# Patient Record
Sex: MALE | Race: WHITE | Employment: OTHER | URBAN - METROPOLITAN AREA
[De-identification: names, ages, dates, MRNs, and addresses within clinical notes are randomized per-mention and may not be internally consistent; named-entity substitution may affect disease eponyms.]

---

## 2017-05-27 PROBLEM — J96.21 ACUTE ON CHRONIC RESPIRATORY FAILURE WITH HYPOXIA (HCC): Status: ACTIVE | Noted: 2017-05-27

## 2017-05-27 PROBLEM — A41.9 SEPSIS (HCC): Status: ACTIVE | Noted: 2017-05-27

## 2017-05-27 PROBLEM — E43 SEVERE PROTEIN-ENERGY MALNUTRITION (HCC): Chronic | Status: ACTIVE | Noted: 2017-05-27

## 2017-05-27 PROBLEM — R33.8 ACUTE RETENTION OF URINE: Status: ACTIVE | Noted: 2017-05-27

## 2017-05-27 PROBLEM — F17.200 TOBACCO DEPENDENCE: Status: ACTIVE | Noted: 2017-05-27
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
## 2017-05-27 PROBLEM — J18.9 HAP (HOSPITAL-ACQUIRED PNEUMONIA): Status: ACTIVE | Noted: 2017-05-27

## 2017-05-27 PROBLEM — J44.0 ACUTE BRONCHITIS WITH COPD (HCC): Status: ACTIVE | Noted: 2017-05-27

## 2017-05-27 PROBLEM — J20.9 ACUTE BRONCHITIS WITH COPD (HCC): Status: ACTIVE | Noted: 2017-05-27

## 2017-07-08 PROBLEM — I95.1 ORTHOSTATIC HYPOTENSION: Status: ACTIVE | Noted: 2017-07-08

## 2017-07-08 PROBLEM — D64.9 ANEMIA: Status: ACTIVE | Noted: 2017-07-08

## 2017-07-08 PROBLEM — J44.9 COPD (CHRONIC OBSTRUCTIVE PULMONARY DISEASE) (HCC): Status: ACTIVE | Noted: 2017-05-27

## 2018-03-20 ENCOUNTER — OFFICE VISIT (OUTPATIENT)
Dept: PALLATIVE CARE | Age: 63
End: 2018-03-20
Payer: COMMERCIAL

## 2018-03-20 VITALS
DIASTOLIC BLOOD PRESSURE: 62 MMHG | BODY MASS INDEX: 23.42 KG/M2 | WEIGHT: 167.9 LBS | HEART RATE: 87 BPM | SYSTOLIC BLOOD PRESSURE: 110 MMHG | OXYGEN SATURATION: 95 %

## 2018-03-20 DIAGNOSIS — G89.3 CHRONIC PAIN DUE TO NEOPLASM: Primary | ICD-10-CM

## 2018-03-20 PROCEDURE — 99214 OFFICE O/P EST MOD 30 MIN: CPT | Performed by: NURSE PRACTITIONER

## 2018-03-20 PROCEDURE — 99212 OFFICE O/P EST SF 10 MIN: CPT

## 2018-03-20 RX ORDER — FENTANYL 25 UG/H
1 PATCH TRANSDERMAL
Qty: 10 PATCH | Refills: 0 | Status: SHIPPED | OUTPATIENT
Start: 2018-03-20 | End: 2018-04-19

## 2018-03-20 NOTE — PROGRESS NOTES
Palliative Medicine Outpatient Visit  3/20/2018    Provider: Gus Dahl MD        Referring Provider: Dr. Lorena Gregg    Reason for Consult:  [] [address]  [x] Assist with goals of care  [] Transition of care  [x] Symptom Management    See below for recommendations, as indicated, concernin. Advanced Care Planning  2. Anticipatory Guidance  3. Transition of Care  4. Symptom Management      CC: Pain    HPI:   As per Dr. Truman Leonard assessment on 16:   Mr. Robin James is a pleasant and cooperative 61year old man with a recent diagnosis of stage III NSCLC (probable) with impending cord compression. We recommend a concurrent approach to definitive management of this locally advanced non small cell lung cancer [chemo dosing per [address] record- see EMR]. Based on the clinical characteristic, this disease and stage is generally considered unresectable; optimal therapy tends to be a concurrent chemo-RT approach. Concomitant chemo-RT compared with sequential chemo-RT improved overall survival, primarily through better locoregional control, at the cost of manageable increases in acute esophageal toxicity as based on the [place]perin metanalysis. These data demonstrate a benefit of concomitant chemo-RT over sequential chemo->RT on OS (HR 0.84, SS), with absolute benefit at 3-years of 5.7% (18% to 24%), 5-years 4.5% (11% to 15%). Interestingly, there was no difference in PFS (HR 0.9, p=0.07). However a decrease in locoregional progression (HR 0.777, SS), with absolute decrease of 6% at 5 years (35% to 29%) was shown. There was no difference on distant progression (HR 1.04, NS), with 5-year rate of about 40% Aris Beat Oncol 2010 May 1;28(13):4810-2437). Regarding the radiation dose, 60 Gy in 2Gy/fx was established long ago in RTOG 73-01, with several other trials showing a feasibility of much higher doses however with RT alone.  Interestingly in the concurrent era, RTOG 0617 tested higher dose arms (Concurrent RT + Carbo/Taxol +/- Cetuximab) these were closed early with \"negative\" results (longer term results and POFA needed), therefor 60 Gy in daily fractions with dual agent chemotherapy can be considered the standard (the Brooke and LAMP trials also assisted in the development of this paradigm). Fractionated external beam radiation therapy may or may not be delivered with intensity modulation +/- image guidance per daily cone beam depending on the specific patient anatomy and dose constraints as described per the RTOG and QUANTEC ---Ragini London, Int J Radiat Oncol Biol Phys. 2010 Mar 1;76(3 Suppl):S10-9. The risks, benefits, alternatives, process and logistics of external beam radiation were reviewed (risks include but are not limited to worsening PULM function, esophagitis, esophageal structure, perforations, bleeding, paralysis and death). We answered all of the patient's questions to the best of our ability. Dom verbalized understanding and seemed satisfied. Radiation planning will commence within < 24 hours; the next step in management being the simulation scan, with external beam radiation to commence in a timely fashion thereafter. It was a pleasure meeting Katie Stoll today and we appreciate the referral and opportunity to be involved in his care. We had an extensive discussion today regarding the course to date (including a focused review of the applicable radiographic and laboratory information), multidisciplinary approach to cancer care, and indications for external beam radiation therapy as a component therein. A literature review and multidisciplinary discussion was performed after seeing this patient due to the complexity of the medical decision making in this case. I personally spent greater than 60 minutes with this patient and performed the complete history and physical as above at today's visit, at least 45 minutes was in direct  regarding disease management.    *this pt will be simmed and likely begin treatment prior to a tissue diagnosis. It is my clinical judgement the pre test probability for malignancy is > 95 % and there may be significant functional decline in the time necessary to maintain a tissue diagnosis due to the primary tumor directly extending into the spinal column). I specifically verbalized all of this with Deb Blunt, his wife Alan Manzo, and his daughter Sybil Liang all of who individually verbalized consent to RT treatment to begin prior to a tissue diagnosis. Peer reviewed requested. Del[facility] Pinzon MED referral -- Dr. Melida Flores reccommended --- pt will still pursue aggressive active treatment however this is a pain management consultation request.  *will hold off on DEX or any new pain meds until he sees Dr. Melida Flores but we could start him of DEX in FND occurs or Sx worsens  -coordinated care with Dr. Carol Desai this AM 4/25/16. We will treat the primary tumor and spinal column initially with considerations for second and third \"boost\" plans (nodes involved, primary boost etc.) in conjunction with CHEMO pending the workup / PET. [facility].        Past Medical History:   Diagnosis Date    Acute bronchitis with COPD ([facility] 5/27/2017    Apnea     Arthritis     COPD (chronic obstructive pulmonary disease) (HCC)     Depression with anxiety     Emphysema of lung ([facility]     Encounter for antineoplastic immunotherapy     HAP (hospital-acquired pneumonia) 5/27/2017    Hyperlipidemia     Lung cancer ([facility] 04/11/2016    chemo and radiation    Paralyzed vocal cords     Thrombosis of testis     Tobacco dependence 5/27/2017       Past Surgical History:   Procedure Laterality Date    BACK SURGERY      KNEE ARTHROSCOPY      LUNG BIOPSY Left 5/6/2016    OTHER SURGICAL HISTORY  4/15/2016    cervical myelogram    SINUS SURGERY      TRACHEOSTOMY  05/24/2017    TRACHEOSTOMY  05/24/2017       Current Outpatient Prescriptions on File Prior to Visit   Medication Sig Dispense Refill    LORazepam no LAD, no JVD, supple, no masses, normal speech, trachea midline, tracheostomy tube with speaking valve present   Lungs: bilaterally clear to auscultation, good aeration, symmetric  Heart: regular rate and rhythm, no murmurs/rubs/gallops/ectopy appreciated, PMI WNL  Abd.: non-distended, soft, nontender, non-distended, normal bowel sounds and PEG site without sign of infection. Ext.: no clubbing, cyanosis or edema, no joint tenderness  Skin: warm, adequate hydration without acute lesions  Neuro: awake, alert, oriented x 3, conversive,     [place] Symptom Assessment Score   [place] Score 3/20/2018 1/23/2018 11/28/2017 10/31/2017 10/3/2017   Pain Score 3 2 4 3 3   Tiredness Score 6 5 3 8 6   Nausea Score Not nauseated Not nauseated Not nauseated Not nauseated Not nauseated   Depression Score 3 3 2 2 2   Anxiety Score 3 3 3 3 5   Drowsiness Score 7 5 3 7 3   Appetite Score 8 6 4 6 5   Wellbeing Score 4 4 3 6 2   Dyspnea Score 4 4 2 4 3   Other Problem Score 4 4 4 Best possible response 3   Total Assessment Score(calculated) 42 36 28 39 32       Impression:  As per Dr. Ciro Leblanc assessment on 04/25/16:   Mr. Juan Alberto Garland is a pleasant and cooperative 61year old man with a recent diagnosis of stage III NSCLC (probable) with impending cord compression. We recommend a concurrent approach to definitive management of this locally advanced non small cell lung cancer [chemo dosing per [address] record- see EMR]. Based on the clinical characteristic, this disease and stage is generally considered unresectable; optimal therapy tends to be a concurrent chemo-RT approach. Concomitant chemo-RT compared with sequential chemo-RT improved overall survival, primarily through better locoregional control, at the cost of manageable increases in acute esophageal toxicity as based on the Auperin metanalysis.  These data demonstrate a benefit of concomitant chemo-RT over sequential chemo->RT on OS (HR 0.84, SS), with absolute benefit at pounds since his last visit. Patient states he continues to awaken about every 2 hours at night secondary to pain. Patient is interested in taking something help with the pain so he does not have to depend on the Percocet as above. Wife states that Pamelor seems to help with his mood, appetite. Patient is starting short-term disability next week and will be seeing Dr. Kalyan Del Castillo this week to review the biopsy results and to plan for chemotherapy. Options were discussed and today we are stopping the Decadron and will initiate Mobic 7.5 mg 1 p.o. q.12 hours 60 tablets with 2 refills, increasing the Pamelor to 25 mg q.h.s. 30 tablets with 2 refills, increasing the Neurontin to 900 mg t.i.d. and he was given prescriptions for 90 tablets of each capsule with 2 refills. We are continuing his Percocet in which he has 2 weeks left and prescribed him 90 tablets of Percocet 5-325 milligram tablets but making that q.4 hours p.r.n. instead of q.6 hours p.r.n.   We will see him back in 4 weeks or sooner if he needs us.  06/14/16:  [place] Symptom Assessment Scale                                             Date:   Pain  [] 0  None  [] 1  [] 2  [] 3  [] 4  [] 5  [x] 6  [] 7  [] 8  [] 9  [] 10  Worst   Tiredness  [] 0  None  [] 1  [] 2  [] 3  [x] 4  [] 5  [] 6  [] 7  [] 8  [] 9  [] 10  Worst   Nausea  [x] 0  None  [] 1  [] 2  [] 3  [] 4  [] 5  [] 6  [] 7  [] 8  [] 9  [] 10  Worst   Depression  [] 0  None  [] 1  [x] 2  [] 3  [] 4  [] 5  [] 6  [] 7  [] 8  [] 9  [] 10  Worst   Anxiety  [] 0  None  [x] 1  [] 2  [] 3  [] 4  [] 5  [] 6  [] 7  [] 8  [] 9  [] 10  Worst   Drowsiness  [] 0  None  [x] 1  [] 2  [] 3  [] 4  [] 5  [] 6  [] 7  [] 8  [] 9  [] 10  Worst   Appetite  [] 0  None  [] 1  [] 2  [] 3  [] 4  [] 5  [x] 6  [] 7  [] 8  [] 9  [] 10  Worst   Wellbeing  [] 0  None  [] 1  [] 2  [x] 3  [] 4  [] 5  [] 6  [] 7  [] 8  [] 9  [] 10  Worst   Shortness of Breath  [] 0  None  [] 1  [x] 2  [] 3  [] 4  [] 5  [] 6  [] 7  [] 8  [] 9  [] 10  Worst   Constipation  [] 0  None  [] 1  [] 2  [] 3  [] 4  [] 5  [] 6  [] 7  [x] 8  [] 9  [] 10  Worst   Completed By:  [x]  Patient Report  []  Caregiver/Family  []  Nurse/Staff  Patient is currently a:  [x]   Palliative Medicine Patient  []   Hospice Patient  Medical records reviewed and as per Dr. Xavier Nissen assessment on 06/07/16:  Felix Correa is a 61y.o. year old male here for follow up. He had hid CT guided biopsy done on 5/6 /2016. The results showed Pulmonary adenocarcinoma, moderately to poorly differentiated, invasive, acinar type. He completed his course of XRT and currently is going under chemotherapy . He has done 2/6 cycles. He is tolerating it relatively well. Has not lost any weight. Pain is well controlled. 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 Will follow repeat imaging at that time  H/O nicotine dependence:  Probable COPD  PFTs in future(next office visit)  Patient presents to the exam room with a primary complaint of left arm neuropathic pain and numbness which is worse with decreased gripping strength to his left hand. Patient stated that he finished radiation therapy approximately 10 days ago and also has more of a coarse voice, morning cough productive of phlegm and a mild amount of dysphasia if he eats too quickly. Patient denies nausea or vomiting. Patient only using Carafate infrequently, Percocet 5-325 milligram tablets on the average of 3 a day sometimes 4. Patient states that the Percocet helps approximately % and does not cause oversedation. Patient will also be taking dexamethasone 8 mg a day ×3 days surrounding his chemotherapy every 3 weeks which will start on June 21. Patient also started folic acid 1 mg daily 2 weeks ago.   Options were discussed and although I see no signs of candidiasis orally I suspect this esophageal involvement therefore starting patient on Diflucan 100 mg daily ×14 days and a prescription for 14 tablets with one refill given today. Patient was also complaining of constipation of small hard bowel movements moving his bowels every 2-3 days therefore we are doubling his Dolly-Colace at 2 tablets b.i.d. 120 tablets with 2 refills given today. Patient also given a prescription for Percocet unchanged 5-325 milligram tablets 1 p.o. q.6 hours p.r.n. ×120 tablets today. We will see him back in 2 weeks or sooner if he needs us and in the future secondary to neuropathic complaints we may consider methadone. 06/28/16:  [place] Symptom Assessment Scale                                             Date:   Pain  [] 0  None  [] 1  [] 2  [x] 3  [] 4  [] 5  [] 6  [] 7  [] 8  [] 9  [] 10  Worst   Tiredness  [] 0  None  [x] 1  [] 2  [] 3  [] 4  [] 5  [] 6  [] 7  [] 8  [] 9  [] 10  Worst   Nausea  [x] 0  None  [] 1  [] 2  [] 3  [] 4  [] 5  [] 6  [] 7  [] 8  [] 9  [] 10  Worst   Depression  [] 0  None  [x] 1  [] 2  [] 3  [] 4  [] 5  [] 6  [] 7  [] 8  [] 9  [] 10  Worst   Anxiety  [] 0  None  [x] 1  [] 2  [] 3  [] 4  [] 5  [] 6  [] 7  [] 8  [] 9  [] 10  Worst   Drowsiness  [] 0  None  [x] 1  [] 2  [] 3  [] 4  [] 5  [] 6  [] 7  [] 8  [] 9  [] 10  Worst   Appetite  [] 0  None  [] 1  [] 2  [] 3  [] 4  [] 5  [x] 6  [] 7  [] 8  [] 9  [] 10  Worst   Wellbeing  [] 0  None  [] 1  [] 2  [x] 3  [] 4  [] 5  [] 6  [] 7  [] 8  [] 9  [] 10  Worst   Shortness of Breath  [] 0  None  [x] 1  [] 2  [] 3  [] 4  [] 5  [] 6  [] 7  [] 8  [] 9  [] 10  Worst   Constipation  [] 0  None  [] 1  [] 2  [x] 3  [] 4  [] 5  [] 6  [] 7  [] 8  [] 9  [] 10  Worst   Completed By:  [x]  Patient Report  []  Caregiver/Family  []  Nurse/Staff  Patient is currently a:  [x]   Palliative Medicine Patient  []   Hospice Patient  Medical records reviewed and patient finished radiation therapy as per Dr. Derrell Torres recommendations.  Patient TMJ.  Patient also finished a Z-Eugenio by his PCP one week ago which did not help. Patient also complains of worsening left hand numbness and tingling. Patient states that he took 2 Percocet tablets last night which did help with the pain but then also made him sleepy. Patient compliant with his other medications and states that initially with the Diflucan his throat pain improved some but not significantly. Patient did state that he has a difficult time swallowing at times. Options were discussed and today we are increasing his Cymbalta to 30 mg b.i.d. ×60 tablets and 1 refill electronically prescribed. Patient will continue his Neurontin 900 mg t.i.d.  Today we initiated Duragesic 12 µg patch q.72 hours as directed ×10 patches and prescribed unchanged his Percocet 5-325 milligram tablets 1 p.o. q.6 hours p.r.n. ×120 tablets prescribed today. Patient will be seen back in 4 weeks or sooner and at this time consider increasing the Cymbalta, Percocet dosage, Duragesic patch and reassessing for thrush which is not present today. He will call if symptoms increase or if he has difficulty with the Duragesic in which she was reluctant to accept. 11/15/16:  Medical records reviewed and spirometry on 10/24/16:  DATA: Spirometry done in the office today demonstrates an FVC of 4.17 liters which is 85 % of predicted with an FEV1 of 3.10 liters which is 83 % of predicted. FEV1/FVC ratio is 74 %. Mid expiratory flow rates are 78% of predicted. Maximum voluntary ventilation is normal at 106 liters per minute or 74% of predicted. Total lung capacity is 6.83 liters which is 94% of predicted. DLCO is 12.43mm/min/mmHg which is 36% of predicted. Flow volume loop shows no signs of intrathoracic or extrathoracic obstruction. Impressions: No obstructive or restrictive lung disease. Normal total lung capacity.  Severe decrement in diffusing capacity  Patient presents to the exam room today accompanied by his wife at his baseline from 06/26/17: The patient says he is doing better. He is breathing better with the tracheostomy in place and is tolerating tf well through his PEG. He has some redness right around the PEG and has a small amount of drainage. 64y.o. year old male with lung cancer, s/p PEG Instructed on PEG care Will monitor redness for now, does not appear infected Follow up in 6 months  As per pulmonology assessment on 06/19/17:  I had the pleasure of seeing Corrinne Crochet in our office in follow-up regarding his recent Hospital admission with sepsis, MRSA pneumonia and FTT. Patient was discharged from the hospital to [address] [place] completed his course of antibitics and jsut got discahrged few days ago. . Since his hospital discahrge patient reports no shortness of breath no cough. Has better appetite. Patient denies chest pain, cough and hemoptysis. Patient is compliant with his Albuterol. Patient presents today accompanied by his wife looking more comfortable than he had in the past able to speak with a better voice although still raspy with the valve in place. Patient states that when he was at [facility] they decreased him to Duragesic 25 µg q.72 hours and he noticed a significant increase in his joint and muscle aches, left shoulder pain. Patient states that whenever he went home on [address] 15 he added the second patch for a total of 37 µg q.72 hours without sedation and he is comfortable now on his current regimen. Patient states that he is breathing much better after his tracheostomy and swallowing better. Wife is supplementing and has basically a sliding scale on the percentage of oral intake that she uses. Patient states that he was down to 140 pounds and now is up into the 150s. Our records reveal the same.   Patient states that he has noticed some difference with improvement of appetite but more so with sleeping all night now on the Remeron 7.5 mg q.h.s. without sedation but states there is significant room for improvement on his appetite. Patient also compliant with Neurontin 900 mg t.i.d., Cymbalta 40 mg b.i.d., Pamelor 25 mg q.h.s. and very infrequently takes is 0.5 mg q.h.s. Patient denies constipation and states that is moving his bowels well. Options were discussed and we are increasing his Remeron to 15 mg q.h.s. as per Epic and I gave him a prescription for the Duragesic 37 µg q.72 hours 10 patches of each strength. Patient has refills on his Neurontin 900 mg t.i.d., Ativan 0.5 mg q.h.s. p.r.n., Cymbalta 40 mg b.i.d. and Pamelor 25 mg q.h.s. We will continue us and monitor his weight and symptoms. Patient looks much more comfortable today. 07/25/17:  Medical records reviewed and discharge summary on 07/09/17:  Principal Problem:    Orthostatic hypotension  Active Problems:    Panlobular emphysema (HCC)    Malignant neoplasm of overlapping sites of left lung [facility])    Palliative care encounter    COPD (chronic obstructive pulmonary disease) (HCC)    Tobacco dependence    Severe protein-energy malnutrition ([address]    Anemia  Patient presents today looking very weak, tremulous and not as talkative but able to respond and answer in short sentences. Patient over the past 2-3 days as per wife's history has been very weak and with nausea vomiting, dehydration. Patient is being visited by home health but refuses to go back to the hospital stating that he realizes that he needs to go but he does not want to go back to the hospital at this time. Patient just left ENTs office finding a new right sided neck mass. Apparently the ENT discussed with Dr Lily Nunez and they will perform a PET scan to look at the extent of the lesions. Typically patient goes to the blood and cancer Center every 3 weeks but chemotherapy is on hold secondary to the patient's weakness. Options were discussed and we are refilling his medications unchanged.   We are ordering one liter of IV fluids today and via home health will try to get this at least 3 times there's been no change in how he is taking his other medications the patient was started on prednisone 10 mg daily which is helping with his overall feelings and joint aches by medical oncology. Options were discussed and we are decreasing IV fluids to twice weekly and would decrease them further but patient will be soon starting radiation and had significant difficulty swallowing with his initial radiation. We will monitor and decrease the IV fluids down the road if we can keep his oral intake increased. Patient advised to eat and drink as much as he can within limits. I am refilling his Duragesic as per Epic and he will now use the Neurontin 250 mg per 5 ML suspension 750 mg t.i.d.,  Ativan sporadically as per Epic as well as his other medications. We will see him back in 4 weeks or sooner if he needs us. There is no sign of thrush today. 10/03/17:  Medical records reviewed and as per radiation oncology assessment from 09/27/17:  Comments: Dose 3000 cGy to the subglottic area. Patient has no complaints except mild sore throat. He has Magic mouthwash and its helping can eat. On examination the skin over the treated area looks good. Oral cavity shows no evidence of thrush. Tolerating treatment well  Patient presents to the exam room today accompanied by his wife without sign of sedation and/or confusion able to voice his needs. Patient looking better every time I see him and has gained 10 pounds as per our records. Patient states that his pain is controlled on the current regiment of Duragesic 37 µg q.72 hours, gabapentin 900 mg t.i.d that he likes taking better than the suspension. , Cymbalta 20 mg b.i.d., Pamelor 25 mg q.h.s. not needing anything further. Patient is also compliant with Remeron 15 mg q.h.s. and very infrequently uses Ativan 0.5 mg q.h.s. because he is tired in the evenings.   Patient was able to go outside of work around the house for approximately half hour the other day with fatigue unchanged. We will see him back in 8 weeks or sooner if he needs us and he was advised to call us 1 week prior to running out of his Duragesic patches and we will prescribe electronically as he has been wonderfully appropriate and compliant. We will review the throat CT and PET scan as above as well as recommendations from his medical oncologist.  01/23/18:  Medical records reviewed and no documented physician visits in [address] since I last saw him. Patient presents with his wife in no acute distress without sign of sedation and/or confusion able to voice his needs ambulating well. Patient has lost another 2 pounds and still has a decreased appetite despite taking Remeron 15 mg nightly. Patient is working out details with his employer/insurance and there may be a problem with pain from medication soon. Patient complains of significant fatigue and feels that it is the cold weather and wife states that oftentimes he will just sleep all day. There are small projects around the house but he does not feel like doing them. Options were discussed and I feel that his fatigue is from his decreased caloric intake. Patient is resistant to starting his tube feedings as recommended to utilize at least twice a day if he is not eating because he feels that it is a step backwards. I talked to them both in great detail and I challenged him to eat something at least 3 times a day or go back on the tube feedings and they voiced understanding. Today we are increasing his Remeron to 30 mg q.h.s., continuing unchanged Duragesic 37 µg q.72 hours prescribing 10 patches of each strength and I advised him that if these are too expensive to call us and he would consider going back on his Percocet if that is less expensive. He did not want to do that today. Patient will continue unchanged his Cymbalta 30 mg q.12 hours, Neurontin 900 mg t.i.d. and his Pamelor all of which she does not need refills today.   We will see him back in 8

## 2018-03-23 ENCOUNTER — HOSPITAL ENCOUNTER (OUTPATIENT)
Dept: PET IMAGING | Age: 63
Discharge: HOME | End: 2018-03-25
Payer: COMMERCIAL

## 2018-03-23 DIAGNOSIS — C34.12 CANCER OF BRONCHUS OF LEFT UPPER LOBE (HCC): ICD-10-CM

## 2018-03-23 PROCEDURE — 78815 PET IMAGE W/CT SKULL-THIGH: CPT

## 2018-03-23 RX ORDER — FLUDEOXYGLUCOSE F 18 200 MCI/ML
17.2 INJECTION, SOLUTION INTRAVENOUS
Status: DISCONTINUED | OUTPATIENT
Start: 2018-03-23 | End: 2018-03-26 | Stop reason: HOSPADM

## 2018-04-04 ENCOUNTER — TELEPHONE (OUTPATIENT)
Dept: PALLATIVE CARE | Age: 63
End: 2018-04-04

## 2018-05-03 ENCOUNTER — HOSPITAL ENCOUNTER (OUTPATIENT)
Dept: RADIATION ONCOLOGY | Age: 63
Discharge: HOME | End: 2018-05-03
Payer: COMMERCIAL

## 2018-05-03 VITALS
SYSTOLIC BLOOD PRESSURE: 110 MMHG | WEIGHT: 178 LBS | HEART RATE: 80 BPM | DIASTOLIC BLOOD PRESSURE: 70 MMHG | RESPIRATION RATE: 18 BRPM | BODY MASS INDEX: 24.83 KG/M2 | TEMPERATURE: 97.8 F

## 2018-05-03 DIAGNOSIS — C80.1 CANCER (HCC): Primary | ICD-10-CM

## 2018-05-03 PROCEDURE — 99212 OFFICE O/P EST SF 10 MIN: CPT

## 2018-05-03 PROCEDURE — 99213 OFFICE O/P EST LOW 20 MIN: CPT | Performed by: RADIOLOGY

## 2018-05-03 RX ORDER — FENTANYL 25 UG/H
1 PATCH TRANSDERMAL
COMMUNITY
End: 2018-05-04 | Stop reason: SDUPTHER

## 2018-05-03 RX ORDER — DOCUSATE SODIUM 100 MG/1
100 CAPSULE, LIQUID FILLED ORAL 3 TIMES DAILY
COMMUNITY
End: 2018-07-10 | Stop reason: SDUPTHER

## 2018-05-04 ENCOUNTER — APPOINTMENT (OUTPATIENT)
Dept: GENERAL RADIOLOGY | Age: 63
End: 2018-05-04
Payer: COMMERCIAL

## 2018-05-04 ENCOUNTER — HOSPITAL ENCOUNTER (EMERGENCY)
Age: 63
Discharge: HOME | End: 2018-05-04
Payer: COMMERCIAL

## 2018-05-04 VITALS
BODY MASS INDEX: 24.92 KG/M2 | DIASTOLIC BLOOD PRESSURE: 70 MMHG | WEIGHT: 178 LBS | HEART RATE: 94 BPM | TEMPERATURE: 97.8 F | RESPIRATION RATE: 18 BRPM | SYSTOLIC BLOOD PRESSURE: 114 MMHG | HEIGHT: 71 IN | OXYGEN SATURATION: 93 %

## 2018-05-04 DIAGNOSIS — Z43.1 ATTENTION TO G-TUBE (HCC): Primary | ICD-10-CM

## 2018-05-04 DIAGNOSIS — G89.3 CHRONIC PAIN DUE TO NEOPLASM: Primary | ICD-10-CM

## 2018-05-04 DIAGNOSIS — G89.3 CHRONIC PAIN DUE TO NEOPLASM: ICD-10-CM

## 2018-05-04 PROCEDURE — 43760 HC REPLACE G-TUBE: CPT

## 2018-05-04 PROCEDURE — 6360000004 HC RX CONTRAST MEDICATION: Performed by: RADIOLOGY

## 2018-05-04 PROCEDURE — 75984 XRAY CONTROL CATHETER CHANGE: CPT

## 2018-05-04 PROCEDURE — 99283 EMERGENCY DEPT VISIT LOW MDM: CPT

## 2018-05-04 RX ORDER — FENTANYL 25 UG/H
1 PATCH TRANSDERMAL
Qty: 10 PATCH | Refills: 0 | Status: SHIPPED | OUTPATIENT
Start: 2018-05-04 | End: 2018-06-13 | Stop reason: SDUPTHER

## 2018-05-04 RX ORDER — FENTANYL 12 UG/H
1 PATCH TRANSDERMAL
Qty: 10 PATCH | Refills: 0 | Status: SHIPPED | OUTPATIENT
Start: 2018-05-04 | End: 2018-05-04 | Stop reason: SDUPTHER

## 2018-05-04 RX ORDER — FENTANYL 25 UG/H
1 PATCH TRANSDERMAL
Qty: 10 PATCH | Refills: 0 | Status: SHIPPED | OUTPATIENT
Start: 2018-05-04 | End: 2018-05-04 | Stop reason: SDUPTHER

## 2018-05-04 RX ORDER — FENTANYL 12 UG/H
1 PATCH TRANSDERMAL
Qty: 10 PATCH | Refills: 0 | Status: SHIPPED | OUTPATIENT
Start: 2018-05-04 | End: 2018-06-13 | Stop reason: SDUPTHER

## 2018-05-04 RX ADMIN — DIATRIZOATE MEGLUMINE AND DIATRIZOATE SODIUM 60 ML: 660; 100 LIQUID ORAL; RECTAL at 19:16

## 2018-05-07 ENCOUNTER — CARE COORDINATION (OUTPATIENT)
Dept: CARE COORDINATION | Age: 63
End: 2018-05-07

## 2018-05-15 ENCOUNTER — OFFICE VISIT (OUTPATIENT)
Dept: PALLATIVE CARE | Age: 63
End: 2018-05-15
Payer: COMMERCIAL

## 2018-05-15 VITALS
DIASTOLIC BLOOD PRESSURE: 71 MMHG | SYSTOLIC BLOOD PRESSURE: 106 MMHG | OXYGEN SATURATION: 92 % | HEART RATE: 89 BPM | BODY MASS INDEX: 24.85 KG/M2 | WEIGHT: 178.2 LBS

## 2018-05-15 DIAGNOSIS — R53.83 FATIGUE, UNSPECIFIED TYPE: ICD-10-CM

## 2018-05-15 DIAGNOSIS — Z51.5 PALLIATIVE CARE ENCOUNTER: ICD-10-CM

## 2018-05-15 DIAGNOSIS — T45.1X5A CHEMOTHERAPY-INDUCED PERIPHERAL NEUROPATHY (HCC): ICD-10-CM

## 2018-05-15 DIAGNOSIS — G89.3 CHRONIC PAIN DUE TO NEOPLASM: Primary | ICD-10-CM

## 2018-05-15 DIAGNOSIS — G62.0 CHEMOTHERAPY-INDUCED PERIPHERAL NEUROPATHY (HCC): ICD-10-CM

## 2018-05-15 DIAGNOSIS — G47.00 INSOMNIA, UNSPECIFIED TYPE: ICD-10-CM

## 2018-05-15 DIAGNOSIS — C34.82 MALIGNANT NEOPLASM OF OVERLAPPING SITES OF LEFT LUNG (HCC): ICD-10-CM

## 2018-05-15 DIAGNOSIS — J43.1 PANLOBULAR EMPHYSEMA (HCC): ICD-10-CM

## 2018-05-15 DIAGNOSIS — G90.2: ICD-10-CM

## 2018-05-15 DIAGNOSIS — C34.12 PANCOAST TUMOR, LEFT (HCC): ICD-10-CM

## 2018-05-15 PROCEDURE — 99212 OFFICE O/P EST SF 10 MIN: CPT | Performed by: FAMILY MEDICINE

## 2018-05-15 PROCEDURE — 99215 OFFICE O/P EST HI 40 MIN: CPT | Performed by: FAMILY MEDICINE

## 2018-05-15 RX ORDER — LORAZEPAM 0.5 MG/1
0.5 TABLET ORAL NIGHTLY PRN
Qty: 30 TABLET | Refills: 2 | Status: SHIPPED | OUTPATIENT
Start: 2018-05-15 | End: 2018-08-15 | Stop reason: SDUPTHER

## 2018-05-16 ENCOUNTER — HOSPITAL ENCOUNTER (OUTPATIENT)
Dept: PHYSICAL THERAPY | Age: 63
Setting detail: THERAPIES SERIES
Discharge: HOME | End: 2018-05-16
Payer: COMMERCIAL

## 2018-05-16 PROCEDURE — 97162 PT EVAL MOD COMPLEX 30 MIN: CPT

## 2018-05-16 PROCEDURE — G8981 BODY POS CURRENT STATUS: HCPCS

## 2018-05-16 PROCEDURE — G8982 BODY POS GOAL STATUS: HCPCS

## 2018-05-18 ENCOUNTER — HOSPITAL ENCOUNTER (OUTPATIENT)
Dept: PHYSICAL THERAPY | Age: 63
Setting detail: THERAPIES SERIES
Discharge: HOME | End: 2018-05-18
Payer: COMMERCIAL

## 2018-05-18 PROCEDURE — 97110 THERAPEUTIC EXERCISES: CPT

## 2018-05-21 ENCOUNTER — OFFICE VISIT (OUTPATIENT)
Dept: SURGERY | Age: 63
End: 2018-05-21
Payer: COMMERCIAL

## 2018-05-21 ENCOUNTER — HOSPITAL ENCOUNTER (OUTPATIENT)
Dept: PHYSICAL THERAPY | Age: 63
Setting detail: THERAPIES SERIES
Discharge: HOME | End: 2018-05-21
Payer: COMMERCIAL

## 2018-05-21 VITALS
HEART RATE: 76 BPM | WEIGHT: 179 LBS | HEIGHT: 71 IN | BODY MASS INDEX: 25.06 KG/M2 | DIASTOLIC BLOOD PRESSURE: 60 MMHG | TEMPERATURE: 98 F | OXYGEN SATURATION: 98 % | RESPIRATION RATE: 16 BRPM | SYSTOLIC BLOOD PRESSURE: 86 MMHG

## 2018-05-21 DIAGNOSIS — Z43.1 PEG (PERCUTANEOUS ENDOSCOPIC GASTROSTOMY) ADJUSTMENT/REPLACEMENT/REMOVAL (HCC): Primary | ICD-10-CM

## 2018-05-21 PROCEDURE — 99213 OFFICE O/P EST LOW 20 MIN: CPT | Performed by: SURGERY

## 2018-05-21 PROCEDURE — 97110 THERAPEUTIC EXERCISES: CPT

## 2018-05-21 RX ORDER — ALBUTEROL SULFATE 0.63 MG/3ML
1 SOLUTION RESPIRATORY (INHALATION) EVERY 6 HOURS PRN
Status: ON HOLD | COMMUNITY
End: 2018-09-21 | Stop reason: HOSPADM

## 2018-05-23 ENCOUNTER — HOSPITAL ENCOUNTER (OUTPATIENT)
Dept: PHYSICAL THERAPY | Age: 63
Setting detail: THERAPIES SERIES
Discharge: HOME | End: 2018-05-23
Payer: COMMERCIAL

## 2018-05-23 PROCEDURE — 97110 THERAPEUTIC EXERCISES: CPT

## 2018-05-30 ENCOUNTER — HOSPITAL ENCOUNTER (OUTPATIENT)
Dept: PHYSICAL THERAPY | Age: 63
Setting detail: THERAPIES SERIES
Discharge: HOME | End: 2018-05-30
Payer: COMMERCIAL

## 2018-05-30 PROCEDURE — 97110 THERAPEUTIC EXERCISES: CPT

## 2018-06-01 ENCOUNTER — HOSPITAL ENCOUNTER (OUTPATIENT)
Dept: PHYSICAL THERAPY | Age: 63
Setting detail: THERAPIES SERIES
Discharge: HOME | End: 2018-06-01
Payer: COMMERCIAL

## 2018-06-01 PROCEDURE — 97110 THERAPEUTIC EXERCISES: CPT

## 2018-06-05 ENCOUNTER — APPOINTMENT (OUTPATIENT)
Dept: PHYSICAL THERAPY | Age: 63
End: 2018-06-05
Payer: COMMERCIAL

## 2018-06-14 ENCOUNTER — HOSPITAL ENCOUNTER (OUTPATIENT)
Dept: PHYSICAL THERAPY | Age: 63
Setting detail: THERAPIES SERIES
Discharge: HOME | End: 2018-06-14
Payer: COMMERCIAL

## 2018-06-14 PROCEDURE — 97110 THERAPEUTIC EXERCISES: CPT

## 2018-06-19 ENCOUNTER — HOSPITAL ENCOUNTER (OUTPATIENT)
Dept: PHYSICAL THERAPY | Age: 63
Setting detail: THERAPIES SERIES
Discharge: HOME | End: 2018-06-19
Payer: COMMERCIAL

## 2018-06-19 PROCEDURE — 97110 THERAPEUTIC EXERCISES: CPT

## 2018-06-22 ENCOUNTER — HOSPITAL ENCOUNTER (OUTPATIENT)
Dept: PHYSICAL THERAPY | Age: 63
Setting detail: THERAPIES SERIES
Discharge: HOME | End: 2018-06-22
Payer: COMMERCIAL

## 2018-06-22 PROCEDURE — 97110 THERAPEUTIC EXERCISES: CPT

## 2018-06-27 ENCOUNTER — HOSPITAL ENCOUNTER (OUTPATIENT)
Dept: PHYSICAL THERAPY | Age: 63
Setting detail: THERAPIES SERIES
Discharge: HOME | End: 2018-06-27
Payer: COMMERCIAL

## 2018-06-27 PROCEDURE — 97110 THERAPEUTIC EXERCISES: CPT

## 2018-07-03 ENCOUNTER — HOSPITAL ENCOUNTER (OUTPATIENT)
Dept: PHYSICAL THERAPY | Age: 63
Setting detail: THERAPIES SERIES
Discharge: HOME | End: 2018-07-03
Payer: COMMERCIAL

## 2018-07-03 PROCEDURE — G8983 BODY POS D/C STATUS: HCPCS

## 2018-07-03 PROCEDURE — G8981 BODY POS CURRENT STATUS: HCPCS

## 2018-07-03 PROCEDURE — G8982 BODY POS GOAL STATUS: HCPCS

## 2018-07-03 PROCEDURE — 97110 THERAPEUTIC EXERCISES: CPT

## 2018-07-10 ENCOUNTER — OFFICE VISIT (OUTPATIENT)
Dept: PALLATIVE CARE | Age: 63
End: 2018-07-10
Payer: COMMERCIAL

## 2018-07-10 VITALS
BODY MASS INDEX: 25.29 KG/M2 | SYSTOLIC BLOOD PRESSURE: 108 MMHG | OXYGEN SATURATION: 93 % | WEIGHT: 181.3 LBS | HEART RATE: 94 BPM | DIASTOLIC BLOOD PRESSURE: 74 MMHG

## 2018-07-10 DIAGNOSIS — T45.1X5A CHEMOTHERAPY-INDUCED PERIPHERAL NEUROPATHY (HCC): ICD-10-CM

## 2018-07-10 DIAGNOSIS — G47.00 INSOMNIA, UNSPECIFIED TYPE: ICD-10-CM

## 2018-07-10 DIAGNOSIS — R53.83 FATIGUE, UNSPECIFIED TYPE: ICD-10-CM

## 2018-07-10 DIAGNOSIS — C34.82 MALIGNANT NEOPLASM OF OVERLAPPING SITES OF LEFT LUNG (HCC): ICD-10-CM

## 2018-07-10 DIAGNOSIS — G89.3 CHRONIC PAIN DUE TO NEOPLASM: Primary | ICD-10-CM

## 2018-07-10 DIAGNOSIS — Z51.5 PALLIATIVE CARE ENCOUNTER: ICD-10-CM

## 2018-07-10 DIAGNOSIS — J43.1 PANLOBULAR EMPHYSEMA (HCC): ICD-10-CM

## 2018-07-10 DIAGNOSIS — C34.12 PANCOAST TUMOR, LEFT (HCC): ICD-10-CM

## 2018-07-10 DIAGNOSIS — G90.2: ICD-10-CM

## 2018-07-10 DIAGNOSIS — G62.0 CHEMOTHERAPY-INDUCED PERIPHERAL NEUROPATHY (HCC): ICD-10-CM

## 2018-07-10 PROCEDURE — 99212 OFFICE O/P EST SF 10 MIN: CPT | Performed by: FAMILY MEDICINE

## 2018-07-10 PROCEDURE — 99214 OFFICE O/P EST MOD 30 MIN: CPT | Performed by: FAMILY MEDICINE

## 2018-07-10 RX ORDER — NORTRIPTYLINE HYDROCHLORIDE 25 MG/1
25 CAPSULE ORAL NIGHTLY
Qty: 180 CAPSULE | Refills: 1 | Status: SHIPPED | OUTPATIENT
Start: 2018-07-10 | End: 2019-01-28 | Stop reason: SDUPTHER

## 2018-07-10 RX ORDER — DOCUSATE SODIUM 100 MG/1
100 CAPSULE, LIQUID FILLED ORAL 3 TIMES DAILY
Qty: 270 CAPSULE | Refills: 3 | Status: SHIPPED | OUTPATIENT
Start: 2018-07-10 | End: 2019-07-05

## 2018-07-10 RX ORDER — FENTANYL 25 UG/H
1 PATCH TRANSDERMAL
Qty: 10 PATCH | Refills: 0 | Status: SHIPPED | OUTPATIENT
Start: 2018-07-10 | End: 2018-09-10 | Stop reason: SDUPTHER

## 2018-07-10 RX ORDER — MIRTAZAPINE 30 MG/1
30 TABLET, FILM COATED ORAL NIGHTLY
Qty: 90 TABLET | Refills: 1 | Status: SHIPPED | OUTPATIENT
Start: 2018-07-10 | End: 2018-12-03 | Stop reason: SDUPTHER

## 2018-07-10 RX ORDER — FENTANYL 12 UG/H
1 PATCH TRANSDERMAL
Qty: 10 PATCH | Refills: 0 | Status: SHIPPED | OUTPATIENT
Start: 2018-07-10 | End: 2018-09-10 | Stop reason: SDUPTHER

## 2018-07-10 NOTE — PROGRESS NOTES
Palliative Medicine Outpatient Visit  7/10/2018    Provider: Mattie Gitelman MD        Referring Provider: Dr. Sandeep Westbrook    Reason for Consult:  [] [address]  [x] Assist with goals of care  [] Transition of care  [x] Symptom Management    See below for recommendations, as indicated, concernin. Advanced Care Planning  2. Anticipatory Guidance  3. Transition of Care  4. Symptom Management      CC: Pain    HPI:   As per Dr. Maykel Casanova assessment on 16:   Mr. Erasto Izaguirre is a pleasant and cooperative 61year old man with a recent diagnosis of stage III NSCLC (probable) with impending cord compression. We recommend a concurrent approach to definitive management of this locally advanced non small cell lung cancer [chemo dosing per [address] record- see EMR]. Based on the clinical characteristic, this disease and stage is generally considered unresectable; optimal therapy tends to be a concurrent chemo-RT approach. Concomitant chemo-RT compared with sequential chemo-RT improved overall survival, primarily through better locoregional control, at the cost of manageable increases in acute esophageal toxicity as based on the [place] metanalysis. These data demonstrate a benefit of concomitant chemo-RT over sequential chemo->RT on OS (HR 0.84, SS), with absolute benefit at 3-years of 5.7% (18% to 24%), 5-years 4.5% (11% to 15%). Interestingly, there was no difference in PFS (HR 0.9, p=0.07). However a decrease in locoregional progression (HR 0.777, SS), with absolute decrease of 6% at 5 years (35% to 29%) was shown. There was no difference on distant progression (HR 1.04, NS), with 5-year rate of about 40% [facility] Oncol 2010 May 1;28(13):2349-3050). Regarding the radiation dose, 60 Gy in 2Gy/fx was established long ago in RTOG 73-01, with several other trials showing a feasibility of much higher doses however with RT alone.  Interestingly in the concurrent era, RTOG 0617 tested higher Allergies:    Augmentin [amoxicillin-pot clavulanate]    ROS: UNLESS STATED ABOVE PATIENT DENIES:  CONSTITUTIONAL:  fever, chill, rigors, nausea, vomiting, fatigue. HEENT: blurry vision, double vision, hearing problem, tinnitus, hoarseness, dysphagia,               odynophagia  RESPIRATORY: cough, shortness of breath, sputum expectoration. CARDIOVASCULAR:  Chest pain/pressure, palpitation, syncope, irregular beats  GASTROINTESTINAL:  abdominal or rectal pain, diarrhea, constipation, . GENITOURINARY:  Burning, frequency, urgency, incontinence, discharge  INTEGUMENTARY: rash, wound, pruritis  HEMATOLOGIC/LYMPHATIC:  Swelling, sores, gum bleeding, easy bruising, pica. MUSCULOSKELETAL:  pain, edema, joint swelling or redness  NEUROLOGICAL:  light headed, dizziness, loss of consciousness, weakness, change                                   in memory, seizures, tremors    Social history:  Social History     Social History    Marital status:      Spouse name: N/A    Number of children: N/A    Years of education: N/A     Occupational History   [employer]- SSI      disability short term     Social History Main Topics    Smoking status: Current Every Day Smoker     Packs/day: 1.00     Years: 44.00     Types: Cigarettes     Last attempt to quit: 6/11/2017    Smokeless tobacco: Never Used      Comment: 2 cig today; declines tob tx program    Alcohol use No    Drug use: No    Sexual activity: Not on file     Other Topics Concern    Not on file     Social History Narrative    Works at [employer]. Lives in [facility]. Family history:  Family History   Problem Relation Age of Onset    Cancer Mother         breast cancer    Cancer Father         prostate cancer    Diabetes Father            PE: Vitals: There were no vitals filed for this visit.     Gen: WD, WN, NAD, awake, alert, able to voice their needs/thoughts   HEENT: normocephalic, atraumatic, sclera nonicteric, personally spent greater than 60 minutes with this patient and performed the complete history and physical as above at today's visit, at least 45 minutes was in direct  regarding disease management. *this pt will be simmed and likely begin treatment prior to a tissue diagnosis. It is my clinical judgement the pre test probability for malignancy is > 95 % and there may be significant functional decline in the time necessary to maintain a tissue diagnosis due to the primary tumor directly extending into the spinal column). I specifically verbalized all of this with Elvin Aviles, his wife Dayron Solano, and his daughter Stanislaw all of who individually verbalized consent to RT treatment to begin prior to a tissue diagnosis. Peer reviewed requested. Rhonda Mullen MED referral -- Dr. Clarissa Jamil reccommended --- pt will still pursue aggressive active treatment however this is a pain management consultation request.  *will hold off on DEX or any new pain meds until he sees Dr. Clarissa Jamil but we could start him of DEX in FND occurs or Sx worsens  -coordinated care with Dr. Murphy Lenjazmin this AM 4/25/16. We will treat the primary tumor and spinal column initially with considerations for second and third \"boost\" plans (nodes involved, primary boost etc.) in conjunction with CHEMO pending the workup / PET. MRI brain ordered by [facility].   05/03/16:  OARRS report reviewed today revealing Norco prescriptions since May 2015 with the last being ×60 tablets for 5325 milligram tablets filled on 04/26/16 prescribed by Dave Anderson from [facility]. Ultram ×40 tablets on 12/24/15.   [place] Symptom Assessment Scale                                             Date:   Pain  [] 0  None  [] 1  [] 2  [] 3  [] 4  [] 5  [] 6  [] 7  [] 8  [] 9  [x] 10  Worst   Tiredness  [] 0  None  [] 1  [] 2  [] 3  [] 4  [] 5  [] 6  [] 7  [x] 8  [] 9  [] 10  Worst   Nausea  [] 0  None  [x] 1  [] 2  [] 3  [] 4  [] 5  [] 6  [] 7  [] 8  [] 9  [] 10  Worst   Depression  [] the left forehead consistent with a Pancoast tumor and Vipin's syndrome. Patient states that he has had chronic pain for the past 2 years with cervical fusion one year ago. Patient works midnights and is having difficulty sleeping secondary to the pain only sleeping 2-3 hours a night. Patient also complains of a poor appetite, nausea without vomiting, significant constipation now moving his bowels every 3-4 days consistent with hard bowel movements, mild imbalance upon transitioning from sitting to standing which is self-limiting within seconds to minutes. Patient symptoms over the past several weeks he has tolerated well without symptoms Neurontin 300 mg t.i.d., Norco which he states does not help at all 5325 milligrams tablets sometimes every 2 hours without help. Patient states that he has been on ibuprofen over the past year and now has escalated his dosage to 2200 mg at a time several times a day. Patient denies stomach upset. Patient also takes Zantac 150 mg b.i.d. Patient had been on a Medrol Dosepak one year ago without side effects. Concerning the patient's treatment patient is going for a biopsy this Friday, will be starting chemotherapy with Dr. Song Davies in which she has an appointment on May 19, and has begun radiation as per Dr. Roberto Ayers last Wednesday which will occur 5 times a week through June 2 and then reevaluation. Patient was introduced to Palliative Medicine enmeshment with our team.  Patient signed a pain contract, given a prescription for a urine drug screen and denies any illicit or street drugs. We also discussed the possibility of short-term versus long-term disability in which he is resistant to but I advised that with the conditions that he has both chronic and acute as well as the treatment but I would recommend pursuing disability. Options were discussed and today I made the following changes:  Discontinue Norco, Neurontin 300 mg t.i.d., ibuprofen.   Today we increased his results and to plan for chemotherapy. Options were discussed and today we are stopping the Decadron and will initiate Mobic 7.5 mg 1 p.o. q.12 hours 60 tablets with 2 refills, increasing the Pamelor to 25 mg q.h.s. 30 tablets with 2 refills, increasing the Neurontin to 900 mg t.i.d. and he was given prescriptions for 90 tablets of each capsule with 2 refills. We are continuing his Percocet in which he has 2 weeks left and prescribed him 90 tablets of Percocet 5325 milligram tablets but making that q.4 hours p.r.n. instead of q.6 hours p.r.n. We will see him back in 4 weeks or sooner if he needs us.  06/14/16:  [place] Symptom Assessment Scale                                             Date:   Pain  [] 0  None  [] 1  [] 2  [] 3  [] 4  [] 5  [x] 6  [] 7  [] 8  [] 9  [] 10  Worst   Tiredness  [] 0  None  [] 1  [] 2  [] 3  [x] 4  [] 5  [] 6  [] 7  [] 8  [] 9  [] 10  Worst   Nausea  [x] 0  None  [] 1  [] 2  [] 3  [] 4  [] 5  [] 6  [] 7  [] 8  [] 9  [] 10  Worst   Depression  [] 0  None  [] 1  [x] 2  [] 3  [] 4  [] 5  [] 6  [] 7  [] 8  [] 9  [] 10  Worst   Anxiety  [] 0  None  [x] 1  [] 2  [] 3  [] 4  [] 5  [] 6  [] 7  [] 8  [] 9  [] 10  Worst   Drowsiness  [] 0  None  [x] 1  [] 2  [] 3  [] 4  [] 5  [] 6  [] 7  [] 8  [] 9  [] 10  Worst   Appetite  [] 0  None  [] 1  [] 2  [] 3  [] 4  [] 5  [x] 6  [] 7  [] 8  [] 9  [] 10  Worst   Wellbeing  [] 0  None  [] 1  [] 2  [x] 3  [] 4  [] 5  [] 6  [] 7  [] 8  [] 9  [] 10  Worst   Shortness of Breath  [] 0  None  [] 1  [x] 2  [] 3  [] 4  [] 5  [] 6  [] 7  [] 8  [] 9  [] 10  Worst   Constipation  [] 0  None  [] 1  [] 2  [] 3  [] 4  [] 5  [] 6  [] 7  [x] 8  [] 9  [] 10  Worst   Completed By:  [x]  Patient Report  []  Caregiver/Family  []  Nurse/Staff  Patient is currently a:  [x]   Palliative Medicine Patient  []   Hospice Patient  Medical records reviewed and as per Dr. Bindu Kirkpatrick assessment on 06/07/16:  Mica Cruz is a 61y.o. year old male here for follow up.   He had hid CT guided biopsy done on 5/6 /2016. The results showed Pulmonary adenocarcinoma, moderately to poorly differentiated, invasive, acinar type. He completed his course of XRT and currently is going under chemotherapy . He has done 2/6 cycles. He is tolerating it relatively well. Has not lost any weight. Pain is well controlled. 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 Will follow repeat imaging at that time  H/O nicotine dependence:  Probable COPD  PFTs in future(next office visit)  Patient presents to the exam room with a primary complaint of left arm neuropathic pain and numbness which is worse with decreased gripping strength to his left hand. Patient stated that he finished radiation therapy approximately 10 days ago and also has more of a coarse voice, morning cough productive of phlegm and a mild amount of dysphasia if he eats too quickly. Patient denies nausea or vomiting. Patient only using Carafate infrequently, Percocet 5325 milligram tablets on the average of 3 a day sometimes 4. Patient states that the Percocet helps approximately % and does not cause oversedation. Patient will also be taking dexamethasone 8 mg a day ×3 days surrounding his chemotherapy every 3 weeks which will start on June 21. Patient also started folic acid 1 mg daily 2 weeks ago. Options were discussed and although I see no signs of candidiasis orally I suspect this esophageal involvement therefore starting patient on Diflucan 100 mg daily ×14 days and a prescription for 14 tablets with one refill given today. Patient was also complaining of constipation of small hard bowel movements moving his bowels every 2-3 days therefore we are doubling his Dolly-Colace at 2 tablets b.i.d. 120 tablets with 2 refills given today.   Patient also given a prescription for Percocet unchanged 5325 milligram tablets 1 p.o. q.6 hours p.r.n. ×120 tablets today. We will see him back in 2 weeks or sooner if he needs us and in the future secondary to neuropathic complaints we may consider methadone. 06/28/16:  [place] Symptom Assessment Scale                                             Date:   Pain  [] 0  None  [] 1  [] 2  [x] 3  [] 4  [] 5  [] 6  [] 7  [] 8  [] 9  [] 10  Worst   Tiredness  [] 0  None  [x] 1  [] 2  [] 3  [] 4  [] 5  [] 6  [] 7  [] 8  [] 9  [] 10  Worst   Nausea  [x] 0  None  [] 1  [] 2  [] 3  [] 4  [] 5  [] 6  [] 7  [] 8  [] 9  [] 10  Worst   Depression  [] 0  None  [x] 1  [] 2  [] 3  [] 4  [] 5  [] 6  [] 7  [] 8  [] 9  [] 10  Worst   Anxiety  [] 0  None  [x] 1  [] 2  [] 3  [] 4  [] 5  [] 6  [] 7  [] 8  [] 9  [] 10  Worst   Drowsiness  [] 0  None  [x] 1  [] 2  [] 3  [] 4  [] 5  [] 6  [] 7  [] 8  [] 9  [] 10  Worst   Appetite  [] 0  None  [] 1  [] 2  [] 3  [] 4  [] 5  [x] 6  [] 7  [] 8  [] 9  [] 10  Worst   Wellbeing  [] 0  None  [] 1  [] 2  [x] 3  [] 4  [] 5  [] 6  [] 7  [] 8  [] 9  [] 10  Worst   Shortness of Breath  [] 0  None  [x] 1  [] 2  [] 3  [] 4  [] 5  [] 6  [] 7  [] 8  [] 9  [] 10  Worst   Constipation  [] 0  None  [] 1  [] 2  [x] 3  [] 4  [] 5  [] 6  [] 7  [] 8  [] 9  [] 10  Worst   Completed By:  [x]  Patient Report  []  Caregiver/Family  []  Nurse/Staff  Patient is currently a:  [x]   Palliative Medicine Patient  []   Hospice Patient  Medical records reviewed and patient finished radiation therapy as per Dr. Lona Tapia recommendations. Patient states that he continues to take the Percocet every 6-8 hours which continues to help. Patient states he has three quarters of a bottle left. Patient had a second opinion at a cancer center and here they are keeping him on his initial chemotherapy for another 3 weeks and then restarting the second course. Patient will stop the dexamethasone and folic acid until his chemotherapy agent changes.   Patient smiling and he's doing well overall. Eating well. Coughing occasionally with phlegm - light brown in color. Denies fever. SOB with yard work only. Following with Dr Mihir Kirkland for palliative med, states that pain is well controlled with pain medications he is taking. Pt is following with Dr Gabrielle Chino for medical oncology. Seen last week, continues on chemo. Tolerating chemo well. CT scan planned after next chemo cycle per patient report. Met with phycians in [facility] for med onc for second opinion. States that he had an MRI/CT scan at that time (approx 3 weeks after radiation was completed) which showed (per patient report) no tumor shrinkage but pt states that he understands radiation is continuing to work. No report available at this time. Planning to meet with a neurosurgeon at [facility] to discuss options for vertebral fractures from tumor per patient report. Appt with Dr Rachel Tucker ([facility]) in October. Patient is doing well post-radiation completion. Explained to patient that he would need clearance from Dr Giorgio Ca and Dr Gabrielle Chino prior to surgery being completed especially to the irradiated area. Verbalized understanding. CT scan re-imaging per Dr Gabrielle Chino. Pt's previous imaging done at [facility]. Asked patient to have results of reimaging faxed to us as well when it is done. I discussed follow up plans with Zulma Resendez. At this time, Dr Giorgio Ca will see the patient back in 3 months for a post-radiation completion follow-up visit. Zulma Resendez is to follow up with other physicians involved in their care as directed (including but not limited to Medical Oncology, Primary Care, Pulmonary, and Surgery). Patient evaluated unaccompanied in the exam room by his wife with no sign of confusion and/or sedation able to voices needs. Patient states that he continues with increasing neuropathic pain, numbness and tingling involving his left arm and shoulder is now more distally as well as left mid to upper back pain.   Patient takes Percocet unchanged approximately every 6-8 hours and is compliant with the Neurontin, Pamelor. Patient last week started physical therapy initially 2 times a week which will drop to once a week for a total of 6 weeks. Patient also was placed on an anabiotic as well as Diflucan ending 3 weeks ago for a throat infection which she feels cleared. Patient was also placed on a Medrol Dosepak by his oncologist which did not improve his left arm and back symptoms. Patient states that his back pain had improved significantly since all of his treatments. 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 Patient will be starting a new chemotherapy tomorrow as well as steroids therapy along with this. Options were discussed and patient desires something in addition to the above medications for his polyneuropathy symptoms. Patient has been on Lexapro 10 mg daily by his PCP for the past 3-4 years which she feels as helps. I gave the patient a prescription for Cymbalta 20 mg daily 30 tablets with one refill as well as refills on his other medications as above unchanged. Patient has 104 Percocet tablets left that we had prescribed via phone conversation. Patient advised to discuss the Lexapro and Cymbalta with his PCP Dr. Gaby Mike since he has seen him in the past for this condition. I advised patient that we would not be best case scenario if he took both together and they voiced understanding. We will see him back in 4 weeks or sooner if they need us. 08/30/16:  Medical records reviewed and as per Dr. Viraj Palomo on 08/29/16:  -2 phase RT (initial = emergent) / locally advanced lung CA involving 2 vertebral bodies  Gayle Ornelas has completed radiation treatments to the left lung mass invading the spinal column, with high energy photons using an conformal technique at [address] [place] [address].  The patient received a total dose of 38 Gy in 16 fractions plus a 12 GY (6 sign of sedation and/or confusion able to voice his needs accompanied by his wife. Patient complaining of sinus congestion, sore throat, raspy voice and recently saw his PCP who placed him on amoxicillin yesterday 500 mg t.i.d. Patient states that other than this he is feeling better with better control neuropathy on current regiment and off the Lexapro by his PCP. Patient reviewed how he is taking his medications which is appropriate and denies other new symptoms. Options were discussed and we are continuing the same medication regiment prescribing Percocet 53 and a 25 mg tablets today 1 p.o. q.6 hours p.r.n. in which he is taking crocs 23 a day ×120 tablets. Patient states that he has 11 days remaining of his Percocet. Patient has enough of the Cymbalta, gabapentin and was prescribed Diflucan 100 mg daily ×14 days with one refill. We will see him back in 4 weeks or sooner if he needs us. 10/24/16:   Medical records reviewed and as per Dr. Arina Hackett on 10/14/16:  Chiqui Beverly is well known to me since the emergent R and concurrent course was completed. His spine pain is reduced. CHEMO continues. No early delayed RT effects. All questions answered. TOB cessation discussed again. FU with [facility] as planned and with me in 3 months or sooner PRN. Patient presents to the exam room today without sign of sedation but less jovial looking more uncomfortable for follow-up complaining of right ear and right cheek pain stating that he went to urgent care Friday and was diagnosed with what he knows he has always had, TMJ. Patient also finished a Z-Eugenio by his PCP one week ago which did not help. Patient also complains of worsening left hand numbness and tingling. Patient states that he took 2 Percocet tablets last night which did help with the pain but then also made him sleepy.   Patient compliant with his other medications and states that initially with the Diflucan his throat pain improved some but not significantly. Patient did state that he has a difficult time swallowing at times. Options were discussed and today we are increasing his Cymbalta to 30 mg b.i.d. ×60 tablets and 1 refill electronically prescribed. Patient will continue his Neurontin 900 mg t.i.d.  Today we initiated Duragesic 12 µg patch q.72 hours as directed ×10 patches and prescribed unchanged his Percocet 5325 milligram tablets 1 p.o. q.6 hours p.r.n. ×120 tablets prescribed today. Patient will be seen back in 4 weeks or sooner and at this time consider increasing the Cymbalta, Percocet dosage, Duragesic patch and reassessing for thrush which is not present today. He will call if symptoms increase or if he has difficulty with the Duragesic in which she was reluctant to accept. 11/15/16:  Medical records reviewed and spirometry on 10/24/16:  DATA: Spirometry done in the office today demonstrates an FVC of 4.17 liters which is 85 % of predicted with an FEV1 of 3.10 liters which is 83 % of predicted. FEV1/FVC ratio is 74 %. Mid expiratory flow rates are 78% of predicted. Maximum voluntary ventilation is normal at 106 liters per minute or 74% of predicted. Total lung capacity is 6.83 liters which is 94% of predicted. DLCO is 12.43mm/min/mmHg which is 36% of predicted. Flow volume loop shows no signs of intrathoracic or extrathoracic obstruction. Impressions: No obstructive or restrictive lung disease. Normal total lung capacity. Severe decrement in diffusing capacity  Patient presents to the exam room today accompanied by his wife at his baseline with no sign of sedation and/or confusion. Patient states that since his oncologist increased his Duragesic to two 12 µg patches his pain has improved approximately 75%. Patient states that he was able to decrease his Percocet to approximately 3 daily on the average. Patient has not noticed much of a difference since we increased his Cymbalta 1 month ago.   Patient states that a left chest pain pain is controlled on current regiment and he is noting fatigue and some depression secondary to not sleeping well secondary to the cough. Options were discussed and we refilled his Duragesic unchanged ×5 patches each strength, Dolly-Colace and prescribed Ativan 0.5 mg tablets 1/2-1 tablet q.h.s. p.r.n. ×30 tablets. We will see him back in 1 month or sooner if he needs us. 02/06/17:  Medical records reviewed and as per radiation oncology assessment from 01/30/17:  -pt seen To discuss hoarseness and CT findings  -Miranda Miranda was asked to come in today to review the negative CT findings. He and hoarseness and I reviewed the RT treatment noting the laryngeal mean dose constraints WERE met. 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 CT reviewed with pt and wife. TOB cessations discussed, again at length. -FU with Dr. Frannie Glez for systemic immunotherapy and re-staging. -MM for Sx. Patient presents to the exam room today accompanied by his wife looking well in no acute distress without sign of sedation and/or confusion. Patient states that Ativan 0.5 mg q.h.s. help significantly with his sleep without sedation during the day. Patient also is compliant with his other medications without change stating that his pain is much better controlled. Patient reviews with me his visit with Dr. Charles Lutz and CAT scan. Patient states there is no change in how he is using his Duragesic other medications and requesting refill on Ativan, Duragesic and Cymbalta. Options were discussed and patient was provided with prescriptions for Ativan, Duragesic, Cymbalta all unchanged and we will see him back in 4 weeks or sooner if they need us. Patient is very comfortable with his symptom control currently and looks well.  03/06/17:  Medical records reviewed and no documented patient visits in Epic since I have seen him.   CT of soft tissue neck from 01/27/17:  1. Irregular opacities seen at medial aspect left lung apex and left   upper lobe suggestive of area of treated lung cancer and radiation   changes.       2. Previously seen lytic lesions within upper thoracic vertebral   bodies and left second rib are now sclerotic which is suggestive of   treated bone metastases.       3. Larynx appears unremarkable. Vocal folds are symmetric.       4. No evidence of cervical lymphadenopathy.       5. Slight limitation to this examination due to metallic artifact from   anterior fusion hardware associated with cervical spine from C3-C7. Patient presents today in no acute distress without sign of sedation and/or confusion. Patient plane of right sided scapular, shoulder, arm symptoms that are similar to his left side over the past several weeks. Patient denies falls or traumas, difficulty breathing or any other symptoms other than left hand dryness worse than his right with fissures noted on the extensor surfaces of the index MCP and ring finger MCP without sign of infection. Patient states he has always had difficulty with dry hands more now. Patient states there's been no change otherwise to how he is taking his medications. Patient will be seeing oncology tomorrow in reviewing the CAT scan results. Patient complains of sometimes decreased ability to sleep secondary to discomfort in his hands and shoulders. Options were discussed and patient resistant to increasing his Duragesic secondary to fatigue also stating that on the days that he is working long hours he has increasing fatigue. Wife also states that he is not been eating is much in our records reveal a 3 pound weight loss. We refilled unchanged his Duragesic 37 µg q.72 hours with 10 patches of each strength, he has enough of the Ativan and today we increased his Cymbalta to 40 mg bid as per Epic to help with his neuropathic symptoms.   I talked to them about continued moisturization to his hands but also smoke cigarettes. I am concerned about an acute pulmonary event and I suggested he take time off from work. He should definitively quit smoking. He and his wife voiced understanding. Thank you for allowing me to participate in Mr. Hadley Amin evaluation. As per H&P while an inpatient on 05/03/17:  HISTORY OF PRESENT ILLNESS: The patient is a 57-year-old  male with  history of COPD and adenocarcinoma of the lung who presented to the Emergency  Room with shortness of breath and hoarse voice that worsened throughout the  day of admission. He does have a history of subglottic airway narrowing and  has been seen by ENT for it. He denies any issues with chest pain. He feels  like something is stuck in his throat. He had breathing treatments in the  Emergency Room which were of very little help. He has finished chemotherapy  approximately the end of 11/2016 and now is getting immunotherapy. His last  immunotherapy treatment was 2 weeks ago. The next one is scheduled for  05/09/2017. He was seen in consultation by ENT. Tracheostomy was offered,  but the patient declined. He was also seen in consultation by Pulmonary. His  chest x-ray on admission shows a possible right lung infiltrate. The patient  was started on Pulmicort and Perforomist aerosol treatments along with  DuoNebs. Respiratory cultures and viral panels were obtained. DIAGNOSTIC IMPRESSION:  1. Bilateral vocal cord palsy. 2. History of chronic obstructive pulmonary disease, not in acute  exacerbation. 3. History of nonsmall cell lung CA, currently receiving immunotherapy. 4. Subglottic narrowing. 5. Tobacco dependency. 6. Depression. 7. Hypercholesterolemia. As per ENT consult while an inpatient on 05/02/17:  Patient is a 65 yo male, history of COPD and lung adenocarcinoma, that presented to the ED late last night with complaints of shortness of breath and hoarse voice that worsened throughout the day yesterday.  He has history of subglottic 45 minutes about differences in emergent versus elective tracheostomy and answered their questions. Wife has been wanting the patient to quit work for quite some time and patient has been resistant admitting today that he feels as long as he can work in the cancer is not beating him. Patient is also worried about insurance coverage once he quits work. We had a long very cynthia discussion and patient admits that [facility] has to go\". Patient feels that over the next month he needs to make steps to end his employment for his health. I advised him to talk to social work, insurance, his work about options for [employer] and they voiced understanding. Patient's wife has researched different treatments 1 especially that they are only performing at [facility] and animals concerning electrical stimulation of vocal cords. She does admit that treatments are several years off as she has been told by them. Concerning the treatment including a tracheostomy of his specific case she will consider what his specialists above are recommending and gather information on details of the trach/treatment. He will then make his decision based on their recommendations. Patient states that he needs to have a tracheostomy then he would but he wants to know if there are any other options. Patient did talk about not having an appetite and we are initiating today Remeron 7.5 mg q.h.s. as per Epic and will see him back in 4 weeks or sooner if he needs us. 06/27/17:   Medical records reviewed and as per general surgery assessment from 06/26/17: The patient says he is doing better. He is breathing better with the tracheostomy in place and is tolerating tf well through his PEG. He has some redness right around the PEG and has a small amount of drainage.   64y.o. year old male with lung cancer, s/p PEG Instructed on PEG care Will monitor redness for now, does not appear infected Follow up in 6 months  As per pulmonology assessment on 06/19/17:  I had the pleasure of seeing Chapin Phan in our office in follow-up regarding his recent Hospital admission with sepsis, MRSA pneumonia and FTT. Patient was discharged from the hospital to [address] completed his course of antibitics and jsut got discahrged few days ago. . Since his hospital discahrge patient reports no shortness of breath no cough. Has better appetite. Patient denies chest pain, cough and hemoptysis. Patient is compliant with his Albuterol. Patient presents today accompanied by his wife looking more comfortable than he had in the past able to speak with a better voice although still raspy with the valve in place. Patient states that when he was at [facility] they decreased him to Duragesic 25 µg q.72 hours and he noticed a significant increase in his joint and muscle aches, left shoulder pain. Patient states that whenever he went home on Nati 15 he added the second patch for a total of 37 µg q.72 hours without sedation and he is comfortable now on his current regimen. Patient states that he is breathing much better after his tracheostomy and swallowing better. Wife is supplementing and has basically a sliding scale on the percentage of oral intake that she uses. Patient states that he was down to 140 pounds and now is up into the 150s. Our records reveal the same. Patient states that he has noticed some difference with improvement of appetite but more so with sleeping all night now on the Remeron 7.5 mg q.h.s. without sedation but states there is significant room for improvement on his appetite. Patient also compliant with Neurontin 900 mg t.i.d., Cymbalta 40 mg b.i.d., Pamelor 25 mg q.h.s. and very infrequently takes is 0.5 mg q.h.s. Patient denies constipation and states that is moving his bowels well. Options were discussed and we are increasing his Remeron to 15 mg q.h.s. as per Epic and I gave him a prescription for the Duragesic 37 µg q.72 hours 10 patches of each strength. palliative medicine outpatient clinic on April 15. As per radiation oncology assessment from 05/03/18:  Mr. Tejal Moreland is a pleasant gentleman well know to me with a history of both lung cancer and laryngeal cancer s/p concurrent chemo-RT for the former and definitve intent fractionated external beam radiation therapy for the latter (with the highest dose the spinal cord could tolerate - see above). He is still on immunotherapy and his KPS is intact at KPS 70 WO new Sx. There is no evidence of disease recurrence or progression based on a detailed review of the available imaging, physical exam, and ROS (all personally performed prior to and during this follow up appointment today). The patient did verbalize understanding for the indications of continued FU including imaging and laboratory evaluation as applicable to this case; as well as appropriate lifestyle choices and health maintenance. All questions answered to the best of my ability. 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 Patient presents with wife ambulatory in no acute distress looking much more comfortable than I previously seen him in the past gaining 11 pounds stating that he is much more active at home with carpentry and working on his cars. Patient reviews with me the increasing pain which is still experiencing describing as neuropathy involving his left leg and left arm requiring an increase of the fentanyl patch as above after we attempted to decrease. Patient compliant with 37 µg q.72 hours but does complain of worsening pain after the secondary the patch. Patient does take Tylenol for breakthrough pain. Patient's #1 complaint is difficulty falling and staying asleep which has been a chronic problem but improved in the wintertime. Patient for many years working midnights and afternoon shift and of course contributing to the problem.   Patient continues to use Ativan 0.5 mg which creates sedation but still with difficulty falling asleep and staying asleep. Patient states that he has good energy throughout the day improved from previous despite difficulty sleeping. Patient currently denies restlessness of his legs as a contributing factor. They also talk about watching a movie when they cannot sleep but will try not doing so. Options were discussed and they will try melatonin 3 mg q.h.s. and call to have any difficulties. Other options down the road may be trazodone, Ambien or other sleep aids. They will call us in a couple of weeks and we may prescribe this. We are continuing Duragesic 37 µg q.72 hours and he will call when he is out of the patches. We are sending the patient to physical therapy to help with the peripheral neuropathy. We will see the patient back in 8 weeks or sooner if they need us. Patient knows that they can call us anytime. 07/10/18:  Medical records reviewed including physical therapy notes and no documented physician visits in [facility] since I last saw him. Patient presents today accompanied by his wife in no acute distress at his baseline without sign of sedation and/or confusion able to voice his needs. Patient states that there is no significant change in how he is doing other than he is sleeping better most nights with melatonin. Patient does complain of pain in his lower back down the back of his legs periodically, sometimes restless legs at night. Patient takes Tylenol for breakthrough pain and is compliant with his Duragesic patc hes having for remaining of each. Patient also requesting a refill on the Colace. Options were discussed and I offered Requip at night for restless legs or changing his medication profile for his lower back pain but he desires to keep everything the same for the time.   I continued his Duragesic 37 µg q.72 hours prescribing 10 patches of each strength, refilled his Colace and he will continue his melatonin q.h.s., other medications unchanged including Ativan 0.5 mg, Remeron 30 mg at at bedtime, continue Cymbalta 30 mg every 12 hours, continue Neurontin 900 mg 3 times a day, and his Pamelor 25 mg at at bedtime. We are extending his appointment out to 12 weeks and he knows to call prior to running out of his fentanyl patches or if he needs anything. Patient congratulated that he is now in remission as per his oncologist.    Controlled Substances Monitoring: The Prescription Monitoring Report for this patient was reviewed today. (Sharron Marroquin MD)  Possible medication side effects, risk of tolerance/dependence & alternative treatments discussed., No signs of potential drug abuse or diversion identified. (Sharron Marroquin MD)  Dose reduction has been attempted., Reviewed the patient's functional status and documentation. , Reestablished informed consent., Functional status reviewed - continues with improved or maintaining ADL's., Treatment objectives documented - patient is progressing appropriately. (Sharron Marroquin MD)  Existing medication contract.  (Sharron Marroquin MD)  Time in minutes:    [] 15   [] 30   [] 45   [] 60   [x] 75   [] 90  Chart review/documentation:  [] 15   [x] 30   [] 45   [] 60   [] 75   [] 90  Assessment:     [x] 15   [] 30   [] 45   [] 60   [] 75   [] 90  Conversation patient/family/staff: [] 15   [x] 30   [] 45   [] 60   [] 75   [] 90    Sharron Marroquin MD    7/10/2018

## 2018-07-24 ENCOUNTER — TELEPHONE (OUTPATIENT)
Dept: PALLATIVE CARE | Age: 63
End: 2018-07-24

## 2018-07-30 ENCOUNTER — HOSPITAL ENCOUNTER (OUTPATIENT)
Dept: PSYCHIATRY | Age: 63
Discharge: HOME | End: 2018-07-30
Payer: COMMERCIAL

## 2018-07-30 PROCEDURE — 94250 HC DIFFUSION: CPT | Performed by: COUNSELOR

## 2018-07-30 PROCEDURE — 99407 BEHAV CHNG SMOKING > 10 MIN: CPT | Performed by: COUNSELOR

## 2018-08-06 ENCOUNTER — HOSPITAL ENCOUNTER (OUTPATIENT)
Dept: PSYCHIATRY | Age: 63
Discharge: HOME | End: 2018-08-06
Payer: COMMERCIAL

## 2018-08-06 PROCEDURE — 94250 HC DIFFUSION: CPT | Performed by: COUNSELOR

## 2018-08-06 PROCEDURE — 99412 PREVENTIVE COUNSELING GROUP: CPT | Performed by: COUNSELOR

## 2018-08-13 ENCOUNTER — HOSPITAL ENCOUNTER (OUTPATIENT)
Dept: PSYCHIATRY | Age: 63
Discharge: HOME | End: 2018-08-13
Payer: COMMERCIAL

## 2018-08-13 PROCEDURE — 94250 HC DIFFUSION: CPT | Performed by: COUNSELOR

## 2018-08-13 PROCEDURE — 99412 PREVENTIVE COUNSELING GROUP: CPT | Performed by: COUNSELOR

## 2018-08-15 DIAGNOSIS — C34.82 MALIGNANT NEOPLASM OF OVERLAPPING SITES OF LEFT LUNG (HCC): ICD-10-CM

## 2018-08-15 RX ORDER — LORAZEPAM 0.5 MG/1
0.5 TABLET ORAL NIGHTLY PRN
Qty: 30 TABLET | Refills: 2 | Status: SHIPPED | OUTPATIENT
Start: 2018-08-15 | End: 2018-11-19 | Stop reason: SDUPTHER

## 2018-08-20 ENCOUNTER — HOSPITAL ENCOUNTER (OUTPATIENT)
Dept: PSYCHIATRY | Age: 63
Discharge: HOME | End: 2018-08-20
Payer: COMMERCIAL

## 2018-08-20 PROCEDURE — 99412 PREVENTIVE COUNSELING GROUP: CPT | Performed by: COUNSELOR

## 2018-08-20 PROCEDURE — 94250 HC DIFFUSION: CPT | Performed by: COUNSELOR

## 2018-08-27 ENCOUNTER — HOSPITAL ENCOUNTER (OUTPATIENT)
Dept: HYPERBARIC MEDICINE | Age: 63
Setting detail: THERAPIES SERIES
Discharge: HOME | End: 2018-08-27
Payer: COMMERCIAL

## 2018-08-27 ENCOUNTER — HOSPITAL ENCOUNTER (OUTPATIENT)
Dept: PSYCHIATRY | Age: 63
Discharge: HOME | End: 2018-08-27
Payer: COMMERCIAL
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
## 2018-08-27 DIAGNOSIS — M27.2 OSTEORADIONECROSIS OF JAW: ICD-10-CM

## 2018-08-27 DIAGNOSIS — I71.40 ABDOMINAL AORTIC ANEURYSM WITHOUT RUPTURE: ICD-10-CM

## 2018-08-27 PROCEDURE — 94250 HC DIFFUSION: CPT | Performed by: COUNSELOR

## 2018-08-27 PROCEDURE — 99412 PREVENTIVE COUNSELING GROUP: CPT | Performed by: COUNSELOR

## 2018-08-27 PROCEDURE — 99213 OFFICE O/P EST LOW 20 MIN: CPT

## 2018-08-27 NOTE — CONSULTS
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, may require debridement of the upper joined the future and also his potential possibility of extraction of the teeth from the mandible and lower jaw and patient was referred for evaluation and consideration to start HBOT treatments, referred by his dentist and oral surgeon    Patient's wife, he attributes, some other problems with the spine and the jaw to receiving [facility]    Patient has completed his radiation treatments, has no more left    Currently he is on, immunotherapy with Farooq Galicia    Patient smokes     Patient currently has a tracheostomy, that is partially plugged in the past did have a PEG tube that was removed    Patient denies any abdominal or back symptoms      Patient denies any focal lateralizing neurological symptoms like loss of speech, vision or loss of function of extremity    Patient can walk a few blocks, and denies any symptoms of rest pain    Allergies   Allergen Reactions    Augmentin [Amoxicillin-Pot Clavulanate] Diarrhea       Current Outpatient Prescriptions   Medication Sig Dispense Refill    LORazepam (ATIVAN) 0.5 MG tablet Take 1 tablet by mouth nightly as needed for Anxiety (hold for sedation) for up to 90 days. . 30 tablet 2    buPROPion (WELLBUTRIN) 75 MG tablet Take 1 tablet by mouth 2 times daily 60 tablet 3    tamsulosin (FLOMAX) 0.4 MG capsule Take 0.4 mg by mouth daily      melatonin 5 MG TABS tablet Take 5 mg by mouth nightly      senna-docusate (PERICOLACE) 8.6-50 MG per tablet Take 2 tablets by mouth 2 times daily as needed for Constipation 120 tablet 5    mirtazapine (REMERON) 30 MG tablet Take 1 tablet by mouth nightly 90 tablet 1    nortriptyline (PAMELOR) 25 MG capsule Take 1 capsule by mouth nightly 180 capsule 1    docusate sodium (COLACE) 100 MG capsule Take 1 capsule by mouth 3 times daily 270 capsule 3    albuterol (ACCUNEB) 0.63 MG/3ML nebulizer solution Take 1 ampule by nebulization every 6 hours as needed for Wheezing      DULoxetine (CYMBALTA) 30 MG extended release capsule Take 1 capsule by mouth 2 times daily (Patient taking differently: Take 60 mg by mouth 2 times daily ) 180 capsule 3    gabapentin (NEURONTIN) 300 MG capsule Take 1 capsule by mouth 3 times daily Along with the 600 mg tabs for a total of 900 mg three times daily 270 capsule 3    gabapentin (NEURONTIN) 600 MG tablet Take 1 tablet by mouth 3 times daily Along with the 300 mg caps for a total of 900 mg three times daily 270 tablet 3    predniSONE (DELTASONE) 10 MG tablet Take 10 mg by mouth daily      chlorhexidine (PERIDEX) 0.12 % solution RINSE WITH 15 MILLILITERS TWICE A DAY AS NEEDED  0    Levothyroxine Sodium 125 MCG CAPS 100 mcg daily  0    Calcium Carb-Cholecalciferol (CALTRATE 600+D3 SOFT PO) Take by mouth      denosumab (XGEVA) 120 MG/1.7ML SOLN SC injection Inject 120 mg into the skin once      esomeprazole (NEXIUM) 40 MG capsule Take 40 mg by mouth nightly       simvastatin (ZOCOR) 40 MG tablet Take 40 mg by mouth nightly       No current facility-administered medications for this encounter.         Past Medical History:   Diagnosis Date    Acute bronchitis with COPD ([facility] 5/27/2017    Apnea     Arthritis     COPD (chronic obstructive pulmonary disease) (HCC)     Depression with anxiety     [facility]     Encounter for antineoplastic immunotherapy     HAP (hospital-acquired pneumonia) 5/27/2017    Hyperlipidemia     Lung cancer ([facility] 04/11/2016    chemo and radiation    Paralyzed questions were answered.       Thank you for letting us participate in the care of your patient    Electronically signed by Anola Burkitt, MD on 8/27/2018 at 6:57 PM

## 2018-08-27 NOTE — PROGRESS NOTES
[facility]   Progress Note    Client Name: Nancy Perez         Treatment Site:   [x][facility]      [] [address] [facility]                      CO level:  13 ppm    Length of Service: 60 minutes   Session Type:    [] individual   [x]Group Client/Staff Ratio: 2:1                                Clients target quit date: 18       Last date of tobacco use: 18  Pharmacotherapy provided this session:   []  NRT: Patch  []21mg . / []14mg.  / []7mg. []  NRT:  Gum []2mg. / []4mg.   x (  )boxes  []  NRT: Lozenge []2mg.  / []4mg.  x (  ) tubes       []  Varenicline []0.5 mg.  [] 1 mg. Wks. (  )   []  Bupropion    Wks. (  )  [x]  Other: ___NRT distribution is suspended____                                                                        Treatment Objectives addressed during the session:       [] Coping Skils [x] Secondhand Smoke Risks   [] Relapse Prevention [] Promote Self Efficacy   [] Addiction [] Enhance Self-Image   [] Stress Management [] Use Self Affirmation   [x] Health and Wellness [] Problem Solving Techniques   []  [] Conflict Resolution/Anger Management      Clients Response to counseling:  [x] Active participant                        [] Passive listener        [] No behavior change, still participating                   [x] Inappropriate: Smoked one cigarette today received from his younger brother. [x] Implemented other strategy/behavior changes: Adopt the mindfulness techniques discussed during this session.   [] Discussed Quit Plan that will be implemented  [] Re-set Quit Date:      Clients Response to Pharmacotherapy:  [] Decreased cravings  [x] Decrease in tobacco use:    [] Maintaining abstinence  [x] No change experienced by client  []        Risk/Benefit Meds education provided to client  []        Adverse reaction (if checked - explain):        Plan of Action:  [] Maintain abstinence  [] Continue treatment;
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
## 2018-08-28 PROBLEM — M27.2 OSTEORADIONECROSIS OF JAW: Status: ACTIVE | Noted: 2018-08-28

## 2018-08-29 ENCOUNTER — HOSPITAL ENCOUNTER (OUTPATIENT)
Dept: GENERAL RADIOLOGY | Age: 63
Discharge: HOME | End: 2018-08-31
Payer: COMMERCIAL

## 2018-08-29 ENCOUNTER — HOSPITAL ENCOUNTER (OUTPATIENT)
Age: 63
Discharge: HOME | End: 2018-08-31
Payer: COMMERCIAL

## 2018-08-29 DIAGNOSIS — J44.9 CHRONIC OBSTRUCTIVE PULMONARY DISEASE, UNSPECIFIED COPD TYPE (HCC): ICD-10-CM

## 2018-08-29 PROCEDURE — 71046 X-RAY EXAM CHEST 2 VIEWS: CPT

## 2018-09-04 ENCOUNTER — HOSPITAL ENCOUNTER (OUTPATIENT)
Dept: HYPERBARIC MEDICINE | Age: 63
Setting detail: THERAPIES SERIES
Discharge: HOME | End: 2018-09-04
Payer: COMMERCIAL

## 2018-09-04 VITALS
SYSTOLIC BLOOD PRESSURE: 110 MMHG | RESPIRATION RATE: 20 BRPM | TEMPERATURE: 97.9 F | HEART RATE: 80 BPM | DIASTOLIC BLOOD PRESSURE: 74 MMHG

## 2018-09-04 PROCEDURE — G0277 HBOT, FULL BODY CHAMBER, 30M: HCPCS

## 2018-09-04 PROCEDURE — 99183 HYPERBARIC OXYGEN THERAPY: CPT

## 2018-09-04 PROCEDURE — 99183 HYPERBARIC OXYGEN THERAPY: CPT | Performed by: SURGERY

## 2018-09-05 ENCOUNTER — HOSPITAL ENCOUNTER (OUTPATIENT)
Dept: HYPERBARIC MEDICINE | Age: 63
Setting detail: THERAPIES SERIES
Discharge: HOME | End: 2018-09-05
Payer: COMMERCIAL

## 2018-09-05 VITALS
DIASTOLIC BLOOD PRESSURE: 67 MMHG | SYSTOLIC BLOOD PRESSURE: 103 MMHG | TEMPERATURE: 98.5 F | HEART RATE: 72 BPM | RESPIRATION RATE: 20 BRPM

## 2018-09-05 PROCEDURE — 99183 HYPERBARIC OXYGEN THERAPY: CPT | Performed by: SURGERY

## 2018-09-06 ENCOUNTER — HOSPITAL ENCOUNTER (OUTPATIENT)
Dept: HYPERBARIC MEDICINE | Age: 63
Setting detail: THERAPIES SERIES
Discharge: HOME | End: 2018-09-06
Payer: COMMERCIAL

## 2018-09-06 ENCOUNTER — HOSPITAL ENCOUNTER (OUTPATIENT)
Dept: PSYCHIATRY | Age: 63
Discharge: HOME | End: 2018-09-06
Payer: COMMERCIAL

## 2018-09-06 VITALS
DIASTOLIC BLOOD PRESSURE: 77 MMHG | SYSTOLIC BLOOD PRESSURE: 141 MMHG | TEMPERATURE: 98.5 F | RESPIRATION RATE: 20 BRPM | HEART RATE: 88 BPM

## 2018-09-06 PROCEDURE — 99183 HYPERBARIC OXYGEN THERAPY: CPT

## 2018-09-06 PROCEDURE — G0277 HBOT, FULL BODY CHAMBER, 30M: HCPCS

## 2018-09-06 PROCEDURE — 94250 HC DIFFUSION: CPT | Performed by: COUNSELOR

## 2018-09-06 PROCEDURE — 99183 HYPERBARIC OXYGEN THERAPY: CPT | Performed by: SURGERY

## 2018-09-06 PROCEDURE — 99412 PREVENTIVE COUNSELING GROUP: CPT | Performed by: COUNSELOR

## 2018-09-06 NOTE — PROGRESS NOTES
\"Coping\" section in the client handbook. []         Triggers / Concerns to be addressed:    [x] Graduated / received aftercare plan    Comments:      []No call/ no show by client.   Phone call to client:   [] Cancelled:      Counselor Signature: IVORY Mao Session Date: 9/6/18  Time: 2:30pm

## 2018-09-07 ENCOUNTER — HOSPITAL ENCOUNTER (OUTPATIENT)
Dept: HYPERBARIC MEDICINE | Age: 63
Setting detail: THERAPIES SERIES
Discharge: HOME | End: 2018-09-07
Payer: COMMERCIAL

## 2018-09-07 VITALS
RESPIRATION RATE: 20 BRPM | HEART RATE: 86 BPM | TEMPERATURE: 98.7 F | DIASTOLIC BLOOD PRESSURE: 71 MMHG | SYSTOLIC BLOOD PRESSURE: 105 MMHG

## 2018-09-07 PROCEDURE — G0277 HBOT, FULL BODY CHAMBER, 30M: HCPCS

## 2018-09-07 PROCEDURE — 99183 HYPERBARIC OXYGEN THERAPY: CPT

## 2018-09-07 PROCEDURE — 99183 HYPERBARIC OXYGEN THERAPY: CPT | Performed by: SURGERY

## 2018-09-10 ENCOUNTER — HOSPITAL ENCOUNTER (OUTPATIENT)
Dept: HYPERBARIC MEDICINE | Age: 63
Setting detail: THERAPIES SERIES
Discharge: HOME | End: 2018-09-10
Payer: COMMERCIAL

## 2018-09-10 ENCOUNTER — TELEPHONE (OUTPATIENT)
Dept: PALLATIVE CARE | Age: 63
End: 2018-09-10

## 2018-09-10 VITALS
HEART RATE: 76 BPM | RESPIRATION RATE: 20 BRPM | TEMPERATURE: 98.4 F | SYSTOLIC BLOOD PRESSURE: 101 MMHG | DIASTOLIC BLOOD PRESSURE: 62 MMHG

## 2018-09-10 DIAGNOSIS — G89.3 CHRONIC PAIN DUE TO NEOPLASM: ICD-10-CM

## 2018-09-10 PROCEDURE — G0277 HBOT, FULL BODY CHAMBER, 30M: HCPCS

## 2018-09-10 PROCEDURE — 99183 HYPERBARIC OXYGEN THERAPY: CPT

## 2018-09-10 PROCEDURE — 99183 HYPERBARIC OXYGEN THERAPY: CPT | Performed by: SURGERY

## 2018-09-10 RX ORDER — FENTANYL 12 UG/H
1 PATCH TRANSDERMAL
Qty: 10 PATCH | Refills: 0 | Status: SHIPPED | OUTPATIENT
Start: 2018-09-10 | End: 2018-10-19 | Stop reason: SDUPTHER

## 2018-09-10 RX ORDER — FENTANYL 25 UG/H
1 PATCH TRANSDERMAL
Qty: 10 PATCH | Refills: 0 | Status: SHIPPED | OUTPATIENT
Start: 2018-09-10 | End: 2018-10-19 | Stop reason: SDUPTHER

## 2018-09-11 ENCOUNTER — HOSPITAL ENCOUNTER (OUTPATIENT)
Dept: HYPERBARIC MEDICINE | Age: 63
Setting detail: THERAPIES SERIES
Discharge: HOME | End: 2018-09-11
Payer: COMMERCIAL

## 2018-09-11 VITALS
DIASTOLIC BLOOD PRESSURE: 83 MMHG | RESPIRATION RATE: 20 BRPM | HEART RATE: 76 BPM | TEMPERATURE: 98.3 F | SYSTOLIC BLOOD PRESSURE: 111 MMHG

## 2018-09-11 PROCEDURE — 99183 HYPERBARIC OXYGEN THERAPY: CPT | Performed by: SURGERY

## 2018-09-11 PROCEDURE — G0277 HBOT, FULL BODY CHAMBER, 30M: HCPCS

## 2018-09-11 PROCEDURE — 99183 HYPERBARIC OXYGEN THERAPY: CPT

## 2018-09-12 ENCOUNTER — HOSPITAL ENCOUNTER (OUTPATIENT)
Dept: HYPERBARIC MEDICINE | Age: 63
Setting detail: THERAPIES SERIES
Discharge: HOME | End: 2018-09-12
Payer: COMMERCIAL

## 2018-09-12 VITALS
RESPIRATION RATE: 20 BRPM | HEART RATE: 72 BPM | SYSTOLIC BLOOD PRESSURE: 138 MMHG | TEMPERATURE: 98.2 F | DIASTOLIC BLOOD PRESSURE: 69 MMHG

## 2018-09-12 PROCEDURE — G0277 HBOT, FULL BODY CHAMBER, 30M: HCPCS

## 2018-09-12 PROCEDURE — 99183 HYPERBARIC OXYGEN THERAPY: CPT

## 2018-09-12 PROCEDURE — 99183 HYPERBARIC OXYGEN THERAPY: CPT | Performed by: SURGERY

## 2018-09-12 NOTE — PROGRESS NOTES
[facility]  Hyperbaric Oxygen Therapy   Progress Note    NAME: Deborah Smith  MEDICAL RECORD NUMBER:  [id number]  AGE: 61 y.o. GENDER: male  : 1955  EPISODE DATE:  2018   Subjective   HBO Treatment Number: 6 out of Total Treatments: 60  HBO Diagnosis:      Indications: Osteoradionecrosis of the Jaw  Safety checks performed prior to treatment by HBOT staff. See doc flowsheets for documentation. Objective       No results for input(s): GLUMET in the last 72 hours. Pre treatment Vital Signs       Temp: 98.5 °F (36.9 °C)     Pulse: 80     Resp: 20     BP: (!) 127/92     Post treatment Vital Signs  Temp: 98.3 °F (36.8 °C)  Pulse: 76  Resp: 20  BP: 111/83  Assessment      Physical Exam:  General Appearance:  alert and oriented to person, place and time, well-developed and well-nourished, in no acute distress  ENT:  tympanic membranes intact bilaterally, normal color, normal light reflex bilaterally  Pulmonary/Chest:  clear to auscultation bilaterally- no wheezes, rales or rhonchi, normal air movement, no respiratory distress and trach in place  Cardiovascular:  regular rate and rhythm  Chamber #: [id number]  Treatment Start Time: 1007     Pressure Reached Time: 1020  JOSHUA Rate: 2  Number of Air Breaks:  Treatment Status: No Air break     Decompression Time: 1151   Treatment End Time: 0202  Length of Treatment: 90 Minutes  Symptoms Noted During Treatment: None    Adverse Event: no    I was present on these premises and immediately available to furnish assistance & direction throughout the procedure. Plan      Deborah Smith is a 61 y.o. male  did successfully complete today's hyperbaric oxygen treatment at [address] [place] [address]. In my clinical judgement, ongoing HBO therapy is  necessary at this time, given a threat to patient function, limb or life from the current condition. Supervision and attendance of Hyperbaric Oxygen Therapy provided.

## 2018-09-12 NOTE — PROGRESS NOTES
[facility]  Hyperbaric Oxygen Therapy   Progress Note    NAME: Deborah Smith  MEDICAL RECORD NUMBER:  [id number]  AGE: 61 y.o. GENDER: male  : 1955  EPISODE DATE:  2018   Subjective   HBO Treatment Number: 2 out of Total Treatments: 60  HBO Diagnosis:      Indications: Osteoradionecrosis of the Jaw  Safety checks performed prior to treatment. See doc flowsheets for documentation. Objective       No results for input(s): GLUMET in the last 72 hours. Pre treatment Vital Signs       Temp: 98.6 °F (37 °C)     Pulse: 72     Resp: 20     BP: 119/69     Post treatment Vital Signs  Temp: 98.5 °F (36.9 °C)  Pulse: 72  Resp: 20  BP: 103/67  Assessment      Physical Exam:  General Appearance:  alert and oriented to person, place and time, well-developed and well-nourished, in no acute distress  ENT:  tympanic membranes intact bilaterally  Pulmonary/Chest:  clear to auscultation bilaterally- no wheezes, rales or rhonchi, normal air movement, no respiratory distress  Cardiovascular:  regular rate and rhythm  Chamber #: [id number]  Treatment Start Time: 1002     Pressure Reached Time: 1016  JOSHUA Rate: 2  Number of Air Breaks:  Treatment Status: No Air break     Decompression Time: 1147   Treatment End Time: 1200  Length of Treatment: 90 Minutes  Symptoms Noted During Treatment: None    Adverse Event: no    I was present on these premises and immediately available to furnish assistance & direction throughout the procedure. Plan      Deborah Smith is a 61 y.o. male  did successfully complete today's hyperbaric oxygen treatment at [address] [place] [address]. In my clinical judgement, ongoing HBO therapy is  necessary at this time, given a threat to patient function, limb or life from the current condition. Supervision and attendance of Hyperbaric Oxygen Therapy provided. Continue HBO treatment as outlined in the treatment plan.     Hyperbaric Oxygen: Alan

## 2018-09-12 NOTE — PROGRESS NOTES
Mauricioo tolerated Treatment Number: 7 well today without complications.     Discharge Instructions were explained and given to Mr. Ovidio Gonzalez     Electronically signed by Susana Castro MD on 9/12/2018 at 5:11 PM

## 2018-09-13 ENCOUNTER — HOSPITAL ENCOUNTER (OUTPATIENT)
Dept: HYPERBARIC MEDICINE | Age: 63
Setting detail: THERAPIES SERIES
Discharge: HOME | End: 2018-09-13
Payer: COMMERCIAL

## 2018-09-13 VITALS
TEMPERATURE: 98.6 F | RESPIRATION RATE: 20 BRPM | DIASTOLIC BLOOD PRESSURE: 63 MMHG | SYSTOLIC BLOOD PRESSURE: 96 MMHG | HEART RATE: 88 BPM

## 2018-09-13 PROCEDURE — 99183 HYPERBARIC OXYGEN THERAPY: CPT | Performed by: SURGERY

## 2018-09-13 PROCEDURE — G0277 HBOT, FULL BODY CHAMBER, 30M: HCPCS

## 2018-09-13 PROCEDURE — 99183 HYPERBARIC OXYGEN THERAPY: CPT

## 2018-09-14 ENCOUNTER — HOSPITAL ENCOUNTER (OUTPATIENT)
Dept: HYPERBARIC MEDICINE | Age: 63
Setting detail: THERAPIES SERIES
Discharge: HOME | End: 2018-09-14
Payer: COMMERCIAL

## 2018-09-14 VITALS
TEMPERATURE: 98.3 F | RESPIRATION RATE: 20 BRPM | HEART RATE: 76 BPM | DIASTOLIC BLOOD PRESSURE: 64 MMHG | SYSTOLIC BLOOD PRESSURE: 126 MMHG

## 2018-09-14 PROCEDURE — 99183 HYPERBARIC OXYGEN THERAPY: CPT

## 2018-09-14 PROCEDURE — 99183 HYPERBARIC OXYGEN THERAPY: CPT | Performed by: SURGERY

## 2018-09-14 PROCEDURE — G0277 HBOT, FULL BODY CHAMBER, 30M: HCPCS

## 2018-09-14 NOTE — PROGRESS NOTES
[facility]  Hyperbaric Oxygen Therapy   Progress Note    NAME: Chandni Younger  MEDICAL RECORD NUMBER:  [id number]  AGE: 61 y.o. GENDER: male  : 1955  EPISODE DATE:  2018   Subjective   HBO Treatment Number: 9 out of Total Treatments: 60  HBO Diagnosis:      Indications: Osteoradionecrosis of the Jaw  Safety checks performed prior to treatment. See doc flowsheets for documentation. Objective       No results for input(s): GLUMET in the last 72 hours. Pre treatment Vital Signs       Temp: 98.8 °F (37.1 °C)     Pulse: 84     Resp: 20     BP: 124/87     Post treatment Vital Signs  Temp: 98.3 °F (36.8 °C)  Pulse: 76  Resp: 20  BP: 126/64  Assessment      Physical Exam:  General Appearance:  alert and oriented to person, place and time, well-developed and well-nourished, in no acute distress  ENT:  tympanic membranes intact bilaterally  Pulmonary/Chest:  clear to auscultation bilaterally- no wheezes, rales or rhonchi, normal air movement, no respiratory distress  Cardiovascular:  regular rate and rhythm  Chamber #: [id number]  Treatment Start Time: 1004     Pressure Reached Time: 1015  JOSHUA Rate: 2  Number of Air Breaks:  Treatment Status: No Air break     Decompression Time: 1145   Treatment End Time: 1155  Length of Treatment: 90 Minutes  Symptoms Noted During Treatment: None    Adverse Event: no    I was present on these premises and immediately available to furnish assistance & direction throughout the procedure. Plan      Chandni Younger is a 61 y.o. male  did successfully complete today's hyperbaric oxygen treatment at [address] [place] [address]. In my clinical judgement, ongoing HBO therapy is  necessary at this time, given a threat to patient function, limb or life from the current condition. Supervision and attendance of Hyperbaric Oxygen Therapy provided. Continue HBO treatment as outlined in the treatment plan.     Hyperbaric Oxygen: Alan Casandra tolerated Treatment Number: 9 well today without complications.     Discharge Instructions were explained and given to  Merlineshilpi Durand     Electronically signed by Camron Salgado MD on 9/14/2018 at 1:15 PM

## 2018-09-14 NOTE — PROGRESS NOTES
HBO treatment as outlined in the treatment plan. Hyperbaric Oxygen: Alan Guajardo tolerated Treatment Number: 8 well today without complications.     Discharge Instructions were explained and given to  Dash Read by HBOT staff    Electronically signed by Emily Avery MD on 9/13/2018 at 7:38 AM

## 2018-09-17 ENCOUNTER — APPOINTMENT (OUTPATIENT)
Dept: CT IMAGING | Age: 63
DRG: 178 | End: 2018-09-17
Payer: COMMERCIAL

## 2018-09-17 ENCOUNTER — APPOINTMENT (OUTPATIENT)
Dept: HYPERBARIC MEDICINE | Age: 63
End: 2018-09-17
Payer: COMMERCIAL

## 2018-09-17 ENCOUNTER — APPOINTMENT (OUTPATIENT)
Dept: GENERAL RADIOLOGY | Age: 63
DRG: 178 | End: 2018-09-17
Payer: COMMERCIAL

## 2018-09-17 ENCOUNTER — HOSPITAL ENCOUNTER (INPATIENT)
Age: 63
LOS: 7 days | Discharge: HOME HEALTH | DRG: 178 | End: 2018-09-24
Attending: EMERGENCY MEDICINE | Admitting: FAMILY MEDICINE
Payer: COMMERCIAL

## 2018-09-17 DIAGNOSIS — J18.9 PNEUMONIA DUE TO ORGANISM: Primary | ICD-10-CM

## 2018-09-17 DIAGNOSIS — Z51.5 PALLIATIVE CARE BY SPECIALIST: ICD-10-CM

## 2018-09-17 LAB
ANION GAP SERPL CALCULATED.3IONS-SCNC: 13 MMOL/L (ref 7–16)
APTT: 37.5 SEC (ref 24.5–35.1)
BASOPHILS ABSOLUTE: 0.05 E9/L (ref 0–0.2)
BASOPHILS RELATIVE PERCENT: 0.5 % (ref 0–2)
BUN BLDV-MCNC: 20 MG/DL (ref 8–23)
CALCIUM SERPL-MCNC: 9.7 MG/DL (ref 8.6–10.2)
CHLORIDE BLD-SCNC: 94 MMOL/L (ref 98–107)
CO2: 25 MMOL/L (ref 22–29)
CREAT SERPL-MCNC: 1.4 MG/DL (ref 0.7–1.2)
EKG ATRIAL RATE: 95 BPM
EKG P AXIS: 66 DEGREES
EKG P-R INTERVAL: 188 MS
EKG Q-T INTERVAL: 342 MS
EKG QRS DURATION: 94 MS
EKG QTC CALCULATION (BAZETT): 429 MS
EKG R AXIS: -27 DEGREES
EKG T AXIS: 9 DEGREES
EKG VENTRICULAR RATE: 95 BPM
EOSINOPHILS ABSOLUTE: 0.28 E9/L (ref 0.05–0.5)
EOSINOPHILS RELATIVE PERCENT: 2.6 % (ref 0–6)
GFR AFRICAN AMERICAN: >60
GFR NON-AFRICAN AMERICAN: 51 ML/MIN/1.73
GLUCOSE BLD-MCNC: 106 MG/DL (ref 74–109)
HCT VFR BLD CALC: 40.2 % (ref 37–54)
HEMOGLOBIN: 13 G/DL (ref 12.5–16.5)
IMMATURE GRANULOCYTES #: 0.06 E9/L
IMMATURE GRANULOCYTES %: 0.6 % (ref 0–5)
INR BLD: 1.5
LYMPHOCYTES ABSOLUTE: 0.7 E9/L (ref 1.5–4)
LYMPHOCYTES RELATIVE PERCENT: 6.5 % (ref 20–42)
MCH RBC QN AUTO: 27.6 PG (ref 26–35)
MCHC RBC AUTO-ENTMCNC: 32.3 % (ref 32–34.5)
MCV RBC AUTO: 85.4 FL (ref 80–99.9)
MONOCYTES ABSOLUTE: 0.48 E9/L (ref 0.1–0.95)
MONOCYTES RELATIVE PERCENT: 4.5 % (ref 2–12)
NEUTROPHILS ABSOLUTE: 9.18 E9/L (ref 1.8–7.3)
NEUTROPHILS RELATIVE PERCENT: 85.3 % (ref 43–80)
PDW BLD-RTO: 15.5 FL (ref 11.5–15)
PLATELET # BLD: 247 E9/L (ref 130–450)
PMV BLD AUTO: 8.9 FL (ref 7–12)
POTASSIUM SERPL-SCNC: 4.7 MMOL/L (ref 3.5–5)
PRO-BNP: 140 PG/ML (ref 0–125)
PROTHROMBIN TIME: 16.9 SEC (ref 9.3–12.4)
RBC # BLD: 4.71 E12/L (ref 3.8–5.8)
SODIUM BLD-SCNC: 132 MMOL/L (ref 132–146)
TROPONIN: <0.01 NG/ML (ref 0–0.03)
WBC # BLD: 10.8 E9/L (ref 4.5–11.5)

## 2018-09-17 PROCEDURE — 87040 BLOOD CULTURE FOR BACTERIA: CPT

## 2018-09-17 PROCEDURE — 96374 THER/PROPH/DIAG INJ IV PUSH: CPT

## 2018-09-17 PROCEDURE — 6360000002 HC RX W HCPCS: Performed by: EMERGENCY MEDICINE

## 2018-09-17 PROCEDURE — 83880 ASSAY OF NATRIURETIC PEPTIDE: CPT

## 2018-09-17 PROCEDURE — 93005 ELECTROCARDIOGRAM TRACING: CPT | Performed by: EMERGENCY MEDICINE

## 2018-09-17 PROCEDURE — 99285 EMERGENCY DEPT VISIT HI MDM: CPT

## 2018-09-17 PROCEDURE — 84484 ASSAY OF TROPONIN QUANT: CPT

## 2018-09-17 PROCEDURE — 80048 BASIC METABOLIC PNL TOTAL CA: CPT

## 2018-09-17 PROCEDURE — 2580000003 HC RX 258: Performed by: EMERGENCY MEDICINE

## 2018-09-17 PROCEDURE — 94760 N-INVAS EAR/PLS OXIMETRY 1: CPT

## 2018-09-17 PROCEDURE — 85610 PROTHROMBIN TIME: CPT

## 2018-09-17 PROCEDURE — 6360000004 HC RX CONTRAST MEDICATION: Performed by: RADIOLOGY

## 2018-09-17 PROCEDURE — 94664 DEMO&/EVAL PT USE INHALER: CPT

## 2018-09-17 PROCEDURE — 71275 CT ANGIOGRAPHY CHEST: CPT

## 2018-09-17 PROCEDURE — 36415 COLL VENOUS BLD VENIPUNCTURE: CPT

## 2018-09-17 PROCEDURE — 85025 COMPLETE CBC W/AUTO DIFF WBC: CPT

## 2018-09-17 PROCEDURE — 2060000000 HC ICU INTERMEDIATE R&B

## 2018-09-17 PROCEDURE — 85730 THROMBOPLASTIN TIME PARTIAL: CPT

## 2018-09-17 PROCEDURE — 71045 X-RAY EXAM CHEST 1 VIEW: CPT

## 2018-09-17 RX ORDER — 0.9 % SODIUM CHLORIDE 0.9 %
1000 INTRAVENOUS SOLUTION INTRAVENOUS ONCE
Status: COMPLETED | OUTPATIENT
Start: 2018-09-17 | End: 2018-09-18

## 2018-09-17 RX ORDER — ALBUTEROL SULFATE 2.5 MG/3ML
2.5 SOLUTION RESPIRATORY (INHALATION) ONCE
Status: COMPLETED | OUTPATIENT
Start: 2018-09-17 | End: 2018-09-17

## 2018-09-17 RX ORDER — 0.9 % SODIUM CHLORIDE 0.9 %
1000 INTRAVENOUS SOLUTION INTRAVENOUS ONCE
Status: COMPLETED | OUTPATIENT
Start: 2018-09-17 | End: 2018-09-17

## 2018-09-17 RX ORDER — ONDANSETRON 2 MG/ML
4 INJECTION INTRAMUSCULAR; INTRAVENOUS ONCE
Status: COMPLETED | OUTPATIENT
Start: 2018-09-17 | End: 2018-09-17

## 2018-09-17 RX ADMIN — CEFEPIME HYDROCHLORIDE 2 G: 2 INJECTION, POWDER, FOR SOLUTION INTRAVENOUS at 20:07

## 2018-09-17 RX ADMIN — ONDANSETRON 4 MG: 2 SOLUTION INTRAMUSCULAR; INTRAVENOUS at 20:07

## 2018-09-17 RX ADMIN — ALBUTEROL SULFATE 2.5 MG: 2.5 SOLUTION RESPIRATORY (INHALATION) at 16:15

## 2018-09-17 RX ADMIN — VANCOMYCIN HYDROCHLORIDE 1250 MG: 10 INJECTION, POWDER, LYOPHILIZED, FOR SOLUTION INTRAVENOUS at 22:57

## 2018-09-17 RX ADMIN — SODIUM CHLORIDE 1000 ML: 9 INJECTION, SOLUTION INTRAVENOUS at 16:36

## 2018-09-17 RX ADMIN — SODIUM CHLORIDE 1000 ML: 9 INJECTION, SOLUTION INTRAVENOUS at 22:57

## 2018-09-17 RX ADMIN — IOPAMIDOL 60 ML: 755 INJECTION, SOLUTION INTRAVENOUS at 18:06

## 2018-09-17 ASSESSMENT — PAIN DESCRIPTION - LOCATION: LOCATION: ABDOMEN

## 2018-09-17 ASSESSMENT — PAIN DESCRIPTION - ORIENTATION: ORIENTATION: LOWER;RIGHT

## 2018-09-17 ASSESSMENT — PAIN SCALES - GENERAL: PAINLEVEL_OUTOF10: 5

## 2018-09-17 ASSESSMENT — PAIN DESCRIPTION - PAIN TYPE: TYPE: ACUTE PAIN

## 2018-09-17 NOTE — ED NOTES
EKG performed. Patient placed on monitor and pulse oximetry.       Singh Huizar, LPN  62/94/39 1843 [facility], CHRISTINE  30/04/95 [id number]

## 2018-09-18 ENCOUNTER — HOSPITAL ENCOUNTER (OUTPATIENT)
Dept: HYPERBARIC MEDICINE | Age: 63
Setting detail: THERAPIES SERIES
End: 2018-09-18
Payer: COMMERCIAL

## 2018-09-18 LAB
ALBUMIN SERPL-MCNC: 3 G/DL (ref 3.5–5.2)
ALP BLD-CCNC: 141 U/L (ref 40–129)
ALT SERPL-CCNC: 15 U/L (ref 0–40)
ANION GAP SERPL CALCULATED.3IONS-SCNC: 11 MMOL/L (ref 7–16)
ANISOCYTOSIS: ABNORMAL
AST SERPL-CCNC: 17 U/L (ref 0–39)
BASOPHILS ABSOLUTE: 0.03 E9/L (ref 0–0.2)
BASOPHILS RELATIVE PERCENT: 0.4 % (ref 0–2)
BILIRUB SERPL-MCNC: 0.6 MG/DL (ref 0–1.2)
BUN BLDV-MCNC: 15 MG/DL (ref 8–23)
CALCIUM SERPL-MCNC: 8.7 MG/DL (ref 8.6–10.2)
CHLORIDE BLD-SCNC: 97 MMOL/L (ref 98–107)
CO2: 24 MMOL/L (ref 22–29)
CREAT SERPL-MCNC: 1.2 MG/DL (ref 0.7–1.2)
EOSINOPHILS ABSOLUTE: 0.27 E9/L (ref 0.05–0.5)
EOSINOPHILS RELATIVE PERCENT: 3.5 % (ref 0–6)
FILM ARRAY ADENOVIRUS: NORMAL
FILM ARRAY BORDETELLA PERTUSSIS: NORMAL
FILM ARRAY CHLAMYDOPHILIA PNEUMONIAE: NORMAL
FILM ARRAY CORONAVIRUS 229E: NORMAL
FILM ARRAY CORONAVIRUS HKU1: NORMAL
FILM ARRAY CORONAVIRUS NL63: NORMAL
FILM ARRAY CORONAVIRUS OC43: NORMAL
FILM ARRAY INFLUENZA A VIRUS 09H1: NORMAL
FILM ARRAY INFLUENZA A VIRUS H1: NORMAL
FILM ARRAY INFLUENZA A VIRUS H3: NORMAL
FILM ARRAY INFLUENZA A VIRUS: NORMAL
FILM ARRAY INFLUENZA B: NORMAL
FILM ARRAY METAPNEUMOVIRUS: NORMAL
FILM ARRAY MYCOPLASMA PNEUMONIAE: NORMAL
FILM ARRAY PARAINFLUENZA VIRUS 1: NORMAL
FILM ARRAY PARAINFLUENZA VIRUS 2: NORMAL
FILM ARRAY PARAINFLUENZA VIRUS 3: NORMAL
FILM ARRAY PARAINFLUENZA VIRUS 4: NORMAL
FILM ARRAY RESPIRATORY SYNCITIAL VIRUS: NORMAL
FILM ARRAY RHINOVIRUS/ENTEROVIRUS: NORMAL
GFR AFRICAN AMERICAN: >60
GFR NON-AFRICAN AMERICAN: >60 ML/MIN/1.73
GLUCOSE BLD-MCNC: 84 MG/DL (ref 74–109)
HCT VFR BLD CALC: 37.2 % (ref 37–54)
HEMOGLOBIN: 11.8 G/DL (ref 12.5–16.5)
IMMATURE GRANULOCYTES #: 0.05 E9/L
IMMATURE GRANULOCYTES %: 0.6 % (ref 0–5)
LYMPHOCYTES ABSOLUTE: 0.41 E9/L (ref 1.5–4)
LYMPHOCYTES RELATIVE PERCENT: 5.3 % (ref 20–42)
MCH RBC QN AUTO: 27.6 PG (ref 26–35)
MCHC RBC AUTO-ENTMCNC: 31.7 % (ref 32–34.5)
MCV RBC AUTO: 86.9 FL (ref 80–99.9)
MONOCYTES ABSOLUTE: 0.36 E9/L (ref 0.1–0.95)
MONOCYTES RELATIVE PERCENT: 4.6 % (ref 2–12)
NEUTROPHILS ABSOLUTE: 6.68 E9/L (ref 1.8–7.3)
NEUTROPHILS RELATIVE PERCENT: 85.6 % (ref 43–80)
PDW BLD-RTO: 15.6 FL (ref 11.5–15)
PLATELET # BLD: 225 E9/L (ref 130–450)
PMV BLD AUTO: 9.2 FL (ref 7–12)
POTASSIUM REFLEX MAGNESIUM: 4 MMOL/L (ref 3.5–5)
PROCALCITONIN: 1.42 NG/ML (ref 0–0.08)
RBC # BLD: 4.28 E12/L (ref 3.8–5.8)
SODIUM BLD-SCNC: 132 MMOL/L (ref 132–146)
TOTAL PROTEIN: 6.3 G/DL (ref 6.4–8.3)
WBC # BLD: 7.8 E9/L (ref 4.5–11.5)

## 2018-09-18 PROCEDURE — 2060000000 HC ICU INTERMEDIATE R&B

## 2018-09-18 PROCEDURE — 87581 M.PNEUMON DNA AMP PROBE: CPT

## 2018-09-18 PROCEDURE — 87450 HC DIRECT STREP B ANTIGEN: CPT

## 2018-09-18 PROCEDURE — 87798 DETECT AGENT NOS DNA AMP: CPT

## 2018-09-18 PROCEDURE — 87070 CULTURE OTHR SPECIMN AEROBIC: CPT

## 2018-09-18 PROCEDURE — 6360000002 HC RX W HCPCS: Performed by: FAMILY MEDICINE

## 2018-09-18 PROCEDURE — 80053 COMPREHEN METABOLIC PANEL: CPT

## 2018-09-18 PROCEDURE — 84145 PROCALCITONIN (PCT): CPT

## 2018-09-18 PROCEDURE — 36415 COLL VENOUS BLD VENIPUNCTURE: CPT

## 2018-09-18 PROCEDURE — 87186 SC STD MICRODIL/AGAR DIL: CPT

## 2018-09-18 PROCEDURE — 51798 US URINE CAPACITY MEASURE: CPT

## 2018-09-18 PROCEDURE — 87486 CHLMYD PNEUM DNA AMP PROBE: CPT

## 2018-09-18 PROCEDURE — 6360000002 HC RX W HCPCS: Performed by: INTERNAL MEDICINE

## 2018-09-18 PROCEDURE — 87633 RESP VIRUS 12-25 TARGETS: CPT

## 2018-09-18 PROCEDURE — 2580000003 HC RX 258: Performed by: INTERNAL MEDICINE

## 2018-09-18 PROCEDURE — 85025 COMPLETE CBC W/AUTO DIFF WBC: CPT

## 2018-09-18 PROCEDURE — 87205 SMEAR GRAM STAIN: CPT

## 2018-09-18 PROCEDURE — 51702 INSERT TEMP BLADDER CATH: CPT

## 2018-09-18 PROCEDURE — 6370000000 HC RX 637 (ALT 250 FOR IP): Performed by: FAMILY MEDICINE

## 2018-09-18 RX ORDER — LEVOTHYROXINE SODIUM 0.12 MG/1
125 TABLET ORAL
Status: DISCONTINUED | OUTPATIENT
Start: 2018-09-18 | End: 2018-09-24 | Stop reason: HOSPADM

## 2018-09-18 RX ORDER — BUPROPION HYDROCHLORIDE 75 MG/1
75 TABLET ORAL 2 TIMES DAILY
Status: DISCONTINUED | OUTPATIENT
Start: 2018-09-18 | End: 2018-09-21

## 2018-09-18 RX ORDER — LEVOFLOXACIN 5 MG/ML
500 INJECTION, SOLUTION INTRAVENOUS EVERY 24 HOURS
Status: DISCONTINUED | OUTPATIENT
Start: 2018-09-18 | End: 2018-09-19

## 2018-09-18 RX ORDER — DULOXETIN HYDROCHLORIDE 30 MG/1
30 CAPSULE, DELAYED RELEASE ORAL 2 TIMES DAILY
Status: DISCONTINUED | OUTPATIENT
Start: 2018-09-18 | End: 2018-09-21

## 2018-09-18 RX ORDER — DOCUSATE SODIUM 100 MG/1
100 CAPSULE, LIQUID FILLED ORAL 3 TIMES DAILY
Status: DISCONTINUED | OUTPATIENT
Start: 2018-09-18 | End: 2018-09-24 | Stop reason: HOSPADM

## 2018-09-18 RX ORDER — OYSTER SHELL CALCIUM WITH VITAMIN D 500; 200 MG/1; [IU]/1
1 TABLET, FILM COATED ORAL DAILY
Status: DISCONTINUED | OUTPATIENT
Start: 2018-09-18 | End: 2018-09-24 | Stop reason: HOSPADM

## 2018-09-18 RX ORDER — SIMVASTATIN 40 MG
40 TABLET ORAL NIGHTLY
Status: DISCONTINUED | OUTPATIENT
Start: 2018-09-18 | End: 2018-09-24 | Stop reason: HOSPADM

## 2018-09-18 RX ORDER — FENTANYL 12 UG/H
1 PATCH TRANSDERMAL
Status: DISCONTINUED | OUTPATIENT
Start: 2018-09-20 | End: 2018-09-24 | Stop reason: HOSPADM

## 2018-09-18 RX ORDER — GABAPENTIN 300 MG/1
900 CAPSULE ORAL 3 TIMES DAILY
Status: DISCONTINUED | OUTPATIENT
Start: 2018-09-18 | End: 2018-09-24 | Stop reason: HOSPADM

## 2018-09-18 RX ORDER — LORAZEPAM 0.5 MG/1
0.5 TABLET ORAL NIGHTLY PRN
Status: DISCONTINUED | OUTPATIENT
Start: 2018-09-18 | End: 2018-09-24 | Stop reason: HOSPADM

## 2018-09-18 RX ORDER — NORTRIPTYLINE HYDROCHLORIDE 25 MG/1
25 CAPSULE ORAL NIGHTLY
Status: DISCONTINUED | OUTPATIENT
Start: 2018-09-18 | End: 2018-09-24 | Stop reason: HOSPADM

## 2018-09-18 RX ORDER — ALBUTEROL SULFATE 0.63 MG/3ML
1 SOLUTION RESPIRATORY (INHALATION) EVERY 6 HOURS PRN
Status: DISCONTINUED | OUTPATIENT
Start: 2018-09-18 | End: 2018-09-24 | Stop reason: HOSPADM

## 2018-09-18 RX ORDER — SODIUM CHLORIDE 9 MG/ML
INJECTION, SOLUTION INTRAVENOUS CONTINUOUS
Status: DISCONTINUED | OUTPATIENT
Start: 2018-09-18 | End: 2018-09-21

## 2018-09-18 RX ORDER — FENTANYL 25 UG/H
1 PATCH TRANSDERMAL
Status: DISCONTINUED | OUTPATIENT
Start: 2018-09-20 | End: 2018-09-24 | Stop reason: HOSPADM

## 2018-09-18 RX ORDER — GABAPENTIN 600 MG/1
600 TABLET ORAL 3 TIMES DAILY
Status: DISCONTINUED | OUTPATIENT
Start: 2018-09-18 | End: 2018-09-18 | Stop reason: SDUPTHER

## 2018-09-18 RX ORDER — MIRTAZAPINE 15 MG/1
30 TABLET, FILM COATED ORAL NIGHTLY
Status: DISCONTINUED | OUTPATIENT
Start: 2018-09-18 | End: 2018-09-24 | Stop reason: HOSPADM

## 2018-09-18 RX ORDER — TAMSULOSIN HYDROCHLORIDE 0.4 MG/1
0.4 CAPSULE ORAL DAILY
Status: DISCONTINUED | OUTPATIENT
Start: 2018-09-18 | End: 2018-09-24 | Stop reason: HOSPADM

## 2018-09-18 RX ORDER — PREDNISONE 10 MG/1
10 TABLET ORAL DAILY
Status: DISCONTINUED | OUTPATIENT
Start: 2018-09-18 | End: 2018-09-24 | Stop reason: HOSPADM

## 2018-09-18 RX ORDER — UREA 10 %
5 LOTION (ML) TOPICAL NIGHTLY
Status: DISCONTINUED | OUTPATIENT
Start: 2018-09-18 | End: 2018-09-24 | Stop reason: HOSPADM

## 2018-09-18 RX ADMIN — ENOXAPARIN SODIUM 40 MG: 40 INJECTION SUBCUTANEOUS at 09:38

## 2018-09-18 RX ADMIN — BUPROPION HYDROCHLORIDE 75 MG: 75 TABLET, FILM COATED ORAL at 03:47

## 2018-09-18 RX ADMIN — Medication 5 MG: at 20:28

## 2018-09-18 RX ADMIN — NORTRIPTYLINE HYDROCHLORIDE 25 MG: 25 CAPSULE ORAL at 20:28

## 2018-09-18 RX ADMIN — SODIUM CHLORIDE: 9 INJECTION, SOLUTION INTRAVENOUS at 13:36

## 2018-09-18 RX ADMIN — DOCUSATE SODIUM 100 MG: 100 CAPSULE, LIQUID FILLED ORAL at 20:28

## 2018-09-18 RX ADMIN — TAMSULOSIN HYDROCHLORIDE 0.4 MG: 0.4 CAPSULE ORAL at 09:36

## 2018-09-18 RX ADMIN — GABAPENTIN 900 MG: 300 CAPSULE ORAL at 22:45

## 2018-09-18 RX ADMIN — CALCIUM CARBONATE-VITAMIN D TAB 500 MG-200 UNIT 1 TABLET: 500-200 TAB at 09:36

## 2018-09-18 RX ADMIN — DULOXETINE HYDROCHLORIDE 30 MG: 30 CAPSULE, DELAYED RELEASE ORAL at 03:47

## 2018-09-18 RX ADMIN — GABAPENTIN 900 MG: 300 CAPSULE ORAL at 13:35

## 2018-09-18 RX ADMIN — DOCUSATE SODIUM 100 MG: 100 CAPSULE, LIQUID FILLED ORAL at 13:35

## 2018-09-18 RX ADMIN — MIRTAZAPINE 30 MG: 15 TABLET, FILM COATED ORAL at 20:28

## 2018-09-18 RX ADMIN — DOCUSATE SODIUM 100 MG: 100 CAPSULE, LIQUID FILLED ORAL at 09:36

## 2018-09-18 RX ADMIN — LEVOFLOXACIN 500 MG: 5 INJECTION, SOLUTION INTRAVENOUS at 13:38

## 2018-09-18 RX ADMIN — BUPROPION HYDROCHLORIDE 75 MG: 75 TABLET, FILM COATED ORAL at 09:37

## 2018-09-18 RX ADMIN — BUPROPION HYDROCHLORIDE 75 MG: 75 TABLET, FILM COATED ORAL at 20:28

## 2018-09-18 RX ADMIN — PREDNISONE 10 MG: 10 TABLET ORAL at 09:36

## 2018-09-18 RX ADMIN — DULOXETINE HYDROCHLORIDE 30 MG: 30 CAPSULE, DELAYED RELEASE ORAL at 20:28

## 2018-09-18 RX ADMIN — DULOXETINE HYDROCHLORIDE 30 MG: 30 CAPSULE, DELAYED RELEASE ORAL at 09:37

## 2018-09-18 RX ADMIN — LEVOTHYROXINE SODIUM 125 MCG: 125 TABLET ORAL at 09:37

## 2018-09-18 RX ADMIN — GABAPENTIN 900 MG: 300 CAPSULE ORAL at 09:36

## 2018-09-18 RX ADMIN — SIMVASTATIN 40 MG: 40 TABLET, FILM COATED ORAL at 20:28

## 2018-09-18 ASSESSMENT — PAIN SCALES - GENERAL
PAINLEVEL_OUTOF10: 0

## 2018-09-18 NOTE — CONSULTS
nightly    Allergies:    Augmentin [amoxicillin-pot clavulanate]    Social History:    reports that he has quit smoking. His smoking use included Cigarettes. He has a 44.00 pack-year smoking history. He has never used smokeless tobacco. He reports that he does not drink alcohol or use drugs. Family History:   family history includes Cancer in his father and mother; Diabetes in his father. REVIEW OF SYSTEMS:  Constitutional: Positive for fatigue and weakness  Skin: Denies pigmentation, dark lesions, and rashes   HEENT: Denies hearing loss, tinnitus, ear drainage, epistaxis, sore throat, and hoarseness. Cardiovascular: Denies palpitations, chest pain, and chest pressure. Respiratory: Positive for shortness of breath and cough cough, dyspnea at rest, hemoptysis, apnea, and choking. Gastrointestinal: Positive for poor appetite, diarrhea, heartburn or reflux  Genitourinary: Denies dysuria, frequency, urgency or hematuria  Musculoskeletal: Denies myalgias, muscle weakness, and bone pain  Neurological: Denies dizziness, vertigo, headache, and focal weakness  Psychological: Denies anxiety and depression  Endocrine: Denies heat intolerance and cold intolerance      PHYSICAL EXAM:    Vitals:  /66   Pulse 88   Temp 98.1 °F (36.7 °C) (Temporal)   Resp 26   Ht 5' 11\" (1.803 m)   Wt 180 lb (81.6 kg)   SpO2 95%   BMI 25.10 kg/m²     General:  Awake, alert, oriented X 3. No apparent distress. HEENT:  Normocephalic, atraumatic. Pupils equal, round, reactive to light. No scleral icterus. No conjunctival injection. Normal lips, teeth, and gums. No nasal discharge.   Neck:  Supple; no bruits  Heart:  RRR, no murmurs, gallops, rubs  Lungs:  CTA bilaterally, bilat symmetrical expansion, no wheeze, rales, or rhonchi  Abdomen:  BS+, soft, nontender nondistended, no masses, no organomegaly Extremities:  No clubbing, cyanosis, or edema  Skin:  Warm and dry, no open lesions or rash  Neuro:  Cranial nerves 2-12 intact, no focal deficits    LABS:  Recent Results (from the past 24 hour(s))   EKG 12 Lead    Collection Time: 09/17/18  3:37 PM   Result Value Ref Range    Ventricular Rate 95 BPM    Atrial Rate 95 BPM    P-R Interval 188 ms    QRS Duration 94 ms    Q-T Interval 342 ms    QTc Calculation (Bazett) 429 ms    P Axis 66 degrees    R Axis -27 degrees    T Axis 9 degrees   Troponin    Collection Time: 09/17/18  4:10 PM   Result Value Ref Range    Troponin <0.01 0.00 - 0.03 ng/mL   CBC Auto Differential    Collection Time: 09/17/18  4:10 PM   Result Value Ref Range    WBC 10.8 4.5 - 11.5 E9/L    RBC 4.71 3.80 - 5.80 E12/L    Hemoglobin 13.0 12.5 - 16.5 g/dL    Hematocrit 40.2 37.0 - 54.0 %    MCV 85.4 80.0 - 99.9 fL    MCH 27.6 26.0 - 35.0 pg    MCHC 32.3 32.0 - 34.5 %    RDW 15.5 (H) 11.5 - 15.0 fL    Platelets 016 912 - 574 E9/L    MPV 8.9 7.0 - 12.0 fL    Neutrophils % 85.3 (H) 43.0 - 80.0 %    Immature Granulocytes % 0.6 0.0 - 5.0 %    Lymphocytes % 6.5 (L) 20.0 - 42.0 %    Monocytes % 4.5 2.0 - 12.0 %    Eosinophils % 2.6 0.0 - 6.0 %    Basophils % 0.5 0.0 - 2.0 %    Neutrophils # 9.18 (H) 1.80 - 7.30 E9/L    Immature Granulocytes # 0.06 E9/L    Lymphocytes # 0.70 (L) 1.50 - 4.00 E9/L    Monocytes # 0.48 0.10 - 0.95 E9/L    Eosinophils # 0.28 0.05 - 0.50 E9/L    Basophils # 0.05 0.00 - 0.20 O1/O   Basic Metabolic Panel    Collection Time: 09/17/18  4:10 PM   Result Value Ref Range    Sodium 132 132 - 146 mmol/L    Potassium 4.7 3.5 - 5.0 mmol/L    Chloride 94 (L) 98 - 107 mmol/L    CO2 25 22 - 29 mmol/L    Anion Gap 13 7 - 16 mmol/L    Glucose 106 74 - 109 mg/dL    BUN 20 8 - 23 mg/dL    CREATININE 1.4 (H) 0.7 - 1.2 mg/dL    GFR Non-African American 51 >=60 mL/min/1.73    GFR African American >60     Calcium 9.7 8.6 - 10.2 mg/dL   Protime-INR    Collection Time: 09/17/18  4:10 PM   Result Value Ref Range    Protime 16.9 (H) 9.3 - 12.4 sec    INR 1.5    APTT    Collection Time: 09/17/18  4:10 PM   Result Value Ref Continue albuterol  4. Patient will need a follow-up CT scan in 8 weeks . 5.  We'll start some normal saline at 75 mL an hour  6. We'll start patient a NicoDerm patch again had a long discussion about importance of quitting smoking    Thank you for your kind referral  Sincerely  Dioni Williamson MD    10:32 AM   9/18/2018        NOTE: This report, in part or full, may have been transcribed using voice recognition software. Every effort was made to ensure accuracy; however, inadvertent computerized transcription errors may be present. Please excuse any transcriptional grammatical or spelling errors that may have escaped my editorial review.

## 2018-09-19 ENCOUNTER — APPOINTMENT (OUTPATIENT)
Dept: HYPERBARIC MEDICINE | Age: 63
End: 2018-09-19
Payer: COMMERCIAL

## 2018-09-19 ENCOUNTER — APPOINTMENT (OUTPATIENT)
Dept: GENERAL RADIOLOGY | Age: 63
DRG: 178 | End: 2018-09-19
Payer: COMMERCIAL

## 2018-09-19 LAB
BASOPHILS ABSOLUTE: 0.07 E9/L (ref 0–0.2)
BASOPHILS RELATIVE PERCENT: 0.9 % (ref 0–2)
EOSINOPHILS ABSOLUTE: 0.07 E9/L (ref 0.05–0.5)
EOSINOPHILS RELATIVE PERCENT: 0.9 % (ref 0–6)
HCT VFR BLD CALC: 33 % (ref 37–54)
HEMOGLOBIN: 10.5 G/DL (ref 12.5–16.5)
L. PNEUMOPHILA SEROGP 1 UR AG: NORMAL
LYMPHOCYTES ABSOLUTE: 0.24 E9/L (ref 1.5–4)
LYMPHOCYTES RELATIVE PERCENT: 2.6 % (ref 20–42)
MCH RBC QN AUTO: 27.3 PG (ref 26–35)
MCHC RBC AUTO-ENTMCNC: 31.8 % (ref 32–34.5)
MCV RBC AUTO: 85.7 FL (ref 80–99.9)
MONOCYTES ABSOLUTE: 0.4 E9/L (ref 0.1–0.95)
MONOCYTES RELATIVE PERCENT: 5.2 % (ref 2–12)
NEUTROPHILS ABSOLUTE: 7.11 E9/L (ref 1.8–7.3)
NEUTROPHILS RELATIVE PERCENT: 90.4 % (ref 43–80)
OVALOCYTES: ABNORMAL
PDW BLD-RTO: 15.4 FL (ref 11.5–15)
PLATELET # BLD: 263 E9/L (ref 130–450)
PMV BLD AUTO: 9.1 FL (ref 7–12)
POIKILOCYTES: ABNORMAL
RBC # BLD: 3.85 E12/L (ref 3.8–5.8)
STREP PNEUMONIAE ANTIGEN, URINE: NORMAL
WBC # BLD: 7.9 E9/L (ref 4.5–11.5)

## 2018-09-19 PROCEDURE — 2580000003 HC RX 258: Performed by: INTERNAL MEDICINE

## 2018-09-19 PROCEDURE — 71045 X-RAY EXAM CHEST 1 VIEW: CPT

## 2018-09-19 PROCEDURE — 85025 COMPLETE CBC W/AUTO DIFF WBC: CPT

## 2018-09-19 PROCEDURE — 6360000002 HC RX W HCPCS: Performed by: INTERNAL MEDICINE

## 2018-09-19 PROCEDURE — 97530 THERAPEUTIC ACTIVITIES: CPT

## 2018-09-19 PROCEDURE — 6360000002 HC RX W HCPCS: Performed by: FAMILY MEDICINE

## 2018-09-19 PROCEDURE — 6370000000 HC RX 637 (ALT 250 FOR IP): Performed by: FAMILY MEDICINE

## 2018-09-19 PROCEDURE — G8979 MOBILITY GOAL STATUS: HCPCS

## 2018-09-19 PROCEDURE — 97161 PT EVAL LOW COMPLEX 20 MIN: CPT

## 2018-09-19 PROCEDURE — 36415 COLL VENOUS BLD VENIPUNCTURE: CPT

## 2018-09-19 PROCEDURE — 2060000000 HC ICU INTERMEDIATE R&B

## 2018-09-19 PROCEDURE — G8978 MOBILITY CURRENT STATUS: HCPCS

## 2018-09-19 RX ADMIN — GABAPENTIN 900 MG: 300 CAPSULE ORAL at 14:17

## 2018-09-19 RX ADMIN — SIMVASTATIN 40 MG: 40 TABLET, FILM COATED ORAL at 20:10

## 2018-09-19 RX ADMIN — GABAPENTIN 900 MG: 300 CAPSULE ORAL at 08:39

## 2018-09-19 RX ADMIN — DULOXETINE HYDROCHLORIDE 30 MG: 30 CAPSULE, DELAYED RELEASE ORAL at 20:11

## 2018-09-19 RX ADMIN — LEVOFLOXACIN 500 MG: 5 INJECTION, SOLUTION INTRAVENOUS at 11:03

## 2018-09-19 RX ADMIN — GABAPENTIN 900 MG: 300 CAPSULE ORAL at 20:10

## 2018-09-19 RX ADMIN — NORTRIPTYLINE HYDROCHLORIDE 25 MG: 25 CAPSULE ORAL at 20:11

## 2018-09-19 RX ADMIN — BUPROPION HYDROCHLORIDE 75 MG: 75 TABLET, FILM COATED ORAL at 20:11

## 2018-09-19 RX ADMIN — DOCUSATE SODIUM 100 MG: 100 CAPSULE, LIQUID FILLED ORAL at 14:17

## 2018-09-19 RX ADMIN — TAMSULOSIN HYDROCHLORIDE 0.4 MG: 0.4 CAPSULE ORAL at 08:40

## 2018-09-19 RX ADMIN — LEVOTHYROXINE SODIUM 125 MCG: 125 TABLET ORAL at 05:59

## 2018-09-19 RX ADMIN — VANCOMYCIN HYDROCHLORIDE 1250 MG: 10 INJECTION, POWDER, LYOPHILIZED, FOR SOLUTION INTRAVENOUS at 23:39

## 2018-09-19 RX ADMIN — BUPROPION HYDROCHLORIDE 75 MG: 75 TABLET, FILM COATED ORAL at 08:39

## 2018-09-19 RX ADMIN — ENOXAPARIN SODIUM 40 MG: 40 INJECTION SUBCUTANEOUS at 08:49

## 2018-09-19 RX ADMIN — SODIUM CHLORIDE: 9 INJECTION, SOLUTION INTRAVENOUS at 18:27

## 2018-09-19 RX ADMIN — VANCOMYCIN HYDROCHLORIDE 1.5 G: 10 INJECTION, POWDER, LYOPHILIZED, FOR SOLUTION INTRAVENOUS at 12:36

## 2018-09-19 RX ADMIN — PREDNISONE 10 MG: 10 TABLET ORAL at 08:56

## 2018-09-19 RX ADMIN — CALCIUM CARBONATE-VITAMIN D TAB 500 MG-200 UNIT 1 TABLET: 500-200 TAB at 08:40

## 2018-09-19 RX ADMIN — CEFEPIME HYDROCHLORIDE 1 G: 1 INJECTION, POWDER, FOR SOLUTION INTRAMUSCULAR; INTRAVENOUS at 18:27

## 2018-09-19 RX ADMIN — Medication 5 MG: at 20:10

## 2018-09-19 RX ADMIN — MIRTAZAPINE 30 MG: 15 TABLET, FILM COATED ORAL at 20:10

## 2018-09-19 RX ADMIN — DOCUSATE SODIUM 100 MG: 100 CAPSULE, LIQUID FILLED ORAL at 20:11

## 2018-09-19 RX ADMIN — DOCUSATE SODIUM 100 MG: 100 CAPSULE, LIQUID FILLED ORAL at 08:39

## 2018-09-19 RX ADMIN — DULOXETINE HYDROCHLORIDE 30 MG: 30 CAPSULE, DELAYED RELEASE ORAL at 08:40

## 2018-09-19 ASSESSMENT — PAIN SCALES - GENERAL
PAINLEVEL_OUTOF10: 0

## 2018-09-19 NOTE — PROGRESS NOTES
Physical Therapy    Facility/Department: [address] PICU  Initial Assessment    NAME: Clifton Garland  : 1955  MRN: [id number]    Date of Service: 2018    Evaluating Therapist: Danny Hays PT, DPT    Room #: 4399F  DIAGNOSIS: Pneumonia  PRECAUTIONS: Falls, Chronic Trach, O2     Social:  Pt lives with wife in a 2 floor plan with 1 step(s) and no rail(s) to enter. Bedroom/bathroom on 1st floor. Prior to admission pt walked with no AD. Reported independent with ADLs. Initial Evaluation  Date: 18 Treatment  Date: NA    Short Term/ Long Term   Goals   Was pt agreeable to Eval/treatment? Yes      Does pt have pain? Reported chronic back pain. Did not quantify. Bed Mobility  Rolling: Independent  Supine to sit: Independent  Sit to supine: Independent  Scooting: Independent  NA   Transfers Sit to stand: SBA  Stand to sit: SBA  Stand pivot: SBA  Independent   Ambulation    30 feet with no AD with CGA  >150 feet with no AD Independently   Stair negotiation: ascended and descended  NT  2 steps with 1 rail with Supervision   AM-PAC Score 17/24       Pt is alert and Oriented x 3. Patient with PMV and able to communicate verbally. BLE ROM is WFL. BLE strength is grossly 4+/5. Balance: Seated: Supervision; Standing: SBA/CGA with no AD  Sensation: Reported neuropathy N/T in bilateral hands. Edema: None noted. Endurance: Poor  Bed/Chair alarm: No     ASSESSMENT  Pt displays functional ability as noted in the objective portion of this evaluation. Comments/Treatment:  Patient cleared by nurse and agreeable to assessment. Patient found with PMV in place with CATINA and SpO2 at 96%. Consulted with RN and RT and cleared to ambulate patient on RA. Patient required increased effort to zeferino one sock due to hands being numb and SpO2 dropped to 85%. Patient removed PMV and donned CATINA and SpO2 recovered quickly to 94%. Patient replaced PMV and assisted with donning other sock.  Patient with increased SOB with

## 2018-09-19 NOTE — PROGRESS NOTES
hearing loss, tinnitus, ear drainage, epistaxis, sore throat, and hoarseness. Cardiovascular: Denies palpitations, chest pain, and chest pressure. Respiratory: positive for productive cough, dyspnea at rest, hemoptysis, apnea, and choking. Gastrointestinal: Denies nausea, vomiting, poor appetite, diarrhea, heartburn or reflux  Genitourinary: Denies dysuria, frequency, urgency or hematuria  Musculoskeletal: Denies myalgias, muscle weakness, and bone pain  Neurological: Denies dizziness, vertigo, headache, and focal weakness  Psychological: Denies anxiety and depression  Endocrine: Denies heat intolerance and cold intolerance  Hematopoietic/Lymphatic: Denies bleeding problems and blood transfusions  Physical    VITALS:  /62   Pulse 76   Temp 97.4 °F (36.3 °C) (Axillary)   Resp 18   Ht 5' 11\" (1.803 m)   Wt 180 lb (81.6 kg)   SpO2 96%   BMI 25.10 kg/m²   24HR INTAKE/OUTPUT:    Intake/Output Summary (Last 24 hours) at 09/19/18 1038  Last data filed at 09/19/18 0600   Gross per 24 hour   Intake          1408.75 ml   Output             1985 ml   Net          -576.25 ml     GENERAL: Alert and oriented x3 in no acute distress. HEENT: Atraumatic. Normocephalic. Extraocular muscles are intact. Pupils are equal, round and reactive to light and accommodation. Sclera is nonicteric. NECK: Supple. No JVD. No adenopathy. No bruits. CARDIOVASCULAR: Regular rate and rhythm. No murmur, gallop or thrills. LUNGS: few right upper lobe ronchi otherwise CTA  ABDOMEN: Soft, nontender. No distention or organomegaly. EXTREMITIES: No clubbing, no cellulitis. Positive peripheral pulses, equal bilaterally.        Data    CBC:   Lab Results   Component Value Date    WBC 7.8 09/18/2018    RBC 4.28 09/18/2018    HGB 11.8 09/18/2018    HCT 37.2 09/18/2018    MCV 86.9 09/18/2018    MCH 27.6 09/18/2018    MCHC 31.7 09/18/2018    RDW 15.6 09/18/2018     09/18/2018    MPV 9.2 09/18/2018     BMP:    Lab Results   Component Value Date     09/18/2018    K 4.0 09/18/2018    CL 97 09/18/2018    CO2 24 09/18/2018    BUN 15 09/18/2018    LABALBU 3.0 09/18/2018    CREATININE 1.2 09/18/2018    CALCIUM 8.7 09/18/2018    GFRAA >60 09/18/2018    LABGLOM >60 09/18/2018    GLUCOSE 84 09/18/2018    GLUCOSE 130 02/22/2012 9/19/2018  9:44 AM - Tarik, Mhy Incoming Results From [facility]     Component Results     Component Collected Lab   CULTURE, RESPIRATORY  (Abnormal) 09/18/2018  1:45 PM [address] Lab   Oral Pharyngeal Alva reduced     Smear, Respiratory 09/18/2018  1:45 PM [facility] [address] [facility]   Group 5: >25 PMN's/LPF and <10 Epithelial cells/LPF   Abundant Polymorphonuclear leukocytes   Rare Epithelial cells   Few Gram positive cocci in clusters     Organism  (Abnormal) 09/18/2018  1:45 PM [address] Lab   Staphylococcus aureus     CULTURE, RESPIRATORY 09/18/2018  1:45 PM [address] Lab   Moderate growth   Sensitivity to follow        ASSESSMENT AND PLAN          Assessment:     HCAP/ MRSA pNA- dense RUL infiltrate  Long term tracheostomy 2017  Acute on chronic respiratory failure with hypoxia  Stage III NSCLCA s/p radiation and currently on Keytruda  Fatigue   Anorexia  COPD  Ongoing nicotine dependence  Immunocompromised host      Plan:   Oxygen therapy CATINA 100% keep >92%  Will continue vanco , consult ID  Await final cultures, procalcitonin elevated  Continue Bronchodilators albuterol PRN  Repeat CXR today  Prednisone 10 mg daily  Repeat CT chest non contrast in 8 weeks  Saline at 75/hr, consider decreasing soon  Tobacco cessation counseling    Juan Carlos Ford MD        NOTE: This report, in part or full, may have been transcribed using voice recognition software. Every effort was made to ensure accuracy; however, inadvertent computerized transcription errors may be present. Please excuse any transcriptional grammatical or spelling errors that may have escaped my editorial review.

## 2018-09-19 NOTE — PROGRESS NOTES
LYMPHSABS 0.41 09/18/2018    EOSABS 0.27 09/18/2018    BASOSABS 0.03 09/18/2018     BMP:    Lab Results   Component Value Date     09/18/2018    K 4.0 09/18/2018    CL 97 09/18/2018    CO2 24 09/18/2018    BUN 15 09/18/2018    LABALBU 3.0 09/18/2018    CREATININE 1.2 09/18/2018    CALCIUM 8.7 09/18/2018    GFRAA >60 09/18/2018    LABGLOM >60 09/18/2018     PT/INR:    Lab Results   Component Value Date    PROTIME 16.9 09/17/2018    INR 1.5 09/17/2018     Last 3 Troponin:    Lab Results   Component Value Date    TROPONINI <0.01 09/17/2018    TROPONINI <0.01 07/05/2017    TROPONINI <0.01 06/30/2017     TSH:    Lab Results   Component Value Date    TSH 3.320 05/28/2017      Assessment:     1. RUL pneumonia in immunocompromised host  2. H/O stage III non-small cell carcinoma of the lung on immunotherapy with Keyrtuda  3. H/O laryngeal carcinoma  4. Chronic tracheostomy  5.  COPD    Plan:       Continue same plan and orders

## 2018-09-19 NOTE — CARE COORDINATION
SOCIAL WORK / DISCHARGE PLANNING:  Sw met with pt at bedside. Pt able to communicate with speaking valve, trach x 1 year. Pt reports to reside with wife in 2 story home, ambulatory without device, bedroom upstairs. No dme in home other than nebulizer from [facility]. Pt is currently using oxygen and despite pt thinking he will not require at dc. Sw will follow, assist prn with home O2 arrangements if qualifies. He would like to use [facility]. Wife can provide home going transport. [facility] and would use again but again pt does not think is necessary.  [facility]. Sw will follow.                      Electronically signed by CARMINE Riggs on 9/19/2018 at 4:01 PM

## 2018-09-19 NOTE — PROGRESS NOTES
<10 Epithelial cells/LPF   Abundant Polymorphonuclear leukocytes   Rare Epithelial cells   Few Gram positive cocci in clusters     Organism  (Abnormal) 09/18/2018  1:45 PM [facility] - [address] [facility] Lab   Staphylococcus aureus     CULTURE, RESPIRATORY 09/18/2018  1:45 PM Jolly Bravo [facility] Lab   Moderate growth   Sensitivity to follow           SpO2 Readings from Last 1 Encounters:   09/19/18 96%          Chest Radiographs have all been reviewed   Medications and labs have all been reviewed.   Current Facility-Administered Medications   Medication Dose Route Frequency Provider Last Rate Last Dose    vancomycin 1.5 g in dextrose 5% 300 mL IVPB  1,500 mg Intravenous Q12H Monica Damon  mL/hr at 09/19/18 1236 1.5 g at 09/19/18 1236    albuterol (ACCUNEB) nebulizer solution 0.63 mg  1 ampule Nebulization Q6H PRN Sylvia Buccino, DO        buPROPion (WELLBUTRIN) tablet 75 mg  75 mg Oral BID Sylvia Buccino, DO   75 mg at 09/19/18 0839    docusate sodium (COLACE) capsule 100 mg  100 mg Oral TID Sylvia Buccino, DO   100 mg at 09/19/18 0839    DULoxetine (CYMBALTA) extended release capsule 30 mg  30 mg Oral BID Sylvia Buccino, DO   30 mg at 09/19/18 0840    [START ON 9/20/2018] fentaNYL (DURAGESIC) 12 MCG/HR 1 patch  1 patch Transdermal Q72H Sylvia Buccino, DO        [START ON 9/20/2018] fentaNYL (DURAGESIC) 25 MCG/HR 1 patch  1 patch Transdermal Q72H Sylvia Buccino, DO        gabapentin (NEURONTIN) capsule 900 mg  900 mg Oral TID Sylvia Buccino, DO   900 mg at 09/19/18 0839    levothyroxine (SYNTHROID) tablet 125 mcg  125 mcg Oral QAM AC Sylvia Buccino, DO   125 mcg at 09/19/18 0559    LORazepam (ATIVAN) tablet 0.5 mg  0.5 mg Oral Nightly PRN Sylvia Buccino, DO        melatonin tablet 5 mg  5 mg Oral Nightly Sylvia Buccino, DO   5 mg at 09/18/18 2028    mirtazapine (REMERON) tablet 30 mg  30 mg Oral Nightly Sylvia Buccino, DO   30 mg at 09/18/18 2028    nortriptyline (PAMELOR) capsule 25 mg  25 mg Oral Nightly Sylvia Buccino, DO   25 mg at 09/18/18 2028    predniSONE (DELTASONE) tablet 10 mg  10 mg Oral Daily Sylvia Buccino, DO   10 mg at 09/19/18 0856    simvastatin (ZOCOR) tablet 40 mg  40 mg Oral Nightly Sylvia Buccino, DO   40 mg at 09/18/18 2028    tamsulosin (FLOMAX) capsule 0.4 mg  0.4 mg Oral Daily Sylvia Buccino, DO   0.4 mg at 09/19/18 0840    calcium-vitamin D (OSCAL-500) 500-200 MG-UNIT per tablet 1 tablet  1 tablet Oral Daily Sylvia Buccino, DO   1 tablet at 09/19/18 0840    enoxaparin (LOVENOX) injection 40 mg  40 mg Subcutaneous Daily Sylvia Buccino, DO   40 mg at 09/19/18 0849    levofloxacin (LEVAQUIN) 500 MG/100ML infusion 500 mg  500 mg Intravenous Q24H Monica Damon MD   Stopped at 09/19/18 1203    0.9 % sodium chloride infusion   Intravenous Continuous Monica Damon MD 75 mL/hr at 09/18/18 7066       PHYSICAL EXAM:  General Appearance:    Lying in bed in no acute distress. Appears comfortable. Head:  Normocephalic atraumatic without obvious abnormality   Throat:  Lips, mucosa, and tongue normal.   Neck:  Supple, symmetrical, trachea midline, no adenopathy;     thyroid:  no enlargement/tenderness/nodules or JVD. Size 6 DCT trache intact         Lungs:   Breath sounds few rhonchi. Respirations   unlabored. Symmetrical expansion. No active wheeze        Heart:   Regular rate and rhythm, S1 and S2 normal, no murmur, rub   or gallop. Abdomen:    Soft, non-tender, bowel sounds active all four quadrants,     no masses, no organomegaly, no bruit. Extremities  Extremities normal, no cyanosis, clubbing, or edema. Capillary refill <3 seconds. Skin:   Warm and dry. Skin color, texture, turgor normal. No rashes,    bleeding,  or lesions. See wound care assessment.             ASSESSMENT:  HCAP- dense RUL infiltrate  Long term tracheostomy 2017  Acute on chronic respiratory failure with hypoxia  Stage III NSCLCA s/p radiation and currently on Keytruda  Fatigue Anorexia  COPD  Ongoing nicotine dependence  Immunocompromised host    PLAN:  Oxygen therapy CATINA 100% keep >92%  Will continue vanco , consult ID  Await final cultures, procalcitonin elevated  Continue Bronchodilators albuterol PRN  Repeat CXR today  Prednisone 10 mg daily  Repeat CT chest non contrast in 8 weeks  Saline at 75/hr, consider decreasing soon  Tobacco cessation counseling  This plan of care was reviewed in collaboration with Dr. Shira Alves    Electronically signed by BG Bar CNP on 9/19/2018 at 12:44 PM   9/19/2018  12:44 PM    I personally saw, examined, and cared for the patient. Labs, medications, radiographs reviewed. I agree with history exam and plans detailed in NP note.    Yola Tian MD

## 2018-09-19 NOTE — PLAN OF CARE
Problem: Gas Exchange - Impaired:  Goal: Levels of oxygenation will improve  Levels of oxygenation will improve   Outcome: Met This Shift

## 2018-09-20 ENCOUNTER — APPOINTMENT (OUTPATIENT)
Dept: HYPERBARIC MEDICINE | Age: 63
End: 2018-09-20
Payer: COMMERCIAL

## 2018-09-20 LAB
CULTURE, RESPIRATORY: ABNORMAL
CULTURE, RESPIRATORY: ABNORMAL
ORGANISM: ABNORMAL
SMEAR, RESPIRATORY: ABNORMAL
VANCOMYCIN TROUGH: 16 MCG/ML (ref 5–16)

## 2018-09-20 PROCEDURE — 2580000003 HC RX 258: Performed by: INTERNAL MEDICINE

## 2018-09-20 PROCEDURE — 2060000000 HC ICU INTERMEDIATE R&B

## 2018-09-20 PROCEDURE — 6360000002 HC RX W HCPCS: Performed by: FAMILY MEDICINE

## 2018-09-20 PROCEDURE — 36415 COLL VENOUS BLD VENIPUNCTURE: CPT

## 2018-09-20 PROCEDURE — 6370000000 HC RX 637 (ALT 250 FOR IP): Performed by: FAMILY MEDICINE

## 2018-09-20 PROCEDURE — 2700000000 HC OXYGEN THERAPY PER DAY

## 2018-09-20 PROCEDURE — 80202 ASSAY OF VANCOMYCIN: CPT

## 2018-09-20 PROCEDURE — 6360000002 HC RX W HCPCS: Performed by: INTERNAL MEDICINE

## 2018-09-20 RX ORDER — ONDANSETRON 2 MG/ML
4 INJECTION INTRAMUSCULAR; INTRAVENOUS EVERY 6 HOURS PRN
Status: DISCONTINUED | OUTPATIENT
Start: 2018-09-20 | End: 2018-09-24 | Stop reason: HOSPADM

## 2018-09-20 RX ADMIN — BUPROPION HYDROCHLORIDE 75 MG: 75 TABLET, FILM COATED ORAL at 21:06

## 2018-09-20 RX ADMIN — PREDNISONE 10 MG: 10 TABLET ORAL at 09:53

## 2018-09-20 RX ADMIN — DULOXETINE HYDROCHLORIDE 30 MG: 30 CAPSULE, DELAYED RELEASE ORAL at 09:53

## 2018-09-20 RX ADMIN — GABAPENTIN 900 MG: 300 CAPSULE ORAL at 21:07

## 2018-09-20 RX ADMIN — CEFEPIME HYDROCHLORIDE 1 G: 1 INJECTION, POWDER, FOR SOLUTION INTRAMUSCULAR; INTRAVENOUS at 09:53

## 2018-09-20 RX ADMIN — SODIUM CHLORIDE: 9 INJECTION, SOLUTION INTRAVENOUS at 11:59

## 2018-09-20 RX ADMIN — GABAPENTIN 900 MG: 300 CAPSULE ORAL at 09:52

## 2018-09-20 RX ADMIN — CEFEPIME HYDROCHLORIDE 1 G: 1 INJECTION, POWDER, FOR SOLUTION INTRAMUSCULAR; INTRAVENOUS at 01:19

## 2018-09-20 RX ADMIN — DOCUSATE SODIUM 100 MG: 100 CAPSULE, LIQUID FILLED ORAL at 14:52

## 2018-09-20 RX ADMIN — ONDANSETRON 4 MG: 2 INJECTION INTRAMUSCULAR; INTRAVENOUS at 21:06

## 2018-09-20 RX ADMIN — SIMVASTATIN 40 MG: 40 TABLET, FILM COATED ORAL at 21:07

## 2018-09-20 RX ADMIN — TAMSULOSIN HYDROCHLORIDE 0.4 MG: 0.4 CAPSULE ORAL at 09:53

## 2018-09-20 RX ADMIN — ENOXAPARIN SODIUM 40 MG: 40 INJECTION SUBCUTANEOUS at 09:53

## 2018-09-20 RX ADMIN — Medication 5 MG: at 21:07

## 2018-09-20 RX ADMIN — DOCUSATE SODIUM 100 MG: 100 CAPSULE, LIQUID FILLED ORAL at 09:52

## 2018-09-20 RX ADMIN — BUPROPION HYDROCHLORIDE 75 MG: 75 TABLET, FILM COATED ORAL at 09:53

## 2018-09-20 RX ADMIN — CALCIUM CARBONATE-VITAMIN D TAB 500 MG-200 UNIT 1 TABLET: 500-200 TAB at 09:53

## 2018-09-20 RX ADMIN — MIRTAZAPINE 30 MG: 15 TABLET, FILM COATED ORAL at 21:07

## 2018-09-20 RX ADMIN — VANCOMYCIN HYDROCHLORIDE 1250 MG: 10 INJECTION, POWDER, LYOPHILIZED, FOR SOLUTION INTRAVENOUS at 23:30

## 2018-09-20 RX ADMIN — VANCOMYCIN HYDROCHLORIDE 1250 MG: 10 INJECTION, POWDER, LYOPHILIZED, FOR SOLUTION INTRAVENOUS at 12:32

## 2018-09-20 RX ADMIN — DULOXETINE HYDROCHLORIDE 30 MG: 30 CAPSULE, DELAYED RELEASE ORAL at 21:07

## 2018-09-20 RX ADMIN — DOCUSATE SODIUM 100 MG: 100 CAPSULE, LIQUID FILLED ORAL at 21:07

## 2018-09-20 RX ADMIN — NORTRIPTYLINE HYDROCHLORIDE 25 MG: 25 CAPSULE ORAL at 21:07

## 2018-09-20 RX ADMIN — LEVOTHYROXINE SODIUM 125 MCG: 125 TABLET ORAL at 06:11

## 2018-09-20 RX ADMIN — CEFEPIME HYDROCHLORIDE 1 G: 1 INJECTION, POWDER, FOR SOLUTION INTRAMUSCULAR; INTRAVENOUS at 18:35

## 2018-09-20 RX ADMIN — GABAPENTIN 900 MG: 300 CAPSULE ORAL at 14:52

## 2018-09-20 ASSESSMENT — PAIN SCALES - GENERAL
PAINLEVEL_OUTOF10: 0
PAINLEVEL_OUTOF10: 3
PAINLEVEL_OUTOF10: 3

## 2018-09-20 ASSESSMENT — PAIN DESCRIPTION - PAIN TYPE: TYPE: CHRONIC PAIN

## 2018-09-20 ASSESSMENT — PAIN DESCRIPTION - ORIENTATION: ORIENTATION: LEFT

## 2018-09-20 ASSESSMENT — PAIN DESCRIPTION - ONSET: ONSET: ON-GOING

## 2018-09-20 ASSESSMENT — PAIN DESCRIPTION - FREQUENCY: FREQUENCY: CONTINUOUS

## 2018-09-20 ASSESSMENT — PAIN DESCRIPTION - DESCRIPTORS: DESCRIPTORS: SHARP;STABBING

## 2018-09-20 ASSESSMENT — PAIN DESCRIPTION - LOCATION: LOCATION: BACK

## 2018-09-20 NOTE — PROGRESS NOTES
Dr. Nati Reynoso paged in regards to pt having nausea and was wondering if he could have an antiemetic.

## 2018-09-20 NOTE — CARE COORDINATION
CM met with patient and his wife at the bedside today to discuss transition of care; pt remains on iv antibiotics and iv hydration; ID and Pulmonology following; per  note, if patient requires home o2 at time of discharge, will refer to [facility]; we discussed possible hhc services, specifically for cardio/pulmonary assessment when he goes home; patient is very much agreeable to this service; hhc agency list provided, he chooses [facility], as he has used them in the past; referral called to [facility]; will need hhc orders prior to discharge; will await ID plans for further antibiotic therapy

## 2018-09-20 NOTE — PROGRESS NOTES
CC- SOB    Subjective: The patient is awake and alert. Tolerating medications. Reports no side effects. Afebrile. 10 ROS otherwise negative unless otherwise specified above. Objective:    Vitals:    09/20/18 0425   BP: 108/66   Pulse: 70   Resp: 18   Temp: 98 °F (36.7 °C)   SpO2: 98%       General Appearance:    Awake, alert , no acute distress. Neck:   Supple, Tracheostomy site with secretions no adenopathy; no JVD   Lungs:     R Base diminished, respirations labored   Chest wall:    No tenderness or deformity   Heart:    Regular rate and rhythm, S1 and S2 normal, no murmur, rub   or gallop   Abdomen:     Soft, non-tender, bowel sounds active all four quadrants,     no masses, no organomegaly   Extremities:   Extremities normal, atraumatic, no cyanosis or edema   Pulses:   2+ and symmetric all extremities   Skin:   Skin color, texture, turgor normal, no rashes or lesions         CBC+dif:  Reviewed and trend followed     Radiology:  Ct Scan chest shows right lower lobe dense consolidation     Microbiology:  Pending      Recent Labs      09/17/18   1955   BC  24 Hours- no growth          Recent Labs      09/18/18   1345   ORG  Staphylococcus aureus*          Recent Labs      09/17/18   2000   BLOODCULT2  24 Hours- no growth      No results for input(s): ASO in the last 72 hours. 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 Monitor renal function closely.  Check trough before 4th dose  · cefepime 1 g every 8  · Monitor labs  · Will follow with you          Electronically signed by Breezy oMrales MD on 9/20/2018 at 9:53 AM

## 2018-09-21 ENCOUNTER — HOSPITAL ENCOUNTER (OUTPATIENT)
Dept: HYPERBARIC MEDICINE | Age: 63
Setting detail: THERAPIES SERIES
End: 2018-09-21
Payer: COMMERCIAL

## 2018-09-21 LAB
ANION GAP SERPL CALCULATED.3IONS-SCNC: 14 MMOL/L (ref 7–16)
BUN BLDV-MCNC: 9 MG/DL (ref 8–23)
CALCIUM SERPL-MCNC: 8.8 MG/DL (ref 8.6–10.2)
CHLORIDE BLD-SCNC: 101 MMOL/L (ref 98–107)
CO2: 25 MMOL/L (ref 22–29)
CREAT SERPL-MCNC: 1 MG/DL (ref 0.7–1.2)
GFR AFRICAN AMERICAN: >60
GFR NON-AFRICAN AMERICAN: >60 ML/MIN/1.73
GLUCOSE BLD-MCNC: 97 MG/DL (ref 74–109)
POTASSIUM SERPL-SCNC: 4 MMOL/L (ref 3.5–5)
SODIUM BLD-SCNC: 140 MMOL/L (ref 132–146)

## 2018-09-21 PROCEDURE — 6360000002 HC RX W HCPCS: Performed by: CLINICAL NURSE SPECIALIST

## 2018-09-21 PROCEDURE — 2700000000 HC OXYGEN THERAPY PER DAY

## 2018-09-21 PROCEDURE — 2580000003 HC RX 258: Performed by: INTERNAL MEDICINE

## 2018-09-21 PROCEDURE — 6360000002 HC RX W HCPCS: Performed by: FAMILY MEDICINE

## 2018-09-21 PROCEDURE — 97530 THERAPEUTIC ACTIVITIES: CPT

## 2018-09-21 PROCEDURE — 36415 COLL VENOUS BLD VENIPUNCTURE: CPT

## 2018-09-21 PROCEDURE — 6370000000 HC RX 637 (ALT 250 FOR IP): Performed by: FAMILY MEDICINE

## 2018-09-21 PROCEDURE — 2060000000 HC ICU INTERMEDIATE R&B

## 2018-09-21 PROCEDURE — 99223 1ST HOSP IP/OBS HIGH 75: CPT | Performed by: CLINICAL NURSE SPECIALIST

## 2018-09-21 PROCEDURE — 6360000002 HC RX W HCPCS: Performed by: INTERNAL MEDICINE

## 2018-09-21 PROCEDURE — 80048 BASIC METABOLIC PNL TOTAL CA: CPT

## 2018-09-21 RX ORDER — DRONABINOL 2.5 MG/1
2.5 CAPSULE ORAL 2 TIMES DAILY
Status: DISCONTINUED | OUTPATIENT
Start: 2018-09-21 | End: 2018-09-24 | Stop reason: HOSPADM

## 2018-09-21 RX ORDER — LINEZOLID 600 MG/1
600 TABLET, FILM COATED ORAL 2 TIMES DAILY
Qty: 42 TABLET | Refills: 0 | Status: SHIPPED | OUTPATIENT
Start: 2018-09-21 | End: 2018-09-24 | Stop reason: HOSPADM

## 2018-09-21 RX ORDER — DIPHENHYDRAMINE HCL 25 MG
50 TABLET ORAL NIGHTLY PRN
Status: DISCONTINUED | OUTPATIENT
Start: 2018-09-21 | End: 2018-09-24 | Stop reason: HOSPADM

## 2018-09-21 RX ORDER — DRONABINOL 2.5 MG/1
2.5 CAPSULE ORAL 2 TIMES DAILY
Qty: 60 CAPSULE | Refills: 0 | Status: SHIPPED | OUTPATIENT
Start: 2018-09-21 | End: 2018-10-02 | Stop reason: SDUPTHER

## 2018-09-21 RX ORDER — LINEZOLID 600 MG/1
600 TABLET, FILM COATED ORAL 2 TIMES DAILY
Qty: 28 TABLET | Refills: 0 | Status: SHIPPED | OUTPATIENT
Start: 2018-09-21 | End: 2018-09-21

## 2018-09-21 RX ORDER — ACETAMINOPHEN 325 MG/1
650 TABLET ORAL EVERY 6 HOURS PRN
Status: DISCONTINUED | OUTPATIENT
Start: 2018-09-21 | End: 2018-09-24 | Stop reason: HOSPADM

## 2018-09-21 RX ADMIN — BUPROPION HYDROCHLORIDE 75 MG: 75 TABLET, FILM COATED ORAL at 08:42

## 2018-09-21 RX ADMIN — LEVOTHYROXINE SODIUM 125 MCG: 125 TABLET ORAL at 06:19

## 2018-09-21 RX ADMIN — PREDNISONE 10 MG: 10 TABLET ORAL at 08:42

## 2018-09-21 RX ADMIN — DOCUSATE SODIUM 100 MG: 100 CAPSULE, LIQUID FILLED ORAL at 21:12

## 2018-09-21 RX ADMIN — CEFEPIME HYDROCHLORIDE 1 G: 1 INJECTION, POWDER, FOR SOLUTION INTRAMUSCULAR; INTRAVENOUS at 11:16

## 2018-09-21 RX ADMIN — CEFEPIME HYDROCHLORIDE 1 G: 1 INJECTION, POWDER, FOR SOLUTION INTRAMUSCULAR; INTRAVENOUS at 17:20

## 2018-09-21 RX ADMIN — DOCUSATE SODIUM 100 MG: 100 CAPSULE, LIQUID FILLED ORAL at 17:21

## 2018-09-21 RX ADMIN — CALCIUM CARBONATE-VITAMIN D TAB 500 MG-200 UNIT 1 TABLET: 500-200 TAB at 08:42

## 2018-09-21 RX ADMIN — VANCOMYCIN HYDROCHLORIDE 1250 MG: 10 INJECTION, POWDER, LYOPHILIZED, FOR SOLUTION INTRAVENOUS at 23:16

## 2018-09-21 RX ADMIN — MIRTAZAPINE 30 MG: 15 TABLET, FILM COATED ORAL at 21:13

## 2018-09-21 RX ADMIN — GABAPENTIN 900 MG: 300 CAPSULE ORAL at 22:22

## 2018-09-21 RX ADMIN — DRONABINOL 2.5 MG: 2.5 CAPSULE ORAL at 21:15

## 2018-09-21 RX ADMIN — SIMVASTATIN 40 MG: 40 TABLET, FILM COATED ORAL at 21:13

## 2018-09-21 RX ADMIN — NORTRIPTYLINE HYDROCHLORIDE 25 MG: 25 CAPSULE ORAL at 22:22

## 2018-09-21 RX ADMIN — Medication 5 MG: at 21:13

## 2018-09-21 RX ADMIN — VANCOMYCIN HYDROCHLORIDE 1250 MG: 10 INJECTION, POWDER, LYOPHILIZED, FOR SOLUTION INTRAVENOUS at 11:16

## 2018-09-21 RX ADMIN — GABAPENTIN 900 MG: 300 CAPSULE ORAL at 08:42

## 2018-09-21 RX ADMIN — GABAPENTIN 900 MG: 300 CAPSULE ORAL at 17:20

## 2018-09-21 RX ADMIN — DULOXETINE HYDROCHLORIDE 30 MG: 30 CAPSULE, DELAYED RELEASE ORAL at 08:42

## 2018-09-21 RX ADMIN — ENOXAPARIN SODIUM 40 MG: 40 INJECTION SUBCUTANEOUS at 08:42

## 2018-09-21 RX ADMIN — CEFEPIME HYDROCHLORIDE 1 G: 1 INJECTION, POWDER, FOR SOLUTION INTRAMUSCULAR; INTRAVENOUS at 02:03

## 2018-09-21 RX ADMIN — DOCUSATE SODIUM 100 MG: 100 CAPSULE, LIQUID FILLED ORAL at 08:42

## 2018-09-21 RX ADMIN — TAMSULOSIN HYDROCHLORIDE 0.4 MG: 0.4 CAPSULE ORAL at 08:42

## 2018-09-21 RX ADMIN — DRONABINOL 2.5 MG: 2.5 CAPSULE ORAL at 17:21

## 2018-09-21 ASSESSMENT — PAIN SCALES - GENERAL
PAINLEVEL_OUTOF10: 0

## 2018-09-21 NOTE — PROGRESS NOTES
declines to have IV antibiotics and PICC line if there is any other alternative   · DC cefepime  · Repeat ct to follow up as there is concern for malignancy- will wait for the ifnection to clear up  · Will follow with you  discussed with Dr Aleksandr Bennett about stopping antipsychotics        Electronically signed by Le Upton MD on 9/21/2018 at 11:19 AM

## 2018-09-21 NOTE — PROGRESS NOTES
LYMPHSABS 0.24 09/19/2018    EOSABS 0.07 09/19/2018    BASOSABS 0.07 09/19/2018     BMP:    Lab Results   Component Value Date     09/21/2018    K 4.0 09/21/2018    K 4.0 09/18/2018     09/21/2018    CO2 25 09/21/2018    BUN 9 09/21/2018    LABALBU 3.0 09/18/2018    CREATININE 1.0 09/21/2018    CALCIUM 8.8 09/21/2018    GFRAA >60 09/21/2018    LABGLOM >60 09/21/2018     PT/INR:    Lab Results   Component Value Date    PROTIME 16.9 09/17/2018    INR 1.5 09/17/2018     Last 3 Troponin:    Lab Results   Component Value Date    TROPONINI <0.01 09/17/2018    TROPONINI <0.01 07/05/2017    TROPONINI <0.01 06/30/2017     TSH:    Lab Results   Component Value Date    TSH 3.320 05/28/2017      Assessment:     1. RUL pneumonia in immunocompromised host  2. H/O stage III non small cell carcinoma treated with radiation and presently on Keytruda  3. H/O laryngeal carcinoma  4. Chronic tracheostomy  5.  COPD    Plan:       Continue same plan and orders    Antibiotics as per ID

## 2018-09-22 LAB
BLOOD CULTURE, ROUTINE: NORMAL
CULTURE, BLOOD 2: NORMAL

## 2018-09-22 PROCEDURE — 6370000000 HC RX 637 (ALT 250 FOR IP): Performed by: FAMILY MEDICINE

## 2018-09-22 PROCEDURE — 2060000000 HC ICU INTERMEDIATE R&B

## 2018-09-22 PROCEDURE — 6370000000 HC RX 637 (ALT 250 FOR IP): Performed by: INTERNAL MEDICINE

## 2018-09-22 PROCEDURE — 2580000003 HC RX 258: Performed by: INTERNAL MEDICINE

## 2018-09-22 PROCEDURE — 6360000002 HC RX W HCPCS: Performed by: INTERNAL MEDICINE

## 2018-09-22 PROCEDURE — 6360000002 HC RX W HCPCS: Performed by: CLINICAL NURSE SPECIALIST

## 2018-09-22 PROCEDURE — 2700000000 HC OXYGEN THERAPY PER DAY

## 2018-09-22 PROCEDURE — 6360000002 HC RX W HCPCS: Performed by: FAMILY MEDICINE

## 2018-09-22 RX ORDER — SODIUM CHLORIDE 0.9 % (FLUSH) 0.9 %
10 SYRINGE (ML) INJECTION PRN
Status: DISCONTINUED | OUTPATIENT
Start: 2018-09-22 | End: 2018-09-24 | Stop reason: HOSPADM

## 2018-09-22 RX ORDER — BUPROPION HYDROCHLORIDE 75 MG/1
75 TABLET ORAL 2 TIMES DAILY
Status: DISCONTINUED | OUTPATIENT
Start: 2018-09-22 | End: 2018-09-24 | Stop reason: HOSPADM

## 2018-09-22 RX ORDER — HEPARIN SODIUM (PORCINE) LOCK FLUSH IV SOLN 100 UNIT/ML 100 UNIT/ML
3 SOLUTION INTRAVENOUS PRN
Status: DISCONTINUED | OUTPATIENT
Start: 2018-09-22 | End: 2018-09-24 | Stop reason: HOSPADM

## 2018-09-22 RX ORDER — HEPARIN SODIUM (PORCINE) LOCK FLUSH IV SOLN 100 UNIT/ML 100 UNIT/ML
3 SOLUTION INTRAVENOUS EVERY 12 HOURS SCHEDULED
Status: DISCONTINUED | OUTPATIENT
Start: 2018-09-22 | End: 2018-09-24 | Stop reason: HOSPADM

## 2018-09-22 RX ORDER — DULOXETIN HYDROCHLORIDE 30 MG/1
30 CAPSULE, DELAYED RELEASE ORAL DAILY
Status: DISCONTINUED | OUTPATIENT
Start: 2018-09-22 | End: 2018-09-24 | Stop reason: HOSPADM

## 2018-09-22 RX ORDER — LIDOCAINE HYDROCHLORIDE 10 MG/ML
5 INJECTION, SOLUTION EPIDURAL; INFILTRATION; INTRACAUDAL; PERINEURAL ONCE
Status: DISCONTINUED | OUTPATIENT
Start: 2018-09-22 | End: 2018-09-24 | Stop reason: HOSPADM

## 2018-09-22 RX ADMIN — VANCOMYCIN HYDROCHLORIDE 1250 MG: 10 INJECTION, POWDER, LYOPHILIZED, FOR SOLUTION INTRAVENOUS at 23:30

## 2018-09-22 RX ADMIN — MIRTAZAPINE 30 MG: 15 TABLET, FILM COATED ORAL at 21:22

## 2018-09-22 RX ADMIN — Medication 5 MG: at 21:22

## 2018-09-22 RX ADMIN — GABAPENTIN 900 MG: 300 CAPSULE ORAL at 10:26

## 2018-09-22 RX ADMIN — DRONABINOL 2.5 MG: 2.5 CAPSULE ORAL at 21:23

## 2018-09-22 RX ADMIN — SIMVASTATIN 40 MG: 40 TABLET, FILM COATED ORAL at 21:22

## 2018-09-22 RX ADMIN — DULOXETINE HYDROCHLORIDE 30 MG: 30 CAPSULE, DELAYED RELEASE ORAL at 16:02

## 2018-09-22 RX ADMIN — LEVOTHYROXINE SODIUM 125 MCG: 125 TABLET ORAL at 06:44

## 2018-09-22 RX ADMIN — TAMSULOSIN HYDROCHLORIDE 0.4 MG: 0.4 CAPSULE ORAL at 10:25

## 2018-09-22 RX ADMIN — Medication 10 ML: at 21:23

## 2018-09-22 RX ADMIN — VANCOMYCIN HYDROCHLORIDE 1250 MG: 10 INJECTION, POWDER, LYOPHILIZED, FOR SOLUTION INTRAVENOUS at 10:26

## 2018-09-22 RX ADMIN — CALCIUM CARBONATE-VITAMIN D TAB 500 MG-200 UNIT 1 TABLET: 500-200 TAB at 10:26

## 2018-09-22 RX ADMIN — DRONABINOL 2.5 MG: 2.5 CAPSULE ORAL at 10:36

## 2018-09-22 RX ADMIN — DOCUSATE SODIUM 100 MG: 100 CAPSULE, LIQUID FILLED ORAL at 21:22

## 2018-09-22 RX ADMIN — PREDNISONE 10 MG: 10 TABLET ORAL at 10:25

## 2018-09-22 RX ADMIN — ENOXAPARIN SODIUM 40 MG: 40 INJECTION SUBCUTANEOUS at 10:25

## 2018-09-22 RX ADMIN — BUPROPION HYDROCHLORIDE 75 MG: 75 TABLET, FILM COATED ORAL at 21:22

## 2018-09-22 RX ADMIN — NORTRIPTYLINE HYDROCHLORIDE 25 MG: 25 CAPSULE ORAL at 21:22

## 2018-09-22 RX ADMIN — GABAPENTIN 900 MG: 300 CAPSULE ORAL at 16:00

## 2018-09-22 RX ADMIN — GABAPENTIN 900 MG: 300 CAPSULE ORAL at 21:22

## 2018-09-22 ASSESSMENT — PAIN SCALES - GENERAL: PAINLEVEL_OUTOF10: 0

## 2018-09-22 NOTE — PROGRESS NOTES
up  · Will follow with you        Electronically signed by Declan Rodriguez MD on 9/22/2018 at 12:45 PM

## 2018-09-23 LAB — VANCOMYCIN RANDOM: 18.6 MCG/ML (ref 5–40)

## 2018-09-23 PROCEDURE — 6370000000 HC RX 637 (ALT 250 FOR IP): Performed by: FAMILY MEDICINE

## 2018-09-23 PROCEDURE — C1751 CATH, INF, PER/CENT/MIDLINE: HCPCS

## 2018-09-23 PROCEDURE — 6360000002 HC RX W HCPCS: Performed by: INTERNAL MEDICINE

## 2018-09-23 PROCEDURE — 2700000000 HC OXYGEN THERAPY PER DAY

## 2018-09-23 PROCEDURE — 6370000000 HC RX 637 (ALT 250 FOR IP): Performed by: INTERNAL MEDICINE

## 2018-09-23 PROCEDURE — 36569 INSJ PICC 5 YR+ W/O IMAGING: CPT

## 2018-09-23 PROCEDURE — 76937 US GUIDE VASCULAR ACCESS: CPT

## 2018-09-23 PROCEDURE — 2060000000 HC ICU INTERMEDIATE R&B

## 2018-09-23 PROCEDURE — 6360000002 HC RX W HCPCS: Performed by: CLINICAL NURSE SPECIALIST

## 2018-09-23 PROCEDURE — 36415 COLL VENOUS BLD VENIPUNCTURE: CPT

## 2018-09-23 PROCEDURE — 80202 ASSAY OF VANCOMYCIN: CPT

## 2018-09-23 PROCEDURE — 36592 COLLECT BLOOD FROM PICC: CPT

## 2018-09-23 PROCEDURE — 2580000003 HC RX 258: Performed by: INTERNAL MEDICINE

## 2018-09-23 PROCEDURE — 02HV33Z INSERTION OF INFUSION DEVICE INTO SUPERIOR VENA CAVA, PERCUTANEOUS APPROACH: ICD-10-PCS | Performed by: FAMILY MEDICINE

## 2018-09-23 RX ADMIN — PREDNISONE 10 MG: 10 TABLET ORAL at 10:13

## 2018-09-23 RX ADMIN — GABAPENTIN 900 MG: 300 CAPSULE ORAL at 22:05

## 2018-09-23 RX ADMIN — TAMSULOSIN HYDROCHLORIDE 0.4 MG: 0.4 CAPSULE ORAL at 10:13

## 2018-09-23 RX ADMIN — NORTRIPTYLINE HYDROCHLORIDE 25 MG: 25 CAPSULE ORAL at 20:51

## 2018-09-23 RX ADMIN — DRONABINOL 2.5 MG: 2.5 CAPSULE ORAL at 10:12

## 2018-09-23 RX ADMIN — SODIUM CHLORIDE, PRESERVATIVE FREE 300 UNITS: 5 INJECTION INTRAVENOUS at 20:51

## 2018-09-23 RX ADMIN — DULOXETINE HYDROCHLORIDE 30 MG: 30 CAPSULE, DELAYED RELEASE ORAL at 10:13

## 2018-09-23 RX ADMIN — Medication 5 MG: at 20:51

## 2018-09-23 RX ADMIN — DRONABINOL 2.5 MG: 2.5 CAPSULE ORAL at 20:54

## 2018-09-23 RX ADMIN — CALCIUM CARBONATE-VITAMIN D TAB 500 MG-200 UNIT 1 TABLET: 500-200 TAB at 10:13

## 2018-09-23 RX ADMIN — VANCOMYCIN HYDROCHLORIDE 1250 MG: 10 INJECTION, POWDER, LYOPHILIZED, FOR SOLUTION INTRAVENOUS at 22:05

## 2018-09-23 RX ADMIN — BUPROPION HYDROCHLORIDE 75 MG: 75 TABLET, FILM COATED ORAL at 20:51

## 2018-09-23 RX ADMIN — Medication 10 ML: at 10:14

## 2018-09-23 RX ADMIN — MIRTAZAPINE 30 MG: 15 TABLET, FILM COATED ORAL at 20:51

## 2018-09-23 RX ADMIN — SODIUM CHLORIDE, PRESERVATIVE FREE 300 UNITS: 5 INJECTION INTRAVENOUS at 10:16

## 2018-09-23 RX ADMIN — BUPROPION HYDROCHLORIDE 75 MG: 75 TABLET, FILM COATED ORAL at 10:14

## 2018-09-23 RX ADMIN — DOCUSATE SODIUM 100 MG: 100 CAPSULE, LIQUID FILLED ORAL at 20:52

## 2018-09-23 RX ADMIN — GABAPENTIN 900 MG: 300 CAPSULE ORAL at 10:13

## 2018-09-23 RX ADMIN — LEVOTHYROXINE SODIUM 125 MCG: 125 TABLET ORAL at 06:35

## 2018-09-23 RX ADMIN — SIMVASTATIN 40 MG: 40 TABLET, FILM COATED ORAL at 20:51

## 2018-09-23 RX ADMIN — VANCOMYCIN HYDROCHLORIDE 1250 MG: 10 INJECTION, POWDER, LYOPHILIZED, FOR SOLUTION INTRAVENOUS at 13:19

## 2018-09-23 RX ADMIN — Medication 10 ML: at 20:50

## 2018-09-23 RX ADMIN — GABAPENTIN 900 MG: 300 CAPSULE ORAL at 14:53

## 2018-09-23 ASSESSMENT — PAIN SCALES - GENERAL
PAINLEVEL_OUTOF10: 0

## 2018-09-23 NOTE — PROGRESS NOTES
=^0.25 mcg/mL   doxycycline Sensitive <=^0.5 mcg/mL   erythromycin Resistant >=^8 mcg/mL   gentamicin Sensitive <=^0.5 mcg/mL   oxacillin Resistant >=^4 mcg/mL   tigecycline Sensitive <=^0.12 mcg/mL   trimethoprim-sulfamethoxazole Sensitive <=^10 mcg/mL   vancomycin Sensitive <=^0.5 mcg/mL                                          SpO2 Readings from Last 1 Encounters:   09/22/18 94%        Current Facility-Administered Medications   Medication Dose Route Frequency Provider Last Rate Last Dose    lidocaine PF 1 % injection 5 mL  5 mL Intradermal Once David Reyna MD        sodium chloride flush 0.9 % injection 10 mL  10 mL Intravenous PRN David Reyna MD   10 mL at 09/22/18 2123    heparin flush 100 UNIT/ML injection 300 Units  3 mL Intravenous 2 times per day David Reyna MD        heparin flush 100 UNIT/ML injection 300 Units  3 mL Intercatheter PRN David Reyna MD        DULoxetine (CYMBALTA) extended release capsule 30 mg  30 mg Oral Daily David Reyna MD   30 mg at 09/22/18 1602    buPROPion [facility]) tablet 75 mg  75 mg Oral BID David Reyna MD   75 mg at 09/22/18 2122    dronabinol (MARINOL) capsule 2.5 mg  2.5 mg Oral BID [facility], APN   2.5 mg at 09/22/18 2123    diphenhydrAMINE (BENADRYL) tablet 50 mg  50 mg Oral Nightly PRN [facility], APN        acetaminophen (TYLENOL) tablet 650 mg  650 mg Oral Q6H PRN [facility], [facility]        ondansetron [facility]) injection 4 mg  4 mg Intravenous Q6H PRN Sylvia Bucaltono, DO   4 mg at 09/20/18 2106    vancomycin (VANCOCIN) 1,250 mg in dextrose 5 % 250 mL IVPB  1,250 mg Intravenous Q12H aDvid Reyna MD   Stopped at 09/22/18 1228    albuterol (ACCUNEB) nebulizer solution 0.63 mg  1 ampule Nebulization Q6H PRN Sylvia Santamariao, DO        docusate sodium (COLACE) capsule 100 mg  100 mg Oral TID Sylvia Buccino, DO   100 mg at 09/22/18 2122    fentaNYL (DURAGESIC) 12 MCG/HR 1 patch  1 patch Transdermal Q72H Sylvia Buccino, DO   1 patch at 09/20/18 1049    fentaNYL

## 2018-09-23 NOTE — PROGRESS NOTES
PICC Placement 9/23/2018    Product number: [id number]   Lot Number: [id number]      Ultrasound: yes   L Basilic      Upper Arm Circumference: 33cm    Size: 5f    Exposed Length: 3cm    Internal Length: 42cm   Cut: 5cm   Vein Measurement: .60m               JOSSY Oneill  9/23/2018  8:42 AM

## 2018-09-23 NOTE — PROGRESS NOTES
LSW for Palliative Medicine and PM NP met with pt's wife, Kevan Perez, in [facility]. She discussed plan is for pt to return home with antibiotics. She is hopeful pt may be able to visit with his son and family in near future, discussed following protocol to prevent infection. Pt will be following up with PM outpatient clinic in near future. Psychosocial support provided.

## 2018-09-23 NOTE — PROGRESS NOTES
09/21/2018    K 4.0 09/21/2018    K 4.0 09/18/2018     09/21/2018    CO2 25 09/21/2018    BUN 9 09/21/2018    LABALBU 3.0 09/18/2018    CREATININE 1.0 09/21/2018    CALCIUM 8.8 09/21/2018    GFRAA >60 09/21/2018    LABGLOM >60 09/21/2018     PT/INR:    Lab Results   Component Value Date    PROTIME 16.9 09/17/2018    INR 1.5 09/17/2018     Results for Kamilla Montanez (MRN [id number]) as of 9/19/2018 12:54   Ref. Range 9/18/2018 12:28   Procalcitonin Latest Ref Range: 0.00 - 0.08 ng/mL 1.42 (H)     Component Results     Component Collected Lab   CULTURE, RESPIRATORY  (Abnormal) 09/18/2018  1:45 PM [address] Lab   Oral Pharyngeal Alva reduced     Smear, Respiratory 09/18/2018  1:45 PM [address] Lab   Group 5: >25 PMN's/LPF and <10 Epithelial cells/LPF   Abundant Polymorphonuclear leukocytes   Rare Epithelial cells   Few Gram positive cocci in clusters     Organism  (Abnormal) 09/18/2018  1:45 PM [address] Lab   Staphylococcus aureus     CULTURE, RESPIRATORY 09/18/2018  1:45 PM  - [address] [place] [address] Lab   Moderate growth   Methicillin resistant Staph aureus isolated. Most Methicillin   resistant Staphylococcus are usually resistant to multiple   antibiotics including other B-Lactams, Aminoglycosides,   Macrolides, Clindamycin and Tetracycline. Contact isolation   is indicated. This isolate is presumed to be resistant based on the   detection of inducible Clindamycin resistance.  Clindamycin   may still be effective in some patients. Testing Performed By     Lab - Abbreviation Name Director Address Valid Date Range   83[address] [address] Yann Garcia MD [address] [place] [address].   Gillian Gatica [id number] 08/30/17 1221-Present   Narrative     Source: TRAAS       Site:              Culture & Susceptibility     STAPHYLOCOCCUS AUREUS     Antibiotic Interpretation NAVDEEP Unit   clindamycin Resistant =^0.25 mcg/mL   doxycycline Sensitive <=^0.5 mcg/mL

## 2018-09-23 NOTE — PROGRESS NOTES
09/23/18 1557   Oxygen Therapy/Pulse Ox   O2 Therapy Oxygen humidified   O2 Device Trach mask   O2 Flow Rate (L/min) 5 L/min   FiO2  28 %   Resp 20   SpO2 94 %   Pulse Oximeter Device Mode Continuous   Pulse Oximeter Device Location Finger   H2O bottle changed and # 6 inner cannula changed.

## 2018-09-24 ENCOUNTER — HOSPITAL ENCOUNTER (OUTPATIENT)
Dept: HYPERBARIC MEDICINE | Age: 63
Setting detail: THERAPIES SERIES
End: 2018-09-24
Payer: COMMERCIAL

## 2018-09-24 VITALS
SYSTOLIC BLOOD PRESSURE: 107 MMHG | HEART RATE: 79 BPM | BODY MASS INDEX: 25.48 KG/M2 | OXYGEN SATURATION: 97 % | RESPIRATION RATE: 18 BRPM | DIASTOLIC BLOOD PRESSURE: 63 MMHG | WEIGHT: 182 LBS | HEIGHT: 71 IN | TEMPERATURE: 97.9 F

## 2018-09-24 PROCEDURE — 6370000000 HC RX 637 (ALT 250 FOR IP): Performed by: INTERNAL MEDICINE

## 2018-09-24 PROCEDURE — G8988 SELF CARE GOAL STATUS: HCPCS

## 2018-09-24 PROCEDURE — 6360000002 HC RX W HCPCS: Performed by: INTERNAL MEDICINE

## 2018-09-24 PROCEDURE — 6360000002 HC RX W HCPCS: Performed by: CLINICAL NURSE SPECIALIST

## 2018-09-24 PROCEDURE — 2580000003 HC RX 258: Performed by: INTERNAL MEDICINE

## 2018-09-24 PROCEDURE — G8989 SELF CARE D/C STATUS: HCPCS

## 2018-09-24 PROCEDURE — G8987 SELF CARE CURRENT STATUS: HCPCS

## 2018-09-24 PROCEDURE — 2700000000 HC OXYGEN THERAPY PER DAY

## 2018-09-24 PROCEDURE — 97165 OT EVAL LOW COMPLEX 30 MIN: CPT

## 2018-09-24 PROCEDURE — 6370000000 HC RX 637 (ALT 250 FOR IP): Performed by: FAMILY MEDICINE

## 2018-09-24 RX ADMIN — PREDNISONE 10 MG: 10 TABLET ORAL at 09:39

## 2018-09-24 RX ADMIN — Medication 10 ML: at 09:38

## 2018-09-24 RX ADMIN — CALCIUM CARBONATE-VITAMIN D TAB 500 MG-200 UNIT 1 TABLET: 500-200 TAB at 09:38

## 2018-09-24 RX ADMIN — VANCOMYCIN HYDROCHLORIDE 1250 MG: 10 INJECTION, POWDER, LYOPHILIZED, FOR SOLUTION INTRAVENOUS at 10:36

## 2018-09-24 RX ADMIN — GABAPENTIN 900 MG: 300 CAPSULE ORAL at 09:38

## 2018-09-24 RX ADMIN — DOCUSATE SODIUM 100 MG: 100 CAPSULE, LIQUID FILLED ORAL at 13:54

## 2018-09-24 RX ADMIN — DRONABINOL 2.5 MG: 2.5 CAPSULE ORAL at 10:36

## 2018-09-24 RX ADMIN — SODIUM CHLORIDE, PRESERVATIVE FREE 300 UNITS: 5 INJECTION INTRAVENOUS at 09:37

## 2018-09-24 RX ADMIN — TAMSULOSIN HYDROCHLORIDE 0.4 MG: 0.4 CAPSULE ORAL at 09:39

## 2018-09-24 RX ADMIN — BUPROPION HYDROCHLORIDE 75 MG: 75 TABLET, FILM COATED ORAL at 09:39

## 2018-09-24 RX ADMIN — LEVOTHYROXINE SODIUM 125 MCG: 125 TABLET ORAL at 06:08

## 2018-09-24 RX ADMIN — DULOXETINE HYDROCHLORIDE 30 MG: 30 CAPSULE, DELAYED RELEASE ORAL at 09:38

## 2018-09-24 RX ADMIN — DOCUSATE SODIUM 100 MG: 100 CAPSULE, LIQUID FILLED ORAL at 09:38

## 2018-09-24 RX ADMIN — GABAPENTIN 900 MG: 300 CAPSULE ORAL at 13:54

## 2018-09-24 ASSESSMENT — PAIN SCALES - GENERAL: PAINLEVEL_OUTOF10: 0

## 2018-09-24 NOTE — CARE COORDINATION
SOCIAL WORK / DISCHARGE PLANNING:  Script for IV vanco had been faxed to [facility] this morning. Sw followed up, spoke with [facility], [facility] rep, pricing was completed and pt was agreeable to daily cost of $162.88 after $4000 deductible would be covered at 100%. Pt is set up to start servcie tonight for evening dose. He has picc line. Sw met with pt and wife both ready for dc. [facility] O2 [facility] and this Sw [facility] to alert to need for concentrator to be delivered. Wife will provide home going transport. Mary Judge RN charge to obtain dc order. [facility].                        Electronically signed by Suzen Cowden, LSW on 9/24/2018 at 1:16 PM

## 2018-09-24 NOTE — PROGRESS NOTES
Edie Cruz    Pt. Known to our group, status reviewed    S: feels better , no fever or hemoptysis now   Denies pain , no new sxs    O:alert,appropiate. no icterus      Lymph nodes--normal        Heart  Reg      Lungs rt.sided crackles      Abdomen  Soft      edema  none       NEURO--no deficit         CBC with Differential:    Lab Results   Component Value Date    WBC 7.9 09/19/2018    RBC 3.85 09/19/2018    HGB 10.5 09/19/2018    HCT 33.0 09/19/2018     09/19/2018    MCV 85.7 09/19/2018    MCH 27.3 09/19/2018    MCHC 31.8 09/19/2018    RDW 15.4 09/19/2018    SEGSPCT 59 02/22/2012    LYMPHOPCT 2.6 09/19/2018    MONOPCT 5.2 09/19/2018    BASOPCT 0.9 09/19/2018    MONOSABS 0.40 09/19/2018    LYMPHSABS 0.24 09/19/2018    EOSABS 0.07 09/19/2018    BASOSABS 0.07 09/19/2018        CMP:    Lab Results   Component Value Date     09/21/2018    K 4.0 09/21/2018    K 4.0 09/18/2018     09/21/2018    CO2 25 09/21/2018    BUN 9 09/21/2018    CREATININE 1.0 09/21/2018    GFRAA >60 09/21/2018    LABGLOM >60 09/21/2018    GLUCOSE 97 09/21/2018    GLUCOSE 130 02/22/2012    PROT 6.3 09/18/2018    LABALBU 3.0 09/18/2018    CALCIUM 8.8 09/21/2018    BILITOT 0.6 09/18/2018    ALKPHOS 141 09/18/2018    AST 17 09/18/2018    ALT 15 09/18/2018     LDH:  No results found for: LDH  PT/INR:    Lab Results   Component Value Date    PROTIME 16.9 09/17/2018    INR 1.5 09/17/2018     PTT:    Lab Results   Component Value Date    APTT 37.5 09/17/2018   [APTT  VITAMIN B12: No components found for: B12  FOLATE:  No results found for: FOLATE  IRON:  No results found for: IRON  Iron Saturation:  No components found for: PERCENTFE  TIBC:  No results found for: TIBC  FERRITIN:  No results found for: FERRITIN  RUDY:  No results found for: ANATITER, RUDY    Assessment  NSCL cancer with bone metastases   P.H of chemoRx , recently on Keytruda  Pneumonia       Plan  On antibiotics per I.d   Waiting to be discharged   Advised to follow up with  next week        Electronically signed by Niurka Tenorio MD on 9/24/2018 at 1:37 PM

## 2018-09-24 NOTE — PROGRESS NOTES
Unit   clindamycin Resistant =^0.25 mcg/mL   doxycycline Sensitive <=^0.5 mcg/mL   erythromycin Resistant >=^8 mcg/mL   gentamicin Sensitive <=^0.5 mcg/mL   oxacillin Resistant >=^4 mcg/mL   tigecycline Sensitive <=^0.12 mcg/mL   trimethoprim-sulfamethoxazole Sensitive <=^10 mcg/mL   vancomycin Sensitive <=^0.5 mcg/mL                                          SpO2 Readings from Last 1 Encounters:   09/24/18 97%        Current Facility-Administered Medications   Medication Dose Route Frequency Provider Last Rate Last Dose    lidocaine PF 1 % injection 5 mL  5 mL Intradermal Once Husam Perdomo MD        sodium chloride flush 0.9 % injection 10 mL  10 mL Intravenous PRN Husam Perdomo MD   10 mL at 09/24/18 0938    heparin flush 100 UNIT/ML injection 300 Units  3 mL Intravenous 2 times per day Husam Perdomo MD   300 Units at 09/24/18 5366    heparin flush 100 UNIT/ML injection 300 Units  3 mL Intercatheter PRN Husam Perdomo MD        DULoxetine (CYMBALTA) extended release capsule 30 mg  30 mg Oral Daily Husam Perdomo MD   30 mg at 09/24/18 0549    buPROPion (WELLBUTRIN) tablet 75 mg  75 mg Oral BID Husam Perdomo MD   75 mg at 09/24/18 7466    dronabinol (MARINOL) capsule 2.5 mg  2.5 mg Oral BID Lurena Sails, APN   2.5 mg at 09/24/18 1036    diphenhydrAMINE (BENADRYL) tablet 50 mg  50 mg Oral Nightly PRN Lurena Sails, APN        acetaminophen (TYLENOL) tablet 650 mg  650 mg Oral Q6H PRN Lurena Sails, APN        ondansetron (ZOFRAN) injection 4 mg  4 mg Intravenous Q6H PRN Sylvia Buccino, DO   4 mg at 09/20/18 2106    vancomycin (VANCOCIN) 1,250 mg in dextrose 5 % 250 mL IVPB  1,250 mg Intravenous Q12H Husam Perdomo MD   Stopped at 09/24/18 1206    albuterol (ACCUNEB) nebulizer solution 0.63 mg  1 ampule Nebulization Q6H PRN Sylvia Buccino, DO        docusate sodium (COLACE) capsule 100 mg  100 mg Oral TID Sylvia Bucemely DO   100 mg at 09/24/18 1354    fentaNYL (DURAGESIC) 12 MCG/HR 1 patch  1 patch Transdermal Q72H Sylvia Buccino, DO   1 patch at 09/23/18 1016    fentaNYL (DURAGESIC) 25 MCG/HR 1 patch  1 patch Transdermal Q72H Sylvia Buccino, DO   1 patch at 09/23/18 1017    gabapentin (NEURONTIN) capsule 900 mg  900 mg Oral TID Sylvia Buccino, DO   900 mg at 09/24/18 1354    levothyroxine (SYNTHROID) tablet 125 mcg  125 mcg Oral QAM AC Sylvia Buccino, DO   125 mcg at 09/24/18 0608    LORazepam (ATIVAN) tablet 0.5 mg  0.5 mg Oral Nightly PRN Sylvia Buccino, DO        melatonin tablet 5 mg  5 mg Oral Nightly Sylvia Buccino, DO   5 mg at 09/23/18 2051    mirtazapine (REMERON) tablet 30 mg  30 mg Oral Nightly Sylvia Buccino, DO   30 mg at 09/23/18 2051    nortriptyline (PAMELOR) capsule 25 mg  25 mg Oral Nightly Sylvia Buccino, DO   25 mg at 09/23/18 2051    predniSONE (DELTASONE) tablet 10 mg  10 mg Oral Daily Sylvia Buccino, DO   10 mg at 09/24/18 0939    simvastatin (ZOCOR) tablet 40 mg  40 mg Oral Nightly Sylvia Buccino, DO   40 mg at 09/23/18 2051    tamsulosin (FLOMAX) capsule 0.4 mg  0.4 mg Oral Daily Sylvia Buccino, DO   0.4 mg at 09/24/18 0939    calcium-vitamin D (OSCAL-500) 500-200 MG-UNIT per tablet 1 tablet  1 tablet Oral Daily Sylvia Buccino, DO   1 tablet at 09/24/18 0938    enoxaparin (LOVENOX) injection 40 mg  40 mg Subcutaneous Daily Sylvia Buccino, DO   40 mg at 09/22/18 1025     Current Outpatient Prescriptions   Medication Sig Dispense Refill    vancomycin (VANCOCIN) 1000 MG injection Infuse 1,250 mg intravenously every 12 hours for 21 days 10 each 0    dronabinol (MARINOL) 2.5 MG capsule Take 1 capsule by mouth 2 times daily for 30 days. . 60 capsule 0    fentaNYL (DURAGESIC) 25 MCG/HR Place 1 patch onto the skin every 72 hours for 30 days. Apply 25 mcg and 12 mcg patch each for total of 37 mcg hold for sedation. 10 patch 0    fentaNYL (DURAGESIC) 12 MCG/HR Place 1 patch onto the skin every 72 hours for 30 days. Apply 25 mcg and 12 mcg patch each for total of 37 mcg.  10 patch 0    LORazepam (ATIVAN) 0.5 MG no murmur, rub   or gallop. Abdomen:    Soft, non-tender, bowel sounds active all four quadrants,     no masses, no organomegaly, no bruit. Extremities  Extremities normal, no cyanosis, clubbing, or edema. Capillary refill <3 seconds. Skin:   Warm and dry. Skin color, texture, turgor normal. No rashes,    bleeding,  or lesions. See wound care assessment.             ASSESSMENT:  Acute hypoxic respiratory failure- impoving  MRSA pneumonia  Long term tracheostomy 2017  Acute on chronic respiratory failure with hypoxia  Stage III NSCLCA s/p radiation and currently on Keytruda  Fatigue   Anorexia  COPD  Ongoing nicotine dependence  Immunocompromised host    PLAN:  Oxygen therapy CATINA 28 % keep >92%  Antibiotics per ID- Vancomycin via PICC line for 21 days   Continue Bronchodilators albuterol PRN  Prednisone 10 mg daily  Repeat CT chest non contrast in 8 weeks  Tobacco cessation counseling  This plan of care was reviewed in collaboration with Dr. Duarte Damian, BG - CNP

## 2018-09-25 ENCOUNTER — APPOINTMENT (OUTPATIENT)
Dept: HYPERBARIC MEDICINE | Age: 63
End: 2018-09-25
Payer: COMMERCIAL

## 2018-09-26 ENCOUNTER — HOSPITAL ENCOUNTER (OUTPATIENT)
Age: 63
Discharge: HOME | End: 2018-09-28
Payer: COMMERCIAL

## 2018-09-26 ENCOUNTER — APPOINTMENT (OUTPATIENT)
Dept: HYPERBARIC MEDICINE | Age: 63
End: 2018-09-26
Payer: COMMERCIAL

## 2018-09-26 LAB
ALBUMIN SERPL-MCNC: 3.2 G/DL (ref 3.5–5.2)
ALP BLD-CCNC: 91 U/L (ref 40–129)
ALT SERPL-CCNC: 16 U/L (ref 0–40)
ANION GAP SERPL CALCULATED.3IONS-SCNC: 13 MMOL/L (ref 7–16)
AST SERPL-CCNC: 15 U/L (ref 0–39)
BASOPHILS ABSOLUTE: 0.1 E9/L (ref 0–0.2)
BASOPHILS RELATIVE PERCENT: 1.3 % (ref 0–2)
BILIRUB SERPL-MCNC: <0.2 MG/DL (ref 0–1.2)
BUN BLDV-MCNC: 14 MG/DL (ref 8–23)
C-REACTIVE PROTEIN: 1.3 MG/DL (ref 0–0.4)
CALCIUM SERPL-MCNC: 9 MG/DL (ref 8.6–10.2)
CHLORIDE BLD-SCNC: 100 MMOL/L (ref 98–107)
CO2: 25 MMOL/L (ref 22–29)
CREAT SERPL-MCNC: 1.1 MG/DL (ref 0.7–1.2)
EOSINOPHILS ABSOLUTE: 0.38 E9/L (ref 0.05–0.5)
EOSINOPHILS RELATIVE PERCENT: 5 % (ref 0–6)
GFR AFRICAN AMERICAN: >60
GFR NON-AFRICAN AMERICAN: >60 ML/MIN/1.73
GLUCOSE BLD-MCNC: 85 MG/DL (ref 74–109)
HCT VFR BLD CALC: 37.5 % (ref 37–54)
HEMOGLOBIN: 11.4 G/DL (ref 12.5–16.5)
IMMATURE GRANULOCYTES #: 0.09 E9/L
IMMATURE GRANULOCYTES %: 1.2 % (ref 0–5)
LYMPHOCYTES ABSOLUTE: 1.57 E9/L (ref 1.5–4)
LYMPHOCYTES RELATIVE PERCENT: 20.8 % (ref 20–42)
MCH RBC QN AUTO: 27 PG (ref 26–35)
MCHC RBC AUTO-ENTMCNC: 30.4 % (ref 32–34.5)
MCV RBC AUTO: 88.9 FL (ref 80–99.9)
MONOCYTES ABSOLUTE: 0.43 E9/L (ref 0.1–0.95)
MONOCYTES RELATIVE PERCENT: 5.7 % (ref 2–12)
NEUTROPHILS ABSOLUTE: 4.96 E9/L (ref 1.8–7.3)
NEUTROPHILS RELATIVE PERCENT: 66 % (ref 43–80)
PDW BLD-RTO: 15.6 FL (ref 11.5–15)
PLATELET # BLD: 508 E9/L (ref 130–450)
PMV BLD AUTO: 9 FL (ref 7–12)
POTASSIUM SERPL-SCNC: 3.9 MMOL/L (ref 3.5–5)
RBC # BLD: 4.22 E12/L (ref 3.8–5.8)
SEDIMENTATION RATE, ERYTHROCYTE: 37 MM/HR (ref 0–15)
SODIUM BLD-SCNC: 138 MMOL/L (ref 132–146)
TOTAL PROTEIN: 6.4 G/DL (ref 6.4–8.3)
VANCOMYCIN TROUGH: 19.3 MCG/ML (ref 5–16)
WBC # BLD: 7.5 E9/L (ref 4.5–11.5)

## 2018-09-26 PROCEDURE — 85651 RBC SED RATE NONAUTOMATED: CPT

## 2018-09-26 PROCEDURE — 86140 C-REACTIVE PROTEIN: CPT

## 2018-09-26 PROCEDURE — 85025 COMPLETE CBC W/AUTO DIFF WBC: CPT

## 2018-09-26 PROCEDURE — 80202 ASSAY OF VANCOMYCIN: CPT

## 2018-09-26 PROCEDURE — 80053 COMPREHEN METABOLIC PANEL: CPT

## 2018-09-27 ENCOUNTER — APPOINTMENT (OUTPATIENT)
Dept: HYPERBARIC MEDICINE | Age: 63
End: 2018-09-27
Payer: COMMERCIAL

## 2018-09-28 ENCOUNTER — APPOINTMENT (OUTPATIENT)
Dept: HYPERBARIC MEDICINE | Age: 63
End: 2018-09-28
Payer: COMMERCIAL

## 2018-09-29 ENCOUNTER — HOSPITAL ENCOUNTER (OUTPATIENT)
Age: 63
Discharge: HOME | End: 2018-10-01
Payer: COMMERCIAL

## 2018-09-29 LAB
ALBUMIN SERPL-MCNC: 3.4 G/DL (ref 3.5–5.2)
ALP BLD-CCNC: 88 U/L (ref 40–129)
ALT SERPL-CCNC: 14 U/L (ref 0–40)
ANION GAP SERPL CALCULATED.3IONS-SCNC: 9 MMOL/L (ref 7–16)
AST SERPL-CCNC: 13 U/L (ref 0–39)
BASOPHILS ABSOLUTE: 0.11 E9/L (ref 0–0.2)
BASOPHILS RELATIVE PERCENT: 1.1 % (ref 0–2)
BILIRUB SERPL-MCNC: <0.2 MG/DL (ref 0–1.2)
BUN BLDV-MCNC: 11 MG/DL (ref 8–23)
CALCIUM SERPL-MCNC: 8.8 MG/DL (ref 8.6–10.2)
CHLORIDE BLD-SCNC: 101 MMOL/L (ref 98–107)
CO2: 28 MMOL/L (ref 22–29)
CREAT SERPL-MCNC: 1.1 MG/DL (ref 0.7–1.2)
EOSINOPHILS ABSOLUTE: 0.39 E9/L (ref 0.05–0.5)
EOSINOPHILS RELATIVE PERCENT: 3.9 % (ref 0–6)
GFR AFRICAN AMERICAN: >60
GFR NON-AFRICAN AMERICAN: >60 ML/MIN/1.73
GLUCOSE BLD-MCNC: 99 MG/DL (ref 74–109)
HCT VFR BLD CALC: 35.9 % (ref 37–54)
HEMOGLOBIN: 11 G/DL (ref 12.5–16.5)
IMMATURE GRANULOCYTES #: 0.07 E9/L
IMMATURE GRANULOCYTES %: 0.7 % (ref 0–5)
LYMPHOCYTES ABSOLUTE: 1.53 E9/L (ref 1.5–4)
LYMPHOCYTES RELATIVE PERCENT: 15.5 % (ref 20–42)
MCH RBC QN AUTO: 26.8 PG (ref 26–35)
MCHC RBC AUTO-ENTMCNC: 30.6 % (ref 32–34.5)
MCV RBC AUTO: 87.3 FL (ref 80–99.9)
MONOCYTES ABSOLUTE: 0.53 E9/L (ref 0.1–0.95)
MONOCYTES RELATIVE PERCENT: 5.4 % (ref 2–12)
NEUTROPHILS ABSOLUTE: 7.25 E9/L (ref 1.8–7.3)
NEUTROPHILS RELATIVE PERCENT: 73.4 % (ref 43–80)
PDW BLD-RTO: 15.5 FL (ref 11.5–15)
PLATELET # BLD: 494 E9/L (ref 130–450)
PMV BLD AUTO: 9 FL (ref 7–12)
POTASSIUM SERPL-SCNC: 4 MMOL/L (ref 3.5–5)
RBC # BLD: 4.11 E12/L (ref 3.8–5.8)
SODIUM BLD-SCNC: 138 MMOL/L (ref 132–146)
TOTAL PROTEIN: 6.5 G/DL (ref 6.4–8.3)
WBC # BLD: 9.9 E9/L (ref 4.5–11.5)

## 2018-09-29 PROCEDURE — 85025 COMPLETE CBC W/AUTO DIFF WBC: CPT

## 2018-09-29 PROCEDURE — 80053 COMPREHEN METABOLIC PANEL: CPT

## 2018-10-01 ENCOUNTER — HOSPITAL ENCOUNTER (OUTPATIENT)
Age: 63
Discharge: HOME | End: 2018-10-03
Payer: MEDICARE

## 2018-10-01 ENCOUNTER — TELEPHONE (OUTPATIENT)
Dept: INFUSION THERAPY | Age: 63
End: 2018-10-01

## 2018-10-01 LAB
ALBUMIN SERPL-MCNC: 3.3 G/DL (ref 3.5–5.2)
ALP BLD-CCNC: 83 U/L (ref 40–129)
ALT SERPL-CCNC: 17 U/L (ref 0–40)
ANION GAP SERPL CALCULATED.3IONS-SCNC: 13 MMOL/L (ref 7–16)
AST SERPL-CCNC: 16 U/L (ref 0–39)
BASOPHILS ABSOLUTE: 0.12 E9/L (ref 0–0.2)
BASOPHILS RELATIVE PERCENT: 1.3 % (ref 0–2)
BILIRUB SERPL-MCNC: <0.2 MG/DL (ref 0–1.2)
BUN BLDV-MCNC: 11 MG/DL (ref 8–23)
C-REACTIVE PROTEIN: 1.7 MG/DL (ref 0–0.4)
CALCIUM SERPL-MCNC: 9.1 MG/DL (ref 8.6–10.2)
CHLORIDE BLD-SCNC: 105 MMOL/L (ref 98–107)
CO2: 23 MMOL/L (ref 22–29)
CREAT SERPL-MCNC: 1.2 MG/DL (ref 0.7–1.2)
EOSINOPHILS ABSOLUTE: 0.42 E9/L (ref 0.05–0.5)
EOSINOPHILS RELATIVE PERCENT: 4.5 % (ref 0–6)
GFR AFRICAN AMERICAN: >60
GFR NON-AFRICAN AMERICAN: >60 ML/MIN/1.73
GLUCOSE BLD-MCNC: 85 MG/DL (ref 74–109)
HCT VFR BLD CALC: 37.6 % (ref 37–54)
HEMOGLOBIN: 11.5 G/DL (ref 12.5–16.5)
IMMATURE GRANULOCYTES #: 0.07 E9/L
IMMATURE GRANULOCYTES %: 0.7 % (ref 0–5)
LYMPHOCYTES ABSOLUTE: 1.79 E9/L (ref 1.5–4)
LYMPHOCYTES RELATIVE PERCENT: 19.1 % (ref 20–42)
MCH RBC QN AUTO: 27 PG (ref 26–35)
MCHC RBC AUTO-ENTMCNC: 30.6 % (ref 32–34.5)
MCV RBC AUTO: 88.3 FL (ref 80–99.9)
MONOCYTES ABSOLUTE: 0.44 E9/L (ref 0.1–0.95)
MONOCYTES RELATIVE PERCENT: 4.7 % (ref 2–12)
NEUTROPHILS ABSOLUTE: 6.55 E9/L (ref 1.8–7.3)
NEUTROPHILS RELATIVE PERCENT: 69.7 % (ref 43–80)
PDW BLD-RTO: 15.9 FL (ref 11.5–15)
PLATELET # BLD: 521 E9/L (ref 130–450)
PMV BLD AUTO: 9.3 FL (ref 7–12)
POTASSIUM SERPL-SCNC: 4.1 MMOL/L (ref 3.5–5)
RBC # BLD: 4.26 E12/L (ref 3.8–5.8)
SEDIMENTATION RATE, ERYTHROCYTE: 27 MM/HR (ref 0–15)
SODIUM BLD-SCNC: 141 MMOL/L (ref 132–146)
TOTAL PROTEIN: 6.7 G/DL (ref 6.4–8.3)
VANCOMYCIN TROUGH: 17.4 MCG/ML (ref 5–16)
WBC # BLD: 9.4 E9/L (ref 4.5–11.5)

## 2018-10-01 PROCEDURE — 86140 C-REACTIVE PROTEIN: CPT

## 2018-10-01 PROCEDURE — 80202 ASSAY OF VANCOMYCIN: CPT

## 2018-10-01 PROCEDURE — 36415 COLL VENOUS BLD VENIPUNCTURE: CPT

## 2018-10-01 PROCEDURE — 85025 COMPLETE CBC W/AUTO DIFF WBC: CPT

## 2018-10-01 PROCEDURE — 80053 COMPREHEN METABOLIC PANEL: CPT

## 2018-10-01 PROCEDURE — 85651 RBC SED RATE NONAUTOMATED: CPT

## 2018-10-01 NOTE — TELEPHONE ENCOUNTER
Patients wife, Danny Hernandez, called today asking about getting Magic Cup for the patient at home. Directed her to [facility] where she can buy it in store (encouraged her to call first to ensure they have it), or recommended buying it on VR[employer]. She was receptive to this. Encouraged her to call back if she cannot find it.  Dior Escalante RD,,[place]

## 2018-10-02 ENCOUNTER — OFFICE VISIT (OUTPATIENT)
Dept: PALLATIVE CARE | Age: 63
End: 2018-10-02
Payer: MEDICARE

## 2018-10-02 VITALS
SYSTOLIC BLOOD PRESSURE: 122 MMHG | WEIGHT: 180.4 LBS | HEART RATE: 92 BPM | BODY MASS INDEX: 25.16 KG/M2 | OXYGEN SATURATION: 94 % | DIASTOLIC BLOOD PRESSURE: 79 MMHG

## 2018-10-02 DIAGNOSIS — R63.4 UNINTENTIONAL WEIGHT LOSS: ICD-10-CM

## 2018-10-02 DIAGNOSIS — R53.83 FATIGUE, UNSPECIFIED TYPE: ICD-10-CM

## 2018-10-02 DIAGNOSIS — C34.12 PANCOAST TUMOR, LEFT (HCC): ICD-10-CM

## 2018-10-02 DIAGNOSIS — J43.1 PANLOBULAR EMPHYSEMA (HCC): ICD-10-CM

## 2018-10-02 DIAGNOSIS — Z51.5 PALLIATIVE CARE BY SPECIALIST: ICD-10-CM

## 2018-10-02 DIAGNOSIS — G89.3 CHRONIC PAIN DUE TO NEOPLASM: Primary | ICD-10-CM

## 2018-10-02 DIAGNOSIS — G90.2: ICD-10-CM

## 2018-10-02 DIAGNOSIS — C34.82 MALIGNANT NEOPLASM OF OVERLAPPING SITES OF LEFT LUNG (HCC): ICD-10-CM

## 2018-10-02 DIAGNOSIS — J18.9 HAP (HOSPITAL-ACQUIRED PNEUMONIA): ICD-10-CM
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
## 2018-10-02 PROCEDURE — 99215 OFFICE O/P EST HI 40 MIN: CPT | Performed by: FAMILY MEDICINE

## 2018-10-02 RX ORDER — DRONABINOL 5 MG/1
5 CAPSULE ORAL 2 TIMES DAILY
Qty: 60 CAPSULE | Refills: 0 | Status: SHIPPED | OUTPATIENT
Start: 2018-10-02 | End: 2018-10-30

## 2018-10-02 NOTE — PROGRESS NOTES
dose arms (Concurrent RT + Carbo/Taxol +/- Cetuximab) these were closed early with \"negative\" results (longer term results and POFA needed), therefor 60 Gy in daily fractions with dual agent chemotherapy can be considered the standard (the Brooke and LAMP trials also assisted in the development of this paradigm). Fractionated external beam radiation therapy may or may not be delivered with intensity modulation +/- image guidance per daily cone beam depending on the specific patient anatomy and dose constraints as described per the RTOG and QUANTEC ---Dianelys Rivas Int J Radiat Oncol Biol Phys. 2010 Mar 1;76(3 Suppl):S10-9. The risks, benefits, alternatives, process and logistics of external beam radiation were reviewed (risks include but are not limited to worsening PULM function, esophagitis, esophageal structure, perforations, bleeding, paralysis and death). We answered all of the patient's questions to the best of our ability. Dom verbalized understanding and seemed satisfied. Radiation planning will commence within < 24 hours; the next step in management being the simulation scan, with external beam radiation to commence in a timely fashion thereafter. It was a pleasure meeting Good Lashell today and we appreciate the referral and opportunity to be involved in his care. We had an extensive discussion today regarding the course to date (including a focused review of the applicable radiographic and laboratory information), multidisciplinary approach to cancer care, and indications for external beam radiation therapy as a component therein. A literature review and multidisciplinary discussion was performed after seeing this patient due to the complexity of the medical decision making in this case. I personally spent greater than 60 minutes with this patient and performed the complete history and physical as above at today's visit, at least 45 minutes was in direct  regarding disease management.    *this pt will be 07/06/16:  On 6/3/16, Joanie Judge completed 5000 cGy in 22 fractions directed to the left lung / spine for management of locally advanced lung CA. States that he's doing well overall. Eating well. Coughing occasionally with phlegm - light brown in color. Denies fever. SOB with yard work only. Following with Dr Carline Philippe for palliative med, states that pain is well controlled with pain medications he is taking. Pt is following with Dr Manasa Rees for medical oncology. Seen last week, continues on chemo. Tolerating chemo well. CT scan planned after next chemo cycle per patient report. Met with phycians in [facility] for med onc for second opinion. States that he had an MRI/CT scan at that time (approx 3 weeks after radiation was completed) which showed (per patient report) no tumor shrinkage but pt states that he understands radiation is continuing to work. No report available at this time. Planning to meet with a neurosurgeon at [facility] to discuss options for vertebral fractures from tumor per patient report. Appt with Dr Diana Larose (pul) in October. Patient is doing well post-radiation completion. Explained to patient that he would need clearance from Dr Santiago Link and Dr Manasa Rees prior to surgery being completed especially to the irradiated area. Verbalized understanding. CT scan re-imaging per Dr Manasa Rees. Pt's previous imaging done at [facility]. Asked patient to have results of reimaging faxed to us as well when it is done. I discussed follow up plans with Joaniemary jane Judge. At this time, Dr Santiago Link will see the patient back in 3 months for a post-radiation completion follow-up visit. Joanie Judge is to follow up with other physicians involved in their care as directed (including but not limited to Medical Oncology, Primary Care, Pulmonary, and Surgery). Patient evaluated unaccompanied in the exam room by his wife with no sign of confusion and/or sedation able to voices needs.  Patient states that he continues on the average. Patient has not noticed much of a difference since we increased his Cymbalta 1 month ago. Patient states that a left chest pain is new otherwise his symptoms are unchanged. 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 Patient desires to wean himself off of the Percocet as it does make him sleepy therefore did accept my suggestion of continues to titrate upward the Duragesic. Today we increased him to Duragesic 37 µg q.72 hours and provided a prescription for 10 of each strength patch. Patient was not given a prescription for his Percocet as he states he has not filled the last prescription I gave him. There are no signs of thrush but we will continue to monitor and patient compliant with his other medications. We will see him back in 4 weeks and evaluate his need for his breakthrough pain medicine and consider titrating the Duragesic further if indicated. We will question if he has returned to work. We will also consider increasing his Cymbalta to 40 mg b.i.d. if indicated. 12/13/16:  Medical records reviewed and as per radiation oncology's assessment on 11/30/16:  Patient is doing well post-radiation completion. CT scan on 11/28/16 showing paratracheal node enlargement and he is symptomatic with whispery voice/ sore throat -- ENT referral written today to Dr Austen Rodríguez (physician per patient preference). Will also update Dr Ankit Berumen on imaging report and referral. Further imaging per med onc or ENT. Pain well controlled. Continue with Dr Roberto Cruz (palliative medicine). Continue to follow with Dr Rose Larose (pulmonology). Smoking cessation reinforced again. Patient verbalized understanding. I discussed follow up plans with Violeta Murrell. At this time, Dr Macy Howe will see the patient back in January 2017 for a post-radiation completion follow-up visit.  Violeta Murrell is to pain worsens he will then apply a 37 µg dose and continuing unchanged. They voiced understanding. Of course he is continuing his Ativan, Cymbalta 40 mg b.i.d. and he will need refills of his Ativan next time when we see him in 4 weeks from now, possibly Pamelor and of course his patch or patches. Patient has gained almost 3 pounds over last month and we will continue to monitor. 05/09/17:  OARRS report reviewed today without any prescribing red flags. Medical records reviewed and as per radiation oncology assessment while an inpatient on 05/03/17:  ASSESSMENT/PLAN:  Armaan Valverde appears clinically stable. CT neck and fiberoptic laryngoscopic findings are discussed with the patient and his wife. I also had the opportunity to speak to Yuliana Luna and NENO, today. I believe the CT findings of soft tissue attenuation at the piriform recess and arytenoids bilaterally are the results of the vocal cord paralysis and not a discrete tumor mass. 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 At diagnosis, the large left upper lung mass extended into the mediastinum and may be now contributing to the right recurrent laryngeal nerve involvement along with prior treatments. In any case, palliative radiation treatments directed to the hypopharynx would not be recommended. Mr. Barbi Benítez remains adamantly opposed to undergoing a tracheostomy to protect his airway as discussed with Dr. Blayne Lindquist. I did discuss this with the patient again along with the rec of doing this when he is stable rather than on an emergent basis. He and his wife voiced understanding. CT chest from February, 2017 shows some response to treatment. As discussed with Dr. LAZCANO, plans would be to continue systemic therapy as an out patient followed by restaging CT/PET/CT imaging. We will follow up on this when completed.   Mr. Barbi Benítez is feeling better and is asking when he will be discharged. 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 He also continues to smoke cigarettes. I am concerned about an acute pulmonary event and I suggested he take time off from work. He should definitively quit smoking. He and his wife voiced understanding. Thank you for allowing me to participate in Mr. Mick Novak evaluation. As per H&P while an inpatient on 05/03/17:  HISTORY OF PRESENT ILLNESS: The patient is a 69-year-old  male with  history of COPD and adenocarcinoma of the lung who presented to the Emergency  Room with shortness of breath and hoarse voice that worsened throughout the  day of admission. He does have a history of subglottic airway narrowing and  has been seen by ENT for it. He denies any issues with chest pain. He feels  like something is stuck in his throat. He had breathing treatments in the  Emergency Room which were of very little help. He has finished chemotherapy  approximately the end of 11/2016 and now is getting immunotherapy. His last  immunotherapy treatment was 2 weeks ago. The next one is scheduled for  05/09/2017. He was seen in consultation by ENT. Tracheostomy was offered,  but the patient declined. He was also seen in consultation by Pulmonary. His  chest x-ray on admission shows a possible right lung infiltrate. The patient  was started on Pulmicort and Perforomist aerosol treatments along with  DuoNebs. Respiratory cultures and viral panels were obtained. DIAGNOSTIC IMPRESSION:  1. Bilateral vocal cord palsy. 2. History of chronic obstructive pulmonary disease, not in acute  exacerbation. 3. History of nonsmall cell lung CA, currently receiving immunotherapy. 4. Subglottic narrowing. 5. Tobacco dependency. 6. Depression. 7. Hypercholesterolemia.   As per ENT consult while an inpatient on 05/02/17:  Patient is a 63 yo male, history of COPD and lung adenocarcinoma, that presented to significantly in the near future. 08/22/17:  Medical records reviewed and as per radiation oncology's assessment from 07/27/17:  Mr. Anastacio Castro is a pleasant 64year old man with a new glottic lesion. This is either a distant progression form the lung cancer or a second primary. I have spoken with Dr. Reyna who recommend fractionated external beam radiation therapy prior to re-initiation of systmeic therapy as there is no other disease progression noted. We agree with this approach however a definitve dose may not be feasible due to previous RT as Dom knows. Certainly effective palliation is possible. The risks, benefits, alternatives, process and logistics of external beam radiation were reviewed. We answered all of the patient's questions to the best of our ability. Zakia Encarnacion and his wife verbalized understanding and seemed satisfied. Radiation planning will commence within 14 days; the next step in management being the simulation scan, with external beam radiation to commence in a timely fashion thereafter. Patient presents today accompanied by his wife in no acute distress looking so much better than he did in the past with no sign of sedation and/or confusion. Patient has been receiving IV fluids 3 times a week and has been eating and drinking better. Patient states that he has had difficulty falling asleep recently a couple times but also states that he has eaten later at night and is getting his bathroom remodel and might be worrying about this. Patient also questioning if he should use the Neurontin suspension as he is able to take the pills and capsules without difficulty. Patient desires to use of the liquid since he has it. Patient states there's been no change in how he is taking his other medications the patient was started on prednisone 10 mg daily which is helping with his overall feelings and joint aches by medical oncology.   Options were discussed and we are decreasing IV fluids to twice stating that he has a slight sore throat but is using Magic mouthwash 2-3 times a day which helps. Patient denies any new and/or uncontrolled symptoms otherwise. Options were discussed and secondary to patient's good oral intake we are stopping the IV fluids and will monitor. We may reinstate this in the future but he is doing well. We are continuing unchanged his Duragesic 37 µg q.72 hours prescribing 10 patches of each strength. He will continue his other medicines unchanged as above and today we are initiating Ritalin 5 mg each morning to help with his mental and physical fatigue. Patient educated on the medication and will call if he has any difficulties otherwise we will see him back in 4 weeks or sooner if he needs us. 10/31/17:  Medical records reviewed and as per radiation oncology assessment from 10/23/17:  -post Tx FU - no charge                        -no new Sx, skin changes resolving                        -KPS rebouned to 70-80 (looks much better today)  -RBA reviewed  -Q+A reviewed  -cont FU with [facility], skin rash on hands defer to Dr. Goran Odell  Patient presents with his wife in no acute distress without sign of sedation ambulating well able to voice his needs. Patient has complained of increasing fatigue over the past month and has actually had his Synthroid doubled by his PCP a few days ago. Patient will continue to follow-up with his PCP in 6 weeks from now for repeat TSH. Patient did not notice a difference with his Ritalin 5 mg q.a.m. neither good or bad. Patient has remained active and actually is working with drywall in his bathroom at home. Patient now finished with his radiation therapy for approximately 1 month. Patient recently saw his ENT who will be scoping him at the beginning of November. Patient states that there is been no change in how he is taking his medications and he has one box of each of the Duragesic strength remaining at home.   Patient has complained of \"jumpy CT and PET scan as above as well as recommendations from his medical oncologist.  01/23/18:  Medical records reviewed and no documented physician visits in [facility] since I last saw him. Patient presents with his wife in no acute distress without sign of sedation and/or confusion able to voice his needs ambulating well. Patient has lost another 2 pounds and still has a decreased appetite despite taking Remeron 15 mg nightly. Patient is working out details with his employer/insurance and there may be a problem with pain from medication soon. Patient complains of significant fatigue and feels that it is the cold weather and wife states that oftentimes he will just sleep all day. There are small projects around the house but he does not feel like doing them. Options were discussed and I feel that his fatigue is from his decreased caloric intake. Patient is resistant to starting his tube feedings as recommended to utilize at least twice a day if he is not eating because he feels that it is a step backwards. I talked to them both in great detail and I challenged him to eat something at least 3 times a day or go back on the tube feedings and they voiced understanding. Today we are increasing his Remeron to 30 mg q.h.s., continuing unchanged Duragesic 37 µg q.72 hours prescribing 10 patches of each strength and I advised him that if these are too expensive to call us and he would consider going back on his Percocet if that is less expensive. He did not want to do that today. Patient will continue unchanged his Cymbalta 30 mg q.12 hours, Neurontin 900 mg t.i.d. and his Pamelor all of which she does not need refills today. We will see him back in 8 weeks or sooner if he needs us, monitor his weight and of course the situation with his medication coverage. 05/15/18:  3/20/18: [address] records reviewed and no documented physician visits in [facility] since last seen by Dr. Kierra Suh on 1/23/18.   Patient presents with contributing to the problem. Patient continues to use Ativan 0.5 mg which creates sedation but still with difficulty falling asleep and staying asleep. Patient states that he has good energy throughout the day improved from previous despite difficulty sleeping. Patient currently denies restlessness of his legs as a contributing factor. They also talk about watching a movie when they cannot sleep but will try not doing so. Options were discussed and they will try melatonin 3 mg q.h.s. and call to have any difficulties. Other options down the road may be trazodone, Ambien or other sleep aids. They will call us in a couple of weeks and we may prescribe this. We are continuing Duragesic 37 µg q.72 hours and he will call when he is out of the patches. We are sending the patient to physical therapy to help with the peripheral neuropathy. We will see the patient back in 8 weeks or sooner if they need us. Patient knows that they can call us anytime. 07/10/18:  Medical records reviewed including physical therapy notes and no documented physician visits in [facility] since I last saw him. Patient presents today accompanied by his wife in no acute distress at his baseline without sign of sedation and/or confusion able to voice his needs. Patient states that there is no significant change in how he is doing other than he is sleeping better most nights with melatonin. Patient does complain of pain in his lower back down the back of his legs periodically, sometimes restless legs at night. Patient takes Tylenol for breakthrough pain and is compliant with his Duragesic patc hes having for remaining of each. Patient also requesting a refill on the Colace. Options were discussed and I offered Requip at night for restless legs or changing his medication profile for his lower back pain but he desires to keep everything the same for the time.   I continued his Duragesic 37 µg q.72 hours prescribing 10 patches of each strength, refilled his Colace and he will continue his melatonin q.h.s., other medications unchanged including Ativan 0.5 mg, Remeron 30 mg at at bedtime, continue Cymbalta 30 mg every 12 hours, continue Neurontin 900 mg 3 times a day, and his Pamelor 25 mg at at bedtime. We are extending his appointment out to 12 weeks and he knows to call prior to running out of his fentanyl patches or if he needs anything. Patient congratulated that he is now in remission as per his oncologist.  10/02/18:  Medical records reviewed including assessment by [facility] palliative care on 09/24/18:  Spoke with patient and wife about discharge plan. The patient will be discharged to home with IV antibiotics and has a PICC line in place. He will continue current medications for symptom management. Reviewed medications with patient and family. Wife states that the patient has enough medication to last until next visit to palliative medicine outpatient clinic. Prescription provided for Marinol 2.5 mg twice a day ordered for appetite stimulation. Patient tolerating medication without side effects. The patient's wife will call the palliative medicine outpatient clinic to arrange an appointment in the next 2 weeks. [address] APRN-CNP  Patient presents today accompanied by his wife looking well without sign of sedation and/or confusion able to voice his needs. Patient states that slowly he is regaining his appetite but there is still significant room for improvement. A shunt feels that the Marinol helps without side effects. Patient states his breathing is slowly improving but still not back to baseline. Patient has 5 patches of each strength of his fentanyl patches and states there's no change in how he is taking his medications. Patient denies other new and/or uncontrolled symptoms otherwise.   Patient is following up with infectious disease on October 10 and will ask them about restarting his hyperbaric treatment secondary to his gum disease. Options were discussed and today we are doubling the Marinol to 5 mg b.i.d. as per Epic. Patient advised that if he does not notice a benefit or does not like the way it makes him feel he can always break the tablets in half and go back to his original dosage. Patient will continue his other medications unchanged including Duragesic 37 µg q.72 hours, Colace, melatonin q.h.s., Ativan 0.5 mg, Remeron 30 mg q.h.s., Cymbalta 30 mg q.12 hours, Neurontin 900 mg t.i.d., Pamelor 25 mg q.h.s. He will continue his other medications as well and we'll see him back in 4 weeks monitoring his weight. They know to call us at any time. Controlled Substances Monitoring: The Prescription Monitoring Report for this patient was reviewed today. (Sowmya Roman MD)  Possible medication side effects, risk of tolerance/dependence & alternative treatments discussed., No signs of potential drug abuse or diversion identified. (Sowmya Roman MD)  Dose reduction has been attempted., Reviewed the patient's functional status and documentation. , Reestablished informed consent., Functional status reviewed - continues with improved or maintaining ADL's., Treatment objectives documented - patient is progressing appropriately. (Sowmya Roman MD)  Existing medication contract.  (Sowmya Roman MD)  Time in minutes:    [] 15   [] 30   [] 45   [] 60   [x] 75   [] 90  Chart review/documentation:  [] 15   [x] 30   [] 45   [] 60   [] 75   [] 90  Assessment:     [x] 15   [] 30   [] 45   [] 60   [] 75   [] 90  Conversation patient/family/staff: [] 15   [x] 30   [] 45   [] 60   [] 75   [] 90    Sowmya Roman MD    10/2/2018

## 2018-10-04 ENCOUNTER — HOSPITAL ENCOUNTER (OUTPATIENT)
Age: 63
Discharge: HOME | End: 2018-10-06
Payer: MEDICARE

## 2018-10-04 LAB
ALBUMIN SERPL-MCNC: 3.6 G/DL (ref 3.5–5.2)
ALP BLD-CCNC: 83 U/L (ref 40–129)
ALT SERPL-CCNC: 17 U/L (ref 0–40)
ANION GAP SERPL CALCULATED.3IONS-SCNC: 19 MMOL/L (ref 7–16)
AST SERPL-CCNC: 15 U/L (ref 0–39)
BASOPHILS ABSOLUTE: 0.14 E9/L (ref 0–0.2)
BASOPHILS RELATIVE PERCENT: 1.3 % (ref 0–2)
BILIRUB SERPL-MCNC: <0.2 MG/DL (ref 0–1.2)
BUN BLDV-MCNC: 10 MG/DL (ref 8–23)
CALCIUM SERPL-MCNC: 9 MG/DL (ref 8.6–10.2)
CHLORIDE BLD-SCNC: 101 MMOL/L (ref 98–107)
CO2: 22 MMOL/L (ref 22–29)
CREAT SERPL-MCNC: 1.3 MG/DL (ref 0.7–1.2)
EOSINOPHILS ABSOLUTE: 0.5 E9/L (ref 0.05–0.5)
EOSINOPHILS RELATIVE PERCENT: 4.5 % (ref 0–6)
GFR AFRICAN AMERICAN: >60
GFR NON-AFRICAN AMERICAN: 56 ML/MIN/1.73
GLUCOSE BLD-MCNC: 121 MG/DL (ref 74–109)
HCT VFR BLD CALC: 37.4 % (ref 37–54)
HEMOGLOBIN: 11.4 G/DL (ref 12.5–16.5)
IMMATURE GRANULOCYTES #: 0.1 E9/L
IMMATURE GRANULOCYTES %: 0.9 % (ref 0–5)
LYMPHOCYTES ABSOLUTE: 1.48 E9/L (ref 1.5–4)
LYMPHOCYTES RELATIVE PERCENT: 13.3 % (ref 20–42)
MCH RBC QN AUTO: 27.2 PG (ref 26–35)
MCHC RBC AUTO-ENTMCNC: 30.5 % (ref 32–34.5)
MCV RBC AUTO: 89.3 FL (ref 80–99.9)
MONOCYTES ABSOLUTE: 0.58 E9/L (ref 0.1–0.95)
MONOCYTES RELATIVE PERCENT: 5.2 % (ref 2–12)
NEUTROPHILS ABSOLUTE: 8.29 E9/L (ref 1.8–7.3)
NEUTROPHILS RELATIVE PERCENT: 74.8 % (ref 43–80)
PDW BLD-RTO: 15.9 FL (ref 11.5–15)
PLATELET # BLD: 429 E9/L (ref 130–450)
PMV BLD AUTO: 9.2 FL (ref 7–12)
POTASSIUM SERPL-SCNC: 3.7 MMOL/L (ref 3.5–5)
RBC # BLD: 4.19 E12/L (ref 3.8–5.8)
SODIUM BLD-SCNC: 142 MMOL/L (ref 132–146)
TOTAL PROTEIN: 6.7 G/DL (ref 6.4–8.3)
WBC # BLD: 11.1 E9/L (ref 4.5–11.5)

## 2018-10-04 PROCEDURE — 85025 COMPLETE CBC W/AUTO DIFF WBC: CPT

## 2018-10-04 PROCEDURE — 80053 COMPREHEN METABOLIC PANEL: CPT

## 2018-10-08 ENCOUNTER — HOSPITAL ENCOUNTER (OUTPATIENT)
Age: 63
Discharge: HOME | End: 2018-10-10
Payer: MEDICARE

## 2018-10-08 LAB
ALBUMIN SERPL-MCNC: 3.5 G/DL (ref 3.5–5.2)
ALP BLD-CCNC: 90 U/L (ref 40–129)
ALT SERPL-CCNC: 24 U/L (ref 0–40)
ANION GAP SERPL CALCULATED.3IONS-SCNC: 14 MMOL/L (ref 7–16)
AST SERPL-CCNC: 17 U/L (ref 0–39)
BASOPHILS ABSOLUTE: 0.13 E9/L (ref 0–0.2)
BASOPHILS RELATIVE PERCENT: 1.3 % (ref 0–2)
BILIRUB SERPL-MCNC: <0.2 MG/DL (ref 0–1.2)
BUN BLDV-MCNC: 12 MG/DL (ref 8–23)
C-REACTIVE PROTEIN: 0.9 MG/DL (ref 0–0.4)
CALCIUM SERPL-MCNC: 8.8 MG/DL (ref 8.6–10.2)
CHLORIDE BLD-SCNC: 102 MMOL/L (ref 98–107)
CO2: 23 MMOL/L (ref 22–29)
CREAT SERPL-MCNC: 1.1 MG/DL (ref 0.7–1.2)
EOSINOPHILS ABSOLUTE: 0.45 E9/L (ref 0.05–0.5)
EOSINOPHILS RELATIVE PERCENT: 4.5 % (ref 0–6)
GFR AFRICAN AMERICAN: >60
GFR NON-AFRICAN AMERICAN: >60 ML/MIN/1.73
GLUCOSE BLD-MCNC: 110 MG/DL (ref 74–109)
HCT VFR BLD CALC: 38.4 % (ref 37–54)
HEMOGLOBIN: 11.8 G/DL (ref 12.5–16.5)
IMMATURE GRANULOCYTES #: 0.09 E9/L
IMMATURE GRANULOCYTES %: 0.9 % (ref 0–5)
LYMPHOCYTES ABSOLUTE: 1.49 E9/L (ref 1.5–4)
LYMPHOCYTES RELATIVE PERCENT: 15 % (ref 20–42)
MCH RBC QN AUTO: 27.1 PG (ref 26–35)
MCHC RBC AUTO-ENTMCNC: 30.7 % (ref 32–34.5)
MCV RBC AUTO: 88.3 FL (ref 80–99.9)
MONOCYTES ABSOLUTE: 0.65 E9/L (ref 0.1–0.95)
MONOCYTES RELATIVE PERCENT: 6.6 % (ref 2–12)
NEUTROPHILS ABSOLUTE: 7.1 E9/L (ref 1.8–7.3)
NEUTROPHILS RELATIVE PERCENT: 71.7 % (ref 43–80)
PDW BLD-RTO: 15.9 FL (ref 11.5–15)
PLATELET # BLD: 326 E9/L (ref 130–450)
PMV BLD AUTO: 9.4 FL (ref 7–12)
POTASSIUM SERPL-SCNC: 3.7 MMOL/L (ref 3.5–5)
RBC # BLD: 4.35 E12/L (ref 3.8–5.8)
SEDIMENTATION RATE, ERYTHROCYTE: 11 MM/HR (ref 0–15)
SODIUM BLD-SCNC: 139 MMOL/L (ref 132–146)
TOTAL PROTEIN: 6.6 G/DL (ref 6.4–8.3)
VANCOMYCIN TROUGH: 18.6 MCG/ML (ref 5–16)
WBC # BLD: 9.9 E9/L (ref 4.5–11.5)

## 2018-10-08 PROCEDURE — 85025 COMPLETE CBC W/AUTO DIFF WBC: CPT

## 2018-10-08 PROCEDURE — 36415 COLL VENOUS BLD VENIPUNCTURE: CPT

## 2018-10-08 PROCEDURE — 80202 ASSAY OF VANCOMYCIN: CPT

## 2018-10-08 PROCEDURE — 86140 C-REACTIVE PROTEIN: CPT

## 2018-10-08 PROCEDURE — 80053 COMPREHEN METABOLIC PANEL: CPT

## 2018-10-08 PROCEDURE — 85651 RBC SED RATE NONAUTOMATED: CPT

## 2018-10-19 DIAGNOSIS — G89.3 CHRONIC PAIN DUE TO NEOPLASM: ICD-10-CM

## 2018-10-19 RX ORDER — FENTANYL 25 UG/H
1 PATCH TRANSDERMAL
Qty: 10 PATCH | Refills: 0 | Status: SHIPPED | OUTPATIENT
Start: 2018-10-19 | End: 2018-12-03 | Stop reason: SDUPTHER

## 2018-10-19 RX ORDER — FENTANYL 12 UG/H
1 PATCH TRANSDERMAL
Qty: 10 PATCH | Refills: 0 | Status: SHIPPED | OUTPATIENT
Start: 2018-10-19 | End: 2018-12-03 | Stop reason: SDUPTHER

## 2018-10-22 RX ORDER — GABAPENTIN 600 MG/1
600 TABLET ORAL 3 TIMES DAILY
Qty: 270 TABLET | Refills: 3 | Status: SHIPPED | OUTPATIENT
Start: 2018-10-22 | End: 2020-01-23 | Stop reason: ALTCHOICE

## 2018-10-22 RX ORDER — GABAPENTIN 300 MG/1
300 CAPSULE ORAL 3 TIMES DAILY
Qty: 270 CAPSULE | Refills: 3 | Status: SHIPPED | OUTPATIENT
Start: 2018-10-22 | End: 2020-01-23 | Stop reason: ALTCHOICE

## 2018-10-27 ENCOUNTER — HOSPITAL ENCOUNTER (OUTPATIENT)
Dept: CT IMAGING | Age: 63
Discharge: HOME | End: 2018-10-29
Payer: MEDICARE

## 2018-10-27 DIAGNOSIS — J15.20: ICD-10-CM

## 2018-10-27 PROCEDURE — 71270 CT THORAX DX C-/C+: CPT

## 2018-10-27 PROCEDURE — 2580000003 HC RX 258: Performed by: RADIOLOGY

## 2018-10-27 PROCEDURE — 6360000004 HC RX CONTRAST MEDICATION: Performed by: RADIOLOGY

## 2018-10-27 RX ORDER — SODIUM CHLORIDE 0.9 % (FLUSH) 0.9 %
10 SYRINGE (ML) INJECTION
Status: COMPLETED | OUTPATIENT
Start: 2018-10-27 | End: 2018-10-27

## 2018-10-27 RX ADMIN — Medication 10 ML: at 10:06

## 2018-10-27 RX ADMIN — IOPAMIDOL 90 ML: 755 INJECTION, SOLUTION INTRAVENOUS at 10:05

## 2018-10-30 ENCOUNTER — OFFICE VISIT (OUTPATIENT)
Dept: PALLATIVE CARE | Age: 63
End: 2018-10-30
Payer: MEDICARE

## 2018-10-30 VITALS
SYSTOLIC BLOOD PRESSURE: 134 MMHG | OXYGEN SATURATION: 92 % | DIASTOLIC BLOOD PRESSURE: 68 MMHG | BODY MASS INDEX: 25.91 KG/M2 | HEART RATE: 104 BPM | WEIGHT: 185.8 LBS

## 2018-10-30 DIAGNOSIS — Z51.5 PALLIATIVE CARE BY SPECIALIST: ICD-10-CM

## 2018-10-30 DIAGNOSIS — T45.1X5A CHEMOTHERAPY-INDUCED PERIPHERAL NEUROPATHY (HCC): ICD-10-CM

## 2018-10-30 DIAGNOSIS — G62.0 CHEMOTHERAPY-INDUCED PERIPHERAL NEUROPATHY (HCC): ICD-10-CM

## 2018-10-30 DIAGNOSIS — G90.2: ICD-10-CM

## 2018-10-30 DIAGNOSIS — J43.1 PANLOBULAR EMPHYSEMA (HCC): ICD-10-CM

## 2018-10-30 DIAGNOSIS — R53.83 FATIGUE, UNSPECIFIED TYPE: ICD-10-CM

## 2018-10-30 DIAGNOSIS — R63.4 UNINTENTIONAL WEIGHT LOSS: ICD-10-CM

## 2018-10-30 DIAGNOSIS — C34.12 PANCOAST TUMOR, LEFT (HCC): ICD-10-CM

## 2018-10-30 DIAGNOSIS — C34.82 MALIGNANT NEOPLASM OF OVERLAPPING SITES OF LEFT LUNG (HCC): ICD-10-CM

## 2018-10-30 DIAGNOSIS — G89.3 CHRONIC PAIN DUE TO NEOPLASM: Primary | ICD-10-CM

## 2018-10-30 PROCEDURE — 99212 OFFICE O/P EST SF 10 MIN: CPT

## 2018-10-30 PROCEDURE — 99215 OFFICE O/P EST HI 40 MIN: CPT | Performed by: FAMILY MEDICINE

## 2018-10-30 NOTE — PROGRESS NOTES
Current Outpatient Prescriptions on File Prior to Visit   Medication Sig Dispense Refill    gabapentin (NEURONTIN) 300 MG capsule Take 1 capsule by mouth 3 times daily for 360 days. Along with the 600 mg tabs for a total of 900 mg three times daily. 270 capsule 3    gabapentin (NEURONTIN) 600 MG tablet Take 1 tablet by mouth 3 times daily for 360 days. Along with the 300 mg caps for a total of 900 mg three times daily. 270 tablet 3    fentaNYL (DURAGESIC) 25 MCG/HR Place 1 patch onto the skin every 72 hours for 30 days. Apply 25 mcg and 12 mcg patch each for total of 37 mcg hold for sedation. 10 patch 0    fentaNYL (DURAGESIC) 12 MCG/HR Place 1 patch onto the skin every 72 hours for 30 days. Apply 25 mcg and 12 mcg patch each for total of 37 mcg. 10 patch 0    albuterol (ACCUNEB) 0.63 MG/3ML nebulizer solution Take 3 mLs by nebulization every 6 hours as needed for Wheezing 270 mL 3    mupirocin (BACTROBAN) 2 % cream Apply topically 3 times daily. 1 Tube 1    dronabinol (MARINOL) 5 MG capsule Take 1 capsule by mouth 2 times daily for 30 days. . 60 capsule 0    buPROPion (WELLBUTRIN) 75 MG tablet Take 1 tablet by mouth 2 times daily 180 tablet 3    LORazepam (ATIVAN) 0.5 MG tablet Take 1 tablet by mouth nightly as needed for Anxiety (hold for sedation) for up to 90 days. . 30 tablet 2    tamsulosin (FLOMAX) 0.4 MG capsule Take 0.4 mg by mouth daily      melatonin 5 MG TABS tablet Take 5 mg by mouth nightly      mirtazapine (REMERON) 30 MG tablet Take 1 tablet by mouth nightly 90 tablet 1    nortriptyline (PAMELOR) 25 MG capsule Take 1 capsule by mouth nightly 180 capsule 1    docusate sodium (COLACE) 100 MG capsule Take 1 capsule by mouth 3 times daily 270 capsule 3    DULoxetine (CYMBALTA) 30 MG extended release capsule Take 1 capsule by mouth 2 times daily (Patient taking differently: Take 60 mg by mouth 2 times daily ) 180 capsule 3    predniSONE (DELTASONE) 10 MG tablet Take 10 mg by mouth daily      Levothyroxine Sodium 125 MCG CAPS 100 mcg daily  0    Calcium Carb-Cholecalciferol (CALTRATE 600+D3 SOFT PO) Take by mouth      esomeprazole (NEXIUM) 40 MG capsule Take 40 mg by mouth nightly       simvastatin (ZOCOR) 40 MG tablet Take 40 mg by mouth nightly       No current facility-administered medications on file prior to visit. Allergies:    Augmentin [amoxicillin-pot clavulanate]    ROS: UNLESS STATED ABOVE PATIENT DENIES:  CONSTITUTIONAL:  fever, chill, rigors, nausea, vomiting, fatigue. HEENT: blurry vision, double vision, hearing problem, tinnitus, hoarseness, dysphagia,               odynophagia  RESPIRATORY: cough, shortness of breath, sputum expectoration. CARDIOVASCULAR:  Chest pain/pressure, palpitation, syncope, irregular beats  GASTROINTESTINAL:  abdominal or rectal pain, diarrhea, constipation, . GENITOURINARY:  Burning, frequency, urgency, incontinence, discharge  INTEGUMENTARY: rash, wound, pruritis  HEMATOLOGIC/LYMPHATIC:  Swelling, sores, gum bleeding, easy bruising, pica. MUSCULOSKELETAL:  pain, edema, joint swelling or redness  NEUROLOGICAL:  light headed, dizziness, loss of consciousness, weakness, change                                   in memory, seizures, tremors    Social history:  Social History     Social History    Marital status:      Spouse name: N/A    Number of children: N/A    Years of education: N/A     Occupational History   [employer]- [employer]      disability short term     Social History Main Topics    Smoking status: Former Smoker     Packs/day: 1.00     Years: 44.00     Types: Cigarettes    Smokeless tobacco: Never Used      Comment: quit a few days ago    Alcohol use No    Drug use: No    Sexual activity: Not on file     Other Topics Concern    Not on file     Social History Narrative    Works at [employer]. Lives in [facility].          Family history:  Family History   Problem Relation Age of Onset    Cancer three quarters of a bottle left. Patient had a second opinion at a cancer center and here they are keeping him on his initial chemotherapy for another 3 weeks and then restarting the second course. Patient will stop the dexamethasone and folic acid until his chemotherapy agent changes. Patient smiling and more interactive, jovial, more comfortable and states that his appetite is good, his hoarse voice has improved but not completely back to normal. Patient gained 2-1/2 pounds over the past 2 weeks. Patient states that all of his symptoms are either stable or headed in the right direction. Patient is balancing his Dolly-Colace with bowel movements which seems effective. Options were discussed and we are continuing the same and he needs no prescriptions today. We will see him back in 4 weeks or sooner if he needs us.   08/01/16:  Thompson Symptom Assessment Scale                                             Date:   Pain  [] 0  None  [] 1  [] 2  [] 3  [x] 4  [] 5  [] 6  [] 7  [] 8  [] 9  [] 10  Worst   Tiredness  [] 0  None  [] 1  [x] 2  [] 3  [] 4  [] 5  [] 6  [] 7  [] 8  [] 9  [] 10  Worst   Nausea  [x] 0  None  [] 1  [] 2  [] 3  [] 4  [] 5  [] 6  [] 7  [] 8  [] 9  [] 10  Worst   Depression  [] 0  None  [x] 1  [] 2  [] 3  [] 4  [] 5  [] 6  [] 7  [] 8  [] 9  [] 10  Worst   Anxiety  [] 0  None  [] 1  [] 2  [x] 3  [] 4  [] 5  [] 6  [] 7  [] 8  [] 9  [] 10  Worst   Drowsiness  [] 0  None  [x] 1  [] 2  [] 3  [] 4  [] 5  [] 6  [] 7  [] 8  [] 9  [] 10  Worst   Appetite  [] 0  None  [] 1  [x] 2  [] 3  [] 4  [] 5  [] 6  [] 7  [] 8  [] 9  [] 10  Worst   Wellbeing  [] 0  None  [] 1  [x] 2  [] 3  [] 4  [] 5  [] 6  [] 7  [] 8  [] 9  [] 10  Worst   Shortness of Breath  [] 0  None  [] 1  [] 2  [] 3  [x] 4  [] 5  [] 6  [] 7  [] 8  [] 9  [] 10  Worst   Constipation  [] 0  None  [] 1  [] 2  [] 3  [] 4  [] 5  [] 6  [x] 7  [] 8  [] 9  [] 10  Worst   Completed By:  [x]  Patient Report  []  Caregiver/Family  []  Nurse/Staff  Patient is Emmett Wolfe. At this time, Dr Kye Mas will see the patient back in January 2017 for a post-radiation completion follow-up visit. Emmett Wolfe is to follow up with other physicians involved in their care as directed (including but not limited to Medical Oncology, Primary Care, Pulmonary, and Surgery). The patient was given our contact number in the event that if at any time they change their mind and would like to return to the clinic to see either myself or one of the Radiation Oncologists, they can simply call us and we would be happy to see them. Thank you for involving us in the management of this extremely pleasant patient. More than 25 min was in direct contact with pt coordinating/giving care. >50% of the visit was spent in counseling the pt on the following: Follow up care  The nurses notes were reviewed and incorporated into this assessment and plan. Patient presents to the exam room today stating that he now is no longer taking Percocet and is comfortable on the patches. Patient denies new symptoms and stated that he saw his ENT yesterday who felt that he may need a trach. They saw their oncologist today who felt that the area could be stretched/dilated. Patient does \"breathes different\" as per the wife when he is sleeping but does not wake up choking or coughing. Patient does have a hoarse coarse voice quality. Patient is comfortable on the current regiment and we phoned in Neurontin as per Vibease prior to this visit. Options were discussed and we are continuing his Duragesic 37 µg q.72 hours and 10 patches of each strength prescribed today. Patient needs no other prescriptions and we'll see him back in 4 weeks or sooner if he needs us. 01/09/17:  Medical records reviewed and patient followed up with radiation oncology to revisit them 3 months from now. Patient presents to the exam room today complaining of a tickling cough mostly at night but also throughout the day.   Patient would be to continue systemic therapy as an out patient followed by restaging CT/PET/CT imaging. We will follow up on this when completed. Mr. Bhaskar Shanks is feeling better and is asking when he will be discharged. 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 He also continues to smoke cigarettes. I am concerned about an acute pulmonary event and I suggested he take time off from work. He should definitively quit smoking. He and his wife voiced understanding. Thank you for allowing me to participate in Mr. Carolina Du evaluation. As per H&P while an inpatient on 05/03/17:  HISTORY OF PRESENT ILLNESS: The patient is a 80-year-old  male with  history of COPD and adenocarcinoma of the lung who presented to the Emergency  Room with shortness of breath and hoarse voice that worsened throughout the  day of admission. He does have a history of subglottic airway narrowing and  has been seen by ENT for it. He denies any issues with chest pain. He feels  like something is stuck in his throat. He had breathing treatments in the  Emergency Room which were of very little help. He has finished chemotherapy  approximately the end of 11/2016 and now is getting immunotherapy. His last  immunotherapy treatment was 2 weeks ago. The next one is scheduled for  05/09/2017. He was seen in consultation by ENT. Tracheostomy was offered,  but the patient declined. He was also seen in consultation by Pulmonary. His  chest x-ray on admission shows a possible right lung infiltrate. The patient  was started on Pulmicort and Perforomist aerosol treatments along with  DuoNebs. Respiratory cultures and viral panels were obtained. DIAGNOSTIC IMPRESSION:  1. Bilateral vocal cord palsy. 2. History of chronic obstructive pulmonary disease, not in acute  exacerbation. 3. History of nonsmall cell lung CA, currently receiving immunotherapy. 4. Subglottic narrowing. 5. Tobacco dependency. 6. Depression.   7. Hypercholesterolemia. As per ENT consult while an inpatient on 05/02/17:  Patient is a 65 yo male, history of COPD and lung adenocarcinoma, that presented to the ED late last night with complaints of shortness of breath and hoarse voice that worsened throughout the day yesterday. He has history of subglottic airway narrowing and has been seen by ENT for it. He denies any issues with shortness of breath at the time but says it feels like something is stuck in his throat. He had breathing treatments in the ED which said helped a little. He mentions that he had some diarrhea for the past few days but denies any other symptoms. Patient says he has cut back on his smoking, but still was smoking prior to coming into the hospital.  He has finished chemotherapy approximately the end of Nov 2016 and now currently getting immunotherapy with carbo/Avastin + Xgeva. His last treatment was 2 weeks ago and his next one is scheduled for May 9. Voice was worse yesterday and more SOB, better today. Was noted to have left vocal cord palsy in Dec 2016  EXAM - erythema, dryness of supraglottic larynx but no lesions. Fiberoptic laryngoscopy, both vocal cords in paramedian position, minimal abduction, right cord does medialize slightly with phonation. Unable to see subglottic  No neck mass  IMP - bilateral vocal cord palsy. Would be important to delineate status of mediastinum taking out both cords. Boswell Meager was discussed as alternative for improving breathing status. Pt does not wish to proceed with this at this time. Risks, options discussed with pt and wife  Patient presents today with his wife and daughter without sign of sedation and/or confusion able to voice his needs. Patient appropriate and talking with a whispering/raspy voice without shortness of breath noted. Patient states there's been no change to the medications and he needs refills. Patient and family discussing the recommendations of a tracheostomy and all the above. running out of his Duragesic patches and we will prescribe electronically as he has been wonderfully appropriate and compliant. We will review the throat CT and PET scan as above as well as recommendations from his medical oncologist.  01/23/18:  Medical records reviewed and no documented physician visits in [address] since I last saw him. Patient presents with his wife in no acute distress without sign of sedation and/or confusion able to voice his needs ambulating well. Patient has lost another 2 pounds and still has a decreased appetite despite taking Remeron 15 mg nightly. Patient is working out details with his employer/insurance and there may be a problem with pain from medication soon. Patient complains of significant fatigue and feels that it is the cold weather and wife states that oftentimes he will just sleep all day. There are small projects around the house but he does not feel like doing them. Options were discussed and I feel that his fatigue is from his decreased caloric intake. Patient is resistant to starting his tube feedings as recommended to utilize at least twice a day if he is not eating because he feels that it is a step backwards. I talked to them both in great detail and I challenged him to eat something at least 3 times a day or go back on the tube feedings and they voiced understanding. Today we are increasing his Remeron to 30 mg q.h.s., continuing unchanged Duragesic 37 µg q.72 hours prescribing 10 patches of each strength and I advised him that if these are too expensive to call us and he would consider going back on his Percocet if that is less expensive. He did not want to do that today. Patient will continue unchanged his Cymbalta 30 mg q.12 hours, Neurontin 900 mg t.i.d. and his Pamelor all of which she does not need refills today.   We will see him back in 8 weeks or sooner if he needs us, monitor his weight and of course the situation with his medication 05/04/18:  Call received with request for refill of fentanyl patch. They also requested reinitiation of the additional 12.5 µg patch, as per his prior regimen, due to increase in generalized pain. Will refill patient's 25 µg patch and reorder 12.5 µg patches. Patient is to follow-up the palliative medicine outpatient clinic on April 15. As per radiation oncology assessment from 05/03/18:  Mr. Fredi Gutiérrez is a pleasant gentleman well know to me with a history of both lung cancer and laryngeal cancer s/p concurrent chemo-RT for the former and definitve intent fractionated external beam radiation therapy for the latter (with the highest dose the spinal cord could tolerate - see above). He is still on immunotherapy and his KPS is intact at KPS 70 WO new Sx. There is no evidence of disease recurrence or progression based on a detailed review of the available imaging, physical exam, and ROS (all personally performed prior to and during this follow up appointment today). The patient did verbalize understanding for the indications of continued FU including imaging and laboratory evaluation as applicable to this case; as well as appropriate lifestyle choices and health maintenance. All questions answered to the best of my ability. 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 Patient presents with wife ambulatory in no acute distress looking much more comfortable than I previously seen him in the past gaining 11 pounds stating that he is much more active at home with carpentry and working on his cars. Patient reviews with me the increasing pain which is still experiencing describing as neuropathy involving his left leg and left arm requiring an increase of the fentanyl patch as above after we attempted to decrease. Patient compliant with 37 µg q.72 hours but does complain of worsening pain after the secondary the patch. Patient does take Tylenol for breakthrough pain.   Patient's #1 complaint is difficulty falling hours, Colace, melatonin, Ativan, Remeron 30 mg q.h.s., Cymbalta 30 mg q.12 hours, Neurontin 900 mg t.i.d., Pamelor 25 mg q.h.s. I advised patient to use his albuterol aerosols more frequently as they are written q.6 hours p.r.n. by pulmonology. I am sending pulmonology and note informing them of his continued dyspnea especially upon exertion. I am wondering if DuoNeb around-the-clock may help him more with continued p.r.n. albuterol. Also a pulse dose of the prednisone may be beneficial as well. I advised the patient to use his oxygen more frequently. I will of course defer to pulmonology for these decisions. We will see the patient back in 4 weeks or sooner if he needs us and they know to call us at any time. Controlled Substances Monitoring: The Prescription Monitoring Report for this patient was reviewed today. (Berta Sahu MD)  Possible medication side effects, risk of tolerance/dependence & alternative treatments discussed., No signs of potential drug abuse or diversion identified. (Berta Sahu MD)  Dose reduction has been attempted., Reviewed the patient's functional status and documentation. , Reestablished informed consent., Treatment objectives documented - patient is progressing appropriately. , Functional status reviewed - continues with improved or maintaining ADL's. (Berta Sahu MD)  Existing medication contract.  (Berta Sahu MD)  Time in minutes:    [] 15   [] 30   [] 45   [] 60   [x] 75   [] 90  Chart review/documentation:  [] 15   [x] 30   [] 45   [] 60   [] 75   [] 90  Assessment:     [x] 15   [] 30   [] 45   [] 60   [] 75   [] 90  Conversation patient/family/staff: [] 15   [x] 30   [] 45   [] 60   [] 75   [] 90    Berta Sahu MD    10/30/2018

## 2018-11-08 ENCOUNTER — TELEPHONE (OUTPATIENT)
Dept: RADIATION ONCOLOGY | Age: 63
End: 2018-11-08

## 2018-11-12 ENCOUNTER — HOSPITAL ENCOUNTER (OUTPATIENT)
Dept: RADIATION ONCOLOGY | Age: 63
Discharge: HOME | End: 2018-11-12
Payer: MEDICARE

## 2018-11-12 VITALS
SYSTOLIC BLOOD PRESSURE: 108 MMHG | WEIGHT: 191.3 LBS | OXYGEN SATURATION: 93 % | TEMPERATURE: 98.5 F | DIASTOLIC BLOOD PRESSURE: 74 MMHG | BODY MASS INDEX: 26.68 KG/M2 | HEART RATE: 78 BPM

## 2018-11-12 DIAGNOSIS — C80.1 CANCER (HCC): Primary | ICD-10-CM

## 2018-11-12 PROCEDURE — 99212 OFFICE O/P EST SF 10 MIN: CPT

## 2018-11-12 PROCEDURE — 99214 OFFICE O/P EST MOD 30 MIN: CPT | Performed by: RADIOLOGY

## 2018-11-12 NOTE — PROGRESS NOTES
tablet by mouth nightly, Disp: 90 tablet, Rfl: 1    nortriptyline (PAMELOR) 25 MG capsule, Take 1 capsule by mouth nightly, Disp: 180 capsule, Rfl: 1    docusate sodium (COLACE) 100 MG capsule, Take 1 capsule by mouth 3 times daily, Disp: 270 capsule, Rfl: 3    DULoxetine (CYMBALTA) 30 MG extended release capsule, Take 1 capsule by mouth 2 times daily (Patient taking differently: Take 60 mg by mouth 2 times daily ), Disp: 180 capsule, Rfl: 3    predniSONE (DELTASONE) 10 MG tablet, Take 10 mg by mouth daily, Disp: , Rfl:     Levothyroxine Sodium 125 MCG CAPS, 100 mcg daily, Disp: , Rfl: 0    Calcium Carb-Cholecalciferol (CALTRATE 600+D3 SOFT PO), Take by mouth, Disp: , Rfl:     esomeprazole (NEXIUM) 40 MG capsule, Take 40 mg by mouth nightly , Disp: , Rfl:     simvastatin (ZOCOR) 40 MG tablet, Take 40 mg by mouth nightly, Disp: , Rfl:       Patient is seen today in follow up, 6 month follow up related to receiving radiation treatment to the left lung/spine from 4/27/2016-6/3/2016, 5000 cGy in 21 fractions followed by radiation therapy to a subglottic mass from 9/7/2017-10/3/2017, 3800 cGy in 18 fractions. Patient states he had a recent hospitalization within the last 2 months related to pneumonia-currently follows with Dr Shellie Najjar in this regard. Per patient, next appointment with Dr Ciera DelC astillo will be 12/26/2018 with a Ct of the chest done prior to. Per patient, at that time, if the pneumonia is not completely resolved, Dr Shellie Najjar will recommend biopsy. Patient states he has had Peg tube removed without issue with oral intake. FALLS RISK SCREEN  Instructions:  Assess the patient and enter the appropriate indicators that are present for fall risk identification. Total the numbers entered and assign a fall risk score from Table 2.  Reassess patient at a minimum every 12 weeks or with status change. Assessment   Date  11/12/2018   1. Mental Ability: confusion/cognitively impaired 0 (3)   2.

## 2018-11-19 DIAGNOSIS — C34.82 MALIGNANT NEOPLASM OF OVERLAPPING SITES OF LEFT LUNG (HCC): ICD-10-CM

## 2018-11-19 RX ORDER — LORAZEPAM 0.5 MG/1
0.5 TABLET ORAL NIGHTLY PRN
Qty: 30 TABLET | Refills: 2 | Status: SHIPPED | OUTPATIENT
Start: 2018-11-19 | End: 2019-02-21 | Stop reason: SDUPTHER

## 2018-12-03 ENCOUNTER — OFFICE VISIT (OUTPATIENT)
Dept: PALLATIVE CARE | Age: 63
End: 2018-12-03
Payer: MEDICARE

## 2018-12-03 VITALS
DIASTOLIC BLOOD PRESSURE: 70 MMHG | SYSTOLIC BLOOD PRESSURE: 106 MMHG | OXYGEN SATURATION: 92 % | BODY MASS INDEX: 26.08 KG/M2 | WEIGHT: 187 LBS | HEART RATE: 98 BPM

## 2018-12-03 DIAGNOSIS — Z51.5 PALLIATIVE CARE BY SPECIALIST: ICD-10-CM

## 2018-12-03 DIAGNOSIS — C34.12 PANCOAST TUMOR, LEFT (HCC): ICD-10-CM

## 2018-12-03 DIAGNOSIS — C34.82 MALIGNANT NEOPLASM OF OVERLAPPING SITES OF LEFT LUNG (HCC): Primary | ICD-10-CM

## 2018-12-03 DIAGNOSIS — G62.0 CHEMOTHERAPY-INDUCED PERIPHERAL NEUROPATHY (HCC): ICD-10-CM

## 2018-12-03 DIAGNOSIS — R63.4 UNINTENTIONAL WEIGHT LOSS: ICD-10-CM

## 2018-12-03 DIAGNOSIS — T45.1X5A CHEMOTHERAPY-INDUCED PERIPHERAL NEUROPATHY (HCC): ICD-10-CM

## 2018-12-03 DIAGNOSIS — R53.83 FATIGUE, UNSPECIFIED TYPE: ICD-10-CM

## 2018-12-03 DIAGNOSIS — G90.2: ICD-10-CM

## 2018-12-03 DIAGNOSIS — J43.1 PANLOBULAR EMPHYSEMA (HCC): ICD-10-CM

## 2018-12-03 DIAGNOSIS — G89.3 CHRONIC PAIN DUE TO NEOPLASM: ICD-10-CM

## 2018-12-03 PROCEDURE — 99212 OFFICE O/P EST SF 10 MIN: CPT | Performed by: FAMILY MEDICINE

## 2018-12-03 PROCEDURE — 99215 OFFICE O/P EST HI 40 MIN: CPT | Performed by: FAMILY MEDICINE

## 2018-12-03 RX ORDER — FENTANYL 25 UG/H
1 PATCH TRANSDERMAL
Qty: 10 PATCH | Refills: 0 | Status: SHIPPED | OUTPATIENT
Start: 2018-12-03 | End: 2019-01-24 | Stop reason: SDUPTHER

## 2018-12-03 RX ORDER — FENTANYL 12 UG/H
1 PATCH TRANSDERMAL
Qty: 10 PATCH | Refills: 0 | Status: SHIPPED | OUTPATIENT
Start: 2018-12-03 | End: 2018-12-07 | Stop reason: DRUGHIGH

## 2018-12-03 RX ORDER — MIRTAZAPINE 30 MG/1
30 TABLET, FILM COATED ORAL NIGHTLY
Qty: 90 TABLET | Refills: 3 | Status: SHIPPED | OUTPATIENT
Start: 2018-12-03 | End: 2019-10-22 | Stop reason: SDUPTHER

## 2018-12-03 NOTE — PROGRESS NOTES
dose arms (Concurrent RT + Carbo/Taxol +/- Cetuximab) these were closed early with \"negative\" results (longer term results and POFA needed), therefor 60 Gy in daily fractions with dual agent chemotherapy can be considered the standard (the Brooke and LAMP trials also assisted in the development of this paradigm). Fractionated external beam radiation therapy may or may not be delivered with intensity modulation +/- image guidance per daily cone beam depending on the specific patient anatomy and dose constraints as described per the RTOG and QUANTEC ---Lucy Zee Int J Radiat Oncol Biol Phys. 2010 Mar 1;76(3 Suppl):S10-9. The risks, benefits, alternatives, process and logistics of external beam radiation were reviewed (risks include but are not limited to worsening PULM function, esophagitis, esophageal structure, perforations, bleeding, paralysis and death). We answered all of the patient's questions to the best of our ability. Dom verbalized understanding and seemed satisfied. Radiation planning will commence within < 24 hours; the next step in management being the simulation scan, with external beam radiation to commence in a timely fashion thereafter. It was a pleasure meeting Milton  today and we appreciate the referral and opportunity to be involved in his care. We had an extensive discussion today regarding the course to date (including a focused review of the applicable radiographic and laboratory information), multidisciplinary approach to cancer care, and indications for external beam radiation therapy as a component therein. A literature review and multidisciplinary discussion was performed after seeing this patient due to the complexity of the medical decision making in this case. I personally spent greater than 60 minutes with this patient and performed the complete history and physical as above at today's visit, at least 45 minutes was in direct  regarding disease management.    *this pt will be conor and likely begin treatment prior to a tissue diagnosis. It is my clinical judgement the pre test probability for malignancy is > 95 % and there may be significant functional decline in the time necessary to maintain a tissue diagnosis due to the primary tumor directly extending into the spinal column). I specifically verbalized all of this with Nelida Cuellar, his wife Emily Jarvis, and his daughter Shereen Vilchis all of who individually verbalized consent to RT treatment to begin prior to a tissue diagnosis. Peer reviewed requested. [facility] referral -- Dr. Elana Esposito reccommended --- pt will still pursue aggressive active treatment however this is a pain management consultation request.  *will hold off on DEX or any new pain meds until he sees Dr. Elana Esposito but we could start him of DEX in FND occurs or Sx worsens  -coordinated care with Dr. Nadya Suarez this AM 4/25/16. We will treat the primary tumor and spinal column initially with considerations for second and third \"boost\" plans (nodes involved, primary boost etc.) in conjunction with CHEMO pending the workup / PET. MRI brain ordered by [facility].        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 Patient takes Percocet unchanged approximately every 6-8 hours and is compliant with the Neurontin, Pamelor. Patient last week started physical therapy initially 2 times a week which will drop to once a week for a total of 6 weeks. Patient also was placed on an anabiotic as well as Diflucan ending 3 weeks ago for a throat infection which she feels cleared. Patient was also placed on a Medrol Dosepak by his oncologist which did not improve his left arm and back symptoms. Patient states that his back pain had improved significantly since all of his treatments. 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 Patient will be starting a new chemotherapy tomorrow as well as steroids therapy along with this. Options were discussed and patient desires something in addition to the above medications for his polyneuropathy symptoms. Patient has been on Lexapro 10 mg daily by his PCP for the past 3-4 years which she feels as helps. I gave the patient a prescription for Cymbalta 20 mg daily 30 tablets with one refill as well as refills on his other medications as above unchanged. Patient has 104 Percocet tablets left that we had prescribed via phone conversation. Patient advised to discuss the Lexapro and Cymbalta with his PCP Dr. Nae Hill since he has seen him in the past for this condition. I advised patient that we would not be best case scenario if he took both together and they voiced understanding. We will see him back in 4 weeks or sooner if they need us.   08/30/16:  Medical records reviewed and as per Dr. Jaja Patel on 08/29/16:  -2 phase RT (initial = emergent) / locally advanced lung CA involving 2 vertebral bodies  [facility] has completed radiation treatments to the left lung mass invading the spinal column, with high energy photons using an conformal technique at [address] [place] [address]. The patient received a total dose of 38 Gy in 16 fractions plus a 12 GY (6 fractions) boost to 50 Gy ( spinal cord at [facility] reviewed with pt). Second opinion recommend and obtained. Treatment Start Date: 4/27/16  Treatment Completion Date: 6/3/16  The treatments were well tolerated, without significant interruption (except for the re-plan)  RTOG Acute Toxicity Grade [ARMSC]: 1-2. (Pain / fatigue)  We will look forward seeing the patient back in 3 months for a follow-up with myself or our NP [scheduled and PRN toxicity checks in addition]. The patient knows to call with any questions or concerns. Please feel free to contact me at either office to discuss the care of this patient, or if I can be of any further assistance. Patient presents to the exam room in no acute distress alert with no sign of confusion and/or sedation able to voice his needs. Patient states that he has noticed some improvement especially to his thumb and index finger concerning the neuropathy with the addition of the Cymbalta. Patient is happening worsening back pain and is awaiting CT results next Tuesday to look at progression. Patient has lost 4 pounds and states that with chemotherapy his appetite has been decreased, he chronically has difficulty sleeping which has not changed. Patient states that he has taken Percocet on an average of 3 a day but when he had worsening back pain he was taking more frequent. Options were discussed and I advised increasing the Cymbalta to 20 mg b.i.d. and provided in a prescription for 60 tablets with one refill. Patient continues on the Lexapro 10 mg daily and will be meeting with Dr. Lana Murphy soon for assessment on possibly stopping the Lexapro versus continuing. I prescribed unchanged his Percocet 5-325 milligram tablets 1 p.o. q.6 hours p.r.n. ×120 tablets.   We'll see him back in 4 weeks and look at the results of his CT scan at that time as well as to help with his neuropathic symptoms. I talked to them about continued moisturization to his hands but also revealed that these are neuropathic changes as well. They voiced understanding and hopefully the increase in Cymbalta may combat this. We will see him back in 4 weeks or sooner if they need us. Remeron may be indicated to help with his appetite. 04/04/17:  Medical records reviewed and I see no physician visit documented in [facility] since my last visit with him. Patient without sedation and/or confusion able to voice his needs, more comfortable appearing than last time. Patient initially stated that the increase of Cymbalta he did not notice anything from but upon further questioning he has been in less pain. Patient complaining of significant fatigue but upon further questioning he is working 9-11 hours a day oftentimes 6 days a week needing to take days off in between and sleeping the majority of his time off. Patient is aware that he is working too long and now is going to day shift instead of midline shift and hopefully cutting back a few hours a day. I talked to the patient about how a mild change we will not affect him significantly that he may want to consider a significant change in his work status and he voiced understanding but not acceptance at this point. Patient wanting to see how much of a difference this would make. Patient also asking about the Duragesic patches and fatigue stating that over the past month his pain has been better controlled. Patient also still dealing with dryness and fissuring especially on his left hand across the dorsum of his knuckles and he will now try to splint his left index finger attempting to avoid bending at least at night. Options were discussed and I am refilling unchanged his Duragesic 37 µg ×10 patches of each strength.   Secondary to fatigue he will initially try just wearing the 25 µg patch and then if pain worsens he will then apply a 37 µg dose and now.  Options were discussed and we are refilling his medications unchanged as per Epic. Today I talked to them for 45 minutes about differences in emergent versus elective tracheostomy and answered their questions. Wife has been wanting the patient to quit work for quite some time and patient has been resistant admitting today that he feels as long as he can work in the cancer is not beating him. Patient is also worried about insurance coverage once he quits work. We had a long very cynthia discussion and patient admits that [facility] has to go\". Patient feels that over the next month he needs to make steps to end his employment for his health. I advised him to talk to social work, insurance, his work about options for [employer] and they voiced understanding. Patient's wife has researched different treatments 1 especially that they are only performing at [facility] and animals concerning electrical stimulation of vocal cords. She does admit that treatments are several years off as she has been told by them. Concerning the treatment including a tracheostomy of his specific case she will consider what his specialists above are recommending and gather information on details of the trach/treatment. He will then make his decision based on their recommendations. Patient states that he needs to have a tracheostomy then he would but he wants to know if there are any other options. Patient did talk about not having an appetite and we are initiating today Remeron 7.5 mg q.h.s. as per Epic and will see him back in 4 weeks or sooner if he needs us. 06/27/17:   Medical records reviewed and as per general surgery assessment from 06/26/17: The patient says he is doing better. He is breathing better with the tracheostomy in place and is tolerating tf well through his PEG. He has some redness right around the PEG and has a small amount of drainage.   64y.o. year old male with lung cancer, s/p PEG Instructed on over the past 1 month despite the use of Ativan. Options were discussed and we are stopping the Ritalin in case this is exacerbating his \"jumpy legs\" but may try again down the road. I feel that most of his fatigue is exacerbated by the hypothyroidism and we will see how much of this symptom is remedied with normalization of his thyroid hormone. Today we are increasing his Cymbalta to 30 mg q.12 hours as he is noted worsening left hand neuropathy as well as the above symptoms. We are continuing unchanged his Duragesic 37 µg q.72 hours prescribing 5 patches of each strength. Patient will continue his other medications unchanged and we will see him back in 4 weeks or sooner if he needs us. 11/28/17:  Medical records reviewed and Epic records revealing the dislodged PEG tube and reinsertion reviewed. Patient presents today accompanied by his wife in no acute distress at his baseline without sign of sedation and/or confusion able to voice his needs. Patient states that there is been no change to his symptoms and he has been more active painting around the house with increasing aches and pains especially around his neck and back. Patient states that his \"jumpy legs\" are better now that we stop the Ritalin. Patient comfortable on current regiment. Patient states that he sees his medical oncologist this week and will have a CT of the throat in December and a PET scan after the beginning of the year. Options were discussed and we are refilling his Duragesic unchanged at 37 µg q.72 hours prescribing 10 patches of each strength, Patient has refills on unchanged Neurontin 900 mg t.i.d., Cymbalta 30 mg q.12 Remeron and Pamelor unchanged. We will see him back in 8 weeks or sooner if he needs us and he was advised to call us 1 week prior to running out of his Duragesic patches and we will prescribe electronically as he has been wonderfully appropriate and compliant.   We will review the throat CT and PET scan as above as well as recommendations from his medical oncologist.  01/23/18:  Medical records reviewed and no documented physician visits in [place] [facility] beds at the [facility] since I last saw him. Patient presents with his wife in no acute distress without sign of sedation and/or confusion able to voice his needs ambulating well. Patient has lost another 2 pounds and still has a decreased appetite despite taking Remeron 15 mg nightly. Patient is working out details with his employer/insurance and there may be a problem with pain from medication soon. Patient complains of significant fatigue and feels that it is the cold weather and wife states that oftentimes he will just sleep all day. There are small projects around the house but he does not feel like doing them. Options were discussed and I feel that his fatigue is from his decreased caloric intake. Patient is resistant to starting his tube feedings as recommended to utilize at least twice a day if he is not eating because he feels that it is a step backwards. I talked to them both in great detail and I challenged him to eat something at least 3 times a day or go back on the tube feedings and they voiced understanding. Today we are increasing his Remeron to 30 mg q.h.s., continuing unchanged Duragesic 37 µg q.72 hours prescribing 10 patches of each strength and I advised him that if these are too expensive to call us and he would consider going back on his Percocet if that is less expensive. He did not want to do that today. Patient will continue unchanged his Cymbalta 30 mg q.12 hours, Neurontin 900 mg t.i.d. and his Pamelor all of which she does not need refills today. We will see him back in 8 weeks or sooner if he needs us, monitor his weight and of course the situation with his medication coverage. 05/15/18:  3/20/18: [address] records reviewed and no documented physician visits in [facility] since last seen by Dr. Khadar Spence on 1/23/18.   Patient presents with his wife in no acute were discussed and today we are doubling the Marinol to 5 mg b.i.d. as per Epic. Patient advised that if he does not notice a benefit or does not like the way it makes him feel he can always break the tablets in half and go back to his original dosage. Patient will continue his other medications unchanged including Duragesic 37 µg q.72 hours, Colace, melatonin q.h.s., Ativan 0.5 mg, Remeron 30 mg q.h.s., Cymbalta 30 mg q.12 hours, Neurontin 900 mg t.i.d., Pamelor 25 mg q.h.s. He will continue his other medications as well and we'll see him back in 4 weeks monitoring his weight. They know to call us at any time. 10/30/18:  Medical records reviewed and patient presents ambulatory in no acute distress accompanied by his wife without sign of sedation and/or confusion able to voice his needs. Patient appears frustrated today and states that he has significant shortness of breath with activity that has been limiting his activity since his pneumonia. Patient utilizes albuterol aerosols prescribed by his pulmonologist twice daily which does help for an hour or 2 as per his history. Patient has oxygen at home that he infrequently uses. Patient likes to remain active. Patient states there's no change in how he is taking his medications and I prescribed other than he stopped his Marinol secondary to a too high of a co-pay. Patient has gained 5 pounds stating that he is eating with an improved appetite. Patient states he is on prednisone 10 mg daily. Options were discussed and he will call us prior to running out of his Duragesic, Ativan as above and we will electronically prescribed unchanged. Of course we are not continuing the Marinol as he is already stopped. Patient will continue his Duragesic 37 µg one patch q.72 hours, Colace, melatonin, Ativan, Remeron 30 mg q.h.s., Cymbalta 30 mg q.12 hours, Neurontin 900 mg t.i.d., Pamelor 25 mg q.h.s.   I advised patient to use his albuterol aerosols more frequently as

## 2018-12-07 ENCOUNTER — HOSPITAL ENCOUNTER (OUTPATIENT)
Age: 63
Discharge: HOME | End: 2018-12-07
Payer: MEDICARE

## 2018-12-07 DIAGNOSIS — J18.9 PNEUMONIA DUE TO INFECTIOUS ORGANISM, UNSPECIFIED LATERALITY, UNSPECIFIED PART OF LUNG: ICD-10-CM

## 2018-12-07 PROCEDURE — 87081 CULTURE SCREEN ONLY: CPT

## 2018-12-09 LAB — ORGANISM: ABNORMAL

## 2018-12-18 ENCOUNTER — HOSPITAL ENCOUNTER (OUTPATIENT)
Dept: CT IMAGING | Age: 63
Discharge: HOME | End: 2018-12-20
Payer: MEDICARE

## 2018-12-18 DIAGNOSIS — J15.212 PNEUMONIA OF RIGHT UPPER LOBE DUE TO METHICILLIN RESISTANT STAPHYLOCOCCUS AUREUS (MRSA) (HCC): ICD-10-CM

## 2018-12-18 PROCEDURE — 71250 CT THORAX DX C-: CPT

## 2019-01-24 DIAGNOSIS — G89.3 CHRONIC PAIN DUE TO NEOPLASM: ICD-10-CM

## 2019-01-24 RX ORDER — FENTANYL 25 UG/H
1 PATCH TRANSDERMAL
Qty: 10 PATCH | Refills: 0 | Status: SHIPPED | OUTPATIENT
Start: 2019-01-24 | End: 2019-02-23

## 2019-01-24 RX ORDER — FENTANYL 12 UG/H
1 PATCH TRANSDERMAL
Qty: 10 PATCH | Refills: 0 | Status: SHIPPED | OUTPATIENT
Start: 2019-01-24 | End: 2019-02-23

## 2019-01-28 ENCOUNTER — OFFICE VISIT (OUTPATIENT)
Dept: PALLATIVE CARE | Age: 64
End: 2019-01-28
Payer: MEDICARE

## 2019-01-28 VITALS
SYSTOLIC BLOOD PRESSURE: 114 MMHG | HEART RATE: 95 BPM | WEIGHT: 184.3 LBS | DIASTOLIC BLOOD PRESSURE: 79 MMHG | BODY MASS INDEX: 25.7 KG/M2 | OXYGEN SATURATION: 94 %

## 2019-01-28 DIAGNOSIS — G62.0 CHEMOTHERAPY-INDUCED PERIPHERAL NEUROPATHY (HCC): ICD-10-CM

## 2019-01-28 DIAGNOSIS — R63.4 UNINTENTIONAL WEIGHT LOSS: ICD-10-CM

## 2019-01-28 DIAGNOSIS — Z51.5 PALLIATIVE CARE BY SPECIALIST: ICD-10-CM

## 2019-01-28 DIAGNOSIS — J43.1 PANLOBULAR EMPHYSEMA (HCC): ICD-10-CM

## 2019-01-28 DIAGNOSIS — T45.1X5A CHEMOTHERAPY-INDUCED PERIPHERAL NEUROPATHY (HCC): ICD-10-CM

## 2019-01-28 DIAGNOSIS — R53.83 FATIGUE, UNSPECIFIED TYPE: ICD-10-CM

## 2019-01-28 DIAGNOSIS — F32.A DEPRESSION, UNSPECIFIED DEPRESSION TYPE: Primary | ICD-10-CM

## 2019-01-28 DIAGNOSIS — C34.12 PANCOAST TUMOR, LEFT (HCC): ICD-10-CM

## 2019-01-28 DIAGNOSIS — G89.3 CHRONIC PAIN DUE TO NEOPLASM: ICD-10-CM

## 2019-01-28 DIAGNOSIS — C34.82 MALIGNANT NEOPLASM OF OVERLAPPING SITES OF LEFT LUNG (HCC): ICD-10-CM

## 2019-01-28 DIAGNOSIS — G90.2: ICD-10-CM

## 2019-01-28 PROCEDURE — 99212 OFFICE O/P EST SF 10 MIN: CPT | Performed by: FAMILY MEDICINE

## 2019-01-28 PROCEDURE — 99215 OFFICE O/P EST HI 40 MIN: CPT | Performed by: FAMILY MEDICINE

## 2019-01-28 RX ORDER — OXYCODONE HYDROCHLORIDE AND ACETAMINOPHEN 5; 325 MG/1; MG/1
1 TABLET ORAL EVERY 6 HOURS PRN
Qty: 120 TABLET | Refills: 0 | Status: SHIPPED | OUTPATIENT
Start: 2019-01-28 | End: 2019-04-09 | Stop reason: SDUPTHER

## 2019-01-28 RX ORDER — NORTRIPTYLINE HYDROCHLORIDE 10 MG/1
10 CAPSULE ORAL NIGHTLY
Qty: 90 CAPSULE | Refills: 0 | Status: SHIPPED | OUTPATIENT
Start: 2019-01-28 | End: 2019-04-01 | Stop reason: SDUPTHER

## 2019-01-31 ENCOUNTER — TELEPHONE (OUTPATIENT)
Dept: PALLATIVE CARE | Age: 64
End: 2019-01-31

## 2019-02-08 ENCOUNTER — TELEPHONE (OUTPATIENT)
Dept: PALLATIVE CARE | Age: 64
End: 2019-02-08

## 2019-02-19 ENCOUNTER — TELEPHONE (OUTPATIENT)
Dept: RADIATION ONCOLOGY | Age: 64
End: 2019-02-19

## 2019-02-19 ENCOUNTER — HOSPITAL ENCOUNTER (OUTPATIENT)
Dept: RADIATION ONCOLOGY | Age: 64
Discharge: HOME | End: 2019-02-19
Payer: MEDICARE

## 2019-02-19 VITALS
WEIGHT: 181.9 LBS | SYSTOLIC BLOOD PRESSURE: 98 MMHG | DIASTOLIC BLOOD PRESSURE: 64 MMHG | BODY MASS INDEX: 25.37 KG/M2 | OXYGEN SATURATION: 92 % | HEART RATE: 101 BPM

## 2019-02-19 DIAGNOSIS — C80.1 CANCER (HCC): Primary | ICD-10-CM

## 2019-02-19 PROCEDURE — 99212 OFFICE O/P EST SF 10 MIN: CPT

## 2019-02-19 PROCEDURE — 99213 OFFICE O/P EST LOW 20 MIN: CPT | Performed by: RADIOLOGY

## 2019-02-20 ENCOUNTER — TELEPHONE (OUTPATIENT)
Dept: RADIATION ONCOLOGY | Age: 64
End: 2019-02-20

## 2019-02-21 DIAGNOSIS — C34.82 MALIGNANT NEOPLASM OF OVERLAPPING SITES OF LEFT LUNG (HCC): ICD-10-CM

## 2019-02-21 RX ORDER — LORAZEPAM 0.5 MG/1
0.5 TABLET ORAL NIGHTLY PRN
Qty: 30 TABLET | Refills: 2 | Status: SHIPPED | OUTPATIENT
Start: 2019-02-21 | End: 2019-05-23 | Stop reason: SDUPTHER

## 2019-03-01 ENCOUNTER — TELEPHONE (OUTPATIENT)
Dept: RADIATION ONCOLOGY | Age: 64
End: 2019-03-01

## 2019-03-05 ENCOUNTER — HOSPITAL ENCOUNTER (OUTPATIENT)
Dept: GENERAL RADIOLOGY | Age: 64
Discharge: HOME | End: 2019-03-07
Payer: MEDICARE

## 2019-03-05 ENCOUNTER — HOSPITAL ENCOUNTER (OUTPATIENT)
Dept: CT IMAGING | Age: 64
Discharge: HOME | End: 2019-03-07
Payer: MEDICARE

## 2019-03-05 ENCOUNTER — HOSPITAL ENCOUNTER (OUTPATIENT)
Age: 64
Discharge: HOME | End: 2019-03-07
Payer: MEDICARE

## 2019-03-05 ENCOUNTER — OFFICE VISIT (OUTPATIENT)
Dept: PALLATIVE CARE | Age: 64
End: 2019-03-05
Payer: MEDICARE

## 2019-03-05 VITALS
WEIGHT: 185 LBS | DIASTOLIC BLOOD PRESSURE: 74 MMHG | BODY MASS INDEX: 25.8 KG/M2 | SYSTOLIC BLOOD PRESSURE: 121 MMHG | HEART RATE: 87 BPM | OXYGEN SATURATION: 92 %

## 2019-03-05 DIAGNOSIS — G89.3 CHRONIC PAIN DUE TO NEOPLASM: ICD-10-CM

## 2019-03-05 DIAGNOSIS — C34.82 MALIGNANT NEOPLASM OF OVERLAPPING SITES OF LEFT LUNG (HCC): Primary | ICD-10-CM

## 2019-03-05 DIAGNOSIS — C34.12 CANCER OF BRONCHUS OF LEFT UPPER LOBE (HCC): ICD-10-CM

## 2019-03-05 DIAGNOSIS — J43.1 PANLOBULAR EMPHYSEMA (HCC): ICD-10-CM

## 2019-03-05 DIAGNOSIS — R53.83 FATIGUE, UNSPECIFIED TYPE: ICD-10-CM

## 2019-03-05 DIAGNOSIS — G62.0 CHEMOTHERAPY-INDUCED PERIPHERAL NEUROPATHY (HCC): ICD-10-CM

## 2019-03-05 DIAGNOSIS — T45.1X5A CHEMOTHERAPY-INDUCED PERIPHERAL NEUROPATHY (HCC): ICD-10-CM

## 2019-03-05 DIAGNOSIS — R63.4 UNINTENTIONAL WEIGHT LOSS: ICD-10-CM

## 2019-03-05 DIAGNOSIS — Z51.5 PALLIATIVE CARE BY SPECIALIST: ICD-10-CM

## 2019-03-05 DIAGNOSIS — C34.12 PANCOAST TUMOR, LEFT (HCC): ICD-10-CM

## 2019-03-05 DIAGNOSIS — G90.2: ICD-10-CM

## 2019-03-05 PROCEDURE — 70490 CT SOFT TISSUE NECK W/O DYE: CPT

## 2019-03-05 PROCEDURE — 73552 X-RAY EXAM OF FEMUR 2/>: CPT

## 2019-03-05 PROCEDURE — 99215 OFFICE O/P EST HI 40 MIN: CPT | Performed by: FAMILY MEDICINE

## 2019-03-05 PROCEDURE — 99212 OFFICE O/P EST SF 10 MIN: CPT | Performed by: FAMILY MEDICINE

## 2019-03-05 PROCEDURE — 73502 X-RAY EXAM HIP UNI 2-3 VIEWS: CPT

## 2019-03-05 RX ORDER — FENTANYL 50 UG/H
1 PATCH TRANSDERMAL
Qty: 10 PATCH | Refills: 0 | Status: SHIPPED | OUTPATIENT
Start: 2019-03-05 | End: 2019-04-01 | Stop reason: SDUPTHER

## 2019-03-05 NOTE — PROGRESS NOTES
conor and likely begin treatment prior to a tissue diagnosis. It is my clinical judgement the pre test probability for malignancy is > 95 % and there may be significant functional decline in the time necessary to maintain a tissue diagnosis due to the primary tumor directly extending into the spinal column). I specifically verbalized all of this with Bridgette Almaraz, his wife Lucia Mejia, and his daughter Rocio Pack all of who individually verbalized consent to RT treatment to begin prior to a tissue diagnosis. Peer reviewed requested. Kerrie Bee MED referral -- Dr. Domenic Kenney reccommended --- pt will still pursue aggressive active treatment however this is a pain management consultation request.  *will hold off on DEX or any new pain meds until he sees Dr. Domenic Kenney but we could start him of DEX in FND occurs or Sx worsens  -coordinated care with Dr. Echevarria Eye this AM 4/25/16. We will treat the primary tumor and spinal column initially with considerations for second and third \"boost\" plans (nodes involved, primary boost etc.) in conjunction with CHEMO pending the workup / PET. MRI brain ordered by [facility].        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    Paralyzed vocal cords     Thrombosis of testis     Tobacco dependence 5/27/2017       Past Surgical History:   Procedure Laterality Date    BACK SURGERY      KNEE ARTHROSCOPY      LUNG BIOPSY Left 5/6/2016    OTHER SURGICAL HISTORY  4/15/2016    cervical myelogram    SINUS SURGERY      TRACHEOSTOMY  05/24/2017    TRACHEOSTOMY  05/24/2017 Current Outpatient Medications on File Prior to Visit   Medication Sig Dispense Refill    chlorhexidine (PERIDEX) 0.12 % solution Take 15 mLs by mouth daily Swish & spit qd 473 mL 2    clindamycin (CLEOCIN) 300 MG capsule Take 1 capsule by mouth 3 times daily 21 capsule 0    LORazepam (ATIVAN) 0.5 MG tablet Take 1 tablet by mouth nightly as needed for Anxiety (hold for sedation) for up to 90 days. . 30 tablet 2    nortriptyline (PAMELOR) 10 MG capsule Take 1 capsule by mouth nightly 90 capsule 0    pembrolizumab (KEYTRUDA) 100 MG/4ML SOLN Infuse intravenously      mirtazapine (REMERON) 30 MG tablet Take 1 tablet by mouth nightly 90 tablet 3    formoterol (PERFOROMIST) 20 MCG/2ML nebulizer solution Take 2 mLs by nebulization 2 times daily 120 mL 5    budesonide (PULMICORT) 0.5 MG/2ML nebulizer suspension Take 2 mLs by nebulization 2 times daily 60 ampule 5    gabapentin (NEURONTIN) 300 MG capsule Take 1 capsule by mouth 3 times daily for 360 days. Along with the 600 mg tabs for a total of 900 mg three times daily. 270 capsule 3    gabapentin (NEURONTIN) 600 MG tablet Take 1 tablet by mouth 3 times daily for 360 days. Along with the 300 mg caps for a total of 900 mg three times daily.  270 tablet 3    albuterol (ACCUNEB) 0.63 MG/3ML nebulizer solution Take 3 mLs by nebulization every 6 hours as needed for Wheezing 270 mL 3    buPROPion (WELLBUTRIN) 75 MG tablet Take 1 tablet by mouth 2 times daily 180 tablet 3    tamsulosin (FLOMAX) 0.4 MG capsule Take 0.4 mg by mouth daily      melatonin 5 MG TABS tablet Take 5 mg by mouth nightly      docusate sodium (COLACE) 100 MG capsule Take 1 capsule by mouth 3 times daily 270 capsule 3    DULoxetine (CYMBALTA) 30 MG extended release capsule Take 1 capsule by mouth 2 times daily (Patient taking differently: Take 60 mg by mouth 2 times daily ) 180 capsule 3    predniSONE (DELTASONE) 10 MG tablet Take 10 mg by mouth daily      Levothyroxine Sodium 125 MCG CAPS 100 mcg daily  0    Calcium Carb-Cholecalciferol (CALTRATE 600+D3 SOFT PO) Take by mouth      esomeprazole (NEXIUM) 40 MG capsule Take 40 mg by mouth nightly       simvastatin (ZOCOR) 40 MG tablet Take 40 mg by mouth nightly       No current facility-administered medications on file prior to visit. Allergies:    Augmentin [amoxicillin-pot clavulanate]    ROS: UNLESS STATED ABOVE PATIENT DENIES:  CONSTITUTIONAL:  fever, chill, rigors, nausea, vomiting, fatigue. HEENT: blurry vision, double vision, hearing problem, tinnitus, hoarseness, dysphagia,               odynophagia  RESPIRATORY: cough, shortness of breath, sputum expectoration. CARDIOVASCULAR:  Chest pain/pressure, palpitation, syncope, irregular beats  GASTROINTESTINAL:  abdominal or rectal pain, diarrhea, constipation, . GENITOURINARY:  Burning, frequency, urgency, incontinence, discharge  INTEGUMENTARY: rash, wound, pruritis  HEMATOLOGIC/LYMPHATIC:  Swelling, sores, gum bleeding, easy bruising, pica.   MUSCULOSKELETAL:  pain, edema, joint swelling or redness  NEUROLOGICAL:  light headed, dizziness, loss of consciousness, weakness, change                                   in memory, seizures, tremors    Social history:  Social History     Socioeconomic History    Marital status:      Spouse name: Not on file    Number of children: Not on file    Years of education: Not on file    Highest education level: Not on file   Occupational History    Occupation: supervior a tube mill- [employer]     Comment: disability short term   Social Needs    Financial resource strain: Not on file    Food insecurity:     Worry: Not on file     Inability: Not on file   [employer] needs:     Medical: Not on file     Non-medical: Not on file   Tobacco Use    Smoking status: Former Smoker     Packs/day: 2.00     Years: 44.00     Pack years: 88.00     Types: Cigarettes     Last attempt to quit: 2018     Years since quittin.4    Smokeless tobacco: Never Used   Substance and Sexual Activity    Alcohol use: No     Alcohol/week: 0.0 oz    Drug use: No    Sexual activity: Not on file   Lifestyle    Physical activity:     Days per week: Not on file     Minutes per session: Not on file    Stress: Not on file   Relationships    Social connections:     Talks on phone: Not on file     Gets together: Not on file     Attends Gnosticism service: Not on file     Active member of club or organization: Not on file     Attends meetings of clubs or organizations: Not on file     Relationship status: Not on file    Intimate partner violence:     Fear of current or ex partner: Not on file     Emotionally abused: Not on file     Physically abused: Not on file     Forced sexual activity: Not on file   Other Topics Concern    Not on file   Social History Narrative    Works at [employer]. Lives in [facility] [facility]. Family history:  Family History   Problem Relation Age of Onset    Cancer Mother         breast cancer    Cancer Father         prostate cancer    Diabetes Father            PE: Vitals:   Vitals:    03/05/19 1306   Weight: 185 lb (83.9 kg)       Gen: WD, WN, NAD, awake, alert, able to voice their needs/thoughts   HEENT: normocephalic, atraumatic, sclera nonicteric, PERRLA, EOMI, MMM, Mildly raspy speech tone and quality with no sign of thrush, lesions or dryness and trachea intact without sign of infection  Neck: no LAD, no JVD, supple, no masses, normal speech, trachea midline,   Lungs: bilaterally clear to auscultation, good aeration, symmetric  Heart: regular rate and rhythm, no murmurs/rubs/gallops/ectopy appreciated, PMI WNL  Abd.: non-distended, soft, nontender, non-distended, normal bowel sounds and PEG site without sign of infection.   Ext.: no clubbing, cyanosis or edema, no joint tenderness  Skin: warm, adequate hydration without acute lesions  Neuro: awake, alert, oriented x 3, conversive,     Impression:  As per Dr. Nai Krishna assessment on 04/25/16:   Mr. Scarlet Carrasco is a pleasant and cooperative 61year old man with a recent diagnosis of stage III NSCLC (probable) with impending cord compression. We recommend a concurrent approach to definitive management of this locally advanced non small cell lung cancer [chemo dosing per [address] record- see EMR]. Based on the clinical characteristic, this disease and stage is generally considered unresectable; optimal therapy tends to be a concurrent chemo-RT approach. Concomitant chemo-RT compared with sequential chemo-RT improved overall survival, primarily through better locoregional control, at the cost of manageable increases in acute esophageal toxicity as based on the [place]in metanalysis. These data demonstrate a benefit of concomitant chemo-RT over sequential chemo->RT on OS (HR 0.84, SS), with absolute benefit at 3-years of 5.7% (18% to 24%), 5-years 4.5% (11% to 15%). Interestingly, there was no difference in PFS (HR 0.9, p=0.07). However a decrease in locoregional progression (HR 0.777, SS), with absolute decrease of 6% at 5 years (35% to 29%) was shown. There was no difference on distant progression (HR 1.04, NS), with 5-year rate of about 40% Hilton Estrin Oncol 2010 May 1;28(13):9710-9335). Regarding the radiation dose, 60 Gy in 2Gy/fx was established long ago in RTOG 73-01, with several other trials showing a feasibility of much higher doses however with RT alone. Interestingly in the concurrent era, RTOG 0617 tested higher dose arms (Concurrent RT + Carbo/Taxol +/- Cetuximab) these were closed early with \"negative\" results (longer term results and POFA needed), therefor 60 Gy in daily fractions with dual agent chemotherapy can be considered the standard (the Brooke and LAMP trials also assisted in the development of this paradigm).  Fractionated external beam radiation therapy may or may not be delivered with intensity modulation +/- image guidance per daily cone beam depending on the specific patient anatomy and dose constraints as described per the RTOG and QUANTEC ---Esthela Contreras, Int J Radiat Oncol Biol Phys. 2010 Mar 1;76(3 Suppl):S10-9. The risks, benefits, alternatives, process and logistics of external beam radiation were reviewed (risks include but are not limited to worsening PULM function, esophagitis, esophageal structure, perforations, bleeding, paralysis and death). We answered all of the patient's questions to the best of our ability. Dom verbalized understanding and seemed satisfied. Radiation planning will commence within < 24 hours; the next step in management being the simulation scan, with external beam radiation to commence in a timely fashion thereafter. It was a pleasure meeting Syed Mcleand today and we appreciate the referral and opportunity to be involved in his care. We had an extensive discussion today regarding the course to date (including a focused review of the applicable radiographic and laboratory information), multidisciplinary approach to cancer care, and indications for external beam radiation therapy as a component therein. A literature review and multidisciplinary discussion was performed after seeing this patient due to the complexity of the medical decision making in this case. I personally spent greater than 60 minutes with this patient and performed the complete history and physical as above at today's visit, at least 45 minutes was in direct  regarding disease management. *this pt will be simmed and likely begin treatment prior to a tissue diagnosis. It is my clinical judgement the pre test probability for malignancy is > 95 % and there may be significant functional decline in the time necessary to maintain a tissue diagnosis due to the primary tumor directly extending into the spinal column).  I specifically verbalized all of this with Syed Monroe, his wife Yaniv Whitaker, and his daughter Tana Ngo all of who individually verbalized consent to RT treatment to begin prior to a tissue diagnosis. Peer reviewed requested. Tyrel Garrett MED referral -- Dr. Aman Salter reccommended --- pt will still pursue aggressive active treatment however this is a pain management consultation request.  *will hold off on DEX or any new pain meds until he sees Dr. Aman Salter but we could start him of DEX in FND occurs or Sx worsens  -coordinated care with Dr. Tami Rubio this AM 4/25/16. We will treat the primary tumor and spinal column initially with considerations for second and third \"boost\" plans (nodes involved, primary boost etc.) in conjunction with CHEMO pending the workup / PET. MRI brain ordered by "[employer].   05/03/16:  OARRS report reviewed today revealing Norco prescriptions since May 2015 with the last being ×60 tablets for 5-325 milligram tablets filled on 04/26/16 prescribed by Nilda Patel from [address]. Ultram ×40 tablets on 12/24/15.   [place] Symptom Assessment Scale                                             Date:   Pain  [] 0  None  [] 1  [] 2  [] 3  [] 4  [] 5  [] 6  [] 7  [] 8  [] 9  [x] 10  Worst   Tiredness  [] 0  None  [] 1  [] 2  [] 3  [] 4  [] 5  [] 6  [] 7  [x] 8  [] 9  [] 10  Worst   Nausea  [] 0  None  [x] 1  [] 2  [] 3  [] 4  [] 5  [] 6  [] 7  [] 8  [] 9  [] 10  Worst   Depression  [] 0  None  [] 1  [] 2  [x] 3  [] 4  [] 5  [] 6  [] 7  [] 8  [] 9  [] 10  Worst   Anxiety  [] 0  None  [] 1  [x] 2  [] 3  [] 4  [] 5  [] 6  [] 7  [] 8  [] 9  [] 10  Worst   Drowsiness  [] 0  None  [] 1  [] 2  [] 3  [] 4  [x] 5  [] 6  [] 7  [] 8  [] 9  [] 10  Worst   Appetite  [] 0  None  [] 1  [] 2  [] 3  [x] 4  [] 5  [] 6  [] 7  [] 8  [] 9  [] 10  Worst   Wellbeing  [] 0  None  [] 1  [] 2  [] 3  [] 4  [] 5  [] 6  [] 7  [x] 8  [] 9  [] 10  Worst   Shortness of Breath  [] 0  None  [] 1  [x] 2  [] 3  [] 4  [] 5  [] 6  [] 7  [] 8  [] 9  [] 10  Worst   Constipation  [] 0  None  [] 1  [] 2  [] 3  [] 4  [x] 5  [] 6  [] 7  [] 8  [] 9  [] 10  Worst   Completed By:  [x]  Patient Report  [] Caregiver/Family  []  Nurse/Staff  Patient is currently a:  [x]   Palliative Medicine Patient  []   Hospice Patient  Medical records reviewed and Patient presents to the exam room today accompanied by his wife alert with no sign of sedation confusion able to voice his needs in detail. As per Cervical CT on 04/15/16:  IMPRESSION: ALERT: THIS IS AN ABNORMAL REPORT   Critical result: Findings communicated directly with Dr. Eldon Carter at   approximately 0949 hours on 4/15/2016.   1. Large mass consistent with malignancy in the mesial left lung apex   with anterior chest wall and left mediastinal invasion. There is also   osseous invasion of the T1 and T2 vertebral bodies and the left second   rib. Infection of spinal canal at the level of T1 with right lateral   displacement and compression of the thecal sac. See above for details. 2. Moderate centrilobular segment. 3. Postoperative cervical spine status post anterior cervical fusion   C2-C7, worse at the C3-C4 and C4-C5 motion segment level, see above   for details. Patient complains of significant pain described as a constant numbness and pain to his left arm left elbow with burning and numbness into his axilla and to the left side of his chest, drooping to his left eye with no swelling to the left forehead consistent with a Pancoast tumor and Vipin's syndrome. Patient states that he has had chronic pain for the past 2 years with cervical fusion one year ago. Patient works midnights and is having difficulty sleeping secondary to the pain only sleeping 2-3 hours a night. Patient also complains of a poor appetite, nausea without vomiting, significant constipation now moving his bowels every 3-4 days consistent with hard bowel movements, mild imbalance upon transitioning from sitting to standing which is self-limiting within seconds to minutes.   Patient symptoms over the past several weeks he has tolerated well without symptoms Neurontin 300 mg t.i.d., Norco Date:   Pain  [] 0  None  [] 1  [] 2  [] 3  [] 4  [] 5  [] 6  [] 7  [x] 8  [x] 9  [x] 10  Worst   Tiredness  [] 0  None  [] 1  [] 2  [] 3  [] 4  [] 5  [x] 6  [] 7  [] 8  [] 9  [] 10  Worst   Nausea  [] 0  None  [] 1  [x] 2  [] 3  [] 4  [] 5  [] 6  [] 7  [] 8  [] 9  [] 10  Worst   Depression  [] 0  None  [] 1  [x] 2  [] 3  [] 4  [] 5  [] 6  [] 7  [] 8  [] 9  [] 10  Worst   Anxiety  [] 0  None  [] 1  [x] 2  [] 3  [] 4  [] 5  [] 6  [] 7  [] 8  [] 9  [] 10  Worst   Drowsiness  [] 0  None  [] 1  [] 2  [] 3  [] 4  [x] 5  [] 6  [] 7  [] 8  [] 9  [] 10  Worst   Appetite  [] 0  None  [] 1  [] 2  [] 3  [] 4  [] 5  [] 6  [x] 7  [] 8  [] 9  [] 10  Worst   Wellbeing  [] 0  None  [] 1  [] 2  [] 3  [] 4  [x] 5  [] 6  [] 7  [] 8  [] 9  [] 10  Worst   Shortness of Breath  [] 0  None  [] 1  [] 2  [] 3  [x] 4  [] 5  [] 6  [] 7  [] 8  [] 9  [] 10  Worst   Constipation  [] 0  None  [] 1  [] 2  [] 3  [] 4  [] 5  [x] 6  [] 7  [] 8  [] 9  [] 10  Worst   Completed By:  [x]  Patient Report  []  Caregiver/Family  []  Nurse/Staff  Patient is currently a:  [x]   Palliative Medicine Patient  []   Hospice Patient  As per Beckie's phone call discussion on 05/09/16:  Patient's wife, Oksana Gonzáles, had called and left a voicemail this morning regarding patient's pain not being relieved by neurontin or percocet. She says the patient is back to taking the ibuprofen. Dr. Aman Salter notified. I called Oksana Gonzáles back with Dr. Flaco Murcia instructions: Patient needs to give decadron more time to become effective. Patient is not to take ibuprofen while on decadron. Patient may take 2 percocet every 6 hours only as needed for severe pain but needs to be aware that he will run out of percocet early if he does. Neurontin dose is not to change at this time. Oksana Gonzáles says that the patient cut the grass at home this weekend and now the pain is even more severe. Asked Oksana Gonzáles to tell patient to not cut the grass due to pain issues.  Discussed with Casie the many contraindications of taking large doses of ibuprofen. She is aware and does not encourage it, but she cannot stop him. She thinks that he will follow Dr. Felton Novak advice. Says they will be here next Tuesday for the patient's next clinic appointment. Agrees to call us if there are any other changes. Patient presents with his wife today looking more comfortable slightly more animated with his arms behind his head leaning back in the chair with no sign of confusion and/or sedation able to voice his needs. Patient states that occasionally he takes 2 Percocet at a time but this makes him \"loopy\". Patient states that 1 tablet usually lasts for approximately 4 hours. Patient states he takes on average of 6 Percocet daily. Patient is not taking ibuprofen and is taking the Decadron stating that his appetite is somewhat better and he has gained 5 pounds since his last visit. Patient states he continues to awaken about every 2 hours at night secondary to pain. Patient is interested in taking something help with the pain so he does not have to depend on the Percocet as above. Wife states that Pamelor seems to help with his mood, appetite. Patient is starting short-term disability next week and will be seeing Dr. Alistair Butler this week to review the biopsy results and to plan for chemotherapy. Options were discussed and today we are stopping the Decadron and will initiate Mobic 7.5 mg 1 p.o. q.12 hours 60 tablets with 2 refills, increasing the Pamelor to 25 mg q.h.s. 30 tablets with 2 refills, increasing the Neurontin to 900 mg t.i.d. and he was given prescriptions for 90 tablets of each capsule with 2 refills. We are continuing his Percocet in which he has 2 weeks left and prescribed him 90 tablets of Percocet 5-325 milligram tablets but making that q.4 hours p.r.n. instead of q.6 hours p.r.n.   We will see him back in 4 weeks or sooner if he needs us.  06/14/16:  [place] Symptom Assessment Scale Date:   Pain  [] 0  None  [] 1  [] 2  [] 3  [] 4  [] 5  [x] 6  [] 7  [] 8  [] 9  [] 10  Worst   Tiredness  [] 0  None  [] 1  [] 2  [] 3  [x] 4  [] 5  [] 6  [] 7  [] 8  [] 9  [] 10  Worst   Nausea  [x] 0  None  [] 1  [] 2  [] 3  [] 4  [] 5  [] 6  [] 7  [] 8  [] 9  [] 10  Worst   Depression  [] 0  None  [] 1  [x] 2  [] 3  [] 4  [] 5  [] 6  [] 7  [] 8  [] 9  [] 10  Worst   Anxiety  [] 0  None  [x] 1  [] 2  [] 3  [] 4  [] 5  [] 6  [] 7  [] 8  [] 9  [] 10  Worst   Drowsiness  [] 0  None  [x] 1  [] 2  [] 3  [] 4  [] 5  [] 6  [] 7  [] 8  [] 9  [] 10  Worst   Appetite  [] 0  None  [] 1  [] 2  [] 3  [] 4  [] 5  [x] 6  [] 7  [] 8  [] 9  [] 10  Worst   Wellbeing  [] 0  None  [] 1  [] 2  [x] 3  [] 4  [] 5  [] 6  [] 7  [] 8  [] 9  [] 10  Worst   Shortness of Breath  [] 0  None  [] 1  [x] 2  [] 3  [] 4  [] 5  [] 6  [] 7  [] 8  [] 9  [] 10  Worst   Constipation  [] 0  None  [] 1  [] 2  [] 3  [] 4  [] 5  [] 6  [] 7  [x] 8  [] 9  [] 10  Worst   Completed By:  [x]  Patient Report  []  Caregiver/Family  []  Nurse/Staff  Patient is currently a:  [x]   Palliative Medicine Patient  []   Hospice Patient  Medical records reviewed and as per Dr. Terry Serrano assessment on 06/07/16:  Arina Sarkar is a 61y.o. year old male here for follow up. He had hid CT guided biopsy done on 5/6 /2016. The results showed Pulmonary adenocarcinoma, moderately to poorly differentiated, invasive, acinar type. He completed his course of XRT and currently is going under chemotherapy . He has done 2/6 cycles. He is tolerating it relatively well. Has not lost any weight. Pain is well controlled. 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   Will follow repeat imaging at that time  H/O nicotine dependence:  Probable COPD  PFTs in future(next office visit)  Patient presents to the exam room with a primary complaint of left arm neuropathic pain and numbness which is worse with decreased gripping strength to his left hand. Patient stated that he finished radiation therapy approximately 10 days ago and also has more of a coarse voice, morning cough productive of phlegm and a mild amount of dysphasia if he eats too quickly. Patient denies nausea or vomiting. Patient only using Carafate infrequently, Percocet 5-325 milligram tablets on the average of 3 a day sometimes 4. Patient states that the Percocet helps approximately % and does not cause oversedation. Patient will also be taking dexamethasone 8 mg a day ×3 days surrounding his chemotherapy every 3 weeks which will start on June 21. Patient also started folic acid 1 mg daily 2 weeks ago. Options were discussed and although I see no signs of candidiasis orally I suspect this esophageal involvement therefore starting patient on Diflucan 100 mg daily ×14 days and a prescription for 14 tablets with one refill given today. Patient was also complaining of constipation of small hard bowel movements moving his bowels every 2-3 days therefore we are doubling his Dolly-Colace at 2 tablets b.i.d. 120 tablets with 2 refills given today. Patient also given a prescription for Percocet unchanged 5-325 milligram tablets 1 p.o. q.6 hours p.r.n. ×120 tablets today. We will see him back in 2 weeks or sooner if he needs us and in the future secondary to neuropathic complaints we may consider methadone.   06/28/16:  [place] Symptom Assessment Scale                                             Date:   Pain  [] 0  None  [] 1  [] 2  [x] 3  [] 4  [] 5  [] 6  [] 7  [] 8  [] 9  [] 10  Worst   Tiredness  [] 0  None  [x] 1  [] 2  [] 3  [] 4  [] 5  [] 6  [] 7  [] 8  [] 9  [] 10  Worst   Nausea  [x] 0  None  [] 1  [] 2  [] 3  [] 4  [] 5  [] 6  [] 7  [] 8  [] 9  [] 10  Worst   Depression  [] 0  None  [x] 1  [] 2  [] 3  [] 4  [] 5  [] 6  [] 7  [] 8  [] 9  [] 10  Worst   Anxiety  [] 0  None  [x] 1  [] 2  [] 3  [] 4  [] 5  [] 6  [] 7  [] 8  [] 9  [] 10  Worst   Drowsiness  [] 0  None  [x] 1  [] 2  [] 3  [] 4  [] 5  [] 6  [] 7  [] 8  [] 9  [] 10  Worst   Appetite  [] 0  None  [] 1  [] 2  [] 3  [] 4  [] 5  [x] 6  [] 7  [] 8  [] 9  [] 10  Worst   Wellbeing  [] 0  None  [] 1  [] 2  [x] 3  [] 4  [] 5  [] 6  [] 7  [] 8  [] 9  [] 10  Worst   Shortness of Breath  [] 0  None  [x] 1  [] 2  [] 3  [] 4  [] 5  [] 6  [] 7  [] 8  [] 9  [] 10  Worst   Constipation  [] 0  None  [] 1  [] 2  [x] 3  [] 4  [] 5  [] 6  [] 7  [] 8  [] 9  [] 10  Worst   Completed By:  [x]  Patient Report  []  Caregiver/Family  []  Nurse/Staff  Patient is currently a:  [x]   Palliative Medicine Patient  []   Hospice Patient  Medical records reviewed and patient finished radiation therapy as per Dr. Facundo Garcia recommendations. Patient states that he continues to take the Percocet every 6-8 hours which continues to help. Patient states he has three quarters of a bottle left. Patient had a second opinion at a cancer center and here they are keeping him on his initial chemotherapy for another 3 weeks and then restarting the second course. Patient will stop the dexamethasone and folic acid until his chemotherapy agent changes. Patient smiling and more interactive, jovial, more comfortable and states that his appetite is good, his hoarse voice has improved but not completely back to normal. Patient gained 2-1/2 pounds over the past 2 weeks. Patient states that all of his symptoms are either stable or headed in the right direction. Patient is balancing his Dolly-Colace with bowel movements which seems effective. Options were discussed and we are continuing the same and he needs no prescriptions today. We will see him back in 4 weeks or sooner if he needs us.   08/01/16:  [place] Symptom Assessment Scale                                             Date:   Pain  [] 0  None  [] 1  [] 2  [] 3  [x] 4  [] 5  [] 6  [] 7 [] 8  [] 9  [] 10  Worst   Tiredness  [] 0  None  [] 1  [x] 2  [] 3  [] 4  [] 5  [] 6  [] 7  [] 8  [] 9  [] 10  Worst   Nausea  [x] 0  None  [] 1  [] 2  [] 3  [] 4  [] 5  [] 6  [] 7  [] 8  [] 9  [] 10  Worst   Depression  [] 0  None  [x] 1  [] 2  [] 3  [] 4  [] 5  [] 6  [] 7  [] 8  [] 9  [] 10  Worst   Anxiety  [] 0  None  [] 1  [] 2  [x] 3  [] 4  [] 5  [] 6  [] 7  [] 8  [] 9  [] 10  Worst   Drowsiness  [] 0  None  [x] 1  [] 2  [] 3  [] 4  [] 5  [] 6  [] 7  [] 8  [] 9  [] 10  Worst   Appetite  [] 0  None  [] 1  [x] 2  [] 3  [] 4  [] 5  [] 6  [] 7  [] 8  [] 9  [] 10  Worst   Wellbeing  [] 0  None  [] 1  [x] 2  [] 3  [] 4  [] 5  [] 6  [] 7  [] 8  [] 9  [] 10  Worst   Shortness of Breath  [] 0  None  [] 1  [] 2  [] 3  [x] 4  [] 5  [] 6  [] 7  [] 8  [] 9  [] 10  Worst   Constipation  [] 0  None  [] 1  [] 2  [] 3  [] 4  [] 5  [] 6  [x] 7  [] 8  [] 9  [] 10  Worst   Completed By:  [x]  Patient Report  []  Caregiver/Family  []  Nurse/Staff  Patient is currently a:  [x]   Palliative Medicine Patient  []   Hospice Patient  Medical records reviewed and as per radiation oncology's assessment on 07/06/16:  On 6/3/16, Natividad Coughlin completed 5000 cGy in 22 fractions directed to the left lung / spine for management of locally advanced lung CA. States that he's doing well overall. Eating well. Coughing occasionally with phlegm - light brown in color. Denies fever. SOB with yard work only. Following with Dr Palmira Piña for palliative med, states that pain is well controlled with pain medications he is taking. Pt is following with Dr Adalberto Soler for medical oncology. Seen last week, continues on chemo. Tolerating chemo well. CT scan planned after next chemo cycle per patient report. Met with phycians in [facility] for med onc for second opinion.  States that he had an MRI/CT scan at that time (approx 3 weeks after radiation was completed) which showed (per patient report) no tumor shrinkage but pt states that he understands radiation is continuing to work. No report available at this time. Planning to meet with a neurosurgeon at [facility] to discuss options for vertebral fractures from tumor per patient report. Appt with Dr Edyta Frias ([facility]) in October. Patient is doing well post-radiation completion. Explained to patient that he would need clearance from Dr Sonny Rand and Dr Lester Redding prior to surgery being completed especially to the irradiated area. Verbalized understanding. CT scan re-imaging per Dr Lester Redding. Pt's previous imaging done at [facility]. Asked patient to have results of reimaging faxed to us as well when it is done. I discussed follow up plans with Caleb Valderrama. At this time, Dr Sonny Rand will see the patient back in 3 months for a post-radiation completion follow-up visit. Caleb Valderrama is to follow up with other physicians involved in their care as directed (including but not limited to Medical Oncology, Primary Care, Pulmonary, and Surgery). Patient evaluated unaccompanied in the exam room by his wife with no sign of confusion and/or sedation able to voices needs. Patient states that he continues with increasing neuropathic pain, numbness and tingling involving his left arm and shoulder is now more distally as well as left mid to upper back pain. Patient takes Percocet unchanged approximately every 6-8 hours and is compliant with the Neurontin, Pamelor. Patient last week started physical therapy initially 2 times a week which will drop to once a week for a total of 6 weeks. Patient also was placed on an anabiotic as well as Diflucan ending 3 weeks ago for a throat infection which she feels cleared. Patient was also placed on a Medrol Dosepak by his oncologist which did not improve his left arm and back symptoms. Patient states that his back pain had improved significantly since all of his treatments. 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   Patient will be starting a new chemotherapy tomorrow as well as steroids therapy along with this. Options were discussed and patient desires something in addition to the above medications for his polyneuropathy symptoms. Patient has been on Lexapro 10 mg daily by his PCP for the past 3-4 years which she feels as helps. I gave the patient a prescription for Cymbalta 20 mg daily 30 tablets with one refill as well as refills on his other medications as above unchanged. Patient has 104 Percocet tablets left that we had prescribed via phone conversation. Patient advised to discuss the Lexapro and Cymbalta with his PCP Dr. Christa Dempsey since he has seen him in the past for this condition. I advised patient that we would not be best case scenario if he took both together and they voiced understanding. We will see him back in 4 weeks or sooner if they need us. 08/30/16:  Medical records reviewed and as per Dr. Jamari Ramos on 08/29/16:  -2 phase RT (initial = emergent) / locally advanced lung CA involving 2 vertebral bodies  Chloe Mckeon has completed radiation treatments to the left lung mass invading the spinal column, with high energy photons using an conformal technique at [address] [place] [address]. The patient received a total dose of 38 Gy in 16 fractions plus a 12 GY (6 fractions) boost to 50 Gy ( spinal cord at [facility] reviewed with pt). Second opinion recommend and obtained. Treatment Start Date: 4/27/16  Treatment Completion Date: 6/3/16  The treatments were well tolerated, without significant interruption (except for the re-plan)  RTOG Acute Toxicity Grade [ARMSC]: 1-2. (Pain / fatigue)  We will look forward seeing the patient back in 3 months for a follow-up with myself or our NP [scheduled and PRN toxicity checks in addition]. The patient knows to call with any questions or concerns. Please feel free to contact me at either office to discuss the care of this patient, or if I can be of any further assistance.   Patient presents to the exam room in no acute distress alert with no sign of confusion and/or sedation able to voice his needs. Patient states that he has noticed some improvement especially to his thumb and index finger concerning the neuropathy with the addition of the Cymbalta. Patient is happening worsening back pain and is awaiting CT results next Tuesday to look at progression. Patient has lost 4 pounds and states that with chemotherapy his appetite has been decreased, he chronically has difficulty sleeping which has not changed. Patient states that he has taken Percocet on an average of 3 a day but when he had worsening back pain he was taking more frequent. Options were discussed and I advised increasing the Cymbalta to 20 mg b.i.d. and provided in a prescription for 60 tablets with one refill. Patient continues on the Lexapro 10 mg daily and will be meeting with Dr. Corbin Monk soon for assessment on possibly stopping the Lexapro versus continuing. I prescribed unchanged his Percocet 5-325 milligram tablets 1 p.o. q.6 hours p.r.n. ×120 tablets. We'll see him back in 4 weeks and look at the results of his CT scan at that time as well as benefit from the changes above.  09/27/16:  Medical records reviewed and patient presents today looking well without sign of sedation and/or confusion able to voice his needs accompanied by his wife. Patient complaining of sinus congestion, sore throat, raspy voice and recently saw his PCP who placed him on amoxicillin yesterday 500 mg t.i.d. Patient states that other than this he is feeling better with better control neuropathy on current regiment and off the Lexapro by his PCP. Patient reviewed how he is taking his medications which is appropriate and denies other new symptoms. Options were discussed and we are continuing the same medication regiment prescribing Percocet 5-3 and a 25 mg tablets today 1 p.o. q.6 hours p.r.n. in which he is taking crocs 23 a day ×120 tablets.   Patient states that he has 11 days remaining of his Percocet. Patient has enough of the Cymbalta, gabapentin and was prescribed Diflucan 100 mg daily ×14 days with one refill. We will see him back in 4 weeks or sooner if he needs us. 10/24/16:   Medical records reviewed and as per Dr. Lenwood Sandhoff on 10/14/16:  Kaylee Perales is well known to me since the emergent R and concurrent course was completed. His spine pain is reduced. CHEMO continues. No early delayed RT effects. All questions answered. TOB cessation discussed again. FU with [facility] as planned and with me in 3 months or sooner PRN. Patient presents to the exam room today without sign of sedation but less jovial looking more uncomfortable for follow-up complaining of right ear and right cheek pain stating that he went to urgent care Friday and was diagnosed with what he knows he has always had, TMJ. Patient also finished a Z-Eugenio by his PCP one week ago which did not help. Patient also complains of worsening left hand numbness and tingling. Patient states that he took 2 Percocet tablets last night which did help with the pain but then also made him sleepy. Patient compliant with his other medications and states that initially with the Diflucan his throat pain improved some but not significantly. Patient did state that he has a difficult time swallowing at times. Options were discussed and today we are increasing his Cymbalta to 30 mg b.i.d. ×60 tablets and 1 refill electronically prescribed. Patient will continue his Neurontin 900 mg t.i.d.  Today we initiated Duragesic 12 µg patch q.72 hours as directed ×10 patches and prescribed unchanged his Percocet 5-325 milligram tablets 1 p.o. q.6 hours p.r.n. ×120 tablets prescribed today. Patient will be seen back in 4 weeks or sooner and at this time consider increasing the Cymbalta, Percocet dosage, Duragesic patch and reassessing for thrush which is not present today.   He will call if symptoms increase or if he has difficulty with the Duragesic in which she was reluctant to accept. 11/15/16:  Medical records reviewed and spirometry on 10/24/16:  DATA: Spirometry done in the office today demonstrates an FVC of 4.17 liters which is 85 % of predicted with an FEV1 of 3.10 liters which is 83 % of predicted. FEV1/FVC ratio is 74 %. Mid expiratory flow rates are 78% of predicted. Maximum voluntary ventilation is normal at 106 liters per minute or 74% of predicted. Total lung capacity is 6.83 liters which is 94% of predicted. DLCO is 12.43mm/min/mmHg which is 36% of predicted. Flow volume loop shows no signs of intrathoracic or extrathoracic obstruction. Impressions: No obstructive or restrictive lung disease. Normal total lung capacity. Severe decrement in diffusing capacity  Patient presents to the exam room today accompanied by his wife at his baseline with no sign of sedation and/or confusion. Patient states that since his oncologist increased his Duragesic to two 12 µg patches his pain has improved approximately 75%. Patient states that he was able to decrease his Percocet to approximately 3 daily on the average. Patient has not noticed much of a difference since we increased his Cymbalta 1 month ago. Patient states that a left chest pain is new otherwise his symptoms are unchanged. 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 Patient desires to wean himself off of the Percocet as it does make him sleepy therefore did accept my suggestion of continues to titrate upward the Duragesic. Today we increased him to Duragesic 37 µg q.72 hours and provided a prescription for 10 of each strength patch. Patient was not given a prescription for his Percocet as he states he has not filled the last prescription I gave him. There are no signs of thrush but we will continue to monitor and patient compliant with his other medications.   We will see him back in 4 weeks and evaluate his need for his breakthrough pain medicine and consider titrating the Duragesic further if indicated. We will question if he has returned to work. We will also consider increasing his Cymbalta to 40 mg b.i.d. if indicated. 12/13/16:  Medical records reviewed and as per radiation oncology's assessment on 11/30/16:  Patient is doing well post-radiation completion. CT scan on 11/28/16 showing paratracheal node enlargement and he is symptomatic with whispery voice/ sore throat -- ENT referral written today to Dr Adam Aquino (physician per patient preference). Will also update Dr Jaci Gilmore on imaging report and referral. Further imaging per med onc or ENT. Pain well controlled. Continue with Dr Tye Mills (palliative medicine). Continue to follow with Dr Fredi Alas (pulmonology). Smoking cessation reinforced again. Patient verbalized understanding. I discussed follow up plans with Magdy Inman. At this time, Dr Destin Hollis will see the patient back in January 2017 for a post-radiation completion follow-up visit. Magdy Inman is to follow up with other physicians involved in their care as directed (including but not limited to Medical Oncology, Primary Care, Pulmonary, and Surgery). The patient was given our contact number in the event that if at any time they change their mind and would like to return to the clinic to see either myself or one of the Radiation Oncologists, they can simply call us and we would be happy to see them. Thank you for involving us in the management of this extremely pleasant patient. More than 25 min was in direct contact with pt coordinating/giving care. >50% of the visit was spent in counseling the pt on the following: Follow up care  The nurses notes were reviewed and incorporated into this assessment and plan. Patient presents to the exam room today stating that he now is no longer taking Percocet and is comfortable on the patches. Patient denies new symptoms and stated that he saw his ENT yesterday who felt that he may need a trach. They saw their oncologist today who felt that the area could be stretched/dilated. Patient does \"breathes different\" as per the wife when he is sleeping but does not wake up choking or coughing. Patient does have a hoarse coarse voice quality. Patient is comfortable on the current regiment and we phoned in Neurontin as per [facility] prior to this visit. Options were discussed and we are continuing his Duragesic 37 µg q.72 hours and 10 patches of each strength prescribed today. Patient needs no other prescriptions and we'll see him back in 4 weeks or sooner if he needs us. 01/09/17:  Medical records reviewed and patient followed up with radiation oncology to revisit them 3 months from now. Patient presents to the exam room today complaining of a tickling cough mostly at night but also throughout the day. Patient states that he is not sleeping as well secondary to this cough despite running a humidifier throughout the house with good humidity. Patient states that Dr. Yayo Stringer had him on steroids ×2 weeks which helped but then oncology secondary to his immunotherapy wanted him off of this. Patient states that his pain is controlled on current regiment and he is noting fatigue and some depression secondary to not sleeping well secondary to the cough. Options were discussed and we refilled his Duragesic unchanged ×5 patches each strength, Dolly-Colace and prescribed Ativan 0.5 mg tablets 1/2-1 tablet q.h.s. p.r.n. ×30 tablets. We will see him back in 1 month or sooner if he needs us. 02/06/17:  Medical records reviewed and as per radiation oncology assessment from 01/30/17:  -pt seen To discuss hoarseness and CT findings  -Carlitos Rodriguez was asked to come in today to review the negative CT findings. He and hoarseness and I reviewed the RT treatment noting the laryngeal mean dose constraints WERE met.  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 CT reviewed with pt and wife. TOB cessations discussed, again at length. -FU with Dr. Juanell Sever for systemic immunotherapy and re-staging. -MM for Sx. Patient presents to the exam room today accompanied by his wife looking well in no acute distress without sign of sedation and/or confusion. Patient states that Ativan 0.5 mg q.h.s. help significantly with his sleep without sedation during the day. Patient also is compliant with his other medications without change stating that his pain is much better controlled. Patient reviews with me his visit with Dr. Ada Starkey and CAT scan. Patient states there is no change in how he is using his Duragesic other medications and requesting refill on Ativan, Duragesic and Cymbalta. Options were discussed and patient was provided with prescriptions for Ativan, Duragesic, Cymbalta all unchanged and we will see him back in 4 weeks or sooner if they need us. Patient is very comfortable with his symptom control currently and looks well.  03/06/17:  Medical records reviewed and no documented patient visits in Epic since I have seen him. CT of soft tissue neck from 01/27/17:  1. Irregular opacities seen at medial aspect left lung apex and left   upper lobe suggestive of area of treated lung cancer and radiation   changes.       2. Previously seen lytic lesions within upper thoracic vertebral   bodies and left second rib are now sclerotic which is suggestive of   treated bone metastases.       3. Larynx appears unremarkable. Vocal folds are symmetric.       4. No evidence of cervical lymphadenopathy.       5. Slight limitation to this examination due to metallic artifact from   anterior fusion hardware associated with cervical spine from C3-C7. Patient presents today in no acute distress without sign of sedation and/or confusion.   Patient plane of right sided scapular, shoulder, arm symptoms that are similar to his left side over the past several weeks. Patient denies falls or traumas, difficulty breathing or any other symptoms other than left hand dryness worse than his right with fissures noted on the extensor surfaces of the index MCP and ring finger MCP without sign of infection. Patient states he has always had difficulty with dry hands more now. Patient states there's been no change otherwise to how he is taking his medications. Patient will be seeing oncology tomorrow in reviewing the CAT scan results. Patient complains of sometimes decreased ability to sleep secondary to discomfort in his hands and shoulders. Options were discussed and patient resistant to increasing his Duragesic secondary to fatigue also stating that on the days that he is working long hours he has increasing fatigue. Wife also states that he is not been eating is much in our records reveal a 3 pound weight loss. We refilled unchanged his Duragesic 37 µg q.72 hours with 10 patches of each strength, he has enough of the Ativan and today we increased his Cymbalta to 40 mg bid as per Epic to help with his neuropathic symptoms. I talked to them about continued moisturization to his hands but also revealed that these are neuropathic changes as well. They voiced understanding and hopefully the increase in Cymbalta may combat this. We will see him back in 4 weeks or sooner if they need us. Remeron may be indicated to help with his appetite. 04/04/17:  Medical records reviewed and I see no physician visit documented in [facility] since my last visit with him. Patient without sedation and/or confusion able to voice his needs, more comfortable appearing than last time. Patient initially stated that the increase of Cymbalta he did not notice anything from but upon further questioning he has been in less pain.   Patient complaining of significant fatigue but upon further questioning he is working recess and arytenoids bilaterally are the results of the vocal cord paralysis and not a discrete tumor mass. 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 At diagnosis, the large left upper lung mass extended into the mediastinum and may be now contributing to the right recurrent laryngeal nerve involvement along with prior treatments. In any case, palliative radiation treatments directed to the hypopharynx would not be recommended. Mr. Sol Kerns remains adamantly opposed to undergoing a tracheostomy to protect his airway as discussed with Dr. Elliott Shore. I did discuss this with the patient again along with the rec of doing this when he is stable rather than on an emergent basis. He and his wife voiced understanding. CT chest from February, 2017 shows some response to treatment. As discussed with Dr. Laura Gaston, plans would be to continue systemic therapy as an out patient followed by restaging CT/PET/CT imaging. We will follow up on this when completed. Mr. Sol Kerns is feeling better and is asking when he will be discharged. 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 He also continues to smoke cigarettes. I am concerned about an acute pulmonary event and I suggested he take time off from work. He should definitively quit smoking. He and his wife voiced understanding. Thank you for allowing me to participate in Mr. Christian Mediate evaluation. As per H&P while an inpatient on 05/03/17:  HISTORY OF PRESENT ILLNESS: The patient is a 66-year-old  male with  history of COPD and adenocarcinoma of the lung who presented to the Emergency  Room with shortness of breath and hoarse voice that worsened throughout the  day of admission. He does have a history of subglottic airway narrowing and  has been seen by ENT for it. He denies any issues with chest pain.  He feels  like something is stuck in his throat. He had breathing treatments in the  Emergency Room which were of very little help. He has finished chemotherapy  approximately the end of 11/2016 and now is getting immunotherapy. His last  immunotherapy treatment was 2 weeks ago. The next one is scheduled for  05/09/2017. He was seen in consultation by ENT. Tracheostomy was offered,  but the patient declined. He was also seen in consultation by Pulmonary. His  chest x-ray on admission shows a possible right lung infiltrate. The patient  was started on Pulmicort and Perforomist aerosol treatments along with  DuoNebs. Respiratory cultures and viral panels were obtained. DIAGNOSTIC IMPRESSION:  1. Bilateral vocal cord palsy. 2. History of chronic obstructive pulmonary disease, not in acute  exacerbation. 3. History of nonsmall cell lung CA, currently receiving immunotherapy. 4. Subglottic narrowing. 5. Tobacco dependency. 6. Depression. 7. Hypercholesterolemia. As per ENT consult while an inpatient on 05/02/17:  Patient is a 63 yo male, history of COPD and lung adenocarcinoma, that presented to the ED late last night with complaints of shortness of breath and hoarse voice that worsened throughout the day yesterday. He has history of subglottic airway narrowing and has been seen by ENT for it. He denies any issues with shortness of breath at the time but says it feels like something is stuck in his throat. He had breathing treatments in the ED which said helped a little. He mentions that he had some diarrhea for the past few days but denies any other symptoms. Patient says he has cut back on his smoking, but still was smoking prior to coming into the hospital.  He has finished chemotherapy approximately the end of Nov 2016 and now currently getting immunotherapy with carbo/Avastin + Xgeva. His last treatment was 2 weeks ago and his next one is scheduled for May 9. Voice was worse yesterday and more SOB, better today.  Was noted to have left vocal cord palsy in Dec 2016  EXAM - erythema, dryness of supraglottic larynx but no lesions. Fiberoptic laryngoscopy, both vocal cords in paramedian position, minimal abduction, right cord does medialize slightly with phonation. Unable to see subglottic  No neck mass  IMP - bilateral vocal cord palsy. Would be important to delineate status of mediastinum taking out both cords. [facility] was discussed as alternative for improving breathing status. Pt does not wish to proceed with this at this time. Risks, options discussed with pt and wife  Patient presents today with his wife and daughter without sign of sedation and/or confusion able to voice his needs. Patient appropriate and talking with a whispering/raspy voice without shortness of breath noted. Patient states there's been no change to the medications and he needs refills. Patient and family discussing the recommendations of a tracheostomy and all the above. Patient sees Dr. Gulshan Mendoza tomorrow and medical oncology on Thursday to further discuss treatment options. Patient also complaining of worsening numbness and tingling involving his right hand now. Options were discussed and we are refilling his medications unchanged as per Epic. Today I talked to them for 45 minutes about differences in emergent versus elective tracheostomy and answered their questions. Wife has been wanting the patient to quit work for quite some time and patient has been resistant admitting today that he feels as long as he can work in the cancer is not beating him. Patient is also worried about insurance coverage once he quits work. We had a long very cynthia discussion and patient admits that [facility] has to go\". Patient feels that over the next month he needs to make steps to end his employment for his health. I advised him to talk to social work, insurance, his work about options for [employer] and they voiced understanding.   Patient's wife has researched different treatments 1 especially that they are only performing at [facility] and animals concerning electrical stimulation of vocal cords. She does admit that treatments are several years off as she has been told by them. Concerning the treatment including a tracheostomy of his specific case she will consider what his specialists above are recommending and gather information on details of the trach/treatment. He will then make his decision based on their recommendations. Patient states that he needs to have a tracheostomy then he would but he wants to know if there are any other options. Patient did talk about not having an appetite and we are initiating today Remeron 7.5 mg q.h.s. as per Epic and will see him back in 4 weeks or sooner if he needs us. 06/27/17:   Medical records reviewed and as per general surgery assessment from 06/26/17: The patient says he is doing better. He is breathing better with the tracheostomy in place and is tolerating tf well through his PEG. He has some redness right around the PEG and has a small amount of drainage. 64y.o. year old male with lung cancer, s/p PEG Instructed on PEG care Will monitor redness for now, does not appear infected Follow up in 6 months  As per pulmonology assessment on 06/19/17:  I had the pleasure of seeing Gallito Pitts in our office in follow-up regarding his recent Hospital admission with sepsis, MRSA pneumonia and FTT. Patient was discharged from the hospital to [id number] completed his course of antibitics and jsut got discahrged few days ago. . Since his hospital discahrge patient reports no shortness of breath no cough. Has better appetite. Patient denies chest pain, cough and hemoptysis. Patient is compliant with his Albuterol. Patient presents today accompanied by his wife looking more comfortable than he had in the past able to speak with a better voice although still raspy with the valve in place.   Patient states that when he was at [facility] they decreased him to Duragesic 25 µg q.72 hours and he noticed a significant increase in his joint and muscle aches, left shoulder pain. Patient states that whenever he went home on Nati 15 he added the second patch for a total of 37 µg q.72 hours without sedation and he is comfortable now on his current regimen. Patient states that he is breathing much better after his tracheostomy and swallowing better. Wife is supplementing and has basically a sliding scale on the percentage of oral intake that she uses. Patient states that he was down to 140 pounds and now is up into the 150s. Our records reveal the same. Patient states that he has noticed some difference with improvement of appetite but more so with sleeping all night now on the Remeron 7.5 mg q.h.s. without sedation but states there is significant room for improvement on his appetite. Patient also compliant with Neurontin 900 mg t.i.d., Cymbalta 40 mg b.i.d., Pamelor 25 mg q.h.s. and very infrequently takes is 0.5 mg q.h.s. Patient denies constipation and states that is moving his bowels well. Options were discussed and we are increasing his Remeron to 15 mg q.h.s. as per Epic and I gave him a prescription for the Duragesic 37 µg q.72 hours 10 patches of each strength. Patient has refills on his Neurontin 900 mg t.i.d., Ativan 0.5 mg q.h.s. p.r.n., Cymbalta 40 mg b.i.d. and Pamelor 25 mg q.h.s. We will continue us and monitor his weight and symptoms. Patient looks much more comfortable today.     07/25/17:  Medical records reviewed and discharge summary on 07/09/17:  Principal Problem:    Orthostatic hypotension  Active Problems:    Panlobular emphysema (HCC)    Malignant neoplasm of overlapping sites of left lung [facility])    Palliative care encounter    COPD (chronic obstructive pulmonary disease) (HCC)    Tobacco dependence    Severe protein-energy malnutrition ([facility] [address]    Anemia  Patient presents today looking very weak, tremulous and not as talkative but able to respond and answer in short sentences. Patient over the past 2-3 days as per wife's history has been very weak and with nausea vomiting, dehydration. Patient is being visited by home health but refuses to go back to the hospital stating that he realizes that he needs to go but he does not want to go back to the hospital at this time. Patient just left ENTs office finding a new right sided neck mass. Apparently the ENT discussed with Dr Huang Francis and they will perform a PET scan to look at the extent of the lesions. Typically patient goes to the blood and cancer Center every 3 weeks but chemotherapy is on hold secondary to the patient's weakness. Options were discussed and we are refilling his medications unchanged. We are ordering one liter of IV fluids today and via home health will try to get this at least 3 times a week. I reiterated to the patient that he should go to the emergency department and he voiced understanding but did not want to go despite our suggestions. Patient looks ill and depending on how he responds to the IV fluids may continued to decline significantly in the near future. 08/22/17:  Medical records reviewed and as per radiation oncology's assessment from 07/27/17:  Mr. Fco Ruiz is a pleasant 64year old man with a new glottic lesion. This is either a distant progression form the lung cancer or a second primary. I have spoken with Dr. Huang Francis who recommend fractionated external beam radiation therapy prior to re-initiation of systmeic therapy as there is no other disease progression noted. We agree with this approach however a definitve dose may not be feasible due to previous RT as Dom knows. Certainly effective palliation is possible. The risks, benefits, alternatives, process and logistics of external beam radiation were reviewed. We answered all of the patient's questions to the best of our ability. Jennifer Linder and his wife verbalized understanding and seemed satisfied. Radiation planning will commence within 14 days; the next step in management being the simulation scan, with external beam radiation to commence in a timely fashion thereafter. Patient presents today accompanied by his wife in no acute distress looking so much better than he did in the past with no sign of sedation and/or confusion. Patient has been receiving IV fluids 3 times a week and has been eating and drinking better. Patient states that he has had difficulty falling asleep recently a couple times but also states that he has eaten later at night and is getting his bathroom remodel and might be worrying about this. Patient also questioning if he should use the Neurontin suspension as he is able to take the pills and capsules without difficulty. Patient desires to use of the liquid since he has it. Patient states there's been no change in how he is taking his other medications the patient was started on prednisone 10 mg daily which is helping with his overall feelings and joint aches by medical oncology. Options were discussed and we are decreasing IV fluids to twice weekly and would decrease them further but patient will be soon starting radiation and had significant difficulty swallowing with his initial radiation. We will monitor and decrease the IV fluids down the road if we can keep his oral intake increased. Patient advised to eat and drink as much as he can within limits. I am refilling his Duragesic as per Epic and he will now use the Neurontin 250 mg per 5 ML suspension 750 mg t.i.d.,  Ativan sporadically as per Epic as well as his other medications. We will see him back in 4 weeks or sooner if he needs us. There is no sign of thrush today. 10/03/17:  Medical records reviewed and as per radiation oncology assessment from 09/27/17:  Comments: Dose 3000 cGy to the subglottic area. Patient has no complaints except mild sore throat. He has Magic mouthwash and its helping can eat.   On examination the skin over the treated area looks good. Oral cavity shows no evidence of thrush. Tolerating treatment well  Patient presents to the exam room today accompanied by his wife without sign of sedation and/or confusion able to voice his needs. Patient looking better every time I see him and has gained 10 pounds as per our records. Patient states that his pain is controlled on the current regiment of Duragesic 37 µg q.72 hours, gabapentin 900 mg t.i.d that he likes taking better than the suspension. , Cymbalta 20 mg b.i.d., Pamelor 25 mg q.h.s. not needing anything further. Patient is also compliant with Remeron 15 mg q.h.s. and very infrequently uses Ativan 0.5 mg q.h.s. because he is tired in the evenings. Patient was able to go outside of work around the house for approximately half hour the other day with fatigue afterwards. Patient does complain of not only physical fatigue but mental fatigue oftentimes forgetting common names even names of grandchildren. Patient asking if there is anything that would help with this. Patient is having his last radiation therapy today stating that he has a slight sore throat but is using Magic mouthwash 2-3 times a day which helps. Patient denies any new and/or uncontrolled symptoms otherwise. Options were discussed and secondary to patient's good oral intake we are stopping the IV fluids and will monitor. We may reinstate this in the future but he is doing well. We are continuing unchanged his Duragesic 37 µg q.72 hours prescribing 10 patches of each strength. He will continue his other medicines unchanged as above and today we are initiating Ritalin 5 mg each morning to help with his mental and physical fatigue. Patient educated on the medication and will call if he has any difficulties otherwise we will see him back in 4 weeks or sooner if he needs us.   10/31/17:  Medical records reviewed and as per radiation oncology assessment from 10/23/17:  -post Tx FU - no charge                        -no new Sx, skin changes resolving                        -KPS rebouned to 70-80 (looks much better today)  -RBA reviewed  -Q+A reviewed  -cont FU with MEDON, skin rash on hands defer to Dr. Ned Wills  Patient presents with his wife in no acute distress without sign of sedation ambulating well able to voice his needs. Patient has complained of increasing fatigue over the past month and has actually had his Synthroid doubled by his PCP a few days ago. Patient will continue to follow-up with his PCP in 6 weeks from now for repeat TSH. Patient did not notice a difference with his Ritalin 5 mg q.a.m. neither good or bad. Patient has remained active and actually is working with [employer] in his bathroom at home. Patient now finished with his radiation therapy for approximately 1 month. Patient recently saw his ENT who will be scoping him at the beginning of November. Patient states that there is been no change in how he is taking his medications and he has one box of each of the Duragesic strength remaining at home. Patient has complained of \"jumpy legs\" at night more so over the past 1 month despite the use of Ativan. Options were discussed and we are stopping the Ritalin in case this is exacerbating his \"jumpy legs\" but may try again down the road. I feel that most of his fatigue is exacerbated by the hypothyroidism and we will see how much of this symptom is remedied with normalization of his thyroid hormone. Today we are increasing his Cymbalta to 30 mg q.12 hours as he is noted worsening left hand neuropathy as well as the above symptoms. We are continuing unchanged his Duragesic 37 µg q.72 hours prescribing 5 patches of each strength. Patient will continue his other medications unchanged and we will see him back in 4 weeks or sooner if he needs us. 11/28/17:  Medical records reviewed and [facility] records revealing the dislodged PEG tube and reinsertion reviewed.   Patient presents today accompanied by his wife in no acute distress at his baseline without sign of sedation and/or confusion able to voice his needs. Patient states that there is been no change to his symptoms and he has been more active painting around the house with increasing aches and pains especially around his neck and back. Patient states that his \"jumpy legs\" are better now that we stop the Ritalin. Patient comfortable on current regiment. Patient states that he sees his medical oncologist this week and will have a CT of the throat in December and a PET scan after the beginning of the year. Options were discussed and we are refilling his Duragesic unchanged at 37 µg q.72 hours prescribing 10 patches of each strength, Patient has refills on unchanged Neurontin 900 mg t.i.d., Cymbalta 30 mg q.12 Remeron and Pamelor unchanged. We will see him back in 8 weeks or sooner if he needs us and he was advised to call us 1 week prior to running out of his Duragesic patches and we will prescribe electronically as he has been wonderfully appropriate and compliant. We will review the throat CT and PET scan as above as well as recommendations from his medical oncologist.  01/23/18:  Medical records reviewed and no documented physician visits in [place] since I last saw him. Patient presents with his wife in no acute distress without sign of sedation and/or confusion able to voice his needs ambulating well. Patient has lost another 2 pounds and still has a decreased appetite despite taking Remeron 15 mg nightly. Patient is working out details with his employer/insurance and there may be a problem with pain from medication soon. Patient complains of significant fatigue and feels that it is the cold weather and wife states that oftentimes he will just sleep all day. There are small projects around the house but he does not feel like doing them.   Options were discussed and I feel that his fatigue is from his decreased caloric intake. Patient is resistant to starting his tube feedings as recommended to utilize at least twice a day if he is not eating because he feels that it is a step backwards. I talked to them both in great detail and I challenged him to eat something at least 3 times a day or go back on the tube feedings and they voiced understanding. Today we are increasing his Remeron to 30 mg q.h.s., continuing unchanged Duragesic 37 µg q.72 hours prescribing 10 patches of each strength and I advised him that if these are too expensive to call us and he would consider going back on his Percocet if that is less expensive. He did not want to do that today. Patient will continue unchanged his Cymbalta 30 mg q.12 hours, Neurontin 900 mg t.i.d. and his Pamelor all of which she does not need refills today. We will see him back in 8 weeks or sooner if he needs us, monitor his weight and of course the situation with his medication coverage. 05/15/18:  3/20/18: [address] records reviewed and no documented physician visits in [facility] since last seen by Dr. Linda Chapin on 1/23/18. Patient presents with his wife in no acute distress, ambulating well, no signs of sedation and/or confusion, is able to voice his needs and concerns well. Since his last office visit he has gained 7 pounds, and states that his appetite is good. He also states that his pain has been well controlled. He continues to have some daytime tiredness, but associates this with poor sleep secondary to restless legs. He states that his activity level has increased, and that he is very active around the home, recently working at reducing his bathroom. He states that he has no immediate concerns, that is most bothersome problem, hasn't been stressed legs at night. He states that is somewhat newer to him. We have a long discussion, it was revealed that he has had similar issues in the past, when he struck with sleep apnea, which often interrupted sleep.   He states that he has been sleeping with his speaking valve on history, when he is supposed to be sleeping with a compressor. We discussed that he should attempt to follow the instructions of his pulmonary team regarding his airway at night. Options for continued treatment were discussed, and he will continue to use his Remeron 30 mg at at bedtime, continue Cymbalta 30 mg every 12 hours, continue Neurontin 900 mg 3 times a day, and his Pamelor 25 mg at at bedtime. He states that his pain has been well controlled, and in fact he states that he has been stretching his fentanyl patches out to about 4 days over the past week or so. We discussed reducing his final dose, and he is agreeable to attempting this. We will prescribe today fentanyl 25 µg every 72 hours. He knows to call if pain worsens, or other uncontrolled symptoms arise. We will see him back in 8 weeks, or sooner if needed. As per phone discussion with Ezekiel Chavez on 05/04/18:  Call received with request for refill of fentanyl patch. They also requested reinitiation of the additional 12.5 µg patch, as per his prior regimen, due to increase in generalized pain. Will refill patient's 25 µg patch and reorder 12.5 µg patches. Patient is to follow-up the palliative medicine outpatient clinic on April 15. As per radiation oncology assessment from 05/03/18:  Mr. Sam Naik is a pleasant gentleman well know to me with a history of both lung cancer and laryngeal cancer s/p concurrent chemo-RT for the former and definitve intent fractionated external beam radiation therapy for the latter (with the highest dose the spinal cord could tolerate - see above). He is still on immunotherapy and his KPS is intact at KPS 70 WO new Sx. There is no evidence of disease recurrence or progression based on a detailed review of the available imaging, physical exam, and ROS (all personally performed prior to and during this follow up appointment today).   The patient did verbalize understanding for the indications of continued FU including imaging and laboratory evaluation as applicable to this case; as well as appropriate lifestyle choices and health maintenance. All questions answered to the best of my ability. 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 Patient presents with wife ambulatory in no acute distress looking much more comfortable than I previously seen him in the past gaining 11 pounds stating that he is much more active at home with carpentry and working on his cars. Patient reviews with me the increasing pain which is still experiencing describing as neuropathy involving his left leg and left arm requiring an increase of the fentanyl patch as above after we attempted to decrease. Patient compliant with 37 µg q.72 hours but does complain of worsening pain after the secondary the patch. Patient does take Tylenol for breakthrough pain. Patient's #1 complaint is difficulty falling and staying asleep which has been a chronic problem but improved in the wintertime. Patient for many years working midnights and afternoon shift and of course contributing to the problem. Patient continues to use Ativan 0.5 mg which creates sedation but still with difficulty falling asleep and staying asleep. Patient states that he has good energy throughout the day improved from previous despite difficulty sleeping. Patient currently denies restlessness of his legs as a contributing factor. They also talk about watching a movie when they cannot sleep but will try not doing so. Options were discussed and they will try melatonin 3 mg q.h.s. and call to have any difficulties. Other options down the road may be trazodone, Ambien or other sleep aids. They will call us in a couple of weeks and we may prescribe this. We are continuing Duragesic 37 µg q.72 hours and he will call when he is out of the patches. We are sending the patient to physical therapy to help with the peripheral neuropathy. We will see the patient back in 8 weeks or sooner if they need us. Patient knows that they can call us anytime. 07/10/18:  Medical records reviewed including physical therapy notes and no documented physician visits in [facility] since I last saw him. Patient presents today accompanied by his wife in no acute distress at his baseline without sign of sedation and/or confusion able to voice his needs. Patient states that there is no significant change in how he is doing other than he is sleeping better most nights with melatonin. Patient does complain of pain in his lower back down the back of his legs periodically, sometimes restless legs at night. Patient takes Tylenol for breakthrough pain and is compliant with his Duragesic patc hes having for remaining of each. Patient also requesting a refill on the Colace. Options were discussed and I offered Requip at night for restless legs or changing his medication profile for his lower back pain but he desires to keep everything the same for the time. I continued his Duragesic 37 µg q.72 hours prescribing 10 patches of each strength, refilled his Colace and he will continue his melatonin q.h.s., other medications unchanged including Ativan 0.5 mg, Remeron 30 mg at at bedtime, continue Cymbalta 30 mg every 12 hours, continue Neurontin 900 mg 3 times a day, and his Pamelor 25 mg at at bedtime. We are extending his appointment out to 12 weeks and he knows to call prior to running out of his fentanyl patches or if he needs anything. Patient congratulated that he is now in remission as per his oncologist.  10/02/18:  Medical records reviewed including assessment by Sandy Blanchard of palliative care on 09/24/18:  Spoke with patient and wife about discharge plan. The patient will be discharged to home with IV antibiotics and has a PICC line in place. He will continue current medications for symptom management. Reviewed medications with patient and family.   Wife states that the patient has enough medication to last until next visit to palliative medicine outpatient clinic. Prescription provided for Marinol 2.5 mg twice a day ordered for appetite stimulation. Patient tolerating medication without side effects. The patient's wife will call the palliative medicine outpatient clinic to arrange an appointment in the next 2 weeks. [address] APRN-CNP  Patient presents today accompanied by his wife looking well without sign of sedation and/or confusion able to voice his needs. Patient states that slowly he is regaining his appetite but there is still significant room for improvement. A shunt feels that the Marinol helps without side effects. Patient states his breathing is slowly improving but still not back to baseline. Patient has 5 patches of each strength of his fentanyl patches and states there's no change in how he is taking his medications. Patient denies other new and/or uncontrolled symptoms otherwise. Patient is following up with infectious disease on October 10 and will ask them about restarting his hyperbaric treatment secondary to his gum disease. Options were discussed and today we are doubling the Marinol to 5 mg b.i.d. as per Epic. Patient advised that if he does not notice a benefit or does not like the way it makes him feel he can always break the tablets in half and go back to his original dosage. Patient will continue his other medications unchanged including Duragesic 37 µg q.72 hours, Colace, melatonin q.h.s., Ativan 0.5 mg, Remeron 30 mg q.h.s., Cymbalta 30 mg q.12 hours, Neurontin 900 mg t.i.d., Pamelor 25 mg q.h.s. He will continue his other medications as well and we'll see him back in 4 weeks monitoring his weight. They know to call us at any time. 10/30/18:  Medical records reviewed and patient presents ambulatory in no acute distress accompanied by his wife without sign of sedation and/or confusion able to voice his needs.   Patient appears and then continued CHEMO followed by immunotherapy (the later continues - Keytruda per [facility]). He had a treatment response and maintained a reasonable QOL however a new lesion in the larynx was diagnosed this then was treated with fractionated external beam radiation therapy to the maximum dose allowed y the spinal cord (QUANTEC). Rocio White presents today for FU.             Benjamin underwent RT initially due to newly diagnosed NSCLC with erosion of the mid thoracic, left side of the spinal column with probable cord involvement. After a short hypo fractionated course (emergent) the remainder of the RT was delivered with concurrent chemo. The maximum dose was delivered as allowed by normal tissue tolerance (cord) [physics consult obtained], RBA reviewed with pt at that time. Potential chronic effects discussed today with pt and wife. He did get a second opinion at [facility] scanned in to [facility] but wants to stick with myself and Dr. Susan Garcia for his care. Dr. Susan Garcia initially prescribed Rondel John / Erica Bonus + Jerre Fleeting. See [facility] records for further [id number] including immunotherapy. Benjamin then came in with continued voice change and stridor that then prompted a tracheostomy (last year). 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  We prescribed fractionated external beam radiation therapy to the highest dose the spinal cord could tolerate per QUANTEC and he has had a response to treatment with improvement in his voice and stable KPS still on immunotherapy per Dr. Susan Garcia. He recently had a new pneumonia with questionable mass in the right side, continued FU pending. If this is malignancy (biopsy could be considered) then SBRT is a reasonable considerations. 2nd opinion offered and declined. KPS 70. He does continue to smoke. Cessation again discussed.     Mr. Celia Rico is a pleasant gentleman well know to me with a history of both lung cancer and laryngeal cancer s/p concurrent chemo-RT for the former and definitve intent fractionated external beam radiation therapy for the latter (with the highest dose the spinal cord could tolerate - see above) --- he has a recently diagnosis of pneumonia +/- mass with continue FU pending (SBRT could be considered for oligo metastatic disease vs 3rd primary depending on the clinical course.)  He is still on immunotherapy and his KPS is intact at KPS 70 WO new Sx perhaps SOB). Neo Esteban is no evidence of disease recurrence or progression based on a detailed review of the available imaging, physical exam, and ROS (all personally performed prior to and during this follow up appointment today).  The patient did verbalize understanding for the indications of continued FU including imaging and laboratory evaluation as applicable to this case; as well as appropriate lifestyle choices and health maintenance.  All questions answered to the best of my ability. 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 Patient presents today accompanied by his wife in no acute distress at his baseline ambulating well without sign of sedation and/or confusion able to voice his needs. Patient states is no change in how he is taken his medications needing a refill on his fentanyl patches. Patient started pulmonary rehab last week and states that his breathing is slowly improving. Patient gained 3 pounds and denies new and/or uncontrolled symptoms otherwise. Options were discussed and we are continuing the same medications unchanged including Duragesic 37 µg one patch q.72 hours prescribing 10 of each strength, Colace, melatonin, Ativan, Remeron 30 mg q.h.s., Cymbalta 30 mg q.12 hours, Neurontin 900 mg t.i.d., Pamelor 25 mg q.h.s. We will see him back in 8 weeks and they know to call us at anytime with any questions and/or concerns. 01/28/19:  Medical records reviewed and as per pulmonology assessment on 12/07/18:   I had the pleasure of seeing back pain across his hips that the fentanyl patches are not helping. Patient also complaining of gum pain stating that he will be calling in oral surgeon soon as he has exposed bone and he cannot wear his dentures for more than a couple hours at a time. Patient also is now on 2 new nebulizers every 12 hours by pulmonology. Patient also asked to talk to me in private and his wife stepped out. Patient states he is having difficulty urinating despite his Flomax and I advised him to contact his urologist to discuss titration of medications. Today we are decreasing his Pamelor from 25 q.h.s. to 10 mg q.h.s. secondary to anticholinergic effect. Patient also states that he is experiencing more depression without any suicidal ideations. Patient did state that he is thinking about stopping treatment and really does not like feeling this way. I talked to him about counseling and he was very receptive to counseling. Patient states that he protects his wife and does not want to tell her about these things and is not real close to his family to talk to them about intimate details. Patient did talk to his elder sister at one time whom he is close to. Patient also complains about sexual dysfunction and I discussed this with him as well being multi modality. Options were discussed and I made the above recommendations as well as today continuing his fentanyl 37 µg q.72 hours as we normally do and today we initiated p.r.n. Percocet 5-325 milligram tablets 1 p.o. q.6 hours p.r.n. holding for sedation prescribing 120 tablets today. We see what his opioid load requirement is and then add to the fentanyl patch if need be. Concerning his depression we are sending him to [facility] for counseling and recommendations. Today we are keeping him on Cymbalta 60 mg q.h.s., Remeron 30 mg q.h.s. and we did decrease his Pamelor from 25 mg to 10 mg q.h.s. secondary to BPH and his above complaints.   I will be glad to discuss treatments discussed., No signs of potential drug abuse or diversion identified: otherwise, see note documentation (Carie Gastelum MD)  Obtained or confirmed a written medication contract was on file., Looked for signs of prescription misuse.  (Carie Gastelum MD)     Time in minutes:    [] 15   [] 30   [] 45   [] 60   [x] 75   [] 90  Chart review/documentation:  [] 15   [x] 30   [] 45   [] 60   [] 75   [] 90  Assessment:     [x] 15   [] 30   [] 45   [] 60   [] 75   [] 90  Conversation patient/family/staff: [] 15   [x] 30   [] 45   [] 60   [] 75   [] Jerod Valiente MD    3/5/2019

## 2019-04-01 DIAGNOSIS — C34.12 PANCOAST TUMOR, LEFT (HCC): ICD-10-CM

## 2019-04-01 DIAGNOSIS — C34.82 MALIGNANT NEOPLASM OF OVERLAPPING SITES OF LEFT LUNG (HCC): ICD-10-CM

## 2019-04-01 DIAGNOSIS — G90.2: ICD-10-CM

## 2019-04-01 DIAGNOSIS — G89.3 CHRONIC PAIN DUE TO NEOPLASM: ICD-10-CM

## 2019-04-01 RX ORDER — NORTRIPTYLINE HYDROCHLORIDE 10 MG/1
10 CAPSULE ORAL NIGHTLY
Qty: 90 CAPSULE | Refills: 0 | Status: SHIPPED | OUTPATIENT
Start: 2019-04-01 | End: 2019-08-20 | Stop reason: SDUPTHER

## 2019-04-01 RX ORDER — FENTANYL 50 UG/H
1 PATCH TRANSDERMAL
Qty: 10 PATCH | Refills: 0 | Status: SHIPPED | OUTPATIENT
Start: 2019-04-01 | End: 2019-05-06 | Stop reason: SDUPTHER

## 2019-04-01 RX ORDER — DULOXETIN HYDROCHLORIDE 30 MG/1
30 CAPSULE, DELAYED RELEASE ORAL 2 TIMES DAILY
Qty: 180 CAPSULE | Refills: 3 | Status: SHIPPED | OUTPATIENT
Start: 2019-04-01 | End: 2020-01-23 | Stop reason: ALTCHOICE

## 2019-04-02 RX ORDER — AMOXICILLIN 250 MG
2 CAPSULE ORAL 2 TIMES DAILY PRN
Qty: 120 TABLET | Refills: 5 | Status: SHIPPED | OUTPATIENT
Start: 2019-04-02 | End: 2019-04-17

## 2019-04-09 ENCOUNTER — OFFICE VISIT (OUTPATIENT)
Dept: PALLATIVE CARE | Age: 64
End: 2019-04-09
Payer: MEDICARE

## 2019-04-09 VITALS
WEIGHT: 185 LBS | HEART RATE: 94 BPM | OXYGEN SATURATION: 93 % | DIASTOLIC BLOOD PRESSURE: 69 MMHG | SYSTOLIC BLOOD PRESSURE: 110 MMHG | BODY MASS INDEX: 25.8 KG/M2

## 2019-04-09 DIAGNOSIS — G89.3 CHRONIC PAIN DUE TO NEOPLASM: ICD-10-CM

## 2019-04-09 DIAGNOSIS — J43.1 PANLOBULAR EMPHYSEMA (HCC): ICD-10-CM

## 2019-04-09 DIAGNOSIS — C34.82 MALIGNANT NEOPLASM OF OVERLAPPING SITES OF LEFT LUNG (HCC): Primary | ICD-10-CM

## 2019-04-09 DIAGNOSIS — G47.00 INSOMNIA, UNSPECIFIED TYPE: ICD-10-CM

## 2019-04-09 DIAGNOSIS — R63.4 UNINTENTIONAL WEIGHT LOSS: ICD-10-CM

## 2019-04-09 DIAGNOSIS — Z51.5 PALLIATIVE CARE BY SPECIALIST: ICD-10-CM

## 2019-04-09 DIAGNOSIS — G62.0 CHEMOTHERAPY-INDUCED PERIPHERAL NEUROPATHY (HCC): ICD-10-CM

## 2019-04-09 DIAGNOSIS — T45.1X5A CHEMOTHERAPY-INDUCED PERIPHERAL NEUROPATHY (HCC): ICD-10-CM

## 2019-04-09 DIAGNOSIS — G90.2: ICD-10-CM

## 2019-04-09 DIAGNOSIS — R53.83 FATIGUE, UNSPECIFIED TYPE: ICD-10-CM

## 2019-04-09 DIAGNOSIS — C34.12 PANCOAST TUMOR, LEFT (HCC): ICD-10-CM

## 2019-04-09 PROCEDURE — 99215 OFFICE O/P EST HI 40 MIN: CPT | Performed by: FAMILY MEDICINE

## 2019-04-09 PROCEDURE — 99212 OFFICE O/P EST SF 10 MIN: CPT | Performed by: FAMILY MEDICINE

## 2019-04-09 RX ORDER — OXYCODONE HYDROCHLORIDE AND ACETAMINOPHEN 5; 325 MG/1; MG/1
1 TABLET ORAL EVERY 6 HOURS PRN
Qty: 120 TABLET | Refills: 0 | Status: SHIPPED | OUTPATIENT
Start: 2019-04-09 | End: 2019-05-06 | Stop reason: SDUPTHER

## 2019-04-09 NOTE — PROGRESS NOTES
dose arms (Concurrent RT + Carbo/Taxol +/- Cetuximab) these were closed early with \"negative\" results (longer term results and POFA needed), therefor 60 Gy in daily fractions with dual agent chemotherapy can be considered the standard (the Brooke and LAMP trials also assisted in the development of this paradigm). Fractionated external beam radiation therapy may or may not be delivered with intensity modulation +/- image guidance per daily cone beam depending on the specific patient anatomy and dose constraints as described per the RTOG and QUANTEC ---Pallavi Mohan, Int J Radiat Oncol Biol Phys. 2010 Mar 1;76(3 Suppl):S10-9. The risks, benefits, alternatives, process and logistics of external beam radiation were reviewed (risks include but are not limited to worsening PULM function, esophagitis, esophageal structure, perforations, bleeding, paralysis and death). We answered all of the patient's questions to the best of our ability. Dom verbalized understanding and seemed satisfied. Radiation planning will commence within < 24 hours; the next step in management being the simulation scan, with external beam radiation to commence in a timely fashion thereafter. It was a pleasure meeting Melonie Chang today and we appreciate the referral and opportunity to be involved in his care. We had an extensive discussion today regarding the course to date (including a focused review of the applicable radiographic and laboratory information), multidisciplinary approach to cancer care, and indications for external beam radiation therapy as a component therein. A literature review and multidisciplinary discussion was performed after seeing this patient due to the complexity of the medical decision making in this case. I personally spent greater than 60 minutes with this patient and performed the complete history and physical as above at today's visit, at least 45 minutes was in direct  regarding disease management.    *this pt will be conor and likely begin treatment prior to a tissue diagnosis. It is my clinical judgement the pre test probability for malignancy is > 95 % and there may be significant functional decline in the time necessary to maintain a tissue diagnosis due to the primary tumor directly extending into the spinal column). I specifically verbalized all of this with Nighat Tavares, his wife Sean Lin, and his daughter Kamryn Talavera all of who individually verbalized consent to RT treatment to begin prior to a tissue diagnosis. Peer reviewed requested. [facility] referral -- Dr. Felisha Castelan reccommended --- pt will still pursue aggressive active treatment however this is a pain management consultation request.  *will hold off on DEX or any new pain meds until he sees Dr. Felisha Castelan but we could start him of DEX in FND occurs or Sx worsens  -coordinated care with Dr. Danny Crouch this AM 4/25/16. We will treat the primary tumor and spinal column initially with considerations for second and third \"boost\" plans (nodes involved, primary boost etc.) in conjunction with CHEMO pending the workup / PET. MRI brain ordered by [facility].        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    Paralyzed vocal cords     Thrombosis of testis     Tobacco dependence 5/27/2017       Past Surgical History:   Procedure Laterality Date    BACK SURGERY      KNEE ARTHROSCOPY      LUNG BIOPSY Left 5/6/2016    OTHER SURGICAL HISTORY  4/15/2016    cervical myelogram    SINUS SURGERY      TRACHEOSTOMY  05/24/2017    TRACHEOSTOMY  05/24/2017 Current Outpatient Medications on File Prior to Visit   Medication Sig Dispense Refill    esomeprazole (NEXIUM) 40 MG delayed release capsule Take 1 capsule by mouth nightly 90 capsule 0    senna-docusate (PERICOLACE) 8.6-50 MG per tablet Take 2 tablets by mouth 2 times daily as needed for Constipation 120 tablet 5    tamsulosin (FLOMAX) 0.4 MG capsule Take 1 capsule by mouth daily 30 capsule 0    Levothyroxine Sodium 125 MCG CAPS Take 125 mcg by mouth daily On an empty stomach With a large glass of water 30 capsule 0    fentaNYL (DURAGESIC) 50 MCG/HR Place 1 patch onto the skin every 72 hours for 30 days. 10 patch 0    nortriptyline (PAMELOR) 10 MG capsule Take 1 capsule by mouth nightly 90 capsule 0    DULoxetine (CYMBALTA) 30 MG extended release capsule Take 1 capsule by mouth 2 times daily 180 capsule 3    chlorhexidine (PERIDEX) 0.12 % solution Take 15 mLs by mouth daily Swish & spit qd 473 mL 2    clindamycin (CLEOCIN) 300 MG capsule Take 1 capsule by mouth 3 times daily 21 capsule 0    LORazepam (ATIVAN) 0.5 MG tablet Take 1 tablet by mouth nightly as needed for Anxiety (hold for sedation) for up to 90 days. . 30 tablet 2    pembrolizumab (KEYTRUDA) 100 MG/4ML SOLN Infuse intravenously      mirtazapine (REMERON) 30 MG tablet Take 1 tablet by mouth nightly 90 tablet 3    formoterol (PERFOROMIST) 20 MCG/2ML nebulizer solution Take 2 mLs by nebulization 2 times daily 120 mL 5    budesonide (PULMICORT) 0.5 MG/2ML nebulizer suspension Take 2 mLs by nebulization 2 times daily 60 ampule 5    gabapentin (NEURONTIN) 300 MG capsule Take 1 capsule by mouth 3 times daily for 360 days. Along with the 600 mg tabs for a total of 900 mg three times daily. 270 capsule 3    gabapentin (NEURONTIN) 600 MG tablet Take 1 tablet by mouth 3 times daily for 360 days. Along with the 300 mg caps for a total of 900 mg three times daily.  270 tablet 3    albuterol (ACCUNEB) 0.63 MG/3ML nebulizer solution Take 3 mLs by nebulization every 6 hours as needed for Wheezing 270 mL 3    buPROPion (WELLBUTRIN) 75 MG tablet Take 1 tablet by mouth 2 times daily 180 tablet 3    melatonin 5 MG TABS tablet Take 5 mg by mouth nightly      docusate sodium (COLACE) 100 MG capsule Take 1 capsule by mouth 3 times daily 270 capsule 3    predniSONE (DELTASONE) 10 MG tablet Take 10 mg by mouth daily      Calcium Carb-Cholecalciferol (CALTRATE 600+D3 SOFT PO) Take by mouth      simvastatin (ZOCOR) 40 MG tablet Take 40 mg by mouth nightly       No current facility-administered medications on file prior to visit. Allergies:    Augmentin [amoxicillin-pot clavulanate]    ROS: UNLESS STATED ABOVE PATIENT DENIES:  CONSTITUTIONAL:  fever, chill, rigors, nausea, vomiting, fatigue. HEENT: blurry vision, double vision, hearing problem, tinnitus, hoarseness, dysphagia,               odynophagia  RESPIRATORY: cough, shortness of breath, sputum expectoration. CARDIOVASCULAR:  Chest pain/pressure, palpitation, syncope, irregular beats  GASTROINTESTINAL:  abdominal or rectal pain, diarrhea, constipation, . GENITOURINARY:  Burning, frequency, urgency, incontinence, discharge  INTEGUMENTARY: rash, wound, pruritis  HEMATOLOGIC/LYMPHATIC:  Swelling, sores, gum bleeding, easy bruising, pica.   MUSCULOSKELETAL:  pain, edema, joint swelling or redness  NEUROLOGICAL:  light headed, dizziness, loss of consciousness, weakness, change                                   in memory, seizures, tremors    Social history:  Social History     Socioeconomic History    Marital status:      Spouse name: Not on file    Number of children: Not on file    Years of education: Not on file    Highest education level: Not on file   Occupational History    Occupation: supervior a tube mill- SSI     Comment: disability short term   Social Needs    Financial resource strain: Not on file    Food insecurity:     Worry: Not on file     Inability: Not on file   Claire Martinez PMI WNL  Abd.: non-distended, soft, nontender, non-distended, normal bowel sounds and PEG site without sign of infection. Ext.: no clubbing, cyanosis or edema, no joint tenderness  Skin: warm, adequate hydration without acute lesions  Neuro: awake, alert, oriented x 3, conversive,     Impression:  As per Dr. Bev Verma assessment on 04/25/16:   Mr. Misti Kay is a pleasant and cooperative 61year old man with a recent diagnosis of stage III NSCLC (probable) with impending cord compression. We recommend a concurrent approach to definitive management of this locally advanced non small cell lung cancer [chemo dosing per [address] record- see EMR]. Based on the clinical characteristic, this disease and stage is generally considered unresectable; optimal therapy tends to be a concurrent chemo-RT approach. Concomitant chemo-RT compared with sequential chemo-RT improved overall survival, primarily through better locoregional control, at the cost of manageable increases in acute esophageal toxicity as based on the [place] metanalysis. These data demonstrate a benefit of concomitant chemo-RT over sequential chemo->RT on OS (HR 0.84, SS), with absolute benefit at 3-years of 5.7% (18% to 24%), 5-years 4.5% (11% to 15%). Interestingly, there was no difference in PFS (HR 0.9, p=0.07). However a decrease in locoregional progression (HR 0.777, SS), with absolute decrease of 6% at 5 years (35% to 29%) was shown. There was no difference on distant progression (HR 1.04, NS), with 5-year rate of about 40% Yessica [place] Oncol 2010 May 1;28(13):3123-1798). Regarding the radiation dose, 60 Gy in 2Gy/fx was established long ago in RTOG 73-01, with several other trials showing a feasibility of much higher doses however with RT alone.  Interestingly in the concurrent era, RTOG 0617 tested higher dose arms (Concurrent RT + Carbo/Taxol +/- Cetuximab) these were closed early with \"negative\" results (longer term results and POFA needed), therefor 60 Gy in daily fractions with dual agent chemotherapy can be considered the standard (the Brooke and LAMP trials also assisted in the development of this paradigm). Fractionated external beam radiation therapy may or may not be delivered with intensity modulation +/- image guidance per daily cone beam depending on the specific patient anatomy and dose constraints as described per the RTOG and QUANTEC ---Marilyn Ayon Int J Radiat Oncol Biol Phys. 2010 Mar 1;76(3 Suppl):S10-9. The risks, benefits, alternatives, process and logistics of external beam radiation were reviewed (risks include but are not limited to worsening PULM function, esophagitis, esophageal structure, perforations, bleeding, paralysis and death). We answered all of the patient's questions to the best of our ability. Dom verbalized understanding and seemed satisfied. Radiation planning will commence within < 24 hours; the next step in management being the simulation scan, with external beam radiation to commence in a timely fashion thereafter. It was a pleasure meeting Markreyes Hernandez today and we appreciate the referral and opportunity to be involved in his care. We had an extensive discussion today regarding the course to date (including a focused review of the applicable radiographic and laboratory information), multidisciplinary approach to cancer care, and indications for external beam radiation therapy as a component therein. A literature review and multidisciplinary discussion was performed after seeing this patient due to the complexity of the medical decision making in this case. I personally spent greater than 60 minutes with this patient and performed the complete history and physical as above at today's visit, at least 45 minutes was in direct  regarding disease management. *this pt will be simmed and likely begin treatment prior to a tissue diagnosis.  It is my clinical judgement the pre test probability for malignancy is > 95 % and 0  None  [] 1  [] 2  [] 3  [] 4  [] 5  [] 6  [] 7  [x] 8  [] 9  [] 10  Worst   Shortness of Breath  [] 0  None  [] 1  [x] 2  [] 3  [] 4  [] 5  [] 6  [] 7  [] 8  [] 9  [] 10  Worst   Constipation  [] 0  None  [] 1  [] 2  [] 3  [] 4  [x] 5  [] 6  [] 7  [] 8  [] 9  [] 10  Worst   Completed By:  [x]  Patient Report  []  Caregiver/Family  []  Nurse/Staff  Patient is currently a:  [x]   Palliative Medicine Patient  []   Hospice Patient  Medical records reviewed and Patient presents to the exam room today accompanied by his wife alert with no sign of sedation confusion able to voice his needs in detail. As per Cervical CT on 04/15/16:  IMPRESSION: ALERT: THIS IS AN ABNORMAL REPORT   Critical result: Findings communicated directly with Dr. Haydee Estevez at   approximately 0949 hours on 4/15/2016.   1. Large mass consistent with malignancy in the mesial left lung apex   with anterior chest wall and left mediastinal invasion. There is also   osseous invasion of the T1 and T2 vertebral bodies and the left second   rib. Infection of spinal canal at the level of T1 with right lateral   displacement and compression of the thecal sac. See above for details. 2. Moderate centrilobular segment. 3. Postoperative cervical spine status post anterior cervical fusion   C2-C7, worse at the C3-C4 and C4-C5 motion segment level, see above   for details. Patient complains of significant pain described as a constant numbness and pain to his left arm left elbow with burning and numbness into his axilla and to the left side of his chest, drooping to his left eye with no swelling to the left forehead consistent with a Pancoast tumor and Vipin's syndrome. Patient states that he has had chronic pain for the past 2 years with cervical fusion one year ago. Patient works midnights and is having difficulty sleeping secondary to the pain only sleeping 2-3 hours a night.   Patient also complains of a poor appetite, nausea without vomiting, significant refill, Protonix 40 mg daily 30 tablets with 2 refills, Zofran ODT 4 mg q.8 hours p.r.n. ×90 tablets with 1 refill, Dolly-Colace 2 tablets daily 60 tablets with 2 refills. We will see the patient back in 2 weeks or sooner if he needs us.  05/17/16:  [place] Symptom Assessment Scale                                             Date:   Pain  [] 0  None  [] 1  [] 2  [] 3  [] 4  [] 5  [] 6  [] 7  [x] 8  [x] 9  [x] 10  Worst   Tiredness  [] 0  None  [] 1  [] 2  [] 3  [] 4  [] 5  [x] 6  [] 7  [] 8  [] 9  [] 10  Worst   Nausea  [] 0  None  [] 1  [x] 2  [] 3  [] 4  [] 5  [] 6  [] 7  [] 8  [] 9  [] 10  Worst   Depression  [] 0  None  [] 1  [x] 2  [] 3  [] 4  [] 5  [] 6  [] 7  [] 8  [] 9  [] 10  Worst   Anxiety  [] 0  None  [] 1  [x] 2  [] 3  [] 4  [] 5  [] 6  [] 7  [] 8  [] 9  [] 10  Worst   Drowsiness  [] 0  None  [] 1  [] 2  [] 3  [] 4  [x] 5  [] 6  [] 7  [] 8  [] 9  [] 10  Worst   Appetite  [] 0  None  [] 1  [] 2  [] 3  [] 4  [] 5  [] 6  [x] 7  [] 8  [] 9  [] 10  Worst   Wellbeing  [] 0  None  [] 1  [] 2  [] 3  [] 4  [x] 5  [] 6  [] 7  [] 8  [] 9  [] 10  Worst   Shortness of Breath  [] 0  None  [] 1  [] 2  [] 3  [x] 4  [] 5  [] 6  [] 7  [] 8  [] 9  [] 10  Worst   Constipation  [] 0  None  [] 1  [] 2  [] 3  [] 4  [] 5  [x] 6  [] 7  [] 8  [] 9  [] 10  Worst   Completed By:  [x]  Patient Report  []  Caregiver/Family  []  Nurse/Staff  Patient is currently a:  [x]   Palliative Medicine Patient  []   Hospice Patient  As per Beckie's phone call discussion on 05/09/16:  Patient's wife, Santos Bauer, had called and left a voicemail this morning regarding patient's pain not being relieved by neurontin or percocet. She says the patient is back to taking the ibuprofen. Dr. Hickman Course notified. I called Santos Bauer back with Dr. Canseco Clarity instructions: Patient needs to give decadron more time to become effective. Patient is not to take ibuprofen while on decadron.  Patient may take 2 percocet every 6 hours only as needed for severe pain but needs to be aware that he will run out of percocet early if he does. Neurontin dose is not to change at this time. Joslyn Koroma says that the patient cut the grass at home this weekend and now the pain is even more severe. Asked Joslyn Ga to tell patient to not cut the grass due to pain issues. Discussed with Joslyn Ga the many contraindications of taking large doses of ibuprofen. She is aware and does not encourage it, but she cannot stop him. She thinks that he will follow Dr. Kadi Arias advice. Says they will be here next Tuesday for the patient's next clinic appointment. Agrees to call us if there are any other changes. Patient presents with his wife today looking more comfortable slightly more animated with his arms behind his head leaning back in the chair with no sign of confusion and/or sedation able to voice his needs. Patient states that occasionally he takes 2 Percocet at a time but this makes him \"loopy\". Patient states that 1 tablet usually lasts for approximately 4 hours. Patient states he takes on average of 6 Percocet daily. Patient is not taking ibuprofen and is taking the Decadron stating that his appetite is somewhat better and he has gained 5 pounds since his last visit. Patient states he continues to awaken about every 2 hours at night secondary to pain. Patient is interested in taking something help with the pain so he does not have to depend on the Percocet as above. Wife states that Pamelor seems to help with his mood, appetite. Patient is starting short-term disability next week and will be seeing Dr. Ila Barber this week to review the biopsy results and to plan for chemotherapy. Options were discussed and today we are stopping the Decadron and will initiate Mobic 7.5 mg 1 p.o. q.12 hours 60 tablets with 2 refills, increasing the Pamelor to 25 mg q.h.s. 30 tablets with 2 refills, increasing the Neurontin to 900 mg t.i.d. and he was given prescriptions for 90 tablets of each capsule with 2 refills.   We are continuing his Percocet in which he has 2 weeks left and prescribed him 90 tablets of Percocet 5-325 milligram tablets but making that q.4 hours p.r.n. instead of q.6 hours p.r.n. We will see him back in 4 weeks or sooner if he needs us.  06/14/16:  [place] Symptom Assessment Scale                                             Date:   Pain  [] 0  None  [] 1  [] 2  [] 3  [] 4  [] 5  [x] 6  [] 7  [] 8  [] 9  [] 10  Worst   Tiredness  [] 0  None  [] 1  [] 2  [] 3  [x] 4  [] 5  [] 6  [] 7  [] 8  [] 9  [] 10  Worst   Nausea  [x] 0  None  [] 1  [] 2  [] 3  [] 4  [] 5  [] 6  [] 7  [] 8  [] 9  [] 10  Worst   Depression  [] 0  None  [] 1  [x] 2  [] 3  [] 4  [] 5  [] 6  [] 7  [] 8  [] 9  [] 10  Worst   Anxiety  [] 0  None  [x] 1  [] 2  [] 3  [] 4  [] 5  [] 6  [] 7  [] 8  [] 9  [] 10  Worst   Drowsiness  [] 0  None  [x] 1  [] 2  [] 3  [] 4  [] 5  [] 6  [] 7  [] 8  [] 9  [] 10  Worst   Appetite  [] 0  None  [] 1  [] 2  [] 3  [] 4  [] 5  [x] 6  [] 7  [] 8  [] 9  [] 10  Worst   Wellbeing  [] 0  None  [] 1  [] 2  [x] 3  [] 4  [] 5  [] 6  [] 7  [] 8  [] 9  [] 10  Worst   Shortness of Breath  [] 0  None  [] 1  [x] 2  [] 3  [] 4  [] 5  [] 6  [] 7  [] 8  [] 9  [] 10  Worst   Constipation  [] 0  None  [] 1  [] 2  [] 3  [] 4  [] 5  [] 6  [] 7  [x] 8  [] 9  [] 10  Worst   Completed By:  [x]  Patient Report  []  Caregiver/Family  []  Nurse/Staff  Patient is currently a:  [x]   Palliative Medicine Patient  []   Hospice Patient  Medical records reviewed and as per Dr. Rand Cook assessment on 06/07/16:  Lakisha Lymna is a 61y.o. year old male here for follow up. He had hid CT guided biopsy done on 5/6 /2016. The results showed Pulmonary adenocarcinoma, moderately to poorly differentiated, invasive, acinar type. He completed his course of XRT and currently is going under chemotherapy . He has done 2/6 cycles. He is tolerating it relatively well. Has not lost any weight. Pain is well controlled.   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 Will follow repeat imaging at that time  H/O nicotine dependence:  Probable COPD  PFTs in future(next office visit)  Patient presents to the exam room with a primary complaint of left arm neuropathic pain and numbness which is worse with decreased gripping strength to his left hand. Patient stated that he finished radiation therapy approximately 10 days ago and also has more of a coarse voice, morning cough productive of phlegm and a mild amount of dysphasia if he eats too quickly. Patient denies nausea or vomiting. Patient only using Carafate infrequently, Percocet 5-325 milligram tablets on the average of 3 a day sometimes 4. Patient states that the Percocet helps approximately % and does not cause oversedation. Patient will also be taking dexamethasone 8 mg a day ×3 days surrounding his chemotherapy every 3 weeks which will start on June 21. Patient also started folic acid 1 mg daily 2 weeks ago. Options were discussed and although I see no signs of candidiasis orally I suspect this esophageal involvement therefore starting patient on Diflucan 100 mg daily ×14 days and a prescription for 14 tablets with one refill given today. Patient was also complaining of constipation of small hard bowel movements moving his bowels every 2-3 days therefore we are doubling his Dolly-Colace at 2 tablets b.i.d. 120 tablets with 2 refills given today. Patient also given a prescription for Percocet unchanged 5-325 milligram tablets 1 p.o. q.6 hours p.r.n. ×120 tablets today. We will see him back in 2 weeks or sooner if he needs us and in the future secondary to neuropathic complaints we may consider methadone.   06/28/16:  [place] Symptom Assessment Scale                                             Date:   Pain  [] 0  None  [] 1  [] 2  [x] 3  [] 4  [] 5 [] 6  [] 7  [] 8  [] 9  [] 10  Worst   Tiredness  [] 0  None  [x] 1  [] 2  [] 3  [] 4  [] 5  [] 6  [] 7  [] 8  [] 9  [] 10  Worst   Nausea  [x] 0  None  [] 1  [] 2  [] 3  [] 4  [] 5  [] 6  [] 7  [] 8  [] 9  [] 10  Worst   Depression  [] 0  None  [x] 1  [] 2  [] 3  [] 4  [] 5  [] 6  [] 7  [] 8  [] 9  [] 10  Worst   Anxiety  [] 0  None  [x] 1  [] 2  [] 3  [] 4  [] 5  [] 6  [] 7  [] 8  [] 9  [] 10  Worst   Drowsiness  [] 0  None  [x] 1  [] 2  [] 3  [] 4  [] 5  [] 6  [] 7  [] 8  [] 9  [] 10  Worst   Appetite  [] 0  None  [] 1  [] 2  [] 3  [] 4  [] 5  [x] 6  [] 7  [] 8  [] 9  [] 10  Worst   Wellbeing  [] 0  None  [] 1  [] 2  [x] 3  [] 4  [] 5  [] 6  [] 7  [] 8  [] 9  [] 10  Worst   Shortness of Breath  [] 0  None  [x] 1  [] 2  [] 3  [] 4  [] 5  [] 6  [] 7  [] 8  [] 9  [] 10  Worst   Constipation  [] 0  None  [] 1  [] 2  [x] 3  [] 4  [] 5  [] 6  [] 7  [] 8  [] 9  [] 10  Worst   Completed By:  [x]  Patient Report  []  Caregiver/Family  []  Nurse/Staff  Patient is currently a:  [x]   Palliative Medicine Patient  []   Hospice Patient  Medical records reviewed and patient finished radiation therapy as per Dr. Gay Quintana recommendations. Patient states that he continues to take the Percocet every 6-8 hours which continues to help. Patient states he has three quarters of a bottle left. Patient had a second opinion at a cancer center and here they are keeping him on his initial chemotherapy for another 3 weeks and then restarting the second course. Patient will stop the dexamethasone and folic acid until his chemotherapy agent changes. Patient smiling and more interactive, jovial, more comfortable and states that his appetite is good, his hoarse voice has improved but not completely back to normal. Patient gained 2-1/2 pounds over the past 2 weeks. Patient states that all of his symptoms are either stable or headed in the right direction.  Patient is balancing his Dolly-Colace with bowel movements which seems effective. Options were discussed and we are continuing the same and he needs no prescriptions today. We will see him back in 4 weeks or sooner if he needs us. 08/01/16:  [place] Symptom Assessment Scale                                             Date:   Pain  [] 0  None  [] 1  [] 2  [] 3  [x] 4  [] 5  [] 6  [] 7  [] 8  [] 9  [] 10  Worst   Tiredness  [] 0  None  [] 1  [x] 2  [] 3  [] 4  [] 5  [] 6  [] 7  [] 8  [] 9  [] 10  Worst   Nausea  [x] 0  None  [] 1  [] 2  [] 3  [] 4  [] 5  [] 6  [] 7  [] 8  [] 9  [] 10  Worst   Depression  [] 0  None  [x] 1  [] 2  [] 3  [] 4  [] 5  [] 6  [] 7  [] 8  [] 9  [] 10  Worst   Anxiety  [] 0  None  [] 1  [] 2  [x] 3  [] 4  [] 5  [] 6  [] 7  [] 8  [] 9  [] 10  Worst   Drowsiness  [] 0  None  [x] 1  [] 2  [] 3  [] 4  [] 5  [] 6  [] 7  [] 8  [] 9  [] 10  Worst   Appetite  [] 0  None  [] 1  [x] 2  [] 3  [] 4  [] 5  [] 6  [] 7  [] 8  [] 9  [] 10  Worst   Wellbeing  [] 0  None  [] 1  [x] 2  [] 3  [] 4  [] 5  [] 6  [] 7  [] 8  [] 9  [] 10  Worst   Shortness of Breath  [] 0  None  [] 1  [] 2  [] 3  [x] 4  [] 5  [] 6  [] 7  [] 8  [] 9  [] 10  Worst   Constipation  [] 0  None  [] 1  [] 2  [] 3  [] 4  [] 5  [] 6  [x] 7  [] 8  [] 9  [] 10  Worst   Completed By:  [x]  Patient Report  []  Caregiver/Family  []  Nurse/Staff  Patient is currently a:  [x]   Palliative Medicine Patient  []   Hospice Patient  Medical records reviewed and as per radiation oncology's assessment on 07/06/16:  On 6/3/16, Zoran Mills completed 5000 cGy in 22 fractions directed to the left lung / spine for management of locally advanced lung CA. States that he's doing well overall. Eating well. Coughing occasionally with phlegm - light brown in color. Denies fever. SOB with yard work only. Following with Dr Lloyd Pro for palliative med, states that pain is well controlled with pain medications he is taking. Pt is following with Dr Juanell Sever for medical oncology. Seen last week, continues on chemo.  Tolerating chemo also placed on a Medrol Dosepak by his oncologist which did not improve his left arm and back symptoms. Patient states that his back pain had improved significantly since all of his treatments. 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 Patient will be starting a new chemotherapy tomorrow as well as steroids therapy along with this. Options were discussed and patient desires something in addition to the above medications for his polyneuropathy symptoms. Patient has been on Lexapro 10 mg daily by his PCP for the past 3-4 years which she feels as helps. I gave the patient a prescription for Cymbalta 20 mg daily 30 tablets with one refill as well as refills on his other medications as above unchanged. Patient has 104 Percocet tablets left that we had prescribed via phone conversation. Patient advised to discuss the Lexapro and Cymbalta with his PCP Dr. Jayne Fuentes since he has seen him in the past for this condition. I advised patient that we would not be best case scenario if he took both together and they voiced understanding. We will see him back in 4 weeks or sooner if they need us. 08/30/16:  Medical records reviewed and as per Dr. Primitivo Spencer on 08/29/16:  -2 phase RT (initial = emergent) / locally advanced lung CA involving 2 vertebral bodies  Baldo Marsh has completed radiation treatments to the left lung mass invading the spinal column, with high energy photons using an conformal technique at [address] [place] [address]. The patient received a total dose of 38 Gy in 16 fractions plus a 12 GY (6 fractions) boost to 50 Gy ( spinal cord at [facility] reviewed with pt). Second opinion recommend and obtained. Treatment Start Date: 4/27/16  Treatment Completion Date: 6/3/16  The treatments were well tolerated, without significant interruption (except for the re-plan)  RTOG Acute Toxicity Grade [ARMSC]: 1-2.  (Pain / fatigue)  We will look forward seeing the patient back in 3 months for a follow-up with myself or our NP [scheduled and PRN toxicity checks in addition]. The patient knows to call with any questions or concerns. Please feel free to contact me at either office to discuss the care of this patient, or if I can be of any further assistance. Patient presents to the exam room in no acute distress alert with no sign of confusion and/or sedation able to voice his needs. Patient states that he has noticed some improvement especially to his thumb and index finger concerning the neuropathy with the addition of the Cymbalta. Patient is happening worsening back pain and is awaiting CT results next Tuesday to look at progression. Patient has lost 4 pounds and states that with chemotherapy his appetite has been decreased, he chronically has difficulty sleeping which has not changed. Patient states that he has taken Percocet on an average of 3 a day but when he had worsening back pain he was taking more frequent. Options were discussed and I advised increasing the Cymbalta to 20 mg b.i.d. and provided in a prescription for 60 tablets with one refill. Patient continues on the Lexapro 10 mg daily and will be meeting with Dr. Pedrito Corrigan soon for assessment on possibly stopping the Lexapro versus continuing. I prescribed unchanged his Percocet 5-325 milligram tablets 1 p.o. q.6 hours p.r.n. ×120 tablets. We'll see him back in 4 weeks and look at the results of his CT scan at that time as well as benefit from the changes above.  09/27/16:  Medical records reviewed and patient presents today looking well without sign of sedation and/or confusion able to voice his needs accompanied by his wife. Patient complaining of sinus congestion, sore throat, raspy voice and recently saw his PCP who placed him on amoxicillin yesterday 500 mg t.i.d. Patient states that other than this he is feeling better with better control neuropathy on current regiment and off the Lexapro by his PCP.   Patient reviewed how he is taking his medications which is appropriate and denies other new symptoms. Options were discussed and we are continuing the same medication regiment prescribing Percocet 5-3 and a 25 mg tablets today 1 p.o. q.6 hours p.r.n. in which he is taking crocs 23 a day ×120 tablets. Patient states that he has 11 days remaining of his Percocet. Patient has enough of the Cymbalta, gabapentin and was prescribed Diflucan 100 mg daily ×14 days with one refill. We will see him back in 4 weeks or sooner if he needs us. 10/24/16:   Medical records reviewed and as per Dr. Markell Francisco on 10/14/16:  Cherylene Barber is well known to me since the emergent R and concurrent course was completed. His spine pain is reduced. CHEMO continues. No early delayed RT effects. All questions answered. TOB cessation discussed again. FU with [facility] as planned and with me in 3 months or sooner PRN. Patient presents to the exam room today without sign of sedation but less jovial looking more uncomfortable for follow-up complaining of right ear and right cheek pain stating that he went to urgent care Friday and was diagnosed with what he knows he has always had, TMJ. Patient also finished a Z-Eugenio by his PCP one week ago which did not help. Patient also complains of worsening left hand numbness and tingling. Patient states that he took 2 Percocet tablets last night which did help with the pain but then also made him sleepy. Patient compliant with his other medications and states that initially with the Diflucan his throat pain improved some but not significantly. Patient did state that he has a difficult time swallowing at times. Options were discussed and today we are increasing his Cymbalta to 30 mg b.i.d. ×60 tablets and 1 refill electronically prescribed.   Patient will continue his Neurontin 900 mg t.i.d.  Today we initiated Duragesic 12 µg patch q.72 hours as directed ×10 patches and prescribed unchanged his Percocet 5-325 milligram tablets 1 p.o. q.6 hours p.r.n. ×120 tablets prescribed today. Patient will be seen back in 4 weeks or sooner and at this time consider increasing the Cymbalta, Percocet dosage, Duragesic patch and reassessing for thrush which is not present today. He will call if symptoms increase or if he has difficulty with the Duragesic in which she was reluctant to accept. 11/15/16:  Medical records reviewed and spirometry on 10/24/16:  DATA: Spirometry done in the office today demonstrates an FVC of 4.17 liters which is 85 % of predicted with an FEV1 of 3.10 liters which is 83 % of predicted. FEV1/FVC ratio is 74 %. Mid expiratory flow rates are 78% of predicted. Maximum voluntary ventilation is normal at 106 liters per minute or 74% of predicted. Total lung capacity is 6.83 liters which is 94% of predicted. DLCO is 12.43mm/min/mmHg which is 36% of predicted. Flow volume loop shows no signs of intrathoracic or extrathoracic obstruction. Impressions: No obstructive or restrictive lung disease. Normal total lung capacity. Severe decrement in diffusing capacity  Patient presents to the exam room today accompanied by his wife at his baseline with no sign of sedation and/or confusion. Patient states that since his oncologist increased his Duragesic to two 12 µg patches his pain has improved approximately 75%. Patient states that he was able to decrease his Percocet to approximately 3 daily on the average. Patient has not noticed much of a difference since we increased his Cymbalta 1 month ago. Patient states that a left chest pain is new otherwise his symptoms are unchanged. 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 Patient desires to wean himself off of the Percocet as it does make him sleepy therefore did accept my suggestion of continues to titrate upward the Duragesic.   Today we increased him to Duragesic 37 µg q.72 hours and provided a prescription for 10 of each strength patch. Patient was not given a prescription for his Percocet as he states he has not filled the last prescription I gave him. There are no signs of thrush but we will continue to monitor and patient compliant with his other medications. We will see him back in 4 weeks and evaluate his need for his breakthrough pain medicine and consider titrating the Duragesic further if indicated. We will question if he has returned to work. We will also consider increasing his Cymbalta to 40 mg b.i.d. if indicated. 12/13/16:  Medical records reviewed and as per radiation oncology's assessment on 11/30/16:  Patient is doing well post-radiation completion. CT scan on 11/28/16 showing paratracheal node enlargement and he is symptomatic with whispery voice/ sore throat -- ENT referral written today to Dr Raymond Christian (physician per patient preference). Will also update Dr Nicholas Almaguer on imaging report and referral. Further imaging per med onc or ENT. Pain well controlled. Continue with Dr Wendi Dangelo (palliative medicine). Continue to follow with Dr Lonny Stovall (pulmonology). Smoking cessation reinforced again. Patient verbalized understanding. I discussed follow up plans with Mindy Hull. At this time, Dr Princess Butler will see the patient back in January 2017 for a post-radiation completion follow-up visit. Mindy Hull is to follow up with other physicians involved in their care as directed (including but not limited to Medical Oncology, Primary Care, Pulmonary, and Surgery). The patient was given our contact number in the event that if at any time they change their mind and would like to return to the clinic to see either myself or one of the Radiation Oncologists, they can simply call us and we would be happy to see them. Thank you for involving us in the management of this extremely pleasant patient.    More than 25 min was in direct contact with pt coordinating/giving care. >50% of the visit was spent in counseling the pt on the following: Follow up care  The nurses notes were reviewed and incorporated into this assessment and plan. Patient presents to the exam room today stating that he now is no longer taking Percocet and is comfortable on the patches. Patient denies new symptoms and stated that he saw his ENT yesterday who felt that he may need a trach. They saw their oncologist today who felt that the area could be stretched/dilated. Patient does \"breathes different\" as per the wife when he is sleeping but does not wake up choking or coughing. Patient does have a hoarse coarse voice quality. Patient is comfortable on the current regiment and we phoned in Neurontin as per [employer] prior to this visit. Options were discussed and we are continuing his Duragesic 37 µg q.72 hours and 10 patches of each strength prescribed today. Patient needs no other prescriptions and we'll see him back in 4 weeks or sooner if he needs us. 01/09/17:  Medical records reviewed and patient followed up with radiation oncology to revisit them 3 months from now. Patient presents to the exam room today complaining of a tickling cough mostly at night but also throughout the day. Patient states that he is not sleeping as well secondary to this cough despite running a humidifier throughout the house with good humidity. Patient states that Dr. Cali Singh had him on steroids ×2 weeks which helped but then oncology secondary to his immunotherapy wanted him off of this. Patient states that his pain is controlled on current regiment and he is noting fatigue and some depression secondary to not sleeping well secondary to the cough. Options were discussed and we refilled his Duragesic unchanged ×5 patches each strength, Dolly-Colace and prescribed Ativan 0.5 mg tablets 1/2-1 tablet q.h.s. p.r.n. ×30 tablets.   We will see him back in 1 month or sooner if he needs us.  02/06/17:  Medical records reviewed and as per radiation oncology assessment from 01/30/17:  -pt seen To discuss hoarseness and CT findings  -Mark Hernandez was asked to come in today to review the negative CT findings. He and hoarseness and I reviewed the RT treatment noting the laryngeal mean dose constraints WERE met. 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 CT reviewed with pt and wife. TOB cessations discussed, again at length. -FU with Dr. Hyacinth Romero for systemic immunotherapy and re-staging. -MM for Sx. Patient presents to the exam room today accompanied by his wife looking well in no acute distress without sign of sedation and/or confusion. Patient states that Ativan 0.5 mg q.h.s. help significantly with his sleep without sedation during the day. Patient also is compliant with his other medications without change stating that his pain is much better controlled. Patient reviews with me his visit with Dr. Cali Singh and CAT scan. Patient states there is no change in how he is using his Duragesic other medications and requesting refill on Ativan, Duragesic and Cymbalta. Options were discussed and patient was provided with prescriptions for Ativan, Duragesic, Cymbalta all unchanged and we will see him back in 4 weeks or sooner if they need us. Patient is very comfortable with his symptom control currently and looks well.  03/06/17:  Medical records reviewed and no documented patient visits in Epic since I have seen him. CT of soft tissue neck from 01/27/17:  1. Irregular opacities seen at medial aspect left lung apex and left   upper lobe suggestive of area of treated lung cancer and radiation   changes.       2. Previously seen lytic lesions within upper thoracic vertebral   bodies and left second rib are now sclerotic which is suggestive of   treated bone metastases.       3. Larynx appears unremarkable.  Vocal folds are symmetric.       4. No evidence of cervical lymphadenopathy.       5. Slight limitation to this examination due to metallic artifact from   anterior fusion hardware associated with cervical spine from C3-C7. Patient presents today in no acute distress without sign of sedation and/or confusion. Patient plane of right sided scapular, shoulder, arm symptoms that are similar to his left side over the past several weeks. Patient denies falls or traumas, difficulty breathing or any other symptoms other than left hand dryness worse than his right with fissures noted on the extensor surfaces of the index MCP and ring finger MCP without sign of infection. Patient states he has always had difficulty with dry hands more now. Patient states there's been no change otherwise to how he is taking his medications. Patient will be seeing oncology tomorrow in reviewing the CAT scan results. Patient complains of sometimes decreased ability to sleep secondary to discomfort in his hands and shoulders. Options were discussed and patient resistant to increasing his Duragesic secondary to fatigue also stating that on the days that he is working long hours he has increasing fatigue. Wife also states that he is not been eating is much in our records reveal a 3 pound weight loss. We refilled unchanged his Duragesic 37 µg q.72 hours with 10 patches of each strength, he has enough of the Ativan and today we increased his Cymbalta to 40 mg bid as per Epic to help with his neuropathic symptoms. I talked to them about continued moisturization to his hands but also revealed that these are neuropathic changes as well. They voiced understanding and hopefully the increase in Cymbalta may combat this. We will see him back in 4 weeks or sooner if they need us. Remeron may be indicated to help with his appetite. 04/04/17:  Medical records reviewed and I see no physician visit documented in [facility] since my last visit with him. Patient without sedation and/or confusion able to voice his needs, more comfortable appearing than last time. Patient initially stated that the increase of Cymbalta he did not notice anything from but upon further questioning he has been in less pain. Patient complaining of significant fatigue but upon further questioning he is working 9-11 hours a day oftentimes 6 days a week needing to take days off in between and sleeping the majority of his time off. Patient is aware that he is working too long and now is going to day shift instead of midline shift and hopefully cutting back a few hours a day. I talked to the patient about how a mild change we will not affect him significantly that he may want to consider a significant change in his work status and he voiced understanding but not acceptance at this point. Patient wanting to see how much of a difference this would make. Patient also asking about the Duragesic patches and fatigue stating that over the past month his pain has been better controlled. Patient also still dealing with dryness and fissuring especially on his left hand across the dorsum of his knuckles and he will now try to splint his left index finger attempting to avoid bending at least at night. Options were discussed and I am refilling unchanged his Duragesic 37 µg ×10 patches of each strength. Secondary to fatigue he will initially try just wearing the 25 µg patch and then if pain worsens he will then apply a 37 µg dose and continuing unchanged. They voiced understanding. Of course he is continuing his Ativan, Cymbalta 40 mg b.i.d. and he will need refills of his Ativan next time when we see him in 4 weeks from now, possibly Pamelor and of course his patch or patches. Patient has gained almost 3 pounds over last month and we will continue to monitor. 05/09/17:  OARRS report reviewed today without any prescribing red flags.   Medical records reviewed and as per radiation oncology assessment while an inpatient on 05/03/17:  ASSESSMENT/PLAN:  Arina Sarkar appears clinically stable. CT neck and fiberoptic laryngoscopic findings are discussed with the patient and his wife. I also had the opportunity to speak to Yuliana Hayes and NENO, today. I believe the CT findings of soft tissue attenuation at the piriform recess and arytenoids bilaterally are the results of the vocal cord paralysis and not a discrete tumor mass. 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 At diagnosis, the large left upper lung mass extended into the mediastinum and may be now contributing to the right recurrent laryngeal nerve involvement along with prior treatments. In any case, palliative radiation treatments directed to the hypopharynx would not be recommended. Mr. Adela Alas remains adamantly opposed to undergoing a tracheostomy to protect his airway as discussed with Dr. Carlo Helm. I did discuss this with the patient again along with the rec of doing this when he is stable rather than on an emergent basis. He and his wife voiced understanding. CT chest from February, 2017 shows some response to treatment. As discussed with Dr. LAZCANO, plans would be to continue systemic therapy as an out patient followed by restaging CT/PET/CT imaging. We will follow up on this when completed. Mr. Adela Alas is feeling better and is asking when he will be discharged. 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 He also continues to smoke cigarettes. I am concerned about an acute pulmonary event and I suggested he take time off from work. He should definitively quit smoking. He and his wife voiced understanding. Thank you for allowing me to participate in Mr. Law Hilton evaluation.   As per H&P while an inpatient on 05/03/17:  HISTORY OF PRESENT ILLNESS: The patient is a 35-year-old  male with  history of COPD and adenocarcinoma of the lung who presented to the Emergency  Room with shortness of breath and hoarse voice that worsened throughout the  day of admission. He does have a history of subglottic airway narrowing and  has been seen by ENT for it. He denies any issues with chest pain. He feels  like something is stuck in his throat. He had breathing treatments in the  Emergency Room which were of very little help. He has finished chemotherapy  approximately the end of 11/2016 and now is getting immunotherapy. His last  immunotherapy treatment was 2 weeks ago. The next one is scheduled for  05/09/2017. He was seen in consultation by ENT. Tracheostomy was offered,  but the patient declined. He was also seen in consultation by Pulmonary. His  chest x-ray on admission shows a possible right lung infiltrate. The patient  was started on Pulmicort and Perforomist aerosol treatments along with  DuoNebs. Respiratory cultures and viral panels were obtained. DIAGNOSTIC IMPRESSION:  1. Bilateral vocal cord palsy. 2. History of chronic obstructive pulmonary disease, not in acute  exacerbation. 3. History of nonsmall cell lung CA, currently receiving immunotherapy. 4. Subglottic narrowing. 5. Tobacco dependency. 6. Depression. 7. Hypercholesterolemia. As per ENT consult while an inpatient on 05/02/17:  Patient is a 65 yo male, history of COPD and lung adenocarcinoma, that presented to the ED late last night with complaints of shortness of breath and hoarse voice that worsened throughout the day yesterday. He has history of subglottic airway narrowing and has been seen by ENT for it. He denies any issues with shortness of breath at the time but says it feels like something is stuck in his throat. He had breathing treatments in the ED which said helped a little. He mentions that he had some diarrhea for the past few days but denies any other symptoms.  Patient says he has cut back on his smoking, but still was smoking prior to coming into the hospital.  He has finished chemotherapy approximately the end of Nov 2016 and now currently getting immunotherapy with carbo/Avastin + Xgeva. His last treatment was 2 weeks ago and his next one is scheduled for May 9. Voice was worse yesterday and more SOB, better today. Was noted to have left vocal cord palsy in Dec 2016  EXAM - erythema, dryness of supraglottic larynx but no lesions. Fiberoptic laryngoscopy, both vocal cords in paramedian position, minimal abduction, right cord does medialize slightly with phonation. Unable to see subglottic  No neck mass  IMP - bilateral vocal cord palsy. Would be important to delineate status of mediastinum taking out both cords. Catheryn Clarity was discussed as alternative for improving breathing status. Pt does not wish to proceed with this at this time. Risks, options discussed with pt and wife  Patient presents today with his wife and daughter without sign of sedation and/or confusion able to voice his needs. Patient appropriate and talking with a whispering/raspy voice without shortness of breath noted. Patient states there's been no change to the medications and he needs refills. Patient and family discussing the recommendations of a tracheostomy and all the above. Patient sees Dr. Hiwot Fischer tomorrow and medical oncology on Thursday to further discuss treatment options. Patient also complaining of worsening numbness and tingling involving his right hand now. Options were discussed and we are refilling his medications unchanged as per Epic. Today I talked to them for 45 minutes about differences in emergent versus elective tracheostomy and answered their questions. Wife has been wanting the patient to quit work for quite some time and patient has been resistant admitting today that he feels as long as he can work in the cancer is not beating him. Patient is also worried about insurance coverage once he quits work.   We had a long very cynthia discussion and patient admits that [facility] has to go\". Patient feels that over the next month he needs to make steps to end his employment for his health. I advised him to talk to social work, insurance, his work about options for [employer] and they voiced understanding. Patient's wife has researched different treatments 1 especially that they are only performing at [facility] and animals concerning electrical stimulation of vocal cords. She does admit that treatments are several years off as she has been told by them. Concerning the treatment including a tracheostomy of his specific case she will consider what his specialists above are recommending and gather information on details of the trach/treatment. He will then make his decision based on their recommendations. Patient states that he needs to have a tracheostomy then he would but he wants to know if there are any other options. Patient did talk about not having an appetite and we are initiating today Remeron 7.5 mg q.h.s. as per Epic and will see him back in 4 weeks or sooner if he needs us. 06/27/17:   Medical records reviewed and as per general surgery assessment from 06/26/17: The patient says he is doing better. He is breathing better with the tracheostomy in place and is tolerating tf well through his PEG. He has some redness right around the PEG and has a small amount of drainage. 64y.o. year old male with lung cancer, s/p PEG Instructed on PEG care Will monitor redness for now, does not appear infected Follow up in 6 months  As per pulmonology assessment on 06/19/17:  I had the pleasure of seeing Courtney Campos in our office in follow-up regarding his recent Hospital admission with sepsis, MRSA pneumonia and FTT. Patient was discharged from the hospital to [address] completed his course of antibitics and jsut got discahrged few days ago. . Since his hospital discahrge patient reports no shortness of breath no cough. Has better appetite. Patient denies chest pain, cough and hemoptysis. Patient is compliant with his Albuterol. Patient presents today accompanied by his wife looking more comfortable than he had in the past able to speak with a better voice although still raspy with the valve in place. Patient states that when he was at [facility] they decreased him to Duragesic 25 µg q.72 hours and he noticed a significant increase in his joint and muscle aches, left shoulder pain. Patient states that whenever he went home on [address] he added the second patch for a total of 37 µg q.72 hours without sedation and he is comfortable now on his current regimen. Patient states that he is breathing much better after his tracheostomy and swallowing better. Wife is supplementing and has basically a sliding scale on the percentage of oral intake that she uses. Patient states that he was down to 140 pounds and now is up into the 150s. Our records reveal the same. Patient states that he has noticed some difference with improvement of appetite but more so with sleeping all night now on the Remeron 7.5 mg q.h.s. without sedation but states there is significant room for improvement on his appetite. Patient also compliant with Neurontin 900 mg t.i.d., Cymbalta 40 mg b.i.d., Pamelor 25 mg q.h.s. and very infrequently takes is 0.5 mg q.h.s. Patient denies constipation and states that is moving his bowels well. Options were discussed and we are increasing his Remeron to 15 mg q.h.s. as per [facility] and I gave him a prescription for the Duragesic 37 µg q.72 hours 10 patches of each strength. Patient has refills on his Neurontin 900 mg t.i.d., Ativan 0.5 mg q.h.s. p.r.n., Cymbalta 40 mg b.i.d. and Pamelor 25 mg q.h.s. We will continue us and monitor his weight and symptoms. Patient looks much more comfortable today.     07/25/17:  Medical records reviewed and discharge summary on 07/09/17:  Principal Problem:    Orthostatic hypotension  Active Problems:    Panlobular emphysema (HCC)    Malignant neoplasm of overlapping sites of left lung [facility])    Palliative care encounter    COPD (chronic obstructive pulmonary disease) (HCC)    Tobacco dependence    Severe protein-energy malnutrition ([address]    Anemia  Patient presents today looking very weak, tremulous and not as talkative but able to respond and answer in short sentences. Patient over the past 2-3 days as per wife's history has been very weak and with nausea vomiting, dehydration. Patient is being visited by home health but refuses to go back to the hospital stating that he realizes that he needs to go but he does not want to go back to the hospital at this time. Patient just left ENTs office finding a new right sided neck mass. Apparently the ENT discussed with Dr Jonel Jane and they will perform a PET scan to look at the extent of the lesions. Typically patient goes to the blood and cancer Center every 3 weeks but chemotherapy is on hold secondary to the patient's weakness. Options were discussed and we are refilling his medications unchanged. We are ordering one liter of IV fluids today and via home health will try to get this at least 3 times a week. I reiterated to the patient that he should go to the emergency department and he voiced understanding but did not want to go despite our suggestions. Patient looks ill and depending on how he responds to the IV fluids may continued to decline significantly in the near future. 08/22/17:  Medical records reviewed and as per radiation oncology's assessment from 07/27/17:  Mr. Tracee Lee is a pleasant 64year old man with a new glottic lesion. This is either a distant progression form the lung cancer or a second primary. I have spoken with Dr. Jonel Jane who recommend fractionated external beam radiation therapy prior to re-initiation of systmeic therapy as there is no other disease progression noted.    We agree with this approach above symptoms. We are continuing unchanged his Duragesic 37 µg q.72 hours prescribing 5 patches of each strength. Patient will continue his other medications unchanged and we will see him back in 4 weeks or sooner if he needs us. 11/28/17:  Medical records reviewed and Epic records revealing the dislodged PEG tube and reinsertion reviewed. Patient presents today accompanied by his wife in no acute distress at his baseline without sign of sedation and/or confusion able to voice his needs. Patient states that there is been no change to his symptoms and he has been more active painting around the house with increasing aches and pains especially around his neck and back. Patient states that his \"jumpy legs\" are better now that we stop the Ritalin. Patient comfortable on current regiment. Patient states that he sees his medical oncologist this week and will have a CT of the throat in December and a PET scan after the beginning of the year. Options were discussed and we are refilling his Duragesic unchanged at 37 µg q.72 hours prescribing 10 patches of each strength, Patient has refills on unchanged Neurontin 900 mg t.i.d., Cymbalta 30 mg q.12 Remeron and Pamelor unchanged. We will see him back in 8 weeks or sooner if he needs us and he was advised to call us 1 week prior to running out of his Duragesic patches and we will prescribe electronically as he has been wonderfully appropriate and compliant. We will review the throat CT and PET scan as above as well as recommendations from his medical oncologist.  01/23/18:  Medical records reviewed and no documented physician visits in [address] since I last saw him. Patient presents with his wife in no acute distress without sign of sedation and/or confusion able to voice his needs ambulating well. Patient has lost another 2 pounds and still has a decreased appetite despite taking Remeron 15 mg nightly.   Patient is working out details with his employer/insurance and there may be a problem with pain from medication soon. Patient complains of significant fatigue and feels that it is the cold weather and wife states that oftentimes he will just sleep all day. There are small projects around the house but he does not feel like doing them. Options were discussed and I feel that his fatigue is from his decreased caloric intake. Patient is resistant to starting his tube feedings as recommended to utilize at least twice a day if he is not eating because he feels that it is a step backwards. I talked to them both in great detail and I challenged him to eat something at least 3 times a day or go back on the tube feedings and they voiced understanding. Today we are increasing his Remeron to 30 mg q.h.s., continuing unchanged Duragesic 37 µg q.72 hours prescribing 10 patches of each strength and I advised him that if these are too expensive to call us and he would consider going back on his Percocet if that is less expensive. He did not want to do that today. Patient will continue unchanged his Cymbalta 30 mg q.12 hours, Neurontin 900 mg t.i.d. and his Pamelor all of which she does not need refills today. We will see him back in 8 weeks or sooner if he needs us, monitor his weight and of course the situation with his medication coverage. 05/15/18:  3/20/18: [address] records reviewed and no documented physician visits in [facility] since last seen by Dr. Lillian Gaitan on 1/23/18. Patient presents with his wife in no acute distress, ambulating well, no signs of sedation and/or confusion, is able to voice his needs and concerns well. Since his last office visit he has gained 7 pounds, and states that his appetite is good. He also states that his pain has been well controlled. He continues to have some daytime tiredness, but associates this with poor sleep secondary to restless legs.   He states that his activity level has increased, and that he is very active around the home, recently cord could tolerate - see above). He is still on immunotherapy and his KPS is intact at KPS 70 WO new Sx. There is no evidence of disease recurrence or progression based on a detailed review of the available imaging, physical exam, and ROS (all personally performed prior to and during this follow up appointment today). The patient did verbalize understanding for the indications of continued FU including imaging and laboratory evaluation as applicable to this case; as well as appropriate lifestyle choices and health maintenance. All questions answered to the best of my ability. 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 Patient presents with wife ambulatory in no acute distress looking much more comfortable than I previously seen him in the past gaining 11 pounds stating that he is much more active at home with carpentry and working on his cars. Patient reviews with me the increasing pain which is still experiencing describing as neuropathy involving his left leg and left arm requiring an increase of the fentanyl patch as above after we attempted to decrease. Patient compliant with 37 µg q.72 hours but does complain of worsening pain after the secondary the patch. Patient does take Tylenol for breakthrough pain. Patient's #1 complaint is difficulty falling and staying asleep which has been a chronic problem but improved in the wintertime. Patient for many years working midnights and afternoon shift and of course contributing to the problem. Patient continues to use Ativan 0.5 mg which creates sedation but still with difficulty falling asleep and staying asleep. Patient states that he has good energy throughout the day improved from previous despite difficulty sleeping. Patient currently denies restlessness of his legs as a contributing factor. They also talk about watching a movie when they cannot sleep but will try not doing so.   Options were discussed and they will try melatonin 3 mg q.h.s. and call records reviewed including assessment by Kyree Braga of palliative care on 09/24/18:  Spoke with patient and wife about discharge plan. The patient will be discharged to home with IV antibiotics and has a PICC line in place. He will continue current medications for symptom management. Reviewed medications with patient and family. Wife states that the patient has enough medication to last until next visit to palliative medicine outpatient clinic. Prescription provided for Marinol 2.5 mg twice a day ordered for appetite stimulation. Patient tolerating medication without side effects. The patient's wife will call the palliative medicine outpatient clinic to arrange an appointment in the next 2 weeks. [address] APRN-CNP  Patient presents today accompanied by his wife looking well without sign of sedation and/or confusion able to voice his needs. Patient states that slowly he is regaining his appetite but there is still significant room for improvement. A shunt feels that the Marinol helps without side effects. Patient states his breathing is slowly improving but still not back to baseline. Patient has 5 patches of each strength of his fentanyl patches and states there's no change in how he is taking his medications. Patient denies other new and/or uncontrolled symptoms otherwise. Patient is following up with infectious disease on October 10 and will ask them about restarting his hyperbaric treatment secondary to his gum disease. Options were discussed and today we are doubling the Marinol to 5 mg b.i.d. as per Epic. Patient advised that if he does not notice a benefit or does not like the way it makes him feel he can always break the tablets in half and go back to his original dosage.   Patient will continue his other medications unchanged including Duragesic 37 µg q.72 hours, Colace, melatonin q.h.s., Ativan 0.5 mg, Remeron 30 mg q.h.s., Cymbalta 30 mg q.12 hours, Neurontin 900 mg t.i.d., Pamelor 25 mg q.h.s. He will continue his other medications as well and we'll see him back in 4 weeks monitoring his weight. They know to call us at any time. 10/30/18:  Medical records reviewed and patient presents ambulatory in no acute distress accompanied by his wife without sign of sedation and/or confusion able to voice his needs. Patient appears frustrated today and states that he has significant shortness of breath with activity that has been limiting his activity since his pneumonia. Patient utilizes albuterol aerosols prescribed by his pulmonologist twice daily which does help for an hour or 2 as per his history. Patient has oxygen at home that he infrequently uses. Patient likes to remain active. Patient states there's no change in how he is taking his medications and I prescribed other than he stopped his Marinol secondary to a too high of a co-pay. Patient has gained 5 pounds stating that he is eating with an improved appetite. Patient states he is on prednisone 10 mg daily. Options were discussed and he will call us prior to running out of his Duragesic, Ativan as above and we will electronically prescribed unchanged. Of course we are not continuing the Marinol as he is already stopped. Patient will continue his Duragesic 37 µg one patch q.72 hours, Colace, melatonin, Ativan, Remeron 30 mg q.h.s., Cymbalta 30 mg q.12 hours, Neurontin 900 mg t.i.d., Pamelor 25 mg q.h.s. I advised patient to use his albuterol aerosols more frequently as they are written q.6 hours p.r.n. by pulmonology. I am sending pulmonology and note informing them of his continued dyspnea especially upon exertion. I am wondering if DuoNeb around-the-clock may help him more with continued p.r.n. albuterol. Also a pulse dose of the prednisone may be beneficial as well. I advised the patient to use his oxygen more frequently. I will of course defer to pulmonology for these decisions.   We will see the patient back in 4 weeks or sooner if he needs us and they know to call us at any time. 12/03/18:  Medical records reviewed and as per radiation oncology assessment from 11/12/18:  Mr. Mojgan Guerrero is a exceedingly pleasant 64year old man with locally advanced left lung cancer with spinal column involvement s/p palliative RT followed by a definitive concurrent course and then continued CHEMO followed by immunotherapy (the later continues - [place] per [facility]). He had a treatment response and maintained a reasonable QOL however a new lesion in the larynx was diagnosed this then was treated with fractionated external beam radiation therapy to the maximum dose allowed y the spinal cord (QUANTEC). Manju Vasquez presents today for FU.             Dom underwent RT initially due to newly diagnosed NSCLC with erosion of the mid thoracic, left side of the spinal column with probable cord involvement. After a short hypo fractionated course (emergent) the remainder of the RT was delivered with concurrent chemo. The maximum dose was delivered as allowed by normal tissue tolerance (cord) [physics consult obtained], RBA reviewed with pt at that time. Potential chronic effects discussed today with pt and wife. He did get a second opinion at [facility] scanned in to [facility] but wants to stick with myself and Dr. Cinthya Sarmiento for his care. Dr. Cinthya Sarmiento initially prescribed Wendy Espinoza / [facility] [place] + [facility] [place]. See [facility] records for further [id number] including immunotherapy. Benjamin then came in with continued voice change and stridor that then prompted a tracheostomy (last year). 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  We prescribed fractionated external beam radiation therapy to the highest dose the spinal cord could tolerate per QUANTEC and he has had a response to treatment with improvement in his voice and stable KPS still on immunotherapy per Dr. Cinthya Sarmiento.   He recently had a new pneumonia with questionable mass in the right side, continued FU pending. If this is malignancy (biopsy could be considered) then SBRT is a reasonable considerations. 2nd opinion offered and declined. KPS 70. He does continue to smoke. Cessation again discussed.     Mr. Desmond Claros is a pleasant gentleman well know to me with a history of both lung cancer and laryngeal cancer s/p concurrent chemo-RT for the former and definitve intent fractionated external beam radiation therapy for the latter (with the highest dose the spinal cord could tolerate - see above) --- he has a recently diagnosis of pneumonia +/- mass with continue FU pending (SBRT could be considered for oligo metastatic disease vs 3rd primary depending on the clinical course.)  He is still on immunotherapy and his KPS is intact at KPS 70 WO new Sx perhaps SOB). Alexandra Robins is no evidence of disease recurrence or progression based on a detailed review of the available imaging, physical exam, and ROS (all personally performed prior to and during this follow up appointment today).  The patient did verbalize understanding for the indications of continued FU including imaging and laboratory evaluation as applicable to this case; as well as appropriate lifestyle choices and health maintenance.  All questions answered to the best of my ability. 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 Patient presents today accompanied by his wife in no acute distress at his baseline ambulating well without sign of sedation and/or confusion able to voice his needs. Patient states is no change in how he is taken his medications needing a refill on his fentanyl patches. Patient started pulmonary rehab last week and states that his breathing is slowly improving. Patient gained 3 pounds and denies new and/or uncontrolled symptoms otherwise.   Options were discussed and we are continuing the same medications unchanged including Duragesic 37 µg one patch q.72 hours prescribing 10 of each involvement or malignancy are considered and   further assessment is recommended.       Improving pneumonic infiltrate in the right upper lobe. There is also   thoracic aortic aneurysm   Patient presents today And advised wife noted distress without sign of sedation and/or confusion able to voice his needs. Patient complaining of increasing lower back pain across his hips that the fentanyl patches are not helping. Patient also complaining of gum pain stating that he will be calling in oral surgeon soon as he has exposed bone and he cannot wear his dentures for more than a couple hours at a time. Patient also is now on 2 new nebulizers every 12 hours by pulmonology. Patient also asked to talk to me in private and his wife stepped out. Patient states he is having difficulty urinating despite his Flomax and I advised him to contact his urologist to discuss titration of medications. Today we are decreasing his Pamelor from 25 q.h.s. to 10 mg q.h.s. secondary to anticholinergic effect. Patient also states that he is experiencing more depression without any suicidal ideations. Patient did state that he is thinking about stopping treatment and really does not like feeling this way. I talked to him about counseling and he was very receptive to counseling. Patient states that he protects his wife and does not want to tell her about these things and is not real close to his family to talk to them about intimate details. Patient did talk to his elder sister at one time whom he is close to. Patient also complains about sexual dysfunction and I discussed this with him as well being multi modality. Options were discussed and I made the above recommendations as well as today continuing his fentanyl 37 µg q.72 hours as we normally do and today we initiated p.r.n. Percocet 5-325 milligram tablets 1 p.o. q.6 hours p.r.n. holding for sedation prescribing 120 tablets today.   We see what his opioid load requirement is and then add to the fentanyl patch if need be. Concerning his depression we are sending him to [facility] for counseling and recommendations. Today we are keeping him on Cymbalta 60 mg q.h.s., Remeron 30 mg q.h.s. and we did decrease his Pamelor from 25 mg to 10 mg q.h.s. secondary to BPH and his above complaints. I will be glad to discuss results with them if need be. I will see him back in 4 weeks or sooner if he needs us and I advised him that he can call us at anytime with any concerns and he voiced understanding and appreciation. 03/05/19:  Medical records reviewed and as per phone discussion with Jaya Trevizo our palliative care nurse on 02/28/19:  Call from [address] [place] [address] stating had not heard from Counseling group Dr. Arlene Amato had referred him to. This nurse had a confirmed fax that referral was made 1/28/19. Called over to [facility]. They did not receive referral. Referral resent and called to Alan to give update. Patient presents today accompanied by his wife distress without sign of sedation and/or confusion able to voice his needs. Patient complaining of worsening pain to his hip and thigh groin and left side of his mouth. Patient going for scans today as per medical oncology. Patient takes 2-3 Percocet tablets remaining having approximately 60 tablets left stating that they do not help but sometimes causes mild sedation. Patient voices compliance with his other medications and cannot notice a difference from decreasing the Pamelor last time. Patient has gained 4 pounds as per our records. Patient also going for the third visit at the [facility] stating that it is going well and he relates well to his counselor. Options were discussed and we are continuing the Percocet 5-325 milligram tablets 1 p.o. q.6 hours p.r.n. and he will call prior to running out.   Today we are increasing Duragesic to 50 µg one patch q.72 hours prescribing 10 patches today and he will set aside the 1 patches each of his old strength in a safe place and of course not use them. We will see him back in 4 weeks or sooner if he needs us. 04/09/19:  Medical records reviewed and no documented physician visits in [facility] since I last saw him. Patient presents today accompanied by his wife in no acute distress without sign of sedation able to voice his needs and bleeding well. Patient weight has remained the same. Patient noticed a difference with increasing the fentanyl and now only using Percocet 1-2 times a day but still with breakthrough pain. Patient restarted smoking despite Wellbutrin 75 mg b.i.d.  I talked to him about attending classes again, habit alteration, etc.  Patient also complains of right hip pain worse on rainy days diagnosed with arthritis via x-rays. Patient unable to take NSAIDs. Patient does complain of fatigue with the Percocet and at times difficulty sleeping with melatonin 5 mg q.h.s. which helped in the past.  Options were discussed and he will try melatonin 10 mg q.h.s. and he did not want to adjust his pain medications at this time. I advised him that he can try breaking the Percocet in half taking a half tablet more frequently to see if this balances the pain relief with sedation. We prescribed fentanyl 50 µg patches ×10 on April 1 and refilled his Percocet 5-325 milligram tablets 1 p.o. q.6 hours p.r.n. prescribing 120 tablets today. Patient will continue the Wellbutrin 75 mg b.i.d. as well as the remainder of his medications that we prescribed. We'll see him back in 8 weeks or sooner if he needs us. Controlled Substances Monitoring:   RX Monitoring 4/9/2019   Attestation The Prescription Monitoring Report for this patient was reviewed today. Chronic Pain Routine Monitoring Possible medication side effects, risk of tolerance/dependence & alternative treatments discussed. ;No signs of potential drug abuse or diversion identified: otherwise, see note documentation   Chronic Pain > 50 MEDD Re-evaluated the status of the patient's underlying condition causing pain.;Obtained or confirmened \"Consent of Opioid Use\" on file. Chronic Pain > 80 MEDD Looked for signs of prescription misuse.;Obtained or confirmed a written medication contract was on file.      Time in minutes:    [] 15   [] 30   [] 45   [] 60   [x] 75   [] 90  Chart review/documentation:  [] 15   [x] 30   [] 45   [] 60   [] 75   [] 90  Assessment:     [x] 15   [] 30   [] 45   [] 60   [] 75   [] 90  Conversation patient/family/staff: [] 15   [x] 30   [] 45   [] 60   [] 75   [] 90    Dorna Harada MD    4/9/2019

## 2019-04-17 ENCOUNTER — HOSPITAL ENCOUNTER (OUTPATIENT)
Age: 64
Discharge: HOME | End: 2019-04-19
Payer: MEDICARE

## 2019-04-17 DIAGNOSIS — E78.2 MIXED HYPERLIPIDEMIA: ICD-10-CM

## 2019-04-17 DIAGNOSIS — C34.82 MALIGNANT NEOPLASM OF OVERLAPPING SITES OF LEFT LUNG (HCC): Chronic | ICD-10-CM

## 2019-04-17 DIAGNOSIS — E03.9 ACQUIRED HYPOTHYROIDISM: ICD-10-CM

## 2019-04-17 DIAGNOSIS — N40.0 BENIGN PROSTATIC HYPERPLASIA WITHOUT LOWER URINARY TRACT SYMPTOMS: ICD-10-CM

## 2019-04-17 PROBLEM — C14.0 PHARYNGEAL CANCER (HCC): Status: ACTIVE | Noted: 2019-04-17

## 2019-04-17 PROBLEM — M16.11 OSTEOARTHRITIS OF RIGHT HIP: Status: ACTIVE | Noted: 2019-04-17

## 2019-04-17 LAB
ALBUMIN SERPL-MCNC: 4 G/DL (ref 3.5–5.2)
ALP BLD-CCNC: 95 U/L (ref 40–129)
ALT SERPL-CCNC: 10 U/L (ref 0–40)
ANION GAP SERPL CALCULATED.3IONS-SCNC: 12 MMOL/L (ref 7–16)
AST SERPL-CCNC: 20 U/L (ref 0–39)
BASOPHILS ABSOLUTE: 0.11 E9/L (ref 0–0.2)
BASOPHILS RELATIVE PERCENT: 0.9 % (ref 0–2)
BILIRUB SERPL-MCNC: 0.4 MG/DL (ref 0–1.2)
BUN BLDV-MCNC: 12 MG/DL (ref 8–23)
CALCIUM SERPL-MCNC: 9.6 MG/DL (ref 8.6–10.2)
CHLORIDE BLD-SCNC: 104 MMOL/L (ref 98–107)
CHOLESTEROL, TOTAL: 177 MG/DL (ref 0–199)
CO2: 27 MMOL/L (ref 22–29)
CREAT SERPL-MCNC: 1.3 MG/DL (ref 0.7–1.2)
EOSINOPHILS ABSOLUTE: 0.31 E9/L (ref 0.05–0.5)
EOSINOPHILS RELATIVE PERCENT: 2.6 % (ref 0–6)
GFR AFRICAN AMERICAN: >60
GFR NON-AFRICAN AMERICAN: 56 ML/MIN/1.73
GLUCOSE BLD-MCNC: 81 MG/DL (ref 74–99)
HCT VFR BLD CALC: 43.5 % (ref 37–54)
HDLC SERPL-MCNC: 41 MG/DL
HEMOGLOBIN: 13.3 G/DL (ref 12.5–16.5)
IMMATURE GRANULOCYTES #: 0.05 E9/L
IMMATURE GRANULOCYTES %: 0.4 % (ref 0–5)
LDL CHOLESTEROL CALCULATED: 104 MG/DL (ref 0–99)
LYMPHOCYTES ABSOLUTE: 1.77 E9/L (ref 1.5–4)
LYMPHOCYTES RELATIVE PERCENT: 15.1 % (ref 20–42)
MCH RBC QN AUTO: 27 PG (ref 26–35)
MCHC RBC AUTO-ENTMCNC: 30.6 % (ref 32–34.5)
MCV RBC AUTO: 88.4 FL (ref 80–99.9)
MONOCYTES ABSOLUTE: 0.6 E9/L (ref 0.1–0.95)
MONOCYTES RELATIVE PERCENT: 5.1 % (ref 2–12)
NEUTROPHILS ABSOLUTE: 8.88 E9/L (ref 1.8–7.3)
NEUTROPHILS RELATIVE PERCENT: 75.9 % (ref 43–80)
PDW BLD-RTO: 17.2 FL (ref 11.5–15)
PLATELET # BLD: 402 E9/L (ref 130–450)
PMV BLD AUTO: 9.5 FL (ref 7–12)
POTASSIUM SERPL-SCNC: 4.1 MMOL/L (ref 3.5–5)
PROSTATE SPECIFIC ANTIGEN: 2.2 NG/ML (ref 0–4)
RBC # BLD: 4.92 E12/L (ref 3.8–5.8)
SODIUM BLD-SCNC: 143 MMOL/L (ref 132–146)
TOTAL PROTEIN: 7.2 G/DL (ref 6.4–8.3)
TRIGL SERPL-MCNC: 162 MG/DL (ref 0–149)
TSH SERPL DL<=0.05 MIU/L-ACNC: 2.26 UIU/ML (ref 0.27–4.2)
VLDLC SERPL CALC-MCNC: 32 MG/DL
WBC # BLD: 11.7 E9/L (ref 4.5–11.5)

## 2019-04-17 PROCEDURE — 84153 ASSAY OF PSA TOTAL: CPT

## 2019-04-17 PROCEDURE — 80061 LIPID PANEL: CPT

## 2019-04-17 PROCEDURE — 85025 COMPLETE CBC W/AUTO DIFF WBC: CPT

## 2019-04-17 PROCEDURE — 84443 ASSAY THYROID STIM HORMONE: CPT

## 2019-04-17 PROCEDURE — 80053 COMPREHEN METABOLIC PANEL: CPT

## 2019-05-06 ENCOUNTER — TELEPHONE (OUTPATIENT)
Dept: PALLATIVE CARE | Age: 64
End: 2019-05-06

## 2019-05-06 DIAGNOSIS — C34.82 MALIGNANT NEOPLASM OF OVERLAPPING SITES OF LEFT LUNG (HCC): ICD-10-CM

## 2019-05-06 DIAGNOSIS — C34.12 PANCOAST TUMOR, LEFT (HCC): ICD-10-CM

## 2019-05-06 DIAGNOSIS — G90.2: ICD-10-CM

## 2019-05-06 DIAGNOSIS — G89.3 CHRONIC PAIN DUE TO NEOPLASM: ICD-10-CM

## 2019-05-06 RX ORDER — FENTANYL 12 UG/H
1 PATCH TRANSDERMAL
Qty: 10 PATCH | Refills: 0 | Status: SHIPPED | OUTPATIENT
Start: 2019-05-06 | End: 2019-07-09 | Stop reason: SDUPTHER

## 2019-05-06 RX ORDER — OXYCODONE HYDROCHLORIDE AND ACETAMINOPHEN 5; 325 MG/1; MG/1
1 TABLET ORAL EVERY 6 HOURS PRN
Qty: 120 TABLET | Refills: 0 | Status: SHIPPED | OUTPATIENT
Start: 2019-05-06 | End: 2019-07-09 | Stop reason: SDUPTHER

## 2019-05-06 RX ORDER — FENTANYL 50 UG/H
1 PATCH TRANSDERMAL
Qty: 10 PATCH | Refills: 0 | Status: SHIPPED | OUTPATIENT
Start: 2019-05-06 | End: 2019-07-09 | Stop reason: SDUPTHER

## 2019-05-22 ENCOUNTER — HOSPITAL ENCOUNTER (OUTPATIENT)
Dept: MRI IMAGING | Age: 64
Discharge: HOME | End: 2019-05-24
Payer: MEDICARE

## 2019-05-22 DIAGNOSIS — C34.12 CANCER OF BRONCHUS OF LEFT UPPER LOBE (HCC): ICD-10-CM

## 2019-05-22 PROCEDURE — 73720 MRI LWR EXTREMITY W/O&W/DYE: CPT

## 2019-05-22 PROCEDURE — 73723 MRI JOINT LWR EXTR W/O&W/DYE: CPT

## 2019-05-22 PROCEDURE — 6360000004 HC RX CONTRAST MEDICATION: Performed by: RADIOLOGY

## 2019-05-22 PROCEDURE — A9577 INJ MULTIHANCE: HCPCS | Performed by: RADIOLOGY

## 2019-05-22 RX ADMIN — GADOBENATE DIMEGLUMINE 17 ML: 529 INJECTION, SOLUTION INTRAVENOUS at 19:21

## 2019-05-23 DIAGNOSIS — C34.82 MALIGNANT NEOPLASM OF OVERLAPPING SITES OF LEFT LUNG (HCC): ICD-10-CM

## 2019-05-23 RX ORDER — LORAZEPAM 0.5 MG/1
0.5 TABLET ORAL NIGHTLY PRN
Qty: 30 TABLET | Refills: 2 | Status: SHIPPED | OUTPATIENT
Start: 2019-05-23 | End: 2019-08-20 | Stop reason: SDUPTHER

## 2019-06-11 ENCOUNTER — OFFICE VISIT (OUTPATIENT)
Dept: PALLATIVE CARE | Age: 64
End: 2019-06-11
Payer: MEDICARE

## 2019-06-11 VITALS
BODY MASS INDEX: 25.61 KG/M2 | WEIGHT: 183.6 LBS | SYSTOLIC BLOOD PRESSURE: 120 MMHG | OXYGEN SATURATION: 95 % | DIASTOLIC BLOOD PRESSURE: 78 MMHG | HEART RATE: 79 BPM

## 2019-06-11 DIAGNOSIS — Z51.5 PALLIATIVE CARE BY SPECIALIST: Primary | ICD-10-CM

## 2019-06-11 DIAGNOSIS — G89.3 PAIN DUE TO NEOPLASM: ICD-10-CM

## 2019-06-11 PROCEDURE — 99212 OFFICE O/P EST SF 10 MIN: CPT | Performed by: NURSE PRACTITIONER

## 2019-06-11 PROCEDURE — 99214 OFFICE O/P EST MOD 30 MIN: CPT | Performed by: NURSE PRACTITIONER

## 2019-06-11 NOTE — PROGRESS NOTES
Palliative Medicine Outpatient Visit  2019  Provider: Tania Borges APRN-CNP    Referring Provider: Dr. Juan J Rodriguez    Reason for Consult:  [] [address]  [x] Assist with goals of care  [] Transition of care  [x] Symptom Management    See below for recommendations, as indicated, concernin. Advanced Care Planning  2. Anticipatory Guidance  3. Transition of Care  4. Symptom Management      CC: Pain    HPI:   As per Dr. Mel Donohue assessment on 16:   Mr. Celia Rico is a pleasant and cooperative 61year old man with a recent diagnosis of stage III NSCLC (probable) with impending cord compression. We recommend a concurrent approach to definitive management of this locally advanced non small cell lung cancer [chemo dosing per [address] record- see EMR]. Based on the clinical characteristic, this disease and stage is generally considered unresectable; optimal therapy tends to be a concurrent chemo-RT approach. Concomitant chemo-RT compared with sequential chemo-RT improved overall survival, primarily through better locoregional control, at the cost of manageable increases in acute esophageal toxicity as based on the Auperin metanalysis. These data demonstrate a benefit of concomitant chemo-RT over sequential chemo->RT on OS (HR 0.84, SS), with absolute benefit at 3-years of 5.7% (18% to 24%), 5-years 4.5% (11% to 15%). Interestingly, there was no difference in PFS (HR 0.9, p=0.07). However a decrease in locoregional progression (HR 0.777, SS), with absolute decrease of 6% at 5 years (35% to 29%) was shown. There was no difference on distant progression (HR 1.04, NS), with 5-year rate of about 40% Janey Niyah Oncol 2010 May 1;28(13):3658-0423). Regarding the radiation dose, 60 Gy in 2Gy/fx was established long ago in RTOG 73-01, with several other trials showing a feasibility of much higher doses however with RT alone.  Interestingly in the concurrent era, RTOG 0617 tested higher dose arms (Concurrent RT + Carbo/Taxol +/- Cetuximab) these were closed early with \"negative\" results (longer term results and POFA needed), therefor 60 Gy in daily fractions with dual agent chemotherapy can be considered the standard (the Brooke and LAMP trials also assisted in the development of this paradigm). Fractionated external beam radiation therapy may or may not be delivered with intensity modulation +/- image guidance per daily cone beam depending on the specific patient anatomy and dose constraints as described per the RTOG and QUANTEC ---Adam Nicole, Int J Radiat Oncol Biol Phys. 2010 Mar 1;76(3 Suppl):S10-9. The risks, benefits, alternatives, process and logistics of external beam radiation were reviewed (risks include but are not limited to worsening PULM function, esophagitis, esophageal structure, perforations, bleeding, paralysis and death). We answered all of the patient's questions to the best of our ability. Dom verbalized understanding and seemed satisfied. Radiation planning will commence within < 24 hours; the next step in management being the simulation scan, with external beam radiation to commence in a timely fashion thereafter. It was a pleasure meeting Corinne Rodriguez today and we appreciate the referral and opportunity to be involved in his care. We had an extensive discussion today regarding the course to date (including a focused review of the applicable radiographic and laboratory information), multidisciplinary approach to cancer care, and indications for external beam radiation therapy as a component therein. A literature review and multidisciplinary discussion was performed after seeing this patient due to the complexity of the medical decision making in this case. I personally spent greater than 60 minutes with this patient and performed the complete history and physical as above at today's visit, at least 45 minutes was in direct  regarding disease management.    *this pt will be simmed and likely begin treatment prior to a tissue diagnosis. It is my clinical judgement the pre test probability for malignancy is > 95 % and there may be significant functional decline in the time necessary to maintain a tissue diagnosis due to the primary tumor directly extending into the spinal column). I specifically verbalized all of this with Beverley Macario, his wife Quincy Demarco, and his daughter Alexandra Hawley all of who individually verbalized consent to RT treatment to begin prior to a tissue diagnosis. Peer reviewed requested. [facility] referral -- Dr. Chinedu Vazquez reccommended --- pt will still pursue aggressive active treatment however this is a pain management consultation request.  *will hold off on DEX or any new pain meds until he sees Dr. Chinedu Vazquez but we could start him of DEX in FND occurs or Sx worsens  -coordinated care with Dr. Wing Sanders this AM 4/25/16. We will treat the primary tumor and spinal column initially with considerations for second and third \"boost\" plans (nodes involved, primary boost etc.) in conjunction with CHEMO pending the workup / PET. MRI brain ordered by [facility].        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 30 tablet 2    tamsulosin (FLOMAX) 0.4 MG capsule Take 1 capsule by mouth daily Pt using coupon 30 capsule 0    esomeprazole (NEXIUM) 40 MG delayed release capsule Take 1 capsule by mouth nightly 90 capsule 0    Levothyroxine Sodium 125 MCG CAPS Take 125 mcg by mouth daily On an empty stomach With a large glass of water 30 capsule 0    nortriptyline (PAMELOR) 10 MG capsule Take 1 capsule by mouth nightly 90 capsule 0    DULoxetine (CYMBALTA) 30 MG extended release capsule Take 1 capsule by mouth 2 times daily 180 capsule 3    chlorhexidine (PERIDEX) 0.12 % solution Take 15 mLs by mouth daily Swish & spit qd 473 mL 2    pembrolizumab (KEYTRUDA) 100 MG/4ML SOLN Infuse intravenously      mirtazapine (REMERON) 30 MG tablet Take 1 tablet by mouth nightly 90 tablet 3    formoterol (PERFOROMIST) 20 MCG/2ML nebulizer solution Take 2 mLs by nebulization 2 times daily 120 mL 5    budesonide (PULMICORT) 0.5 MG/2ML nebulizer suspension Take 2 mLs by nebulization 2 times daily 60 ampule 5    gabapentin (NEURONTIN) 300 MG capsule Take 1 capsule by mouth 3 times daily for 360 days. Along with the 600 mg tabs for a total of 900 mg three times daily. 270 capsule 3    gabapentin (NEURONTIN) 600 MG tablet Take 1 tablet by mouth 3 times daily for 360 days. Along with the 300 mg caps for a total of 900 mg three times daily.  270 tablet 3    albuterol (ACCUNEB) 0.63 MG/3ML nebulizer solution Take 3 mLs by nebulization every 6 hours as needed for Wheezing 270 mL 3    buPROPion (WELLBUTRIN) 75 MG tablet Take 1 tablet by mouth 2 times daily 180 tablet 3    melatonin 5 MG TABS tablet Take 5 mg by mouth nightly      docusate sodium (COLACE) 100 MG capsule Take 1 capsule by mouth 3 times daily 270 capsule 3    predniSONE (DELTASONE) 10 MG tablet Take 10 mg by mouth daily      Calcium Carb-Cholecalciferol (CALTRATE 600+D3 SOFT PO) Take by mouth      simvastatin (ZOCOR) 40 MG tablet Take 40 mg by mouth nightly       No current facility-administered medications on file prior to visit. Allergies:    Augmentin [amoxicillin-pot clavulanate]    ROS: UNLESS STATED ABOVE PATIENT DENIES:  CONSTITUTIONAL:  fever, chill, rigors, nausea, vomiting, fatigue. HEENT: blurry vision, double vision, hearing problem, tinnitus, hoarseness, dysphagia,               odynophagia  RESPIRATORY: cough, shortness of breath, sputum expectoration. CARDIOVASCULAR:  Chest pain/pressure, palpitation, syncope, irregular beats  GASTROINTESTINAL:  abdominal or rectal pain, diarrhea, constipation, . GENITOURINARY:  Burning, frequency, urgency, incontinence, discharge  INTEGUMENTARY: rash, wound, pruritis  HEMATOLOGIC/LYMPHATIC:  Swelling, sores, gum bleeding, easy bruising, pica. MUSCULOSKELETAL:  pain, edema, joint swelling or redness  NEUROLOGICAL:  light headed, dizziness, loss of consciousness, weakness, change                                   in memory, seizures, tremors    Social history:  Social History     Socioeconomic History    Marital status:      Spouse name: None    Number of children: None    Years of education: None    Highest education level: None   Occupational History    Occupation: [employer] a [employer]- [employer]     Comment: disability short term   Social Needs    Financial resource strain: None    Food insecurity:     Worry: None     Inability: None    Transportation needs:     Medical: None     Non-medical: None   Tobacco Use    Smoking status: Former Smoker     Packs/day: 2.00     Years: 44.00     Pack years: 88.00     Types: Cigarettes     Start date:      Last attempt to quit: 2018     Years since quittin.7    Smokeless tobacco: Never Used   Substance and Sexual Activity    Alcohol use:  No Alcohol/week: 0.0 oz    Drug use: No    Sexual activity: None   Lifestyle    Physical activity:     Days per week: None     Minutes per session: None    Stress: None   Relationships    Social connections:     Talks on phone: None     Gets together: None     Attends Latter day service: None     Active member of club or organization: None     Attends meetings of clubs or organizations: None     Relationship status: None    Intimate partner violence:     Fear of current or ex partner: None     Emotionally abused: None     Physically abused: None     Forced sexual activity: None   Other Topics Concern    None   Social History Donald    Works at [employer]. Lives in [place]. Family history:  Family History   Problem Relation Age of Onset    Cancer Mother         breast cancer    Cancer Father         prostate cancer    Diabetes Father            PE: Vitals:   Vitals:    06/11/19 1524   BP: 120/78   Pulse: 79   SpO2: 95%   Weight: 183 lb 9.6 oz (83.3 kg)       Gen: WD, WN, NAD, awake, alert, able to voice their needs/thoughts   HEENT: normocephalic, atraumatic, sclera nonicteric, PERRLA, EOMI, MMM, Mildly raspy speech tone and quality with no sign of thrush, lesions or dryness and trachea intact without sign of infection  Neck: no LAD, no JVD, supple, no masses, normal speech, trachea midline,   Lungs: bilaterally clear to auscultation, good aeration, symmetric  Heart: regular rate and rhythm, no murmurs/rubs/gallops/ectopy appreciated, PMI WNL  Abd.: non-distended, soft, nontender, non-distended, normal bowel sounds and PEG site without sign of infection.   Ext.: no clubbing, cyanosis or edema, no joint tenderness  Skin: warm, adequate hydration without acute lesions  Neuro: awake, alert, oriented x 3, conversive,     Impression:  As per Dr. Scott Leonel assessment on 04/25/16:   Mr. Desmond Claros is a pleasant and cooperative 61year old man with a recent diagnosis of stage III NSCLC (probable) with impending cord compression. We recommend a concurrent approach to definitive management of this locally advanced non small cell lung cancer [chemo dosing per [address] record- see EMR]. Based on the clinical characteristic, this disease and stage is generally considered unresectable; optimal therapy tends to be a concurrent chemo-RT approach. Concomitant chemo-RT compared with sequential chemo-RT improved overall survival, primarily through better locoregional control, at the cost of manageable increases in acute esophageal toxicity as based on the Auperin metanalysis. These data demonstrate a benefit of concomitant chemo-RT over sequential chemo->RT on OS (HR 0.84, SS), with absolute benefit at 3-years of 5.7% (18% to 24%), 5-years 4.5% (11% to 15%). Interestingly, there was no difference in PFS (HR 0.9, p=0.07). However a decrease in locoregional progression (HR 0.777, SS), with absolute decrease of 6% at 5 years (35% to 29%) was shown. There was no difference on distant progression (HR 1.04, NS), with 5-year rate of about 40% [facility] Oncol 2010 May 1;28(13):7520-3087). Regarding the radiation dose, 60 Gy in 2Gy/fx was established long ago in RTOG 73-01, with several other trials showing a feasibility of much higher doses however with RT alone. Interestingly in the concurrent era, RTOG 0617 tested higher dose arms (Concurrent RT + Carbo/Taxol +/- Cetuximab) these were closed early with \"negative\" results (longer term results and POFA needed), therefor 60 Gy in daily fractions with dual agent chemotherapy can be considered the standard (the Brooke and LAMP trials also assisted in the development of this paradigm).  Fractionated external beam radiation therapy may or may not be delivered with intensity modulation +/- image guidance per daily cone beam depending on the specific patient anatomy and dose constraints as described per the RTOG and QUANTEC ---Donna Loredo Int J Radiat Oncol Biol Phys. 2010 Mar 1;76(3 Suppl):S10-9. The risks, benefits, alternatives, process and logistics of external beam radiation were reviewed (risks include but are not limited to worsening PULM function, esophagitis, esophageal structure, perforations, bleeding, paralysis and death). We answered all of the patient's questions to the best of our ability. Dom verbalized understanding and seemed satisfied. Radiation planning will commence within < 24 hours; the next step in management being the simulation scan, with external beam radiation to commence in a timely fashion thereafter. It was a pleasure meeting Eduardo Feldman today and we appreciate the referral and opportunity to be involved in his care. We had an extensive discussion today regarding the course to date (including a focused review of the applicable radiographic and laboratory information), multidisciplinary approach to cancer care, and indications for external beam radiation therapy as a component therein. A literature review and multidisciplinary discussion was performed after seeing this patient due to the complexity of the medical decision making in this case. I personally spent greater than 60 minutes with this patient and performed the complete history and physical as above at today's visit, at least 45 minutes was in direct  regarding disease management. *this pt will be simmed and likely begin treatment prior to a tissue diagnosis. It is my clinical judgement the pre test probability for malignancy is > 95 % and there may be significant functional decline in the time necessary to maintain a tissue diagnosis due to the primary tumor directly extending into the spinal column). I specifically verbalized all of this with Eduardo Feldman, his wife Salome Goode, and his daughter Armando Abarca all of who individually verbalized consent to RT treatment to begin prior to a tissue diagnosis. Peer reviewed requested.   Malena Sommer [facility] referral -- Dr. Albert Ruggiero reccommended --- pt will still pursue aggressive active treatment however this is a pain management consultation request.  *will hold off on DEX or any new pain meds until he sees Dr. Lillian Gaitan but we could start him of DEX in [address] occurs or Sx worsens  -coordinated care with Dr. Cinthya Sarmiento this AM 4/25/16. We will treat the primary tumor and spinal column initially with considerations for second and third \"boost\" plans (nodes involved, primary boost etc.) in conjunction with CHEMO pending the workup / PET. MRI brain [facility] by [facility].   05/03/16:  OARRS report reviewed today revealing Norco prescriptions since May 2015 with the last being ×60 tablets for 5-325 milligram tablets filled on 04/26/16 prescribed by Artemio Jewell from [address]. Ultram ×40 tablets on 12/24/15.   [place] Symptom Assessment Scale                                             Date:   Pain  [] 0  None  [] 1  [] 2  [] 3  [] 4  [] 5  [] 6  [] 7  [] 8  [] 9  [x] 10  Worst   Tiredness  [] 0  None  [] 1  [] 2  [] 3  [] 4  [] 5  [] 6  [] 7  [x] 8  [] 9  [] 10  Worst   Nausea  [] 0  None  [x] 1  [] 2  [] 3  [] 4  [] 5  [] 6  [] 7  [] 8  [] 9  [] 10  Worst   Depression  [] 0  None  [] 1  [] 2  [x] 3  [] 4  [] 5  [] 6  [] 7  [] 8  [] 9  [] 10  Worst   Anxiety  [] 0  None  [] 1  [x] 2  [] 3  [] 4  [] 5  [] 6  [] 7  [] 8  [] 9  [] 10  Worst   Drowsiness  [] 0  None  [] 1  [] 2  [] 3  [] 4  [x] 5  [] 6  [] 7  [] 8  [] 9  [] 10  Worst   Appetite  [] 0  None  [] 1  [] 2  [] 3  [x] 4  [] 5  [] 6  [] 7  [] 8  [] 9  [] 10  Worst   Wellbeing  [] 0  None  [] 1  [] 2  [] 3  [] 4  [] 5  [] 6  [] 7  [x] 8  [] 9  [] 10  Worst   Shortness of Breath  [] 0  None  [] 1  [x] 2  [] 3  [] 4  [] 5  [] 6  [] 7  [] 8  [] 9  [] 10  Worst   Constipation  [] 0  None  [] 1  [] 2  [] 3  [] 4  [x] 5  [] 6  [] 7  [] 8  [] 9  [] 10  Worst   Completed By:  [x]  Patient Report  []  Caregiver/Family  []  Nurse/Staff  Patient is currently a:  [x]   Palliative Medicine Patient  []   Hospice Patient  Medical been on ibuprofen over the past year and now has escalated his dosage to 2200 mg at a time several times a day. Patient denies stomach upset. Patient also takes Zantac 150 mg b.i.d. Patient had been on a Medrol Dosepak one year ago without side effects. Concerning the patient's treatment patient is going for a biopsy this Friday, will be starting chemotherapy with Dr. Carly Begum in which she has an appointment on May 19, and has begun radiation as per Dr. Michelle Spencer last Wednesday which will occur 5 times a week through June 2 and then reevaluation. Patient was introduced to Palliative Medicine enmeshment with our team.  Patient signed a pain contract, given a prescription for a urine drug screen and denies any illicit or street drugs. We also discussed the possibility of short-term versus long-term disability in which he is resistant to but I advised that with the conditions that he has both chronic and acute as well as the treatment but I would recommend pursuing disability. Options were discussed and today I made the following changes:  Discontinue Norco, Neurontin 300 mg t.i.d., ibuprofen. Today we increased his Neurontin to 600 mg t.i.d. prescribing 90 tablets with 2 refills, initiated Pamelor 10 mg q.h.s. 30 tablets with one refill,, initiated Percocet 5-325 milligrams tablets 1 p.o. q.6 hours p.r.n. ×120 tablets advising of the sedation potential and not taking it while at work work prior, Decadron 4 mg once daily in his morning since he works [employer] ×30 tablets with no refill, Protonix 40 mg daily 30 tablets with 2 refills, Zofran ODT 4 mg q.8 hours p.r.n. ×90 tablets with 1 refill, Dolly-Colace 2 tablets daily 60 tablets with 2 refills.   We will see the patient back in 2 weeks or sooner if he needs us.  05/17/16:  [place] Symptom Assessment Scale                                             Date:   Pain  [] 0  None  [] 1  [] 2  [] 3  [] 4  [] 5  [] 6  [] 7  [x] 8  [x] 9  [x] 10  Worst   Tiredness  [] 0  None  [] 1  [] 2  [] 3  [] 4  [] 5  [x] 6  [] 7  [] 8  [] 9  [] 10  Worst   Nausea  [] 0  None  [] 1  [x] 2  [] 3  [] 4  [] 5  [] 6  [] 7  [] 8  [] 9  [] 10  Worst   Depression  [] 0  None  [] 1  [x] 2  [] 3  [] 4  [] 5  [] 6  [] 7  [] 8  [] 9  [] 10  Worst   Anxiety  [] 0  None  [] 1  [x] 2  [] 3  [] 4  [] 5  [] 6  [] 7  [] 8  [] 9  [] 10  Worst   Drowsiness  [] 0  None  [] 1  [] 2  [] 3  [] 4  [x] 5  [] 6  [] 7  [] 8  [] 9  [] 10  Worst   Appetite  [] 0  None  [] 1  [] 2  [] 3  [] 4  [] 5  [] 6  [x] 7  [] 8  [] 9  [] 10  Worst   Wellbeing  [] 0  None  [] 1  [] 2  [] 3  [] 4  [x] 5  [] 6  [] 7  [] 8  [] 9  [] 10  Worst   Shortness of Breath  [] 0  None  [] 1  [] 2  [] 3  [x] 4  [] 5  [] 6  [] 7  [] 8  [] 9  [] 10  Worst   Constipation  [] 0  None  [] 1  [] 2  [] 3  [] 4  [] 5  [x] 6  [] 7  [] 8  [] 9  [] 10  Worst   Completed By:  [x]  Patient Report  []  Caregiver/Family  []  Nurse/Staff  Patient is currently a:  [x]   Palliative Medicine Patient  []   Hospice Patient  As per Beckie's phone call discussion on 05/09/16:  Patient's wife, Salome Goode, had called and left a voicemail this morning regarding patient's pain not being relieved by neurontin or percocet. She says the patient is back to taking the ibuprofen. Dr. Albert Ruggiero notified. I called Salome Goode back with Dr. Radha Contreras instructions: Patient needs to give decadron more time to become effective. Patient is not to take ibuprofen while on decadron. Patient may take 2 percocet every 6 hours only as needed for severe pain but needs to be aware that he will run out of percocet early if he does. Neurontin dose is not to change at this time. Salome Goode says that the patient cut the grass at home this weekend and now the pain is even more severe. Asked Salome Goode to tell patient to not cut the grass due to pain issues. Discussed with Salome Goode the many contraindications of taking large doses of ibuprofen. She is aware and does not encourage it, but she cannot stop him.  She thinks that 0  None  [] 1  [] 2  [] 3  [x] 4  [] 5  [] 6  [] 7  [] 8  [] 9  [] 10  Worst   Nausea  [x] 0  None  [] 1  [] 2  [] 3  [] 4  [] 5  [] 6  [] 7  [] 8  [] 9  [] 10  Worst   Depression  [] 0  None  [] 1  [x] 2  [] 3  [] 4  [] 5  [] 6  [] 7  [] 8  [] 9  [] 10  Worst   Anxiety  [] 0  None  [x] 1  [] 2  [] 3  [] 4  [] 5  [] 6  [] 7  [] 8  [] 9  [] 10  Worst   Drowsiness  [] 0  None  [x] 1  [] 2  [] 3  [] 4  [] 5  [] 6  [] 7  [] 8  [] 9  [] 10  Worst   Appetite  [] 0  None  [] 1  [] 2  [] 3  [] 4  [] 5  [x] 6  [] 7  [] 8  [] 9  [] 10  Worst   Wellbeing  [] 0  None  [] 1  [] 2  [x] 3  [] 4  [] 5  [] 6  [] 7  [] 8  [] 9  [] 10  Worst   Shortness of Breath  [] 0  None  [] 1  [x] 2  [] 3  [] 4  [] 5  [] 6  [] 7  [] 8  [] 9  [] 10  Worst   Constipation  [] 0  None  [] 1  [] 2  [] 3  [] 4  [] 5  [] 6  [] 7  [x] 8  [] 9  [] 10  Worst   Completed By:  [x]  Patient Report  []  Caregiver/Family  []  Nurse/Staff  Patient is currently a:  [x]   Palliative Medicine Patient  []   Hospice Patient  Medical records reviewed and as per Dr. Foley Pump assessment on 06/07/16:  Kaitlynn Rendon is a 61y.o. year old male here for follow up. He had hid CT guided biopsy done on 5/6 /2016. The results showed Pulmonary adenocarcinoma, moderately to poorly differentiated, invasive, acinar type. He completed his course of XRT and currently is going under chemotherapy . He has done 2/6 cycles. He is tolerating it relatively well. Has not lost any weight. Pain is well controlled. 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   Will follow repeat imaging at that time  H/O nicotine dependence:  Probable COPD  PFTs in future(next office visit)  Patient presents to the exam room with a primary complaint of left arm neuropathic pain and numbness which is worse with decreased gripping strength to his left hand. Patient stated that he finished radiation therapy approximately 10 days ago and also has more of a coarse voice, morning cough productive of phlegm and a mild amount of dysphasia if he eats too quickly. Patient denies nausea or vomiting. Patient only using Carafate infrequently, Percocet 5-325 milligram tablets on the average of 3 a day sometimes 4. Patient states that the Percocet helps approximately % and does not cause oversedation. Patient will also be taking dexamethasone 8 mg a day ×3 days surrounding his chemotherapy every 3 weeks which will start on June 21. Patient also started folic acid 1 mg daily 2 weeks ago. Options were discussed and although I see no signs of candidiasis orally I suspect this esophageal involvement therefore starting patient on Diflucan 100 mg daily ×14 days and a prescription for 14 tablets with one refill given today. Patient was also complaining of constipation of small hard bowel movements moving his bowels every 2-3 days therefore we are doubling his Dolly-Colace at 2 tablets b.i.d. 120 tablets with 2 refills given today. Patient also given a prescription for Percocet unchanged 5-325 milligram tablets 1 p.o. q.6 hours p.r.n. ×120 tablets today. We will see him back in 2 weeks or sooner if he needs us and in the future secondary to neuropathic complaints we may consider methadone.   06/28/16:  [place] Symptom Assessment Scale                                             Date:   Pain  [] 0  None  [] 1  [] 2  [x] 3  [] 4  [] 5  [] 6  [] 7  [] 8  [] 9  [] 10  Worst   Tiredness  [] 0  None  [x] 1  [] 2  [] 3  [] 4  [] 5  [] 6  [] 7  [] 8  [] 9  [] 10  Worst   Nausea  [x] 0  None  [] 1  [] 2  [] 3  [] 4  [] 5  [] 6  [] 7  [] 8  [] 9  [] 10  Worst   Depression  [] 0  None  [x] 1  [] 2  [] 3  [] 4  [] 5  [] 6  [] 7  [] 8  [] 9  [] 10  Worst   Anxiety  [] 0  None  [x] 1  [] 2  [] 3  [] 4  [] 5  [] 6  [] 7  [] 8  [] 9  [] 10  Worst   Drowsiness  [] 0  None  [x] 1 0  None  [] 1  [] 2  [] 3  [] 4  [] 5  [] 6  [] 7  [] 8  [] 9  [] 10  Worst   Depression  [] 0  None  [x] 1  [] 2  [] 3  [] 4  [] 5  [] 6  [] 7  [] 8  [] 9  [] 10  Worst   Anxiety  [] 0  None  [] 1  [] 2  [x] 3  [] 4  [] 5  [] 6  [] 7  [] 8  [] 9  [] 10  Worst   Drowsiness  [] 0  None  [x] 1  [] 2  [] 3  [] 4  [] 5  [] 6  [] 7  [] 8  [] 9  [] 10  Worst   Appetite  [] 0  None  [] 1  [x] 2  [] 3  [] 4  [] 5  [] 6  [] 7  [] 8  [] 9  [] 10  Worst   Wellbeing  [] 0  None  [] 1  [x] 2  [] 3  [] 4  [] 5  [] 6  [] 7  [] 8  [] 9  [] 10  Worst   Shortness of Breath  [] 0  None  [] 1  [] 2  [] 3  [x] 4  [] 5  [] 6  [] 7  [] 8  [] 9  [] 10  Worst   Constipation  [] 0  None  [] 1  [] 2  [] 3  [] 4  [] 5  [] 6  [x] 7  [] 8  [] 9  [] 10  Worst   Completed By:  [x]  Patient Report  []  Caregiver/Family  []  Nurse/Staff  Patient is currently a:  [x]   Palliative Medicine Patient  []   Hospice Patient  Medical records reviewed and as per radiation oncology's assessment on 07/06/16:  On 6/3/16, Fernando Vegas completed 5000 cGy in 22 fractions directed to the left lung / spine for management of locally advanced lung CA. States that he's doing well overall. Eating well. Coughing occasionally with phlegm - light brown in color. Denies fever. SOB with yard work only. Following with Dr Marylou Perez for palliative med, states that pain is well controlled with pain medications he is taking. Pt is following with Dr Socorro Coles for medical oncology. Seen last week, continues on chemo. Tolerating chemo well. CT scan planned after next chemo cycle per patient report. Met with phycyoel in [facility] for med onc for second opinion. States that he had an MRI/CT scan at that time (approx 3 weeks after radiation was completed) which showed (per patient report) no tumor shrinkage but pt states that he understands radiation is continuing to work. No report available at this time.   Planning to meet with a neurosurgeon at [facility] to discuss options for vertebral fractures from tumor per patient report. Appt with Dr Rani Bee ([facility]) in October. Patient is doing well post-radiation completion. Explained to patient that he would need clearance from Dr John Sharma and Dr Sujey Choudhary prior to surgery being completed especially to the irradiated area. Verbalized understanding. CT scan re-imaging per Dr Sujey Choudhary. Pt's previous imaging done at [facility]. Asked patient to have results of reimaging faxed to us as well when it is done. I discussed follow up plans with Irasema Blount. At this time, Dr John Sharma will see the patient back in 3 months for a post-radiation completion follow-up visit. Irasema Blount is to follow up with other physicians involved in their care as directed (including but not limited to Medical Oncology, Primary Care, Pulmonary, and Surgery). Patient evaluated unaccompanied in the exam room by his wife with no sign of confusion and/or sedation able to voices needs. Patient states that he continues with increasing neuropathic pain, numbness and tingling involving his left arm and shoulder is now more distally as well as left mid to upper back pain. Patient takes Percocet unchanged approximately every 6-8 hours and is compliant with the Neurontin, Pamelor. Patient last week started physical therapy initially 2 times a week which will drop to once a week for a total of 6 weeks. Patient also was placed on an anabiotic as well as Diflucan ending 3 weeks ago for a throat infection which she feels cleared. Patient was also placed on a Medrol Dosepak by his oncologist which did not improve his left arm and back symptoms. Patient states that his back pain had improved significantly since all of his treatments. 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 Patient will be starting a new chemotherapy tomorrow as well as steroids therapy along with this.   Options were discussed and patient desires something in addition to the above medications for his polyneuropathy symptoms. Patient has been on Lexapro 10 mg daily by his PCP for the past 3-4 years which she feels as helps. I gave the patient a prescription for Cymbalta 20 mg daily 30 tablets with one refill as well as refills on his other medications as above unchanged. Patient has 104 Percocet tablets left that we had prescribed via phone conversation. Patient advised to discuss the Lexapro and Cymbalta with his PCP Dr. Bina Bone since he has seen him in the past for this condition. I advised patient that we would not be best case scenario if he took both together and they voiced understanding. We will see him back in 4 weeks or sooner if they need us. 08/30/16:  Medical records reviewed and as per Dr. Matthew Jorgensen on 08/29/16:  -2 phase RT (initial = emergent) / locally advanced lung CA involving 2 vertebral bodies  Americo Soto has completed radiation treatments to the left lung mass invading the spinal column, with high energy photons using an conformal technique at [address] [place] [address]. The patient received a total dose of 38 Gy in 16 fractions plus a 12 GY (6 fractions) boost to 50 Gy ( spinal cord at [facility] reviewed with pt). Second opinion recommend and obtained. Treatment Start Date: 4/27/16  Treatment Completion Date: 6/3/16  The treatments were well tolerated, without significant interruption (except for the re-plan)  RTOG Acute Toxicity Grade [ARMSC]: 1-2. (Pain / fatigue)  We will look forward seeing the patient back in 3 months for a follow-up with myself or our NP [scheduled and PRN toxicity checks in addition]. The patient knows to call with any questions or concerns. Please feel free to contact me at either office to discuss the care of this patient, or if I can be of any further assistance. Patient presents to the exam room in no acute distress alert with no sign of confusion and/or sedation able to voice his needs.   Patient states that he has noticed some improvement especially to his thumb and index finger concerning the neuropathy with the addition of the Cymbalta. Patient is happening worsening back pain and is awaiting CT results next Tuesday to look at progression. Patient has lost 4 pounds and states that with chemotherapy his appetite has been decreased, he chronically has difficulty sleeping which has not changed. Patient states that he has taken Percocet on an average of 3 a day but when he had worsening back pain he was taking more frequent. Options were discussed and I advised increasing the Cymbalta to 20 mg b.i.d. and provided in a prescription for 60 tablets with one refill. Patient continues on the Lexapro 10 mg daily and will be meeting with Dr. Jose Otoole soon for assessment on possibly stopping the Lexapro versus continuing. I prescribed unchanged his Percocet 5-325 milligram tablets 1 p.o. q.6 hours p.r.n. ×120 tablets. We'll see him back in 4 weeks and look at the results of his CT scan at that time as well as benefit from the changes above.  09/27/16:  Medical records reviewed and patient presents today looking well without sign of sedation and/or confusion able to voice his needs accompanied by his wife. Patient complaining of sinus congestion, sore throat, raspy voice and recently saw his PCP who placed him on amoxicillin yesterday 500 mg t.i.d. Patient states that other than this he is feeling better with better control neuropathy on current regiment and off the Lexapro by his PCP. Patient reviewed how he is taking his medications which is appropriate and denies other new symptoms. Options were discussed and we are continuing the same medication regiment prescribing Percocet 5-3 and a 25 mg tablets today 1 p.o. q.6 hours p.r.n. in which he is taking crocs 23 a day ×120 tablets. Patient states that he has 11 days remaining of his Percocet.   Patient has enough of the Cymbalta, gabapentin and was prescribed Diflucan 100 mg daily ×14 days with one refill. We will see him back in 4 weeks or sooner if he needs us. 10/24/16:   Medical records reviewed and as per Dr. Galina Delgado on 10/14/16:  Elena Bean is well known to me since the emergent R and concurrent course was completed. His spine pain is reduced. CHEMO continues. No early delayed RT effects. All questions answered. TOB cessation discussed again. FU with [facility] as planned and with me in 3 months or sooner PRN. Patient presents to the exam room today without sign of sedation but less jovial looking more uncomfortable for follow-up complaining of right ear and right cheek pain stating that he went to urgent care Friday and was diagnosed with what he knows he has always had, TMJ. Patient also finished a Z-Eugenio by his PCP one week ago which did not help. Patient also complains of worsening left hand numbness and tingling. Patient states that he took 2 Percocet tablets last night which did help with the pain but then also made him sleepy. Patient compliant with his other medications and states that initially with the Diflucan his throat pain improved some but not significantly. Patient did state that he has a difficult time swallowing at times. Options were discussed and today we are increasing his Cymbalta to 30 mg b.i.d. ×60 tablets and 1 refill electronically prescribed. Patient will continue his Neurontin 900 mg t.i.d.  Today we initiated Duragesic 12 µg patch q.72 hours as directed ×10 patches and prescribed unchanged his Percocet 5-325 milligram tablets 1 p.o. q.6 hours p.r.n. ×120 tablets prescribed today. Patient will be seen back in 4 weeks or sooner and at this time consider increasing the Cymbalta, Percocet dosage, Duragesic patch and reassessing for thrush which is not present today. He will call if symptoms increase or if he has difficulty with the Duragesic in which she was reluctant to accept.   11/15/16:  Medical records reviewed and spirometry on 10/24/16:  DATA: Spirometry done in the office today demonstrates an FVC of 4.17 liters which is 85 % of predicted with an FEV1 of 3.10 liters which is 83 % of predicted. FEV1/FVC ratio is 74 %. Mid expiratory flow rates are 78% of predicted. Maximum voluntary ventilation is normal at 106 liters per minute or 74% of predicted. Total lung capacity is 6.83 liters which is 94% of predicted. DLCO is 12.43mm/min/mmHg which is 36% of predicted. Flow volume loop shows no signs of intrathoracic or extrathoracic obstruction. Impressions: No obstructive or restrictive lung disease. Normal total lung capacity. Severe decrement in diffusing capacity  Patient presents to the exam room today accompanied by his wife at his baseline with no sign of sedation and/or confusion. Patient states that since his oncologist increased his Duragesic to two 12 µg patches his pain has improved approximately 75%. Patient states that he was able to decrease his Percocet to approximately 3 daily on the average. Patient has not noticed much of a difference since we increased his Cymbalta 1 month ago. Patient states that a left chest pain is new otherwise his symptoms are unchanged. 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 Patient desires to wean himself off of the Percocet as it does make him sleepy therefore did accept my suggestion of continues to titrate upward the Duragesic. Today we increased him to Duragesic 37 µg q.72 hours and provided a prescription for 10 of each strength patch. Patient was not given a prescription for his Percocet as he states he has not filled the last prescription I gave him. There are no signs of thrush but we will continue to monitor and patient compliant with his other medications.   We will see him back in 4 weeks and evaluate his need for his breakthrough pain medicine and consider titrating the Duragesic further if indicated. We will question if he has returned to work. We will also consider increasing his Cymbalta to 40 mg b.i.d. if indicated. 12/13/16:  Medical records reviewed and as per radiation oncology's assessment on 11/30/16:  Patient is doing well post-radiation completion. CT scan on 11/28/16 showing paratracheal node enlargement and he is symptomatic with whispery voice/ sore throat -- ENT referral written today to Dr Colletta Rusk (physician per patient preference). Will also update Dr Socorro Coles on imaging report and referral. Further imaging per med onc or ENT. Pain well controlled. Continue with Dr Marylou Perez (palliative medicine). Continue to follow with Dr Kvng Vick (pulmonology). Smoking cessation reinforced again. Patient verbalized understanding. I discussed follow up plans with Fernando Vegas. At this time, Dr Liseth Douglas will see the patient back in January 2017 for a post-radiation completion follow-up visit. Fernando Vegas is to follow up with other physicians involved in their care as directed (including but not limited to Medical Oncology, Primary Care, Pulmonary, and Surgery). The patient was given our contact number in the event that if at any time they change their mind and would like to return to the clinic to see either myself or one of the Radiation Oncologists, they can simply call us and we would be happy to see them. Thank you for involving us in the management of this extremely pleasant patient. More than 25 min was in direct contact with pt coordinating/giving care. >50% of the visit was spent in counseling the pt on the following: Follow up care  The nurses notes were reviewed and incorporated into this assessment and plan. Patient presents to the exam room today stating that he now is no longer taking Percocet and is comfortable on the patches. Patient denies new symptoms and stated that he saw his ENT yesterday who felt that he may need a trach.   They saw their oncologist reviewed with pt and wife. TOB cessations discussed, again at length. -FU with Dr. Rehana Bean for systemic immunotherapy and re-staging. -MM for Sx. Patient presents to the exam room today accompanied by his wife looking well in no acute distress without sign of sedation and/or confusion. Patient states that Ativan 0.5 mg q.h.s. help significantly with his sleep without sedation during the day. Patient also is compliant with his other medications without change stating that his pain is much better controlled. Patient reviews with me his visit with Dr. Denis Berry and CAT scan. Patient states there is no change in how he is using his Duragesic other medications and requesting refill on Ativan, Duragesic and Cymbalta. Options were discussed and patient was provided with prescriptions for Ativan, Duragesic, Cymbalta all unchanged and we will see him back in 4 weeks or sooner if they need us. Patient is very comfortable with his symptom control currently and looks well.  03/06/17:  Medical records reviewed and no documented patient visits in Epic since I have seen him. CT of soft tissue neck from 01/27/17:  1. Irregular opacities seen at medial aspect left lung apex and left   upper lobe suggestive of area of treated lung cancer and radiation   changes.       2. Previously seen lytic lesions within upper thoracic vertebral   bodies and left second rib are now sclerotic which is suggestive of   treated bone metastases.       3. Larynx appears unremarkable. Vocal folds are symmetric.       4. No evidence of cervical lymphadenopathy.       5. Slight limitation to this examination due to metallic artifact from   anterior fusion hardware associated with cervical spine from C3-C7. Patient presents today in no acute distress without sign of sedation and/or confusion. Patient plane of right sided scapular, shoulder, arm symptoms that are similar to his left side over the past several weeks. Patient denies falls or Patient is aware that he is working too long and now is going to day shift instead of midline shift and hopefully cutting back a few hours a day. I talked to the patient about how a mild change we will not affect him significantly that he may want to consider a significant change in his work status and he voiced understanding but not acceptance at this point. Patient wanting to see how much of a difference this would make. Patient also asking about the Duragesic patches and fatigue stating that over the past month his pain has been better controlled. Patient also still dealing with dryness and fissuring especially on his left hand across the dorsum of his knuckles and he will now try to splint his left index finger attempting to avoid bending at least at night. Options were discussed and I am refilling unchanged his Duragesic 37 µg ×10 patches of each strength. Secondary to fatigue he will initially try just wearing the 25 µg patch and then if pain worsens he will then apply a 37 µg dose and continuing unchanged. They voiced understanding. Of course he is continuing his Ativan, Cymbalta 40 mg b.i.d. and he will need refills of his Ativan next time when we see him in 4 weeks from now, possibly Pamelor and of course his patch or patches. Patient has gained almost 3 pounds over last month and we will continue to monitor. 05/09/17:  OARRS report reviewed today without any prescribing red flags. Medical records reviewed and as per radiation oncology assessment while an inpatient on 05/03/17:  ASSESSMENT/PLAN:  Gallito Pitts appears clinically stable. CT neck and fiberoptic laryngoscopic findings are discussed with the patient and his wife. I also had the opportunity to speak to Yuliana Carter and NENO, today. I believe the CT findings of soft tissue attenuation at the piriform recess and arytenoids bilaterally are the results of the vocal cord paralysis and not a discrete tumor mass.  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 At diagnosis, the large left upper lung mass extended into the mediastinum and may be now contributing to the right recurrent laryngeal nerve involvement along with prior treatments. In any case, palliative radiation treatments directed to the hypopharynx would not be recommended. Mr. Kristel Salamanca remains adamantly opposed to undergoing a tracheostomy to protect his airway as discussed with Dr. Kerry Brown. I did discuss this with the patient again along with the rec of doing this when he is stable rather than on an emergent basis. He and his wife voiced understanding. CT chest from February, 2017 shows some response to treatment. As discussed with Dr. Aldair Kirkpatrick, plans would be to continue systemic therapy as an out patient followed by restaging CT/PET/CT imaging. We will follow up on this when completed. Mr. Kristel Salamanca is feeling better and is asking when he will be discharged. 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 He also continues to smoke cigarettes. I am concerned about an acute pulmonary event and I suggested he take time off from work. He should definitively quit smoking. He and his wife voiced understanding. Thank you for allowing me to participate in Mr. Dino Gaxiola evaluation. As per H&P while an inpatient on 05/03/17:  HISTORY OF PRESENT ILLNESS: The patient is a 77-year-old  male with  history of COPD and adenocarcinoma of the lung who presented to the Emergency  Room with shortness of breath and hoarse voice that worsened throughout the  day of admission. He does have a history of subglottic airway narrowing and  has been seen by ENT for it. He denies any issues with chest pain. He feels  like something is stuck in his throat. He had breathing treatments in the  Emergency Room which were of very little help.  He has finished chemotherapy  approximately the end of 11/2016 and now is getting immunotherapy. His last  immunotherapy treatment was 2 weeks ago. The next one is scheduled for  05/09/2017. He was seen in consultation by ENT. Tracheostomy was offered,  but the patient declined. He was also seen in consultation by Pulmonary. His  chest x-ray on admission shows a possible right lung infiltrate. The patient  was started on Pulmicort and Perforomist aerosol treatments along with  DuoNebs. Respiratory cultures and viral panels were obtained. DIAGNOSTIC IMPRESSION:  1. Bilateral vocal cord palsy. 2. History of chronic obstructive pulmonary disease, not in acute  exacerbation. 3. History of nonsmall cell lung CA, currently receiving immunotherapy. 4. Subglottic narrowing. 5. Tobacco dependency. 6. Depression. 7. Hypercholesterolemia. As per ENT consult while an inpatient on 05/02/17:  Patient is a 65 yo male, history of COPD and lung adenocarcinoma, that presented to the ED late last night with complaints of shortness of breath and hoarse voice that worsened throughout the day yesterday. He has history of subglottic airway narrowing and has been seen by ENT for it. He denies any issues with shortness of breath at the time but says it feels like something is stuck in his throat. He had breathing treatments in the ED which said helped a little. He mentions that he had some diarrhea for the past few days but denies any other symptoms. Patient says he has cut back on his smoking, but still was smoking prior to coming into the hospital.  He has finished chemotherapy approximately the end of Nov 2016 and now currently getting immunotherapy with carbo/Avastin + Xgeva. His last treatment was 2 weeks ago and his next one is scheduled for May 9. Voice was worse yesterday and more SOB, better today. Was noted to have left vocal cord palsy in Dec 2016  EXAM - erythema, dryness of supraglottic larynx but no lesions.  Fiberoptic laryngoscopy, both vocal cords in paramedian position, minimal abduction, right cord does medialize slightly with phonation. Unable to see subglottic  No neck mass  IMP - bilateral vocal cord palsy. Would be important to delineate status of mediastinum taking out both cords. [place] was discussed as alternative for improving breathing status. Pt does not wish to proceed with this at this time. Risks, options discussed with pt and wife  Patient presents today with his wife and daughter without sign of sedation and/or confusion able to voice his needs. Patient appropriate and talking with a whispering/raspy voice without shortness of breath noted. Patient states there's been no change to the medications and he needs refills. Patient and family discussing the recommendations of a tracheostomy and all the above. Patient sees Dr. Sonny Rand tomorrow and medical oncology on Thursday to further discuss treatment options. Patient also complaining of worsening numbness and tingling involving his right hand now. Options were discussed and we are refilling his medications unchanged as per Epic. Today I talked to them for 45 minutes about differences in emergent versus elective tracheostomy and answered their questions. Wife has been wanting the patient to quit work for quite some time and patient has been resistant admitting today that he feels as long as he can work in the cancer is not beating him. Patient is also worried about insurance coverage once he quits work. We had a long very cynthia discussion and patient admits that [facility] has to go\". Patient feels that over the next month he needs to make steps to end his employment for his health. I advised him to talk to social work, insurance, his work about options for [employer] and they voiced understanding.   Patient's wife has researched different treatments 1 especially that they are only performing at [facility] and animals concerning electrical stimulation of vocal cords.  She does admit that treatments are several years off as she has been told by them. Concerning the treatment including a tracheostomy of his specific case she will consider what his specialists above are recommending and gather information on details of the trach/treatment. He will then make his decision based on their recommendations. Patient states that he needs to have a tracheostomy then he would but he wants to know if there are any other options. Patient did talk about not having an appetite and we are initiating today Remeron 7.5 mg q.h.s. as per Epic and will see him back in 4 weeks or sooner if he needs us. 06/27/17:   Medical records reviewed and as per general surgery assessment from 06/26/17: The patient says he is doing better. He is breathing better with the tracheostomy in place and is tolerating tf well through his PEG. He has some redness right around the PEG and has a small amount of drainage. 64y.o. year old male with lung cancer, s/p PEG Instructed on PEG care Will monitor redness for now, does not appear infected Follow up in 6 months  As per pulmonology assessment on 06/19/17:  I had the pleasure of seeing Fernando Vegas in our office in follow-up regarding his recent Hospital admission with sepsis, MRSA pneumonia and FTT. Patient was discharged from the hospital to [address] completed his course of antibitics and jsut got discahrged few days ago. . Since his hospital discahrge patient reports no shortness of breath no cough. Has better appetite. Patient denies chest pain, cough and hemoptysis. Patient is compliant with his Albuterol. Patient presents today accompanied by his wife looking more comfortable than he had in the past able to speak with a better voice although still raspy with the valve in place.   Patient states that when he was at [facility] they decreased him to Duragesic 25 µg q.72 hours and he noticed a significant increase in his joint and muscle aches, left shoulder pain.  Patient states that whenever he went home on [address] he added the second patch for a total of 37 µg q.72 hours without sedation and he is comfortable now on his current regimen. Patient states that he is breathing much better after his tracheostomy and swallowing better. Wife is supplementing and has basically a sliding scale on the percentage of oral intake that she uses. Patient states that he was down to 140 pounds and now is up into the 150s. Our records reveal the same. Patient states that he has noticed some difference with improvement of appetite but more so with sleeping all night now on the Remeron 7.5 mg q.h.s. without sedation but states there is significant room for improvement on his appetite. Patient also compliant with Neurontin 900 mg t.i.d., Cymbalta 40 mg b.i.d., Pamelor 25 mg q.h.s. and very infrequently takes is 0.5 mg q.h.s. Patient denies constipation and states that is moving his bowels well. Options were discussed and we are increasing his Remeron to 15 mg q.h.s. as per Epic and I gave him a prescription for the Duragesic 37 µg q.72 hours 10 patches of each strength. Patient has refills on his Neurontin 900 mg t.i.d., Ativan 0.5 mg q.h.s. p.r.n., Cymbalta 40 mg b.i.d. and Pamelor 25 mg q.h.s. We will continue us and monitor his weight and symptoms. Patient looks much more comfortable today. 07/25/17:  Medical records reviewed and discharge summary on 07/09/17:  Principal Problem:    Orthostatic hypotension  Active Problems:    Panlobular emphysema (HCC)    Malignant neoplasm of overlapping sites of left lung [facility])    Palliative care encounter    COPD (chronic obstructive pulmonary disease) (HCC)    Tobacco dependence    Severe protein-energy malnutrition ([address]    Anemia  Patient presents today looking very weak, tremulous and not as talkative but able to respond and answer in short sentences.   Patient over the past 2-3 days as per wife's history has been very weak and with nausea vomiting, dehydration. Patient is being visited by home health but refuses to go back to the hospital stating that he realizes that he needs to go but he does not want to go back to the hospital at this time. Patient just left ENTs office finding a new right sided neck mass. Apparently the ENT discussed with Dr Herlinda Yepez and they will perform a PET scan to look at the extent of the lesions. Typically patient goes to the blood and cancer Center every 3 weeks but chemotherapy is on hold secondary to the patient's weakness. Options were discussed and we are refilling his medications unchanged. We are ordering one liter of IV fluids today and via home health will try to get this at least 3 times a week. I reiterated to the patient that he should go to the emergency department and he voiced understanding but did not want to go despite our suggestions. Patient looks ill and depending on how he responds to the IV fluids may continued to decline significantly in the near future. 08/22/17:  Medical records reviewed and as per radiation oncology's assessment from 07/27/17:  Mr. Sangeeta Saucedo is a pleasant 64year old man with a new glottic lesion. This is either a distant progression form the lung cancer or a second primary. I have spoken with Dr. Herlinda Yepez who recommend fractionated external beam radiation therapy prior to re-initiation of systmeic therapy as there is no other disease progression noted. We agree with this approach however a definitve dose may not be feasible due to previous RT as Dom knows. Certainly effective palliation is possible. The risks, benefits, alternatives, process and logistics of external beam radiation were reviewed. We answered all of the patient's questions to the best of our ability. Crystal Qureshi and his wife verbalized understanding and seemed satisfied.  Radiation planning will commence within 14 days; the next step in management being the simulation scan, with external beam presents to the exam room today accompanied by his wife without sign of sedation and/or confusion able to voice his needs. Patient looking better every time I see him and has gained 10 pounds as per our records. Patient states that his pain is controlled on the current regiment of Duragesic 37 µg q.72 hours, gabapentin 900 mg t.i.d that he likes taking better than the suspension. , Cymbalta 20 mg b.i.d., Pamelor 25 mg q.h.s. not needing anything further. Patient is also compliant with Remeron 15 mg q.h.s. and very infrequently uses Ativan 0.5 mg q.h.s. because he is tired in the evenings. Patient was able to go outside of work around the house for approximately half hour the other day with fatigue afterwards. Patient does complain of not only physical fatigue but mental fatigue oftentimes forgetting common names even names of grandchildren. Patient asking if there is anything that would help with this. Patient is having his last radiation therapy today stating that he has a slight sore throat but is using Magic mouthwash 2-3 times a day which helps. Patient denies any new and/or uncontrolled symptoms otherwise. Options were discussed and secondary to patient's good oral intake we are stopping the IV fluids and will monitor. We may reinstate this in the future but he is doing well. We are continuing unchanged his Duragesic 37 µg q.72 hours prescribing 10 patches of each strength. He will continue his other medicines unchanged as above and today we are initiating Ritalin 5 mg each morning to help with his mental and physical fatigue. Patient educated on the medication and will call if he has any difficulties otherwise we will see him back in 4 weeks or sooner if he needs us.   10/31/17:  Medical records reviewed and as per radiation oncology assessment from 10/23/17:  -post Tx FU - no charge                        -no new Sx, skin changes resolving                        -KPS rebouned to 70-80 (looks much better today)  -RBA reviewed  -Q+A reviewed  -cont FU with [facility], skin rash on hands defer to Dr. Laura Gaston  Patient presents with his wife in no acute distress without sign of sedation ambulating well able to voice his needs. Patient has complained of increasing fatigue over the past month and has actually had his Synthroid doubled by his PCP a few days ago. Patient will continue to follow-up with his PCP in 6 weeks from now for repeat TSH. Patient did not notice a difference with his Ritalin 5 mg q.a.m. neither good or bad. Patient has remained active and actually is working with [employer] in his bathroom at home. Patient now finished with his radiation therapy for approximately 1 month. Patient recently saw his ENT who will be scoping him at the beginning of November. Patient states that there is been no change in how he is taking his medications and he has one box of each of the Duragesic strength remaining at home. Patient has complained of \"jumpy legs\" at night more so over the past 1 month despite the use of Ativan. Options were discussed and we are stopping the Ritalin in case this is exacerbating his \"jumpy legs\" but may try again down the road. I feel that most of his fatigue is exacerbated by the hypothyroidism and we will see how much of this symptom is remedied with normalization of his thyroid hormone. Today we are increasing his Cymbalta to 30 mg q.12 hours as he is noted worsening left hand neuropathy as well as the above symptoms. We are continuing unchanged his Duragesic 37 µg q.72 hours prescribing 5 patches of each strength. Patient will continue his other medications unchanged and we will see him back in 4 weeks or sooner if he needs us. 11/28/17:  Medical records reviewed and [facility] records revealing the dislodged PEG tube and reinsertion reviewed.   Patient presents today accompanied by his wife in no acute distress at his baseline without sign of sedation and/or confusion able to voice his needs. Patient states that there is been no change to his symptoms and he has been more active painting around the house with increasing aches and pains especially around his neck and back. Patient states that his \"jumpy legs\" are better now that we stop the Ritalin. Patient comfortable on current regiment. Patient states that he sees his medical oncologist this week and will have a CT of the throat in December and a PET scan after the beginning of the year. Options were discussed and we are refilling his Duragesic unchanged at 37 µg q.72 hours prescribing 10 patches of each strength, Patient has refills on unchanged Neurontin 900 mg t.i.d., Cymbalta 30 mg q.12 Remeron and Pamelor unchanged. We will see him back in 8 weeks or sooner if he needs us and he was advised to call us 1 week prior to running out of his Duragesic patches and we will prescribe electronically as he has been wonderfully appropriate and compliant. We will review the throat CT and PET scan as above as well as recommendations from his medical oncologist.  01/23/18:  Medical records reviewed and no documented physician visits in [place] since I last saw him. Patient presents with his wife in no acute distress without sign of sedation and/or confusion able to voice his needs ambulating well. Patient has lost another 2 pounds and still has a decreased appetite despite taking Remeron 15 mg nightly. Patient is working out details with his employer/insurance and there may be a problem with pain from medication soon. Patient complains of significant fatigue and feels that it is the cold weather and wife states that oftentimes he will just sleep all day. There are small projects around the house but he does not feel like doing them. Options were discussed and I feel that his fatigue is from his decreased caloric intake.   Patient is resistant to starting his tube feedings as recommended to utilize at least twice a day if he is not discussed that he should attempt to follow the instructions of his pulmonary team regarding his airway at night. Options for continued treatment were discussed, and he will continue to use his Remeron 30 mg at at bedtime, continue Cymbalta 30 mg every 12 hours, continue Neurontin 900 mg 3 times a day, and his Pamelor 25 mg at at bedtime. He states that his pain has been well controlled, and in fact he states that he has been stretching his fentanyl patches out to about 4 days over the past week or so. We discussed reducing his final dose, and he is agreeable to attempting this. We will prescribe today fentanyl 25 µg every 72 hours. He knows to call if pain worsens, or other uncontrolled symptoms arise. We will see him back in 8 weeks, or sooner if needed. As per phone discussion with Kathleen Shabazz on 05/04/18:  Call received with request for refill of fentanyl patch. They also requested reinitiation of the additional 12.5 µg patch, as per his prior regimen, due to increase in generalized pain. Will refill patient's 25 µg patch and reorder 12.5 µg patches. Patient is to follow-up the palliative medicine outpatient clinic on April 15. As per radiation oncology assessment from 05/03/18:  Mr. Tristen Brannon is a pleasant gentleman well know to me with a history of both lung cancer and laryngeal cancer s/p concurrent chemo-RT for the former and definitve intent fractionated external beam radiation therapy for the latter (with the highest dose the spinal cord could tolerate - see above). He is still on immunotherapy and his KPS is intact at KPS 70 WO new Sx. There is no evidence of disease recurrence or progression based on a detailed review of the available imaging, physical exam, and ROS (all personally performed prior to and during this follow up appointment today).   The patient did verbalize understanding for the indications of continued FU including imaging and laboratory evaluation as applicable to this case; as well as appropriate lifestyle choices and health maintenance. All questions answered to the best of my ability. 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 Patient presents with wife ambulatory in no acute distress looking much more comfortable than I previously seen him in the past gaining 11 pounds stating that he is much more active at home with carpentry and working on his cars. Patient reviews with me the increasing pain which is still experiencing describing as neuropathy involving his left leg and left arm requiring an increase of the fentanyl patch as above after we attempted to decrease. Patient compliant with 37 µg q.72 hours but does complain of worsening pain after the secondary the patch. Patient does take Tylenol for breakthrough pain. Patient's #1 complaint is difficulty falling and staying asleep which has been a chronic problem but improved in the wintertime. Patient for many years working midnights and afternoon shift and of course contributing to the problem. Patient continues to use Ativan 0.5 mg which creates sedation but still with difficulty falling asleep and staying asleep. Patient states that he has good energy throughout the day improved from previous despite difficulty sleeping. Patient currently denies restlessness of his legs as a contributing factor. They also talk about watching a movie when they cannot sleep but will try not doing so. Options were discussed and they will try melatonin 3 mg q.h.s. and call to have any difficulties. Other options down the road may be trazodone, Ambien or other sleep aids. They will call us in a couple of weeks and we may prescribe this. We are continuing Duragesic 37 µg q.72 hours and he will call when he is out of the patches. We are sending the patient to physical therapy to help with the peripheral neuropathy. We will see the patient back in 8 weeks or sooner if they need us.   Patient knows that they can call us anytime. 07/10/18:  Medical records reviewed including physical therapy notes and no documented physician visits in [facility] since I last saw him. Patient presents today accompanied by his wife in no acute distress at his baseline without sign of sedation and/or confusion able to voice his needs. Patient states that there is no significant change in how he is doing other than he is sleeping better most nights with melatonin. Patient does complain of pain in his lower back down the back of his legs periodically, sometimes restless legs at night. Patient takes Tylenol for breakthrough pain and is compliant with his Duragesic patc hes having for remaining of each. Patient also requesting a refill on the Colace. Options were discussed and I offered Requip at night for restless legs or changing his medication profile for his lower back pain but he desires to keep everything the same for the time. I continued his Duragesic 37 µg q.72 hours prescribing 10 patches of each strength, refilled his Colace and he will continue his melatonin q.h.s., other medications unchanged including Ativan 0.5 mg, Remeron 30 mg at at bedtime, continue Cymbalta 30 mg every 12 hours, continue Neurontin 900 mg 3 times a day, and his Pamelor 25 mg at at bedtime. We are extending his appointment out to 12 weeks and he knows to call prior to running out of his fentanyl patches or if he needs anything. Patient congratulated that he is now in remission as per his oncologist.  10/02/18:  Medical records reviewed including assessment by Pedrito Alvarado of palliative care on 09/24/18:  Spoke with patient and wife about discharge plan. The patient will be discharged to home with IV antibiotics and has a PICC line in place. He will continue current medications for symptom management. Reviewed medications with patient and family. Wife states that the patient has enough medication to last until next visit to palliative medicine outpatient clinic. Prescription provided for Marinol 2.5 mg twice a day ordered for appetite stimulation. Patient tolerating medication without side effects. The patient's wife will call the palliative medicine outpatient clinic to arrange an appointment in the next 2 weeks. [address] APRN-CNP  Patient presents today accompanied by his wife looking well without sign of sedation and/or confusion able to voice his needs. Patient states that slowly he is regaining his appetite but there is still significant room for improvement. A shunt feels that the Marinol helps without side effects. Patient states his breathing is slowly improving but still not back to baseline. Patient has 5 patches of each strength of his fentanyl patches and states there's no change in how he is taking his medications. Patient denies other new and/or uncontrolled symptoms otherwise. Patient is following up with infectious disease on October 10 and will ask them about restarting his hyperbaric treatment secondary to his gum disease. Options were discussed and today we are doubling the Marinol to 5 mg b.i.d. as per Epic. Patient advised that if he does not notice a benefit or does not like the way it makes him feel he can always break the tablets in half and go back to his original dosage. Patient will continue his other medications unchanged including Duragesic 37 µg q.72 hours, Colace, melatonin q.h.s., Ativan 0.5 mg, Remeron 30 mg q.h.s., Cymbalta 30 mg q.12 hours, Neurontin 900 mg t.i.d., Pamelor 25 mg q.h.s. He will continue his other medications as well and we'll see him back in 4 weeks monitoring his weight. They know to call us at any time. 10/30/18:  Medical records reviewed and patient presents ambulatory in no acute distress accompanied by his wife without sign of sedation and/or confusion able to voice his needs.   Patient appears frustrated today and states that he has significant shortness of breath with activity that has been treatment response and maintained a reasonable QOL however a new lesion in the larynx was diagnosed this then was treated with fractionated external beam radiation therapy to the maximum dose allowed y the spinal cord (QUANTEC). Manju Perosanne presents today for FU.             Benjamin underwent RT initially due to newly diagnosed NSCLC with erosion of the mid thoracic, left side of the spinal column with probable cord involvement. After a short hypo fractionated course (emergent) the remainder of the RT was delivered with concurrent chemo. The maximum dose was delivered as allowed by normal tissue tolerance (cord) [physics consult obtained], RBA reviewed with pt at that time. Potential chronic effects discussed today with pt and wife. He did get a second opinion at [facility] scanned in to [facility] but wants to stick with myself and Dr. Cinthya Sarmiento for his care. Dr. Cinthya Sarmiento initially prescribed Pearley Nailer / Perry Gores + [place]. See [email or web address] records for further [address] including immunotherapy. Benjamin then came in with continued voice change and stridor that then prompted a tracheostomy (last year). 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  We prescribed fractionated external beam radiation therapy to the highest dose the spinal cord could tolerate per QUANTEC and he has had a response to treatment with improvement in his voice and stable KPS still on immunotherapy per Dr. Cinthya Sarmiento. He recently had a new pneumonia with questionable mass in the right side, continued FU pending. If this is malignancy (biopsy could be considered) then SBRT is a reasonable considerations. 2nd opinion offered and declined. KPS 70. He does continue to smoke. Cessation again discussed.     Mr. Taty Bennett is a pleasant gentleman well know to me with a history of both lung cancer and laryngeal cancer s/p concurrent chemo-RT for the former and definitve intent fractionated external beam radiation therapy for the latter (with the highest dose the spinal cord could tolerate - see above) --- he has a recently diagnosis of pneumonia +/- mass with continue FU pending (SBRT could be considered for oligo metastatic disease vs 3rd primary depending on the clinical course.)  He is still on immunotherapy and his KPS is intact at KPS 70 WO new Sx perhaps SOB). Eleni Kocher is no evidence of disease recurrence or progression based on a detailed review of the available imaging, physical exam, and ROS (all personally performed prior to and during this follow up appointment today).  The patient did verbalize understanding for the indications of continued FU including imaging and laboratory evaluation as applicable to this case; as well as appropriate lifestyle choices and health maintenance.  All questions answered to the best of my ability. 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 Patient presents today accompanied by his wife in no acute distress at his baseline ambulating well without sign of sedation and/or confusion able to voice his needs. Patient states is no change in how he is taken his medications needing a refill on his fentanyl patches. Patient started pulmonary rehab last week and states that his breathing is slowly improving. Patient gained 3 pounds and denies new and/or uncontrolled symptoms otherwise. Options were discussed and we are continuing the same medications unchanged including Duragesic 37 µg one patch q.72 hours prescribing 10 of each strength, Colace, melatonin, Ativan, Remeron 30 mg q.h.s., Cymbalta 30 mg q.12 hours, Neurontin 900 mg t.i.d., Pamelor 25 mg q.h.s. We will see him back in 8 weeks and they know to call us at anytime with any questions and/or concerns. 01/28/19:  Medical records reviewed and as per pulmonology assessment on 12/07/18:   I had the pleasure of seeing Magdy Inman in our office in follow-up regarding his COPD, history of MRSA pneumonia and lung cancer. Overall he has been doing well. He has gained weight. He is attending pulmonary rehab. Complying with his use of budesonide and Perforomist.  Continues to be on Keytruda and chronic prednisone 10 mg daily. He continues to remain compliant with and benefit from the use of oxygen at 3 lpm with activity and nocturnal.  Also follows with Dr. Cassandra Moreland for palliative care for his pain control. Lisa Valadez was seen today for copd and cancer. Diagnoses and all orders for this visit:  Tracheostomy in place [facility])  History of staphylococcal pneumonia  -     XR CHEST STANDARD (2 VW)  -     Cancel: MRSA SCREENING CULTURE ONLY; Future  Pancoast tumor of left lung ([address]  Centrilobular emphysema (HCC)  Chronic respiratory failure with hypoxia (HCC)  Chest x-ray shows persistent  right upper lobe nodular density will  proceed with obtaining a CT scan considering patient's history. We'll check MRSA screening. Continue oxygen at 3 lpm with activity and nocturnal  Continue budesonide and Perforomist breathing treatments. FOLLOW UP  Return in about 3 days (around 12/10/2018) for ct scan. Chest CT results on 12/18/18:  Persistent ill-defined infiltrative soft tissue density in the left   upper lobe medially extending to the apex, abutting the mediastinum   and aortic arch which may represent inflammatory process like   pneumonia or malignancy. There is associated mixed sclerotic and lytic   lesion involving the T1 and T2 vertebral body and left second rib   posteriorly. Infectious involvement or malignancy are considered and   further assessment is recommended.       Improving pneumonic infiltrate in the right upper lobe. There is also   thoracic aortic aneurysm   Patient presents today And advised wife noted distress without sign of sedation and/or confusion able to voice his needs. Patient complaining of increasing lower back pain across his hips that the fentanyl patches are not helping.   Patient also complaining of gum pain stating that he will be calling in oral surgeon soon as he has exposed bone and he cannot wear his dentures for more than a couple hours at a time. Patient also is now on 2 new nebulizers every 12 hours by pulmonology. Patient also asked to talk to me in private and his wife stepped out. Patient states he is having difficulty urinating despite his Flomax and I advised him to contact his urologist to discuss titration of medications. Today we are decreasing his Pamelor from 25 q.h.s. to 10 mg q.h.s. secondary to anticholinergic effect. Patient also states that he is experiencing more depression without any suicidal ideations. Patient did state that he is thinking about stopping treatment and really does not like feeling this way. I talked to him about counseling and he was very receptive to counseling. Patient states that he protects his wife and does not want to tell her about these things and is not real close to his family to talk to them about intimate details. Patient did talk to his elder sister at one time whom he is close to. Patient also complains about sexual dysfunction and I discussed this with him as well being multi modality. Options were discussed and I made the above recommendations as well as today continuing his fentanyl 37 µg q.72 hours as we normally do and today we initiated p.r.n. Percocet 5-325 milligram tablets 1 p.o. q.6 hours p.r.n. holding for sedation prescribing 120 tablets today. We see what his opioid load requirement is and then add to the fentanyl patch if need be. Concerning his depression we are sending him to [facility] for counseling and recommendations. Today we are keeping him on Cymbalta 60 mg q.h.s., Remeron 30 mg q.h.s. and we did decrease his Pamelor from 25 mg to 10 mg q.h.s. secondary to BPH and his above complaints. I will be glad to discuss results with them if need be.   I will see him back in 4 weeks or sooner if he needs us and I advised

## 2019-07-01 PROBLEM — J00 ACUTE RHINITIS: Status: ACTIVE | Noted: 2019-07-01

## 2019-07-09 DIAGNOSIS — C34.12 PANCOAST TUMOR, LEFT (HCC): ICD-10-CM

## 2019-07-09 DIAGNOSIS — G89.3 CHRONIC PAIN DUE TO NEOPLASM: ICD-10-CM

## 2019-07-09 DIAGNOSIS — C34.82 MALIGNANT NEOPLASM OF OVERLAPPING SITES OF LEFT LUNG (HCC): ICD-10-CM

## 2019-07-09 DIAGNOSIS — G90.2: ICD-10-CM

## 2019-07-09 RX ORDER — FENTANYL 50 UG/H
1 PATCH TRANSDERMAL
Qty: 10 PATCH | Refills: 0 | Status: SHIPPED | OUTPATIENT
Start: 2019-07-09 | End: 2019-08-06 | Stop reason: SDUPTHER

## 2019-07-09 RX ORDER — OXYCODONE HYDROCHLORIDE AND ACETAMINOPHEN 5; 325 MG/1; MG/1
1 TABLET ORAL EVERY 6 HOURS PRN
Qty: 120 TABLET | Refills: 0 | Status: SHIPPED | OUTPATIENT
Start: 2019-07-09 | End: 2019-08-06 | Stop reason: SDUPTHER

## 2019-07-09 RX ORDER — FENTANYL 12 UG/H
1 PATCH TRANSDERMAL
Qty: 10 PATCH | Refills: 0 | Status: SHIPPED | OUTPATIENT
Start: 2019-07-09 | End: 2019-08-06 | Stop reason: SDUPTHER

## 2019-08-06 DIAGNOSIS — G89.3 CHRONIC PAIN DUE TO NEOPLASM: ICD-10-CM

## 2019-08-06 DIAGNOSIS — C34.12 PANCOAST TUMOR, LEFT (HCC): ICD-10-CM

## 2019-08-06 DIAGNOSIS — G90.2: ICD-10-CM

## 2019-08-06 DIAGNOSIS — C34.82 MALIGNANT NEOPLASM OF OVERLAPPING SITES OF LEFT LUNG (HCC): ICD-10-CM

## 2019-08-06 RX ORDER — FENTANYL 50 UG/H
1 PATCH TRANSDERMAL
Qty: 10 PATCH | Refills: 0 | Status: SHIPPED | OUTPATIENT
Start: 2019-08-06 | End: 2019-08-27

## 2019-08-06 RX ORDER — FENTANYL 12 UG/H
1 PATCH TRANSDERMAL
Qty: 10 PATCH | Refills: 0 | Status: SHIPPED | OUTPATIENT
Start: 2019-08-06 | End: 2019-08-27

## 2019-08-06 RX ORDER — OXYCODONE HYDROCHLORIDE AND ACETAMINOPHEN 5; 325 MG/1; MG/1
1 TABLET ORAL EVERY 6 HOURS PRN
Qty: 120 TABLET | Refills: 0 | Status: SHIPPED | OUTPATIENT
Start: 2019-08-06 | End: 2019-08-27

## 2019-08-15 ENCOUNTER — HOSPITAL ENCOUNTER (OUTPATIENT)
Dept: RADIATION ONCOLOGY | Age: 64
Discharge: HOME | End: 2019-08-15
Payer: MEDICARE

## 2019-08-15 VITALS
OXYGEN SATURATION: 93 % | DIASTOLIC BLOOD PRESSURE: 68 MMHG | WEIGHT: 186 LBS | BODY MASS INDEX: 25.94 KG/M2 | HEART RATE: 62 BPM | RESPIRATION RATE: 18 BRPM | SYSTOLIC BLOOD PRESSURE: 110 MMHG

## 2019-08-15 DIAGNOSIS — C34.90 MALIGNANT NEOPLASM OF LUNG, UNSPECIFIED LATERALITY, UNSPECIFIED PART OF LUNG (HCC): Primary | ICD-10-CM

## 2019-08-15 PROCEDURE — 99213 OFFICE O/P EST LOW 20 MIN: CPT | Performed by: RADIOLOGY

## 2019-08-15 PROCEDURE — 99212 OFFICE O/P EST SF 10 MIN: CPT

## 2019-08-15 NOTE — PROGRESS NOTES
Dotty Marin  8/15/2019  3:13 PM      There were no vitals filed for this visit. :    Wt Readings from Last 3 Encounters:   07/09/19 181 lb 6.4 oz (82.3 kg)   07/01/19 179 lb 3.2 oz (81.3 kg)   06/11/19 183 lb 9.6 oz (83.3 kg)                Current Outpatient Medications:     fentaNYL (DURAGESIC) 50 MCG/HR, Place 1 patch onto the skin every 72 hours for 30 days. , Disp: 10 patch, Rfl: 0    fentaNYL (DURAGESIC) 12 MCG/HR, Place 1 patch onto the skin every 72 hours for 30 days. , Disp: 10 patch, Rfl: 0    oxyCODONE-acetaminophen (PERCOCET) 5-325 MG per tablet, Take 1 tablet by mouth every 6 hours as needed for Pain (hold for sedation) for up to 30 days. , Disp: 120 tablet, Rfl: 0    simvastatin (ZOCOR) 40 MG tablet, Take 1 tablet by mouth nightly, Disp: 90 tablet, Rfl: 0    tamsulosin (FLOMAX) 0.4 MG capsule, Take 1 capsule by mouth daily, Disp: 90 capsule, Rfl: 0    esomeprazole (NEXIUM) 40 MG delayed release capsule, Take 1 capsule by mouth nightly, Disp: 90 capsule, Rfl: 0    LORazepam (ATIVAN) 0.5 MG tablet, Take 1 tablet by mouth nightly as needed for Anxiety (hold for sedation) for up to 90 days. , Disp: 30 tablet, Rfl: 2    Levothyroxine Sodium 125 MCG CAPS, Take 125 mcg by mouth daily On an empty stomach With a large glass of water, Disp: 30 capsule, Rfl: 0    nortriptyline (PAMELOR) 10 MG capsule, Take 1 capsule by mouth nightly, Disp: 90 capsule, Rfl: 0    DULoxetine (CYMBALTA) 30 MG extended release capsule, Take 1 capsule by mouth 2 times daily (Patient taking differently: Take 60 mg by mouth daily ), Disp: 180 capsule, Rfl: 3    chlorhexidine (PERIDEX) 0.12 % solution, Take 15 mLs by mouth daily Swish & spit qd, Disp: 473 mL, Rfl: 2    pembrolizumab (KEYTRUDA) 100 MG/4ML SOLN, Infuse intravenously, Disp: , Rfl:     mirtazapine (REMERON) 30 MG tablet, Take 1 tablet by mouth nightly, Disp: 90 tablet, Rfl: 3    formoterol (PERFOROMIST) 20 MCG/2ML nebulizer solution, Take 2 mLs by nebulization

## 2019-08-15 NOTE — PROGRESS NOTES
upper lung / spine RT):        Maya Machuca completed radiation treatments to the subglottic mass with 6x photons using a complex conformal technique at [facility].   The patient received a total dose of 45 Gy in 19 fractions - this is the absolute highest does that in my judgement can be delivered safely per Diana Baldwin verbalized understanding for the risks prior.     Treatment Start Date:  9/6/17  Treatment Completion Date: 10/3/17     The treatments were well tolerated, without significant interruption.      RTOG Acute Toxicity Grade [ARMSC]: 1 (fatigue)           -----           PET 3/23/18:  1. Persistent nodular thickening and nodular density along the right   vocal cord and aryepiglottic fold with hypermetabolic activity,   concerning for malignancy, without hypermetabolic worrisome lymph   nodes in the neck.       2. Persistent infiltrative soft tissue density in the left lung apex   medially with mild metabolic activity, likely radiation scarring and   fibrosis with inflammation. Residual malignancy is less likely but not   excluded. Moderate mediastinal lymph nodes are also noted which are   mildly hypermetabolic which may be inflammatory or malignant, and   surveillance is recommended.       3. Aortic aneurysm involving the ascending thoracic aorta and   abdominal aorta.       4. Diffuse uptake in the esophagus, likely inflammatory, like   esophagitis.  Clinical surveillance is recommended.            -----              Past Medical History:   Diagnosis Date    Abdominal aortic aneurysm without rupture ([address] 8/27/2018    Acute bronchitis with COPD ([address] 5/27/2017    Apnea     Arthritis     COPD (chronic obstructive pulmonary disease) (HCC)     Depression with anxiety     Emphysema of lung ([address]     Encounter for antineoplastic immunotherapy     HAP (hospital-acquired pneumonia) 5/27/2017    Hyperlipidemia     Lung cancer ([address] 04/11/2016    chemo and radiation    nebulizer solution Take 2 mLs by nebulization 2 times daily 120 mL 5    gabapentin (NEURONTIN) 300 MG capsule Take 1 capsule by mouth 3 times daily for 360 days. Along with the 600 mg tabs for a total of 900 mg three times daily. 270 capsule 3    gabapentin (NEURONTIN) 600 MG tablet Take 1 tablet by mouth 3 times daily for 360 days. Along with the 300 mg caps for a total of 900 mg three times daily. 270 tablet 3    albuterol (ACCUNEB) 0.63 MG/3ML nebulizer solution Take 3 mLs by nebulization every 6 hours as needed for Wheezing 270 mL 3    buPROPion (WELLBUTRIN) 75 MG tablet Take 1 tablet by mouth 2 times daily 180 tablet 3    Melatonin 10 MG TBDP Take 10 mg by mouth nightly       predniSONE (DELTASONE) 10 MG tablet Take 10 mg by mouth daily       No current facility-administered medications for this encounter. REVIEW OF SYSTEMS  History obtained from chart review and the patient  General ROS:weight stable  Psychological ROS: negative  Ophthalmic ROS: negative  ENT ROS: positive for - sore throat and vocal changes  Allergy and Immunology ROS: negative  Hematological and Lymphatic ROS: negative  Endocrine ROS: negative  Respiratory ROS: positive for - cough and periodic hemoptysis  Cardiovascular ROS: no chest pain or dyspnea on exertion  Gastrointestinal ROS: no abdominal pain, change in bowel habits, or black or bloody stools  Genito-Urinary ROS: no dysuria, trouble voiding, or hematuria  Musculoskeletal ROS: negative  Neurological ROS: no TIA or stroke symptoms  Dermatological ROS: negative         Physical Exam   Constitutional: He is oriented to person, place, and time. He appears well-developed. HENT:   Head: Normocephalic. Neck: Normal range of motion. Cardiovascular: Normal rate. Pulmonary/Chest: Effort normal.   Abdominal: Soft. Musculoskeletal: Normal range of motion. He exhibits no edema. Neurological: He is alert and oriented to person, place, and time.    Skin: Skin is warm

## 2019-08-20 DIAGNOSIS — C34.82 MALIGNANT NEOPLASM OF OVERLAPPING SITES OF LEFT LUNG (HCC): ICD-10-CM

## 2019-08-20 RX ORDER — NORTRIPTYLINE HYDROCHLORIDE 10 MG/1
10 CAPSULE ORAL NIGHTLY
Qty: 90 CAPSULE | Refills: 1 | Status: SHIPPED
Start: 2019-08-20 | End: 2020-02-24

## 2019-08-20 RX ORDER — LORAZEPAM 0.5 MG/1
0.5 TABLET ORAL NIGHTLY PRN
Qty: 30 TABLET | Refills: 2 | Status: SHIPPED | OUTPATIENT
Start: 2019-08-20 | End: 2019-12-02 | Stop reason: SDUPTHER

## 2019-08-27 ENCOUNTER — OFFICE VISIT (OUTPATIENT)
Dept: PALLATIVE CARE | Age: 64
End: 2019-08-27
Payer: MEDICARE

## 2019-08-27 VITALS
DIASTOLIC BLOOD PRESSURE: 60 MMHG | WEIGHT: 188 LBS | HEART RATE: 80 BPM | OXYGEN SATURATION: 92 % | SYSTOLIC BLOOD PRESSURE: 97 MMHG | BODY MASS INDEX: 26.22 KG/M2

## 2019-08-27 DIAGNOSIS — C34.82 MALIGNANT NEOPLASM OF OVERLAPPING SITES OF LEFT LUNG (HCC): ICD-10-CM

## 2019-08-27 DIAGNOSIS — G90.2: ICD-10-CM

## 2019-08-27 DIAGNOSIS — C34.12 PANCOAST TUMOR, LEFT (HCC): ICD-10-CM

## 2019-08-27 DIAGNOSIS — G89.3 CHRONIC PAIN DUE TO NEOPLASM: ICD-10-CM

## 2019-08-27 PROCEDURE — 99212 OFFICE O/P EST SF 10 MIN: CPT | Performed by: NURSE PRACTITIONER

## 2019-08-27 PROCEDURE — 99215 OFFICE O/P EST HI 40 MIN: CPT | Performed by: NURSE PRACTITIONER

## 2019-08-27 RX ORDER — FENTANYL 12 UG/H
1 PATCH TRANSDERMAL
Qty: 10 PATCH | Refills: 0 | Status: SHIPPED | OUTPATIENT
Start: 2019-08-27 | End: 2019-09-13 | Stop reason: SDUPTHER

## 2019-08-27 RX ORDER — FENTANYL 50 UG/H
1 PATCH TRANSDERMAL
Qty: 10 PATCH | Refills: 0 | Status: SHIPPED | OUTPATIENT
Start: 2019-08-27 | End: 2019-09-13 | Stop reason: SDUPTHER

## 2019-08-27 RX ORDER — OXYCODONE HYDROCHLORIDE AND ACETAMINOPHEN 5; 325 MG/1; MG/1
1 TABLET ORAL EVERY 6 HOURS PRN
Qty: 120 TABLET | Refills: 0 | Status: SHIPPED | OUTPATIENT
Start: 2019-08-27 | End: 2019-09-13 | Stop reason: SDUPTHER

## 2019-08-27 NOTE — PROGRESS NOTES
likely begin treatment prior to a tissue diagnosis. It is my clinical judgement the pre test probability for malignancy is > 95 % and there may be significant functional decline in the time necessary to maintain a tissue diagnosis due to the primary tumor directly extending into the spinal column). I specifically verbalized all of this with Alberto Raphael, his wife [id number], and his daughter Wandy Herbert all of who individually verbalized consent to RT treatment to begin prior to a tissue diagnosis. Peer reviewed requested. [facility] MED referral -- Dr. Dany Saha reccommended --- pt will still pursue aggressive active treatment however this is a pain management consultation request.  *will hold off on DEX or any new pain meds until he sees Dr. Dany Saha but we could start him of DEX in FND occurs or Sx worsens  -coordinated care with Dr. Ai Ray this AM 4/25/16. We will treat the primary tumor and spinal column initially with considerations for second and third \"boost\" plans (nodes involved, primary boost etc.) in conjunction with CHEMO pending the workup / PET. MRI brain ordered by [facility].        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 We are continuing his Percocet in which he has 2 weeks left and prescribed him 90 tablets of Percocet 5-325 milligram tablets but making that q.4 hours p.r.n. instead of q.6 hours p.r.n. We will see him back in 4 weeks or sooner if he needs us.  06/14/16:  [place] Symptom Assessment Scale                                             Date:   Pain  [] 0  None  [] 1  [] 2  [] 3  [] 4  [] 5  [x] 6  [] 7  [] 8  [] 9  [] 10  Worst   Tiredness  [] 0  None  [] 1  [] 2  [] 3  [x] 4  [] 5  [] 6  [] 7  [] 8  [] 9  [] 10  Worst   Nausea  [x] 0  None  [] 1  [] 2  [] 3  [] 4  [] 5  [] 6  [] 7  [] 8  [] 9  [] 10  Worst   Depression  [] 0  None  [] 1  [x] 2  [] 3  [] 4  [] 5  [] 6  [] 7  [] 8  [] 9  [] 10  Worst   Anxiety  [] 0  None  [x] 1  [] 2  [] 3  [] 4  [] 5  [] 6  [] 7  [] 8  [] 9  [] 10  Worst   Drowsiness  [] 0  None  [x] 1  [] 2  [] 3  [] 4  [] 5  [] 6  [] 7  [] 8  [] 9  [] 10  Worst   Appetite  [] 0  None  [] 1  [] 2  [] 3  [] 4  [] 5  [x] 6  [] 7  [] 8  [] 9  [] 10  Worst   Wellbeing  [] 0  None  [] 1  [] 2  [x] 3  [] 4  [] 5  [] 6  [] 7  [] 8  [] 9  [] 10  Worst   Shortness of Breath  [] 0  None  [] 1  [x] 2  [] 3  [] 4  [] 5  [] 6  [] 7  [] 8  [] 9  [] 10  Worst   Constipation  [] 0  None  [] 1  [] 2  [] 3  [] 4  [] 5  [] 6  [] 7  [x] 8  [] 9  [] 10  Worst   Completed By:  [x]  Patient Report  []  Caregiver/Family  []  Nurse/Staff  Patient is currently a:  [x]   Palliative Medicine Patient  []   Hospice Patient  Medical records reviewed and as per Dr. Alex King assessment on 06/07/16:  Maya Barrett is a 61y.o. year old male here for follow up. He had hid CT guided biopsy done on 5/6 /2016. The results showed Pulmonary adenocarcinoma, moderately to poorly differentiated, invasive, acinar type. He completed his course of XRT and currently is going under chemotherapy . He has done 2/6 cycles. He is tolerating it relatively well.  Has not lost any weight. Pain is well controlled. 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 Will follow repeat imaging at that time  H/O nicotine dependence:  Probable COPD  PFTs in future(next office visit)  Patient presents to the exam room with a primary complaint of left arm neuropathic pain and numbness which is worse with decreased gripping strength to his left hand. Patient stated that he finished radiation therapy approximately 10 days ago and also has more of a coarse voice, morning cough productive of phlegm and a mild amount of dysphasia if he eats too quickly. Patient denies nausea or vomiting. Patient only using Carafate infrequently, Percocet 5-325 milligram tablets on the average of 3 a day sometimes 4. Patient states that the Percocet helps approximately % and does not cause oversedation. Patient will also be taking dexamethasone 8 mg a day ×3 days surrounding his chemotherapy every 3 weeks which will start on June 21. Patient also started folic acid 1 mg daily 2 weeks ago. Options were discussed and although I see no signs of candidiasis orally I suspect this esophageal involvement therefore starting patient on Diflucan 100 mg daily ×14 days and a prescription for 14 tablets with one refill given today. Patient was also complaining of constipation of small hard bowel movements moving his bowels every 2-3 days therefore we are doubling his Dolly-Colace at 2 tablets b.i.d. 120 tablets with 2 refills given today. Patient also given a prescription for Percocet unchanged 5-325 milligram tablets 1 p.o. q.6 hours p.r.n. ×120 tablets today. We will see him back in 2 weeks or sooner if he needs us and in the future secondary to neuropathic complaints we may consider methadone.   06/28/16:  [place] Symptom Assessment Scale balancing his Dolly-Colace with bowel movements which seems effective. Options were discussed and we are continuing the same and he needs no prescriptions today. We will see him back in 4 weeks or sooner if he needs us. 08/01/16:  [place] Symptom Assessment Scale                                             Date:   Pain  [] 0  None  [] 1  [] 2  [] 3  [x] 4  [] 5  [] 6  [] 7  [] 8  [] 9  [] 10  Worst   Tiredness  [] 0  None  [] 1  [x] 2  [] 3  [] 4  [] 5  [] 6  [] 7  [] 8  [] 9  [] 10  Worst   Nausea  [x] 0  None  [] 1  [] 2  [] 3  [] 4  [] 5  [] 6  [] 7  [] 8  [] 9  [] 10  Worst   Depression  [] 0  None  [x] 1  [] 2  [] 3  [] 4  [] 5  [] 6  [] 7  [] 8  [] 9  [] 10  Worst   Anxiety  [] 0  None  [] 1  [] 2  [x] 3  [] 4  [] 5  [] 6  [] 7  [] 8  [] 9  [] 10  Worst   Drowsiness  [] 0  None  [x] 1  [] 2  [] 3  [] 4  [] 5  [] 6  [] 7  [] 8  [] 9  [] 10  Worst   Appetite  [] 0  None  [] 1  [x] 2  [] 3  [] 4  [] 5  [] 6  [] 7  [] 8  [] 9  [] 10  Worst   Wellbeing  [] 0  None  [] 1  [x] 2  [] 3  [] 4  [] 5  [] 6  [] 7  [] 8  [] 9  [] 10  Worst   Shortness of Breath  [] 0  None  [] 1  [] 2  [] 3  [x] 4  [] 5  [] 6  [] 7  [] 8  [] 9  [] 10  Worst   Constipation  [] 0  None  [] 1  [] 2  [] 3  [] 4  [] 5  [] 6  [x] 7  [] 8  [] 9  [] 10  Worst   Completed By:  [x]  Patient Report  []  Caregiver/Family  []  Nurse/Staff  Patient is currently a:  [x]   Palliative Medicine Patient  []   Hospice Patient  Medical records reviewed and as per radiation oncology's assessment on 07/06/16:  On 6/3/16, Mello Allred completed 5000 cGy in 22 fractions directed to the left lung / spine for management of locally advanced lung CA. States that he's doing well overall. Eating well. Coughing occasionally with phlegm - light brown in color. Denies fever. SOB with yard work only. Following with Dr Ailyn Hameed for palliative med, states that pain is well controlled with pain medications he is taking.   Pt is following with Dr Po Ruvalcaba for medical oncology. Seen last week, continues on chemo. Tolerating chemo well. CT scan planned after next chemo cycle per patient report. Met with phycians in [facility] for med onc for second opinion. States that he had an MRI/CT scan at that time (approx 3 weeks after radiation was completed) which showed (per patient report) no tumor shrinkage but pt states that he understands radiation is continuing to work. No report available at this time. Planning to meet with a neurosurgeon at [facility] to discuss options for vertebral fractures from tumor per patient report. Appt with Dr Mary Valverde (pulm) in October. Patient is doing well post-radiation completion. Explained to patient that he would need clearance from Dr Nora Garza and Dr Jeannie Winters prior to surgery being completed especially to the irradiated area. Verbalized understanding. CT scan re-imaging per Dr Jeannie Winters. Pt's previous imaging done at [facility]. Asked patient to have results of reimaging faxed to us as well when it is done. I discussed follow up plans with Homer Nick. At this time, Dr Nora Garza will see the patient back in 3 months for a post-radiation completion follow-up visit. Homer Nick is to follow up with other physicians involved in their care as directed (including but not limited to Medical Oncology, Primary Care, Pulmonary, and Surgery). Patient evaluated unaccompanied in the exam room by his wife with no sign of confusion and/or sedation able to voices needs. Patient states that he continues with increasing neuropathic pain, numbness and tingling involving his left arm and shoulder is now more distally as well as left mid to upper back pain. Patient takes Percocet unchanged approximately every 6-8 hours and is compliant with the Neurontin, Pamelor. Patient last week started physical therapy initially 2 times a week which will drop to once a week for a total of 6 weeks.   Patient also was placed on an anabiotic as well as Diflucan ending 3 weeks hours as directed ×10 patches and prescribed unchanged his Percocet 5-325 milligram tablets 1 p.o. q.6 hours p.r.n. ×120 tablets prescribed today. Patient will be seen back in 4 weeks or sooner and at this time consider increasing the Cymbalta, Percocet dosage, Duragesic patch and reassessing for thrush which is not present today. He will call if symptoms increase or if he has difficulty with the Duragesic in which she was reluctant to accept. 11/15/16:  Medical records reviewed and spirometry on 10/24/16:  DATA: Spirometry done in the office today demonstrates an FVC of 4.17 liters which is 85 % of predicted with an FEV1 of 3.10 liters which is 83 % of predicted. FEV1/FVC ratio is 74 %. Mid expiratory flow rates are 78% of predicted. Maximum voluntary ventilation is normal at 106 liters per minute or 74% of predicted. Total lung capacity is 6.83 liters which is 94% of predicted. DLCO is 12.43mm/min/mmHg which is 36% of predicted. Flow volume loop shows no signs of intrathoracic or extrathoracic obstruction. Impressions: No obstructive or restrictive lung disease. Normal total lung capacity. Severe decrement in diffusing capacity  Patient presents to the exam room today accompanied by his wife at his baseline with no sign of sedation and/or confusion. Patient states that since his oncologist increased his Duragesic to two 12 µg patches his pain has improved approximately 75%. Patient states that he was able to decrease his Percocet to approximately 3 daily on the average. Patient has not noticed much of a difference since we increased his Cymbalta 1 month ago. Patient states that a left chest pain is new otherwise his symptoms are unchanged. 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   Patient desires to wean himself off of the Percocet as it does make him sleepy therefore did accept my suggestion of documented in Epic since my last visit with him. Patient without sedation and/or confusion able to voice his needs, more comfortable appearing than last time. Patient initially stated that the increase of Cymbalta he did not notice anything from but upon further questioning he has been in less pain. Patient complaining of significant fatigue but upon further questioning he is working 9-11 hours a day oftentimes 6 days a week needing to take days off in between and sleeping the majority of his time off. Patient is aware that he is working too long and now is going to day shift instead of midline shift and hopefully cutting back a few hours a day. I talked to the patient about how a mild change we will not affect him significantly that he may want to consider a significant change in his work status and he voiced understanding but not acceptance at this point. Patient wanting to see how much of a difference this would make. Patient also asking about the Duragesic patches and fatigue stating that over the past month his pain has been better controlled. Patient also still dealing with dryness and fissuring especially on his left hand across the dorsum of his knuckles and he will now try to splint his left index finger attempting to avoid bending at least at night. Options were discussed and I am refilling unchanged his Duragesic 37 µg ×10 patches of each strength. Secondary to fatigue he will initially try just wearing the 25 µg patch and then if pain worsens he will then apply a 37 µg dose and continuing unchanged. They voiced understanding. Of course he is continuing his Ativan, Cymbalta 40 mg b.i.d. and he will need refills of his Ativan next time when we see him in 4 weeks from now, possibly Pamelor and of course his patch or patches. Patient has gained almost 3 pounds over last month and we will continue to monitor. 05/09/17:  OARRS report reviewed today without any prescribing red flags.   Medical 07/09/17:  Principal Problem:    Orthostatic hypotension  Active Problems:    Panlobular emphysema (HCC)    Malignant neoplasm of overlapping sites of left lung [facility])    Palliative care encounter    COPD (chronic obstructive pulmonary disease) (HCC)    Tobacco dependence    Severe protein-energy malnutrition ([facility]    Anemia  Patient presents today looking very weak, tremulous and not as talkative but able to respond and answer in short sentences. Patient over the past 2-3 days as per wife's history has been very weak and with nausea vomiting, dehydration. Patient is being visited by home health but refuses to go back to the hospital stating that he realizes that he needs to go but he does not want to go back to the hospital at this time. Patient just left ENTs office finding a new right sided neck mass. Apparently the ENT discussed with Dr Ai Ray and they will perform a PET scan to look at the extent of the lesions. Typically patient goes to the blood and cancer Center every 3 weeks but chemotherapy is on hold secondary to the patient's weakness. Options were discussed and we are refilling his medications unchanged. We are ordering one liter of IV fluids today and via home health will try to get this at least 3 times a week. I reiterated to the patient that he should go to the emergency department and he voiced understanding but did not want to go despite our suggestions. Patient looks ill and depending on how he responds to the IV fluids may continued to decline significantly in the near future. 08/22/17:  Medical records reviewed and as per radiation oncology's assessment from 07/27/17:  Mr. aMrlyn Mathur is a pleasant 64year old man with a new glottic lesion. This is either a distant progression form the lung cancer or a second primary.  I have spoken with Dr. Ai Ray who recommend fractionated external beam radiation therapy prior to re-initiation of systmeic therapy as there is no other disease sporadically as per Epic as well as his other medications. We will see him back in 4 weeks or sooner if he needs us. There is no sign of thrush today. 10/03/17:  Medical records reviewed and as per radiation oncology assessment from 09/27/17:  Comments: Dose 3000 cGy to the subglottic area. Patient has no complaints except mild sore throat. He has Magic mouthwash and its helping can eat. On examination the skin over the treated area looks good. Oral cavity shows no evidence of thrush. Tolerating treatment well  Patient presents to the exam room today accompanied by his wife without sign of sedation and/or confusion able to voice his needs. Patient looking better every time I see him and has gained 10 pounds as per our records. Patient states that his pain is controlled on the current regiment of Duragesic 37 µg q.72 hours, gabapentin 900 mg t.i.d that he likes taking better than the suspension. , Cymbalta 20 mg b.i.d., Pamelor 25 mg q.h.s. not needing anything further. Patient is also compliant with Remeron 15 mg q.h.s. and very infrequently uses Ativan 0.5 mg q.h.s. because he is tired in the evenings. Patient was able to go outside of work around the house for approximately half hour the other day with fatigue afterwards. Patient does complain of not only physical fatigue but mental fatigue oftentimes forgetting common names even names of grandchildren. Patient asking if there is anything that would help with this. Patient is having his last radiation therapy today stating that he has a slight sore throat but is using Magic mouthwash 2-3 times a day which helps. Patient denies any new and/or uncontrolled symptoms otherwise. Options were discussed and secondary to patient's good oral intake we are stopping the IV fluids and will monitor. We may reinstate this in the future but he is doing well. We are continuing unchanged his Duragesic 37 µg q.72 hours prescribing 10 patches of each strength.   He will continue his other medicines unchanged as above and today we are initiating Ritalin 5 mg each morning to help with his mental and physical fatigue. Patient educated on the medication and will call if he has any difficulties otherwise we will see him back in 4 weeks or sooner if he needs us. 10/31/17:  Medical records reviewed and as per radiation oncology assessment from 10/23/17:  -post Tx FU - no charge                        -no new Sx, skin changes resolving                        -KPS rebouned to 70-80 (looks much better today)  -RBA reviewed  -Q+A reviewed  -cont FU with MEDON, skin rash on hands defer to Dr. Radha Pltaa  Patient presents with his wife in no acute distress without sign of sedation ambulating well able to voice his needs. Patient has complained of increasing fatigue over the past month and has actually had his Synthroid doubled by his PCP a few days ago. Patient will continue to follow-up with his PCP in 6 weeks from now for repeat TSH. Patient did not notice a difference with his Ritalin 5 mg q.a.m. neither good or bad. Patient has remained active and actually is working with [employer] in his bathroom at home. Patient now finished with his radiation therapy for approximately 1 month. Patient recently saw his ENT who will be scoping him at the beginning of November. Patient states that there is been no change in how he is taking his medications and he has one box of each of the Duragesic strength remaining at home. Patient has complained of \"jumpy legs\" at night more so over the past 1 month despite the use of Ativan. Options were discussed and we are stopping the Ritalin in case this is exacerbating his \"jumpy legs\" but may try again down the road. I feel that most of his fatigue is exacerbated by the hypothyroidism and we will see how much of this symptom is remedied with normalization of his thyroid hormone.   Today we are increasing his Cymbalta to 30 mg q.12 hours as he is noted as per his oncologist.  10/02/18:  Medical records reviewed including assessment by Ruth Jensen of palliative care on 09/24/18:  Spoke with patient and wife about discharge plan. The patient will be discharged to home with IV antibiotics and has a PICC line in place. He will continue current medications for symptom management. Reviewed medications with patient and family. Wife states that the patient has enough medication to last until next visit to palliative medicine outpatient clinic. Prescription provided for Marinol 2.5 mg twice a day ordered for appetite stimulation. Patient tolerating medication without side effects. The patient's wife will call the palliative medicine outpatient clinic to arrange an appointment in the next 2 weeks. Joshua Winsome Weiner APRN-CNP  Patient presents today accompanied by his wife looking well without sign of sedation and/or confusion able to voice his needs. Patient states that slowly he is regaining his appetite but there is still significant room for improvement. A shunt feels that the Marinol helps without side effects. Patient states his breathing is slowly improving but still not back to baseline. Patient has 5 patches of each strength of his fentanyl patches and states there's no change in how he is taking his medications. Patient denies other new and/or uncontrolled symptoms otherwise. Patient is following up with infectious disease on October 10 and will ask them about restarting his hyperbaric treatment secondary to his gum disease. Options were discussed and today we are doubling the Marinol to 5 mg b.i.d. as per Epic. Patient advised that if he does not notice a benefit or does not like the way it makes him feel he can always break the tablets in half and go back to his original dosage.   Patient will continue his other medications unchanged including Duragesic 37 µg q.72 hours, Colace, melatonin q.h.s., Ativan 0.5 mg, Remeron 30 mg q.h.s., Cymbalta 30 mg per Dr. Danis Avelar. He recently had a new pneumonia with questionable mass in the right side, continued FU pending. If this is malignancy (biopsy could be considered) then SBRT is a reasonable considerations. 2nd opinion offered and declined. KPS 70. He does continue to smoke. Cessation again discussed.     Mr. Monique Bertrand is a pleasant gentleman well know to me with a history of both lung cancer and laryngeal cancer s/p concurrent chemo-RT for the former and definitve intent fractionated external beam radiation therapy for the latter (with the highest dose the spinal cord could tolerate - see above) --- he has a recently diagnosis of pneumonia +/- mass with continue FU pending (SBRT could be considered for oligo metastatic disease vs 3rd primary depending on the clinical course.)  He is still on immunotherapy and his KPS is intact at KPS 70 WO new Sx perhaps SOB). Shwetha Lee is no evidence of disease recurrence or progression based on a detailed review of the available imaging, physical exam, and ROS (all personally performed prior to and during this follow up appointment today).  The patient did verbalize understanding for the indications of continued FU including imaging and laboratory evaluation as applicable to this case; as well as appropriate lifestyle choices and health maintenance.  All questions answered to the best of my ability. 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 Patient presents today accompanied by his wife in no acute distress at his baseline ambulating well without sign of sedation and/or confusion able to voice his needs. Patient states is no change in how he is taken his medications needing a refill on his fentanyl patches. Patient started pulmonary rehab last week and states that his breathing is slowly improving. Patient gained 3 pounds and denies new and/or uncontrolled symptoms otherwise.   Options were discussed and we are continuing the same medications unchanged including vertebral body and left second rib   posteriorly. Infectious involvement or malignancy are considered and   further assessment is recommended.       Improving pneumonic infiltrate in the right upper lobe. There is also   thoracic aortic aneurysm   Patient presents today And advised wife noted distress without sign of sedation and/or confusion able to voice his needs. Patient complaining of increasing lower back pain across his hips that the fentanyl patches are not helping. Patient also complaining of gum pain stating that he will be calling in oral surgeon soon as he has exposed bone and he cannot wear his dentures for more than a couple hours at a time. Patient also is now on 2 new nebulizers every 12 hours by pulmonology. Patient also asked to talk to me in private and his wife stepped out. Patient states he is having difficulty urinating despite his Flomax and I advised him to contact his urologist to discuss titration of medications. Today we are decreasing his Pamelor from 25 q.h.s. to 10 mg q.h.s. secondary to anticholinergic effect. Patient also states that he is experiencing more depression without any suicidal ideations. Patient did state that he is thinking about stopping treatment and really does not like feeling this way. I talked to him about counseling and he was very receptive to counseling. Patient states that he protects his wife and does not want to tell her about these things and is not real close to his family to talk to them about intimate details. Patient did talk to his elder sister at one time whom he is close to. Patient also complains about sexual dysfunction and I discussed this with him as well being multi modality. Options were discussed and I made the above recommendations as well as today continuing his fentanyl 37 µg q.72 hours as we normally do and today we initiated p.r.n.  Percocet 5-325 milligram tablets 1 p.o. q.6 hours p.r.n. holding for sedation prescribing 120 and pulmonary regarding that problem. He is otherwise very satisfied with his symptom management, and will make no changes to his regimen. He will continue unchanged Pamelor 10 mg daily, Ativan 0.5 mg nightly, Cymbalta 30 mg daily, Remeron 30 mg daily, and melatonin 10 mg nightly. For his pain he will continue unchanged fentanyl 62 mcg every 72 hours, utilizing both a 50 and 12 mcg patch, as well as Percocet 5-325 mg, of which he utilizes 2-3 times per day. We will provide a refill of both his fentanyl and Percocet today. We will have him return in approximately 8 weeks, and he is encouraged to call with any questions, concerns, or changes in his symptoms. Controlled Substances Monitoring:   RX Monitoring 8/27/2019   Attestation -   Periodic Controlled Substance Monitoring Possible medication side effects, risk of tolerance/dependence & alternative treatments discussed. ;No signs of potential drug abuse or diversion identified. ;Assessed functional status. ;Obtaining appropriate analgesic effect of treatment. Chronic Pain > 50 MEDD Re-evaluated the status of the patient's underlying condition causing pain. Chronic Pain > 80 MEDD Obtained or confirmed \"Medication Contract\" on file.      Time in minutes:    [] 15   [] 30   [x] 45   [] 60   [] 75   [] 90  Chart review/documentation:  [x] 15   [] 30   [] 45   [] 60   [] 75   [] 90  Assessment:     [x] 15   [] 30   [] 45   [] 60   [] 75   [] 90   Conversation patient/family/staff: [x] 15   [] 30   [] 45   [] 60   [] 75   [] [address] APRN-CNP    8/27/2019

## 2019-09-13 DIAGNOSIS — C34.12 PANCOAST TUMOR, LEFT (HCC): ICD-10-CM

## 2019-09-13 DIAGNOSIS — G90.2: ICD-10-CM

## 2019-09-13 DIAGNOSIS — G89.3 CHRONIC PAIN DUE TO NEOPLASM: ICD-10-CM

## 2019-09-13 DIAGNOSIS — C34.82 MALIGNANT NEOPLASM OF OVERLAPPING SITES OF LEFT LUNG (HCC): ICD-10-CM

## 2019-09-13 RX ORDER — FENTANYL 12 UG/H
1 PATCH TRANSDERMAL
Qty: 10 PATCH | Refills: 0 | Status: SHIPPED | OUTPATIENT
Start: 2019-09-13 | End: 2019-10-13

## 2019-09-13 RX ORDER — FENTANYL 50 UG/H
1 PATCH TRANSDERMAL
Qty: 10 PATCH | Refills: 0 | Status: SHIPPED | OUTPATIENT
Start: 2019-09-13 | End: 2019-10-13

## 2019-09-13 RX ORDER — OXYCODONE HYDROCHLORIDE AND ACETAMINOPHEN 5; 325 MG/1; MG/1
1 TABLET ORAL EVERY 6 HOURS PRN
Qty: 120 TABLET | Refills: 0 | Status: SHIPPED | OUTPATIENT
Start: 2019-09-13 | End: 2019-10-13

## 2019-09-22 ENCOUNTER — APPOINTMENT (OUTPATIENT)
Dept: GENERAL RADIOLOGY | Age: 64
DRG: 178 | End: 2019-09-22
Payer: MEDICARE

## 2019-09-22 ENCOUNTER — HOSPITAL ENCOUNTER (INPATIENT)
Age: 64
LOS: 4 days | Discharge: HOME | DRG: 178 | End: 2019-09-26
Attending: EMERGENCY MEDICINE | Admitting: FAMILY MEDICINE
Payer: MEDICARE

## 2019-09-22 LAB
ALBUMIN SERPL-MCNC: 3.6 G/DL (ref 3.5–5.2)
ALP BLD-CCNC: 221 U/L (ref 40–129)
ALT SERPL-CCNC: 88 U/L (ref 0–40)
ANION GAP SERPL CALCULATED.3IONS-SCNC: 13 MMOL/L (ref 7–16)
ANISOCYTOSIS: ABNORMAL
APTT: 29.9 SEC (ref 24.5–35.1)
AST SERPL-CCNC: 117 U/L (ref 0–39)
BASOPHILS ABSOLUTE: 0.04 E9/L (ref 0–0.2)
BASOPHILS RELATIVE PERCENT: 0.6 % (ref 0–2)
BILIRUB SERPL-MCNC: 2 MG/DL (ref 0–1.2)
BILIRUBIN URINE: ABNORMAL
BLOOD, URINE: ABNORMAL
BUN BLDV-MCNC: 16 MG/DL (ref 8–23)
CALCIUM SERPL-MCNC: 9.5 MG/DL (ref 8.6–10.2)
CHLORIDE BLD-SCNC: 94 MMOL/L (ref 98–107)
CLARITY: CLEAR
CO2: 24 MMOL/L (ref 22–29)
COLOR: YELLOW
CREAT SERPL-MCNC: 1.6 MG/DL (ref 0.7–1.2)
EOSINOPHILS ABSOLUTE: 0.02 E9/L (ref 0.05–0.5)
EOSINOPHILS RELATIVE PERCENT: 0.3 % (ref 0–6)
GFR AFRICAN AMERICAN: 53
GFR NON-AFRICAN AMERICAN: 44 ML/MIN/1.73
GLUCOSE BLD-MCNC: 156 MG/DL (ref 74–99)
GLUCOSE URINE: NEGATIVE MG/DL
HCT VFR BLD CALC: 41.9 % (ref 37–54)
HEMOGLOBIN: 13.8 G/DL (ref 12.5–16.5)
IMMATURE GRANULOCYTES #: 0.03 E9/L
IMMATURE GRANULOCYTES %: 0.4 % (ref 0–5)
INR BLD: 1.5
KETONES, URINE: ABNORMAL MG/DL
LEUKOCYTE ESTERASE, URINE: NEGATIVE
LYMPHOCYTES ABSOLUTE: 0.41 E9/L (ref 1.5–4)
LYMPHOCYTES RELATIVE PERCENT: 5.7 % (ref 20–42)
MCH RBC QN AUTO: 28.3 PG (ref 26–35)
MCHC RBC AUTO-ENTMCNC: 32.9 % (ref 32–34.5)
MCV RBC AUTO: 85.9 FL (ref 80–99.9)
MONOCYTES ABSOLUTE: 0.35 E9/L (ref 0.1–0.95)
MONOCYTES RELATIVE PERCENT: 4.9 % (ref 2–12)
NEUTROPHILS ABSOLUTE: 6.29 E9/L (ref 1.8–7.3)
NEUTROPHILS RELATIVE PERCENT: 88.1 % (ref 43–80)
NITRITE, URINE: NEGATIVE
PDW BLD-RTO: 15.7 FL (ref 11.5–15)
PH UA: 6 (ref 5–9)
PLATELET # BLD: 207 E9/L (ref 130–450)
PMV BLD AUTO: 9.6 FL (ref 7–12)
POTASSIUM REFLEX MAGNESIUM: 3.9 MMOL/L (ref 3.5–5)
PROTEIN UA: 30 MG/DL
PROTHROMBIN TIME: 17.4 SEC (ref 9.3–12.4)
RBC # BLD: 4.88 E12/L (ref 3.8–5.8)
SODIUM BLD-SCNC: 131 MMOL/L (ref 132–146)
SPECIFIC GRAVITY UA: 1.02 (ref 1–1.03)
TOTAL PROTEIN: 7.5 G/DL (ref 6.4–8.3)
UROBILINOGEN, URINE: >=8 E.U./DL
WBC # BLD: 7.1 E9/L (ref 4.5–11.5)

## 2019-09-22 PROCEDURE — 71045 X-RAY EXAM CHEST 1 VIEW: CPT

## 2019-09-22 PROCEDURE — 2580000003 HC RX 258: Performed by: EMERGENCY MEDICINE

## 2019-09-22 PROCEDURE — 2060000000 HC ICU INTERMEDIATE R&B

## 2019-09-22 PROCEDURE — 99285 EMERGENCY DEPT VISIT HI MDM: CPT

## 2019-09-22 PROCEDURE — 87088 URINE BACTERIA CULTURE: CPT

## 2019-09-22 PROCEDURE — 80053 COMPREHEN METABOLIC PANEL: CPT

## 2019-09-22 PROCEDURE — 2700000000 HC OXYGEN THERAPY PER DAY

## 2019-09-22 PROCEDURE — 85025 COMPLETE CBC W/AUTO DIFF WBC: CPT

## 2019-09-22 PROCEDURE — 6360000002 HC RX W HCPCS: Performed by: EMERGENCY MEDICINE

## 2019-09-22 PROCEDURE — 6370000000 HC RX 637 (ALT 250 FOR IP): Performed by: EMERGENCY MEDICINE

## 2019-09-22 PROCEDURE — 85610 PROTHROMBIN TIME: CPT

## 2019-09-22 PROCEDURE — 36415 COLL VENOUS BLD VENIPUNCTURE: CPT

## 2019-09-22 PROCEDURE — 85730 THROMBOPLASTIN TIME PARTIAL: CPT

## 2019-09-22 PROCEDURE — 96365 THER/PROPH/DIAG IV INF INIT: CPT

## 2019-09-22 PROCEDURE — 81001 URINALYSIS AUTO W/SCOPE: CPT

## 2019-09-22 PROCEDURE — 87040 BLOOD CULTURE FOR BACTERIA: CPT

## 2019-09-22 PROCEDURE — 93005 ELECTROCARDIOGRAM TRACING: CPT | Performed by: EMERGENCY MEDICINE

## 2019-09-22 RX ORDER — SODIUM CHLORIDE, SODIUM LACTATE, POTASSIUM CHLORIDE, CALCIUM CHLORIDE 600; 310; 30; 20 MG/100ML; MG/100ML; MG/100ML; MG/100ML
1000 INJECTION, SOLUTION INTRAVENOUS ONCE
Status: DISCONTINUED | OUTPATIENT
Start: 2019-09-22 | End: 2019-09-22

## 2019-09-22 RX ORDER — 0.9 % SODIUM CHLORIDE 0.9 %
30 INTRAVENOUS SOLUTION INTRAVENOUS ONCE
Status: COMPLETED | OUTPATIENT
Start: 2019-09-22 | End: 2019-09-23

## 2019-09-22 RX ORDER — ACETAMINOPHEN 500 MG
1000 TABLET ORAL ONCE
Status: COMPLETED | OUTPATIENT
Start: 2019-09-22 | End: 2019-09-22

## 2019-09-22 RX ADMIN — VANCOMYCIN HYDROCHLORIDE 1000 MG: 1 INJECTION, POWDER, LYOPHILIZED, FOR SOLUTION INTRAVENOUS at 23:10

## 2019-09-22 RX ADMIN — PIPERACILLIN SODIUM AND TAZOBACTAM SODIUM 3.38 G: 3; .375 INJECTION, POWDER, LYOPHILIZED, FOR SOLUTION INTRAVENOUS at 22:09

## 2019-09-22 RX ADMIN — ACETAMINOPHEN 1000 MG: 500 TABLET ORAL at 20:33

## 2019-09-22 RX ADMIN — SODIUM CHLORIDE 2478 ML: 9 INJECTION, SOLUTION INTRAVENOUS at 19:52

## 2019-09-22 ASSESSMENT — PAIN SCALES - GENERAL: PAINLEVEL_OUTOF10: 0

## 2019-09-22 NOTE — ED NOTES
Blood cultures obtained from [address] [place] [address], per policy. Set one of two drawn at this time.              Phyllis Shelton RN  09/1955

## 2019-09-22 NOTE — ED NOTES
Bed: 20  Expected date:   Expected time:   Means of arrival:   Comments:  Bashir Jackson RN  09/22/19 1925

## 2019-09-23 ENCOUNTER — APPOINTMENT (OUTPATIENT)
Dept: GENERAL RADIOLOGY | Age: 64
DRG: 178 | End: 2019-09-23
Payer: MEDICARE

## 2019-09-23 LAB
BACTERIA: ABNORMAL /HPF
EKG ATRIAL RATE: 85 BPM
EKG P AXIS: 61 DEGREES
EKG P-R INTERVAL: 170 MS
EKG Q-T INTERVAL: 384 MS
EKG QRS DURATION: 106 MS
EKG QTC CALCULATION (BAZETT): 456 MS
EKG R AXIS: -32 DEGREES
EKG T AXIS: 2 DEGREES
EKG VENTRICULAR RATE: 85 BPM
HCT VFR BLD CALC: 38.4 % (ref 37–54)
HEMOGLOBIN: 12.3 G/DL (ref 12.5–16.5)
MCH RBC QN AUTO: 27.6 PG (ref 26–35)
MCHC RBC AUTO-ENTMCNC: 32 % (ref 32–34.5)
MCV RBC AUTO: 86.3 FL (ref 80–99.9)
PDW BLD-RTO: 15.6 FL (ref 11.5–15)
PLATELET # BLD: 201 E9/L (ref 130–450)
PMV BLD AUTO: 9.5 FL (ref 7–12)
RBC # BLD: 4.45 E12/L (ref 3.8–5.8)
RBC UA: ABNORMAL /HPF (ref 0–2)
WBC # BLD: 10.1 E9/L (ref 4.5–11.5)
WBC UA: ABNORMAL /HPF (ref 0–5)

## 2019-09-23 PROCEDURE — 6370000000 HC RX 637 (ALT 250 FOR IP): Performed by: FAMILY MEDICINE

## 2019-09-23 PROCEDURE — 71045 X-RAY EXAM CHEST 1 VIEW: CPT

## 2019-09-23 PROCEDURE — 80053 COMPREHEN METABOLIC PANEL: CPT

## 2019-09-23 PROCEDURE — 2580000003 HC RX 258: Performed by: FAMILY MEDICINE

## 2019-09-23 PROCEDURE — 94640 AIRWAY INHALATION TREATMENT: CPT

## 2019-09-23 PROCEDURE — 36415 COLL VENOUS BLD VENIPUNCTURE: CPT

## 2019-09-23 PROCEDURE — 6360000002 HC RX W HCPCS: Performed by: FAMILY MEDICINE

## 2019-09-23 PROCEDURE — 2060000000 HC ICU INTERMEDIATE R&B

## 2019-09-23 PROCEDURE — 2700000000 HC OXYGEN THERAPY PER DAY

## 2019-09-23 PROCEDURE — 85027 COMPLETE CBC AUTOMATED: CPT

## 2019-09-23 RX ORDER — HEPARIN SODIUM 10000 [USP'U]/ML
5000 INJECTION, SOLUTION INTRAVENOUS; SUBCUTANEOUS EVERY 8 HOURS
Status: DISCONTINUED | OUTPATIENT
Start: 2019-09-23 | End: 2019-09-26 | Stop reason: HOSPADM

## 2019-09-23 RX ORDER — MIRTAZAPINE 15 MG/1
30 TABLET, FILM COATED ORAL NIGHTLY
Status: DISCONTINUED | OUTPATIENT
Start: 2019-09-23 | End: 2019-09-26 | Stop reason: HOSPADM

## 2019-09-23 RX ORDER — CYCLOBENZAPRINE HCL 10 MG
10 TABLET ORAL 3 TIMES DAILY PRN
Status: ON HOLD | COMMUNITY
End: 2020-09-17 | Stop reason: HOSPADM

## 2019-09-23 RX ORDER — TAMSULOSIN HYDROCHLORIDE 0.4 MG/1
0.4 CAPSULE ORAL DAILY
Status: DISCONTINUED | OUTPATIENT
Start: 2019-09-23 | End: 2019-09-26 | Stop reason: HOSPADM

## 2019-09-23 RX ORDER — LEVOTHYROXINE SODIUM 0.12 MG/1
125 TABLET ORAL DAILY
Status: DISCONTINUED | OUTPATIENT
Start: 2019-09-23 | End: 2019-09-26 | Stop reason: HOSPADM

## 2019-09-23 RX ORDER — BUPROPION HYDROCHLORIDE 150 MG/1
150 TABLET, EXTENDED RELEASE ORAL DAILY
Status: DISCONTINUED | OUTPATIENT
Start: 2019-09-23 | End: 2019-09-26 | Stop reason: HOSPADM

## 2019-09-23 RX ORDER — METHYLPREDNISOLONE SODIUM SUCCINATE 40 MG/ML
40 INJECTION, POWDER, LYOPHILIZED, FOR SOLUTION INTRAMUSCULAR; INTRAVENOUS EVERY 12 HOURS
Status: DISCONTINUED | OUTPATIENT
Start: 2019-09-23 | End: 2019-09-26

## 2019-09-23 RX ORDER — SIMVASTATIN 40 MG
40 TABLET ORAL NIGHTLY
Status: DISCONTINUED | OUTPATIENT
Start: 2019-09-23 | End: 2019-09-23

## 2019-09-23 RX ORDER — BUDESONIDE 0.5 MG/2ML
0.5 INHALANT ORAL 2 TIMES DAILY
Status: DISCONTINUED | OUTPATIENT
Start: 2019-09-23 | End: 2019-09-26 | Stop reason: HOSPADM

## 2019-09-23 RX ORDER — PANTOPRAZOLE SODIUM 40 MG/1
40 TABLET, DELAYED RELEASE ORAL
Status: DISCONTINUED | OUTPATIENT
Start: 2019-09-23 | End: 2019-09-26 | Stop reason: HOSPADM

## 2019-09-23 RX ORDER — IPRATROPIUM BROMIDE AND ALBUTEROL SULFATE 2.5; .5 MG/3ML; MG/3ML
1 SOLUTION RESPIRATORY (INHALATION)
Status: DISCONTINUED | OUTPATIENT
Start: 2019-09-23 | End: 2019-09-26 | Stop reason: HOSPADM

## 2019-09-23 RX ORDER — LORAZEPAM 0.5 MG/1
0.5 TABLET ORAL 3 TIMES DAILY PRN
Status: DISCONTINUED | OUTPATIENT
Start: 2019-09-23 | End: 2019-09-26 | Stop reason: HOSPADM

## 2019-09-23 RX ORDER — GABAPENTIN 300 MG/1
900 CAPSULE ORAL 3 TIMES DAILY
Status: DISCONTINUED | OUTPATIENT
Start: 2019-09-23 | End: 2019-09-26 | Stop reason: HOSPADM

## 2019-09-23 RX ORDER — SODIUM CHLORIDE 9 MG/ML
INJECTION, SOLUTION INTRAVENOUS CONTINUOUS
Status: DISCONTINUED | OUTPATIENT
Start: 2019-09-23 | End: 2019-09-24

## 2019-09-23 RX ADMIN — LEVOTHYROXINE SODIUM 125 MCG: 125 TABLET ORAL at 06:55

## 2019-09-23 RX ADMIN — BUDESONIDE 500 MCG: 0.5 SUSPENSION RESPIRATORY (INHALATION) at 21:34

## 2019-09-23 RX ADMIN — BUDESONIDE 500 MCG: 0.5 SUSPENSION RESPIRATORY (INHALATION) at 09:15

## 2019-09-23 RX ADMIN — IPRATROPIUM BROMIDE AND ALBUTEROL SULFATE 1 AMPULE: .5; 3 SOLUTION RESPIRATORY (INHALATION) at 16:35

## 2019-09-23 RX ADMIN — IPRATROPIUM BROMIDE AND ALBUTEROL SULFATE 1 AMPULE: .5; 3 SOLUTION RESPIRATORY (INHALATION) at 21:34

## 2019-09-23 RX ADMIN — TAMSULOSIN HYDROCHLORIDE 0.4 MG: 0.4 CAPSULE ORAL at 10:27

## 2019-09-23 RX ADMIN — HEPARIN SODIUM 5000 UNITS: 10000 INJECTION INTRAVENOUS; SUBCUTANEOUS at 06:34

## 2019-09-23 RX ADMIN — IPRATROPIUM BROMIDE AND ALBUTEROL SULFATE 1 AMPULE: .5; 3 SOLUTION RESPIRATORY (INHALATION) at 12:48

## 2019-09-23 RX ADMIN — GABAPENTIN 900 MG: 300 CAPSULE ORAL at 10:27

## 2019-09-23 RX ADMIN — SODIUM CHLORIDE: 9 INJECTION, SOLUTION INTRAVENOUS at 18:53

## 2019-09-23 RX ADMIN — IPRATROPIUM BROMIDE AND ALBUTEROL SULFATE 1 AMPULE: .5; 3 SOLUTION RESPIRATORY (INHALATION) at 09:15

## 2019-09-23 RX ADMIN — MIRTAZAPINE 30 MG: 15 TABLET, FILM COATED ORAL at 21:00

## 2019-09-23 RX ADMIN — METHYLPREDNISOLONE SODIUM SUCCINATE 40 MG: 40 INJECTION, POWDER, FOR SOLUTION INTRAMUSCULAR; INTRAVENOUS at 18:54

## 2019-09-23 RX ADMIN — METHYLPREDNISOLONE SODIUM SUCCINATE 40 MG: 40 INJECTION, POWDER, FOR SOLUTION INTRAMUSCULAR; INTRAVENOUS at 06:33

## 2019-09-23 RX ADMIN — HEPARIN SODIUM 5000 UNITS: 10000 INJECTION INTRAVENOUS; SUBCUTANEOUS at 21:01

## 2019-09-23 RX ADMIN — HEPARIN SODIUM 5000 UNITS: 10000 INJECTION INTRAVENOUS; SUBCUTANEOUS at 14:39

## 2019-09-23 RX ADMIN — BUPROPION HYDROCHLORIDE 150 MG: 150 TABLET, EXTENDED RELEASE ORAL at 10:27

## 2019-09-23 RX ADMIN — PANTOPRAZOLE SODIUM 40 MG: 40 TABLET, DELAYED RELEASE ORAL at 06:33

## 2019-09-23 RX ADMIN — SODIUM CHLORIDE: 9 INJECTION, SOLUTION INTRAVENOUS at 06:34

## 2019-09-23 RX ADMIN — GABAPENTIN 900 MG: 300 CAPSULE ORAL at 14:39

## 2019-09-23 RX ADMIN — GABAPENTIN 900 MG: 300 CAPSULE ORAL at 21:00

## 2019-09-23 ASSESSMENT — PAIN SCALES - GENERAL
PAINLEVEL_OUTOF10: 0

## 2019-09-23 NOTE — H&P
systems is otherwise negative except  for the above-mentioned    PHYSICAL ASSESSMENT:  GENERAL:  Reveals a 69-year-old male in no acute distress. VITAL SIGNS:  Blood pressure 114/73, pulse rate of 94, temperature of  102.4, respiratory rate is 26. ENT:  Exam is negative. HEART:  Tachycardic. LUNGS:  Demonstrate faint expiratory wheezes bilaterally. ABDOMEN:  Noted to be soft with positive bowel sounds. EXTREMITIES:  Reveal no evidence for edema and/or cyanosis. ASSESSMENT:  1. Pneumonia. 2.  Acute hypoxic respiratory failure. 3.  History of lung cancer. 4.  History of laryngeal cancer. 5.  Hypothyroidism. 6.  Hyperlipidemia. 7. BPH. PLAN OF TREATMENT:  See orders. Appropriate consultations. DISCHARGE PLAN:  Will be to home when medically stable.         Hai Dangelo DO    D: 09/23/2019 6:07:53       T: 09/23/2019 6:11:08     [email or web address]  Job#: [id number]     Doc#: [id number]    CC:

## 2019-09-23 NOTE — CONSULTS
See dictated consult for details:  Imp:  Right basilar pneumonia  Possible aspiration  Hx pharyngeal CA  Hx left upper lobe adenocarcinoma    Plan:  Suctioned sputum  Antibiotics   Repeat CT chest, neck  Video swallow  Will follow with you  Thanks!!!   Kerrie Casanova D.O.  [phone]

## 2019-09-23 NOTE — ED PROVIDER NOTES
>=60 mL/min/1.73    GFR African American 53     Calcium 9.5 8.6 - 10.2 mg/dL    Total Protein 7.5 6.4 - 8.3 g/dL    Alb 3.6 3.5 - 5.2 g/dL    Total Bilirubin 2.0 (H) 0.0 - 1.2 mg/dL    Alkaline Phosphatase 221 (H) 40 - 129 U/L    ALT 88 (H) 0 - 40 U/L     (H) 0 - 39 U/L   APTT   Result Value Ref Range    aPTT 29.9 24.5 - 35.1 sec   Protime-INR   Result Value Ref Range    Protime 17.4 (H) 9.3 - 12.4 sec    INR 1.5        RADIOLOGY:  Interpreted by Radiologist.  XR CHEST PORTABLE   Final Result   Significant improvement although not fully resolution of   the right para hilar infiltrate since September 19. EKG: This EKG is signed and interpreted by me. Rate: 85  Rhythm: Sinus  Interpretation: no acute changes      ------------------------- NURSING NOTES AND VITALS REVIEWED -------------------------  The nursing notes within the ED encounter and vital signs as below have been reviewed. /73   Pulse 94   Temp 102.4 °F (39.1 °C)   Resp 26   Ht 6' 5\" (1.956 m)   Wt 182 lb (82.6 kg)   SpO2 97%   BMI 21.58 kg/m²   Oxygen Saturation Interpretation: Abnormal    ---------------------------------------------------PHYSICAL EXAM---------------------------------------------------    Constitutional/General:  well-nourished no acute distress  HENT: Normocephalic and atraumatic. PERRL, EOMI. Oropharynx clear, handling secretions, no trismus. Neck: Tracheostomy hole with no trach tube in place. Hole appears crusted and is closing. Supple, full ROM, no stridor. No midline cervical spine tenderness. Pulmonary: Breath sounds throughout. He does have low oxygenation. . Not in respiratory distress. Cardiovascular: Regular rate. Regular rhythm. No murmurs. 2+ distal pulses. Chest: No chest wall tenderness. Abdomen: Soft. Non tender. Non distended. No rebound, guarding, or rigidity. No pulsatile masses appreciated. Musculoskeletal: Moves all extremities x 4. Warm and well perfused, no edema.  Capillary

## 2019-09-24 ENCOUNTER — APPOINTMENT (OUTPATIENT)
Dept: CT IMAGING | Age: 64
DRG: 178 | End: 2019-09-24
Payer: MEDICARE

## 2019-09-24 ENCOUNTER — APPOINTMENT (OUTPATIENT)
Dept: GENERAL RADIOLOGY | Age: 64
DRG: 178 | End: 2019-09-24
Payer: MEDICARE

## 2019-09-24 LAB
ALBUMIN SERPL-MCNC: 3.2 G/DL (ref 3.5–5.2)
ALP BLD-CCNC: 161 U/L (ref 40–129)
ALT SERPL-CCNC: 63 U/L (ref 0–40)
ANION GAP SERPL CALCULATED.3IONS-SCNC: 9 MMOL/L (ref 7–16)
AST SERPL-CCNC: 52 U/L (ref 0–39)
BILIRUB SERPL-MCNC: 0.3 MG/DL (ref 0–1.2)
BUN BLDV-MCNC: 23 MG/DL (ref 8–23)
CALCIUM SERPL-MCNC: 9.1 MG/DL (ref 8.6–10.2)
CHLORIDE BLD-SCNC: 103 MMOL/L (ref 98–107)
CO2: 23 MMOL/L (ref 22–29)
CREAT SERPL-MCNC: 1.1 MG/DL (ref 0.7–1.2)
FILM ARRAY ADENOVIRUS: NORMAL
FILM ARRAY BORDETELLA PERTUSSIS: NORMAL
FILM ARRAY CHLAMYDOPHILIA PNEUMONIAE: NORMAL
FILM ARRAY CORONAVIRUS 229E: NORMAL
FILM ARRAY CORONAVIRUS HKU1: NORMAL
FILM ARRAY CORONAVIRUS NL63: NORMAL
FILM ARRAY CORONAVIRUS OC43: NORMAL
FILM ARRAY INFLUENZA A VIRUS 09H1: NORMAL
FILM ARRAY INFLUENZA A VIRUS H1: NORMAL
FILM ARRAY INFLUENZA A VIRUS H3: NORMAL
FILM ARRAY INFLUENZA A VIRUS: NORMAL
FILM ARRAY INFLUENZA B: NORMAL
FILM ARRAY METAPNEUMOVIRUS: NORMAL
FILM ARRAY MYCOPLASMA PNEUMONIAE: NORMAL
FILM ARRAY PARAINFLUENZA VIRUS 1: NORMAL
FILM ARRAY PARAINFLUENZA VIRUS 2: NORMAL
FILM ARRAY PARAINFLUENZA VIRUS 3: NORMAL
FILM ARRAY PARAINFLUENZA VIRUS 4: NORMAL
FILM ARRAY RESPIRATORY SYNCITIAL VIRUS: NORMAL
FILM ARRAY RHINOVIRUS/ENTEROVIRUS: NORMAL
GFR AFRICAN AMERICAN: >60
GFR NON-AFRICAN AMERICAN: >60 ML/MIN/1.73
GLUCOSE BLD-MCNC: 170 MG/DL (ref 74–99)
POTASSIUM SERPL-SCNC: 4.4 MMOL/L (ref 3.5–5)
SODIUM BLD-SCNC: 135 MMOL/L (ref 132–146)
TOTAL PROTEIN: 6.8 G/DL (ref 6.4–8.3)

## 2019-09-24 PROCEDURE — 87798 DETECT AGENT NOS DNA AMP: CPT

## 2019-09-24 PROCEDURE — 87206 SMEAR FLUORESCENT/ACID STAI: CPT

## 2019-09-24 PROCEDURE — 6370000000 HC RX 637 (ALT 250 FOR IP): Performed by: FAMILY MEDICINE

## 2019-09-24 PROCEDURE — 87070 CULTURE OTHR SPECIMN AEROBIC: CPT

## 2019-09-24 PROCEDURE — 94640 AIRWAY INHALATION TREATMENT: CPT

## 2019-09-24 PROCEDURE — 92611 MOTION FLUOROSCOPY/SWALLOW: CPT

## 2019-09-24 PROCEDURE — 70490 CT SOFT TISSUE NECK W/O DYE: CPT

## 2019-09-24 PROCEDURE — 2580000003 HC RX 258

## 2019-09-24 PROCEDURE — 2060000000 HC ICU INTERMEDIATE R&B

## 2019-09-24 PROCEDURE — 87486 CHLMYD PNEUM DNA AMP PROBE: CPT

## 2019-09-24 PROCEDURE — 2700000000 HC OXYGEN THERAPY PER DAY

## 2019-09-24 PROCEDURE — 6360000002 HC RX W HCPCS: Performed by: FAMILY MEDICINE

## 2019-09-24 PROCEDURE — 87077 CULTURE AEROBIC IDENTIFY: CPT

## 2019-09-24 PROCEDURE — 87633 RESP VIRUS 12-25 TARGETS: CPT

## 2019-09-24 PROCEDURE — 2500000003 HC RX 250 WO HCPCS: Performed by: NURSE PRACTITIONER

## 2019-09-24 PROCEDURE — 87186 SC STD MICRODIL/AGAR DIL: CPT

## 2019-09-24 PROCEDURE — 87581 M.PNEUMON DNA AMP PROBE: CPT

## 2019-09-24 PROCEDURE — 71250 CT THORAX DX C-: CPT

## 2019-09-24 PROCEDURE — 74230 X-RAY XM SWLNG FUNCJ C+: CPT

## 2019-09-24 RX ORDER — SODIUM CHLORIDE 0.9 % (FLUSH) 0.9 %
SYRINGE (ML) INJECTION
Status: COMPLETED
Start: 2019-09-24 | End: 2019-09-24

## 2019-09-24 RX ORDER — FENTANYL 50 UG/H
1 PATCH TRANSDERMAL
Status: DISCONTINUED | OUTPATIENT
Start: 2019-09-24 | End: 2019-09-26 | Stop reason: HOSPADM

## 2019-09-24 RX ORDER — FENTANYL 12 UG/H
1 PATCH TRANSDERMAL
Status: DISCONTINUED | OUTPATIENT
Start: 2019-09-24 | End: 2019-09-26 | Stop reason: HOSPADM

## 2019-09-24 RX ADMIN — METHYLPREDNISOLONE SODIUM SUCCINATE 40 MG: 40 INJECTION, POWDER, FOR SOLUTION INTRAMUSCULAR; INTRAVENOUS at 18:13

## 2019-09-24 RX ADMIN — HEPARIN SODIUM 5000 UNITS: 10000 INJECTION INTRAVENOUS; SUBCUTANEOUS at 22:05

## 2019-09-24 RX ADMIN — MIRTAZAPINE 30 MG: 15 TABLET, FILM COATED ORAL at 20:33

## 2019-09-24 RX ADMIN — LEVOTHYROXINE SODIUM 125 MCG: 125 TABLET ORAL at 05:12

## 2019-09-24 RX ADMIN — IPRATROPIUM BROMIDE AND ALBUTEROL SULFATE 1 AMPULE: .5; 3 SOLUTION RESPIRATORY (INHALATION) at 17:16

## 2019-09-24 RX ADMIN — METHYLPREDNISOLONE SODIUM SUCCINATE 40 MG: 40 INJECTION, POWDER, FOR SOLUTION INTRAMUSCULAR; INTRAVENOUS at 05:12

## 2019-09-24 RX ADMIN — GABAPENTIN 900 MG: 300 CAPSULE ORAL at 15:19

## 2019-09-24 RX ADMIN — BUDESONIDE 500 MCG: 0.5 SUSPENSION RESPIRATORY (INHALATION) at 08:17

## 2019-09-24 RX ADMIN — IPRATROPIUM BROMIDE AND ALBUTEROL SULFATE 1 AMPULE: .5; 3 SOLUTION RESPIRATORY (INHALATION) at 11:49

## 2019-09-24 RX ADMIN — TAMSULOSIN HYDROCHLORIDE 0.4 MG: 0.4 CAPSULE ORAL at 09:20

## 2019-09-24 RX ADMIN — GABAPENTIN 900 MG: 300 CAPSULE ORAL at 20:41

## 2019-09-24 RX ADMIN — IPRATROPIUM BROMIDE AND ALBUTEROL SULFATE 1 AMPULE: .5; 3 SOLUTION RESPIRATORY (INHALATION) at 08:17

## 2019-09-24 RX ADMIN — BARIUM SULFATE 58 G: 960 POWDER, FOR SUSPENSION ORAL at 14:16

## 2019-09-24 RX ADMIN — HEPARIN SODIUM 5000 UNITS: 10000 INJECTION INTRAVENOUS; SUBCUTANEOUS at 15:19

## 2019-09-24 RX ADMIN — PANTOPRAZOLE SODIUM 40 MG: 40 TABLET, DELAYED RELEASE ORAL at 05:12

## 2019-09-24 RX ADMIN — BUDESONIDE 500 MCG: 0.5 SUSPENSION RESPIRATORY (INHALATION) at 19:26

## 2019-09-24 RX ADMIN — GABAPENTIN 900 MG: 300 CAPSULE ORAL at 09:20

## 2019-09-24 RX ADMIN — BARIUM SULFATE 45 G: 0.6 CREAM ORAL at 14:16

## 2019-09-24 RX ADMIN — Medication 10 ML: at 18:13

## 2019-09-24 RX ADMIN — IPRATROPIUM BROMIDE AND ALBUTEROL SULFATE 1 AMPULE: .5; 3 SOLUTION RESPIRATORY (INHALATION) at 19:26

## 2019-09-24 RX ADMIN — BUPROPION HYDROCHLORIDE 150 MG: 150 TABLET, EXTENDED RELEASE ORAL at 09:20

## 2019-09-24 RX ADMIN — HEPARIN SODIUM 5000 UNITS: 10000 INJECTION INTRAVENOUS; SUBCUTANEOUS at 05:12

## 2019-09-24 ASSESSMENT — PAIN SCALES - GENERAL
PAINLEVEL_OUTOF10: 0
PAINLEVEL_OUTOF10: 6
PAINLEVEL_OUTOF10: 0
PAINLEVEL_OUTOF10: 0

## 2019-09-24 NOTE — PROGRESS NOTES
appeared adequate                              [x]The admitting diagnosis and active problem list, as listed below have been reviewed prior to initiation of this evaluation.      ADMITTING DIAGNOSIS: Pneumonia [J18.9]     ACTIVE PROBLEM LIST:   Patient Active Problem List   Diagnosis    [facility]ma [facility]    Malignant neoplasm of overlapping sites of left lung ([facility]    Airway obstruction    Palliative care encounter    COPD (chronic obstructive pulmonary disease) ([facility]    Sepsis ([facility]    HAP (hospital-acquired pneumonia)    Tobacco dependence    Severe protein-energy malnutrition (HCC)    Acute on chronic respiratory failure with hypoxia ([facility]    Acute retention of urine    Pharyngoesophageal dysphagia    Anemia    Orthostatic hypotension    Attention to G-tube [facility])    Abdominal aortic aneurysm without rupture (HCC)    Osteoradionecrosis of jaw    Pneumonia    Palliative care by specialist    Pharyngeal cancer ([facility]    Osteoarthritis of right hip    Benign prostatic hyperplasia without lower urinary tract symptoms    Acquired hypothyroidism    Acute rhinitis

## 2019-09-24 NOTE — PROGRESS NOTES
however appears to extend over a length of approximately 3.3 cm.       A tracheostomy tube is present. Scattered lymph nodes are visualized which do not meet the CT criteria   for adenopathy.                Impression   Findings compatible with a mass at the level of the   larynx        CT chest 9/24/19   There are infiltrates in both lung fields, these appear to be chronic. There is likely a component of fibrosis. This is superimposed on   emphysema. There are pleural-based infiltrates 1 which is somewhat   nodular in the left upper lung anteriorly in the lingula which appears   to be new in the interval measuring approximately 5 x 5 mm. There is   an infiltrate in the right lower lung new in the interval this has a   focal nodular component measuring 2.9 x 2.4 cm. No definite pulmonary mass is identified   No significant pleural fluid is identified. The heart is abnormal. There is coronary artery calcification. The aorta demonstrates evidence for atherosclerotic disease   The mediastinum is unremarkable               Impression   Findings suspicious for progression of disease, there are 2 nodular   infiltrates were at the right lung base which could represent   pneumonia or mass could also be present.  There is a nodular infiltrate   in the left lingula along the pleural surface concerning for a new   focal metastasis                         Labs:  Lab Results   Component Value Date    WBC 10.1 09/23/2019    HGB 12.3 09/23/2019    HCT 38.4 09/23/2019    MCV 86.3 09/23/2019    MCH 27.6 09/23/2019    MCHC 32.0 09/23/2019    RDW 15.6 09/23/2019     09/23/2019    MPV 9.5 09/23/2019     Lab Results   Component Value Date     09/23/2019    K 4.4 09/23/2019    K 3.9 09/22/2019     09/23/2019    CO2 23 09/23/2019    BUN 23 09/23/2019    CREATININE 1.1 09/23/2019    LABALBU 3.2 09/23/2019    CALCIUM 9.1 09/23/2019    GFRAA >60 09/23/2019    LABGLOM >60 09/23/2019     Lab Results   Component

## 2019-09-24 NOTE — PLAN OF CARE
Problem: Breathing Pattern - Ineffective:  Goal: Ability to achieve and maintain a regular respiratory rate will improve  Description  Ability to achieve and maintain a regular respiratory rate will improve  Outcome: Met This Shift

## 2019-09-24 NOTE — CONSULTS
white count 7.1,  hemoglobin 13.8, hematocrit 41.9, and platelets 602. INR is 1.5. IMPRESSION:  1. Right basilar pneumonia. 2.  Possible aspiration. 3.  History of pharyngeal carcinoma. 4.  History of left upper lobe adenocarcinoma. PLAN:  1. Obtain suction sputum for Gram stain culture. 2.  Respiratory panel FilmArray. 3.  IV antibiotics. 4.  Repeat CT scan of the chest and CT scan of the neck. 5.  Obtain video swallow. We will follow along with you in the care of the patient. Once again,  we thank you for the opportunity to be involved in the care of your  patient. If I can provide you with any further information, please feel  free to contact me directly.     Sincerely,        Carlos Ambriz DO    D: 09/23/2019 16:41:45       T: 09/23/2019 16:45:35     [email or web address]  Job#: [id number]     Doc#: [id number]    CC:

## 2019-09-25 LAB
ALBUMIN SERPL-MCNC: 3.2 G/DL (ref 3.5–5.2)
ALP BLD-CCNC: 133 U/L (ref 40–129)
ALT SERPL-CCNC: 49 U/L (ref 0–40)
ANION GAP SERPL CALCULATED.3IONS-SCNC: 12 MMOL/L (ref 7–16)
AST SERPL-CCNC: 30 U/L (ref 0–39)
BASOPHILS ABSOLUTE: 0 E9/L (ref 0–0.2)
BASOPHILS RELATIVE PERCENT: 0 % (ref 0–2)
BILIRUB SERPL-MCNC: 0.4 MG/DL (ref 0–1.2)
BUN BLDV-MCNC: 26 MG/DL (ref 8–23)
CALCIUM SERPL-MCNC: 9.1 MG/DL (ref 8.6–10.2)
CHLORIDE BLD-SCNC: 106 MMOL/L (ref 98–107)
CO2: 21 MMOL/L (ref 22–29)
CREAT SERPL-MCNC: 1.1 MG/DL (ref 0.7–1.2)
EOSINOPHILS ABSOLUTE: 0 E9/L (ref 0.05–0.5)
EOSINOPHILS RELATIVE PERCENT: 0 % (ref 0–6)
GFR AFRICAN AMERICAN: >60
GFR NON-AFRICAN AMERICAN: >60 ML/MIN/1.73
GLUCOSE BLD-MCNC: 145 MG/DL (ref 74–99)
HCT VFR BLD CALC: 36.3 % (ref 37–54)
HEMOGLOBIN: 11.5 G/DL (ref 12.5–16.5)
IMMATURE GRANULOCYTES #: 0.04 E9/L
IMMATURE GRANULOCYTES %: 0.5 % (ref 0–5)
LYMPHOCYTES ABSOLUTE: 0.78 E9/L (ref 1.5–4)
LYMPHOCYTES RELATIVE PERCENT: 10.2 % (ref 20–42)
MCH RBC QN AUTO: 28 PG (ref 26–35)
MCHC RBC AUTO-ENTMCNC: 31.7 % (ref 32–34.5)
MCV RBC AUTO: 88.3 FL (ref 80–99.9)
MONOCYTES ABSOLUTE: 0.34 E9/L (ref 0.1–0.95)
MONOCYTES RELATIVE PERCENT: 4.4 % (ref 2–12)
NEUTROPHILS ABSOLUTE: 6.49 E9/L (ref 1.8–7.3)
NEUTROPHILS RELATIVE PERCENT: 84.9 % (ref 43–80)
PDW BLD-RTO: 16.4 FL (ref 11.5–15)
PLATELET # BLD: 240 E9/L (ref 130–450)
PMV BLD AUTO: 10.4 FL (ref 7–12)
POTASSIUM SERPL-SCNC: 4.9 MMOL/L (ref 3.5–5)
RBC # BLD: 4.11 E12/L (ref 3.8–5.8)
SODIUM BLD-SCNC: 139 MMOL/L (ref 132–146)
STREP PNEUMONIAE ANTIGEN, URINE: NORMAL
TOTAL PROTEIN: 6.7 G/DL (ref 6.4–8.3)
URINE CULTURE, ROUTINE: NORMAL
WBC # BLD: 7.7 E9/L (ref 4.5–11.5)

## 2019-09-25 PROCEDURE — 87450 HC DIRECT STREP B ANTIGEN: CPT

## 2019-09-25 PROCEDURE — 85025 COMPLETE CBC W/AUTO DIFF WBC: CPT

## 2019-09-25 PROCEDURE — 36415 COLL VENOUS BLD VENIPUNCTURE: CPT

## 2019-09-25 PROCEDURE — 2700000000 HC OXYGEN THERAPY PER DAY

## 2019-09-25 PROCEDURE — 6360000002 HC RX W HCPCS: Performed by: FAMILY MEDICINE

## 2019-09-25 PROCEDURE — 6370000000 HC RX 637 (ALT 250 FOR IP): Performed by: FAMILY MEDICINE

## 2019-09-25 PROCEDURE — 94640 AIRWAY INHALATION TREATMENT: CPT

## 2019-09-25 PROCEDURE — 2060000000 HC ICU INTERMEDIATE R&B

## 2019-09-25 PROCEDURE — 6370000000 HC RX 637 (ALT 250 FOR IP): Performed by: SPECIALIST

## 2019-09-25 PROCEDURE — 80053 COMPREHEN METABOLIC PANEL: CPT

## 2019-09-25 RX ORDER — PETROLATUM 42 G/100G
OINTMENT TOPICAL 2 TIMES DAILY
Status: DISCONTINUED | OUTPATIENT
Start: 2019-09-25 | End: 2019-09-26 | Stop reason: HOSPADM

## 2019-09-25 RX ORDER — LINEZOLID 600 MG/1
600 TABLET, FILM COATED ORAL EVERY 12 HOURS SCHEDULED
Status: DISCONTINUED | OUTPATIENT
Start: 2019-09-25 | End: 2019-09-26 | Stop reason: HOSPADM

## 2019-09-25 RX ORDER — LINEZOLID 2 MG/ML
600 INJECTION, SOLUTION INTRAVENOUS EVERY 12 HOURS
Status: DISCONTINUED | OUTPATIENT
Start: 2019-09-25 | End: 2019-09-25

## 2019-09-25 RX ADMIN — LEVOTHYROXINE SODIUM 125 MCG: 125 TABLET ORAL at 05:31

## 2019-09-25 RX ADMIN — GABAPENTIN 900 MG: 300 CAPSULE ORAL at 21:00

## 2019-09-25 RX ADMIN — IPRATROPIUM BROMIDE AND ALBUTEROL SULFATE 1 AMPULE: .5; 3 SOLUTION RESPIRATORY (INHALATION) at 11:46

## 2019-09-25 RX ADMIN — PANTOPRAZOLE SODIUM 40 MG: 40 TABLET, DELAYED RELEASE ORAL at 05:31

## 2019-09-25 RX ADMIN — LINEZOLID 600 MG: 600 TABLET, FILM COATED ORAL at 21:01

## 2019-09-25 RX ADMIN — SKIN PROTECTANT: 44 OINTMENT TOPICAL at 14:06

## 2019-09-25 RX ADMIN — HEPARIN SODIUM 5000 UNITS: 10000 INJECTION INTRAVENOUS; SUBCUTANEOUS at 14:13

## 2019-09-25 RX ADMIN — METHYLPREDNISOLONE SODIUM SUCCINATE 40 MG: 40 INJECTION, POWDER, FOR SOLUTION INTRAMUSCULAR; INTRAVENOUS at 05:31

## 2019-09-25 RX ADMIN — HEPARIN SODIUM 5000 UNITS: 10000 INJECTION INTRAVENOUS; SUBCUTANEOUS at 21:00

## 2019-09-25 RX ADMIN — IPRATROPIUM BROMIDE AND ALBUTEROL SULFATE 1 AMPULE: .5; 3 SOLUTION RESPIRATORY (INHALATION) at 21:06

## 2019-09-25 RX ADMIN — TAMSULOSIN HYDROCHLORIDE 0.4 MG: 0.4 CAPSULE ORAL at 08:54

## 2019-09-25 RX ADMIN — BUDESONIDE 500 MCG: 0.5 SUSPENSION RESPIRATORY (INHALATION) at 08:45

## 2019-09-25 RX ADMIN — METHYLPREDNISOLONE SODIUM SUCCINATE 40 MG: 40 INJECTION, POWDER, FOR SOLUTION INTRAMUSCULAR; INTRAVENOUS at 17:34

## 2019-09-25 RX ADMIN — BUPROPION HYDROCHLORIDE 150 MG: 150 TABLET, EXTENDED RELEASE ORAL at 08:55

## 2019-09-25 RX ADMIN — GABAPENTIN 900 MG: 300 CAPSULE ORAL at 08:55

## 2019-09-25 RX ADMIN — HEPARIN SODIUM 5000 UNITS: 10000 INJECTION INTRAVENOUS; SUBCUTANEOUS at 05:25

## 2019-09-25 RX ADMIN — IPRATROPIUM BROMIDE AND ALBUTEROL SULFATE 1 AMPULE: .5; 3 SOLUTION RESPIRATORY (INHALATION) at 16:54

## 2019-09-25 RX ADMIN — IPRATROPIUM BROMIDE AND ALBUTEROL SULFATE 1 AMPULE: .5; 3 SOLUTION RESPIRATORY (INHALATION) at 08:45

## 2019-09-25 RX ADMIN — MIRTAZAPINE 30 MG: 15 TABLET, FILM COATED ORAL at 21:01

## 2019-09-25 RX ADMIN — BUDESONIDE 500 MCG: 0.5 SUSPENSION RESPIRATORY (INHALATION) at 21:06

## 2019-09-25 RX ADMIN — GABAPENTIN 900 MG: 300 CAPSULE ORAL at 14:13

## 2019-09-25 ASSESSMENT — PAIN SCALES - GENERAL
PAINLEVEL_OUTOF10: 0

## 2019-09-25 NOTE — FLOWSHEET NOTE
Inpatient Wound Care(initial evaluation) 4524    Admit Date: 9/22/2019  7:25 PM    Reason for consult:  Hands, fingers     Significant history: includes:    Malignant neoplasm of overlapping sites of left lung (HCC)      Chronic pain due to neoplasm      Pancoast tumor, left (HCC)      Ipsilateral Vipin's syndrome     Findings:       09/25/19 0915   Skin Integrity   Skin Integrity   (dried scabs)   Location BLE   Skin Integrity Site 2   Skin Integrity Location 2   (dried scabs, cracks on plantar aspects of fingers)     Plan:  Aquaphor to hands and feet  Areas are chronic to hands    Demetrice Herman 9/25/2019 10:23 AM

## 2019-09-25 NOTE — PROGRESS NOTES
09/25/2019    CO2 21 09/25/2019    BUN 26 09/25/2019    LABALBU 3.2 09/25/2019    CREATININE 1.1 09/25/2019    CALCIUM 9.1 09/25/2019    GFRAA >60 09/25/2019    LABGLOM >60 09/25/2019     PT/INR:    Lab Results   Component Value Date    PROTIME 17.4 09/22/2019    INR 1.5 09/22/2019     Last 3 Troponin:    Lab Results   Component Value Date    TROPONINI <0.01 09/17/2018    TROPONINI <0.01 07/05/2017    TROPONINI <0.01 06/30/2017     TSH:    Lab Results   Component Value Date    TSH 2.260 04/17/2019      Assessment:     1. Right basilar pneumonia  2.  Long term tracheostomy    Plan:       Continue same plan and orders    Results of CT scan of the soft tissue of the neck and CT scan of chest both noted

## 2019-09-25 NOTE — CONSULTS
[address] [place] [address] [facility]    Consultation Note     Admit Date: 9/22/2019  7:25 PM    Reason for Consult:   Probable MRSA from the trach secretions    Attending Physician:  Anahi Jesus DO     Chief Complaint: Shortness of breath    HISTORY OF PRESENT ILLNESS:   The patient is a 59 y.o.  man known to the Infectious Diseases service. The patient is states along with his wife that he was doing really well with his pharyngeal CA and left upper lobe adenocarcinoma that is under control with current therapy managed by heme-onc. 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 He became quite short of breath and anxious and actually pulled his trach out because he could not seem to get enough air. He subsequently was admitted large mucous plugs were extracted and currently he seems to be doing fairly well with the new trach. He has been afebrile sputum's have grown however normal anita and staph. He has a history of MRSA in the past.  He was treated for MRSA pneumonia in December 2018. In this admission he had a swallowing study that was adequate and the CT of the chest that showed suspicion for progression of his disease with 2 nodular infiltrates in the right lung left lingula also had nodular infiltrates.   Patient still smokes          Historically   9/24/2019 normal anita and staph  12/7/2018 MRSA nasal colonization  9/18/2018 normal oral anita and staph MRSA respiratory cultures from the trach aspirate  7/5/2017 staph aureus urine  5/27/2017 normal oral anita MRSA aspirated sputum      Past Medical History:        Diagnosis Date    Abdominal aortic aneurysm without rupture ([address] 8/27/2018    Acute bronchitis with COPD ([address] 5/27/2017    Apnea     Arthritis     COPD (chronic obstructive pulmonary disease) (HCC)     Depression with anxiety     Emphysema of lung ([address]     Encounter for antineoplastic immunotherapy     HAP aspiration pneumonia. 2. Chronic left apical pulmonary mass, similar to prior exams. This   likely represents apical pleural scarring. XR CHEST PORTABLE   Final Result   Significant improvement although not fully resolution of   the right para hilar infiltrate since September 19. Microbiology:  Pending  Recent Labs     09/22/19  2020   BC 24 Hours- no growth     Recent Labs     09/24/19 2015   ORG Staphylococcus species*  Gram negative ruperto*     Recent Labs     09/22/19  2200   BLOODCULT2 24 Hours- no growth     Recent Labs     09/25/19  0630   STREPNEUMAGU Presumptive NEGATIVE for Pneumococcal pneumonia, suggesting  no current or recent pneumococcal infection. Infection due  to S. pneumoniae cannot be ruled out since the antigen present  in the sample may be below the detection limit of the test.       No results for input(s): LP1UAG in the last 72 hours. No results for input(s): ASO in the last 72 hours. Recent Labs     09/24/19 2015   CULTRESP Oral Pharyngeal Alva reduced*  Heavy growth  Identification and sensitivity to follow    Light growth  Identification and sensitivity to follow         Assessment:  · MRSA tracheitis rule out pneumonia symptoms may been related to mixing cement and then causing some  · Mucus solidification resulting in his anxiety and thick  mucus tenacious secretions    Plan:    · Cont Zyvox p.o.  · Long discussion with wife with questions about MRSA transmission to grandchildren. Explained in detail the mode of transmission to the wife  · Check cultures  · Baseline ESR, CRP  · Monitor labs  · Will follow with you    Thank you for having us see this patient in consultation. I will be discussing this case with the treating physicians.       Electronically signed by Murtis Aase, MD on 9/25/2019 at 4:21 PM

## 2019-09-25 NOTE — CARE COORDINATION
Spoke with patient. He tells me he has adequate trach supplies at home, if he goes home with current trach he will change to his home supplies himself once he is back at home. He will call [employer] and ask for more supplies when he needs them. Declined any needs at this point. Patient has oxygen through [employer] and portable tanks to get him home if necessary at discharge.

## 2019-09-25 NOTE — CONSULTS
pain .  He was initiated on Decadron along with Gabapentin and will be maintained on PRN Norco. His Decadron was tapered off. His MRI brain showed no evidence of CNS metastasis. His PET CT scan showed abnormal metabolic activity confined to left hilar area and mediastinum without evidence of other skeletal  metastasis or intra abdominal metastasis . He is S/P CT guided biopsy and the pathology is consistent with moderately to poorly differentiated adenocarcinoma . Specimen was sent  for EGFR/ALK/ROS 1. He completed paliative radiation therapy to T1/T2 to prevent cord compression . The fact that his disease is localized to left hilum and he has Vipin's syndrome with stage IIIB , T4 N2 M0, he will be recommended  concurrent modality approach with weekly Taxol/carbo along with XRT . His EGFR/ALK and ROS 1 were all negative and the fact that he has no targetable mutations ,he will be recommended Alimta/Avastin after  completion of concurrent modality approach . He completed the 3 additional weeks of Taxol/Carbo then was switched to Alimta/Carbo/Avastin. His follow up CT chest done 16 showed improvement in left apical mass and mediastinal lymphadenopathy . MRI of T spine also showed  Alexandra Lock : [id number]) Page 2 of 4  improvement with persistent tumor extension into T1 and T2 vertebral bodies but to a lesser extent and without impingement or displacement  of spinal cord . He will continue on Gabapentin 900 mg tid for brachial plexopathy and neuropathy and he will be recommended to continue PT/OT  He will benefit from Francies Rosa for palliation of skeletal metastasis  He was restaged after cycle 6.   His CT scan of abdomen and pelvis done 16 was essentially negative but his CT chest showed slight increase in paratracheal node  from 10 to 14 mm but was otherwise stable  His PDL1 marker came back positive with 60 % intensity and he will be a good candidate for second line immunotherapy with of brain done FEB 2018 was negative  He was recommended to continue on present regimen with Roslynn Amas  His follow up PET CT scan in [facility] 2018 showed minimal residual activity in small CRISTINA lesion likely representing post radiation  fibrosis and persistent moderate SUV activity in the right vocal cord area without associated lymphadenopathy . He has achieved overall an excellent response and will be recommended to continue maintenance [place] He will have Clindamycin for his gingivitis, he will follow with oral surgery. His Kathern Cogan has been discontinued because of ONJ  Was recently hospitalized with pneumonia and discharged home on IV Vancomycin for MRSA. His CTA chest showed RLL pneumonia, no evidence of PE and post radiation fibrotic changes in CRISTINA without evidence of mediastinal  lymphadenopathy or recurrent disease  His follow up CT chest on 10/27/18 showed resolution of Right lung pneumonia and stable fibrotic changes in CRISTINA . His follow up CT chest on 12/21 /18 showed similar findings  He will receive IV hydration today and will go on Amoxil for gum infection and will be referred to oral surgeon . His recent X ray of right femur and hip showed degenerative arthritic changes . CT neck done [facility] 2019 showed post radiation scars and sclerotic stable cervical spine metastasis as well multilevel cervical disc disease. His MRI of right hip and femur showed no evidence of fracture, lytic lesion, avascular necrosis or osseous metastasis . Only right knee  effusion and synovitis was described . he will be given Medrol dosepak and PRN Flexeril for his arthritis  He switched ENT doctor and will be seeing Dr Marilu Neely for his trach.   He might require EGD and balloon dilatation for his mild dysphagia and will be referred to GI Dr Lindsay Alicia    Diagnostic Data:     Past Medical History:   Diagnosis Date    Abdominal aortic aneurysm without rupture ([address] 8/27/2018    Acute bronchitis with COPD ([address] 5/27/2017    Apnea     taste or smell. Cardiovascular: No chest discomfort, dyspnea on exertion, palpitations or loss of consciousness. or phlebitis. Respiratory: +cough and sputum production. Had hemoptysis now resolved. No pleuritic pain   Gastrointestinal: No abdominal pain, appetite loss, blood in stools. No change in bowel habits. No hematemesis   Genitourinary: Patient acknowledges no dysuria, trouble voiding, or hematuria. No nocturia or increased frequency. Musculoskeletal: No gait disturbance, weakness or joint complaints. Integumentary: No rash or pruritis. Neurological: No headache, diplopia, change in muscle strength, numbness or tingling. No change in gait, balance, coordination, mood, affect, memory, mentation, behavior. Psychiatric: No anxiety, or depression. Endocrine: No temperature intolerance. No excessive thirst, fluid intake, or urination. No tremor. Hematologic/Lymphatic: No abnormal bruising or bleeding, blood clots or swollen lymph nodes. Allergic/Immunologic: No nasal congestion or hives. Objective  /74   Pulse 81   Temp 97.4 °F (36.3 °C) (Temporal)   Resp 17   Ht 6' 5\" (1.956 m)   Wt 182 lb (82.6 kg)   SpO2 99%   BMI 21.58 kg/m²     Physical Exam:   Performance Status:  General: AAO to person, place, time, in no acute distress, pleasant   Head and neck : PERRLA, EOMI . Sclera non icteric. Caye Baston in place, clean and without blood  Oropharynx : Clear  Neck: no JVD,  no adenopathy  LYMPHATICS : No LAD  Heart: Regular rate and regular rhythm, no murmur  Lungs: Clear to auscultation   Extremities: No edema,no cyanosis, no clubbing. Abdomen: Soft, non-tender;no masses, no organomegaly  Skin:  No rash  Neurologic:Cranial nerves grossly intact. No focal motor or sensory deficits .     Recent Laboratory Data-   Lab Results   Component Value Date    WBC 7.7 09/25/2019    HGB 11.5 (L) 09/25/2019    HCT 36.3 (L) 09/25/2019    MCV 88.3 09/25/2019     09/25/2019    LYMPHOPCT 10.2 (L) airspace opacities favored to represent atelectasis/scarring rather   than aspiration pneumonia. 2. Chronic left apical pulmonary mass, similar to prior exams. This   likely represents apical pleural scarring. XR CHEST PORTABLE   Final Result   Significant improvement although not fully resolution of   the right para hilar infiltrate since September 19. ASSESSMENT/PLAN :  60 yo male   Stage IV lung adenocarcinoma s/p XRT and chemotherapy  Primary H+N neoplasm vs metastatic disease from lung, s/p XRT and currently on single agent keytruda  RLL PNA, staph  CT Neck and Chest with new mass in R vocal cord region and new mass in Lingula as above    - ID following and on Linezolid  - Breathing has significantly improved with new trach  - CT scans reviewed with wife and patient in detail. The Lingula lesion is small but new (5 x 5 mm), and the RLL lesion is nodular lesion appears new as well. There is a new mass as above in the L vocal cord area extending into the piriformis  - Recommend continuing antibiotics and resolving current infection. Then repeat scanning as outpatient with either CT or more likely PET scan to evaluate both the L vocal cord lesions and the newly noted lung lesions. He has already seen chemotherapy and is currently on immunotherapy. If recurrent disease is confirmed, options will be somewhat limited.  Further, he continues to actively smoke  - Will follow      Electronically signed by María uHi MD on 9/25/2019 at 4:01 PM

## 2019-09-26 VITALS
OXYGEN SATURATION: 98 % | TEMPERATURE: 98 F | DIASTOLIC BLOOD PRESSURE: 64 MMHG | SYSTOLIC BLOOD PRESSURE: 110 MMHG | RESPIRATION RATE: 18 BRPM | BODY MASS INDEX: 21.49 KG/M2 | WEIGHT: 182 LBS | HEIGHT: 77 IN | HEART RATE: 98 BPM

## 2019-09-26 LAB
ALBUMIN SERPL-MCNC: 3.3 G/DL (ref 3.5–5.2)
ALP BLD-CCNC: 124 U/L (ref 40–129)
ALT SERPL-CCNC: 41 U/L (ref 0–40)
ANION GAP SERPL CALCULATED.3IONS-SCNC: 14 MMOL/L (ref 7–16)
AST SERPL-CCNC: 20 U/L (ref 0–39)
BILIRUB SERPL-MCNC: 0.4 MG/DL (ref 0–1.2)
BUN BLDV-MCNC: 26 MG/DL (ref 8–23)
CALCIUM SERPL-MCNC: 9.3 MG/DL (ref 8.6–10.2)
CHLORIDE BLD-SCNC: 101 MMOL/L (ref 98–107)
CO2: 23 MMOL/L (ref 22–29)
CREAT SERPL-MCNC: 1.1 MG/DL (ref 0.7–1.2)
CULTURE, RESPIRATORY: ABNORMAL
GFR AFRICAN AMERICAN: >60
GFR NON-AFRICAN AMERICAN: >60 ML/MIN/1.73
GLUCOSE BLD-MCNC: 143 MG/DL (ref 74–99)
ORGANISM: ABNORMAL
ORGANISM: ABNORMAL
POTASSIUM SERPL-SCNC: 4.5 MMOL/L (ref 3.5–5)
SMEAR, RESPIRATORY: ABNORMAL
SODIUM BLD-SCNC: 138 MMOL/L (ref 132–146)
TOTAL PROTEIN: 6.7 G/DL (ref 6.4–8.3)

## 2019-09-26 PROCEDURE — 80053 COMPREHEN METABOLIC PANEL: CPT

## 2019-09-26 PROCEDURE — 6370000000 HC RX 637 (ALT 250 FOR IP): Performed by: FAMILY MEDICINE

## 2019-09-26 PROCEDURE — 6360000002 HC RX W HCPCS: Performed by: FAMILY MEDICINE

## 2019-09-26 PROCEDURE — 36415 COLL VENOUS BLD VENIPUNCTURE: CPT

## 2019-09-26 PROCEDURE — 94640 AIRWAY INHALATION TREATMENT: CPT

## 2019-09-26 PROCEDURE — 2700000000 HC OXYGEN THERAPY PER DAY

## 2019-09-26 PROCEDURE — 6370000000 HC RX 637 (ALT 250 FOR IP): Performed by: SPECIALIST

## 2019-09-26 PROCEDURE — 6370000000 HC RX 637 (ALT 250 FOR IP): Performed by: CLINICAL NURSE SPECIALIST

## 2019-09-26 RX ORDER — SULFAMETHOXAZOLE AND TRIMETHOPRIM 800; 160 MG/1; MG/1
1 TABLET ORAL EVERY 12 HOURS SCHEDULED
Status: DISCONTINUED | OUTPATIENT
Start: 2019-09-26 | End: 2019-09-26 | Stop reason: HOSPADM

## 2019-09-26 RX ORDER — PREDNISONE 10 MG/1
TABLET ORAL
Qty: 34 TABLET | Refills: 0 | Status: SHIPPED | OUTPATIENT
Start: 2019-09-26 | End: 2020-01-23 | Stop reason: ALTCHOICE

## 2019-09-26 RX ORDER — SULFAMETHOXAZOLE AND TRIMETHOPRIM 800; 160 MG/1; MG/1
1 TABLET ORAL EVERY 12 HOURS SCHEDULED
Qty: 12 TABLET | Refills: 0
Start: 2019-09-26 | End: 2019-10-02

## 2019-09-26 RX ORDER — PREDNISONE 20 MG/1
40 TABLET ORAL DAILY
Status: DISCONTINUED | OUTPATIENT
Start: 2019-09-26 | End: 2019-09-26 | Stop reason: HOSPADM

## 2019-09-26 RX ORDER — SULFAMETHOXAZOLE AND TRIMETHOPRIM 800; 160 MG/1; MG/1
1 TABLET ORAL EVERY 12 HOURS SCHEDULED
Qty: 12 TABLET | Refills: 0 | Status: SHIPPED | OUTPATIENT
Start: 2019-09-26 | End: 2019-09-26

## 2019-09-26 RX ORDER — LINEZOLID 600 MG/1
600 TABLET, FILM COATED ORAL EVERY 12 HOURS SCHEDULED
Qty: 28 TABLET | Refills: 0 | Status: SHIPPED | OUTPATIENT
Start: 2019-09-26 | End: 2019-10-10

## 2019-09-26 RX ADMIN — IPRATROPIUM BROMIDE AND ALBUTEROL SULFATE 1 AMPULE: .5; 3 SOLUTION RESPIRATORY (INHALATION) at 08:55

## 2019-09-26 RX ADMIN — BUPROPION HYDROCHLORIDE 150 MG: 150 TABLET, EXTENDED RELEASE ORAL at 08:51

## 2019-09-26 RX ADMIN — SKIN PROTECTANT: 44 OINTMENT TOPICAL at 11:24

## 2019-09-26 RX ADMIN — TAMSULOSIN HYDROCHLORIDE 0.4 MG: 0.4 CAPSULE ORAL at 08:51

## 2019-09-26 RX ADMIN — PANTOPRAZOLE SODIUM 40 MG: 40 TABLET, DELAYED RELEASE ORAL at 06:18

## 2019-09-26 RX ADMIN — SULFAMETHOXAZOLE AND TRIMETHOPRIM 1 TABLET: 800; 160 TABLET ORAL at 12:51

## 2019-09-26 RX ADMIN — HEPARIN SODIUM 5000 UNITS: 10000 INJECTION INTRAVENOUS; SUBCUTANEOUS at 14:28

## 2019-09-26 RX ADMIN — PREDNISONE 40 MG: 20 TABLET ORAL at 17:01

## 2019-09-26 RX ADMIN — IPRATROPIUM BROMIDE AND ALBUTEROL SULFATE 1 AMPULE: .5; 3 SOLUTION RESPIRATORY (INHALATION) at 13:08

## 2019-09-26 RX ADMIN — METHYLPREDNISOLONE SODIUM SUCCINATE 40 MG: 40 INJECTION, POWDER, FOR SOLUTION INTRAMUSCULAR; INTRAVENOUS at 06:23

## 2019-09-26 RX ADMIN — LINEZOLID 600 MG: 600 TABLET, FILM COATED ORAL at 11:24

## 2019-09-26 RX ADMIN — LEVOTHYROXINE SODIUM 125 MCG: 125 TABLET ORAL at 06:18

## 2019-09-26 RX ADMIN — GABAPENTIN 900 MG: 300 CAPSULE ORAL at 08:51

## 2019-09-26 RX ADMIN — BUDESONIDE 500 MCG: 0.5 SUSPENSION RESPIRATORY (INHALATION) at 08:55

## 2019-09-26 RX ADMIN — GABAPENTIN 900 MG: 300 CAPSULE ORAL at 14:27

## 2019-09-26 RX ADMIN — HEPARIN SODIUM 5000 UNITS: 10000 INJECTION INTRAVENOUS; SUBCUTANEOUS at 06:17

## 2019-09-26 ASSESSMENT — PAIN SCALES - GENERAL
PAINLEVEL_OUTOF10: 0

## 2019-09-26 NOTE — PROGRESS NOTES
Blood and [facility]  Hematology/Oncology    Patient Name: Isabel Nava  YOB: 1955  PCP: Smiley Lu DO   Referring Provider:      Reason for Consultation:   Chief Complaint   Patient presents with    Shortness of Breath     EMS called at [facility] for SOB, denies chest pain       Subjective: Feeling better today and less dyspneic. History of Present Illness: This pt is a very pleasant 58 yo male who follows with Dr. 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 He presented with a 1 day history or mildly productive cough and SOB and pulled out his long term trach tube, however this only made his breathing worse and he subsequently came to the ED for evaluation. He has had mucus plugs removed and his breathing has improved. CXR showed a R hilar infiltrate and he also underwent CT of the chest which showed RLL consolidation, nodular and 2.9 x 2.4 cm and a new mass in the lingula measuring 5 x 5 mm. CT soft tissue neck also preformed and showed a mass at the level of the larynx, L larynx extending into the piriformis measuring approximately 2.3 x 1.5 x 3.3 cm (difficulty determining exam measurements). He has been admitted and placed on abx for staph PNA    Oncologic history:  65 yo man heavy smoker with large CRISTINA hilar mass with chest wall and mediastinal invasion , local invasion and destruction of T1 and T2  vertebral bodies and left second rib with lateral displacement of the thecal sac . 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 He was referred to Pulmonology for bronchoscopy and tissue diagnosis.   He was referred to radiation Oncology for palliative radiation therapy to T1T2  for impending spinal cord compression and palliation of pain . He was initiated on Decadron along with Gabapentin and will be maintained on PRN Norco. His Decadron was tapered off. His MRI brain showed no evidence of CNS metastasis. His PET CT scan showed abnormal metabolic activity confined to left hilar area and mediastinum without evidence of other skeletal  metastasis or intra abdominal metastasis . He is S/P CT guided biopsy and the pathology is consistent with moderately to poorly differentiated adenocarcinoma . Specimen was sent  for EGFR/ALK/ROS 1. He completed paliative radiation therapy to T1/T2 to prevent cord compression . The fact that his disease is localized to left hilum and he has Vipin's syndrome with stage IIIB , T4 N2 M0, he will be recommended  concurrent modality approach with weekly Taxol/carbo along with XRT . His EGFR/ALK and ROS 1 were all negative and the fact that he has no targetable mutations ,he will be recommended Alimta/Avastin after  completion of concurrent modality approach . He completed the 3 additional weeks of Taxol/Carbo then was switched to Alimta/Carbo/Avastin. His follow up CT chest done 16 showed improvement in left apical mass and mediastinal lymphadenopathy . MRI of T spine also showed  Margaret Gerry : 1955 ([id number]) Page 2 of 4  improvement with persistent tumor extension into T1 and T2 vertebral bodies but to a lesser extent and without impingement or displacement  of spinal cord . He will continue on Gabapentin 900 mg tid for brachial plexopathy and neuropathy and he will be recommended to continue PT/OT  He will benefit from [facility] for palliation of skeletal metastasis  He was restaged after cycle 6.   His CT scan of abdomen and pelvis done 16 was essentially negative but his CT chest showed slight increase in paratracheal node  from 10 to 14 mm but was otherwise stable  His PDL1 marker came back positive with 60 % intensity and he will be a good candidate for intraabdominal  metastasis  MRI of brain done FEB 2018 was negative  He was recommended to continue on present regimen with Roslynn Amas  His follow up PET CT scan in [facility] 2018 showed minimal residual activity in small CRISTINA lesion likely representing post radiation  fibrosis and persistent moderate SUV activity in the right vocal cord area without associated lymphadenopathy . He has achieved overall an excellent response and will be recommended to continue maintenance [place] He will have Clindamycin for his gingivitis, he will follow with oral surgery. His Kathern Cogan has been discontinued because of ONJ  Was recently hospitalized with pneumonia and discharged home on IV Vancomycin for MRSA. His CTA chest showed RLL pneumonia, no evidence of PE and post radiation fibrotic changes in CRISTINA without evidence of mediastinal  lymphadenopathy or recurrent disease  His follow up CT chest on 10/27/18 showed resolution of Right lung pneumonia and stable fibrotic changes in CRISTINA . His follow up CT chest on 12/21 /18 showed similar findings  He will receive IV hydration today and will go on Amoxil for gum infection and will be referred to oral surgeon . His recent X ray of right femur and hip showed degenerative arthritic changes . CT neck done [facility] 2019 showed post radiation scars and sclerotic stable cervical spine metastasis as well multilevel cervical disc disease. His MRI of right hip and femur showed no evidence of fracture, lytic lesion, avascular necrosis or osseous metastasis . Only right knee  effusion and synovitis was described . he will be given Medrol dosepak and PRN Flexeril for his arthritis  He switched ENT doctor and will be seeing Dr Marilu Neely for his trach.   He might require EGD and balloon dilatation for his mild dysphagia and will be referred to GI Dr Lindsay Alicia    Diagnostic Data:     Past Medical History:   Diagnosis Date    Abdominal aortic aneurysm without rupture ([facility] [address] 8/27/2018    Acute bronchitis with Take 1 tablet by mouth nightly as needed for Anxiety (hold for sedation) for up to 90 days. 8/20/19 11/18/19  Cornelious Minus, APRN - CNP   Revefenacin 175 MCG/3ML SOLN Inhale into the lungs    Historical Provider, MD   Cholecalciferol (VITAMIN D3) 5000 units TABS Take by mouth    Historical Provider, MD   simvastatin (ZOCOR) 40 MG tablet Take 1 tablet by mouth nightly 7/16/19 10/14/19  Sylvia Quintana DO   tamsulosin (FLOMAX) 0.4 MG capsule Take 1 capsule by mouth daily 6/24/19 9/22/19  Sylvia Quintana DO   esomeprazole (NEXIUM) 40 MG delayed release capsule Take 1 capsule by mouth nightly 6/24/19 9/22/19  Sylvia Quintana DO   Levothyroxine Sodium 125 MCG CAPS Take 125 mcg by mouth daily On an empty stomach With a large glass of water 4/2/19 7/1/19  Sylvia Quintana DO   DULoxetine (CYMBALTA) 30 MG extended release capsule Take 1 capsule by mouth 2 times daily  Patient taking differently: Take 60 mg by mouth daily  4/1/19 3/26/20  Lo Esteban MD   chlorhexidine (PERIDEX) 0.12 % solution Take 15 mLs by mouth daily Swish & spit qd 2/25/19   Sylvia Quintana DO   pembrolizumab (KEYTRUDA) 100 MG/4ML SOLN Infuse intravenously    Historical Provider, MD   mirtazapine (REMERON) 30 MG tablet Take 1 tablet by mouth nightly 12/3/18 11/28/19  Lo Esteban MD   gabapentin (NEURONTIN) 300 MG capsule Take 1 capsule by mouth 3 times daily for 360 days. Along with the 600 mg tabs for a total of 900 mg three times daily. 10/22/18 10/17/19  Cornelious Minus, APRN - CNP   gabapentin (NEURONTIN) 600 MG tablet Take 1 tablet by mouth 3 times daily for 360 days. Along with the 300 mg caps for a total of 900 mg three times daily.  10/22/18 10/17/19  Cornelious Minus, APRN - CNP   albuterol (ACCUNEB) 0.63 MG/3ML nebulizer solution Take 3 mLs by nebulization every 6 hours as needed for Wheezing 10/15/18   Gallo Bailon MD   buPROPion [facility]) 75 MG tablet Take 1 tablet by mouth 2 times daily 9/25/18   Gallo Bailon MD Melatonin 10 MG TBDP Take 10 mg by mouth nightly     Historical Provider, MD       Allergies  Allergies   Allergen Reactions    Augmentin [Amoxicillin-Pot Clavulanate] Diarrhea           Objective  /64   Pulse 98   Temp 98 °F (36.7 °C) (Temporal)   Resp 18   Ht 6' 5\" (1.956 m)   Wt 182 lb (82.6 kg)   SpO2 98%   BMI 21.58 kg/m²     Physical Exam:   Performance Status:  General: AAO to person, place, time, in no acute distress, pleasant   Head and neck : PERRLA, EOMI . Sclera non icteric. Shirley Mears in place, clean and without blood  Oropharynx : Clear  Neck: no JVD,  no adenopathy  LYMPHATICS : No LAD  Heart: Regular rate and regular rhythm, no murmur  Lungs: Clear to auscultation   Extremities: No edema,no cyanosis, no clubbing. Abdomen: Soft, non-tender;no masses, no organomegaly  Skin:  No rash  Neurologic:Cranial nerves grossly intact. No focal motor or sensory deficits .     Recent Laboratory Data-   Lab Results   Component Value Date    WBC 7.7 09/25/2019    HGB 11.5 (L) 09/25/2019    HCT 36.3 (L) 09/25/2019    MCV 88.3 09/25/2019     09/25/2019    LYMPHOPCT 10.2 (L) 09/25/2019    RBC 4.11 09/25/2019    MCH 28.0 09/25/2019    MCHC 31.7 (L) 09/25/2019    RDW 16.4 (H) 09/25/2019    NEUTOPHILPCT 84.9 (H) 09/25/2019    MONOPCT 4.4 09/25/2019    BASOPCT 0.0 09/25/2019    NEUTROABS 6.49 09/25/2019    LYMPHSABS 0.78 (L) 09/25/2019    MONOSABS 0.34 09/25/2019    EOSABS 0.00 (L) 09/25/2019    BASOSABS 0.00 09/25/2019       Lab Results   Component Value Date     09/26/2019    K 4.5 09/26/2019     09/26/2019    CO2 23 09/26/2019    BUN 26 (H) 09/26/2019    CREATININE 1.1 09/26/2019    GLUCOSE 143 (H) 09/26/2019    CALCIUM 9.3 09/26/2019    PROT 6.7 09/26/2019    LABALBU 3.3 (L) 09/26/2019    BILITOT 0.4 09/26/2019    ALKPHOS 124 09/26/2019    AST 20 09/26/2019    ALT 41 (H) 09/26/2019    LABGLOM >60 09/26/2019    GFRAA >60 09/26/2019       No results found for: IRON, TIBC, FERRITIN        Radiology-    Fluoroscopy modified barium swallow with video   Final Result   Mild dysphagia observed. Patient demonstrated laryngeal   penetration of thin consistencies without evidence of a cough reflex. This penetration was cleared with coughing. This procedure was performed, dictated, and signed by Loretta Smith. BRET Noble under the indirect supervision of Mely Wesley. Violeta Heaton MD.      Bayron Heaton MD, Radiologist, have reviewed the images and report   and concur with these findings. CT CHEST WO CONTRAST   Final Result   Findings suspicious for progression of disease, there are 2 nodular   infiltrates were at the right lung base which could represent   pneumonia or mass could also be present. There is a nodular infiltrate   in the left lingula along the pleural surface concerning for a new   focal metastasis                              CT SOFT TISSUE NECK WO CONTRAST   Final Result   Findings compatible with a mass at the level of the   larynx             XR CHEST PORTABLE   Final Result      1. Interval improved aeration of the lungs. A few persistent streaky   airspace opacities favored to represent atelectasis/scarring rather   than aspiration pneumonia. 2. Chronic left apical pulmonary mass, similar to prior exams. This   likely represents apical pleural scarring. XR CHEST PORTABLE   Final Result   Significant improvement although not fully resolution of   the right para hilar infiltrate since September 19. ASSESSMENT/PLAN :  58 yo male   Stage IV lung adenocarcinoma s/p XRT and chemotherapy  Primary H+N neoplasm  s/p XRT and currently on single agent keytruda  RLL PNA, staph  CT Neck and Chest with new mass in R vocal cord region and new mass in Lingula as above    - ID following and on Linezolid  - Breathing has significantly improved with new trach  - CT scans reviewed with wife and patient in detail.  The Lingula lesion is small but new (5 x 5 mm), and the RLL lesion is nodular lesion appears new as well. There is a new mass as above in the L vocal cord area extending into the piriformis  - Recommend continuing antibiotics and resolving current infection. Then repeat scanning as outpatient with either CT or more likely PET scan to evaluate both the L vocal cord lesions and the newly noted lung lesions. He has already seen chemotherapy and is currently on immunotherapy. If recurrent disease is confirmed, options will be somewhat limited.  Further, he continues to actively smoke  - Will follow      Electronically signed by Cyril Shea MD on 9/26/2019 at 5:28 PM

## 2019-09-26 NOTE — PROGRESS NOTES
Admit Date: 9/22/2019      Subjective:     Patient: no acute issues reported overnight  Medication side effects: none    Scheduled Meds:   mineral oil-hydrophilic petrolatum   Topical BID    linezolid  600 mg Oral 2 times per day    fentaNYL  1 patch Transdermal Q72H    fentaNYL  1 patch Transdermal Q72H    heparin (porcine)  5,000 Units Subcutaneous Q8H    methylPREDNISolone  40 mg Intravenous Q12H    ipratropium-albuterol  1 ampule Inhalation Q4H WA    tamsulosin  0.4 mg Oral Daily    budesonide  0.5 mg Nebulization BID    pantoprazole  40 mg Oral QAM AC    levothyroxine  125 mcg Oral Daily    gabapentin  900 mg Oral TID    buPROPion  150 mg Oral Daily    mirtazapine  30 mg Oral Nightly    influenza virus vaccine  0.5 mL Intramuscular Prior to discharge     Continuous Infusions:  PRN Meds:LORazepam    Objective:   I/O last 3 completed shifts: In: 240 [P.O.:240]  Out: 600 [Urine:600]  No intake/output data recorded.     /72   Pulse 62   Temp 97.4 °F (36.3 °C) (Temporal)   Resp 18   Ht 6' 5\" (1.956 m)   Wt 182 lb (82.6 kg)   SpO2 100%   BMI 21.58 kg/m²   General appearance: alert, appears stated age and cooperative  Skin:negative  Heent:negative  Lungs: diminished breath sounds bibasilar  Heart: regular rate and rhythm, S1, S2 normal, no murmur, click, rub or gallop  Abdomen: soft, non-tender; bowel sounds normal; no masses,  no organomegaly  Extremities:no edema  Neurologic: Grossly normal    Labs:  CBC with Differential:    Lab Results   Component Value Date    WBC 7.7 09/25/2019    RBC 4.11 09/25/2019    HGB 11.5 09/25/2019    HCT 36.3 09/25/2019     09/25/2019    MCV 88.3 09/25/2019    MCH 28.0 09/25/2019    MCHC 31.7 09/25/2019    RDW 16.4 09/25/2019    SEGSPCT 59 02/22/2012    LYMPHOPCT 10.2 09/25/2019    MONOPCT 4.4 09/25/2019    BASOPCT 0.0 09/25/2019    MONOSABS 0.34 09/25/2019    LYMPHSABS 0.78 09/25/2019    EOSABS 0.00 09/25/2019    BASOSABS 0.00 09/25/2019     BMP:

## 2019-09-26 NOTE — CARE COORDINATION
Spoke with [employer], patient's zyvox is not a prior auth. Copay is $79.33. Discussed with patient and he is agreeable. Oscar Trevizo

## 2019-09-26 NOTE — PROGRESS NOTES
po zyvox and TMX  10. Solu-Medrol 40 mg every 12 hours, taper to prednisone  11. Ok to d/c from pulmonary pov , follows in the office with Dr. Parkinson as outpatient pulmonary, f/u in 1-2 weeks with NP with chest x-ray, routed to office , prednisone taper for d/c , will need follow-up with his ENT also     This plan of care was reviewed in collaboration with [facility]   Electronically signed by BG Sow on 9/26/2019 at 1:34 PM      I personally saw, examined, and cared for the patient. Labs, medications, radiographs reviewed. I agree with history exam and plans detailed in NP note. Appreciate ID input   zyvox   Ok for [employer]     Juan Randall M.D.    Pulmonary/Critical Care Medicine

## 2019-09-26 NOTE — CARE COORDINATION
Plan at discharge remains home with no needs. His wife will provide transportation home.  CM will continue to follow for any needs that arise

## 2019-09-26 NOTE — PLAN OF CARE
Problem: Falls - Risk of:  Goal: Will remain free from falls  Description  Will remain free from falls  9/26/2019 0205 by Sarah Moreno RN  Outcome: Met This Shift  Goal: Absence of physical injury  Description  Absence of physical injury  9/26/2019 0205 by Sarah Moreno RN  Outcome: Met This Shift     Problem: Breathing Pattern - Ineffective:  Goal: Ability to achieve and maintain a regular respiratory rate will improve  Description  Ability to achieve and maintain a regular respiratory rate will improve  9/26/2019 1143 by Adonis De Paz RN  Outcome: Met This Shift  9/26/2019 0205 by Sarah Moreno RN  Outcome: Met This Shift     Problem: Skin Integrity:  Goal: Will show no infection signs and symptoms  Description  Will show no infection signs and symptoms  Outcome: Met This Shift

## 2019-09-27 LAB — BLOOD CULTURE, ROUTINE: NORMAL

## 2019-09-28 LAB — CULTURE, BLOOD 2: NORMAL

## 2019-10-10 ENCOUNTER — HOSPITAL ENCOUNTER (OUTPATIENT)
Dept: GENERAL RADIOLOGY | Age: 64
Discharge: HOME | End: 2019-10-12
Payer: MEDICARE

## 2019-10-10 ENCOUNTER — HOSPITAL ENCOUNTER (OUTPATIENT)
Age: 64
Discharge: HOME | End: 2019-10-12
Payer: MEDICARE

## 2019-10-10 DIAGNOSIS — J43.2 CENTRILOBULAR EMPHYSEMA (HCC): ICD-10-CM

## 2019-10-10 PROCEDURE — 71046 X-RAY EXAM CHEST 2 VIEWS: CPT

## 2019-10-14 ENCOUNTER — HOSPITAL ENCOUNTER (OUTPATIENT)
Age: 64
Discharge: HOME | End: 2019-10-16

## 2019-10-14 PROCEDURE — 87081 CULTURE SCREEN ONLY: CPT

## 2019-10-14 PROCEDURE — 88305 TISSUE EXAM BY PATHOLOGIST: CPT

## 2019-10-15 LAB — CLOTEST: NORMAL

## 2019-10-16 PROBLEM — E43 SEVERE PROTEIN-ENERGY MALNUTRITION (HCC): Chronic | Status: RESOLVED | Noted: 2017-05-27 | Resolved: 2019-10-16

## 2019-10-16 PROBLEM — K22.2 RADIATION-INDUCED ESOPHAGEAL STRICTURE: Status: ACTIVE | Noted: 2019-10-16

## 2019-10-22 ENCOUNTER — OFFICE VISIT (OUTPATIENT)
Dept: PALLATIVE CARE | Age: 64
End: 2019-10-22
Payer: MEDICARE

## 2019-10-22 VITALS
OXYGEN SATURATION: 96 % | SYSTOLIC BLOOD PRESSURE: 106 MMHG | HEART RATE: 84 BPM | DIASTOLIC BLOOD PRESSURE: 58 MMHG | BODY MASS INDEX: 26.23 KG/M2 | WEIGHT: 188.1 LBS

## 2019-10-22 DIAGNOSIS — C34.82 MALIGNANT NEOPLASM OF OVERLAPPING SITES OF LEFT LUNG (HCC): ICD-10-CM

## 2019-10-22 DIAGNOSIS — G90.2: ICD-10-CM

## 2019-10-22 DIAGNOSIS — Z51.5 PALLIATIVE CARE BY SPECIALIST: Primary | ICD-10-CM

## 2019-10-22 DIAGNOSIS — C34.12 PANCOAST TUMOR, LEFT (HCC): ICD-10-CM

## 2019-10-22 DIAGNOSIS — G89.3 CHRONIC PAIN DUE TO NEOPLASM: ICD-10-CM

## 2019-10-22 PROCEDURE — 99212 OFFICE O/P EST SF 10 MIN: CPT | Performed by: NURSE PRACTITIONER

## 2019-10-22 PROCEDURE — 99215 OFFICE O/P EST HI 40 MIN: CPT | Performed by: NURSE PRACTITIONER

## 2019-10-22 RX ORDER — FENTANYL 50 UG/H
1 PATCH TRANSDERMAL
Qty: 10 PATCH | Refills: 0 | Status: CANCELLED | OUTPATIENT
Start: 2019-10-22 | End: 2019-11-21

## 2019-10-22 RX ORDER — MIRTAZAPINE 30 MG/1
30 TABLET, FILM COATED ORAL NIGHTLY
Qty: 90 TABLET | Refills: 3 | Status: SHIPPED
Start: 2019-10-22 | End: 2020-05-13 | Stop reason: ALTCHOICE

## 2019-10-22 RX ORDER — FENTANYL 75 UG/H
1 PATCH TRANSDERMAL
Qty: 10 PATCH | Refills: 0 | Status: SHIPPED | OUTPATIENT
Start: 2019-10-22 | End: 2019-11-21

## 2019-10-22 RX ORDER — FENTANYL 12 UG/H
1 PATCH TRANSDERMAL
Qty: 10 PATCH | Refills: 0 | Status: CANCELLED | OUTPATIENT
Start: 2019-10-22 | End: 2019-11-21

## 2019-10-22 RX ORDER — PREGABALIN 100 MG/1
100 CAPSULE ORAL 3 TIMES DAILY
Qty: 90 CAPSULE | Refills: 0 | Status: SHIPPED | OUTPATIENT
Start: 2019-10-22 | End: 2019-11-18 | Stop reason: SDUPTHER

## 2019-10-22 RX ORDER — GABAPENTIN 600 MG/1
600 TABLET ORAL 3 TIMES DAILY
Qty: 270 TABLET | Refills: 3 | Status: CANCELLED | OUTPATIENT
Start: 2019-10-22 | End: 2020-10-16

## 2019-10-22 RX ORDER — OXYCODONE HYDROCHLORIDE AND ACETAMINOPHEN 5; 325 MG/1; MG/1
1 TABLET ORAL EVERY 6 HOURS PRN
Qty: 120 TABLET | Refills: 0 | Status: CANCELLED | OUTPATIENT
Start: 2019-10-22 | End: 2019-11-21

## 2019-10-22 RX ORDER — GABAPENTIN 300 MG/1
300 CAPSULE ORAL 3 TIMES DAILY
Qty: 270 CAPSULE | Refills: 3 | Status: CANCELLED | OUTPATIENT
Start: 2019-10-22 | End: 2020-10-16

## 2019-10-22 RX ORDER — OXYCODONE AND ACETAMINOPHEN 7.5; 325 MG/1; MG/1
1 TABLET ORAL EVERY 6 HOURS PRN
Qty: 120 TABLET | Refills: 0 | Status: SHIPPED | OUTPATIENT
Start: 2019-10-22 | End: 2019-11-21

## 2019-10-23 ENCOUNTER — TELEPHONE (OUTPATIENT)
Dept: PALLATIVE CARE | Age: 64
End: 2019-10-23

## 2019-10-23 ENCOUNTER — HOSPITAL ENCOUNTER (OUTPATIENT)
Age: 64
Discharge: HOME | End: 2019-10-25
Payer: MEDICARE

## 2019-10-23 LAB
AMPHETAMINE SCREEN, URINE: NOT DETECTED
BARBITURATE SCREEN URINE: NOT DETECTED
BENZODIAZEPINE SCREEN, URINE: NOT DETECTED
CANNABINOID SCREEN URINE: NOT DETECTED
COCAINE METABOLITE SCREEN URINE: NOT DETECTED
Lab: NORMAL
METHADONE SCREEN, URINE: NOT DETECTED
OPIATE SCREEN URINE: NOT DETECTED
PHENCYCLIDINE SCREEN URINE: NOT DETECTED
PROPOXYPHENE SCREEN: NOT DETECTED

## 2019-10-23 PROCEDURE — 80307 DRUG TEST PRSMV CHEM ANLYZR: CPT

## 2019-10-23 PROCEDURE — G0480 DRUG TEST DEF 1-7 CLASSES: HCPCS

## 2019-10-27 LAB
6AM URINE: <10 NG/ML
CODEINE, URINE: <20 NG/ML
HYDROCODONE, URINE: <20 NG/ML
HYDROMORPHONE, URINE: <20 NG/ML
MORPHINE URINE: <20 NG/ML
NORHYDROCODONE, URINE: <20 NG/ML
NOROXYCODONE, URINE: 2259 NG/ML
NOROXYMORPHONE, URINE: 22 NG/ML
OXYCODONE, URINE CONFIRMATION: 890 NG/ML
OXYMORPHONE, URINE: <20 NG/ML

## 2019-10-28 LAB
FENTANYL URINE: 17.6 NG/ML
NORFENTANYL, URINE: 355.8 NG/ML

## 2019-10-28 NOTE — DISCHARGE SUMMARY
[address]                  [address] [facility], [address]                               DISCHARGE SUMMARY    PATIENT NAME: Murtaza Lyon                   :        1955  MED REC NO:   [id number]                            ROOM:       3639  ACCOUNT NO:   [de-identified]                           ADMIT DATE: 2019  PROVIDER:     Chu Mitchell DO                  DISCHARGE DATE:  2019      FINAL DIAGNOSES:  1. Right basilar pneumonia, MRSA, and Klebsiella. 2.  Stage IV lung adenocarcinoma with reoccurrence in the right lower  lobe and lingula. 3.  History of pharyngeal cancer with recurrence. 4.  History of ongoing nicotine dependency. 5.  History of COPD. 6.  History of hypothyroidism. 7.  Hyperlipidemia. 8.  History of BPH. CHIEF COMPLAINT, PRESENTING ILLNESS, PHYSICAL FINDINGS:  The patient is  an elderly male with stage IV lung adenocarcinoma, history of pharyngeal  carcinoma, ongoing nicotine dependency, COPD, hypothyroidism,  hyperlipidemia, and BPH who presents to this hospital emergency room  with cough and shortness of breath with productive sputum. X-ray  obtained at the time of admission revealed a right basilar infiltrate. He was appropriately cultured from a blood and sputum standpoint,  started on IV antibiotic therapy, and aerosol treatments. PHYSICAL ASSESSMENT:  VITAL SIGNS: Reveals his temperature and vital signs to be stable. ENT:  Exam is negative. HEART:  Regular. LUNGS:  Demonstrate coarse breath sounds at both lung bases. ABDOMEN:  The abdomen is notably soft with positive bowel sounds. EXTREMITIES:  Reveal no evidence for edema and/or cyanosis. HOSPITAL COURSE:  He was seen during his hospital stay by Pulmonary and  also by Hematology/Oncology.   CAT scan performed of the chest and neck  revealed what appeared to be a reoccurrence of his stage IV  adenocarcinoma involving the right lower lobe and the lingula and  reoccurrence of his pharyngeal cancer just below the left vocal cord. The patient was discharged home. He would continue on gold treatment  for his MRSA and Klebsiella pneumonia and once that cleared, he more  than likely would require an outpatient PET scan to decipher the  reoccurrences of his lung cancer and pharyngeal cancer. At the time of  his discharge on 09/26, he was breathing comfortably. He no longer was  short of breath. He was strongly advised to refrain from nicotine  consumption. PHYSICIANS FOLLOWING THE PATIENT DURING THIS HOSPITAL STAY:  Dr. Shobha Jones from Hematology/Oncology and Pulmonary Medicine with Dr. Adela sawyer. CONDITION ON DISCHARGE:  Level of function is fair. UNREPORTED TEST RESULTS AND INTENDED FOLLOWUP:  An outpatient PET scan  will run once his pneumonia has cleared. LEVEL OF ACTIVITY:  Up ad efrain. DISCHARGE DIET:  Includes 2 gm sodium. DISCHARGE MEDICATIONS:  See discharge med reconciliation form. FOLLOWUP:  Followup will be with me in 1 week.           Moni Pino DO    D: 10/27/2019 20:46:39       T: 10/27/2019 20:49:12     [email or web address]  Job#: [id number]     Doc#: [id number]    CC:

## 2019-11-18 ENCOUNTER — OFFICE VISIT (OUTPATIENT)
Dept: PALLATIVE CARE | Age: 64
End: 2019-11-18
Payer: MEDICARE

## 2019-11-18 VITALS
HEART RATE: 77 BPM | BODY MASS INDEX: 26.64 KG/M2 | WEIGHT: 191 LBS | SYSTOLIC BLOOD PRESSURE: 106 MMHG | OXYGEN SATURATION: 93 % | DIASTOLIC BLOOD PRESSURE: 66 MMHG

## 2019-11-18 DIAGNOSIS — Z51.5 PALLIATIVE CARE BY SPECIALIST: Primary | ICD-10-CM

## 2019-11-18 DIAGNOSIS — G62.0 CHEMOTHERAPY-INDUCED PERIPHERAL NEUROPATHY (HCC): ICD-10-CM

## 2019-11-18 DIAGNOSIS — G89.3 CHRONIC PAIN DUE TO NEOPLASM: ICD-10-CM

## 2019-11-18 DIAGNOSIS — T45.1X5A CHEMOTHERAPY-INDUCED PERIPHERAL NEUROPATHY (HCC): ICD-10-CM

## 2019-11-18 DIAGNOSIS — C34.82 MALIGNANT NEOPLASM OF OVERLAPPING SITES OF LEFT LUNG (HCC): ICD-10-CM

## 2019-11-18 PROCEDURE — 99215 OFFICE O/P EST HI 40 MIN: CPT | Performed by: NURSE PRACTITIONER

## 2019-11-18 PROCEDURE — 99212 OFFICE O/P EST SF 10 MIN: CPT | Performed by: NURSE PRACTITIONER

## 2019-11-18 RX ORDER — MORPHINE SULFATE 30 MG/1
30 TABLET, FILM COATED, EXTENDED RELEASE ORAL 2 TIMES DAILY
Qty: 60 TABLET | Refills: 0 | Status: SHIPPED | OUTPATIENT
Start: 2019-11-18 | End: 2019-12-18 | Stop reason: SDUPTHER

## 2019-11-18 RX ORDER — PREGABALIN 150 MG/1
150 CAPSULE ORAL 3 TIMES DAILY
Qty: 90 CAPSULE | Refills: 0 | Status: SHIPPED | OUTPATIENT
Start: 2019-11-18 | End: 2019-12-18 | Stop reason: SDUPTHER

## 2019-11-18 RX ORDER — OXYCODONE HYDROCHLORIDE 10 MG/1
10 TABLET ORAL EVERY 6 HOURS PRN
Qty: 120 TABLET | Refills: 0 | Status: SHIPPED | OUTPATIENT
Start: 2019-11-18 | End: 2019-12-18 | Stop reason: SDUPTHER

## 2019-12-02 ENCOUNTER — OFFICE VISIT (OUTPATIENT)
Dept: PALLATIVE CARE | Age: 64
End: 2019-12-02
Payer: MEDICARE

## 2019-12-02 VITALS
HEART RATE: 80 BPM | WEIGHT: 195.7 LBS | SYSTOLIC BLOOD PRESSURE: 101 MMHG | DIASTOLIC BLOOD PRESSURE: 65 MMHG | BODY MASS INDEX: 27.29 KG/M2 | OXYGEN SATURATION: 91 %

## 2019-12-02 DIAGNOSIS — C34.82 MALIGNANT NEOPLASM OF OVERLAPPING SITES OF LEFT LUNG (HCC): ICD-10-CM

## 2019-12-02 DIAGNOSIS — K59.03 DRUG-INDUCED CONSTIPATION: ICD-10-CM

## 2019-12-02 DIAGNOSIS — G89.3 CHRONIC PAIN DUE TO NEOPLASM: ICD-10-CM

## 2019-12-02 DIAGNOSIS — Z51.5 PALLIATIVE CARE BY SPECIALIST: Primary | ICD-10-CM

## 2019-12-02 PROCEDURE — 99212 OFFICE O/P EST SF 10 MIN: CPT | Performed by: NURSE PRACTITIONER

## 2019-12-02 PROCEDURE — 99214 OFFICE O/P EST MOD 30 MIN: CPT | Performed by: NURSE PRACTITIONER

## 2019-12-02 RX ORDER — LORAZEPAM 0.5 MG/1
0.5 TABLET ORAL NIGHTLY PRN
Qty: 30 TABLET | Refills: 2 | Status: SHIPPED
Start: 2019-12-02 | End: 2020-03-17 | Stop reason: SDUPTHER

## 2019-12-18 DIAGNOSIS — T45.1X5A CHEMOTHERAPY-INDUCED PERIPHERAL NEUROPATHY (HCC): ICD-10-CM

## 2019-12-18 DIAGNOSIS — G89.3 CHRONIC PAIN DUE TO NEOPLASM: ICD-10-CM

## 2019-12-18 DIAGNOSIS — C34.82 MALIGNANT NEOPLASM OF OVERLAPPING SITES OF LEFT LUNG (HCC): ICD-10-CM

## 2019-12-18 DIAGNOSIS — Z51.5 PALLIATIVE CARE BY SPECIALIST: ICD-10-CM

## 2019-12-18 DIAGNOSIS — G62.0 CHEMOTHERAPY-INDUCED PERIPHERAL NEUROPATHY (HCC): ICD-10-CM

## 2019-12-18 RX ORDER — OXYCODONE HYDROCHLORIDE 10 MG/1
10 TABLET ORAL EVERY 6 HOURS PRN
Qty: 120 TABLET | Refills: 0 | Status: SHIPPED | OUTPATIENT
Start: 2019-12-18 | End: 2020-01-13 | Stop reason: SDUPTHER

## 2019-12-18 RX ORDER — PREGABALIN 150 MG/1
150 CAPSULE ORAL 3 TIMES DAILY
Qty: 90 CAPSULE | Refills: 2 | Status: SHIPPED
Start: 2019-12-18 | End: 2020-03-24 | Stop reason: SDUPTHER

## 2019-12-18 RX ORDER — AMOXICILLIN 250 MG
2 CAPSULE ORAL DAILY
Qty: 180 TABLET | Refills: 1 | Status: SHIPPED
Start: 2019-12-18 | End: 2020-04-13 | Stop reason: SDUPTHER

## 2019-12-18 RX ORDER — MORPHINE SULFATE 30 MG/1
30 TABLET, FILM COATED, EXTENDED RELEASE ORAL 2 TIMES DAILY
Qty: 60 TABLET | Refills: 0 | Status: SHIPPED | OUTPATIENT
Start: 2019-12-18 | End: 2020-01-13 | Stop reason: SDUPTHER

## 2019-12-19 ENCOUNTER — HOSPITAL ENCOUNTER (OUTPATIENT)
Age: 64
Discharge: HOME | End: 2019-12-19
Payer: MEDICARE

## 2019-12-19 PROCEDURE — 87081 CULTURE SCREEN ONLY: CPT

## 2019-12-22 LAB
MRSA CULTURE ONLY: ABNORMAL
MRSA CULTURE ONLY: NORMAL
ORGANISM: ABNORMAL

## 2020-01-13 ENCOUNTER — OFFICE VISIT (OUTPATIENT)
Dept: PALLATIVE CARE | Age: 65
End: 2020-01-13
Payer: MEDICARE

## 2020-01-13 VITALS
SYSTOLIC BLOOD PRESSURE: 121 MMHG | WEIGHT: 199 LBS | BODY MASS INDEX: 27.75 KG/M2 | DIASTOLIC BLOOD PRESSURE: 74 MMHG | HEART RATE: 79 BPM | OXYGEN SATURATION: 89 %

## 2020-01-13 PROCEDURE — 99214 OFFICE O/P EST MOD 30 MIN: CPT | Performed by: NURSE PRACTITIONER

## 2020-01-13 PROCEDURE — 99211 OFF/OP EST MAY X REQ PHY/QHP: CPT

## 2020-01-13 RX ORDER — OXYCODONE HYDROCHLORIDE 10 MG/1
10 TABLET ORAL EVERY 6 HOURS PRN
Qty: 120 TABLET | Refills: 0 | Status: SHIPPED | OUTPATIENT
Start: 2020-01-13 | End: 2020-01-23 | Stop reason: ALTCHOICE

## 2020-01-13 RX ORDER — MORPHINE SULFATE 30 MG/1
30 TABLET, FILM COATED, EXTENDED RELEASE ORAL 2 TIMES DAILY
Qty: 60 TABLET | Refills: 0 | Status: SHIPPED
Start: 2020-01-13 | End: 2020-02-25 | Stop reason: SDUPTHER

## 2020-01-13 NOTE — PROGRESS NOTES
Palliative Medicine Outpatient Visit  2020  Provider: Yadi Booth APRN-CNP    Referring Provider: Dr. Edgar Chi    Reason for Consult:  [] [address]  [x] Assist with goals of care  [] Transition of care  [x] Symptom Management    See below for recommendations, as indicated, concernin. Advanced Care Planning  2. Anticipatory Guidance  3. Transition of Care  4. Symptom Management      CC: Pain    HPI:   As per Dr. Montes Care assessment on 16:   Mr. Maribell Purvis is a pleasant and cooperative 61year old man with a recent diagnosis of stage III NSCLC (probable) with impending cord compression. We recommend a concurrent approach to definitive management of this locally advanced non small cell lung cancer [chemo dosing per [address] record- see EMR]. Based on the clinical characteristic, this disease and stage is generally considered unresectable; optimal therapy tends to be a concurrent chemo-RT approach. Concomitant chemo-RT compared with sequential chemo-RT improved overall survival, primarily through better locoregional control, at the cost of manageable increases in acute esophageal toxicity as based on the Auperin metanalysis. These data demonstrate a benefit of concomitant chemo-RT over sequential chemo->RT on OS (HR 0.84, SS), with absolute benefit at 3-years of 5.7% (18% to 24%), 5-years 4.5% (11% to 15%). Interestingly, there was no difference in PFS (HR 0.9, p=0.07). However a decrease in locoregional progression (HR 0.777, SS), with absolute decrease of 6% at 5 years (35% to 29%) was shown. There was no difference on distant progression (HR 1.04, NS), with 5-year rate of about 40% Rojas Draft Oncol 2010 May 1;28(13):8276-8472). Regarding the radiation dose, 60 Gy in 2Gy/fx was established long ago in RTOG 73-01, with several other trials showing a feasibility of much higher doses however with RT alone.  Interestingly in the concurrent era, RTOG 0617 tested higher dose arms (Concurrent RT + Carbo/Taxol +/- Cetuximab) these were closed early with \"negative\" results (longer term results and POFA needed), therefor 60 Gy in daily fractions with dual agent chemotherapy can be considered the standard (the Brooke and LAMP trials also assisted in the development of this paradigm). Fractionated external beam radiation therapy may or may not be delivered with intensity modulation +/- image guidance per daily cone beam depending on the specific patient anatomy and dose constraints as described per the RTOG and QUANTEC ---Sherly Zimmerman, Int J Radiat Oncol Biol Phys. 2010 Mar 1;76(3 Suppl):S10-9. The risks, benefits, alternatives, process and logistics of external beam radiation were reviewed (risks include but are not limited to worsening PULM function, esophagitis, esophageal structure, perforations, bleeding, paralysis and death). We answered all of the patient's questions to the best of our ability. Dom verbalized understanding and seemed satisfied. Radiation planning will commence within < 24 hours; the next step in management being the simulation scan, with external beam radiation to commence in a timely fashion thereafter. It was a pleasure meeting [facility] today and we appreciate the referral and opportunity to be involved in his care. We had an extensive discussion today regarding the course to date (including a focused review of the applicable radiographic and laboratory information), multidisciplinary approach to cancer care, and indications for external beam radiation therapy as a component therein. A literature review and multidisciplinary discussion was performed after seeing this patient due to the complexity of the medical decision making in this case. I personally spent greater than 60 minutes with this patient and performed the complete history and physical as above at today's visit, at least 45 minutes was in direct  regarding disease management.    *this pt will be simmed and likely begin treatment prior to a tissue diagnosis. It is my clinical judgement the pre test probability for malignancy is > 95 % and there may be significant functional decline in the time necessary to maintain a tissue diagnosis due to the primary tumor directly extending into the spinal column). I specifically verbalized all of this with Sisi oD, his wife Annia Mitchell, and his daughter Ashutosh Peralta all of who individually verbalized consent to RT treatment to begin prior to a tissue diagnosis. Peer reviewed requested. [facility] MED referral -- Dr. Des Payton reccommended --- pt will still pursue aggressive active treatment however this is a pain management consultation request.  *will hold off on DEX or any new pain meds until he sees Dr. Des Payton but we could start him of DEX in FND occurs or Sx worsens  -coordinated care with Dr. Blas Pratt this AM 4/25/16. We will treat the primary tumor and spinal column initially with considerations for second and third \"boost\" plans (nodes involved, primary boost etc.) in conjunction with CHEMO pending the workup / PET. MRI brain ordered by [employer].        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    Paralyzed vocal cords     Thrombosis of testis     Tobacco dependence 5/27/2017       Past Surgical History:   Procedure Laterality Date    BACK SURGERY      KNEE ARTHROSCOPY      LUNG BIOPSY Left 5/6/2016    OTHER SURGICAL HISTORY  4/15/2016    cervical myelogram    SINUS SURGERY      TRACHEOSTOMY  05/24/2017    TRACHEOSTOMY  05/24/2017 Current Outpatient Medications on File Prior to Visit   Medication Sig Dispense Refill    tamsulosin (FLOMAX) 0.4 MG capsule Take 1 capsule by mouth daily 90 capsule 0    chlorhexidine (PERIDEX) 0.12 % solution Take 15 mLs by mouth daily Swish & spit qd 473 mL 2    esomeprazole (NEXIUM) 40 MG delayed release capsule Take 1 capsule by mouth nightly 90 capsule 0    pregabalin (LYRICA) 150 MG capsule Take 1 capsule by mouth 3 times daily for 90 days. 90 capsule 2    senna-docusate (PERICOLACE) 8.6-50 MG per tablet Take 2 tablets by mouth daily 180 tablet 1    LORazepam (ATIVAN) 0.5 MG tablet Take 1 tablet by mouth nightly as needed for Anxiety (hold for sedation) for up to 90 days. 30 tablet 2    simvastatin (ZOCOR) 40 MG tablet Take 1 tablet by mouth nightly 90 tablet 0    buPROPion (WELLBUTRIN) 75 MG tablet Take 1 tablet by mouth 2 times daily 60 tablet 3    mirtazapine (REMERON) 30 MG tablet Take 1 tablet by mouth nightly 90 tablet 3    Levothyroxine Sodium 125 MCG CAPS Take 125 mcg by mouth daily On an empty stomach With a large glass of water 30 capsule 5    mupirocin (BACTROBAN) 2 % ointment Apply topically 3 times daily Apply topically 3 times daily.       predniSONE (DELTASONE) 10 MG tablet 40 mg x 3 days, then 30 mg x 3 days, 20 mg x 3 days, 10 mg x 3 days then stop (Patient taking differently: Take 10 mg by mouth daily ) 34 tablet 0    cyclobenzaprine (FLEXERIL) 10 MG tablet Take 10 mg by mouth 3 times daily as needed for Muscle spasms      formoterol (PERFOROMIST) 20 MCG/2ML nebulizer solution Take 2 mLs by nebulization 2 times daily DX: COPD J44.9 120 mL 5    budesonide (PULMICORT) 0.5 MG/2ML nebulizer suspension Take 2 mLs by nebulization 2 times daily DX: COPD J44.9 60 ampule 5    nortriptyline (PAMELOR) 10 MG capsule Take 1 capsule by mouth nightly 90 capsule 1    Cholecalciferol (VITAMIN D3) 5000 units TABS Take by mouth      DULoxetine (CYMBALTA) 30 MG extended release capsule Take 1 capsule by mouth 2 times daily (Patient taking differently: Take 60 mg by mouth daily ) 180 capsule 3    pembrolizumab (KEYTRUDA) 100 MG/4ML SOLN Infuse intravenously      gabapentin (NEURONTIN) 300 MG capsule Take 1 capsule by mouth 3 times daily for 360 days. Along with the 600 mg tabs for a total of 900 mg three times daily. 270 capsule 3    gabapentin (NEURONTIN) 600 MG tablet Take 1 tablet by mouth 3 times daily for 360 days. Along with the 300 mg caps for a total of 900 mg three times daily. 270 tablet 3    albuterol (ACCUNEB) 0.63 MG/3ML nebulizer solution Take 3 mLs by nebulization every 6 hours as needed for Wheezing 270 mL 3    Melatonin 10 MG TBDP Take 10 mg by mouth nightly        No current facility-administered medications on file prior to visit. Allergies:    Augmentin [amoxicillin-pot clavulanate]    ROS: UNLESS STATED ABOVE PATIENT DENIES:  CONSTITUTIONAL:  fever, chill, rigors, nausea, vomiting, fatigue. HEENT: blurry vision, double vision, hearing problem, tinnitus, hoarseness, dysphagia,               odynophagia  RESPIRATORY: cough, shortness of breath, sputum expectoration. CARDIOVASCULAR:  Chest pain/pressure, palpitation, syncope, irregular beats  GASTROINTESTINAL:  abdominal or rectal pain, diarrhea, constipation, . GENITOURINARY:  Burning, frequency, urgency, incontinence, discharge  INTEGUMENTARY: rash, wound, pruritis  HEMATOLOGIC/LYMPHATIC:  Swelling, sores, gum bleeding, easy bruising, pica.   MUSCULOSKELETAL:  pain, edema, joint swelling or redness  NEUROLOGICAL:  light headed, dizziness, loss of consciousness, weakness, change                                   in memory, seizures, tremors    Social history:  Social History     Socioeconomic History    Marital status:      Spouse name: Not on file    Number of children: Not on file    Years of education: Not on file    Highest education level: Not on file   Occupational History    Occupation: [employer] difference in PFS (HR 0.9, p=0.07). However a decrease in locoregional progression (HR 0.777, SS), with absolute decrease of 6% at 5 years (35% to 29%) was shown. There was no difference on distant progression (HR 1.04, NS), with 5-year rate of about 40% Noni Mckeon Oncol 2010 May 1;28(13):6459-4514). Regarding the radiation dose, 60 Gy in 2Gy/fx was established long ago in RTOG 73-01, with several other trials showing a feasibility of much higher doses however with RT alone. Interestingly in the concurrent era, RTOG 0617 tested higher dose arms (Concurrent RT + Carbo/Taxol +/- Cetuximab) these were closed early with \"negative\" results (longer term results and POFA needed), therefor 60 Gy in daily fractions with dual agent chemotherapy can be considered the standard (the Brooke and LAMP trials also assisted in the development of this paradigm). Fractionated external beam radiation therapy may or may not be delivered with intensity modulation +/- image guidance per daily cone beam depending on the specific patient anatomy and dose constraints as described per the RTOG and QUANTEC ---Hood Reaves, Int J Radiat Oncol Biol Phys. 2010 Mar 1;76(3 Suppl):S10-9. The risks, benefits, alternatives, process and logistics of external beam radiation were reviewed (risks include but are not limited to worsening PULM function, esophagitis, esophageal structure, perforations, bleeding, paralysis and death). We answered all of the patient's questions to the best of our ability. Dom verbalized understanding and seemed satisfied. Radiation planning will commence within < 24 hours; the next step in management being the simulation scan, with external beam radiation to commence in a timely fashion thereafter. It was a pleasure meeting Shruthi Durand today and we appreciate the referral and opportunity to be involved in his care.  We had an extensive discussion today regarding the course to date (including a focused review of the applicable fusion   C2-C7, worse at the C3-C4 and C4-C5 motion segment level, see above   for details. Patient complains of significant pain described as a constant numbness and pain to his left arm left elbow with burning and numbness into his axilla and to the left side of his chest, drooping to his left eye with no swelling to the left forehead consistent with a Pancoast tumor and Vipin's syndrome. Patient states that he has had chronic pain for the past 2 years with cervical fusion one year ago. Patient works midnights and is having difficulty sleeping secondary to the pain only sleeping 2-3 hours a night. Patient also complains of a poor appetite, nausea without vomiting, significant constipation now moving his bowels every 3-4 days consistent with hard bowel movements, mild imbalance upon transitioning from sitting to standing which is self-limiting within seconds to minutes. Patient symptoms over the past several weeks he has tolerated well without symptoms Neurontin 300 mg t.i.d., Norco which he states does not help at all 5-325 milligrams tablets sometimes every 2 hours without help. Patient states that he has been on ibuprofen over the past year and now has escalated his dosage to 2200 mg at a time several times a day. Patient denies stomach upset. Patient also takes Zantac 150 mg b.i.d. Patient had been on a Medrol Dosepak one year ago without side effects. Concerning the patient's treatment patient is going for a biopsy this Friday, will be starting chemotherapy with Dr. Roma Monique in which she has an appointment on May 19, and has begun radiation as per Dr. Alvino Palencia last Wednesday which will occur 5 times a week through June 2 and then reevaluation. Patient was introduced to Palliative Medicine enmeshment with our team.  Patient signed a pain contract, given a prescription for a urine drug screen and denies any illicit or street drugs.   We also discussed the possibility of short-term versus long-term disability in which he is resistant to but I advised that with the conditions that he has both chronic and acute as well as the treatment but I would recommend pursuing disability. Options were discussed and today I made the following changes:  Discontinue Norco, Neurontin 300 mg t.i.d., ibuprofen. Today we increased his Neurontin to 600 mg t.i.d. prescribing 90 tablets with 2 refills, initiated Pamelor 10 mg q.h.s. 30 tablets with one refill,, initiated Percocet 5-325 milligrams tablets 1 p.o. q.6 hours p.r.n. ×120 tablets advising of the sedation potential and not taking it while at work work prior, Decadron 4 mg once daily in his morning since he works [employer] ×30 tablets with no refill, Protonix 40 mg daily 30 tablets with 2 refills, Zofran ODT 4 mg q.8 hours p.r.n. ×90 tablets with 1 refill, Dolly-Colace 2 tablets daily 60 tablets with 2 refills.   We will see the patient back in 2 weeks or sooner if he needs us.  05/17/16:  [place] Symptom Assessment Scale                                             Date:   Pain  [] 0  None  [] 1  [] 2  [] 3  [] 4  [] 5  [] 6  [] 7  [x] 8  [x] 9  [x] 10  Worst   Tiredness  [] 0  None  [] 1  [] 2  [] 3  [] 4  [] 5  [x] 6  [] 7  [] 8  [] 9  [] 10  Worst   Nausea  [] 0  None  [] 1  [x] 2  [] 3  [] 4  [] 5  [] 6  [] 7  [] 8  [] 9  [] 10  Worst   Depression  [] 0  None  [] 1  [x] 2  [] 3  [] 4  [] 5  [] 6  [] 7  [] 8  [] 9  [] 10  Worst   Anxiety  [] 0  None  [] 1  [x] 2  [] 3  [] 4  [] 5  [] 6  [] 7  [] 8  [] 9  [] 10  Worst   Drowsiness  [] 0  None  [] 1  [] 2  [] 3  [] 4  [x] 5  [] 6  [] 7  [] 8  [] 9  [] 10  Worst   Appetite  [] 0  None  [] 1  [] 2  [] 3  [] 4  [] 5  [] 6  [x] 7  [] 8  [] 9  [] 10  Worst   Wellbeing  [] 0  None  [] 1  [] 2  [] 3  [] 4  [x] 5  [] 6  [] 7  [] 8  [] 9  [] 10  Worst   Shortness of Breath  [] 0  None  [] 1  [] 2  [] 3  [x] 4  [] 5  [] 6  [] 7  [] 8  [] 9  [] 10  Worst   Constipation  [] 0  None  [] 1  [] 2  [] 3  [] 4  [] 5  [x] 6  [] 7  [] 8  [] [] 10  Worst   Completed By:  [x]  Patient Report  []  Caregiver/Family  []  Nurse/Staff  Patient is currently a:  [x]   Palliative Medicine Patient  []   Hospice Patient  Medical records reviewed and as per Dr. Pedro Morales assessment on 06/07/16:  Michelle Solano is a 61y.o. year old male here for follow up. He had hid CT guided biopsy done on 5/6 /2016. The results showed Pulmonary adenocarcinoma, moderately to poorly differentiated, invasive, acinar type. He completed his course of XRT and currently is going under chemotherapy . He has done 2/6 cycles. He is tolerating it relatively well. Has not lost any weight. Pain is well controlled. 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 Will follow repeat imaging at that time  H/O nicotine dependence:  Probable COPD  PFTs in future(next office visit)  Patient presents to the exam room with a primary complaint of left arm neuropathic pain and numbness which is worse with decreased gripping strength to his left hand. Patient stated that he finished radiation therapy approximately 10 days ago and also has more of a coarse voice, morning cough productive of phlegm and a mild amount of dysphasia if he eats too quickly. Patient denies nausea or vomiting. Patient only using Carafate infrequently, Percocet 5-325 milligram tablets on the average of 3 a day sometimes 4. Patient states that the Percocet helps approximately % and does not cause oversedation. Patient will also be taking dexamethasone 8 mg a day ×3 days surrounding his chemotherapy every 3 weeks which will start on June 21. Patient also started folic acid 1 mg daily 2 weeks ago.   Options were discussed and although I see no signs of candidiasis orally I suspect this esophageal involvement therefore starting patient on Diflucan 100 mg daily ×14 days and a prescription for 14 tablets with one refill given today. Patient was also complaining of constipation of small hard bowel movements moving his bowels every 2-3 days therefore we are doubling his Dolly-Colace at 2 tablets b.i.d. 120 tablets with 2 refills given today. Patient also given a prescription for Percocet unchanged 5-325 milligram tablets 1 p.o. q.6 hours p.r.n. ×120 tablets today. We will see him back in 2 weeks or sooner if he needs us and in the future secondary to neuropathic complaints we may consider methadone. 06/28/16:  [place] Symptom Assessment Scale                                             Date:   Pain  [] 0  None  [] 1  [] 2  [x] 3  [] 4  [] 5  [] 6  [] 7  [] 8  [] 9  [] 10  Worst   Tiredness  [] 0  None  [x] 1  [] 2  [] 3  [] 4  [] 5  [] 6  [] 7  [] 8  [] 9  [] 10  Worst   Nausea  [x] 0  None  [] 1  [] 2  [] 3  [] 4  [] 5  [] 6  [] 7  [] 8  [] 9  [] 10  Worst   Depression  [] 0  None  [x] 1  [] 2  [] 3  [] 4  [] 5  [] 6  [] 7  [] 8  [] 9  [] 10  Worst   Anxiety  [] 0  None  [x] 1  [] 2  [] 3  [] 4  [] 5  [] 6  [] 7  [] 8  [] 9  [] 10  Worst   Drowsiness  [] 0  None  [x] 1  [] 2  [] 3  [] 4  [] 5  [] 6  [] 7  [] 8  [] 9  [] 10  Worst   Appetite  [] 0  None  [] 1  [] 2  [] 3  [] 4  [] 5  [x] 6  [] 7  [] 8  [] 9  [] 10  Worst   Wellbeing  [] 0  None  [] 1  [] 2  [x] 3  [] 4  [] 5  [] 6  [] 7  [] 8  [] 9  [] 10  Worst   Shortness of Breath  [] 0  None  [x] 1  [] 2  [] 3  [] 4  [] 5  [] 6  [] 7  [] 8  [] 9  [] 10  Worst   Constipation  [] 0  None  [] 1  [] 2  [x] 3  [] 4  [] 5  [] 6  [] 7  [] 8  [] 9  [] 10  Worst   Completed By:  [x]  Patient Report  []  Caregiver/Family  []  Nurse/Staff  Patient is currently a:  [x]   Palliative Medicine Patient  []   Hospice Patient  Medical records reviewed and patient finished radiation therapy as per Dr. Chelita Hernandez recommendations. Patient states that he continues to take the Percocet every 6-8 hours which continues to help.   Patient states he has three quarters of a bottle left. Patient had a second opinion at a cancer center and here they are keeping him on his initial chemotherapy for another 3 weeks and then restarting the second course. Patient will stop the dexamethasone and folic acid until his chemotherapy agent changes. Patient smiling and more interactive, jovial, more comfortable and states that his appetite is good, his hoarse voice has improved but not completely back to normal. Patient gained 2-1/2 pounds over the past 2 weeks. Patient states that all of his symptoms are either stable or headed in the right direction. Patient is balancing his Dolly-Colace with bowel movements which seems effective. Options were discussed and we are continuing the same and he needs no prescriptions today. We will see him back in 4 weeks or sooner if he needs us.   08/01/16:  [place] Symptom Assessment Scale                                             Date:   Pain  [] 0  None  [] 1  [] 2  [] 3  [x] 4  [] 5  [] 6  [] 7  [] 8  [] 9  [] 10  Worst   Tiredness  [] 0  None  [] 1  [x] 2  [] 3  [] 4  [] 5  [] 6  [] 7  [] 8  [] 9  [] 10  Worst   Nausea  [x] 0  None  [] 1  [] 2  [] 3  [] 4  [] 5  [] 6  [] 7  [] 8  [] 9  [] 10  Worst   Depression  [] 0  None  [x] 1  [] 2  [] 3  [] 4  [] 5  [] 6  [] 7  [] 8  [] 9  [] 10  Worst   Anxiety  [] 0  None  [] 1  [] 2  [x] 3  [] 4  [] 5  [] 6  [] 7  [] 8  [] 9  [] 10  Worst   Drowsiness  [] 0  None  [x] 1  [] 2  [] 3  [] 4  [] 5  [] 6  [] 7  [] 8  [] 9  [] 10  Worst   Appetite  [] 0  None  [] 1  [x] 2  [] 3  [] 4  [] 5  [] 6  [] 7  [] 8  [] 9  [] 10  Worst   Wellbeing  [] 0  None  [] 1  [x] 2  [] 3  [] 4  [] 5  [] 6  [] 7  [] 8  [] 9  [] 10  Worst   Shortness of Breath  [] 0  None  [] 1  [] 2  [] 3  [x] 4  [] 5  [] 6  [] 7  [] 8  [] 9  [] 10  Worst   Constipation  [] 0  None  [] 1  [] 2  [] 3  [] 4  [] 5  [] 6  [x] 7  [] 8  [] 9  [] 10  Worst   Completed By:  [x]  Patient Report  []  Caregiver/Family  [] Surgery). Patient evaluated unaccompanied in the exam room by his wife with no sign of confusion and/or sedation able to voices needs. Patient states that he continues with increasing neuropathic pain, numbness and tingling involving his left arm and shoulder is now more distally as well as left mid to upper back pain. Patient takes Percocet unchanged approximately every 6-8 hours and is compliant with the Neurontin, Pamelor. Patient last week started physical therapy initially 2 times a week which will drop to once a week for a total of 6 weeks. Patient also was placed on an anabiotic as well as Diflucan ending 3 weeks ago for a throat infection which she feels cleared. Patient was also placed on a Medrol Dosepak by his oncologist which did not improve his left arm and back symptoms. Patient states that his back pain had improved significantly since all of his treatments. 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 Patient will be starting a new chemotherapy tomorrow as well as steroids therapy along with this. Options were discussed and patient desires something in addition to the above medications for his polyneuropathy symptoms. Patient has been on Lexapro 10 mg daily by his PCP for the past 3-4 years which she feels as helps. I gave the patient a prescription for Cymbalta 20 mg daily 30 tablets with one refill as well as refills on his other medications as above unchanged. Patient has 104 Percocet tablets left that we had prescribed via phone conversation. Patient advised to discuss the Lexapro and Cymbalta with his PCP Dr. Jose A House since he has seen him in the past for this condition. I advised patient that we would not be best case scenario if he took both together and they voiced understanding. We will see him back in 4 weeks or sooner if they need us.   08/30/16:  Medical records reviewed and as per Dr. Kelly Chavez on 08/29/16:  -2 phase RT (initial = emergent) / locally advanced lung CA involving 2 vertebral bodies  Karmen Manriquez has completed radiation treatments to the left lung mass invading the spinal column, with high energy photons using an conformal technique at [address] [place] [address]. The patient received a total dose of 38 Gy in 16 fractions plus a 12 GY (6 fractions) boost to 50 Gy ( spinal cord at [facility] reviewed with pt). Second opinion recommend and obtained. Treatment Start Date: 4/27/16  Treatment Completion Date: 6/3/16  The treatments were well tolerated, without significant interruption (except for the re-plan)  RTOG Acute Toxicity Grade [ARMSC]: 1-2. (Pain / fatigue)  We will look forward seeing the patient back in 3 months for a follow-up with myself or our NP [scheduled and PRN toxicity checks in addition]. The patient knows to call with any questions or concerns. Please feel free to contact me at either office to discuss the care of this patient, or if I can be of any further assistance. Patient presents to the exam room in no acute distress alert with no sign of confusion and/or sedation able to voice his needs. Patient states that he has noticed some improvement especially to his thumb and index finger concerning the neuropathy with the addition of the Cymbalta. Patient is happening worsening back pain and is awaiting CT results next Tuesday to look at progression. Patient has lost 4 pounds and states that with chemotherapy his appetite has been decreased, he chronically has difficulty sleeping which has not changed. Patient states that he has taken Percocet on an average of 3 a day but when he had worsening back pain he was taking more frequent. Options were discussed and I advised increasing the Cymbalta to 20 mg b.i.d. and provided in a prescription for 60 tablets with one refill.   Patient continues on the Lexapro 10 mg daily and will be meeting with Dr. Nato Esparza soon for assessment on possibly stopping the Lexapro versus continuing. I prescribed unchanged his Percocet 5-325 milligram tablets 1 p.o. q.6 hours p.r.n. ×120 tablets. We'll see him back in 4 weeks and look at the results of his CT scan at that time as well as benefit from the changes above.  09/27/16:  Medical records reviewed and patient presents today looking well without sign of sedation and/or confusion able to voice his needs accompanied by his wife. Patient complaining of sinus congestion, sore throat, raspy voice and recently saw his PCP who placed him on amoxicillin yesterday 500 mg t.i.d. Patient states that other than this he is feeling better with better control neuropathy on current regiment and off the Lexapro by his PCP. Patient reviewed how he is taking his medications which is appropriate and denies other new symptoms. Options were discussed and we are continuing the same medication regiment prescribing Percocet 5-3 and a 25 mg tablets today 1 p.o. q.6 hours p.r.n. in which he is taking crocs 23 a day ×120 tablets. Patient states that he has 11 days remaining of his Percocet. Patient has enough of the Cymbalta, gabapentin and was prescribed Diflucan 100 mg daily ×14 days with one refill. We will see him back in 4 weeks or sooner if he needs us. 10/24/16:   Medical records reviewed and as per Dr. Kamala Asencio on 10/14/16:  Therese Cardoso is well known to me since the emergent R and concurrent course was completed. His spine pain is reduced. CHEMO continues. No early delayed RT effects. All questions answered. TOB cessation discussed again. FU with [facility] as planned and with me in 3 months or sooner PRN. Patient presents to the exam room today without sign of sedation but less jovial looking more uncomfortable for follow-up complaining of right ear and right cheek pain stating that he went to urgent care Friday and was diagnosed with what he knows he has always had, TMJ. Patient also finished a Z-Eugenio by his PCP one week ago which did not help. Patient also complains of worsening left hand numbness and tingling. Patient states that he took 2 Percocet tablets last night which did help with the pain but then also made him sleepy. Patient compliant with his other medications and states that initially with the Diflucan his throat pain improved some but not significantly. Patient did state that he has a difficult time swallowing at times. Options were discussed and today we are increasing his Cymbalta to 30 mg b.i.d. ×60 tablets and 1 refill electronically prescribed. Patient will continue his Neurontin 900 mg t.i.d.  Today we initiated Duragesic 12 µg patch q.72 hours as directed ×10 patches and prescribed unchanged his Percocet 5-325 milligram tablets 1 p.o. q.6 hours p.r.n. ×120 tablets prescribed today. Patient will be seen back in 4 weeks or sooner and at this time consider increasing the Cymbalta, Percocet dosage, Duragesic patch and reassessing for thrush which is not present today. He will call if symptoms increase or if he has difficulty with the Duragesic in which she was reluctant to accept. 11/15/16:  Medical records reviewed and spirometry on 10/24/16:  DATA: Spirometry done in the office today demonstrates an FVC of 4.17 liters which is 85 % of predicted with an FEV1 of 3.10 liters which is 83 % of predicted. FEV1/FVC ratio is 74 %. Mid expiratory flow rates are 78% of predicted. Maximum voluntary ventilation is normal at 106 liters per minute or 74% of predicted. Total lung capacity is 6.83 liters which is 94% of predicted. DLCO is 12.43mm/min/mmHg which is 36% of predicted. Flow volume loop shows no signs of intrathoracic or extrathoracic obstruction. Impressions: No obstructive or restrictive lung disease. Normal total lung capacity. Severe decrement in diffusing capacity  Patient presents to the exam room today accompanied by his wife at his baseline with no sign of sedation and/or confusion.   Patient states that since his oncologist increased his Duragesic to two 12 µg patches his pain has improved approximately 75%. Patient states that he was able to decrease his Percocet to approximately 3 daily on the average. Patient has not noticed much of a difference since we increased his Cymbalta 1 month ago. Patient states that a left chest pain is new otherwise his symptoms are unchanged. 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 Patient desires to wean himself off of the Percocet as it does make him sleepy therefore did accept my suggestion of continues to titrate upward the Duragesic. Today we increased him to Duragesic 37 µg q.72 hours and provided a prescription for 10 of each strength patch. Patient was not given a prescription for his Percocet as he states he has not filled the last prescription I gave him. There are no signs of thrush but we will continue to monitor and patient compliant with his other medications. We will see him back in 4 weeks and evaluate his need for his breakthrough pain medicine and consider titrating the Duragesic further if indicated. We will question if he has returned to work. We will also consider increasing his Cymbalta to 40 mg b.i.d. if indicated. 12/13/16:  Medical records reviewed and as per radiation oncology's assessment on 11/30/16:  Patient is doing well post-radiation completion. CT scan on 11/28/16 showing paratracheal node enlargement and he is symptomatic with whispery voice/ sore throat -- ENT referral written today to Dr Sherel Councilman (physician per patient preference). Will also update Dr Missy Torres on imaging report and referral. Further imaging per med onc or ENT. Pain well controlled. Continue with Dr Kareen Nissen (palliative medicine). Continue to follow with Dr Judith Waters (pulmonology). Smoking cessation reinforced again. Patient verbalized understanding.   I discussed follow up plans states that he is not sleeping as well secondary to this cough despite running a humidifier throughout the house with good humidity. Patient states that Dr. Apple Beck had him on steroids ×2 weeks which helped but then oncology secondary to his immunotherapy wanted him off of this. Patient states that his pain is controlled on current regiment and he is noting fatigue and some depression secondary to not sleeping well secondary to the cough. Options were discussed and we refilled his Duragesic unchanged ×5 patches each strength, Dolly-Colace and prescribed Ativan 0.5 mg tablets 1/2-1 tablet q.h.s. p.r.n. ×30 tablets. We will see him back in 1 month or sooner if he needs us. 02/06/17:  Medical records reviewed and as per radiation oncology assessment from 01/30/17:  -pt seen To discuss hoarseness and CT findings  -Flaca Melendez was asked to come in today to review the negative CT findings. He and hoarseness and I reviewed the RT treatment noting the laryngeal mean dose constraints WERE met. 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 CT reviewed with pt and wife. TOB cessations discussed, again at length. -FU with Dr. Feliciano Shaw for systemic immunotherapy and re-staging. -MM for Sx. Patient presents to the exam room today accompanied by his wife looking well in no acute distress without sign of sedation and/or confusion. Patient states that Ativan 0.5 mg q.h.s. help significantly with his sleep without sedation during the day. Patient also is compliant with his other medications without change stating that his pain is much better controlled. Patient reviews with me his visit with Dr. Apple Beck and CAT scan. Patient states there is no change in how he is using his Duragesic other medications and requesting refill on Ativan, Duragesic and Cymbalta.   Options were discussed and patient was provided with prescriptions for Ativan, 3 pound weight loss. We refilled unchanged his Duragesic 37 µg q.72 hours with 10 patches of each strength, he has enough of the Ativan and today we increased his Cymbalta to 40 mg bid as per Epic to help with his neuropathic symptoms. I talked to them about continued moisturization to his hands but also revealed that these are neuropathic changes as well. They voiced understanding and hopefully the increase in Cymbalta may combat this. We will see him back in 4 weeks or sooner if they need us. Remeron may be indicated to help with his appetite. 04/04/17:  Medical records reviewed and I see no physician visit documented in [facility] since my last visit with him. Patient without sedation and/or confusion able to voice his needs, more comfortable appearing than last time. Patient initially stated that the increase of Cymbalta he did not notice anything from but upon further questioning he has been in less pain. Patient complaining of significant fatigue but upon further questioning he is working 9-11 hours a day oftentimes 6 days a week needing to take days off in between and sleeping the majority of his time off. Patient is aware that he is working too long and now is going to day shift instead of midline shift and hopefully cutting back a few hours a day. I talked to the patient about how a mild change we will not affect him significantly that he may want to consider a significant change in his work status and he voiced understanding but not acceptance at this point. Patient wanting to see how much of a difference this would make. Patient also asking about the Duragesic patches and fatigue stating that over the past month his pain has been better controlled. Patient also still dealing with dryness and fissuring especially on his left hand across the dorsum of his knuckles and he will now try to splint his left index finger attempting to avoid bending at least at night.   Options were discussed and I am would be to continue systemic therapy as an out patient followed by restaging CT/PET/CT imaging. We will follow up on this when completed. Mr. Lana Bazzi is feeling better and is asking when he will be discharged. 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 He also continues to smoke cigarettes. I am concerned about an acute pulmonary event and I suggested he take time off from work. He should definitively quit smoking. He and his wife voiced understanding. Thank you for allowing me to participate in Mr. Chow President evaluation. As per H&P while an inpatient on 05/03/17:  HISTORY OF PRESENT ILLNESS: The patient is a 75-year-old  male with  history of COPD and adenocarcinoma of the lung who presented to the Emergency  Room with shortness of breath and hoarse voice that worsened throughout the  day of admission. He does have a history of subglottic airway narrowing and  has been seen by ENT for it. He denies any issues with chest pain. He feels  like something is stuck in his throat. He had breathing treatments in the  Emergency Room which were of very little help. He has finished chemotherapy  approximately the end of 11/2016 and now is getting immunotherapy. His last  immunotherapy treatment was 2 weeks ago. The next one is scheduled for  05/09/2017. He was seen in consultation by ENT. Tracheostomy was offered,  but the patient declined. He was also seen in consultation by Pulmonary. His  chest x-ray on admission shows a possible right lung infiltrate. The patient  was started on Pulmicort and Perforomist aerosol treatments along with  DuoNebs. Respiratory cultures and viral panels were obtained. DIAGNOSTIC IMPRESSION:  1. Bilateral vocal cord palsy. 2. History of chronic obstructive pulmonary disease, not in acute  exacerbation. 3. History of nonsmall cell lung CA, currently receiving immunotherapy. 4. Subglottic narrowing. 5. Tobacco dependency. 6. Depression.   7. Hypercholesterolemia. As per ENT consult while an inpatient on 05/02/17:  Patient is a 65 yo male, history of COPD and lung adenocarcinoma, that presented to the ED late last night with complaints of shortness of breath and hoarse voice that worsened throughout the day yesterday. He has history of subglottic airway narrowing and has been seen by ENT for it. He denies any issues with shortness of breath at the time but says it feels like something is stuck in his throat. He had breathing treatments in the ED which said helped a little. He mentions that he had some diarrhea for the past few days but denies any other symptoms. Patient says he has cut back on his smoking, but still was smoking prior to coming into the hospital.  He has finished chemotherapy approximately the end of Nov 2016 and now currently getting immunotherapy with carbo/Avastin + Xgeva. His last treatment was 2 weeks ago and his next one is scheduled for May 9. Voice was worse yesterday and more SOB, better today. Was noted to have left vocal cord palsy in Dec 2016  EXAM - erythema, dryness of supraglottic larynx but no lesions. Fiberoptic laryngoscopy, both vocal cords in paramedian position, minimal abduction, right cord does medialize slightly with phonation. Unable to see subglottic  No neck mass  IMP - bilateral vocal cord palsy. Would be important to delineate status of mediastinum taking out both cords. Evelene Mock was discussed as alternative for improving breathing status. Pt does not wish to proceed with this at this time. Risks, options discussed with pt and wife  Patient presents today with his wife and daughter without sign of sedation and/or confusion able to voice his needs. Patient appropriate and talking with a whispering/raspy voice without shortness of breath noted. Patient states there's been no change to the medications and he needs refills. Patient and family discussing the recommendations of a tracheostomy and all the above. Patient sees Dr. Grisel Ruiz tomorrow and medical oncology on Thursday to further discuss treatment options. Patient also complaining of worsening numbness and tingling involving his right hand now. Options were discussed and we are refilling his medications unchanged as per Epic. Today I talked to them for 45 minutes about differences in emergent versus elective tracheostomy and answered their questions. Wife has been wanting the patient to quit work for quite some time and patient has been resistant admitting today that he feels as long as he can work in the cancer is not beating him. Patient is also worried about insurance coverage once he quits work. We had a long very cynthia discussion and patient admits that [facility] has to go\". Patient feels that over the next month he needs to make steps to end his employment for his health. I advised him to talk to social work, insurance, his work about options for [employer] and they voiced understanding. Patient's wife has researched different treatments 1 especially that they are only performing at [facility] and animals concerning electrical stimulation of vocal cords. She does admit that treatments are several years off as she has been told by them. Concerning the treatment including a tracheostomy of his specific case she will consider what his specialists above are recommending and gather information on details of the trach/treatment. He will then make his decision based on their recommendations. Patient states that he needs to have a tracheostomy then he would but he wants to know if there are any other options. Patient did talk about not having an appetite and we are initiating today Remeron 7.5 mg q.h.s. as per Epic and will see him back in 4 weeks or sooner if he needs us. 06/27/17:   Medical records reviewed and as per general surgery assessment from 06/26/17: The patient says he is doing better.  He is breathing better with the tracheostomy in 10 mg daily which is helping with his overall feelings and joint aches by medical oncology. Options were discussed and we are decreasing IV fluids to twice weekly and would decrease them further but patient will be soon starting radiation and had significant difficulty swallowing with his initial radiation. We will monitor and decrease the IV fluids down the road if we can keep his oral intake increased. Patient advised to eat and drink as much as he can within limits. I am refilling his Duragesic as per Epic and he will now use the Neurontin 250 mg per 5 ML suspension 750 mg t.i.d.,  Ativan sporadically as per Epic as well as his other medications. We will see him back in 4 weeks or sooner if he needs us. There is no sign of thrush today. 10/03/17:  Medical records reviewed and as per radiation oncology assessment from 09/27/17:  Comments: Dose 3000 cGy to the subglottic area. Patient has no complaints except mild sore throat. He has Magic mouthwash and its helping can eat. On examination the skin over the treated area looks good. Oral cavity shows no evidence of thrush. Tolerating treatment well  Patient presents to the exam room today accompanied by his wife without sign of sedation and/or confusion able to voice his needs. Patient looking better every time I see him and has gained 10 pounds as per our records. Patient states that his pain is controlled on the current regiment of Duragesic 37 µg q.72 hours, gabapentin 900 mg t.i.d that he likes taking better than the suspension. , Cymbalta 20 mg b.i.d., Pamelor 25 mg q.h.s. not needing anything further. Patient is also compliant with Remeron 15 mg q.h.s. and very infrequently uses Ativan 0.5 mg q.h.s. because he is tired in the evenings. Patient was able to go outside of work around the house for approximately half hour the other day with fatigue afterwards.   Patient does complain of not only physical fatigue but mental fatigue oftentimes forgetting coverage. 05/15/18:  3/20/18: [address] records reviewed and no documented physician visits in [facility] since last seen by Dr. Jerod Tate on 1/23/18. Patient presents with his wife in no acute distress, ambulating well, no signs of sedation and/or confusion, is able to voice his needs and concerns well. Since his last office visit he has gained 7 pounds, and states that his appetite is good. He also states that his pain has been well controlled. He continues to have some daytime tiredness, but associates this with poor sleep secondary to restless legs. He states that his activity level has increased, and that he is very active around the home, recently working at reducing his bathroom. He states that he has no immediate concerns, that is most bothersome problem, hasn't been stressed legs at night. He states that is somewhat newer to him. We have a long discussion, it was revealed that he has had similar issues in the past, when he struck with sleep apnea, which often interrupted sleep. He states that he has been sleeping with his speaking valve on history, when he is supposed to be sleeping with a compressor. We discussed that he should attempt to follow the instructions of his pulmonary team regarding his airway at night. Options for continued treatment were discussed, and he will continue to use his Remeron 30 mg at at bedtime, continue Cymbalta 30 mg every 12 hours, continue Neurontin 900 mg 3 times a day, and his Pamelor 25 mg at at bedtime. He states that his pain has been well controlled, and in fact he states that he has been stretching his fentanyl patches out to about 4 days over the past week or so. We discussed reducing his final dose, and he is agreeable to attempting this. We will prescribe today fentanyl 25 µg every 72 hours. He knows to call if pain worsens, or other uncontrolled symptoms arise. We will see him back in 8 weeks, or sooner if needed.   As per phone discussion with Caleb Cornell on 05/04/18:  Call received with request for refill of fentanyl patch. They also requested reinitiation of the additional 12.5 µg patch, as per his prior regimen, due to increase in generalized pain. Will refill patient's 25 µg patch and reorder 12.5 µg patches. Patient is to follow-up the palliative medicine outpatient clinic on April 15. As per radiation oncology assessment from 05/03/18:  Mr. Reese Reis is a pleasant gentleman well know to me with a history of both lung cancer and laryngeal cancer s/p concurrent chemo-RT for the former and definitve intent fractionated external beam radiation therapy for the latter (with the highest dose the spinal cord could tolerate - see above). He is still on immunotherapy and his KPS is intact at KPS 70 WO new Sx. There is no evidence of disease recurrence or progression based on a detailed review of the available imaging, physical exam, and ROS (all personally performed prior to and during this follow up appointment today). The patient did verbalize understanding for the indications of continued FU including imaging and laboratory evaluation as applicable to this case; as well as appropriate lifestyle choices and health maintenance. All questions answered to the best of my ability. 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 Patient presents with wife ambulatory in no acute distress looking much more comfortable than I previously seen him in the past gaining 11 pounds stating that he is much more active at home with carpentry and working on his cars. Patient reviews with me the increasing pain which is still experiencing describing as neuropathy involving his left leg and left arm requiring an increase of the fentanyl patch as above after we attempted to decrease. Patient compliant with 37 µg q.72 hours but does complain of worsening pain after the secondary the patch. Patient does take Tylenol for breakthrough pain.   Patient's #1 complaint is difficulty falling his lower back pain but he desires to keep everything the same for the time. I continued his Duragesic 37 µg q.72 hours prescribing 10 patches of each strength, refilled his Colace and he will continue his melatonin q.h.s., other medications unchanged including Ativan 0.5 mg, Remeron 30 mg at at bedtime, continue Cymbalta 30 mg every 12 hours, continue Neurontin 900 mg 3 times a day, and his Pamelor 25 mg at at bedtime. We are extending his appointment out to 12 weeks and he knows to call prior to running out of his fentanyl patches or if he needs anything. Patient congratulated that he is now in remission as per his oncologist.  10/02/18:  Medical records reviewed including assessment by Mattie Almendarez of palliative care on 09/24/18:  Spoke with patient and wife about discharge plan. The patient will be discharged to home with IV antibiotics and has a PICC line in place. He will continue current medications for symptom management. Reviewed medications with patient and family. Wife states that the patient has enough medication to last until next visit to palliative medicine outpatient clinic. Prescription provided for Marinol 2.5 mg twice a day ordered for appetite stimulation. Patient tolerating medication without side effects. The patient's wife will call the palliative medicine outpatient clinic to arrange an appointment in the next 2 weeks. [address] APRN-CNP  Patient presents today accompanied by his wife looking well without sign of sedation and/or confusion able to voice his needs. Patient states that slowly he is regaining his appetite but there is still significant room for improvement. A shunt feels that the Marinol helps without side effects. Patient states his breathing is slowly improving but still not back to baseline. Patient has 5 patches of each strength of his fentanyl patches and states there's no change in how he is taking his medications.   Patient denies other new and/or uncontrolled symptoms otherwise. Patient is following up with infectious disease on October 10 and will ask them about restarting his hyperbaric treatment secondary to his gum disease. Options were discussed and today we are doubling the Marinol to 5 mg b.i.d. as per Epic. Patient advised that if he does not notice a benefit or does not like the way it makes him feel he can always break the tablets in half and go back to his original dosage. Patient will continue his other medications unchanged including Duragesic 37 µg q.72 hours, Colace, melatonin q.h.s., Ativan 0.5 mg, Remeron 30 mg q.h.s., Cymbalta 30 mg q.12 hours, Neurontin 900 mg t.i.d., Pamelor 25 mg q.h.s. He will continue his other medications as well and we'll see him back in 4 weeks monitoring his weight. They know to call us at any time. 10/30/18:  Medical records reviewed and patient presents ambulatory in no acute distress accompanied by his wife without sign of sedation and/or confusion able to voice his needs. Patient appears frustrated today and states that he has significant shortness of breath with activity that has been limiting his activity since his pneumonia. Patient utilizes albuterol aerosols prescribed by his pulmonologist twice daily which does help for an hour or 2 as per his history. Patient has oxygen at home that he infrequently uses. Patient likes to remain active. Patient states there's no change in how he is taking his medications and I prescribed other than he stopped his Marinol secondary to a too high of a co-pay. Patient has gained 5 pounds stating that he is eating with an improved appetite. Patient states he is on prednisone 10 mg daily. Options were discussed and he will call us prior to running out of his Duragesic, Ativan as above and we will electronically prescribed unchanged. Of course we are not continuing the Marinol as he is already stopped.   Patient will continue his Duragesic 37 µg one patch q.72 and Dr. Monika Anderson for his care. Dr. Monika Anderson initially prescribed Kem Napier / Humberto Saini + Elidia Dakins. See [address] records for further [address] including immunotherapy. Dom then came in with continued voice change and stridor that then prompted a tracheostomy (last year). 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  We prescribed fractionated external beam radiation therapy to the highest dose the spinal cord could tolerate per [facility] and he has had a response to treatment with improvement in his voice and stable KPS still on immunotherapy per Dr. Monika Anderson. He recently had a new pneumonia with questionable mass in the right side, continued FU pending. If this is malignancy (biopsy could be considered) then SBRT is a reasonable considerations. 2nd opinion offered and declined. KPS 70. He does continue to smoke. Cessation again discussed.     Mr. Sam Babb is a pleasant gentleman well know to me with a history of both lung cancer and laryngeal cancer s/p concurrent chemo-RT for the former and definitve intent fractionated external beam radiation therapy for the latter (with the highest dose the spinal cord could tolerate - see above) --- he has a recently diagnosis of pneumonia +/- mass with continue FU pending (SBRT could be considered for oligo metastatic disease vs 3rd primary depending on the clinical course.)  He is still on immunotherapy and his KPS is intact at KPS 70 WO new Sx perhaps SOB).  Dalila Monday is no evidence of disease recurrence or progression based on a detailed review of the available imaging, physical exam, and ROS (all personally performed prior to and during this follow up appointment today).  The patient did verbalize understanding for the indications of continued FU including imaging and laboratory evaluation as applicable to this case; as well as appropriate lifestyle choices and health maintenance.  All questions answered to states that he protects his wife and does not want to tell her about these things and is not real close to his family to talk to them about intimate details. Patient did talk to his elder sister at one time whom he is close to. Patient also complains about sexual dysfunction and I discussed this with him as well being multi modality. Options were discussed and I made the above recommendations as well as today continuing his fentanyl 37 µg q.72 hours as we normally do and today we initiated p.r.n. Percocet 5-325 milligram tablets 1 p.o. q.6 hours p.r.n. holding for sedation prescribing 120 tablets today. We see what his opioid load requirement is and then add to the fentanyl patch if need be. Concerning his depression we are sending him to [facility] for counseling and recommendations. Today we are keeping him on Cymbalta 60 mg q.h.s., Remeron 30 mg q.h.s. and we did decrease his Pamelor from 25 mg to 10 mg q.h.s. secondary to BPH and his above complaints. I will be glad to discuss results with them if need be. I will see him back in 4 weeks or sooner if he needs us and I advised him that he can call us at anytime with any concerns and he voiced understanding and appreciation. 03/05/19:  Medical records reviewed and as per phone discussion with Cal Gutierres our palliative care nurse on 02/28/19:  Call from [address] [place] [address] stating had not heard from Counseling group Dr. Daniella Blake had referred him to. This nurse had a confirmed fax that referral was made 1/28/19. Called over to [facility]. They did not receive referral. Referral resent and called to Alan to give update. Patient presents today accompanied by his wife distress without sign of sedation and/or confusion able to voice his needs. Patient complaining of worsening pain to his hip and thigh groin and left side of his mouth. Patient going for scans today as per medical oncology.   Patient takes 2-3 Percocet tablets remaining having approximately 60 his Percocet 5-325 milligram tablets 1 p.o. q.6 hours p.r.n. prescribing 120 tablets today. Patient will continue the Wellbutrin 75 mg b.i.d. as well as the remainder of his medications that we prescribed. We'll see him back in 8 weeks or sooner if he needs us.    6/11/19  Medical Records reviewed. Leonel Toney presents today with his wife. He is alert, oriented, without signs of sedation or confusion, and is able to voice his needs and concerns well. He reports that his pain is currently well controlled. He was started on Cyclobenzaprine by his oncologist, and this has greatly improved his pain located in his right hip/thigh. He continues to utilize his fentanyl patches both 50 mcg and 12 mcg, but reports that he sometimes forgets to change them, and at times leaves them on for 4-5 days, and reports having about 5 patches remaining from his prior prescription and does not require a refill. He also continues to take his percocet 1-2 times per day, and reports that he has been alternating his percocet and cyclobenzaprine with good result. We discussed reducing his fentanyl to just the 50 mcg patch, but he does not wish to do that at this time. We discussed that dose reduction would be appropriate moving forward as his pain continues to improve. He continues to smoke, reporting approximately 1 pack per day and we further discussed his need to quit and the efforts he is taking to quit. He denies any other significant issues, and continues to take all of his other medications as prescribed. He will call when he needs any refills and we will see him back in 8 weeks. 8/27/19:  Per Dr. Yonny Reed on 8/15/19:  He recently had a new pneumonia with questionable mass in the right side which has resolved. [facility] he is having WL, pain the lower back, left sided jaw pain, blood from the trach site and fullness around the trach site and voice change.   Overall his performance capacity has rebouned and he is no following with Dr. Charlynn Phalen. We reviewed the course to date including partial resection for ORN. PALLMED following. ENT + PULM following for laryngeal scar tissue and cough. KPS has rebounded to a solid 70 (appears better than last visit). Ongoing Keytruda.  , Accompanied by his wife. He appears to be doing very well, continues to utilize all of his medications as previously prescribed unchanged. He does review with me his frustration related to his most recent pneumonia, as well as some of the difficulty he has had due to his laryngeal scar tissue, and he will continue to follow with ENT and pulmonary regarding that problem. He is otherwise very satisfied with his symptom management, and will make no changes to his regimen. He will continue unchanged Pamelor 10 mg daily, Ativan 0.5 mg nightly, Cymbalta 30 mg daily, Remeron 30 mg daily, and melatonin 10 mg nightly. For his pain he will continue unchanged fentanyl 62 mcg every 72 hours, utilizing both a 50 and 12 mcg patch, as well as Percocet 5-325 mg, of which he utilizes 2-3 times per day. We will provide a refill of both his fentanyl and Percocet today. We will have him return in approximately 8 weeks, and he is encouraged to call with any questions, concerns, or changes in his symptoms. 10/22/2019: As per Dr. Charmayne Cage on 9/2/2019:  ASSESSMENT/PLAN :  58 yo male   Stage IV lung adenocarcinoma s/p XRT and chemotherapy  Primary H+N neoplasm  s/p XRT and currently on single agent keytruda  RLL PNA, staph  CT Neck and Chest with new mass in R vocal cord region and new mass in Lingula as above  - ID following and on Linezolid  - Breathing has significantly improved with new trach  - CT scans reviewed with wife and patient in detail. The Lingula lesion is small but new (5 x 5 mm), and the RLL lesion is nodular lesion appears new as well.  There is a new mass as above in the L vocal cord area extending into the piriformis  - Recommend continuing antibiotics Re-evaluated the status of the patient's underlying condition causing pain. Chronic Pain > 80 MEDD Obtained or confirmed \"Medication Contract\" on file. Chronic Pain > 120 MEDD Engaged a pain medicine specialist as a prescriber or consultant. Time/Communication  Greater than 50% of time spent, total 30 minutes in face-to-face counseling and coordination of care regarding goals of care, symptom management, diagnosis and prognosis and see above. Jorge Parisiole CASSIDYN-CNP  1/13/2020      Note: This report was completed using [employer] voice recognition software. Every effort has been made to ensure accuracy; however, inadvertent computerized transcription errors may be present.

## 2020-01-23 ENCOUNTER — APPOINTMENT (OUTPATIENT)
Dept: CT IMAGING | Age: 65
DRG: 177 | End: 2020-01-23
Payer: MEDICARE

## 2020-01-23 ENCOUNTER — APPOINTMENT (OUTPATIENT)
Dept: NUCLEAR MEDICINE | Age: 65
DRG: 177 | End: 2020-01-23
Payer: MEDICARE

## 2020-01-23 ENCOUNTER — HOSPITAL ENCOUNTER (INPATIENT)
Age: 65
LOS: 4 days | Discharge: HOME | DRG: 177 | End: 2020-01-27
Attending: EMERGENCY MEDICINE | Admitting: FAMILY MEDICINE
Payer: MEDICARE

## 2020-01-23 ENCOUNTER — APPOINTMENT (OUTPATIENT)
Dept: GENERAL RADIOLOGY | Age: 65
DRG: 177 | End: 2020-01-23
Payer: MEDICARE

## 2020-01-23 PROBLEM — N17.9 ACUTE RENAL FAILURE (ARF) (HCC): Status: ACTIVE | Noted: 2020-01-23

## 2020-01-23 PROBLEM — N17.9 ACUTE KIDNEY FAILURE (HCC): Status: ACTIVE | Noted: 2020-01-23

## 2020-01-23 LAB
ADENOVIRUS BY PCR: NOT DETECTED
ALBUMIN SERPL-MCNC: 3.5 G/DL (ref 3.5–5.2)
ALP BLD-CCNC: 89 U/L (ref 40–129)
ALT SERPL-CCNC: 26 U/L (ref 0–40)
AMORPHOUS: ABNORMAL
ANION GAP SERPL CALCULATED.3IONS-SCNC: 16 MMOL/L (ref 7–16)
AST SERPL-CCNC: 27 U/L (ref 0–39)
BACTERIA: ABNORMAL /HPF
BASOPHILS ABSOLUTE: 0.09 E9/L (ref 0–0.2)
BASOPHILS RELATIVE PERCENT: 1.1 % (ref 0–2)
BILIRUB SERPL-MCNC: 0.4 MG/DL (ref 0–1.2)
BILIRUBIN URINE: ABNORMAL
BLOOD, URINE: ABNORMAL
BORDETELLA PARAPERTUSSIS BY PCR: NOT DETECTED
BORDETELLA PERTUSSIS BY PCR: NOT DETECTED
BUN BLDV-MCNC: 24 MG/DL (ref 8–23)
CALCIUM SERPL-MCNC: 9.6 MG/DL (ref 8.6–10.2)
CASTS: ABNORMAL /LPF
CHLAMYDOPHILIA PNEUMONIAE BY PCR: NOT DETECTED
CHLORIDE BLD-SCNC: 98 MMOL/L (ref 98–107)
CLARITY: CLEAR
CO2: 21 MMOL/L (ref 22–29)
COLOR: YELLOW
CORONAVIRUS 229E BY PCR: NOT DETECTED
CORONAVIRUS HKU1 BY PCR: NOT DETECTED
CORONAVIRUS NL63 BY PCR: NOT DETECTED
CORONAVIRUS OC43 BY PCR: NOT DETECTED
CREAT SERPL-MCNC: 3.5 MG/DL (ref 0.7–1.2)
EKG ATRIAL RATE: 72 BPM
EKG P AXIS: 58 DEGREES
EKG P-R INTERVAL: 168 MS
EKG Q-T INTERVAL: 422 MS
EKG QRS DURATION: 104 MS
EKG QTC CALCULATION (BAZETT): 462 MS
EKG R AXIS: -13 DEGREES
EKG T AXIS: 18 DEGREES
EKG VENTRICULAR RATE: 72 BPM
EOSINOPHILS ABSOLUTE: 0.21 E9/L (ref 0.05–0.5)
EOSINOPHILS RELATIVE PERCENT: 2.6 % (ref 0–6)
GFR AFRICAN AMERICAN: 21
GFR NON-AFRICAN AMERICAN: 18 ML/MIN/1.73
GLUCOSE BLD-MCNC: 143 MG/DL (ref 74–99)
GLUCOSE URINE: NEGATIVE MG/DL
HCT VFR BLD CALC: 43.5 % (ref 37–54)
HEMOGLOBIN: 12.9 G/DL (ref 12.5–16.5)
HUMAN METAPNEUMOVIRUS BY PCR: NOT DETECTED
HUMAN RHINOVIRUS/ENTEROVIRUS BY PCR: NOT DETECTED
IMMATURE GRANULOCYTES #: 0.03 E9/L
IMMATURE GRANULOCYTES %: 0.4 % (ref 0–5)
INFLUENZA A BY PCR: NOT DETECTED
INFLUENZA A BY PCR: NOT DETECTED
INFLUENZA B BY PCR: NOT DETECTED
INFLUENZA B BY PCR: NOT DETECTED
KETONES, URINE: ABNORMAL MG/DL
LACTIC ACID: 1 MMOL/L (ref 0.5–2.2)
LACTIC ACID: 2.1 MMOL/L (ref 0.5–2.2)
LEUKOCYTE ESTERASE, URINE: NEGATIVE
LYMPHOCYTES ABSOLUTE: 0.78 E9/L (ref 1.5–4)
LYMPHOCYTES RELATIVE PERCENT: 9.6 % (ref 20–42)
MAGNESIUM: 2 MG/DL (ref 1.6–2.6)
MCH RBC QN AUTO: 24.2 PG (ref 26–35)
MCHC RBC AUTO-ENTMCNC: 29.7 % (ref 32–34.5)
MCV RBC AUTO: 81.6 FL (ref 80–99.9)
MONOCYTES ABSOLUTE: 0.58 E9/L (ref 0.1–0.95)
MONOCYTES RELATIVE PERCENT: 7.1 % (ref 2–12)
MYCOPLASMA PNEUMONIAE BY PCR: NOT DETECTED
NEUTROPHILS ABSOLUTE: 6.44 E9/L (ref 1.8–7.3)
NEUTROPHILS RELATIVE PERCENT: 79.2 % (ref 43–80)
NITRITE, URINE: NEGATIVE
PARAINFLUENZA VIRUS 1 BY PCR: NOT DETECTED
PARAINFLUENZA VIRUS 2 BY PCR: NOT DETECTED
PARAINFLUENZA VIRUS 3 BY PCR: NOT DETECTED
PARAINFLUENZA VIRUS 4 BY PCR: NOT DETECTED
PDW BLD-RTO: 15.6 FL (ref 11.5–15)
PH UA: 6 (ref 5–9)
PLATELET # BLD: 330 E9/L (ref 130–450)
PMV BLD AUTO: 10.3 FL (ref 7–12)
POTASSIUM SERPL-SCNC: 3.5 MMOL/L (ref 3.5–5)
PROTEIN UA: ABNORMAL MG/DL
RBC # BLD: 5.33 E12/L (ref 3.8–5.8)
RBC UA: ABNORMAL /HPF (ref 0–2)
RESPIRATORY SYNCYTIAL VIRUS BY PCR: NOT DETECTED
SODIUM BLD-SCNC: 135 MMOL/L (ref 132–146)
SPECIFIC GRAVITY UA: >=1.03 (ref 1–1.03)
TOTAL PROTEIN: 6.7 G/DL (ref 6.4–8.3)
TROPONIN: 0.02 NG/ML (ref 0–0.03)
TSH SERPL DL<=0.05 MIU/L-ACNC: 14.31 UIU/ML (ref 0.27–4.2)
UROBILINOGEN, URINE: 0.2 E.U./DL
WBC # BLD: 8.1 E9/L (ref 4.5–11.5)
WBC UA: ABNORMAL /HPF (ref 0–5)

## 2020-01-23 PROCEDURE — 96365 THER/PROPH/DIAG IV INF INIT: CPT

## 2020-01-23 PROCEDURE — 0100U HC RESPIRPTHGN MULT REV TRANS & AMP PRB TECH 21 TRGT: CPT

## 2020-01-23 PROCEDURE — 36415 COLL VENOUS BLD VENIPUNCTURE: CPT

## 2020-01-23 PROCEDURE — 94640 AIRWAY INHALATION TREATMENT: CPT

## 2020-01-23 PROCEDURE — 6360000002 HC RX W HCPCS: Performed by: FAMILY MEDICINE

## 2020-01-23 PROCEDURE — 71045 X-RAY EXAM CHEST 1 VIEW: CPT

## 2020-01-23 PROCEDURE — 87502 INFLUENZA DNA AMP PROBE: CPT

## 2020-01-23 PROCEDURE — 71250 CT THORAX DX C-: CPT

## 2020-01-23 PROCEDURE — 6370000000 HC RX 637 (ALT 250 FOR IP): Performed by: FAMILY MEDICINE

## 2020-01-23 PROCEDURE — 3430000000 HC RX DIAGNOSTIC RADIOPHARMACEUTICAL: Performed by: RADIOLOGY

## 2020-01-23 PROCEDURE — 96368 THER/DIAG CONCURRENT INF: CPT

## 2020-01-23 PROCEDURE — 2580000003 HC RX 258: Performed by: EMERGENCY MEDICINE

## 2020-01-23 PROCEDURE — 87040 BLOOD CULTURE FOR BACTERIA: CPT

## 2020-01-23 PROCEDURE — 2580000003 HC RX 258: Performed by: FAMILY MEDICINE

## 2020-01-23 PROCEDURE — 87070 CULTURE OTHR SPECIMN AEROBIC: CPT

## 2020-01-23 PROCEDURE — 84443 ASSAY THYROID STIM HORMONE: CPT

## 2020-01-23 PROCEDURE — 85025 COMPLETE CBC W/AUTO DIFF WBC: CPT

## 2020-01-23 PROCEDURE — 99285 EMERGENCY DEPT VISIT HI MDM: CPT

## 2020-01-23 PROCEDURE — 87186 SC STD MICRODIL/AGAR DIL: CPT

## 2020-01-23 PROCEDURE — 87206 SMEAR FLUORESCENT/ACID STAI: CPT

## 2020-01-23 PROCEDURE — 93010 ELECTROCARDIOGRAM REPORT: CPT | Performed by: INTERNAL MEDICINE

## 2020-01-23 PROCEDURE — 6360000002 HC RX W HCPCS: Performed by: EMERGENCY MEDICINE

## 2020-01-23 PROCEDURE — A9558 XE133 XENON 10MCI: HCPCS | Performed by: RADIOLOGY

## 2020-01-23 PROCEDURE — 81001 URINALYSIS AUTO W/SCOPE: CPT

## 2020-01-23 PROCEDURE — 93005 ELECTROCARDIOGRAM TRACING: CPT | Performed by: EMERGENCY MEDICINE

## 2020-01-23 PROCEDURE — 94664 DEMO&/EVAL PT USE INHALER: CPT

## 2020-01-23 PROCEDURE — A9540 TC99M MAA: HCPCS | Performed by: RADIOLOGY

## 2020-01-23 PROCEDURE — 84484 ASSAY OF TROPONIN QUANT: CPT

## 2020-01-23 PROCEDURE — 87077 CULTURE AEROBIC IDENTIFY: CPT

## 2020-01-23 PROCEDURE — 83605 ASSAY OF LACTIC ACID: CPT

## 2020-01-23 PROCEDURE — 80053 COMPREHEN METABOLIC PANEL: CPT

## 2020-01-23 PROCEDURE — 83735 ASSAY OF MAGNESIUM: CPT

## 2020-01-23 PROCEDURE — 2060000000 HC ICU INTERMEDIATE R&B

## 2020-01-23 PROCEDURE — 94760 N-INVAS EAR/PLS OXIMETRY 1: CPT

## 2020-01-23 PROCEDURE — 87088 URINE BACTERIA CULTURE: CPT

## 2020-01-23 PROCEDURE — 74176 CT ABD & PELVIS W/O CONTRAST: CPT

## 2020-01-23 PROCEDURE — 78582 LUNG VENTILAT&PERFUS IMAGING: CPT

## 2020-01-23 PROCEDURE — 2700000000 HC OXYGEN THERAPY PER DAY

## 2020-01-23 RX ORDER — CHOLECALCIFEROL (VITAMIN D3) 125 MCG
10 CAPSULE ORAL NIGHTLY
Status: DISCONTINUED | OUTPATIENT
Start: 2020-01-23 | End: 2020-01-27 | Stop reason: HOSPADM

## 2020-01-23 RX ORDER — CHLORHEXIDINE GLUCONATE 0.12 MG/ML
15 RINSE ORAL DAILY
Status: DISCONTINUED | OUTPATIENT
Start: 2020-01-23 | End: 2020-01-27 | Stop reason: HOSPADM

## 2020-01-23 RX ORDER — PREGABALIN 150 MG/1
150 CAPSULE ORAL 3 TIMES DAILY
Status: DISCONTINUED | OUTPATIENT
Start: 2020-01-23 | End: 2020-01-27 | Stop reason: HOSPADM

## 2020-01-23 RX ORDER — CHOLECALCIFEROL (VITAMIN D3) 50 MCG
5000 TABLET ORAL DAILY
Status: DISCONTINUED | OUTPATIENT
Start: 2020-01-23 | End: 2020-01-27 | Stop reason: HOSPADM

## 2020-01-23 RX ORDER — SODIUM CHLORIDE 0.9 % (FLUSH) 0.9 %
10 SYRINGE (ML) INJECTION PRN
Status: DISCONTINUED | OUTPATIENT
Start: 2020-01-23 | End: 2020-01-27 | Stop reason: HOSPADM

## 2020-01-23 RX ORDER — BUDESONIDE 0.5 MG/2ML
1 INHALANT ORAL 2 TIMES DAILY
COMMUNITY
End: 2020-03-24 | Stop reason: SDUPTHER

## 2020-01-23 RX ORDER — SODIUM CHLORIDE 9 MG/ML
INJECTION, SOLUTION INTRAVENOUS CONTINUOUS
Status: DISCONTINUED | OUTPATIENT
Start: 2020-01-23 | End: 2020-01-25

## 2020-01-23 RX ORDER — LEVOTHYROXINE SODIUM 0.12 MG/1
125 TABLET ORAL DAILY
Status: DISCONTINUED | OUTPATIENT
Start: 2020-01-23 | End: 2020-01-27 | Stop reason: HOSPADM

## 2020-01-23 RX ORDER — SIMVASTATIN 40 MG
40 TABLET ORAL NIGHTLY
Status: DISCONTINUED | OUTPATIENT
Start: 2020-01-23 | End: 2020-01-27 | Stop reason: HOSPADM

## 2020-01-23 RX ORDER — TAMSULOSIN HYDROCHLORIDE 0.4 MG/1
0.4 CAPSULE ORAL NIGHTLY
Status: DISCONTINUED | OUTPATIENT
Start: 2020-01-23 | End: 2020-01-27 | Stop reason: HOSPADM

## 2020-01-23 RX ORDER — OXYCODONE HYDROCHLORIDE AND ACETAMINOPHEN 5; 325 MG/1; MG/1
1 TABLET ORAL EVERY 4 HOURS PRN
Status: ON HOLD | COMMUNITY
End: 2020-01-27 | Stop reason: HOSPADM

## 2020-01-23 RX ORDER — BUPROPION HYDROCHLORIDE 75 MG/1
75 TABLET ORAL 2 TIMES DAILY
Status: DISCONTINUED | OUTPATIENT
Start: 2020-01-23 | End: 2020-01-27 | Stop reason: HOSPADM

## 2020-01-23 RX ORDER — LORAZEPAM 0.5 MG/1
0.5 TABLET ORAL NIGHTLY PRN
Status: DISCONTINUED | OUTPATIENT
Start: 2020-01-23 | End: 2020-01-27 | Stop reason: HOSPADM

## 2020-01-23 RX ORDER — LEVOTHYROXINE SODIUM 0.12 MG/1
125 TABLET ORAL DAILY
COMMUNITY
End: 2020-05-08 | Stop reason: SDUPTHER

## 2020-01-23 RX ORDER — DULOXETIN HYDROCHLORIDE 30 MG/1
30 CAPSULE, DELAYED RELEASE ORAL 2 TIMES DAILY
Status: DISCONTINUED | OUTPATIENT
Start: 2020-01-23 | End: 2020-01-27 | Stop reason: HOSPADM

## 2020-01-23 RX ORDER — HEPARIN SODIUM 10000 [USP'U]/ML
5000 INJECTION, SOLUTION INTRAVENOUS; SUBCUTANEOUS EVERY 8 HOURS
Status: DISCONTINUED | OUTPATIENT
Start: 2020-01-23 | End: 2020-01-27 | Stop reason: HOSPADM

## 2020-01-23 RX ORDER — FENTANYL CITRATE 50 UG/ML
50 INJECTION, SOLUTION INTRAMUSCULAR; INTRAVENOUS ONCE
Status: COMPLETED | OUTPATIENT
Start: 2020-01-23 | End: 2020-01-23

## 2020-01-23 RX ORDER — ALBUTEROL SULFATE 0.63 MG/3ML
1 SOLUTION RESPIRATORY (INHALATION) EVERY 6 HOURS PRN
Status: DISCONTINUED | OUTPATIENT
Start: 2020-01-23 | End: 2020-01-27 | Stop reason: HOSPADM

## 2020-01-23 RX ORDER — OXYCODONE HYDROCHLORIDE 10 MG/1
10 TABLET ORAL 3 TIMES DAILY
Status: DISCONTINUED | OUTPATIENT
Start: 2020-01-23 | End: 2020-01-27 | Stop reason: HOSPADM

## 2020-01-23 RX ORDER — MORPHINE SULFATE 15 MG/1
30 TABLET, FILM COATED, EXTENDED RELEASE ORAL 2 TIMES DAILY
Status: DISCONTINUED | OUTPATIENT
Start: 2020-01-23 | End: 2020-01-27 | Stop reason: HOSPADM

## 2020-01-23 RX ORDER — XENON XE-133 10 MCI/1
30 GAS RESPIRATORY (INHALATION)
Status: COMPLETED | OUTPATIENT
Start: 2020-01-23 | End: 2020-01-23

## 2020-01-23 RX ORDER — ACETAMINOPHEN 325 MG/1
650 TABLET ORAL EVERY 4 HOURS PRN
Status: DISCONTINUED | OUTPATIENT
Start: 2020-01-23 | End: 2020-01-27 | Stop reason: HOSPADM

## 2020-01-23 RX ORDER — PANTOPRAZOLE SODIUM 40 MG/1
40 TABLET, DELAYED RELEASE ORAL
Status: DISCONTINUED | OUTPATIENT
Start: 2020-01-23 | End: 2020-01-27 | Stop reason: HOSPADM

## 2020-01-23 RX ORDER — MIRTAZAPINE 15 MG/1
30 TABLET, FILM COATED ORAL NIGHTLY
Status: DISCONTINUED | OUTPATIENT
Start: 2020-01-23 | End: 2020-01-27 | Stop reason: HOSPADM

## 2020-01-23 RX ORDER — SODIUM CHLORIDE 9 MG/ML
INJECTION, SOLUTION INTRAVENOUS ONCE
Status: COMPLETED | OUTPATIENT
Start: 2020-01-23 | End: 2020-01-23

## 2020-01-23 RX ORDER — ESOMEPRAZOLE MAGNESIUM 40 MG/1
40 CAPSULE, DELAYED RELEASE ORAL
COMMUNITY
End: 2020-04-01 | Stop reason: SDUPTHER

## 2020-01-23 RX ORDER — PREDNISONE 10 MG/1
10 TABLET ORAL DAILY
COMMUNITY

## 2020-01-23 RX ORDER — FORMOTEROL FUMARATE 20 UG/2ML
20 SOLUTION RESPIRATORY (INHALATION) 2 TIMES DAILY
COMMUNITY
End: 2020-04-29 | Stop reason: SDUPTHER

## 2020-01-23 RX ORDER — BUDESONIDE 0.5 MG/2ML
500 INHALANT ORAL 2 TIMES DAILY
Status: DISCONTINUED | OUTPATIENT
Start: 2020-01-23 | End: 2020-01-27 | Stop reason: HOSPADM

## 2020-01-23 RX ORDER — AMOXICILLIN 250 MG
2 CAPSULE ORAL DAILY
Status: DISCONTINUED | OUTPATIENT
Start: 2020-01-24 | End: 2020-01-26 | Stop reason: SDUPTHER

## 2020-01-23 RX ORDER — FORMOTEROL FUMARATE 20 UG/2ML
20 SOLUTION RESPIRATORY (INHALATION) 2 TIMES DAILY
Status: DISCONTINUED | OUTPATIENT
Start: 2020-01-23 | End: 2020-01-27 | Stop reason: HOSPADM

## 2020-01-23 RX ORDER — CYCLOBENZAPRINE HCL 10 MG
10 TABLET ORAL 3 TIMES DAILY PRN
Status: DISCONTINUED | OUTPATIENT
Start: 2020-01-23 | End: 2020-01-27 | Stop reason: HOSPADM

## 2020-01-23 RX ORDER — DULOXETIN HYDROCHLORIDE 30 MG/1
30 CAPSULE, DELAYED RELEASE ORAL 2 TIMES DAILY
COMMUNITY
End: 2020-03-24

## 2020-01-23 RX ORDER — SODIUM CHLORIDE 0.9 % (FLUSH) 0.9 %
10 SYRINGE (ML) INJECTION EVERY 12 HOURS SCHEDULED
Status: DISCONTINUED | OUTPATIENT
Start: 2020-01-23 | End: 2020-01-27 | Stop reason: HOSPADM

## 2020-01-23 RX ORDER — PREDNISONE 10 MG/1
10 TABLET ORAL DAILY
Status: DISCONTINUED | OUTPATIENT
Start: 2020-01-23 | End: 2020-01-27 | Stop reason: HOSPADM

## 2020-01-23 RX ORDER — NORTRIPTYLINE HYDROCHLORIDE 10 MG/1
10 CAPSULE ORAL NIGHTLY
Status: DISCONTINUED | OUTPATIENT
Start: 2020-01-23 | End: 2020-01-27 | Stop reason: HOSPADM

## 2020-01-23 RX ORDER — OXYCODONE HYDROCHLORIDE 10 MG/1
10 TABLET ORAL 3 TIMES DAILY
COMMUNITY
End: 2020-08-06 | Stop reason: SDUPTHER

## 2020-01-23 RX ORDER — TAMSULOSIN HYDROCHLORIDE 0.4 MG/1
0.4 CAPSULE ORAL NIGHTLY
COMMUNITY
End: 2020-04-01 | Stop reason: SDUPTHER

## 2020-01-23 RX ADMIN — Medication 8 MILLICURIE: at 13:59

## 2020-01-23 RX ADMIN — CHLORHEXIDINE GLUCONATE 0.12% ORAL RINSE 15 ML: 1.2 LIQUID ORAL at 18:46

## 2020-01-23 RX ADMIN — HEPARIN SODIUM 5000 UNITS: 10000 INJECTION INTRAVENOUS; SUBCUTANEOUS at 17:51

## 2020-01-23 RX ADMIN — BUPROPION HYDROCHLORIDE 75 MG: 75 TABLET, FILM COATED ORAL at 20:49

## 2020-01-23 RX ADMIN — SODIUM CHLORIDE: 9 INJECTION, SOLUTION INTRAVENOUS at 10:35

## 2020-01-23 RX ADMIN — CEFEPIME HYDROCHLORIDE 2 G: 2 INJECTION, POWDER, FOR SOLUTION INTRAVENOUS at 14:00

## 2020-01-23 RX ADMIN — NORTRIPTYLINE HYDROCHLORIDE 10 MG: 10 CAPSULE ORAL at 20:49

## 2020-01-23 RX ADMIN — Medication 5000 UNITS: at 20:48

## 2020-01-23 RX ADMIN — SIMVASTATIN 40 MG: 40 TABLET, FILM COATED ORAL at 20:48

## 2020-01-23 RX ADMIN — PREGABALIN 150 MG: 150 CAPSULE ORAL at 20:48

## 2020-01-23 RX ADMIN — XENON XE-133 35 MILLICURIE: 10 GAS RESPIRATORY (INHALATION) at 13:58

## 2020-01-23 RX ADMIN — LORAZEPAM 0.5 MG: 0.5 TABLET ORAL at 20:56

## 2020-01-23 RX ADMIN — Medication 10 MG: at 20:49

## 2020-01-23 RX ADMIN — SODIUM CHLORIDE: 9 INJECTION, SOLUTION INTRAVENOUS at 18:32

## 2020-01-23 RX ADMIN — FENTANYL CITRATE 50 MCG: 50 INJECTION, SOLUTION INTRAMUSCULAR; INTRAVENOUS at 12:52

## 2020-01-23 RX ADMIN — MORPHINE SULFATE 30 MG: 15 TABLET, FILM COATED, EXTENDED RELEASE ORAL at 20:48

## 2020-01-23 RX ADMIN — MIRTAZAPINE 30 MG: 15 TABLET, FILM COATED ORAL at 20:49

## 2020-01-23 RX ADMIN — FORMOTEROL FUMARATE DIHYDRATE 20 MCG: 20 SOLUTION RESPIRATORY (INHALATION) at 21:41

## 2020-01-23 RX ADMIN — DEXTROSE MONOHYDRATE 1750 MG: 50 INJECTION, SOLUTION INTRAVENOUS at 14:59

## 2020-01-23 RX ADMIN — DULOXETINE HYDROCHLORIDE 30 MG: 30 CAPSULE, DELAYED RELEASE ORAL at 20:49

## 2020-01-23 RX ADMIN — TAMSULOSIN HYDROCHLORIDE 0.4 MG: 0.4 CAPSULE ORAL at 20:49

## 2020-01-23 RX ADMIN — BUDESONIDE 500 MCG: 0.5 SUSPENSION RESPIRATORY (INHALATION) at 21:41

## 2020-01-23 RX ADMIN — PANTOPRAZOLE SODIUM 40 MG: 40 TABLET, DELAYED RELEASE ORAL at 18:50

## 2020-01-23 ASSESSMENT — PAIN SCALES - GENERAL
PAINLEVEL_OUTOF10: 0
PAINLEVEL_OUTOF10: 4
PAINLEVEL_OUTOF10: 0
PAINLEVEL_OUTOF10: 0
PAINLEVEL_OUTOF10: 5
PAINLEVEL_OUTOF10: 0

## 2020-01-23 NOTE — ED PROVIDER NOTES
Department of Emergency Medicine   ED  Provider Note  Admit Date/RoomTime: 1/23/2020 10:16 AM  ED Room: 3048/7126-P  Chief Complaint   Shortness of Breath and Loss of Consciousness    History of Present Illness   Source of history provided by:  patient and spouse/SO. History/Exam Limitations: none. Jeri Cooper is a 59 y.o. old male who has a past medical history of:   Past Medical History:   Diagnosis Date    Abdominal aortic aneurysm without rupture ([facility] 8/27/2018    [facility] with [facility] 5/27/2017    Apnea     Arthritis     COPD (chronic obstructive pulmonary disease) (HCC)     Depression with anxiety     Emphysema [facility] lung ([facility]     Encounter for antineoplastic immunotherapy     HAP (hospital-acquired pneumonia) 5/27/2017    Hyperlipidemia     Lung cancer ([facility] 04/11/2016    chemo and radiation    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 He does continue to take Keytruda. He does have a chronic cuffless trach with no inner cannula. He generally only wears oxygen at night but was found to be hypoxic in the 80s on room air for EMS. Wife states his blood pressure normally runs low in the systolic 05E to 441W. He had his PEG tube removed recently and is taking full p.o. intake now. His wife states that he has had flulike symptoms for the past week including cough and diarrhea. He has had poor p.o. intake. Today, he became increasingly short of breath and dizzy. He stood up in the kitchen and had a near syncopal event. When EMS arrived, this happened again. He did not fall or strike his head. Patient currently is pretty stoic and has no complaints.   He does not want to be admitted to the hospital.  Wife tells me that he is being treated by infectious disease, Dr. Tami Jimenez for E9/L    Immature Granulocytes # 0.03 E9/L    Lymphocytes Absolute 0.78 (L) 1.50 - 4.00 E9/L    Monocytes Absolute 0.58 0.10 - 0.95 E9/L    Eosinophils Absolute 0.21 0.05 - 0.50 E9/L    Basophils Absolute 0.09 0.00 - 0.20 E9/L   Comprehensive Metabolic Panel   Result Value Ref Range    Sodium 135 132 - 146 mmol/L    Potassium 3.5 3.5 - 5.0 mmol/L    Chloride 98 98 - 107 mmol/L    CO2 21 (L) 22 - 29 mmol/L    Anion Gap 16 7 - 16 mmol/L    Glucose 143 (H) 74 - 99 mg/dL    BUN 24 (H) 8 - 23 mg/dL    CREATININE 3.5 (H) 0.7 - 1.2 mg/dL    GFR Non-African American 18 >=60 mL/min/1.73    GFR African American 21     Calcium 9.6 8.6 - 10.2 mg/dL    Total Protein 6.7 6.4 - 8.3 g/dL    Alb 3.5 3.5 - 5.2 g/dL    Total Bilirubin 0.4 0.0 - 1.2 mg/dL    Alkaline Phosphatase 89 40 - 129 U/L    ALT 26 0 - 40 U/L    AST 27 0 - 39 U/L   Troponin   Result Value Ref Range    Troponin 0.02 0.00 - 0.03 ng/mL   Lactic Acid, Plasma   Result Value Ref Range    Lactic Acid 2.1 0.5 - 2.2 mmol/L   Lactic Acid, Plasma   Result Value Ref Range    Lactic Acid 1.0 0.5 - 2.2 mmol/L   Urinalysis   Result Value Ref Range    Color, UA Yellow Straw/Yellow    Clarity, UA Clear Clear    Glucose, Ur Negative Negative mg/dL    Bilirubin Urine SMALL (A) Negative    Ketones, Urine TRACE (A) Negative mg/dL    Specific Gravity, UA >=1.030 1.005 - 1.030    Blood, Urine TRACE-INTACT Negative    pH, UA 6.0 5.0 - 9.0    Protein, UA TRACE Negative mg/dL    Urobilinogen, Urine 0.2 <2.0 E.U./dL    Nitrite, Urine Negative Negative    Leukocyte Esterase, Urine Negative Negative   TSH without Reflex   Result Value Ref Range    TSH 14.310 (H) 0.270 - 4.200 uIU/mL   Magnesium   Result Value Ref Range    Magnesium 2.0 1.6 - 2.6 mg/dL   Microscopic Urinalysis   Result Value Ref Range    Casts MANY /LPF    WBC, UA 1-3 0 - 5 /HPF    RBC, UA 1-3 0 - 2 /HPF    Bacteria, UA RARE (A) /HPF    Amorphous, UA FEW    EKG 12 Lead   Result Value Ref Range    Ventricular Rate 72 BPM Atrial Rate 72 BPM    P-R Interval 168 ms    QRS Duration 104 ms    Q-T Interval 422 ms    QTc Calculation (Bazett) 462 ms    P Axis 58 degrees    R Axis -13 degrees    T Axis 18 degrees     Imaging: All Radiology results interpreted by Radiologist unless otherwise noted. NM LUNG VENT/PERFUSION (VQ)   Final Result   Low probability for pulmonary embolism      CT CHEST WO CONTRAST   Final Result   Persistent is strandy fibrotic changes along the left paraspinal area   which may represent radiation fibrosis and scarring. Residual   malignancy is not excluded. Patchy and nodular infiltrates in right lower lobe and minimally in   the left lung base as well as lingula which may represent pneumonia   with possible underlying malignancy. Sclerotic destructive lesion involving T2 and left second rib which   could be stable metastasis. CT ABDOMEN PELVIS WO CONTRAST Additional Contrast? None   Final Result         1. No acute abnormality seen in the abdomen or the pelvis. 2. Small ill-defined hypodensity along the inferior aspect of the   right lobe of the liver. Further characterization with multiphasic   contrast-enhanced MRI of the liver is recommended. 3. Stable aneurysmal dilation of the infrarenal aorta to 3 cm. 4. Tiny nonobstructive right renal calculi. No hydronephrosis. XR CHEST PORTABLE   Final Result      1. Trace basilar atelectasis. 2. No definite pneumonia. EKG #1:  Interpreted by emergency department physician unless otherwise noted. Time:  10:21  Rate: 72  Rhythm: Sinus and occasional PVCs.   Interpretation: normal sinus rhythm, occasional PVC noted, unifocal.    ED Course / Medical Decision Making     Medications   heparin (porcine) injection 5,000 Units (has no administration in time range)   sodium chloride flush 0.9 % injection 10 mL (has no administration in time range)   sodium chloride flush 0.9 % injection 10 mL (has no administration in time range)   acetaminophen (TYLENOL) tablet 650 mg (has no administration in time range)   0.9 % sodium chloride infusion ( Intravenous New Bag 1/23/20 1035)   fentaNYL (SUBLIMAZE) injection 50 mcg (50 mcg Intravenous Given 1/23/20 1252)   xenon xe 585 gas 30 millicurie (35 millicuries Inhalation Given 1/23/20 1358)   technetium albumin aggregated (MAA) solution 8 millicurie (8 millicuries Intravenous Given 1/23/20 1359)   vancomycin (VANCOCIN) 1,750 mg in dextrose 5 % 500 mL IVPB (0 mg Intravenous Stopped 1/23/20 1635)   cefepime (MAXIPIME) 2 g IVPB extended (mini-bag) (0 g Intravenous Stopped 1/23/20 1635)        Re-Evaluations:  1/23/20      Patients symptoms are improving. Consultations:             IP CONSULT TO INTERNAL MEDICINE    Procedures:   none    MDM: Patient presents from home after a week of flulike illnesses and now with 2 syncopal events today. He was found to be hypoxic on room air. Septic work-up was pursued. CT imaging showed a right lower lobe pneumonia. RT evaluated patient as well and he was found to have copious thick sputum in his tracheostomy. Sputum culture was sent. He was found to be very dehydrated with acute kidney injury. He was given a 30 cc/kg fluid bolus with significant improvement of his blood pressure. VQ scan was negative for PE. Abdominal CT scan grossly unremarkable. Case discussed with his family doctor, Dr. Jaiden Ryan and he was admitted for further management. Counseling:   I have spoken with the spouse and patient and discussed todays results, in addition to providing specific details for the plan of care and counseling regarding the diagnosis and prognosis and are agreeable with the plan. Assessment      1. Syncope and collapse    2. Acute renal failure, unspecified acute renal failure type ([address]    3. Hypoxia    4. Diarrhea, unspecified type    5. Acute tracheitis without airway obstruction    6.  Pneumonia of right lower lobe due to infectious organism [facility])      This patient's ED course included: a personal history and physicial examination  This patient has remained hemodynamically stable during their ED course. Plan   Admit to telemetry. Patient condition is stable. New Medications     Current Discharge Medication List        Electronically signed by Izabel Gallardo DO   DD: 1/23/20  **This report was transcribed using voice recognition software. Every effort was made to ensure accuracy; however, inadvertent computerized transcription errors may be present.   END OF PROVIDER NOTE        Izabel Gallardo DO  01/23/20 [id number]

## 2020-01-23 NOTE — PROGRESS NOTES
I was called into room this am to place patient on oxygen via trach mask. When I did so, I noticed the pt did not have an inner cannula in his size 6.0 cuffless shiley trach tube. I asked patient and his wife why, and they stated that he has a hard time breathing through the trach when the inner cannula is in place so they leave the inner cannula out. I explained that was concerning because the trach could become occluded and his airway would become difficult to change emergently in that circumstance. What is most concerning is the amount of thick thick dark yellow mucous with mucous plugs present the patient is coughing up on his own. It is tenacious and a large amount. I ordered two size 6 inner cannulas to put in patients trach and when they came up the patient was very willing to allow me to put it in. When I did, immediately he had a more difficult time breathing, and then coughed up a lot of mucous and when I removed the inner cannula it was already covered with thick dark yellow secretions that were caked on the outside and inside of the inner cannula. I left the inner cannula out and notified and updated Dr. Marcia Benavides with all of this information. We are going to get a sputum speciman on the patient and also make sure his pulmonologist Dr. Ludwin Cai is notified he is in here and that he is experiencing complications with his tracheostomy tube. Pt is on 40% aerosol trach mask, vitals are stable and Sp02 is 97%. Pt is not currently experiencing any shortness of breath unless he gets worked up from coughing a lot.

## 2020-01-24 LAB
ALBUMIN SERPL-MCNC: 3 G/DL (ref 3.5–5.2)
ALP BLD-CCNC: 77 U/L (ref 40–129)
ALT SERPL-CCNC: 24 U/L (ref 0–40)
ANION GAP SERPL CALCULATED.3IONS-SCNC: 14 MMOL/L (ref 7–16)
AST SERPL-CCNC: 23 U/L (ref 0–39)
BILIRUB SERPL-MCNC: 0.2 MG/DL (ref 0–1.2)
BUN BLDV-MCNC: 17 MG/DL (ref 8–23)
CALCIUM SERPL-MCNC: 8.9 MG/DL (ref 8.6–10.2)
CHLORIDE BLD-SCNC: 110 MMOL/L (ref 98–107)
CHLORIDE URINE RANDOM: 159 MMOL/L
CO2: 19 MMOL/L (ref 22–29)
CREAT SERPL-MCNC: 1.8 MG/DL (ref 0.7–1.2)
CREATININE URINE: 87 MG/DL (ref 40–278)
GFR AFRICAN AMERICAN: 46
GFR NON-AFRICAN AMERICAN: 38 ML/MIN/1.73
GLUCOSE BLD-MCNC: 108 MG/DL (ref 74–99)
HCT VFR BLD CALC: 36.3 % (ref 37–54)
HEMOGLOBIN: 10.7 G/DL (ref 12.5–16.5)
MCH RBC QN AUTO: 24.5 PG (ref 26–35)
MCHC RBC AUTO-ENTMCNC: 29.5 % (ref 32–34.5)
MCV RBC AUTO: 83.3 FL (ref 80–99.9)
PDW BLD-RTO: 15.4 FL (ref 11.5–15)
PLATELET # BLD: 243 E9/L (ref 130–450)
PMV BLD AUTO: 10.5 FL (ref 7–12)
POTASSIUM SERPL-SCNC: 4.3 MMOL/L (ref 3.5–5)
POTASSIUM, UR: 29.7 MMOL/L
PROCALCITONIN: 0.12 NG/ML (ref 0–0.08)
RBC # BLD: 4.36 E12/L (ref 3.8–5.8)
SODIUM BLD-SCNC: 143 MMOL/L (ref 132–146)
SODIUM URINE: 137 MMOL/L
TOTAL PROTEIN: 5.7 G/DL (ref 6.4–8.3)
UREA NITROGEN, UR: 339 MG/DL (ref 800–1666)
WBC # BLD: 4.8 E9/L (ref 4.5–11.5)

## 2020-01-24 PROCEDURE — 6370000000 HC RX 637 (ALT 250 FOR IP): Performed by: FAMILY MEDICINE

## 2020-01-24 PROCEDURE — 6360000002 HC RX W HCPCS: Performed by: FAMILY MEDICINE

## 2020-01-24 PROCEDURE — 84133 ASSAY OF URINE POTASSIUM: CPT

## 2020-01-24 PROCEDURE — 84300 ASSAY OF URINE SODIUM: CPT

## 2020-01-24 PROCEDURE — 2580000003 HC RX 258: Performed by: FAMILY MEDICINE

## 2020-01-24 PROCEDURE — 36415 COLL VENOUS BLD VENIPUNCTURE: CPT

## 2020-01-24 PROCEDURE — 6360000002 HC RX W HCPCS: Performed by: INTERNAL MEDICINE

## 2020-01-24 PROCEDURE — 80053 COMPREHEN METABOLIC PANEL: CPT

## 2020-01-24 PROCEDURE — 94640 AIRWAY INHALATION TREATMENT: CPT

## 2020-01-24 PROCEDURE — 2060000000 HC ICU INTERMEDIATE R&B

## 2020-01-24 PROCEDURE — 87081 CULTURE SCREEN ONLY: CPT

## 2020-01-24 PROCEDURE — 6370000000 HC RX 637 (ALT 250 FOR IP): Performed by: INTERNAL MEDICINE

## 2020-01-24 PROCEDURE — 82436 ASSAY OF URINE CHLORIDE: CPT

## 2020-01-24 PROCEDURE — 85027 COMPLETE CBC AUTOMATED: CPT

## 2020-01-24 PROCEDURE — 84540 ASSAY OF URINE/UREA-N: CPT

## 2020-01-24 PROCEDURE — 84145 PROCALCITONIN (PCT): CPT

## 2020-01-24 PROCEDURE — 82570 ASSAY OF URINE CREATININE: CPT

## 2020-01-24 PROCEDURE — 2580000003 HC RX 258: Performed by: INTERNAL MEDICINE

## 2020-01-24 RX ORDER — GUAIFENESIN 400 MG/1
400 TABLET ORAL 3 TIMES DAILY
Status: DISCONTINUED | OUTPATIENT
Start: 2020-01-24 | End: 2020-01-27 | Stop reason: HOSPADM

## 2020-01-24 RX ORDER — ALBUTEROL SULFATE 2.5 MG/3ML
2.5 SOLUTION RESPIRATORY (INHALATION) EVERY 4 HOURS
Status: DISCONTINUED | OUTPATIENT
Start: 2020-01-24 | End: 2020-01-27 | Stop reason: HOSPADM

## 2020-01-24 RX ADMIN — BUPROPION HYDROCHLORIDE 75 MG: 75 TABLET, FILM COATED ORAL at 09:57

## 2020-01-24 RX ADMIN — Medication 10 MG: at 22:16

## 2020-01-24 RX ADMIN — BUPROPION HYDROCHLORIDE 75 MG: 75 TABLET, FILM COATED ORAL at 22:16

## 2020-01-24 RX ADMIN — BUDESONIDE 500 MCG: 0.5 SUSPENSION RESPIRATORY (INHALATION) at 21:09

## 2020-01-24 RX ADMIN — TAMSULOSIN HYDROCHLORIDE 0.4 MG: 0.4 CAPSULE ORAL at 22:15

## 2020-01-24 RX ADMIN — DULOXETINE HYDROCHLORIDE 30 MG: 30 CAPSULE, DELAYED RELEASE ORAL at 09:57

## 2020-01-24 RX ADMIN — ALBUTEROL SULFATE 2.5 MG: 2.5 SOLUTION RESPIRATORY (INHALATION) at 12:21

## 2020-01-24 RX ADMIN — PREDNISONE 10 MG: 10 TABLET ORAL at 09:57

## 2020-01-24 RX ADMIN — PREGABALIN 150 MG: 150 CAPSULE ORAL at 14:35

## 2020-01-24 RX ADMIN — PREGABALIN 150 MG: 150 CAPSULE ORAL at 22:15

## 2020-01-24 RX ADMIN — CHLORHEXIDINE GLUCONATE 0.12% ORAL RINSE 15 ML: 1.2 LIQUID ORAL at 09:56

## 2020-01-24 RX ADMIN — ALBUTEROL SULFATE 2.5 MG: 2.5 SOLUTION RESPIRATORY (INHALATION) at 21:09

## 2020-01-24 RX ADMIN — HEPARIN SODIUM 5000 UNITS: 10000 INJECTION INTRAVENOUS; SUBCUTANEOUS at 22:17

## 2020-01-24 RX ADMIN — FORMOTEROL FUMARATE DIHYDRATE 20 MCG: 20 SOLUTION RESPIRATORY (INHALATION) at 21:09

## 2020-01-24 RX ADMIN — HEPARIN SODIUM 5000 UNITS: 10000 INJECTION INTRAVENOUS; SUBCUTANEOUS at 06:41

## 2020-01-24 RX ADMIN — SENNOSIDES AND DOCUSATE SODIUM 2 TABLET: 8.6; 5 TABLET ORAL at 09:57

## 2020-01-24 RX ADMIN — GUAIFENESIN 400 MG: 400 TABLET, FILM COATED ORAL at 14:34

## 2020-01-24 RX ADMIN — HEPARIN SODIUM 5000 UNITS: 10000 INJECTION INTRAVENOUS; SUBCUTANEOUS at 14:34

## 2020-01-24 RX ADMIN — MIRTAZAPINE 30 MG: 15 TABLET, FILM COATED ORAL at 22:16

## 2020-01-24 RX ADMIN — BUDESONIDE 500 MCG: 0.5 SUSPENSION RESPIRATORY (INHALATION) at 08:07

## 2020-01-24 RX ADMIN — MORPHINE SULFATE 30 MG: 15 TABLET, FILM COATED, EXTENDED RELEASE ORAL at 22:15

## 2020-01-24 RX ADMIN — GUAIFENESIN 400 MG: 400 TABLET, FILM COATED ORAL at 22:16

## 2020-01-24 RX ADMIN — PREGABALIN 150 MG: 150 CAPSULE ORAL at 09:57

## 2020-01-24 RX ADMIN — OXYCODONE HYDROCHLORIDE 10 MG: 10 TABLET ORAL at 22:15

## 2020-01-24 RX ADMIN — NORTRIPTYLINE HYDROCHLORIDE 10 MG: 10 CAPSULE ORAL at 22:16

## 2020-01-24 RX ADMIN — OXYCODONE HYDROCHLORIDE 10 MG: 10 TABLET ORAL at 09:58

## 2020-01-24 RX ADMIN — LEVOTHYROXINE SODIUM 125 MCG: 0.12 TABLET ORAL at 05:39

## 2020-01-24 RX ADMIN — SODIUM CHLORIDE: 9 INJECTION, SOLUTION INTRAVENOUS at 04:24

## 2020-01-24 RX ADMIN — CEFTAROLINE FOSAMIL 400 MG: 400 POWDER, FOR SOLUTION INTRAVENOUS at 14:35

## 2020-01-24 RX ADMIN — ALBUTEROL SULFATE 2.5 MG: 2.5 SOLUTION RESPIRATORY (INHALATION) at 17:19

## 2020-01-24 RX ADMIN — DULOXETINE HYDROCHLORIDE 30 MG: 30 CAPSULE, DELAYED RELEASE ORAL at 22:16

## 2020-01-24 RX ADMIN — SIMVASTATIN 40 MG: 40 TABLET, FILM COATED ORAL at 22:16

## 2020-01-24 RX ADMIN — MORPHINE SULFATE 30 MG: 15 TABLET, FILM COATED, EXTENDED RELEASE ORAL at 09:57

## 2020-01-24 RX ADMIN — SODIUM CHLORIDE, PRESERVATIVE FREE 10 ML: 5 INJECTION INTRAVENOUS at 22:16

## 2020-01-24 RX ADMIN — Medication 5000 UNITS: at 18:15

## 2020-01-24 RX ADMIN — PANTOPRAZOLE SODIUM 40 MG: 40 TABLET, DELAYED RELEASE ORAL at 18:15

## 2020-01-24 RX ADMIN — FORMOTEROL FUMARATE DIHYDRATE 20 MCG: 20 SOLUTION RESPIRATORY (INHALATION) at 08:07

## 2020-01-24 RX ADMIN — OXYCODONE HYDROCHLORIDE 10 MG: 10 TABLET ORAL at 14:35

## 2020-01-24 ASSESSMENT — PAIN DESCRIPTION - PAIN TYPE
TYPE: CHRONIC PAIN
TYPE: CHRONIC PAIN

## 2020-01-24 ASSESSMENT — ENCOUNTER SYMPTOMS
COUGH: 1
ABDOMINAL PAIN: 0
NAUSEA: 0
EYES NEGATIVE: 1
SHORTNESS OF BREATH: 1
DIARRHEA: 1
COLOR CHANGE: 0
RHINORRHEA: 1
VOMITING: 0

## 2020-01-24 ASSESSMENT — PAIN SCALES - GENERAL
PAINLEVEL_OUTOF10: 5
PAINLEVEL_OUTOF10: 0
PAINLEVEL_OUTOF10: 4
PAINLEVEL_OUTOF10: 7
PAINLEVEL_OUTOF10: 0

## 2020-01-24 ASSESSMENT — PAIN DESCRIPTION - FREQUENCY
FREQUENCY: CONTINUOUS
FREQUENCY: CONTINUOUS

## 2020-01-24 ASSESSMENT — PAIN DESCRIPTION - LOCATION: LOCATION: LEG;HIP

## 2020-01-24 ASSESSMENT — PAIN DESCRIPTION - ONSET
ONSET: ON-GOING
ONSET: ON-GOING

## 2020-01-24 ASSESSMENT — PAIN DESCRIPTION - DESCRIPTORS
DESCRIPTORS: SHARP;DULL
DESCRIPTORS: ACHING;CONSTANT;DISCOMFORT

## 2020-01-24 ASSESSMENT — PAIN - FUNCTIONAL ASSESSMENT: PAIN_FUNCTIONAL_ASSESSMENT: PREVENTS OR INTERFERES SOME ACTIVE ACTIVITIES AND ADLS

## 2020-01-24 ASSESSMENT — PAIN DESCRIPTION - PROGRESSION: CLINICAL_PROGRESSION: NOT CHANGED

## 2020-01-24 ASSESSMENT — PAIN DESCRIPTION - ORIENTATION
ORIENTATION: RIGHT
ORIENTATION: RIGHT

## 2020-01-24 NOTE — CONSULTS
[address] [place] [address] [facility]    Consultation Note     Admit Date: 1/23/2020 10:16 AM    Reason for Consult:  Complicated pneumonia    Attending Physician:  Genaro Castanon DO       Chief Complaint   Patient presents with    Shortness of Breath    Loss of Consciousness         History Obtained From:  For a detailed history please see previous and current available medical records. HISTORY OF PRESENT ILLNESS:             The patient is a 59 y.o. male with history of adenocarcinoma of the lung with metastasis to T1 and T2 vertebral bodies with subglottic tumor requiring treatment with radiation chemotherapy and currently on biologic treatment with [place] presenting with CARLA. About 5 days ago he started having chills, cold sweats and diarrhea. He has not been eating anything for 5 days. His diarrhea resolved but he felt weaker and tired. He noticed no increase in secretions from his tracheostomy. His wife had similar symptoms a week ago as well. 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 He is using monthly bactroban to the tracheostomy site.  He was found to have right lung infiltrate and infectious disease was consulted for antibiotic recommendations.     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    Paralyzed vocal cords     Thrombosis of testis     Tobacco dependence 5/27/2017     Past Surgical History:        Procedure Laterality Date    BACK SURGERY      KNEE ARTHROSCOPY      LUNG BIOPSY Left 5/6/2016    OTHER SURGICAL HISTORY  4/15/2016    cervical myelogram    SINUS SURGERY      TRACHEOSTOMY  05/24/2017    TRACHEOSTOMY  05/24/2017     Current Medications:   Scheduled Meds:   albuterol  2.5 mg Nebulization Q4H    guaiFENesin  600 mg Oral BID    heparin (porcine)  5,000 Units Subcutaneous Q8H    sodium chloride flush  10 mL Intravenous 2 times per day    budesonide  500 mcg Nebulization BID    buPROPion  75 mg Oral BID    chlorhexidine  15 mL Mouth/Throat Daily    vitamin D  5,000 Units Oral Daily    DULoxetine  30 mg Oral BID    formoterol  20 mcg Nebulization BID    levothyroxine  125 mcg Oral Daily    melatonin  10 mg Oral Nightly    mirtazapine  30 mg Oral Nightly    morphine  30 mg Oral BID    nortriptyline  10 mg Oral Nightly    oxyCODONE HCl  10 mg Oral TID    [START ON 2/6/2020] pembrolizumab  200 mg Intravenous Q21 Days    predniSONE  10 mg Oral Daily    pregabalin  150 mg Oral TID    Revefenacin  3 mL Inhalation Daily    senna-docusate  2 tablet Oral Daily    simvastatin  40 mg Oral Nightly    tamsulosin  0.4 mg Oral Nightly    pantoprazole  40 mg Oral QAM AC     Continuous Infusions:   sodium chloride 100 mL/hr at 01/24/20 0424     PRN Meds:sodium chloride flush, acetaminophen, albuterol, cyclobenzaprine, LORazepam    Allergies:  Augmentin [amoxicillin-pot clavulanate]    Social History:   Social History     Socioeconomic History    Marital status:      Spouse name: None    Number of children: None    Years of education: None    Highest education level: None   Occupational History    Occupation: [employer] a [employer]- [employer]     Comment: disability short term   Social Needs    Financial resource strain: None    Food insecurity:     Worry: None     Inability: None    Transportation needs:     Medical: None     Non-medical: None   Tobacco Use    Smoking status: Current Some Day Smoker     Packs/day: 2.00     Years: 44.00     Pack years: 88.00     Types: Cigarettes     Start date: 0     Last attempt to quit: 2018     Years since quittin.3    Smokeless tobacco: Never Used   Substance and Sexual Activity    Alcohol use: No     Alcohol/week: 0.0 standard drinks    Drug use: No    Sexual activity: Not Currently   Lifestyle    Physical activity:     Days per week: None     Minutes per session: None    Stress: None   Relationships    Social connections:     Talks on phone: None     Gets together: None     Attends Sikh service: None     Active member of club or organization: None     Attends meetings of clubs or organizations: None     Relationship status: None    Intimate partner violence:     Fear of current or ex partner: None     Emotionally abused: None     Physically abused: None     Forced sexual activity: None   Other Topics Concern    None   Social History Narrative    Works at [employer]. Lives in [facility]. Family History:       Problem Relation Age of Onset    Cancer Mother         breast cancer    Cancer Father         prostate cancer    Diabetes Father    . Otherwise non-pertinent to the chief complaint.     REVIEW OF SYSTEMS:    14 ROS negative unless otherwise specified in the HPI    PHYSICAL EXAM:    Vitals:    /71   Pulse 75   Temp 97.6 °F (36.4 °C) (Temporal)   Resp 19   Ht 5' 11\" (1.803 m)   Wt 203 lb (92.1 kg)   SpO2 95%   BMI 28.31 kg/m²     General Appearance:    Alert, cooperative, no distress, appears stated age   Head:    Normocephalic, without obvious abnormality, atraumatic   Eyes:    PERRL, conjunctiva/corneas clear      Ears:    Normal and external ear canals, both ears   Nose:   Nares normal, septum midline, mucosa normal, no drainage    or sinus tenderness   Throat:   Lips, mucosa, and tongue normal; upper left gum with erosive ulcer   Neck:   Supple, Tracheostomy site with secretions no adenopathy; no JVD   Lungs:     R Base diminished, respirations labored   Chest wall:    No tenderness or deformity   Heart:    Regular rate and rhythm, S1 and S2 normal, no murmur, rub   or gallop   Abdomen:     Soft, non-tender, bowel sounds active all four quadrants,     no masses, no organomegaly   Extremities:   Extremities normal, atraumatic, no cyanosis or edema   Pulses:   2+ and symmetric all extremities   Skin:   Skin color, texture, turgor normal, no rashes or lesions       CBC+dif:  Reviewed and trend followed    Radiology:  Ct Scan chest shows right lower lobe patchy infiltrate    Microbiology:  Pending  No results for input(s): BC in the last 72 hours. Recent Labs     01/23/20  1604   ORG Staphylococcus aureus*  Gram negative ruperto*     No results for input(s): BLOODCULT2 in the last 72 hours. No results for input(s): ASO in the last 72 hours. Recent Labs     01/23/20  1604   CULTRESP Oral Pharyngeal Alva reduced*  Light growth  Sensitivity to follow    Light growth  Identification and sensitivity to follow         Assessment:  · concern for R LL pneumonia  · CARLA from dehdyration and possible viral syndrome  · H/o chronic MRSA colonization  · history of adenocarcinoma of the left lung with metastasis to T1-T2 on [facility]  · Vocal cord paralysis with tracheostomy and MRSA colonization    Plan:  I am not overly impressed with his RLL infiltrate. He is chronically colonized in his respiratory tract with MRSA. Secretions are not worse, no fever. His history of illness is more suggestive of a viral illness. resp viral panel is negative though. Ger Escalera is improving. given his acute presentation will start ov iv tefalro and get procalcitonin. I would plan to switch him to oral options on discharge . · Iv teflaro adjusted for Cr Clearance  · Add procalcitonin to am labs  · Follow up respiratory cultures  · Monitor labs  · Will follow with you     I discussed this case with Dr. Sharonda Shine. Thank you for having us see this patient in consultation.  I will be discussing this case with the treating physicians.     Electronically signed by Lalo Nielson MD on 1/24/2020 at 11:42 AM

## 2020-01-24 NOTE — PROGRESS NOTES
IRON  Iron Saturation:  No components found for: PERCENTFE  TIBC:  No results found for: TIBC  FERRITIN:  No results found for: FERRITIN  RUDY:  No results found for: ANATITER, RUDY    ASSESSMENT/PLAN :  58 yo male   Stage IV lung adenocarcinoma s/p XRT and chemotherapy  Primary H+N neoplasm  s/p XRT and currently on single agent [facility] [place]  ?  RLL PNA  Acute kidney injury --  following     - ID following , on antibiotic , steroids    CT  C / A / P noted

## 2020-01-24 NOTE — H&P
[address]                  [address] [facility],  [facility]                              HISTORY AND PHYSICAL    PATIENT NAME: Faiza Becerra                   :        1955  MED REC NO:   [id number]                            ROOM:       [id number]  ACCOUNT NO:   [de-identified]                           ADMIT DATE: 2020  PROVIDER:     Lia Joshi DO    CHIEF COMPLAINT/REASON FOR THIS HOSPITAL ADMISSION:  Shortness of breath  with acute kidney injury. HISTORY OF PRESENT ILLNESS:  The patient is a 60-year-old  male  with history of lung and pharyngeal cancer, currently on Keytruda. He  does have cuffless trach with no inner cannula. He generally only wears  oxygen at night, but was found to be hypoxic in the 80s on room air when  the EMS was called to the home. The wife states his blood pressure  normally runs low. He had his PEG tube removed recently and was taking  full p.o. intake. Wife reports of flu-like symptoms over the last week  including cough and diarrhea. He has had poor oral intake on the day of  this admission, became acutely short of breath and dizzy. He stood up  in the kitchen and had a near syncopal event, when EMS arrived, he was  noted to be hypoxic, and he was taken to the ER. His influenza A and B  screens were negative, white blood cell count was 8.1, H and H of 12.9  and 43.5 respectively. Platelets were 392,981. BUN was 24, creatinine  3.5, potassium was 3.5. UA was negative. CAT scan performed of the  chest without contrast revealed patchy infiltrates in the right lower  lobe. CAT scan done of the abdomen and pelvis revealed an ill-defined  hypodensity in the right lobe of the liver. EKG revealed sinus rhythm  with occasional PVCs. The patient was given IV fluids. He was  pancultured, started on antibiotics with appropriate consultations  requested.     RECENT AND PRESENT MEDICATIONS:

## 2020-01-24 NOTE — CONSULTS
Thierry Mena M.D.,[facility]  Gabe Bell D.O., F.A.C.O.I., Mushtaq Duenas M.D. Dalila Jameson M.D., Nery Brown M.D. Manuel Ruiz D.O. Patient:  Shawn Nix 59 y.o. male MRN: [id number]     Date of Service: 1/24/2020      PULMONARY CONSULTATION    Reason for Consultation: lung CA, respiratory failure  Referring Physician: Willie Prasons DO      Chief Complaint   Patient presents with    Shortness of Breath    Loss of Consciousness         Code Status: Full Code        SUBJECTIVE:    HPI:  This is a 60-year-old male with history of stage IV adenocarcinoma of the lung s/p XRT, chemo and primary R vocal cord SCC treated with XRT, and Keytruda, chronic trach, emphysema, tobacco use, pneumonia with MRSA and kleb that presents with SOB, dehydration, and fatigue. Patient known to my colleague, Dr. Yashira Garcia, and follow in office. Patient reports that he became ill over the past weekend. Developed loose diarrhea for several days, felt dehydrated. Diarrhea has since resolved. Had subjective fevers and chills. Denies any sick contacts, but was out of the house over the weekend. Does report some increased shortness of breath and some nasal drainage. Coughing slightly more from his baseline. Does not feel overly congested in his chest.  No hemoptysis. Wears O2 with humdification at night. Patient has been placed on supplemental oxygen here. By EMS found to be hypoxic in the 80's. Patient has a chronic trach with Shiley 6.0 cuffless. Patient unfortunately continues to smoke. Is on immunotherapy with Keytruda. Last radiation was over a year ago. Patient was to have a PET scan in December, however this was denied by insurance and he will likely get it within the next month or 2. Follows with Dr. Roma Monique of oncology. Patient was recently hospitalized a few months ago and treated for tracheobronchitis with MRSA and Klebsiella.   Followed by infectious disease at that time as outpatient.         Past Medical History:   Diagnosis Date    Abdominal aortic aneurysm without rupture ([address] 2018    Acute bronchitis with COPD ([address] 2017    Apnea     Arthritis     COPD (chronic obstructive pulmonary disease) (HCC)     Depression with anxiety     Emphysema of lung ([address]     Encounter for antineoplastic immunotherapy     HAP (hospital-acquired pneumonia) 2017    Hyperlipidemia     Lung cancer ([facility] 2016    chemo and radiation    Osteoradionecrosis of jaw 2018    Paralyzed vocal cords     Thrombosis of testis     Tobacco dependence 2017     Past Surgical History:   Procedure Laterality Date    BACK SURGERY      KNEE ARTHROSCOPY      LUNG BIOPSY Left 2016    OTHER SURGICAL HISTORY  4/15/2016    cervical myelogram    SINUS SURGERY      TRACHEOSTOMY  2017    TRACHEOSTOMY  2017     Family History   Problem Relation Age of Onset    Cancer Mother         breast cancer    Cancer Father         prostate cancer    Diabetes Father          Social History:   Social History     Socioeconomic History    Marital status:      Spouse name: Not on file    Number of children: Not on file    Years of education: Not on file    Highest education level: Not on file   Occupational History    Occupation: supervior a tube mill- [employer]     Comment: disability short term   Social Needs    Financial resource strain: Not on file    Food insecurity:     Worry: Not on file     Inability: Not on file   Lovely needs:     Medical: Not on file     Non-medical: Not on file   Tobacco Use    Smoking status: Current Some Day Smoker     Packs/day: 2.00     Years: 44.00     Pack years: 88.00     Types: Cigarettes     Start date:      Last attempt to quit: 2018     Years since quittin.3    Smokeless tobacco: Never Used   Substance and Sexual Activity    Alcohol use: No     Alcohol/week: 0.0 standard drinks    Drug use: No    Sexual activity: Not Currently   Lifestyle    Physical activity:     Days per week: Not on file     Minutes per session: Not on file    Stress: Not on file   Relationships    Social connections:     Talks on phone: Not on file     Gets together: Not on file     Attends Scientology service: Not on file     Active member of club or organization: Not on file     Attends meetings of clubs or organizations: Not on file     Relationship status: Not on file    Intimate partner violence:     Fear of current or ex partner: Not on file     Emotionally abused: Not on file     Physically abused: Not on file     Forced sexual activity: Not on file   Other Topics Concern    Not on file   Social History Narrative    Works at [employer]. Lives in [place] [facility] [place]. Vaccines:    Immunization History   Administered Date(s) Administered    Influenza Virus Vaccine 10/24/2017    Influenza Whole 10/30/2015    Influenza, Quadv, IM, PF (6 mo and older Fluzone, Flulaval, Fluarix, and 3 yrs and older Afluria) 10/16/2019    Influenza, Triv, 3 Years and older, IM (Afluria (5 yrs and older) 01/17/2019    Pneumococcal Conjugate 13-valent ([id number]) 10/16/2019    Pneumococcal Polysaccharide ([id number]) 12/01/2015        Home Meds: Medications Prior to Admission: budesonide (PULMICORT) 0.5 MG/2ML nebulizer suspension, Take 1 ampule by nebulization 2 times daily  DULoxetine (CYMBALTA) 30 MG extended release capsule, Take 30 mg by mouth 2 times daily  esomeprazole (NEXIUM) 40 MG delayed release capsule, Take 40 mg by mouth every morning (before breakfast)  formoterol (PERFOROMIST) 20 MCG/2ML nebulizer solution, Take 20 mcg by nebulization 2 times daily  levothyroxine (SYNTHROID) 125 MCG tablet, Take 125 mcg by mouth Daily  oxyCODONE HCl (OXY-IR) 10 MG immediate release tablet, Take 10 mg by mouth 3 times daily.   predniSONE (DELTASONE) 10 MG tablet, Take 10 mg by mouth daily  tamsulosin (FLOMAX) 0.4 MG capsule, Take 0.4 mg by mouth nightly  oxyCODONE-acetaminophen (PERCOCET) 5-325 MG per tablet, Take 1 tablet by mouth every 4 hours as needed for Pain. Revefenacin (YUPELRI) 175 MCG/3ML SOLN, Inhale 3 mLs into the lungs daily  OXYGEN, Inhale 4 L into the lungs nightly  morphine (MS CONTIN) 30 MG extended release tablet, Take 1 tablet by mouth 2 times daily for 30 days. chlorhexidine (PERIDEX) 0.12 % solution, Take 15 mLs by mouth daily Swish & spit qd  pregabalin (LYRICA) 150 MG capsule, Take 1 capsule by mouth 3 times daily for 90 days. senna-docusate (PERICOLACE) 8.6-50 MG per tablet, Take 2 tablets by mouth daily  LORazepam (ATIVAN) 0.5 MG tablet, Take 1 tablet by mouth nightly as needed for Anxiety (hold for sedation) for up to 90 days.   simvastatin (ZOCOR) 40 MG tablet, Take 1 tablet by mouth nightly  buPROPion (WELLBUTRIN) 75 MG tablet, Take 1 tablet by mouth 2 times daily  mirtazapine (REMERON) 30 MG tablet, Take 1 tablet by mouth nightly  nortriptyline (PAMELOR) 10 MG capsule, Take 1 capsule by mouth nightly  Cholecalciferol (VITAMIN D3) 5000 units TABS, Take 5,000 Units by mouth daily   pembrolizumab (KEYTRUDA) 100 MG/4ML SOLN, Infuse 200 mg intravenously every 21 days Last Dose -01/16/20 Next Dose -02/06/20  albuterol (ACCUNEB) 0.63 MG/3ML nebulizer solution, Take 3 mLs by nebulization every 6 hours as needed for Wheezing  Melatonin 10 MG TABS, Take 10 mg by mouth nightly   cyclobenzaprine (FLEXERIL) 10 MG tablet, Take 10 mg by mouth 3 times daily as needed for Muscle spasms    CURRENT MEDS :  Scheduled Meds:   heparin (porcine)  5,000 Units Subcutaneous Q8H    sodium chloride flush  10 mL Intravenous 2 times per day    budesonide  500 mcg Nebulization BID    buPROPion  75 mg Oral BID    chlorhexidine  15 mL Mouth/Throat Daily    vitamin D  5,000 Units Oral Daily    DULoxetine  30 mg Oral BID    formoterol  20 mcg Nebulization BID    levothyroxine  125 mcg Oral Daily    melatonin  10 mg Oral Nightly    mirtazapine  30 mg Oral Nightly    morphine  30 mg Oral BID    nortriptyline  10 mg Oral Nightly    oxyCODONE HCl  10 mg Oral TID    [START ON 2/6/2020] pembrolizumab  200 mg Intravenous Q21 Days    predniSONE  10 mg Oral Daily    pregabalin  150 mg Oral TID    Revefenacin  3 mL Inhalation Daily    senna-docusate  2 tablet Oral Daily    simvastatin  40 mg Oral Nightly    tamsulosin  0.4 mg Oral Nightly    pantoprazole  40 mg Oral QAM AC       Continuous Infusions:   sodium chloride 100 mL/hr at 01/24/20 0424       Allergies   Allergen Reactions    Augmentin [Amoxicillin-Pot Clavulanate] Diarrhea       REVIEW OF SYSTEMS:  REVIEW OF SYMPTOMS:  Review of Systems   Constitutional: Positive for chills, fatigue and fever. HENT: Positive for rhinorrhea. Eyes: Negative. Respiratory: Positive for cough and shortness of breath. Cardiovascular: Negative for chest pain and leg swelling. Gastrointestinal: Positive for diarrhea. Negative for abdominal pain, nausea and vomiting. Endocrine: Negative for cold intolerance and heat intolerance. Genitourinary: Negative for difficulty urinating and dysuria. Musculoskeletal: Negative. Skin: Negative for color change and rash. Allergic/Immunologic: Negative for environmental allergies and immunocompromised state. Neurological: Negative for dizziness and weakness. Psychiatric/Behavioral: Negative for agitation and confusion. OBJECTIVE:   /71   Pulse 75   Temp 97.6 °F (36.4 °C) (Temporal)   Resp 19   Ht 5' 11\" (1.803 m)   Wt 203 lb (92.1 kg)   SpO2 95%   BMI 28.31 kg/m²   SpO2 Readings from Last 1 Encounters:   01/24/20 95%        I/O:    Intake/Output Summary (Last 24 hours) at 1/24/2020 1054  Last data filed at 1/24/2020 1004  Gross per 24 hour   Intake 2061 ml   Output 1050 ml   Net 1011 ml     Vent Information  FiO2 : 40 %                PHYSICAL EXAM:  Physical Exam  HENT:      Head: Normocephalic and atraumatic.    Eyes: with pulmicort/perforomist, albuterol   Repeat CXR   Oncology has been consulted   IVFs, Nephro consulted        Thank you for allowing me to participate in the care of Abramri Tyra. Please feel free to call with questions.        Electronically signed by Shawn Maldonado DO on 1/24/2020 at 10:54 AM

## 2020-01-24 NOTE — CONSULTS
Nephrology Consult Note  Patient's Name: Shawn Nix  11:45 AM  1/24/2020      Reason for Consult: Acute kidney injury  Requesting Physician:  Willie Parsons DO    Chief Complaint: Shortness of breath  History Obtained From:  patient and E HR    History of Present Ilness:    Shawn Nix is a 59 y.o. 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 He reports having a diarrheal virus illness this week. This was followed by increasing shortness of breath over the course of the past several days. His wife had observe him at home today. Lethargic and EMS were called at the time. Upon their arrival patient was noted to be hypotensive with a systolic blood pressure in the 80s. He was also hypoxic with oxygen saturation in the 80s. He reports a nonproductive cough but denied fever or chills. On presentation to ED his initial vital signs reveal patient to be hypotensive with blood pressure of 78/51, temperature 97.7, and oxygen saturation were noted to be 95%. . Initial laboratory data was significant for a BUN of 24, creatinine 3. 5(recent baseline 1.1), CO2 21, WBC 8.1, hemoglobin 12.9. Urinalysis was reported as having many casts otherwise unremarkable. Chest x-ray performed demonstrated trace bibasilar atelectasis with no definite pneumonia. Patient received cefepime and vancomycin. He was in he was also started on IV fluids and admitted to telemetry for further management. Creatinine has shown an improvement from 3.8 peak to currently 1.8.     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 He started smoking about 48 years ago. He has a 88.00 pack-year smoking history. He has never used smokeless tobacco. He reports that he does not drink alcohol or use drugs.     Allergies:  Augmentin [amoxicillin-pot clavulanate]    Current Medications:    albuterol (PROVENTIL) nebulizer solution 2.5 mg, Q4H  guaiFENesin tablet 400 mg, TID  heparin (porcine) injection 5,000 Units, Q8H  sodium chloride flush 0.9 % injection 10 mL, 2 times per day  sodium chloride flush 0.9 % injection 10 mL, PRN  acetaminophen (TYLENOL) tablet 650 mg, Q4H PRN  albuterol (ACCUNEB) nebulizer solution 0.63 mg, Q6H PRN  budesonide (PULMICORT) nebulizer suspension 500 mcg, BID  buPROPion (WELLBUTRIN) tablet 75 mg, BID  chlorhexidine (PERIDEX) 0.12 % solution 15 mL, Daily  vitamin D tablet 5,000 Units, Daily  cyclobenzaprine (FLEXERIL) tablet 10 mg, TID PRN  DULoxetine (CYMBALTA) extended release capsule 30 mg, BID  formoterol (PERFOROMIST) nebulizer solution 20 mcg, BID  levothyroxine (SYNTHROID) tablet 125 mcg, Daily  LORazepam (ATIVAN) tablet 0.5 mg, Nightly PRN  melatonin tablet 10 mg, Nightly  mirtazapine (REMERON) tablet 30 mg, Nightly  morphine (MS CONTIN) extended release tablet 30 mg, BID  nortriptyline (PAMELOR) capsule 10 mg, Nightly  oxyCODONE HCl (OXY-IR) immediate release tablet 10 mg, TID  [START ON 2/6/2020] pembrolizumab (KEYTRUDA) SOLN 200 mg, Q21 Days  predniSONE (DELTASONE) tablet 10 mg, Daily  pregabalin (LYRICA) capsule 150 mg, TID  Revefenacin SOLN 3 mL, Daily  senna-docusate (PERICOLACE) 8.6-50 MG per tablet 2 tablet, Daily  simvastatin (ZOCOR) tablet 40 mg, Nightly  tamsulosin (FLOMAX) capsule 0.4 mg, Nightly  pantoprazole (PROTONIX) tablet 40 mg, QAM AC  0.9 % sodium chloride infusion, Continuous        Review of Systems:   Pertinent items are noted in HPI. Physical exam:  Vitals:    01/24/20 0802   BP: 127/71   Pulse: 75   Resp: 19   Temp: 97.6 °F (36.4 °C)   SpO2: 95%           General: No distress. Alert. Eyes: PERRL. No sclera icterus. No conjunctival injection. ENT: No discharge. Pharynx clear, tracheostomy with trach mask intact  Neck: Trachea midline. Normal thyroid. Lungs: Coarse breath sounds  CV: Regular rate. Regular rhythm. No murmur or rub. .   Abd: Non-tender. Non-distended. No masses. No organmegaly. Normal bowel sounds. Skin: Warm and dry. No nodule on exposed extremities. No rash on exposed extremities. Ext: No cyanosis, clubbing, edema   Neuro: Awake. Follows commands. Positive pupils/gag/corneals. Normal pain response.       Data:   Labs:  Lab Results   Component Value Date     01/24/2020     01/23/2020     09/26/2019    K 4.3 01/24/2020    K 3.5 01/23/2020    K 4.5 09/26/2019     (H) 01/24/2020    CO2 19 (L) 01/24/2020    CO2 21 (L) 01/23/2020    CO2 23 09/26/2019    CREATININE 1.8 (H) 01/24/2020    CREATININE 3.5 (H) 01/23/2020    CREATININE 1.1 09/26/2019    BUN 17 01/24/2020    BUN 24 (H) 01/23/2020    BUN 26 (H) 09/26/2019    GLUCOSE 108 (H) 01/24/2020    GLUCOSE 143 (H) 01/23/2020    GLUCOSE 143 (H) 09/26/2019    PHOS 3.0 05/28/2017    WBC 4.8 01/24/2020    WBC 8.1 2020    WBC 7.7 2019    HGB 10.7 (L) 2020    HGB 12.9 2020    HGB 11.5 (L) 2019    HCT 36.3 (L) 2020    HCT 43.5 2020    HCT 36.3 (L) 2019    MCV 83.3 2020     2020         Imaging:  Ct Abdomen Pelvis Wo Contrast Additional Contrast? None    Result Date: 2020  Patient MRN:  [id number] : 1955 Age: 59 years Gender: Male Order Date:  2020 12:30 PM EXAM: CT ABDOMEN PELVIS WO CONTRAST INDICATION:  Diarrhea, renal failure Diarrhea, renal failure  COMPARISON: CT the abdomen and pelvis, 2017. Technique: Low-dose CT  acquisition technique included one of following options; 1 . Automated exposure control, 2. Adjustment of MA and or KV according to patient's size or 3. Use of iterative reconstruction. Multiple computerized tomography sections of the abdomen with sagittal and coronal MPR reconstructions were obtained from the top of the diaphragm to the pelvis. The visualized portions of the abdomen reveal: FINDINGS: LUNG BASES: Mild scarring along the lung bases. The heart is normal in size. Small hiatal hernia. No pleural or pericardial effusion. Elli Siskin LIVER: Normal in size and contour. Small, 2.1 cm ill-defined hypodense area along the apex aspect of the right lobe of the liver (axial sequence, image 33). Small probable cyst in the medial aspect of the left lobe of liver (image 20) no biliary ductal dilatation. Elli Siskin GALLBLADDER:Unremarkable SPLEEN:Unremarkable. Mild scarring in the lingula and bilateral lower lobes. The heart is normal in size. No pleural or pericardial effusion. ADRENALS:Unremarkable. KIDNEYS: The kidneys are normal in size and contour. Two 1 mm nonobstructive calculi seen within the upper and lower pole calyces of the right kidney. No left nephrolithiasis. No hydronephrosis. PANCREAS:Unremarkable. BOWEL: Moderate gaseous distention of the colon. No wall thickening or obstruction.  No excessive fecal retention in the colon. APPENDIX:Unremarkable. BLADDER:Unremarkable. REPRODUCTIVE ORGANS: The prostate gland is normal in size, approximately measuring 3.2 x 3.2 x 3.8 cm. VASCULATURE: Mild to moderate calcified atherosclerosis of the abdominal aorta. Stable aneurysmal dilation of the infrarenal aorta to approximately 3 cm. LYMPH NODES:Unremarkable. BONES:Evaluation of the bones reveals no fracture or destructive lesion. Moderate loss of disc height at L1-2. MISCELLANEOUS:No additional finding. 1. No acute abnormality seen in the abdomen or the pelvis. 2. Small ill-defined hypodensity along the inferior aspect of the right lobe of the liver. Further characterization with multiphasic contrast-enhanced MRI of the liver is recommended. 3. Stable aneurysmal dilation of the infrarenal aorta to 3 cm. 4. Tiny nonobstructive right renal calculi. No hydronephrosis. Ct Chest Wo Contrast    Result Date: 2020  Patient MRN:  [id number] : 1955 Age: 59 years Gender: Male Order Date:  2020 12:30 PM EXAM: CT CHEST WO CONTRAST number of images 285. Technique: Low-dose CT  acquisition technique included one of following options; 1 . Automated exposure control, 2. Adjustment of MA and or KV according to patient's size or 3. Use of iterative reconstruction. INDICATION:  SOB, h/o recurrent PNA SOB, h/o recurrent PNA COMPARISON: Previous examination 2019. FINDINGS: There is borderline cardiac size. There is mild ascending thoracic aortic aneurysm measuring 4 x 4.1 cm. A tracheostomy tube is noted. Moderately enlarged mediastinal       Assessment  1. Acute kidney injury stage III this is most likely prerenal occurring the setting of diarrhea with poor oral intake causing dehydration. Patient is already demonstrating improvement in response to IV fluids ; abdominal CT demonstrated no hydronephrosis  2. Acute on chronic hypoxic respiratory failure  3. Stage IV adeno CA of lung  4.  Primary squamous cell CA of right vocal cord status post surgery complicated by paralysis  5. Dehydration    Plan  1. Spot urine for electrolytes and creatinine  2. Continue hydration  3. Monitor urine output  4. Adjust all meds for level of kidney function  5.  Monitor labs      Thank you Dr. Randell Munoz DO for allowing us to participate in care of Noemí Bartlett MD  11:45 AM  1/24/2020

## 2020-01-25 ENCOUNTER — APPOINTMENT (OUTPATIENT)
Dept: GENERAL RADIOLOGY | Age: 65
DRG: 177 | End: 2020-01-25
Payer: MEDICARE

## 2020-01-25 LAB
ALBUMIN SERPL-MCNC: 3 G/DL (ref 3.5–5.2)
ALP BLD-CCNC: 77 U/L (ref 40–129)
ALT SERPL-CCNC: 22 U/L (ref 0–40)
ANION GAP SERPL CALCULATED.3IONS-SCNC: 12 MMOL/L (ref 7–16)
AST SERPL-CCNC: 20 U/L (ref 0–39)
BILIRUB SERPL-MCNC: <0.2 MG/DL (ref 0–1.2)
BUN BLDV-MCNC: 11 MG/DL (ref 8–23)
CALCIUM SERPL-MCNC: 9.1 MG/DL (ref 8.6–10.2)
CHLORIDE BLD-SCNC: 109 MMOL/L (ref 98–107)
CO2: 21 MMOL/L (ref 22–29)
CREAT SERPL-MCNC: 1.3 MG/DL (ref 0.7–1.2)
GFR AFRICAN AMERICAN: >60
GFR NON-AFRICAN AMERICAN: 56 ML/MIN/1.73
GLUCOSE BLD-MCNC: 95 MG/DL (ref 74–99)
HCT VFR BLD CALC: 32.5 % (ref 37–54)
HEMOGLOBIN: 9.7 G/DL (ref 12.5–16.5)
MCH RBC QN AUTO: 24.7 PG (ref 26–35)
MCHC RBC AUTO-ENTMCNC: 29.8 % (ref 32–34.5)
MCV RBC AUTO: 82.7 FL (ref 80–99.9)
PDW BLD-RTO: 15.3 FL (ref 11.5–15)
PLATELET # BLD: 230 E9/L (ref 130–450)
PMV BLD AUTO: 10.6 FL (ref 7–12)
POTASSIUM SERPL-SCNC: 4 MMOL/L (ref 3.5–5)
RBC # BLD: 3.93 E12/L (ref 3.8–5.8)
SODIUM BLD-SCNC: 142 MMOL/L (ref 132–146)
TOTAL PROTEIN: 5.7 G/DL (ref 6.4–8.3)
URINE CULTURE, ROUTINE: NORMAL
WBC # BLD: 5.3 E9/L (ref 4.5–11.5)

## 2020-01-25 PROCEDURE — 36415 COLL VENOUS BLD VENIPUNCTURE: CPT

## 2020-01-25 PROCEDURE — 2580000003 HC RX 258: Performed by: INTERNAL MEDICINE

## 2020-01-25 PROCEDURE — 71045 X-RAY EXAM CHEST 1 VIEW: CPT

## 2020-01-25 PROCEDURE — 6360000002 HC RX W HCPCS: Performed by: INTERNAL MEDICINE

## 2020-01-25 PROCEDURE — 2060000000 HC ICU INTERMEDIATE R&B

## 2020-01-25 PROCEDURE — 80053 COMPREHEN METABOLIC PANEL: CPT

## 2020-01-25 PROCEDURE — 6370000000 HC RX 637 (ALT 250 FOR IP): Performed by: INTERNAL MEDICINE

## 2020-01-25 PROCEDURE — 6370000000 HC RX 637 (ALT 250 FOR IP): Performed by: FAMILY MEDICINE

## 2020-01-25 PROCEDURE — 85027 COMPLETE CBC AUTOMATED: CPT

## 2020-01-25 PROCEDURE — 2580000003 HC RX 258: Performed by: FAMILY MEDICINE

## 2020-01-25 PROCEDURE — 94640 AIRWAY INHALATION TREATMENT: CPT

## 2020-01-25 PROCEDURE — 6360000002 HC RX W HCPCS: Performed by: FAMILY MEDICINE

## 2020-01-25 PROCEDURE — 2700000000 HC OXYGEN THERAPY PER DAY

## 2020-01-25 RX ORDER — DOCUSATE SODIUM 100 MG/1
100 CAPSULE, LIQUID FILLED ORAL 3 TIMES DAILY PRN
Status: ON HOLD | COMMUNITY
End: 2020-01-27 | Stop reason: HOSPADM

## 2020-01-25 RX ORDER — DOXYCYCLINE HYCLATE 100 MG/1
100 CAPSULE ORAL EVERY 12 HOURS SCHEDULED
Status: DISCONTINUED | OUTPATIENT
Start: 2020-01-25 | End: 2020-01-27 | Stop reason: HOSPADM

## 2020-01-25 RX ADMIN — Medication 5000 UNITS: at 18:17

## 2020-01-25 RX ADMIN — DOXYCYCLINE HYCLATE 100 MG: 100 CAPSULE ORAL at 21:36

## 2020-01-25 RX ADMIN — NORTRIPTYLINE HYDROCHLORIDE 10 MG: 10 CAPSULE ORAL at 21:37

## 2020-01-25 RX ADMIN — ALBUTEROL SULFATE 2.5 MG: 2.5 SOLUTION RESPIRATORY (INHALATION) at 16:43

## 2020-01-25 RX ADMIN — CHLORHEXIDINE GLUCONATE 0.12% ORAL RINSE 15 ML: 1.2 LIQUID ORAL at 08:44

## 2020-01-25 RX ADMIN — PREGABALIN 150 MG: 150 CAPSULE ORAL at 08:45

## 2020-01-25 RX ADMIN — TAMSULOSIN HYDROCHLORIDE 0.4 MG: 0.4 CAPSULE ORAL at 21:37

## 2020-01-25 RX ADMIN — MORPHINE SULFATE 30 MG: 15 TABLET, FILM COATED, EXTENDED RELEASE ORAL at 21:37

## 2020-01-25 RX ADMIN — ALBUTEROL SULFATE 2.5 MG: 2.5 SOLUTION RESPIRATORY (INHALATION) at 00:18

## 2020-01-25 RX ADMIN — HEPARIN SODIUM 5000 UNITS: 10000 INJECTION INTRAVENOUS; SUBCUTANEOUS at 21:38

## 2020-01-25 RX ADMIN — CEFTAROLINE FOSAMIL 400 MG: 400 POWDER, FOR SOLUTION INTRAVENOUS at 02:02

## 2020-01-25 RX ADMIN — SIMVASTATIN 40 MG: 40 TABLET, FILM COATED ORAL at 21:37

## 2020-01-25 RX ADMIN — ALBUTEROL SULFATE 2.5 MG: 2.5 SOLUTION RESPIRATORY (INHALATION) at 04:37

## 2020-01-25 RX ADMIN — DULOXETINE HYDROCHLORIDE 30 MG: 30 CAPSULE, DELAYED RELEASE ORAL at 08:44

## 2020-01-25 RX ADMIN — Medication 10 MG: at 21:36

## 2020-01-25 RX ADMIN — DULOXETINE HYDROCHLORIDE 30 MG: 30 CAPSULE, DELAYED RELEASE ORAL at 21:36

## 2020-01-25 RX ADMIN — PANTOPRAZOLE SODIUM 40 MG: 40 TABLET, DELAYED RELEASE ORAL at 18:17

## 2020-01-25 RX ADMIN — FORMOTEROL FUMARATE DIHYDRATE 20 MCG: 20 SOLUTION RESPIRATORY (INHALATION) at 21:11

## 2020-01-25 RX ADMIN — GUAIFENESIN 400 MG: 400 TABLET, FILM COATED ORAL at 08:44

## 2020-01-25 RX ADMIN — OXYCODONE HYDROCHLORIDE 10 MG: 10 TABLET ORAL at 08:44

## 2020-01-25 RX ADMIN — PREGABALIN 150 MG: 150 CAPSULE ORAL at 21:37

## 2020-01-25 RX ADMIN — PREGABALIN 150 MG: 150 CAPSULE ORAL at 14:48

## 2020-01-25 RX ADMIN — ALBUTEROL SULFATE 2.5 MG: 2.5 SOLUTION RESPIRATORY (INHALATION) at 21:10

## 2020-01-25 RX ADMIN — HEPARIN SODIUM 5000 UNITS: 10000 INJECTION INTRAVENOUS; SUBCUTANEOUS at 14:49

## 2020-01-25 RX ADMIN — SODIUM CHLORIDE, PRESERVATIVE FREE 10 ML: 5 INJECTION INTRAVENOUS at 21:38

## 2020-01-25 RX ADMIN — GUAIFENESIN 400 MG: 400 TABLET, FILM COATED ORAL at 14:48

## 2020-01-25 RX ADMIN — BUPROPION HYDROCHLORIDE 75 MG: 75 TABLET, FILM COATED ORAL at 08:44

## 2020-01-25 RX ADMIN — HEPARIN SODIUM 5000 UNITS: 10000 INJECTION INTRAVENOUS; SUBCUTANEOUS at 06:33

## 2020-01-25 RX ADMIN — SODIUM CHLORIDE: 9 INJECTION, SOLUTION INTRAVENOUS at 01:58

## 2020-01-25 RX ADMIN — LEVOTHYROXINE SODIUM 125 MCG: 0.12 TABLET ORAL at 06:42

## 2020-01-25 RX ADMIN — OXYCODONE HYDROCHLORIDE 10 MG: 10 TABLET ORAL at 14:48

## 2020-01-25 RX ADMIN — PREDNISONE 10 MG: 10 TABLET ORAL at 08:44

## 2020-01-25 RX ADMIN — BUDESONIDE 500 MCG: 0.5 SUSPENSION RESPIRATORY (INHALATION) at 21:10

## 2020-01-25 RX ADMIN — MORPHINE SULFATE 30 MG: 15 TABLET, FILM COATED, EXTENDED RELEASE ORAL at 08:45

## 2020-01-25 RX ADMIN — MIRTAZAPINE 30 MG: 15 TABLET, FILM COATED ORAL at 21:37

## 2020-01-25 RX ADMIN — OXYCODONE HYDROCHLORIDE 10 MG: 10 TABLET ORAL at 21:36

## 2020-01-25 RX ADMIN — BUPROPION HYDROCHLORIDE 75 MG: 75 TABLET, FILM COATED ORAL at 21:37

## 2020-01-25 RX ADMIN — GUAIFENESIN 400 MG: 400 TABLET, FILM COATED ORAL at 23:01

## 2020-01-25 ASSESSMENT — PAIN DESCRIPTION - DESCRIPTORS
DESCRIPTORS: ACHING;CONSTANT;DISCOMFORT;DULL
DESCRIPTORS: ACHING;DISCOMFORT;SORE;CONSTANT
DESCRIPTORS: ACHING;DISCOMFORT;CONSTANT
DESCRIPTORS: ACHING;CONSTANT;DISCOMFORT;DULL

## 2020-01-25 ASSESSMENT — PAIN DESCRIPTION - LOCATION
LOCATION: GENERALIZED
LOCATION: GENERALIZED

## 2020-01-25 ASSESSMENT — PAIN SCALES - GENERAL
PAINLEVEL_OUTOF10: 4
PAINLEVEL_OUTOF10: 5
PAINLEVEL_OUTOF10: 4
PAINLEVEL_OUTOF10: 3
PAINLEVEL_OUTOF10: 0
PAINLEVEL_OUTOF10: 6
PAINLEVEL_OUTOF10: 3

## 2020-01-25 ASSESSMENT — PAIN DESCRIPTION - PAIN TYPE
TYPE: CHRONIC PAIN

## 2020-01-25 ASSESSMENT — PAIN DESCRIPTION - ORIENTATION
ORIENTATION: RIGHT
ORIENTATION: RIGHT

## 2020-01-25 ASSESSMENT — PAIN DESCRIPTION - FREQUENCY
FREQUENCY: CONTINUOUS
FREQUENCY: CONTINUOUS

## 2020-01-25 ASSESSMENT — PAIN DESCRIPTION - ONSET
ONSET: ON-GOING
ONSET: ON-GOING

## 2020-01-25 ASSESSMENT — PAIN DESCRIPTION - PROGRESSION: CLINICAL_PROGRESSION: GRADUALLY IMPROVING

## 2020-01-25 NOTE — PROGRESS NOTES
Admit Date: 1/23/2020      Subjective:     Patient: no acute issues reported overnight  Medication side effects: none    Scheduled Meds:   albuterol  2.5 mg Nebulization Q4H    guaiFENesin  400 mg Oral TID    ceftaroline fosamil (TEFLARO) 600 MG IVPB  400 mg Intravenous Q12H    heparin (porcine)  5,000 Units Subcutaneous Q8H    sodium chloride flush  10 mL Intravenous 2 times per day    budesonide  500 mcg Nebulization BID    buPROPion  75 mg Oral BID    chlorhexidine  15 mL Mouth/Throat Daily    vitamin D  5,000 Units Oral Daily    DULoxetine  30 mg Oral BID    formoterol  20 mcg Nebulization BID    levothyroxine  125 mcg Oral Daily    melatonin  10 mg Oral Nightly    mirtazapine  30 mg Oral Nightly    morphine  30 mg Oral BID    nortriptyline  10 mg Oral Nightly    oxyCODONE HCl  10 mg Oral TID    [START ON 2/6/2020] pembrolizumab  200 mg Intravenous Q21 Days    predniSONE  10 mg Oral Daily    pregabalin  150 mg Oral TID    Revefenacin  3 mL Inhalation Daily    senna-docusate  2 tablet Oral Daily    simvastatin  40 mg Oral Nightly    tamsulosin  0.4 mg Oral Nightly    pantoprazole  40 mg Oral QAM AC     Continuous Infusions:   sodium chloride 100 mL/hr at 01/25/20 0158     PRN Meds:sodium chloride flush, acetaminophen, albuterol, cyclobenzaprine, LORazepam    Objective:   I/O last 3 completed shifts: In: 5675 [P.O.:600; I.V.:2194; IV Piggyback:50]  Out: 3500 [Urine:3500]  No intake/output data recorded.     /76   Pulse 66   Temp 97.6 °F (36.4 °C) (Temporal)   Resp 18   Ht 5' 11\" (1.803 m)   Wt 203 lb (92.1 kg)   SpO2 94%   BMI 28.31 kg/m²   General appearance: alert, appears stated age and cooperative  Skin:negative  Heent:negative  Lungs: diminished BS's bilaterally  Heart: regular rate and rhythm, S1, S2 normal, no murmur, click, rub or gallop  Abdomen: soft, non-tender; bowel sounds normal; no masses,  no organomegaly  Extremities:no edema  Neurologic: Grossly normal    Labs:  CBC with Differential:    Lab Results   Component Value Date    WBC 5.3 01/25/2020    RBC 3.93 01/25/2020    HGB 9.7 01/25/2020    HCT 32.5 01/25/2020     01/25/2020    MCV 82.7 01/25/2020    MCH 24.7 01/25/2020    MCHC 29.8 01/25/2020    RDW 15.3 01/25/2020    SEGSPCT 59 02/22/2012    LYMPHOPCT 9.6 01/23/2020    MONOPCT 7.1 01/23/2020    BASOPCT 1.1 01/23/2020    MONOSABS 0.58 01/23/2020    LYMPHSABS 0.78 01/23/2020    EOSABS 0.21 01/23/2020    BASOSABS 0.09 01/23/2020     BMP:    Lab Results   Component Value Date     01/25/2020    K 4.0 01/25/2020    K 3.9 09/22/2019     01/25/2020    CO2 21 01/25/2020    BUN 11 01/25/2020    LABALBU 3.0 01/25/2020    CREATININE 1.3 01/25/2020    CALCIUM 9.1 01/25/2020    GFRAA >60 01/25/2020    LABGLOM 56 01/25/2020     PT/INR:    Lab Results   Component Value Date    PROTIME 17.4 09/22/2019    INR 1.5 09/22/2019     Last 3 Troponin:    Lab Results   Component Value Date    TROPONINI 0.02 01/23/2020    TROPONINI <0.01 09/17/2018    TROPONINI <0.01 07/05/2017     TSH:    Lab Results   Component Value Date    TSH 14.310 01/23/2020      Assessment:     1. CARLA  2. Stage IV lung adenocarcinoma  3. Acute on chronic hypoxic respiratory failure  4. Primary SCC of the right vocal cord s/p trach  5. COPD  6. Nicotine dependency ongoing  7.  Possible RLL pneumonia vs respiratory viral illness    Plan:       Continue same plan and orders

## 2020-01-25 NOTE — PROGRESS NOTES
Saturation:  No components found for: PERCENTFE  TIBC:  No results found for: TIBC  FERRITIN:  No results found for: FERRITIN  RUDY:  No results found for: ANATITER, RUDY    ASSESSMENT/PLAN :  60 yo male   Stage IV lung adenocarcinoma s/p XRT and chemotherapy  Primary H+N neoplasm  s/p XRT and currently on single agent [facility] ([facility]  ? RLL PNA  Acute kidney injury --  following     - ID following. Recommend doxycycline for 5 days  - CARLA improving, Cr 1.3  - CT  C/A/P reviewed, no significant evidence for new malignancy.  Agree with outpt PET  - OK to DC from hem/onc POV      Viraj Willis MD

## 2020-01-25 NOTE — PROGRESS NOTES
Payton Beard M.D.,[facility]  Anna Arriaza D.O., F.A.C.O.I., Zaheer Jiménez M.D. Davion Kennedy M.D., Anthon Pallas ,M.D. Jian Cantor D.O. Daily Pulmonary Progress Note    Patient:  Jovanny Dempsey 59 y.o. male MRN: [id number]     Date of Service: 1/25/2020      Synopsis     We are following patient for stage IV adenocarcinoma and chronic trach, staph and Serratia pneumonia    \"CC\" : Loss of consciousness    Code status: Full code      Subjective      Patient was seen and examined. He is feeling much better today. Denies any chest pain cough shortness of breath. Diarrhea has improved. His only complaint is that he can no longer tolerate his [place] valve since he cannot exhale through his mouth anymore. Review of Systems:  Constitutional: Denies fever, weight loss, night sweats, and fatigue  Skin: Denies pigmentation, dark lesions, and rashes   HEENT: Denies hearing loss, tinnitus, ear drainage, epistaxis, sore throat, and hoarseness. Cardiovascular: Denies palpitations, chest pain, and chest pressure. Respiratory: Denies cough, dyspnea at rest, hemoptysis, apnea, and choking.   Gastrointestinal: Denies nausea, vomiting, poor appetite, diarrhea, heartburn or reflux  Genitourinary: Denies dysuria, frequency, urgency or hematuria  Musculoskeletal: Denies myalgias, muscle weakness, and bone pain  Neurological: Denies dizziness, vertigo, headache, and focal weakness      24-hour events:      Objective   Vitals: /76   Pulse 66   Temp 97.6 °F (36.4 °C) (Temporal)   Resp 18   Ht 5' 11\" (1.803 m)   Wt 203 lb (92.1 kg)   SpO2 94%   BMI 28.31 kg/m²     I/O:    Intake/Output Summary (Last 24 hours) at 1/25/2020 1254  Last data filed at 1/25/2020 0957  Gross per 24 hour   Intake 2408 ml   Output 2500 ml   Net -92 ml       Vent Information  Equipment Changed: Humidification  FiO2 : 40 %                CURRENT MEDS :  Scheduled Meds:   albuterol  2.5 mg Nebulization Q4H    guaiFENesin  400 mg Oral TID    ceftaroline fosamil (TEFLARO) 600 MG IVPB  400 mg Intravenous Q12H    heparin (porcine)  5,000 Units Subcutaneous Q8H    sodium chloride flush  10 mL Intravenous 2 times per day    budesonide  500 mcg Nebulization BID    buPROPion  75 mg Oral BID    chlorhexidine  15 mL Mouth/Throat Daily    vitamin D  5,000 Units Oral Daily    DULoxetine  30 mg Oral BID    formoterol  20 mcg Nebulization BID    levothyroxine  125 mcg Oral Daily    melatonin  10 mg Oral Nightly    mirtazapine  30 mg Oral Nightly    morphine  30 mg Oral BID    nortriptyline  10 mg Oral Nightly    oxyCODONE HCl  10 mg Oral TID    [START ON 2/6/2020] pembrolizumab  200 mg Intravenous Q21 Days    predniSONE  10 mg Oral Daily    pregabalin  150 mg Oral TID    Revefenacin  3 mL Inhalation Daily    senna-docusate  2 tablet Oral Daily    simvastatin  40 mg Oral Nightly    tamsulosin  0.4 mg Oral Nightly    pantoprazole  40 mg Oral QAM AC       Physical Exam:  General Appearance: appears comfortable in no acute distress. HEENT: Normocephalic atraumatic without obvious abnormality   Neck: Lips, mucosa, and tongue normal.  Supple, symmetrical, trachea midline, no adenopathy;thyroid:  no enlargement/tenderness/nodules or JVD. Lung: Breath sounds CTA. Respirations   unlabored. Symmetrical expansion. Heart: RRR, normal S1, S2. No MRG  Abdomen: Soft, NT, ND. BS present x 4 quadrants. No bruit or organomegaly. Extremities: Pedal pulses 2+ symmetric b/l. Extremities normal, no cyanosis, clubbing, or edema. Musculokeletal: No joint swelling, no muscle tenderness. ROM normal in all joints of extremities. Neurologic: Mental status: Alert and Oriented X3 .     Pertinent/ New Labs and Imaging Studies     Imaging Personally Reviewed:            Labs:  Lab Results   Component Value Date    WBC 5.3 01/25/2020    HGB 9.7 01/25/2020    HCT 32.5 01/25/2020    MCV 82.7 01/25/2020    MCH 24.7 input(s): LEGUR in the last 72 hours. No results for input(s): STREPNEUMAGU in the last 72 hours. No results for input(s): LP1UAG in the last 72 hours. Assessment:    1. Acute on chronic hypoxic respiratory failure     2. Pneumonia - HCAP              -New patchy and nodular infiltrates in RLL. Infiltrates are small appearing. Left nodular components may be progression of malignancy.     3. Stage IV AdenoCA with large CRISTINA hilar mass with chest wall and mediastinal invasion. Mets to spin with T1 and T2 invasion. S/P chemo/XRT. Currently on Keytruda.     4. Primary SCC of right vocal cord with paralysis of vocal cord     5. S/p trach with Shiley 6.0 cuffless     6. Centrilobular emphysema - currently compensated     7. Tobacco use 88 py's, ongoing use     8. Previous bronchitis with MRSA and kleb  9. CARLA - improving         Plan:   1. Continue antibiotics per ID, IV Teflaro for now  2. Continue bronchodilators. 3.   We will need to follow-up PET scan as an outpatient  4. Creatinine significantly improved  5.     Smoking cessation    Electronically signed by Blaze Bobo MD on 1/25/2020 at 12:54 PM

## 2020-01-25 NOTE — PROGRESS NOTES
Nephrology Progress Note  Patient's Name: Homer Miller  9:35 AM  1/25/2020      Reason for Consult: Acute kidney injury  Requesting Physician:  Julisa Geronimo DO    Chief Complaint: Shortness of breath  History Obtained From:  patient and E HR    History of Present Ilness:    Homer Miller is a 59 y.o. 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 He reports having a diarrheal virus illness this week. This was followed by increasing shortness of breath over the course of the past several days. His wife had observe him at home today. Lethargic and EMS were called at the time. Upon their arrival patient was noted to be hypotensive with a systolic blood pressure in the 80s. He was also hypoxic with oxygen saturation in the 80s. He reports a nonproductive cough but denied fever or chills. On presentation to ED his initial vital signs reveal patient to be hypotensive with blood pressure of 78/51, temperature 97.7, and oxygen saturation were noted to be 95%. . Initial laboratory data was significant for a BUN of 24, creatinine 3. 5(recent baseline 1.1), CO2 21, WBC 8.1, hemoglobin 12.9. Urinalysis was reported as having many casts otherwise unremarkable. Chest x-ray performed demonstrated trace bibasilar atelectasis with no definite pneumonia. Patient received cefepime and vancomycin. He was in he was also started on IV fluids and admitted to telemetry for further management. Creatinine has shown an improvement from 3.8 peak to currently 1.8.     Subjective    1/25: No new new acute issues overnight    Current Medications:    albuterol (PROVENTIL) nebulizer solution 2.5 mg, Q4H  guaiFENesin tablet 400 mg, TID  ceftaroline fosamil (TEFLARO) 400 mg in dextrose 5 % 50 mL IVPB, Q12H  heparin extremities. Ext: No cyanosis, clubbing, edema   Neuro: Awake. Follows commands. Positive pupils/gag/corneals. Normal pain response. Data:   Labs:  Lab Results   Component Value Date     2020     2020     2020    K 4.0 2020    K 4.3 2020    K 3.5 2020     (H) 2020    CO2 21 (L) 2020    CO2 19 (L) 2020    CO2 21 (L) 2020    CREATININE 1.3 (H) 2020    CREATININE 1.8 (H) 2020    CREATININE 3.5 (H) 2020    BUN 11 2020    BUN 17 2020    BUN 24 (H) 2020    GLUCOSE 95 2020    GLUCOSE 108 (H) 2020    GLUCOSE 143 (H) 2020    PHOS 3.0 2017    WBC 5.3 2020    WBC 4.8 2020    WBC 8.1 2020    HGB 9.7 (L) 2020    HGB 10.7 (L) 2020    HGB 12.9 2020    HCT 32.5 (L) 2020    HCT 36.3 (L) 2020    HCT 43.5 2020    MCV 82.7 2020     2020         Imaging:  Ct Abdomen Pelvis Wo Contrast Additional Contrast? None    Result Date: 2020  Patient MRN:  [id number] : 1955 Age: 59 years Gender: Male Order Date:  2020 12:30 PM EXAM: CT ABDOMEN PELVIS WO CONTRAST INDICATION:  Diarrhea, renal failure Diarrhea, renal failure  COMPARISON: CT the abdomen and pelvis, 2017. Technique: Low-dose CT  acquisition technique included one of following options; 1 . Automated exposure control, 2. Adjustment of MA and or KV according to patient's size or 3. Use of iterative reconstruction. Multiple computerized tomography sections of the abdomen with sagittal and coronal MPR reconstructions were obtained from the top of the diaphragm to the pelvis. The visualized portions of the abdomen reveal: FINDINGS: LUNG BASES: Mild scarring along the lung bases. The heart is normal in size. Small hiatal hernia. No pleural or pericardial effusion. [facility] LIVER: Normal in size and contour.  Small, 2.1 cm ill-defined hypodense area along the iterative reconstruction. INDICATION:  SOB, h/o recurrent PNA SOB, h/o recurrent PNA COMPARISON: Previous examination September 24, 2019. FINDINGS: There is borderline cardiac size. There is mild ascending thoracic aortic aneurysm measuring 4 x 4.1 cm. A tracheostomy tube is noted. Moderately enlarged mediastinal       Assessment/Plans  1. Acute kidney injury stage III this is most likely prerenal occurring the setting of diarrhea with poor oral intake causing dehydration. Patient is already demonstrating improvement in response to IV fluids ; abdominal CT demonstrated no hydronephrosis   -Renal function much improved ; will stop IV fluids  2. Acute on chronic hypoxic respiratory failure  3. Stage IV adeno CA of lung  4. Primary squamous cell CA of right vocal cord status post surgery complicated by paralysis  5. Dehydration, improved; oral intake is better.   Will stop IV fluids    Tila Alfredo MD  9:35 AM  1/25/2020

## 2020-01-25 NOTE — PROGRESS NOTES
BUN 11 01/25/2020    CREATININE 1.3 01/25/2020    GFRAA >60 01/25/2020    LABGLOM 56 01/25/2020    GLUCOSE 95 01/25/2020    GLUCOSE 130 02/22/2012    PROT 5.7 01/25/2020    LABALBU 3.0 01/25/2020    CALCIUM 9.1 01/25/2020    BILITOT <0.2 01/25/2020    ALKPHOS 77 01/25/2020    AST 20 01/25/2020    ALT 22 01/25/2020          Microbiology :  Recent Labs     01/23/20  1054   BC 24 Hours- no growth     Recent Labs     01/23/20  1124   BLOODCULT2 24 Hours- no growth     Recent Labs     01/23/20  1422   LABURIN  <10,000 CFU/mL  Gram positive organism       Recent Labs     01/23/20  1604   CULTRESP Oral Pharyngeal Alva reduced*  Light growth  Sensitivity to follow    Light growth  Identification and sensitivity to follow       No results for input(s): WNDABS in the last 72 hours.     Radiology :  noted    Assessment:  · Post viral infection/tracheobronchitis   · CARLA from dehdyration   · MRSA and gram-negative colonization from lungs (tracheal aspirate)  · history of adenocarcinoma of the left lung with metastasis to T1-T2 on keytruda  · Vocal cord paralysis with tracheostomy and MRSA colonization     Plan:      Low procalcitonin, not concern for pneumonia  P.o. doxycycline for 5 more days  Okay for discharge from infectious disease standpoint          Electronically signed by Richard Yepez MD on 1/25/2020 at 12:14 PM

## 2020-01-26 ENCOUNTER — APPOINTMENT (OUTPATIENT)
Dept: GENERAL RADIOLOGY | Age: 65
DRG: 177 | End: 2020-01-26
Payer: MEDICARE

## 2020-01-26 LAB
ALBUMIN SERPL-MCNC: 3.3 G/DL (ref 3.5–5.2)
ALP BLD-CCNC: 79 U/L (ref 40–129)
ALT SERPL-CCNC: 22 U/L (ref 0–40)
ANION GAP SERPL CALCULATED.3IONS-SCNC: 13 MMOL/L (ref 7–16)
AST SERPL-CCNC: 20 U/L (ref 0–39)
BILIRUB SERPL-MCNC: <0.2 MG/DL (ref 0–1.2)
BUN BLDV-MCNC: 11 MG/DL (ref 8–23)
CALCIUM SERPL-MCNC: 9.4 MG/DL (ref 8.6–10.2)
CHLORIDE BLD-SCNC: 106 MMOL/L (ref 98–107)
CO2: 23 MMOL/L (ref 22–29)
CREAT SERPL-MCNC: 1.3 MG/DL (ref 0.7–1.2)
CULTURE, RESPIRATORY: ABNORMAL
GFR AFRICAN AMERICAN: >60
GFR NON-AFRICAN AMERICAN: 56 ML/MIN/1.73
GLUCOSE BLD-MCNC: 133 MG/DL (ref 74–99)
HCT VFR BLD CALC: 34.8 % (ref 37–54)
HEMOGLOBIN: 10.4 G/DL (ref 12.5–16.5)
MCH RBC QN AUTO: 24.8 PG (ref 26–35)
MCHC RBC AUTO-ENTMCNC: 29.9 % (ref 32–34.5)
MCV RBC AUTO: 82.9 FL (ref 80–99.9)
MRSA CULTURE ONLY: NORMAL
ORGANISM: ABNORMAL
ORGANISM: ABNORMAL
PDW BLD-RTO: 15.6 FL (ref 11.5–15)
PLATELET # BLD: 257 E9/L (ref 130–450)
PMV BLD AUTO: 10.8 FL (ref 7–12)
POTASSIUM SERPL-SCNC: 3.7 MMOL/L (ref 3.5–5)
RBC # BLD: 4.2 E12/L (ref 3.8–5.8)
SMEAR, RESPIRATORY: ABNORMAL
SODIUM BLD-SCNC: 142 MMOL/L (ref 132–146)
TOTAL PROTEIN: 6.1 G/DL (ref 6.4–8.3)
WBC # BLD: 6.2 E9/L (ref 4.5–11.5)

## 2020-01-26 PROCEDURE — 36415 COLL VENOUS BLD VENIPUNCTURE: CPT

## 2020-01-26 PROCEDURE — 2700000000 HC OXYGEN THERAPY PER DAY

## 2020-01-26 PROCEDURE — 6360000002 HC RX W HCPCS: Performed by: FAMILY MEDICINE

## 2020-01-26 PROCEDURE — 85027 COMPLETE CBC AUTOMATED: CPT

## 2020-01-26 PROCEDURE — 71045 X-RAY EXAM CHEST 1 VIEW: CPT

## 2020-01-26 PROCEDURE — 2580000003 HC RX 258: Performed by: FAMILY MEDICINE

## 2020-01-26 PROCEDURE — 94640 AIRWAY INHALATION TREATMENT: CPT

## 2020-01-26 PROCEDURE — 6370000000 HC RX 637 (ALT 250 FOR IP): Performed by: INTERNAL MEDICINE

## 2020-01-26 PROCEDURE — 80053 COMPREHEN METABOLIC PANEL: CPT

## 2020-01-26 PROCEDURE — 6360000002 HC RX W HCPCS: Performed by: INTERNAL MEDICINE

## 2020-01-26 PROCEDURE — 2060000000 HC ICU INTERMEDIATE R&B

## 2020-01-26 PROCEDURE — 6370000000 HC RX 637 (ALT 250 FOR IP): Performed by: FAMILY MEDICINE

## 2020-01-26 RX ORDER — SENNA AND DOCUSATE SODIUM 50; 8.6 MG/1; MG/1
2 TABLET, FILM COATED ORAL DAILY
Status: DISCONTINUED | OUTPATIENT
Start: 2020-01-26 | End: 2020-01-27 | Stop reason: HOSPADM

## 2020-01-26 RX ADMIN — OXYCODONE HYDROCHLORIDE 10 MG: 10 TABLET ORAL at 14:53

## 2020-01-26 RX ADMIN — MORPHINE SULFATE 30 MG: 15 TABLET, FILM COATED, EXTENDED RELEASE ORAL at 08:55

## 2020-01-26 RX ADMIN — SODIUM CHLORIDE, PRESERVATIVE FREE 10 ML: 5 INJECTION INTRAVENOUS at 21:10

## 2020-01-26 RX ADMIN — FORMOTEROL FUMARATE DIHYDRATE 20 MCG: 20 SOLUTION RESPIRATORY (INHALATION) at 21:47

## 2020-01-26 RX ADMIN — HEPARIN SODIUM 5000 UNITS: 10000 INJECTION INTRAVENOUS; SUBCUTANEOUS at 05:53

## 2020-01-26 RX ADMIN — Medication 10 MG: at 21:02

## 2020-01-26 RX ADMIN — ALBUTEROL SULFATE 2.5 MG: 2.5 SOLUTION RESPIRATORY (INHALATION) at 11:57

## 2020-01-26 RX ADMIN — ALBUTEROL SULFATE 2.5 MG: 2.5 SOLUTION RESPIRATORY (INHALATION) at 01:03

## 2020-01-26 RX ADMIN — BUPROPION HYDROCHLORIDE 75 MG: 75 TABLET, FILM COATED ORAL at 08:55

## 2020-01-26 RX ADMIN — NORTRIPTYLINE HYDROCHLORIDE 10 MG: 10 CAPSULE ORAL at 21:02

## 2020-01-26 RX ADMIN — FORMOTEROL FUMARATE DIHYDRATE 20 MCG: 20 SOLUTION RESPIRATORY (INHALATION) at 08:06

## 2020-01-26 RX ADMIN — ALBUTEROL SULFATE 2.5 MG: 2.5 SOLUTION RESPIRATORY (INHALATION) at 08:06

## 2020-01-26 RX ADMIN — LEVOTHYROXINE SODIUM 125 MCG: 0.12 TABLET ORAL at 05:53

## 2020-01-26 RX ADMIN — ALBUTEROL SULFATE 2.5 MG: 2.5 SOLUTION RESPIRATORY (INHALATION) at 04:42

## 2020-01-26 RX ADMIN — PANTOPRAZOLE SODIUM 40 MG: 40 TABLET, DELAYED RELEASE ORAL at 18:11

## 2020-01-26 RX ADMIN — BUDESONIDE 500 MCG: 0.5 SUSPENSION RESPIRATORY (INHALATION) at 21:46

## 2020-01-26 RX ADMIN — ALBUTEROL SULFATE 2.5 MG: 2.5 SOLUTION RESPIRATORY (INHALATION) at 16:20

## 2020-01-26 RX ADMIN — DOXYCYCLINE HYCLATE 100 MG: 100 CAPSULE ORAL at 21:02

## 2020-01-26 RX ADMIN — TAMSULOSIN HYDROCHLORIDE 0.4 MG: 0.4 CAPSULE ORAL at 21:02

## 2020-01-26 RX ADMIN — GUAIFENESIN 400 MG: 400 TABLET, FILM COATED ORAL at 08:56

## 2020-01-26 RX ADMIN — MIRTAZAPINE 30 MG: 15 TABLET, FILM COATED ORAL at 21:01

## 2020-01-26 RX ADMIN — PREDNISONE 10 MG: 10 TABLET ORAL at 08:56

## 2020-01-26 RX ADMIN — OXYCODONE HYDROCHLORIDE 10 MG: 10 TABLET ORAL at 21:02

## 2020-01-26 RX ADMIN — SIMVASTATIN 40 MG: 40 TABLET, FILM COATED ORAL at 21:02

## 2020-01-26 RX ADMIN — PREGABALIN 150 MG: 150 CAPSULE ORAL at 21:02

## 2020-01-26 RX ADMIN — CHLORHEXIDINE GLUCONATE 0.12% ORAL RINSE 15 ML: 1.2 LIQUID ORAL at 08:55

## 2020-01-26 RX ADMIN — ALBUTEROL SULFATE 2.5 MG: 2.5 SOLUTION RESPIRATORY (INHALATION) at 21:47

## 2020-01-26 RX ADMIN — HEPARIN SODIUM 5000 UNITS: 10000 INJECTION INTRAVENOUS; SUBCUTANEOUS at 21:03

## 2020-01-26 RX ADMIN — SENNOSIDES AND DOCUSATE SODIUM 2 TABLET: 8.6; 5 TABLET ORAL at 14:53

## 2020-01-26 RX ADMIN — BUDESONIDE 500 MCG: 0.5 SUSPENSION RESPIRATORY (INHALATION) at 08:06

## 2020-01-26 RX ADMIN — OXYCODONE HYDROCHLORIDE 10 MG: 10 TABLET ORAL at 08:55

## 2020-01-26 RX ADMIN — GUAIFENESIN 400 MG: 400 TABLET, FILM COATED ORAL at 21:01

## 2020-01-26 RX ADMIN — PREGABALIN 150 MG: 150 CAPSULE ORAL at 08:55

## 2020-01-26 RX ADMIN — DULOXETINE HYDROCHLORIDE 30 MG: 30 CAPSULE, DELAYED RELEASE ORAL at 08:56

## 2020-01-26 RX ADMIN — SODIUM CHLORIDE, PRESERVATIVE FREE 10 ML: 5 INJECTION INTRAVENOUS at 08:57

## 2020-01-26 RX ADMIN — HEPARIN SODIUM 5000 UNITS: 10000 INJECTION INTRAVENOUS; SUBCUTANEOUS at 14:55

## 2020-01-26 RX ADMIN — MORPHINE SULFATE 30 MG: 15 TABLET, FILM COATED, EXTENDED RELEASE ORAL at 21:02

## 2020-01-26 RX ADMIN — DULOXETINE HYDROCHLORIDE 30 MG: 30 CAPSULE, DELAYED RELEASE ORAL at 21:02

## 2020-01-26 RX ADMIN — DOXYCYCLINE HYCLATE 100 MG: 100 CAPSULE ORAL at 08:56

## 2020-01-26 RX ADMIN — GUAIFENESIN 400 MG: 400 TABLET, FILM COATED ORAL at 14:54

## 2020-01-26 RX ADMIN — BUPROPION HYDROCHLORIDE 75 MG: 75 TABLET, FILM COATED ORAL at 21:02

## 2020-01-26 RX ADMIN — Medication 5000 UNITS: at 18:11

## 2020-01-26 RX ADMIN — PREGABALIN 150 MG: 150 CAPSULE ORAL at 14:53

## 2020-01-26 ASSESSMENT — PAIN SCALES - GENERAL
PAINLEVEL_OUTOF10: 6
PAINLEVEL_OUTOF10: 4
PAINLEVEL_OUTOF10: 0
PAINLEVEL_OUTOF10: 3
PAINLEVEL_OUTOF10: 4
PAINLEVEL_OUTOF10: 0

## 2020-01-26 ASSESSMENT — PAIN DESCRIPTION - LOCATION
LOCATION: HIP;BACK;GENERALIZED
LOCATION: GENERALIZED
LOCATION: GENERALIZED

## 2020-01-26 ASSESSMENT — PAIN DESCRIPTION - DESCRIPTORS
DESCRIPTORS: ACHING;CONSTANT;CRUSHING;DISCOMFORT
DESCRIPTORS: ACHING;DISCOMFORT;DULL;CONSTANT
DESCRIPTORS: ACHING;DISCOMFORT;CONSTANT

## 2020-01-26 ASSESSMENT — PAIN DESCRIPTION - PAIN TYPE
TYPE: CHRONIC PAIN
TYPE: CHRONIC PAIN

## 2020-01-26 ASSESSMENT — PAIN DESCRIPTION - ORIENTATION: ORIENTATION: RIGHT

## 2020-01-26 ASSESSMENT — PAIN DESCRIPTION - ONSET: ONSET: ON-GOING

## 2020-01-26 ASSESSMENT — PAIN DESCRIPTION - FREQUENCY: FREQUENCY: CONTINUOUS

## 2020-01-26 NOTE — PROGRESS NOTES
Admit Date: 1/23/2020      Subjective:     Patient: no acute issues reported overnight  Medication side effects: none    Scheduled Meds:   doxycycline hyclate  100 mg Oral 2 times per day    albuterol  2.5 mg Nebulization Q4H    guaiFENesin  400 mg Oral TID    heparin (porcine)  5,000 Units Subcutaneous Q8H    sodium chloride flush  10 mL Intravenous 2 times per day    budesonide  500 mcg Nebulization BID    buPROPion  75 mg Oral BID    chlorhexidine  15 mL Mouth/Throat Daily    vitamin D  5,000 Units Oral Daily    DULoxetine  30 mg Oral BID    formoterol  20 mcg Nebulization BID    levothyroxine  125 mcg Oral Daily    melatonin  10 mg Oral Nightly    mirtazapine  30 mg Oral Nightly    morphine  30 mg Oral BID    nortriptyline  10 mg Oral Nightly    oxyCODONE HCl  10 mg Oral TID    [START ON 2/6/2020] pembrolizumab  200 mg Intravenous Q21 Days    predniSONE  10 mg Oral Daily    pregabalin  150 mg Oral TID    Revefenacin  3 mL Inhalation Daily    senna-docusate  2 tablet Oral Daily    simvastatin  40 mg Oral Nightly    tamsulosin  0.4 mg Oral Nightly    pantoprazole  40 mg Oral QAM AC     Continuous Infusions:  PRN Meds:sodium chloride flush, acetaminophen, albuterol, cyclobenzaprine, LORazepam    Objective:   I/O last 3 completed shifts: In: 960 [P.O.:960]  Out: 2425 [Urine:2425]  No intake/output data recorded.     /71   Pulse 88   Temp 97 °F (36.1 °C) (Temporal)   Resp 18   Ht 5' 11\" (1.803 m)   Wt 203 lb (92.1 kg)   SpO2 96%   BMI 28.31 kg/m²   General appearance: alert, appears stated age and cooperative  Skin:negative  Heent:negative  Lungs: diminished breath sounds bibasilar  Heart: regular rate and rhythm, S1, S2 normal, no murmur, click, rub or gallop  Abdomen: soft, non-tender; bowel sounds normal; no masses,  no organomegaly  Extremities:no edema  Neurologic: Grossly normal    Labs:  CBC with Differential:    Lab Results   Component Value Date    WBC 6.2 01/26/2020 RBC 4.20 01/26/2020    HGB 10.4 01/26/2020    HCT 34.8 01/26/2020     01/26/2020    MCV 82.9 01/26/2020    MCH 24.8 01/26/2020    MCHC 29.9 01/26/2020    RDW 15.6 01/26/2020    SEGSPCT 59 02/22/2012    LYMPHOPCT 9.6 01/23/2020    MONOPCT 7.1 01/23/2020    BASOPCT 1.1 01/23/2020    MONOSABS 0.58 01/23/2020    LYMPHSABS 0.78 01/23/2020    EOSABS 0.21 01/23/2020    BASOSABS 0.09 01/23/2020     BMP:    Lab Results   Component Value Date     01/26/2020    K 3.7 01/26/2020    K 3.9 09/22/2019     01/26/2020    CO2 23 01/26/2020    BUN 11 01/26/2020    LABALBU 3.3 01/26/2020    CREATININE 1.3 01/26/2020    CALCIUM 9.4 01/26/2020    GFRAA >60 01/26/2020    LABGLOM 56 01/26/2020     PT/INR:    Lab Results   Component Value Date    PROTIME 17.4 09/22/2019    INR 1.5 09/22/2019     Last 3 Troponin:    Lab Results   Component Value Date    TROPONINI 0.02 01/23/2020    TROPONINI <0.01 09/17/2018    TROPONINI <0.01 07/05/2017     TSH:    Lab Results   Component Value Date    TSH 14.310 01/23/2020      Assessment:     1. CARLA secondary to dehydration  2. Nicotine dependency  3, H/O stage IV adenocarcinoma of the lung  4. Primary SCC of the R vocal cord  5.  S/P trach w/Shiley 6.0 cuffless    Plan:       Continue same plan and orders    Oral intake improving  Hopefully home tomorrow

## 2020-01-26 NOTE — PROGRESS NOTES
Nephrology Progress Note  Patient's Name: Michelle Solano  9:02 AM  1/26/2020      Reason for Consult: Acute kidney injury  Requesting Physician:  Randell Munoz DO    Chief Complaint: Shortness of breath  History Obtained From:  patient and E HR    History of Present Ilness:    Michelle Solano is a 59 y.o. 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 He reports having a diarrheal virus illness this week. This was followed by increasing shortness of breath over the course of the past several days. His wife had observe him at home today. Lethargic and EMS were called at the time. Upon their arrival patient was noted to be hypotensive with a systolic blood pressure in the 80s. He was also hypoxic with oxygen saturation in the 80s. He reports a nonproductive cough but denied fever or chills. On presentation to ED his initial vital signs reveal patient to be hypotensive with blood pressure of 78/51, temperature 97.7, and oxygen saturation were noted to be 95%. . Initial laboratory data was significant for a BUN of 24, creatinine 3. 5(recent baseline 1.1), CO2 21, WBC 8.1, hemoglobin 12.9. Urinalysis was reported as having many casts otherwise unremarkable. Chest x-ray performed demonstrated trace bibasilar atelectasis with no definite pneumonia. Patient received cefepime and vancomycin. He was in he was also started on IV fluids and admitted to telemetry for further management. Creatinine has shown an improvement from 3.8 peak to currently 1.8.     Subjective    1/25: No new new acute issues   1/26: No acute issues overnight; denies SOB    Current Medications:    doxycycline hyclate (VIBRAMYCIN) capsule 100 mg, 2 times per day  albuterol (PROVENTIL) nebulizer solution 2.5 mg, Q4H  guaiFENesin

## 2020-01-27 ENCOUNTER — APPOINTMENT (OUTPATIENT)
Dept: GENERAL RADIOLOGY | Age: 65
DRG: 177 | End: 2020-01-27
Payer: MEDICARE

## 2020-01-27 VITALS
DIASTOLIC BLOOD PRESSURE: 67 MMHG | TEMPERATURE: 97.8 F | HEIGHT: 71 IN | OXYGEN SATURATION: 95 % | BODY MASS INDEX: 28.42 KG/M2 | SYSTOLIC BLOOD PRESSURE: 119 MMHG | HEART RATE: 75 BPM | WEIGHT: 203 LBS | RESPIRATION RATE: 18 BRPM

## 2020-01-27 LAB
ALBUMIN SERPL-MCNC: 3.5 G/DL (ref 3.5–5.2)
ALP BLD-CCNC: 79 U/L (ref 40–129)
ALT SERPL-CCNC: 21 U/L (ref 0–40)
ANION GAP SERPL CALCULATED.3IONS-SCNC: 14 MMOL/L (ref 7–16)
AST SERPL-CCNC: 16 U/L (ref 0–39)
BILIRUB SERPL-MCNC: <0.2 MG/DL (ref 0–1.2)
BUN BLDV-MCNC: 13 MG/DL (ref 8–23)
CALCIUM SERPL-MCNC: 9.2 MG/DL (ref 8.6–10.2)
CHLORIDE BLD-SCNC: 103 MMOL/L (ref 98–107)
CO2: 25 MMOL/L (ref 22–29)
CREAT SERPL-MCNC: 1.3 MG/DL (ref 0.7–1.2)
GFR AFRICAN AMERICAN: >60
GFR NON-AFRICAN AMERICAN: 56 ML/MIN/1.73
GLUCOSE BLD-MCNC: 93 MG/DL (ref 74–99)
HCT VFR BLD CALC: 34.1 % (ref 37–54)
HEMOGLOBIN: 10.4 G/DL (ref 12.5–16.5)
MCH RBC QN AUTO: 25.6 PG (ref 26–35)
MCHC RBC AUTO-ENTMCNC: 30.5 % (ref 32–34.5)
MCV RBC AUTO: 83.8 FL (ref 80–99.9)
PDW BLD-RTO: 15.8 FL (ref 11.5–15)
PLATELET # BLD: 276 E9/L (ref 130–450)
PMV BLD AUTO: 10.7 FL (ref 7–12)
POTASSIUM SERPL-SCNC: 3.3 MMOL/L (ref 3.5–5)
RBC # BLD: 4.07 E12/L (ref 3.8–5.8)
SODIUM BLD-SCNC: 142 MMOL/L (ref 132–146)
TOTAL PROTEIN: 6.4 G/DL (ref 6.4–8.3)
WBC # BLD: 6.2 E9/L (ref 4.5–11.5)

## 2020-01-27 PROCEDURE — 94640 AIRWAY INHALATION TREATMENT: CPT

## 2020-01-27 PROCEDURE — 85027 COMPLETE CBC AUTOMATED: CPT

## 2020-01-27 PROCEDURE — 6360000002 HC RX W HCPCS: Performed by: INTERNAL MEDICINE

## 2020-01-27 PROCEDURE — 80053 COMPREHEN METABOLIC PANEL: CPT

## 2020-01-27 PROCEDURE — 6360000002 HC RX W HCPCS: Performed by: FAMILY MEDICINE

## 2020-01-27 PROCEDURE — 2580000003 HC RX 258: Performed by: FAMILY MEDICINE

## 2020-01-27 PROCEDURE — 2700000000 HC OXYGEN THERAPY PER DAY

## 2020-01-27 PROCEDURE — 6370000000 HC RX 637 (ALT 250 FOR IP): Performed by: INTERNAL MEDICINE

## 2020-01-27 PROCEDURE — 6370000000 HC RX 637 (ALT 250 FOR IP): Performed by: FAMILY MEDICINE

## 2020-01-27 PROCEDURE — 36415 COLL VENOUS BLD VENIPUNCTURE: CPT

## 2020-01-27 PROCEDURE — 71045 X-RAY EXAM CHEST 1 VIEW: CPT

## 2020-01-27 RX ORDER — DOXYCYCLINE HYCLATE 100 MG/1
100 CAPSULE ORAL EVERY 12 HOURS SCHEDULED
Qty: 20 CAPSULE | Refills: 0 | Status: SHIPPED | OUTPATIENT
Start: 2020-01-27 | End: 2020-02-06

## 2020-01-27 RX ORDER — FUROSEMIDE 10 MG/ML
20 INJECTION INTRAMUSCULAR; INTRAVENOUS ONCE
Status: COMPLETED | OUTPATIENT
Start: 2020-01-27 | End: 2020-01-27

## 2020-01-27 RX ADMIN — DOXYCYCLINE HYCLATE 100 MG: 100 CAPSULE ORAL at 09:12

## 2020-01-27 RX ADMIN — PREGABALIN 150 MG: 150 CAPSULE ORAL at 14:05

## 2020-01-27 RX ADMIN — BUDESONIDE 500 MCG: 0.5 SUSPENSION RESPIRATORY (INHALATION) at 09:03

## 2020-01-27 RX ADMIN — FUROSEMIDE 20 MG: 20 INJECTION, SOLUTION INTRAMUSCULAR; INTRAVENOUS at 12:16

## 2020-01-27 RX ADMIN — DULOXETINE HYDROCHLORIDE 30 MG: 30 CAPSULE, DELAYED RELEASE ORAL at 09:12

## 2020-01-27 RX ADMIN — SODIUM CHLORIDE, PRESERVATIVE FREE 10 ML: 5 INJECTION INTRAVENOUS at 09:13

## 2020-01-27 RX ADMIN — GUAIFENESIN 400 MG: 400 TABLET, FILM COATED ORAL at 14:05

## 2020-01-27 RX ADMIN — PREGABALIN 150 MG: 150 CAPSULE ORAL at 09:10

## 2020-01-27 RX ADMIN — CHLORHEXIDINE GLUCONATE 0.12% ORAL RINSE 15 ML: 1.2 LIQUID ORAL at 09:12

## 2020-01-27 RX ADMIN — HEPARIN SODIUM 5000 UNITS: 10000 INJECTION INTRAVENOUS; SUBCUTANEOUS at 05:34

## 2020-01-27 RX ADMIN — OXYCODONE HYDROCHLORIDE 10 MG: 10 TABLET ORAL at 14:04

## 2020-01-27 RX ADMIN — FORMOTEROL FUMARATE DIHYDRATE 20 MCG: 20 SOLUTION RESPIRATORY (INHALATION) at 09:03

## 2020-01-27 RX ADMIN — PREDNISONE 10 MG: 10 TABLET ORAL at 09:12

## 2020-01-27 RX ADMIN — SENNOSIDES AND DOCUSATE SODIUM 2 TABLET: 8.6; 5 TABLET ORAL at 09:12

## 2020-01-27 RX ADMIN — ALBUTEROL SULFATE 2.5 MG: 2.5 SOLUTION RESPIRATORY (INHALATION) at 03:52

## 2020-01-27 RX ADMIN — GUAIFENESIN 400 MG: 400 TABLET, FILM COATED ORAL at 09:12

## 2020-01-27 RX ADMIN — BUPROPION HYDROCHLORIDE 75 MG: 75 TABLET, FILM COATED ORAL at 09:12

## 2020-01-27 RX ADMIN — MORPHINE SULFATE 30 MG: 15 TABLET, FILM COATED, EXTENDED RELEASE ORAL at 09:10

## 2020-01-27 RX ADMIN — LEVOTHYROXINE SODIUM 125 MCG: 0.12 TABLET ORAL at 05:34

## 2020-01-27 RX ADMIN — OXYCODONE HYDROCHLORIDE 10 MG: 10 TABLET ORAL at 09:10

## 2020-01-27 RX ADMIN — ALBUTEROL SULFATE 2.5 MG: 2.5 SOLUTION RESPIRATORY (INHALATION) at 09:02

## 2020-01-27 ASSESSMENT — PAIN DESCRIPTION - LOCATION: LOCATION: GENERALIZED

## 2020-01-27 ASSESSMENT — PAIN DESCRIPTION - DESCRIPTORS: DESCRIPTORS: ACHING;CONSTANT

## 2020-01-27 ASSESSMENT — PAIN SCALES - GENERAL
PAINLEVEL_OUTOF10: 0
PAINLEVEL_OUTOF10: 4

## 2020-01-27 ASSESSMENT — PAIN DESCRIPTION - ONSET: ONSET: ON-GOING

## 2020-01-27 ASSESSMENT — PAIN DESCRIPTION - PROGRESSION: CLINICAL_PROGRESSION: NOT CHANGED

## 2020-01-27 ASSESSMENT — PAIN DESCRIPTION - FREQUENCY: FREQUENCY: CONTINUOUS

## 2020-01-27 ASSESSMENT — PAIN DESCRIPTION - PAIN TYPE: TYPE: ACUTE PAIN;CHRONIC PAIN

## 2020-01-27 NOTE — CARE COORDINATION
Met with patient at bedside. His plan at discharge remains Home. He does not anticipate any needs. He cares for trach independently. Supplies through [employer]. Wife will provide transportation at discharge.  CM will continue to follow

## 2020-01-27 NOTE — PROGRESS NOTES
Bhavna Acosta    Pt. Known to our group -  . Status reviewed  SUBJECTIVE:  Admitted with dyspnea , near syncope     Diarrhea resolved, feels improved and breathing easier  Wife at bedside    OBJECTIVE:    /67   Pulse 75   Temp 97.8 °F (36.6 °C) (Temporal)   Resp 18   Ht 5' 11\" (1.803 m)   Wt 203 lb (92.1 kg)   SpO2 97%   BMI 28.31 kg/m²   PHYSICAL:  GENERAL:  Alert, orient x 3, in no acute distress. HEENT:  No icterus  LYMPH:  No lymphadenopathy. HEART:  Regular rate and rhythm. No murmurs. LUNGS:  Clear to auscultation. ABDOMEN:  Soft. Non-tender. No mass / ascites  EXTREMITIES:  Warm,dry. No edema. NEURO:  No focal deficits.            CBC with Differential:    Lab Results   Component Value Date    WBC 6.2 01/27/2020    RBC 4.07 01/27/2020    HGB 10.4 01/27/2020    HCT 34.1 01/27/2020     01/27/2020    MCV 83.8 01/27/2020    MCH 25.6 01/27/2020    MCHC 30.5 01/27/2020    RDW 15.8 01/27/2020    SEGSPCT 59 02/22/2012    LYMPHOPCT 9.6 01/23/2020    MONOPCT 7.1 01/23/2020    BASOPCT 1.1 01/23/2020    MONOSABS 0.58 01/23/2020    LYMPHSABS 0.78 01/23/2020    EOSABS 0.21 01/23/2020    BASOSABS 0.09 01/23/2020        CMP:    Lab Results   Component Value Date     01/27/2020    K 3.3 01/27/2020    K 3.9 09/22/2019     01/27/2020    CO2 25 01/27/2020    BUN 13 01/27/2020    CREATININE 1.3 01/27/2020    GFRAA >60 01/27/2020    LABGLOM 56 01/27/2020    GLUCOSE 93 01/27/2020    GLUCOSE 130 02/22/2012    PROT 6.4 01/27/2020    LABALBU 3.5 01/27/2020    CALCIUM 9.2 01/27/2020    BILITOT <0.2 01/27/2020    ALKPHOS 79 01/27/2020    AST 16 01/27/2020    ALT 21 01/27/2020     LDH:  No results found for: LDH  PT/INR:    Lab Results   Component Value Date    PROTIME 17.4 09/22/2019    INR 1.5 09/22/2019     PTT:    Lab Results   Component Value Date    APTT 29.9 09/22/2019   [APTT  VITAMIN B12: No components found for: B12  FOLATE:  No results found for: FOLATE  IRON:  No results

## 2020-01-27 NOTE — PROGRESS NOTES
SUBJECTIVE:                                                    Josiane Zurita is a 66 year old female who presents to clinic today for the following health issues:    Chief Complaint   Patient presents with     RECHECK     cellulitis of right lower leg. Fell at work 3/1/2017 and had contusion to right leg. Has had 3 recephin injections at our clinic, and 5 total      Josiane had fallen at work at the beginning of the month,, causing a contusion of her right leg. She was seen in clinic about a week later, on March 8, and was diagnosed with cellulitis. She was started on Rocephin daily in clinic and has been getting that in the infusion center over the weekend, so she has now had five days of therapy.    She reports that the redness and tenderness is improving. She is trying to keep her leg elevated.    OBJECTIVE: /71 (BP Location: Right arm, Patient Position: Chair, Cuff Size: Adult Large)  Pulse 78  Temp 98.7  F (37.1  C) (Tympanic)  Wt (!) 322 lb (146.1 kg)  SpO2 96%  BMI 57.04 kg/m2    The bruising seems to be improved from what was described at her first visit.  There is erythema of the lower 2/3 of the shin extending around the sides of the calf, but the edges of this are now a light pink and she states this tissue used to be bright red as well. There are no vesicles or abscesses noted, no drainage or open wounds. The skin remains warm to touch, but is less tender by history.  There is no ankle edema.    ASSESSMENT: lower leg cellulitis, improving     morbid obesity    PLAN: We will give her one more dose of Rocephin 1 gram IM today, then have her start cephalexin 500 mg QID for the next 10 days.  Keep leg elevated as much as possible, but given her morbid obesity, it is also important to have some movement. Prolonged standing is not recommended, but she will walk to the bathroom or to make meals or other needed housework, but should avoid prolonged sitting with the leg down. When sitting or recumbent  [facility]  Nephrology Attending Progress Note  Swati Sanchez. Osvaldo Peralta MD        SUBJECTIVE:     1/24: Shawn Nix is a 59 y.o. 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 He reports having a diarrheal virus illness this week. This was followed by increasing shortness of breath over the course of the past several days. His wife had observe him at home today. Lethargic and EMS were called at the time. Upon their arrival patient was noted to be hypotensive with a systolic blood pressure in the 80s. He was also hypoxic with oxygen saturation in the 80s. He reports a nonproductive cough but denied fever or chills. On presentation to ED his initial vital signs reveal patient to be hypotensive with blood pressure of 78/51, temperature 97.7, and oxygen saturation were noted to be 95%. . Initial laboratory data was significant for a BUN of 24, creatinine 3. 5(recent baseline 1.1), CO2 21, WBC 8.1, hemoglobin 12.9. Urinalysis was reported as having many casts otherwise unremarkable. Chest x-ray performed demonstrated trace bibasilar atelectasis with no definite pneumonia. Patient received cefepime and vancomycin. He was in he was also started on IV fluids and admitted to telemetry for further management.   Creatinine has shown an improvement from 3.8 peak to currently 1.8.     1/25: No new new acute issues     1/26: No acute issues overnight; denies SOB, walking around room      PROBLEM LIST:    Patient Active Problem List   Diagnosis    Malignant neoplasm of overlapping sites of left lung ([address]    Airway obstruction    Palliative care encounter    COPD [facility]chronic [facility] disease) [facility]    Sepsis [facility]    HAP (hospital-acquired pneumonia)    Tobacco dependence    Acute on with it up, rotate the ankle or bend the knee from time to time to maintain muscle tone and circulation. Recheck in clinic in three days to see how she is doing on the oral medication -- if continuing to improve, then recheck a week later to see if medication needs to be continued beyond that time.    Alena Esqueda md   rate and rhythm, no murmurs, gallops, or rubs  Respiratory:  No rales, rhochi, or wheezes  Gastrointestinal:  Soft, nontender, nondistended, bowel sounds x 4  Ext: tr edema  Skin: dry, no rash  Neuro: aaox3    MEDS (scheduled):    sennosides-docusate sodium  2 tablet Oral Daily    doxycycline hyclate  100 mg Oral 2 times per day    albuterol  2.5 mg Nebulization Q4H    guaiFENesin  400 mg Oral TID    heparin (porcine)  5,000 Units Subcutaneous Q8H    sodium chloride flush  10 mL Intravenous 2 times per day    budesonide  500 mcg Nebulization BID    buPROPion  75 mg Oral BID    chlorhexidine  15 mL Mouth/Throat Daily    vitamin D  5,000 Units Oral Daily    DULoxetine  30 mg Oral BID    formoterol  20 mcg Nebulization BID    levothyroxine  125 mcg Oral Daily    melatonin  10 mg Oral Nightly    mirtazapine  30 mg Oral Nightly    morphine  30 mg Oral BID    nortriptyline  10 mg Oral Nightly    oxyCODONE HCl  10 mg Oral TID    [START ON 2/6/2020] pembrolizumab  200 mg Intravenous Q21 Days    predniSONE  10 mg Oral Daily    pregabalin  150 mg Oral TID    Revefenacin  3 mL Inhalation Daily    simvastatin  40 mg Oral Nightly    tamsulosin  0.4 mg Oral Nightly    pantoprazole  40 mg Oral QAM AC       MEDS (infusions):      MEDS (prn):  sodium chloride flush, acetaminophen, albuterol, cyclobenzaprine, LORazepam    DATA:    Recent Labs     01/25/20  0442 01/26/20  0430 01/27/20  0420   WBC 5.3 6.2 6.2   HGB 9.7* 10.4* 10.4*   HCT 32.5* 34.8* 34.1*   MCV 82.7 82.9 83.8    257 276     Recent Labs     01/25/20  0442 01/26/20  0430 01/27/20  0420    142 142   K 4.0 3.7 3.3*   * 106 103   CO2 21* 23 25   BUN 11 11 13   CREATININE 1.3* 1.3* 1.3*   LABGLOM 56 56 56   GLUCOSE 95 133* 93   CALCIUM 9.1 9.4 9.2   ALT 22 22 21   AST 20 20 16   BILITOT <0.2 <0.2 <0.2   ALKPHOS 77 79 79       Lab Results   Component Value Date    LABALBU 3.5 01/27/2020    LABALBU 3.3 (L) 01/26/2020    LABALBU 3.0

## 2020-01-27 NOTE — PROGRESS NOTES
-outpatient follow-up  -PET scan as outpatient  -tobacco cessation!     Electronically signed by Dallas Betancourt DO on 1/27/2020 at 3:33 PM

## 2020-01-28 ENCOUNTER — CARE COORDINATION (OUTPATIENT)
Dept: CARE COORDINATION | Age: 65
End: 2020-01-28

## 2020-01-28 LAB
BLOOD CULTURE, ROUTINE: NORMAL
CULTURE, BLOOD 2: NORMAL

## 2020-01-28 PROCEDURE — 1111F DSCHRG MED/CURRENT MED MERGE: CPT | Performed by: FAMILY MEDICINE

## 2020-01-28 NOTE — CARE COORDINATION
[facility] Transitions Initial Follow Up Call    Call within 2 business days of discharge: Yes    Patient: Nahun Moore Patient : 1955   MRN: [id number]  Reason for Admission: Syncope and Collapse  Discharge Date: 20 RARS: Readmission Risk Score: 25      Last Discharge [facility]       Complaint Diagnosis Description Type Department Provider    20 Shortness of Breath; Loss of Consciousness Syncope and collapse . .. ED to Hosp-Admission (Discharged) (ADMITTED) [facility] 4S PICU Sylvia Quintana DO; Maria Guadalupe Swan DO           Spoke with:  Patient's spouse, Elvie Flores. Elvie Flores is listed on the patient's HIPAA form. CTN explained role and reason for call, and Elvie Flores is receptive to participating the hospital follow up call. Elvie Flores is answering the questions on the 24 hour call on behalf of her .      Facility: [facility]    Non-face-to-face services provided:  Obtained and reviewed discharge summary and/or continuity of care documents; medication reconciliation completed with the patient's spouse, Elvie Flores.   -[id number] entered    Care Transitions 24 Hour Call    Do you have any ongoing symptoms?:  Yes  Patient-reported symptoms:  Pain (Comment: generalized pain, right hip pain, lower back pain, and neuropathy on left side; spouse reports good pain management with patient's current regimen)  -Pain management per Palliative Care  Interventions for patient-reported symptoms:  Notified PCP/Physician (Comment: note forwarded to Dr. Tex Nascimento for review)  Do you have a copy of your discharge instructions?:  Yes  Do you have all of your prescriptions and are they filled?:  Yes  -Elvie Flores reports patient is taking all prescribed medications as ordered.  -Patient wears oxygen at 4 L at hs and prn  Have you been contacted by a [facility] [place] Pharmacist?:  No  Have you scheduled your follow up appointment?:  No  Were you discharged with any Home Care or Post Acute Services:  No  Do you feel like you have everything you need to keep you well at home?:  Yes  Care Transitions Interventions; Notified Kiel Suresh,  at Dr. Martinez's office that patient will need to be called and scheduled for a hospital follow up/TCM visit; patient discharged to home on 1/27/2020. Patient's spouse reports patient is back to baseline; eating, drinking, and urinating without difficulty. Kiel Suresh reports patient's appetite is back to normal.   CTN discussed would like to follow patient over the next few weeks, and Kiel Suresh is receptive to receiving follow up calls. Emotional support provided. Encouraged Casie to call with any questions or concerns; CTN contact information provided.         Follow Up  Future Appointments   Date Time Provider Alfredo Sotomayor   2/7/2020  2:00 PM DO Lilian Shah   2/20/2020  1:00 PM Warner Aponte MD [facility]   3/9/2020 11:00 AM SCHEDULE, [facility] PALLIATIVE CARE PROVIDER [facility]   4/14/2020  1:15 PM MD [facility] WassermanNEOHINFDS [facility]   4/15/2020 10:45 AM DO Rosalio Shaho [facility] GUZMANSY NILAM Stewart RN

## 2020-02-04 ENCOUNTER — HOSPITAL ENCOUNTER (OUTPATIENT)
Age: 65
Discharge: HOME | End: 2020-02-06
Payer: MEDICARE

## 2020-02-04 PROCEDURE — 80048 BASIC METABOLIC PNL TOTAL CA: CPT

## 2020-02-05 LAB
ANION GAP SERPL CALCULATED.3IONS-SCNC: 14 MMOL/L (ref 7–16)
BUN BLDV-MCNC: 20 MG/DL (ref 8–23)
CALCIUM SERPL-MCNC: 9.5 MG/DL (ref 8.6–10.2)
CHLORIDE BLD-SCNC: 103 MMOL/L (ref 98–107)
CO2: 22 MMOL/L (ref 22–29)
CREAT SERPL-MCNC: 1.4 MG/DL (ref 0.7–1.2)
GFR AFRICAN AMERICAN: >60
GFR NON-AFRICAN AMERICAN: 51 ML/MIN/1.73
GLUCOSE BLD-MCNC: 104 MG/DL (ref 74–99)
POTASSIUM SERPL-SCNC: 4.8 MMOL/L (ref 3.5–5)
SODIUM BLD-SCNC: 139 MMOL/L (ref 132–146)

## 2020-02-10 ENCOUNTER — CARE COORDINATION (OUTPATIENT)
Dept: CARE COORDINATION | Age: 65
End: 2020-02-10

## 2020-02-10 NOTE — CARE COORDINATION
[facility] Transitions Follow Up Call    2/10/2020    Patient: Za Franz. Patient : 1955   MRN: [id number]  Reason for Admission: Syncope and Collapse  Discharge Date: 20 RARS: Readmission Risk Score: 25         Spoke with: Patient's spouse, Kevin Dumont. Care Transitions Subsequent and Final Call    Subsequent and Final Calls  Do you have any ongoing symptoms?:  Yes  Patient-reported symptoms:  Pain  -spouse states patient reports pain level is unchanged; to discuss with Palliative Care   -C/O generalized pain, right hip pain, lower back pain, an neuropathy on left side. Do you have any questions related to your medications?:  No  Do you currently have any active services?:  No  Do you have any needs or concerns that I can assist you with?:  No  Identified Barriers:  Impairment  Care Transitions Interventions; none at this time       Emotional support provided. Discussed final call for follow up; spouse reports no needs. CTN encouraged Casie to reach out for any questions, needs, or concerns; CTN contact information provided.      Follow Up  Future Appointments   Date Time Provider Alfredo Sotomayor   2020 11:00 AM SEB PET SCAN [facility] PET [facility]   3/9/2020 11:00 AM SCHEDULE, [facility] PALLIATIVE CARE PROVIDER [facility]   2020  1:15 PM MD VENICE SpencerOHINFDS [facility]   2020  1:30 PM Juan Franco MD [facility]   2020 11:00 AM DO Lilian Shah RN

## 2020-02-11 ENCOUNTER — HOSPITAL ENCOUNTER (OUTPATIENT)
Dept: PET IMAGING | Age: 65
Discharge: HOME | End: 2020-02-13
Payer: MEDICARE

## 2020-02-11 LAB — METER GLUCOSE: 100 MG/DL (ref 74–99)

## 2020-02-11 PROCEDURE — 82962 GLUCOSE BLOOD TEST: CPT

## 2020-02-11 PROCEDURE — A9552 F18 FDG: HCPCS | Performed by: RADIOLOGY

## 2020-02-11 PROCEDURE — 78815 PET IMAGE W/CT SKULL-THIGH: CPT

## 2020-02-11 PROCEDURE — 3430000000 HC RX DIAGNOSTIC RADIOPHARMACEUTICAL: Performed by: RADIOLOGY

## 2020-02-11 RX ORDER — FLUDEOXYGLUCOSE F 18 200 MCI/ML
15 INJECTION, SOLUTION INTRAVENOUS
Status: COMPLETED | OUTPATIENT
Start: 2020-02-11 | End: 2020-02-11

## 2020-02-11 RX ADMIN — FLUDEOXYGLUCOSE F 18 15 MILLICURIE: 200 INJECTION, SOLUTION INTRAVENOUS at 11:11

## 2020-02-25 RX ORDER — MORPHINE SULFATE 30 MG/1
30 TABLET, FILM COATED, EXTENDED RELEASE ORAL 2 TIMES DAILY
Qty: 60 TABLET | Refills: 0 | Status: SHIPPED
Start: 2020-02-25 | End: 2020-03-24 | Stop reason: ALTCHOICE

## 2020-02-25 RX ORDER — OXYCODONE HYDROCHLORIDE 10 MG/1
10 TABLET ORAL EVERY 6 HOURS PRN
Qty: 120 TABLET | Refills: 0 | Status: SHIPPED
Start: 2020-02-25 | End: 2020-03-24 | Stop reason: SDUPTHER

## 2020-02-26 NOTE — DISCHARGE SUMMARY
activity was increased. His oral intake  did improve and he was discharged home on 01/27 in satisfactory  condition. PHYSICIANS FOLLOWING THE PATIENT DURING THIS HOSPITAL STAY:  Nephrology  and pulmonary services. DISCHARGE CONDITION:  Level of function is fair to good. UNREPORTED TEST RESULTS AND INTENDED FOLLOWUP:  Nonapplicable. LEVEL OF ACTIVITY:  Up ad efrain with assist.    DISCHARGE DIET:  Includes low-fat, low-cholesterol, 2-gm sodium diet. DISCHARGE MEDICATIONS:  See discharge med reconciliation form. FOLLOWUP:  Will be in the office accordingly.         Yadiel Villatoro DO    D: 02/25/2020 17:06:10       T: 02/25/2020 17:18:00     [email or web address]  Job#: [id number]     Doc#: [id number]    CC:

## 2020-03-02 ENCOUNTER — OFFICE VISIT (OUTPATIENT)
Dept: PALLATIVE CARE | Age: 65
End: 2020-03-02
Payer: MEDICARE

## 2020-03-02 ENCOUNTER — HOSPITAL ENCOUNTER (OUTPATIENT)
Age: 65
Discharge: HOME | End: 2020-03-02
Payer: MEDICARE

## 2020-03-02 VITALS
HEART RATE: 88 BPM | BODY MASS INDEX: 28.48 KG/M2 | DIASTOLIC BLOOD PRESSURE: 65 MMHG | OXYGEN SATURATION: 94 % | SYSTOLIC BLOOD PRESSURE: 102 MMHG | WEIGHT: 204.2 LBS

## 2020-03-02 PROCEDURE — 99214 OFFICE O/P EST MOD 30 MIN: CPT | Performed by: NURSE PRACTITIONER

## 2020-03-02 PROCEDURE — 87081 CULTURE SCREEN ONLY: CPT

## 2020-03-02 PROCEDURE — 99212 OFFICE O/P EST SF 10 MIN: CPT | Performed by: NURSE PRACTITIONER

## 2020-03-02 RX ORDER — FENTANYL 75 UG/H
1 PATCH TRANSDERMAL
Qty: 10 PATCH | Refills: 0 | Status: SHIPPED
Start: 2020-03-02 | End: 2020-04-16 | Stop reason: SDUPTHER

## 2020-03-02 NOTE — PROGRESS NOTES
products on the day of surgery    [] If not already done, you can expect a call from registration    [x] You can expect a call the business day prior to procedure to notify you if your arrival time changes    [x] If you receive a survey after surgery we would greatly appreciate your comments    [] Parent/guardian of a minor must accompany their child and remain on the premises  the entire time they are under our care     [] Pediatric patients may bring favorite toy, blanket or comfort item with them    [] A caregiver or family member must remain with the patient during their stay if they are mentally handicapped, have dementia, disoriented or unable to use a call light or would be a safety concern if left unattended    [x] Please notify surgeon if you develop any illness between now and time of surgery (cold, cough, sore throat, fever, nausea, vomiting) or any signs of infections  including skin, wounds, and dental.    []  The Outpatient Pharmacy is available to fill your prescription here on your day of surgery, ask your preop nurse for details    [] Other instructions  EDUCATIONAL MATERIALS PROVIDED:    [] PAT Preoperative Education Packet/Booklet     [] Medication List    [] Fluoroscopy Information Pamphlet    [] Transfusion bracelet applied with instructions    [] Joint replacement video reviewed    [] Shower with soap, lather and rinse well, and use CHG wipes provided the evening before surgery as instructed

## 2020-03-02 NOTE — PROGRESS NOTES
Palliative Medicine Outpatient Visit  3/2/2020  Provider: Sabrina PEDERSON-GERONIMO    Referring Provider: Dr. Deirdre Pak    Reason for Consult:  [] [address]  [x] Assist with goals of care  [] Transition of care  [x] Symptom Management    See below for recommendations, as indicated, concernin. Advanced Care Planning  2. Anticipatory Guidance  3. Transition of Care  4. Symptom Management      CC: Pain    HPI:   As per Dr. Jamal Calderon assessment on 16:   Mr. Gillian Casanova is a pleasant and cooperative 61year old man with a recent diagnosis of stage III NSCLC (probable) with impending cord compression. We recommend a concurrent approach to definitive management of this locally advanced non small cell lung cancer [chemo dosing per [address] record- see EMR]. Based on the clinical characteristic, this disease and stage is generally considered unresectable; optimal therapy tends to be a concurrent chemo-RT approach. Concomitant chemo-RT compared with sequential chemo-RT improved overall survival, primarily through better locoregional control, at the cost of manageable increases in acute esophageal toxicity as based on the Auperin metanalysis. These data demonstrate a benefit of concomitant chemo-RT over sequential chemo->RT on OS (HR 0.84, SS), with absolute benefit at 3-years of 5.7% (18% to 24%), 5-years 4.5% (11% to 15%). Interestingly, there was no difference in PFS (HR 0.9, p=0.07). However a decrease in locoregional progression (HR 0.777, SS), with absolute decrease of 6% at 5 years (35% to 29%) was shown. There was no difference on distant progression (HR 1.04, NS), with 5-year rate of about 40% [facility] Oncol 2010 May 1;28(13):2281-4939). Regarding the radiation dose, 60 Gy in 2Gy/fx was established long ago in RTOG 73-01, with several other trials showing a feasibility of much higher doses however with RT alone.  Interestingly in the concurrent era, RTOG 0617 tested higher dose arms (Concurrent RT + Carbo/Taxol +/- Cetuximab) these were closed early with \"negative\" results (longer term results and POFA needed), therefor 60 Gy in daily fractions with dual agent chemotherapy can be considered the standard (the Brooke and LAMP trials also assisted in the development of this paradigm). Fractionated external beam radiation therapy may or may not be delivered with intensity modulation +/- image guidance per daily cone beam depending on the specific patient anatomy and dose constraints as described per the RTOG and QUANTEC ---Hood Reaves, Int J Radiat Oncol Biol Phys. 2010 Mar 1;76(3 Suppl):S10-9. The risks, benefits, alternatives, process and logistics of external beam radiation were reviewed (risks include but are not limited to worsening PULM function, esophagitis, esophageal structure, perforations, bleeding, paralysis and death). We answered all of the patient's questions to the best of our ability. Dom verbalized understanding and seemed satisfied. Radiation planning will commence within < 24 hours; the next step in management being the simulation scan, with external beam radiation to commence in a timely fashion thereafter. It was a pleasure meeting Shruthi Durand today and we appreciate the referral and opportunity to be involved in his care. We had an extensive discussion today regarding the course to date (including a focused review of the applicable radiographic and laboratory information), multidisciplinary approach to cancer care, and indications for external beam radiation therapy as a component therein. A literature review and multidisciplinary discussion was performed after seeing this patient due to the complexity of the medical decision making in this case. I personally spent greater than 60 minutes with this patient and performed the complete history and physical as above at today's visit, at least 45 minutes was in direct  regarding disease management.    *this pt will be simmed and likely begin treatment prior to a tissue diagnosis. It is my clinical judgement the pre test probability for malignancy is > 95 % and there may be significant functional decline in the time necessary to maintain a tissue diagnosis due to the primary tumor directly extending into the spinal column). I specifically verbalized all of this with Len Potter, his wife Alli Gamino, and his daughter Lizandro Radford all of who individually verbalized consent to RT treatment to begin prior to a tissue diagnosis. Peer reviewed requested. Jose Eduardo Watkins MED referral -- Dr. Shivani Frank reccommended --- pt will still pursue aggressive active treatment however this is a pain management consultation request.  *will hold off on DEX or any new pain meds until he sees Dr. Shivani Frank but we could start him of DEX in FND occurs or Sx worsens  -coordinated care with Dr. Monika Anderson this AM 4/25/16. We will treat the primary tumor and spinal column initially with considerations for second and third \"boost\" plans (nodes involved, primary boost etc.) in conjunction with CHEMO pending the workup / PET. MRI brain ordered by [facility].        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 (Patient taking differently: Take 30 mg by mouth 2 times daily. Instructed to take am of procedure) 60 tablet 0    oxyCODONE HCl (OXY-IR) 10 MG immediate release tablet Take 1 tablet by mouth every 6 hours as needed for Pain for up to 30 days. Intended supply: 30 days 120 tablet 0    simvastatin (ZOCOR) 40 MG tablet Take 1 tablet by mouth nightly 90 tablet 1    budesonide (PULMICORT) 0.5 MG/2ML nebulizer suspension Take 1 ampule by nebulization 2 times daily Instructed to take am of procedure      DULoxetine (CYMBALTA) 30 MG extended release capsule Take 30 mg by mouth 2 times daily Instructed to take am of procedure      esomeprazole (NEXIUM) 40 MG delayed release capsule Take 40 mg by mouth every morning (before breakfast) Instructed to take am of procedure      formoterol (PERFOROMIST) 20 MCG/2ML nebulizer solution Take 20 mcg by nebulization 2 times daily Instructed to take am of procedure      levothyroxine (SYNTHROID) 125 MCG tablet Take 125 mcg by mouth Daily      oxyCODONE HCl (OXY-IR) 10 MG immediate release tablet Take 10 mg by mouth 3 times daily. Instructed to take am of procedure      predniSONE (DELTASONE) 10 MG tablet Take 10 mg by mouth daily Instructed to take am of procedure      tamsulosin (FLOMAX) 0.4 MG capsule Take 0.4 mg by mouth nightly      Revefenacin (YUPELRI) 175 MCG/3ML SOLN Inhale 3 mLs into the lungs daily Instructed to take am of procedure      OXYGEN Inhale 4 L into the lungs nightly      chlorhexidine (PERIDEX) 0.12 % solution Take 15 mLs by mouth daily Swish & spit qd 473 mL 2    pregabalin (LYRICA) 150 MG capsule Take 1 capsule by mouth 3 times daily for 90 days. (Patient taking differently: Take 150 mg by mouth 3 times daily.  Instructed to WD, WN, NAD, awake, alert, able to voice their needs/thoughts   HEENT: normocephalic, atraumatic, sclera nonicteric, PERRLA, EOMI, MMM, Mildly raspy speech tone and quality with no sign of thrush, lesions or dryness and trachea intact without sign of infection  Neck: no LAD, no JVD, supple, no masses, normal speech, trachea midline,   Lungs: bilaterally clear to auscultation, good aeration, symmetric  Heart: regular rate and rhythm, no murmurs/rubs/gallops/ectopy appreciated, PMI WNL  Abd.: non-distended, soft, nontender, non-distended, normal bowel sounds. Ext.: no clubbing, cyanosis or edema, no joint tenderness  Skin: warm, adequate hydration without acute lesions  Neuro: awake, alert, oriented x 3, conversive,     [place] Symptom Assessment Score   [place] Score 3/2/2020 1/13/2020 12/2/2019 11/18/2019 10/22/2019   Pain Score 7 6 4 8 7   Tiredness Score 6 5 3 5 3   Nausea Score Not nauseated Not nauseated Not nauseated Not nauseated Not nauseated   Depression Score 5 5 4 5 4   Anxiety Score 4 5 5 4 3   Drowsiness Score 5 5 2 4 2   Appetite Score 2 1 4 5 5   Wellbeing Score 4 4 5 4 4   Dyspnea Score 7 5 6 4 6   Other Problem Score 7 5 6 5 7   Total Assessment Score(calculated) 47 41 39 44 41     Impression:  As per Dr. Ashli Renteria assessment on 04/25/16:   Mr. Citlaly Gold is a pleasant and cooperative 61year old man with a recent diagnosis of stage III NSCLC (probable) with impending cord compression. We recommend a concurrent approach to definitive management of this locally advanced non small cell lung cancer [chemo dosing per [address] record- see EMR]. Based on the clinical characteristic, this disease and stage is generally considered unresectable; optimal therapy tends to be a concurrent chemo-RT approach.  Concomitant chemo-RT compared with sequential chemo-RT improved overall survival, primarily through better locoregional control, at the cost of manageable increases in acute esophageal toxicity as based on the Auperin metanalysis. These data demonstrate a benefit of concomitant chemo-RT over sequential chemo->RT on OS (HR 0.84, SS), with absolute benefit at 3-years of 5.7% (18% to 24%), 5-years 4.5% (11% to 15%). Interestingly, there was no difference in PFS (HR 0.9, p=0.07). However a decrease in locoregional progression (HR 0.777, SS), with absolute decrease of 6% at 5 years (35% to 29%) was shown. There was no difference on distant progression (HR 1.04, NS), with 5-year rate of about 40% [facility] Oncol 2010 May 1;28(13):1678-9835). Regarding the radiation dose, 60 Gy in 2Gy/fx was established long ago in RTOG 73-01, with several other trials showing a feasibility of much higher doses however with RT alone. Interestingly in the concurrent era, RTOG 0617 tested higher dose arms (Concurrent RT + Carbo/Taxol +/- Cetuximab) these were closed early with \"negative\" results (longer term results and POFA needed), therefor 60 Gy in daily fractions with dual agent chemotherapy can be considered the standard (the Brooke and LAMP trials also assisted in the development of this paradigm). Fractionated external beam radiation therapy may or may not be delivered with intensity modulation +/- image guidance per daily cone beam depending on the specific patient anatomy and dose constraints as described per the RTOG and QUANTEC ---Mio Trujillo, Int J Radiat Oncol Biol Phys. 2010 Mar 1;76(3 Suppl):S10-9. The risks, benefits, alternatives, process and logistics of external beam radiation were reviewed (risks include but are not limited to worsening PULM function, esophagitis, esophageal structure, perforations, bleeding, paralysis and death). We answered all of the patient's questions to the best of our ability. Dom verbalized understanding and seemed satisfied.  Radiation planning will commence within < 24 hours; the next step in management being the simulation scan, with external beam radiation to commence in a timely prescriptions since May 2015 with the last being ×60 tablets for 5-325 milligram tablets filled on 04/26/16 prescribed by Gallito Arrieta from [address]. Ultram ×40 tablets on 12/24/15. [place] Symptom Assessment Scale                                             Date:   Pain  [] 0  None  [] 1  [] 2  [] 3  [] 4  [] 5  [] 6  [] 7  [] 8  [] 9  [x] 10  Worst   Tiredness  [] 0  None  [] 1  [] 2  [] 3  [] 4  [] 5  [] 6  [] 7  [x] 8  [] 9  [] 10  Worst   Nausea  [] 0  None  [x] 1  [] 2  [] 3  [] 4  [] 5  [] 6  [] 7  [] 8  [] 9  [] 10  Worst   Depression  [] 0  None  [] 1  [] 2  [x] 3  [] 4  [] 5  [] 6  [] 7  [] 8  [] 9  [] 10  Worst   Anxiety  [] 0  None  [] 1  [x] 2  [] 3  [] 4  [] 5  [] 6  [] 7  [] 8  [] 9  [] 10  Worst   Drowsiness  [] 0  None  [] 1  [] 2  [] 3  [] 4  [x] 5  [] 6  [] 7  [] 8  [] 9  [] 10  Worst   Appetite  [] 0  None  [] 1  [] 2  [] 3  [x] 4  [] 5  [] 6  [] 7  [] 8  [] 9  [] 10  Worst   Wellbeing  [] 0  None  [] 1  [] 2  [] 3  [] 4  [] 5  [] 6  [] 7  [x] 8  [] 9  [] 10  Worst   Shortness of Breath  [] 0  None  [] 1  [x] 2  [] 3  [] 4  [] 5  [] 6  [] 7  [] 8  [] 9  [] 10  Worst   Constipation  [] 0  None  [] 1  [] 2  [] 3  [] 4  [x] 5  [] 6  [] 7  [] 8  [] 9  [] 10  Worst   Completed By:  [x]  Patient Report  []  Caregiver/Family  []  Nurse/Staff  Patient is currently a:  [x]   Palliative Medicine Patient  []   Hospice Patient  Medical records reviewed and Patient presents to the exam room today accompanied by his wife alert with no sign of sedation confusion able to voice his needs in detail. As per Cervical CT on 04/15/16:  IMPRESSION: ALERT: THIS IS AN ABNORMAL REPORT   Critical result: Findings communicated directly with Dr. Bruno Gonzalez at   approximately 0949 hours on 4/15/2016.   1. Large mass consistent with malignancy in the mesial left lung apex   with anterior chest wall and left mediastinal invasion.  There is also   osseous invasion of the T1 and T2 vertebral bodies and the left second   rib. Infection of spinal canal at the level of T1 with right lateral   displacement and compression of the thecal sac. See above for details. 2. Moderate centrilobular segment. 3. Postoperative cervical spine status post anterior cervical fusion   C2-C7, worse at the C3-C4 and C4-C5 motion segment level, see above   for details. Patient complains of significant pain described as a constant numbness and pain to his left arm left elbow with burning and numbness into his axilla and to the left side of his chest, drooping to his left eye with no swelling to the left forehead consistent with a Pancoast tumor and Vipin's syndrome. Patient states that he has had chronic pain for the past 2 years with cervical fusion one year ago. Patient works midnights and is having difficulty sleeping secondary to the pain only sleeping 2-3 hours a night. Patient also complains of a poor appetite, nausea without vomiting, significant constipation now moving his bowels every 3-4 days consistent with hard bowel movements, mild imbalance upon transitioning from sitting to standing which is self-limiting within seconds to minutes. Patient symptoms over the past several weeks he has tolerated well without symptoms Neurontin 300 mg t.i.d., Norco which he states does not help at all 5-325 milligrams tablets sometimes every 2 hours without help. Patient states that he has been on ibuprofen over the past year and now has escalated his dosage to 2200 mg at a time several times a day. Patient denies stomach upset. Patient also takes Zantac 150 mg b.i.d. Patient had been on a Medrol Dosepak one year ago without side effects.   Concerning the patient's treatment patient is going for a biopsy this Friday, will be starting chemotherapy with Dr. Huang Auguste in which she has an appointment on May 19, and has begun radiation as per Dr. Jodie Griffin last Wednesday which will occur 5 times a week through June 2 and then reevaluation. Patient was introduced to Palliative Medicine enmeshment with our team.  Patient signed a pain contract, given a prescription for a urine drug screen and denies any illicit or street drugs. We also discussed the possibility of short-term versus long-term disability in which he is resistant to but I advised that with the conditions that he has both chronic and acute as well as the treatment but I would recommend pursuing disability. Options were discussed and today I made the following changes:  Discontinue Norco, Neurontin 300 mg t.i.d., ibuprofen. Today we increased his Neurontin to 600 mg t.i.d. prescribing 90 tablets with 2 refills, initiated Pamelor 10 mg q.h.s. 30 tablets with one refill,, initiated Percocet 5-325 milligrams tablets 1 p.o. q.6 hours p.r.n. ×120 tablets advising of the sedation potential and not taking it while at work work prior, Decadron 4 mg once daily in his morning since he works [employer] ×30 tablets with no refill, Protonix 40 mg daily 30 tablets with 2 refills, Zofran ODT 4 mg q.8 hours p.r.n. ×90 tablets with 1 refill, Dolly-Colace 2 tablets daily 60 tablets with 2 refills.   We will see the patient back in 2 weeks or sooner if he needs us.  05/17/16:  [place] Symptom Assessment Scale                                             Date:   Pain  [] 0  None  [] 1  [] 2  [] 3  [] 4  [] 5  [] 6  [] 7  [x] 8  [x] 9  [x] 10  Worst   Tiredness  [] 0  None  [] 1  [] 2  [] 3  [] 4  [] 5  [x] 6  [] 7  [] 8  [] 9  [] 10  Worst   Nausea  [] 0  None  [] 1  [x] 2  [] 3  [] 4  [] 5  [] 6  [] 7  [] 8  [] 9  [] 10  Worst   Depression  [] 0  None  [] 1  [x] 2  [] 3  [] 4  [] 5  [] 6  [] 7  [] 8  [] 9  [] 10  Worst   Anxiety  [] 0  None  [] 1  [x] 2  [] 3  [] 4  [] 5  [] 6  [] 7  [] 8  [] 9  [] 10  Worst   Drowsiness  [] 0  None  [] 1  [] 2  [] 3  [] 4  [x] 5  [] 6  [] 7  [] 8  [] 9  [] 10  Worst   Appetite  [] 0  None  [] 1  [] 2  [] 3  [] 4  [] 5  [] 6  [x] 7  [] 8  [] 9  [] 10  Worst hours. Patient states he takes on average of 6 Percocet daily. Patient is not taking ibuprofen and is taking the Decadron stating that his appetite is somewhat better and he has gained 5 pounds since his last visit. Patient states he continues to awaken about every 2 hours at night secondary to pain. Patient is interested in taking something help with the pain so he does not have to depend on the Percocet as above. Wife states that Pamelor seems to help with his mood, appetite. Patient is starting short-term disability next week and will be seeing Dr. Stephanie Katz this week to review the biopsy results and to plan for chemotherapy. Options were discussed and today we are stopping the Decadron and will initiate Mobic 7.5 mg 1 p.o. q.12 hours 60 tablets with 2 refills, increasing the Pamelor to 25 mg q.h.s. 30 tablets with 2 refills, increasing the Neurontin to 900 mg t.i.d. and he was given prescriptions for 90 tablets of each capsule with 2 refills. We are continuing his Percocet in which he has 2 weeks left and prescribed him 90 tablets of Percocet 5-325 milligram tablets but making that q.4 hours p.r.n. instead of q.6 hours p.r.n.   We will see him back in 4 weeks or sooner if he needs us.  06/14/16:  [place] Symptom Assessment Scale                                             Date:   Pain  [] 0  None  [] 1  [] 2  [] 3  [] 4  [] 5  [x] 6  [] 7  [] 8  [] 9  [] 10  Worst   Tiredness  [] 0  None  [] 1  [] 2  [] 3  [x] 4  [] 5  [] 6  [] 7  [] 8  [] 9  [] 10  Worst   Nausea  [x] 0  None  [] 1  [] 2  [] 3  [] 4  [] 5  [] 6  [] 7  [] 8  [] 9  [] 10  Worst   Depression  [] 0  None  [] 1  [x] 2  [] 3  [] 4  [] 5  [] 6  [] 7  [] 8  [] 9  [] 10  Worst   Anxiety  [] 0  None  [x] 1  [] 2  [] 3  [] 4  [] 5  [] 6  [] 7  [] 8  [] 9  [] 10  Worst   Drowsiness  [] 0  None  [x] 1  [] 2  [] 3  [] 4  [] 5  [] 6  [] 7  [] 8  [] 9  [] 10  Worst   Appetite  [] 0  None  [] 1  [] 2  [] 3  [] 4  [] 5  [x] 6  [] 7  [] 8  [] 9  [] surrounding his chemotherapy every 3 weeks which will start on June 21. Patient also started folic acid 1 mg daily 2 weeks ago. Options were discussed and although I see no signs of candidiasis orally I suspect this esophageal involvement therefore starting patient on Diflucan 100 mg daily ×14 days and a prescription for 14 tablets with one refill given today. Patient was also complaining of constipation of small hard bowel movements moving his bowels every 2-3 days therefore we are doubling his Dolly-Colace at 2 tablets b.i.d. 120 tablets with 2 refills given today. Patient also given a prescription for Percocet unchanged 5-325 milligram tablets 1 p.o. q.6 hours p.r.n. ×120 tablets today. We will see him back in 2 weeks or sooner if he needs us and in the future secondary to neuropathic complaints we may consider methadone.   06/28/16:  [place] Symptom Assessment Scale                                             Date:   Pain  [] 0  None  [] 1  [] 2  [x] 3  [] 4  [] 5  [] 6  [] 7  [] 8  [] 9  [] 10  Worst   Tiredness  [] 0  None  [x] 1  [] 2  [] 3  [] 4  [] 5  [] 6  [] 7  [] 8  [] 9  [] 10  Worst   Nausea  [x] 0  None  [] 1  [] 2  [] 3  [] 4  [] 5  [] 6  [] 7  [] 8  [] 9  [] 10  Worst   Depression  [] 0  None  [x] 1  [] 2  [] 3  [] 4  [] 5  [] 6  [] 7  [] 8  [] 9  [] 10  Worst   Anxiety  [] 0  None  [x] 1  [] 2  [] 3  [] 4  [] 5  [] 6  [] 7  [] 8  [] 9  [] 10  Worst   Drowsiness  [] 0  None  [x] 1  [] 2  [] 3  [] 4  [] 5  [] 6  [] 7  [] 8  [] 9  [] 10  Worst   Appetite  [] 0  None  [] 1  [] 2  [] 3  [] 4  [] 5  [x] 6  [] 7  [] 8  [] 9  [] 10  Worst   Wellbeing  [] 0  None  [] 1  [] 2  [x] 3  [] 4  [] 5  [] 6  [] 7  [] 8  [] 9  [] 10  Worst   Shortness of Breath  [] 0  None  [x] 1  [] 2  [] 3  [] 4  [] 5  [] 6  [] 7  [] 8  [] 9  [] 10  Worst   Constipation  [] 0  None  [] 1  [] 2  [x] 3  [] 4  [] 5  [] 6  [] 7  [] 8  [] 9  [] 10  Worst   Completed By:  [x]  Patient Report  []  Caregiver/Family  [] Nurse/Staff  Patient is currently a:  [x]   Palliative Medicine Patient  []   Hospice Patient  Medical records reviewed and patient finished radiation therapy as per Dr. Ciarra Nelson recommendations. Patient states that he continues to take the Percocet every 6-8 hours which continues to help. Patient states he has three quarters of a bottle left. Patient had a second opinion at a cancer center and here they are keeping him on his initial chemotherapy for another 3 weeks and then restarting the second course. Patient will stop the dexamethasone and folic acid until his chemotherapy agent changes. Patient smiling and more interactive, jovial, more comfortable and states that his appetite is good, his hoarse voice has improved but not completely back to normal. Patient gained 2-1/2 pounds over the past 2 weeks. Patient states that all of his symptoms are either stable or headed in the right direction. Patient is balancing his Dolly-Colace with bowel movements which seems effective. Options were discussed and we are continuing the same and he needs no prescriptions today. We will see him back in 4 weeks or sooner if he needs us.   08/01/16:  [place] Symptom Assessment Scale                                             Date:   Pain  [] 0  None  [] 1  [] 2  [] 3  [x] 4  [] 5  [] 6  [] 7  [] 8  [] 9  [] 10  Worst   Tiredness  [] 0  None  [] 1  [x] 2  [] 3  [] 4  [] 5  [] 6  [] 7  [] 8  [] 9  [] 10  Worst   Nausea  [x] 0  None  [] 1  [] 2  [] 3  [] 4  [] 5  [] 6  [] 7  [] 8  [] 9  [] 10  Worst   Depression  [] 0  None  [x] 1  [] 2  [] 3  [] 4  [] 5  [] 6  [] 7  [] 8  [] 9  [] 10  Worst   Anxiety  [] 0  None  [] 1  [] 2  [x] 3  [] 4  [] 5  [] 6  [] 7  [] 8  [] 9  [] 10  Worst   Drowsiness  [] 0  None  [x] 1  [] 2  [] 3  [] 4  [] 5  [] 6  [] 7  [] 8  [] 9  [] 10  Worst   Appetite  [] 0  None  [] 1  [x] 2  [] 3  [] 4  [] 5  [] 6  [] 7  [] 8  [] 9  [] 10  Worst   Wellbeing  [] 0  None  [] 1  [x] 2  [] 3  [] 4  [] 5  [] 6  [] 7  [] 8  [] 9  [] 10  Worst   Shortness of Breath  [] 0  None  [] 1  [] 2  [] 3  [x] 4  [] 5  [] 6  [] 7  [] 8  [] 9  [] 10  Worst   Constipation  [] 0  None  [] 1  [] 2  [] 3  [] 4  [] 5  [] 6  [x] 7  [] 8  [] 9  [] 10  Worst   Completed By:  [x]  Patient Report  []  Caregiver/Family  []  Nurse/Staff  Patient is currently a:  [x]   Palliative Medicine Patient  []   Hospice Patient  Medical records reviewed and as per radiation oncology's assessment on 07/06/16:  On 6/3/16, Niki Almazan completed 5000 cGy in 22 fractions directed to the left lung / spine for management of locally advanced lung CA. States that he's doing well overall. Eating well. Coughing occasionally with phlegm - light brown in color. Denies fever. SOB with yard work only. Following with Dr Kareen Nissen for palliative med, states that pain is well controlled with pain medications he is taking. Pt is following with Dr Missy Torres for medical oncology. Seen last week, continues on chemo. Tolerating chemo well. CT scan planned after next chemo cycle per patient report. Met with phycians in [facility] for med onc for second opinion. States that he had an MRI/CT scan at that time (approx 3 weeks after radiation was completed) which showed (per patient report) no tumor shrinkage but pt states that he understands radiation is continuing to work. No report available at this time. Planning to meet with a neurosurgeon at [facility] to discuss options for vertebral fractures from tumor per patient report. Appt with Dr Vito Levy (pulm) in October. Patient is doing well post-radiation completion. Explained to patient that he would need clearance from Dr James Lyman and Dr Missy Torres prior to surgery being completed especially to the irradiated area. Verbalized understanding. CT scan re-imaging per Dr Missy Torres. Pt's previous imaging done at [facility]. Asked patient to have results of reimaging faxed to us as well when it is done.   I discussed follow up plans with Francesco Whitley frequent. Options were discussed and I advised increasing the Cymbalta to 20 mg b.i.d. and provided in a prescription for 60 tablets with one refill. Patient continues on the Lexapro 10 mg daily and will be meeting with Dr. Danya Bowles soon for assessment on possibly stopping the Lexapro versus continuing. I prescribed unchanged his Percocet 5-325 milligram tablets 1 p.o. q.6 hours p.r.n. ×120 tablets. We'll see him back in 4 weeks and look at the results of his CT scan at that time as well as benefit from the changes above.  09/27/16:  Medical records reviewed and patient presents today looking well without sign of sedation and/or confusion able to voice his needs accompanied by his wife. Patient complaining of sinus congestion, sore throat, raspy voice and recently saw his PCP who placed him on amoxicillin yesterday 500 mg t.i.d. Patient states that other than this he is feeling better with better control neuropathy on current regiment and off the Lexapro by his PCP. Patient reviewed how he is taking his medications which is appropriate and denies other new symptoms. Options were discussed and we are continuing the same medication regiment prescribing Percocet 5-3 and a 25 mg tablets today 1 p.o. q.6 hours p.r.n. in which he is taking crocs 23 a day ×120 tablets. Patient states that he has 11 days remaining of his Percocet. Patient has enough of the Cymbalta, gabapentin and was prescribed Diflucan 100 mg daily ×14 days with one refill. We will see him back in 4 weeks or sooner if he needs us. 10/24/16:   Medical records reviewed and as per Dr. Kamala Asencio on 10/14/16:  Therese Cardoso is well known to me since the emergent R and concurrent course was completed. His spine pain is reduced. CHEMO continues. No early delayed RT effects. All questions answered. TOB cessation discussed again. FU with [facility] as planned and with me in 3 months or sooner PRN.   Patient presents to the exam room today without sign of sedation but less jovial looking more uncomfortable for follow-up complaining of right ear and right cheek pain stating that he went to urgent care Friday and was diagnosed with what he knows he has always had, TMJ. Patient also finished a Z-Eugenio by his PCP one week ago which did not help. Patient also complains of worsening left hand numbness and tingling. Patient states that he took 2 Percocet tablets last night which did help with the pain but then also made him sleepy. Patient compliant with his other medications and states that initially with the Diflucan his throat pain improved some but not significantly. Patient did state that he has a difficult time swallowing at times. Options were discussed and today we are increasing his Cymbalta to 30 mg b.i.d. ×60 tablets and 1 refill electronically prescribed. Patient will continue his Neurontin 900 mg t.i.d.  Today we initiated Duragesic 12 µg patch q.72 hours as directed ×10 patches and prescribed unchanged his Percocet 5-325 milligram tablets 1 p.o. q.6 hours p.r.n. ×120 tablets prescribed today. Patient will be seen back in 4 weeks or sooner and at this time consider increasing the Cymbalta, Percocet dosage, Duragesic patch and reassessing for thrush which is not present today. He will call if symptoms increase or if he has difficulty with the Duragesic in which she was reluctant to accept. 11/15/16:  Medical records reviewed and spirometry on 10/24/16:  DATA: Spirometry done in the office today demonstrates an FVC of 4.17 liters which is 85 % of predicted with an FEV1 of 3.10 liters which is 83 % of predicted. FEV1/FVC ratio is 74 %. Mid expiratory flow rates are 78% of predicted. Maximum voluntary ventilation is normal at 106 liters per minute or 74% of predicted. Total lung capacity is 6.83 liters which is 94% of predicted. DLCO is 12.43mm/min/mmHg which is 36% of predicted. Flow volume loop shows no signs of intrathoracic or extrathoracic obstruction. on imaging report and referral. Further imaging per med onc or ENT. Pain well controlled. Continue with Dr Gris Larson (palliative medicine). Continue to follow with Dr Marcus Hunt (pulmonology). Smoking cessation reinforced again. Patient verbalized understanding. I discussed follow up plans with Henok Rodriguez. At this time, Dr Deirdre Pak will see the patient back in January 2017 for a post-radiation completion follow-up visit. Henok Rodriguez is to follow up with other physicians involved in their care as directed (including but not limited to Medical Oncology, Primary Care, Pulmonary, and Surgery). The patient was given our contact number in the event that if at any time they change their mind and would like to return to the clinic to see either myself or one of the Radiation Oncologists, they can simply call us and we would be happy to see them. Thank you for involving us in the management of this extremely pleasant patient. More than 25 min was in direct contact with pt coordinating/giving care. >50% of the visit was spent in counseling the pt on the following: Follow up care  The nurses notes were reviewed and incorporated into this assessment and plan. Patient presents to the exam room today stating that he now is no longer taking Percocet and is comfortable on the patches. Patient denies new symptoms and stated that he saw his ENT yesterday who felt that he may need a trach. They saw their oncologist today who felt that the area could be stretched/dilated. Patient does \"breathes different\" as per the wife when he is sleeping but does not wake up choking or coughing. Patient does have a hoarse coarse voice quality. Patient is comfortable on the current regiment and we phoned in Neurontin as per [employer] prior to this visit. Options were discussed and we are continuing his Duragesic 37 µg q.72 hours and 10 patches of each strength prescribed today.   Patient needs no other prescriptions and we'll see him back in 4 weeks or sooner if he needs us. 01/09/17:  Medical records reviewed and patient followed up with radiation oncology to revisit them 3 months from now. Patient presents to the exam room today complaining of a tickling cough mostly at night but also throughout the day. Patient states that he is not sleeping as well secondary to this cough despite running a humidifier throughout the house with good humidity. Patient states that Dr. Evans Fallarun had him on steroids ×2 weeks which helped but then oncology secondary to his immunotherapy wanted him off of this. Patient states that his pain is controlled on current regiment and he is noting fatigue and some depression secondary to not sleeping well secondary to the cough. Options were discussed and we refilled his Duragesic unchanged ×5 patches each strength, Dolly-Colace and prescribed Ativan 0.5 mg tablets 1/2-1 tablet q.h.s. p.r.n. ×30 tablets. We will see him back in 1 month or sooner if he needs us. 02/06/17:  Medical records reviewed and as per radiation oncology assessment from 01/30/17:  -pt seen To discuss hoarseness and CT findings  -Gayatri Adams was asked to come in today to review the negative CT findings. He and hoarseness and I reviewed the RT treatment noting the laryngeal mean dose constraints WERE met. 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 CT reviewed with pt and wife. TOB cessations discussed, again at length. -FU with Dr. Murtaza Angel for systemic immunotherapy and re-staging. -MM for Sx. Patient presents to the exam room today accompanied by his wife looking well in no acute distress without sign of sedation and/or confusion. Patient states that Ativan 0.5 mg q.h.s. help significantly with his sleep without sedation during the day.   Patient also is compliant with his other medications without change stating that his pain is much better controlled. Patient reviews with me his visit with Dr. Grisel Ruiz and CAT scan. Patient states there is no change in how he is using his Duragesic other medications and requesting refill on Ativan, Duragesic and Cymbalta. Options were discussed and patient was provided with prescriptions for Ativan, Duragesic, Cymbalta all unchanged and we will see him back in 4 weeks or sooner if they need us. Patient is very comfortable with his symptom control currently and looks well.  03/06/17:  Medical records reviewed and no documented patient visits in Epic since I have seen him. CT of soft tissue neck from 01/27/17:  1. Irregular opacities seen at medial aspect left lung apex and left   upper lobe suggestive of area of treated lung cancer and radiation   changes.       2. Previously seen lytic lesions within upper thoracic vertebral   bodies and left second rib are now sclerotic which is suggestive of   treated bone metastases.       3. Larynx appears unremarkable. Vocal folds are symmetric.       4. No evidence of cervical lymphadenopathy.       5. Slight limitation to this examination due to metallic artifact from   anterior fusion hardware associated with cervical spine from C3-C7. Patient presents today in no acute distress without sign of sedation and/or confusion. Patient plane of right sided scapular, shoulder, arm symptoms that are similar to his left side over the past several weeks. Patient denies falls or traumas, difficulty breathing or any other symptoms other than left hand dryness worse than his right with fissures noted on the extensor surfaces of the index MCP and ring finger MCP without sign of infection. Patient states he has always had difficulty with dry hands more now. Patient states there's been no change otherwise to how he is taking his medications. Patient will be seeing oncology tomorrow in reviewing the CAT scan results.   Patient complains of sometimes decreased ability to sleep secondary past month his pain has been better controlled. Patient also still dealing with dryness and fissuring especially on his left hand across the dorsum of his knuckles and he will now try to splint his left index finger attempting to avoid bending at least at night. Options were discussed and I am refilling unchanged his Duragesic 37 µg ×10 patches of each strength. Secondary to fatigue he will initially try just wearing the 25 µg patch and then if pain worsens he will then apply a 37 µg dose and continuing unchanged. They voiced understanding. Of course he is continuing his Ativan, Cymbalta 40 mg b.i.d. and he will need refills of his Ativan next time when we see him in 4 weeks from now, possibly Pamelor and of course his patch or patches. Patient has gained almost 3 pounds over last month and we will continue to monitor. 05/09/17:  OARRS report reviewed today without any prescribing red flags. Medical records reviewed and as per radiation oncology assessment while an inpatient on 05/03/17:  ASSESSMENT/PLAN:  Daleen Schwab appears clinically stable. CT neck and fiberoptic laryngoscopic findings are discussed with the patient and his wife. I also had the opportunity to speak to Yuliana Cutler and Huang Auguste, today. I believe the CT findings of soft tissue attenuation at the piriform recess and arytenoids bilaterally are the results of the vocal cord paralysis and not a discrete tumor mass. 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 At diagnosis, the large left upper lung mass extended into the mediastinum and may be now contributing to the right recurrent laryngeal nerve involvement along with prior treatments. In any case, palliative radiation treatments directed to the hypopharynx would not be recommended.    Mr. Tye South remains adamantly opposed to undergoing a tracheostomy to protect his airway as discussed with Dr. Bridgette Salgado. I did discuss this with the patient again along with the rec of doing this when he is stable rather than on an emergent basis. He and his wife voiced understanding. CT chest from February, 2017 shows some response to treatment. As discussed with Dr. Analy Sawyer, plans would be to continue systemic therapy as an out patient followed by restaging CT/PET/CT imaging. We will follow up on this when completed. Mr. Samantha Ansari is feeling better and is asking when he will be discharged. 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 He also continues to smoke cigarettes. I am concerned about an acute pulmonary event and I suggested he take time off from work. He should definitively quit smoking. He and his wife voiced understanding. Thank you for allowing me to participate in Mr. Gayle Mealnaeem evaluation. As per H&P while an inpatient on 05/03/17:  HISTORY OF PRESENT ILLNESS: The patient is a 51-year-old  male with  history of COPD and adenocarcinoma of the lung who presented to the Emergency  Room with shortness of breath and hoarse voice that worsened throughout the  day of admission. He does have a history of subglottic airway narrowing and  has been seen by ENT for it. He denies any issues with chest pain. He feels  like something is stuck in his throat. He had breathing treatments in the  Emergency Room which were of very little help. He has finished chemotherapy  approximately the end of 11/2016 and now is getting immunotherapy. His last  immunotherapy treatment was 2 weeks ago. The next one is scheduled for  05/09/2017. He was seen in consultation by ENT. Tracheostomy was offered,  but the patient declined. He was also seen in consultation by Pulmonary. His  chest x-ray on admission shows a possible right lung infiltrate. The patient  was started on Pulmicort and Perforomist aerosol treatments along with  DuoNebs.  Respiratory cultures and viral panels were obtained. DIAGNOSTIC IMPRESSION:  1. Bilateral vocal cord palsy. 2. History of chronic obstructive pulmonary disease, not in acute  exacerbation. 3. History of nonsmall cell lung CA, currently receiving immunotherapy. 4. Subglottic narrowing. 5. Tobacco dependency. 6. Depression. 7. Hypercholesterolemia. As per ENT consult while an inpatient on 05/02/17:  Patient is a 63 yo male, history of COPD and lung adenocarcinoma, that presented to the ED late last night with complaints of shortness of breath and hoarse voice that worsened throughout the day yesterday. He has history of subglottic airway narrowing and has been seen by ENT for it. He denies any issues with shortness of breath at the time but says it feels like something is stuck in his throat. He had breathing treatments in the ED which said helped a little. He mentions that he had some diarrhea for the past few days but denies any other symptoms. Patient says he has cut back on his smoking, but still was smoking prior to coming into the hospital.  He has finished chemotherapy approximately the end of Nov 2016 and now currently getting immunotherapy with carbo/Avastin + Xgeva. His last treatment was 2 weeks ago and his next one is scheduled for May 9. Voice was worse yesterday and more SOB, better today. Was noted to have left vocal cord palsy in Dec 2016  EXAM - erythema, dryness of supraglottic larynx but no lesions. Fiberoptic laryngoscopy, both vocal cords in paramedian position, minimal abduction, right cord does medialize slightly with phonation. Unable to see subglottic  No neck mass  IMP - bilateral vocal cord palsy. Would be important to delineate status of mediastinum taking out both cords. Derotha Dense was discussed as alternative for improving breathing status. Pt does not wish to proceed with this at this time.  Risks, options discussed with pt and wife  Patient presents today with his wife and daughter without sign of sedation an appetite and we are initiating today Remeron 7.5 mg q.h.s. as per Epic and will see him back in 4 weeks or sooner if he needs us. 06/27/17:   Medical records reviewed and as per general surgery assessment from 06/26/17: The patient says he is doing better. He is breathing better with the tracheostomy in place and is tolerating tf well through his PEG. He has some redness right around the PEG and has a small amount of drainage. 64y.o. year old male with lung cancer, s/p PEG Instructed on PEG care Will monitor redness for now, does not appear infected Follow up in 6 months  As per pulmonology assessment on 06/19/17:  I had the pleasure of seeing Bela Bradley in our office in follow-up regarding his recent Hospital admission with sepsis, MRSA pneumonia and FTT. Patient was discharged from the hospital to [address] completed his course of antibitics and jsut got discahrged few days ago. . Since his hospital discahrge patient reports no shortness of breath no cough. Has better appetite. Patient denies chest pain, cough and hemoptysis. Patient is compliant with his Albuterol. Patient presents today accompanied by his wife looking more comfortable than he had in the past able to speak with a better voice although still raspy with the valve in place. Patient states that when he was at [facility] they decreased him to Duragesic 25 µg q.72 hours and he noticed a significant increase in his joint and muscle aches, left shoulder pain. Patient states that whenever he went home on Nati 15 he added the second patch for a total of 37 µg q.72 hours without sedation and he is comfortable now on his current regimen. Patient states that he is breathing much better after his tracheostomy and swallowing better. Wife is supplementing and has basically a sliding scale on the percentage of oral intake that she uses. Patient states that he was down to 140 pounds and now is up into the 150s. Our records reveal the same.   Patient states that he has noticed some difference with improvement of appetite but more so with sleeping all night now on the Remeron 7.5 mg q.h.s. without sedation but states there is significant room for improvement on his appetite. Patient also compliant with Neurontin 900 mg t.i.d., Cymbalta 40 mg b.i.d., Pamelor 25 mg q.h.s. and very infrequently takes is 0.5 mg q.h.s. Patient denies constipation and states that is moving his bowels well. Options were discussed and we are increasing his Remeron to 15 mg q.h.s. as per Epic and I gave him a prescription for the Duragesic 37 µg q.72 hours 10 patches of each strength. Patient has refills on his Neurontin 900 mg t.i.d., Ativan 0.5 mg q.h.s. p.r.n., Cymbalta 40 mg b.i.d. and Pamelor 25 mg q.h.s. We will continue us and monitor his weight and symptoms. Patient looks much more comfortable today. 07/25/17:  Medical records reviewed and discharge summary on 07/09/17:  Principal Problem:    Orthostatic hypotension  Active Problems:    Panlobular emphysema (HCC)    Malignant neoplasm of overlapping sites of left lung [facility])    Palliative care encounter    COPD (chronic obstructive pulmonary disease) (HCC)    Tobacco dependence    Severe protein-energy malnutrition ([facility] [address]    Anemia  Patient presents today looking very weak, tremulous and not as talkative but able to respond and answer in short sentences. Patient over the past 2-3 days as per wife's history has been very weak and with nausea vomiting, dehydration. Patient is being visited by home health but refuses to go back to the hospital stating that he realizes that he needs to go but he does not want to go back to the hospital at this time. Patient just left ENTs office finding a new right sided neck mass. Apparently the ENT discussed with Dr Silvio Bustillo and they will perform a PET scan to look at the extent of the lesions.   Typically patient goes to the blood and cancer Center every 3 weeks but chemotherapy is on hold mg q.h.s. and very infrequently uses Ativan 0.5 mg q.h.s. because he is tired in the evenings. Patient was able to go outside of work around the house for approximately half hour the other day with fatigue afterwards. Patient does complain of not only physical fatigue but mental fatigue oftentimes forgetting common names even names of grandchildren. Patient asking if there is anything that would help with this. Patient is having his last radiation therapy today stating that he has a slight sore throat but is using Magic mouthwash 2-3 times a day which helps. Patient denies any new and/or uncontrolled symptoms otherwise. Options were discussed and secondary to patient's good oral intake we are stopping the IV fluids and will monitor. We may reinstate this in the future but he is doing well. We are continuing unchanged his Duragesic 37 µg q.72 hours prescribing 10 patches of each strength. He will continue his other medicines unchanged as above and today we are initiating Ritalin 5 mg each morning to help with his mental and physical fatigue. Patient educated on the medication and will call if he has any difficulties otherwise we will see him back in 4 weeks or sooner if he needs us. 10/31/17:  Medical records reviewed and as per radiation oncology assessment from 10/23/17:  -post Tx FU - no charge                        -no new Sx, skin changes resolving                        -KPS rebouned to 70-80 (looks much better today)  -RBA reviewed  -Q+A reviewed  -cont FU with [facility], skin rash on hands defer to Dr. Aylin Ross  Patient presents with his wife in no acute distress without sign of sedation ambulating well able to voice his needs. Patient has complained of increasing fatigue over the past month and has actually had his Synthroid doubled by his PCP a few days ago. Patient will continue to follow-up with his PCP in 6 weeks from now for repeat TSH.   Patient did not notice a difference with his Ritalin 5 restless legs at night. Patient takes Tylenol for breakthrough pain and is compliant with his Duragesic patc hes having for remaining of each. Patient also requesting a refill on the Colace. Options were discussed and I offered Requip at night for restless legs or changing his medication profile for his lower back pain but he desires to keep everything the same for the time. I continued his Duragesic 37 µg q.72 hours prescribing 10 patches of each strength, refilled his Colace and he will continue his melatonin q.h.s., other medications unchanged including Ativan 0.5 mg, Remeron 30 mg at at bedtime, continue Cymbalta 30 mg every 12 hours, continue Neurontin 900 mg 3 times a day, and his Pamelor 25 mg at at bedtime. We are extending his appointment out to 12 weeks and he knows to call prior to running out of his fentanyl patches or if he needs anything. Patient congratulated that he is now in remission as per his oncologist.  10/02/18:  Medical records reviewed including assessment by Karin Jiménez of palliative care on 09/24/18:  Spoke with patient and wife about discharge plan. The patient will be discharged to home with IV antibiotics and has a PICC line in place. He will continue current medications for symptom management. Reviewed medications with patient and family. Wife states that the patient has enough medication to last until next visit to palliative medicine outpatient clinic. Prescription provided for Marinol 2.5 mg twice a day ordered for appetite stimulation. Patient tolerating medication without side effects. The patient's wife will call the palliative medicine outpatient clinic to arrange an appointment in the next 2 weeks. [address] APRN-CNP  Patient presents today accompanied by his wife looking well without sign of sedation and/or confusion able to voice his needs. Patient states that slowly he is regaining his appetite but there is still significant room for improvement.   A prednisone 10 mg daily. Options were discussed and he will call us prior to running out of his Duragesic, Ativan as above and we will electronically prescribed unchanged. Of course we are not continuing the Marinol as he is already stopped. Patient will continue his Duragesic 37 µg one patch q.72 hours, Colace, melatonin, Ativan, Remeron 30 mg q.h.s., Cymbalta 30 mg q.12 hours, Neurontin 900 mg t.i.d., Pamelor 25 mg q.h.s. I advised patient to use his albuterol aerosols more frequently as they are written q.6 hours p.r.n. by pulmonology. I am sending pulmonology and note informing them of his continued dyspnea especially upon exertion. I am wondering if DuoNeb around-the-clock may help him more with continued p.r.n. albuterol. Also a pulse dose of the prednisone may be beneficial as well. I advised the patient to use his oxygen more frequently. I will of course defer to pulmonology for these decisions. We will see the patient back in 4 weeks or sooner if he needs us and they know to call us at any time. 12/03/18:  Medical records reviewed and as per radiation oncology assessment from 11/12/18:  Mr. Sanaz Merritt is a exceedingly pleasant 64year old man with locally advanced left lung cancer with spinal column involvement s/p palliative RT followed by a definitive concurrent course and then continued CHEMO followed by immunotherapy (the later continues - Keytruda per [employer]). He had a treatment response and maintained a reasonable QOL however a new lesion in the larynx was diagnosed this then was treated with fractionated external beam radiation therapy to the maximum dose allowed y the spinal cord (QUANTEC). Cynthia Heck presents today for FU.             Dom underwent RT initially due to newly diagnosed NSCLC with erosion of the mid thoracic, left side of the spinal column with probable cord involvement.  After a short hypo fractionated course (emergent) the remainder of the RT was delivered with concurrent chemo. The maximum dose was delivered as allowed by normal tissue tolerance (cord) [physics consult obtained], RBA reviewed with pt at that time. Potential chronic effects discussed today with pt and wife. He did get a second opinion at [facility] scanned in to [facility] but wants to stick with myself and Dr. Aman Swenson for his care. Dr. Aman Swenson initially prescribed Remona Gemma / Guadlupe Apo + [employer]. See [facility] records for further [address] including immunotherapy. Dom then came in with continued voice change and stridor that then prompted a tracheostomy (last year). 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  We prescribed fractionated external beam radiation therapy to the highest dose the spinal cord could tolerate per QUANTEC and he has had a response to treatment with improvement in his voice and stable KPS still on immunotherapy per Dr. Aman Swenson. He recently had a new pneumonia with questionable mass in the right side, continued FU pending. If this is malignancy (biopsy could be considered) then SBRT is a reasonable considerations. 2nd opinion offered and declined. KPS 70. He does continue to smoke. Cessation again discussed.     Mr. Lucero Mccollum is a pleasant gentleman well know to me with a history of both lung cancer and laryngeal cancer s/p concurrent chemo-RT for the former and definitve intent fractionated external beam radiation therapy for the latter (with the highest dose the spinal cord could tolerate - see above) --- he has a recently diagnosis of pneumonia +/- mass with continue FU pending (SBRT could be considered for oligo metastatic disease vs 3rd primary depending on the clinical course.)  He is still on immunotherapy and his KPS is intact at KPS 70 WO new Sx perhaps SOB).  [place] [employer] is no evidence of disease recurrence or progression based on a detailed review of the available imaging, physical exam, and ROS (all personally performed prior to and during this follow up appointment today).  The patient did verbalize understanding for the indications of continued FU including imaging and laboratory evaluation as applicable to this case; as well as appropriate lifestyle choices and health maintenance.  All questions answered to the best of my ability. 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 Patient presents today accompanied by his wife in no acute distress at his baseline ambulating well without sign of sedation and/or confusion able to voice his needs. Patient states is no change in how he is taken his medications needing a refill on his fentanyl patches. Patient started pulmonary rehab last week and states that his breathing is slowly improving. Patient gained 3 pounds and denies new and/or uncontrolled symptoms otherwise. Options were discussed and we are continuing the same medications unchanged including Duragesic 37 µg one patch q.72 hours prescribing 10 of each strength, Colace, melatonin, Ativan, Remeron 30 mg q.h.s., Cymbalta 30 mg q.12 hours, Neurontin 900 mg t.i.d., Pamelor 25 mg q.h.s. We will see him back in 8 weeks and they know to call us at anytime with any questions and/or concerns. 01/28/19:  Medical records reviewed and as per pulmonology assessment on 12/07/18: I had the pleasure of seeing Brayan Simmons in our office in follow-up regarding his COPD, history of MRSA pneumonia and lung cancer. Overall he has been doing well. He has gained weight. He is attending pulmonary rehab. Complying with his use of budesonide and Perforomist.  Continues to be on Keytruda and chronic prednisone 10 mg daily. He continues to remain compliant with and benefit from the use of oxygen at 3 lpm with activity and nocturnal.  Also follows with Dr. Franco Primrose for palliative care for his pain control. Milla Sneed was seen today for copd and cancer.   Diagnoses and all orders for this visit:  Tracheostomy in place [facility])  History of staphylococcal pneumonia  -     XR CHEST STANDARD (2 VW)  -     Cancel: MRSA SCREENING CULTURE ONLY; Future  Pancoast tumor of left lung ([address]  Centrilobular emphysema (HCC)  Chronic respiratory failure with hypoxia (HCC)  Chest x-ray shows persistent  right upper lobe nodular density will  proceed with obtaining a CT scan considering patient's history. We'll check MRSA screening. Continue oxygen at 3 lpm with activity and nocturnal  Continue budesonide and Perforomist breathing treatments. FOLLOW UP  Return in about 3 days (around 12/10/2018) for ct scan. Chest CT results on 12/18/18:  Persistent ill-defined infiltrative soft tissue density in the left   upper lobe medially extending to the apex, abutting the mediastinum   and aortic arch which may represent inflammatory process like   pneumonia or malignancy. There is associated mixed sclerotic and lytic   lesion involving the T1 and T2 vertebral body and left second rib   posteriorly. Infectious involvement or malignancy are considered and   further assessment is recommended.       Improving pneumonic infiltrate in the right upper lobe. There is also   thoracic aortic aneurysm   Patient presents today And advised wife noted distress without sign of sedation and/or confusion able to voice his needs. Patient complaining of increasing lower back pain across his hips that the fentanyl patches are not helping. Patient also complaining of gum pain stating that he will be calling in oral surgeon soon as he has exposed bone and he cannot wear his dentures for more than a couple hours at a time. Patient also is now on 2 new nebulizers every 12 hours by pulmonology. Patient also asked to talk to me in private and his wife stepped out. Patient states he is having difficulty urinating despite his Flomax and I advised him to contact his urologist to discuss titration of medications.   Today we are decreasing his Pamelor from 25 q.h.s. to 10 mg q.h.s. secondary to anticholinergic effect. Patient also states that he is experiencing more depression without any suicidal ideations. Patient did state that he is thinking about stopping treatment and really does not like feeling this way. I talked to him about counseling and he was very receptive to counseling. Patient states that he protects his wife and does not want to tell her about these things and is not real close to his family to talk to them about intimate details. Patient did talk to his elder sister at one time whom he is close to. Patient also complains about sexual dysfunction and I discussed this with him as well being multi modality. Options were discussed and I made the above recommendations as well as today continuing his fentanyl 37 µg q.72 hours as we normally do and today we initiated p.r.n. Percocet 5-325 milligram tablets 1 p.o. q.6 hours p.r.n. holding for sedation prescribing 120 tablets today. We see what his opioid load requirement is and then add to the fentanyl patch if need be. Concerning his depression we are sending him to [facility] for counseling and recommendations. Today we are keeping him on Cymbalta 60 mg q.h.s., Remeron 30 mg q.h.s. and we did decrease his Pamelor from 25 mg to 10 mg q.h.s. secondary to BPH and his above complaints. I will be glad to discuss results with them if need be. I will see him back in 4 weeks or sooner if he needs us and I advised him that he can call us at anytime with any concerns and he voiced understanding and appreciation. 03/05/19:  Medical records reviewed and as per phone discussion with Edy Dewitt our palliative care nurse on 02/28/19:  Call from [address] [place] [address] stating had not heard from Counseling group Dr. Rolin Holter had referred him to. This nurse had a confirmed fax that referral was made 1/28/19. Called over to [facility]. They did not receive referral. Referral resent and called to Alan to give update.    Patient presents today accompanied by his wife distress without sign of sedation and/or confusion able to voice his needs. Patient complaining of worsening pain to his hip and thigh groin and left side of his mouth. Patient going for scans today as per medical oncology. Patient takes 2-3 Percocet tablets remaining having approximately 60 tablets left stating that they do not help but sometimes causes mild sedation. Patient voices compliance with his other medications and cannot notice a difference from decreasing the Pamelor last time. Patient has gained 4 pounds as per our records. [facility] stating that it is going well and he relates well to his counselor. Options were discussed and we are continuing the Percocet 5-325 milligram tablets 1 p.o. q.6 hours p.r.n. and he will call prior to running out. Today we are increasing Duragesic to 50 µg one patch q.72 hours prescribing 10 patches today and he will set aside the 1 patches each of his old strength in a safe place and of course not use them. We will see him back in 4 weeks or sooner if he needs us. 04/09/19:  Medical records reviewed and no documented physician visits in [facility] since I last saw him. Patient presents today accompanied by his wife in no acute distress without sign of sedation able to voice his needs and bleeding well. Patient weight has remained the same. Patient noticed a difference with increasing the fentanyl and now only using Percocet 1-2 times a day but still with breakthrough pain. Patient restarted smoking despite Wellbutrin 75 mg b.i.d.  I talked to him about attending classes again, habit alteration, etc.  Patient also complains of right hip pain worse on rainy days diagnosed with arthritis via x-rays. Patient unable to take NSAIDs.   Patient does complain of fatigue with the Percocet and at times difficulty sleeping with melatonin 5 mg q.h.s. which helped in the past.  Options were discussed and he will try twice daily, oxycodone 10 mg every 6 as needed, Lyrica 150 mg 3 times daily, as well as his Cymbalta 30 mg twice daily, mirtazapine 30 mg at bedtime, nortriptyline 10 mg at bedtime. We will have him return in approximately 5 to 6 weeks to reassess his needs, and he is encouraged to call with any questions, concerns, change in symptoms, or if he requires a refill. 1/13/2020:  Shalom Barrett presents today, accompanied by his wife. He is alert, only, to voice and his concerns well, and appears to be at his baseline. Continues have pain, she states currently is primarily in his back and bilateral hips, rates it a 6 on a scale 10 today. Continues with MS Contin 30 mg 3 times daily, and oxycodone 10 mg, ordered every 6 PRN, of which he typically utilizes 2-3 times per day. Overall feels as though this is doing very well, and is happy with his current regimen. He also continues unchanged with Lyrica 150 3 times daily, Remeron 30 mg at bedtime, duloxetine 30 mg twice per day, and nortriptyline 10 mg nightly. He additionally continues to utilize Ativan 0.5 mg most every night. Continues to have mild to moderate constipation, but this is well managed utilizing Dolly-Colace and MiraLAX. Options were discussed, will make no changes to his analgesic regimen, will provide a refill of his MS Contin oxycodone today. After further discussion, we will also have him stop taking his nortriptyline, and he is given instructions to take this every other day for 1 week, then every third day for 1 week, then to stop it. He understands he can call if he notices any significant changes in his mood, sleep, or pain after this medication has been stopped. Otherwise will not make any further changes, and will have him return approximately 8 weeks to reassess his needs. He is encouraged to call if he has a questions, concerns, or changes in symptoms. 3/2/2020:  Shalom Barrett presents today, with his wife.   He is alert, only, to voice and his

## 2020-03-04 LAB — MRSA CULTURE ONLY: NORMAL

## 2020-03-05 ENCOUNTER — HOSPITAL ENCOUNTER (OUTPATIENT)
Age: 65
Setting detail: OUTPATIENT SURGERY
Discharge: HOME | End: 2020-03-05
Attending: INTERNAL MEDICINE | Admitting: INTERNAL MEDICINE
Payer: MEDICARE

## 2020-03-05 ENCOUNTER — ANESTHESIA EVENT (OUTPATIENT)
Dept: ENDOSCOPY | Age: 65
End: 2020-03-05
Payer: MEDICARE

## 2020-03-05 ENCOUNTER — ANESTHESIA (OUTPATIENT)
Dept: ENDOSCOPY | Age: 65
End: 2020-03-05
Payer: MEDICARE

## 2020-03-05 VITALS
DIASTOLIC BLOOD PRESSURE: 55 MMHG | RESPIRATION RATE: 19 BRPM | OXYGEN SATURATION: 91 % | SYSTOLIC BLOOD PRESSURE: 85 MMHG

## 2020-03-05 VITALS
HEIGHT: 71 IN | TEMPERATURE: 97.8 F | DIASTOLIC BLOOD PRESSURE: 70 MMHG | SYSTOLIC BLOOD PRESSURE: 112 MMHG | BODY MASS INDEX: 28.56 KG/M2 | HEART RATE: 69 BPM | OXYGEN SATURATION: 94 % | RESPIRATION RATE: 18 BRPM | WEIGHT: 204 LBS

## 2020-03-05 LAB — METER GLUCOSE: 112 MG/DL (ref 74–99)

## 2020-03-05 PROCEDURE — 7100000011 HC PHASE II RECOVERY - ADDTL 15 MIN: Performed by: INTERNAL MEDICINE

## 2020-03-05 PROCEDURE — 87116 MYCOBACTERIA CULTURE: CPT

## 2020-03-05 PROCEDURE — 82962 GLUCOSE BLOOD TEST: CPT

## 2020-03-05 PROCEDURE — 87102 FUNGUS ISOLATION CULTURE: CPT

## 2020-03-05 PROCEDURE — 87205 SMEAR GRAM STAIN: CPT

## 2020-03-05 PROCEDURE — 3700000000 HC ANESTHESIA ATTENDED CARE: Performed by: INTERNAL MEDICINE

## 2020-03-05 PROCEDURE — 3609027000 HC BRONCHOSCOPY: Performed by: INTERNAL MEDICINE

## 2020-03-05 PROCEDURE — 3700000001 HC ADD 15 MINUTES (ANESTHESIA): Performed by: INTERNAL MEDICINE

## 2020-03-05 PROCEDURE — 87015 SPECIMEN INFECT AGNT CONCNTJ: CPT

## 2020-03-05 PROCEDURE — 2709999900 HC NON-CHARGEABLE SUPPLY: Performed by: INTERNAL MEDICINE

## 2020-03-05 PROCEDURE — 87070 CULTURE OTHR SPECIMN AEROBIC: CPT

## 2020-03-05 PROCEDURE — 87206 SMEAR FLUORESCENT/ACID STAI: CPT

## 2020-03-05 PROCEDURE — 87106 FUNGI IDENTIFICATION YEAST: CPT

## 2020-03-05 PROCEDURE — 6360000002 HC RX W HCPCS: Performed by: NURSE ANESTHETIST, CERTIFIED REGISTERED

## 2020-03-05 PROCEDURE — 2580000003 HC RX 258: Performed by: NURSE ANESTHETIST, CERTIFIED REGISTERED

## 2020-03-05 PROCEDURE — 7100000010 HC PHASE II RECOVERY - FIRST 15 MIN: Performed by: INTERNAL MEDICINE

## 2020-03-05 RX ORDER — SODIUM CHLORIDE 9 MG/ML
INJECTION, SOLUTION INTRAVENOUS CONTINUOUS PRN
Status: DISCONTINUED | OUTPATIENT
Start: 2020-03-05 | End: 2020-03-05 | Stop reason: SDUPTHER

## 2020-03-05 RX ORDER — SODIUM CHLORIDE 0.9 % (FLUSH) 0.9 %
10 SYRINGE (ML) INJECTION PRN
Status: DISCONTINUED | OUTPATIENT
Start: 2020-03-05 | End: 2020-03-05 | Stop reason: HOSPADM

## 2020-03-05 RX ORDER — SODIUM CHLORIDE 0.9 % (FLUSH) 0.9 %
10 SYRINGE (ML) INJECTION EVERY 12 HOURS SCHEDULED
Status: DISCONTINUED | OUTPATIENT
Start: 2020-03-05 | End: 2020-03-05 | Stop reason: SDUPTHER

## 2020-03-05 RX ORDER — SODIUM CHLORIDE 0.9 % (FLUSH) 0.9 %
10 SYRINGE (ML) INJECTION EVERY 12 HOURS SCHEDULED
Status: DISCONTINUED | OUTPATIENT
Start: 2020-03-05 | End: 2020-03-05 | Stop reason: HOSPADM

## 2020-03-05 RX ORDER — PROPOFOL 10 MG/ML
INJECTION, EMULSION INTRAVENOUS PRN
Status: DISCONTINUED | OUTPATIENT
Start: 2020-03-05 | End: 2020-03-05 | Stop reason: SDUPTHER

## 2020-03-05 RX ADMIN — PROPOFOL 30 MG: 10 INJECTION, EMULSION INTRAVENOUS at 13:50

## 2020-03-05 RX ADMIN — PROPOFOL 150 MG: 10 INJECTION, EMULSION INTRAVENOUS at 13:44

## 2020-03-05 RX ADMIN — PROPOFOL 50 MG: 10 INJECTION, EMULSION INTRAVENOUS at 13:47

## 2020-03-05 RX ADMIN — SODIUM CHLORIDE: 9 INJECTION, SOLUTION INTRAVENOUS at 13:44

## 2020-03-05 ASSESSMENT — PAIN - FUNCTIONAL ASSESSMENT: PAIN_FUNCTIONAL_ASSESSMENT: 0-10

## 2020-03-05 ASSESSMENT — PAIN SCALES - GENERAL
PAINLEVEL_OUTOF10: 0

## 2020-03-05 ASSESSMENT — ENCOUNTER SYMPTOMS: SHORTNESS OF BREATH: 1

## 2020-03-05 NOTE — H&P
INTERIM HISTORY  I had the pleasure of seeing Nanette Chan in our office in follow-up regarding his recent hospital admission with [facility] and follow up PET scan results. . Since his last office visit patient reports not able to tolertae his pMV and getting SOB. . Patient denies chest pain, cough and hemoptysis.    .epw     ALLERGIES       Allergies   Allergen Reactions    Augmentin [Amoxicillin-Pot Clavulanate] Diarrhea         PAST MEDICAL HISTORY   has a past medical history of Abdominal aortic aneurysm without rupture ([address] Acute bronchitis with COPD ([address] Apnea, Arthritis, COPD (chronic obstructive pulmonary disease) ([address] Depression with anxiety, Emphysema of lung ([address] Encounter for antineoplastic immunotherapy, HAP (hospital-acquired pneumonia), Hyperlipidemia, Lung cancer ([address] Osteoradionecrosis of jaw, Paralyzed vocal cords, Thrombosis of testis, and Tobacco dependence.      MEDICATIONS                                                 has a current medication list which includes the following prescription(s): nortriptyline, simvastatin, budesonide, duloxetine, esomeprazole, formoterol, levothyroxine, oxycodone hcl, prednisone, tamsulosin, revefenacin, OXYGEN, chlorhexidine, pregabalin, senna-docusate, lorazepam, bupropion, mirtazapine, cyclobenzaprine, vitamin d3, pembrolizumab, albuterol, and melatonin.     IMMUNIZATIONS  Immunization status: up to date and documented.     PAST SURGICAL HISTORY   has a past surgical history that includes Knee arthroscopy; back surgery; sinus surgery; other surgical history (4/15/2016); Lung biopsy (Left, 5/6/2016); tracheostomy (05/24/2017); and tracheostomy (05/24/2017).     FAMILY HISTORY  family history includes Cancer in his father and mother; Diabetes in his father.     SOCIAL HISTORY   reports that he has been smoking cigarettes. He started smoking about 48 years ago. He has a 88.00 pack-year smoking history.  He has never used smokeless tobacco. He reports that he does not drink alcohol or use drugs.     REVIEW OF SYSTEMS  Constitutional: Denies fever, weight loss, night sweats, and fatigue  Skin: Denies pigmentation, dark lesions, and rashes   HEENT: Denies hearing loss, tinnitus, ear drainage, epistaxis, sore throat, and hoarseness. Cardiovascular: Denies palpitations, chest pain, and chest pressure. Respiratory: Denies cough, dyspnea at rest, hemoptysis, apnea, and choking. Gastrointestinal: Denies nausea, vomiting, poor appetite, diarrhea, heartburn or reflux  Genitourinary: Denies dysuria, frequency, urgency or hematuria  Musculoskeletal: Denies myalgias, muscle weakness, and bone pain  Neurological: Denies dizziness, vertigo, headache, and focal weakness  Psychological: Denies anxiety and depression  Endocrine: Denies heat intolerance and cold intolerance  Hematopoietic/Lymphatic: Denies bleeding problems and blood transfusions      OBJECTIVE  /62 (Site: Right Upper Arm)   Pulse 82   Temp 96.9 °F (36.1 °C) (Tympanic)   Resp 18   Ht 5' 10\" (1.778 m)   Wt 201 lb 12.8 oz (91.5 kg)   SpO2 96%   BMI 28.96 kg/m²  Body mass index is 28.96 kg/m². GENERAL: Alert and oriented x3 in no acute distress. HEENT: Atraumatic. Normocephalic. Extraocular muscles are intact. Pupils are equal, round and reactive to light and accommodation. Sclera is nonicteric. NECK: Supple. No JVD. No adenopathy. No bruits. CARDIOVASCULAR: Regular rate and rhythm. No murmur, gallop or thrills. LUNGS: Clear to auscultation bilaterally. No wheezing. ABDOMEN: Soft, nontender. No distention or organomegaly. EXTREMITIES: No clubbing, no cellulitis.  Positive peripheral pulses, equal bilaterally.      DATA  Narrative   Patient MRN:  [id number]   : 1955   Age: 59 years   Gender: Male       Order Date:  2020 10:47 AM       TECHNIQUE/NUMBER OF IMAGES/COMPARISON/CLINICAL HISTORY:       Nuclear Medicine PET/CT scan.       Radionuclide: 15 mCi of FDG, IV.       64 nicotine-induced disorder      4. Laryngeal cancer (HCC)            PLAN/IMPRESSION  Alan was seen today for 6 month follow-up and shortness of breath.     Diagnoses and all orders for this visit:     History of adenocarcinoma of lung     Tracheostomy in place [facility])     Cigarette nicotine dependence with nicotine-induced disorder     Laryngeal cancer ([facility]        Will proceed with a  a diagnostic bronchoscopy  To R/O tracheal stenosis/ tracheomalacia vs granulation tissue.       Electronically signed by Emmanuel Corral MD,FCCP 2/24/2020 3:20 PM

## 2020-03-05 NOTE — ANESTHESIA PRE PROCEDURE
Provider, MD   chlorhexidine (PERIDEX) 0.12 % solution Take 15 mLs by mouth daily Swish & spit qd 1/10/20  Yes Sylvia Quintana DO   pregabalin (LYRICA) 150 MG capsule Take 1 capsule by mouth 3 times daily for 90 days. Patient taking differently: Take 150 mg by mouth 3 times daily. Instructed to take am of procedure 12/18/19 3/17/20 Yes BG Pardo CNP   buPROPion [facility]) 75 MG tablet Take 1 tablet by mouth 2 times daily  Patient taking differently: Take 75 mg by mouth 2 times daily Instructed to take am of procedure 11/13/19  Yes Sreekanth Meneses MD   mirtazapine (REMERON) 30 MG tablet Take 1 tablet by mouth nightly 10/22/19 10/16/20 Yes BG Pardo CNP   albuterol (ACCUNEB) 0.63 MG/3ML nebulizer solution Take 3 mLs by nebulization every 6 hours as needed for Wheezing 10/15/18  Yes Sreekanth Meneses MD   fentaNYL (DURAGESIC) 75 MCG/HR Place 1 patch onto the skin every 72 hours for 30 days. Patient taking differently: Place 1 patch onto the skin every 72 hours. Indications: hasnt started medication as of today  3/2/20 4/1/20  BG Pardo CNP   oxyCODONE HCl (OXY-IR) 10 MG immediate release tablet Take 1 tablet by mouth every 6 hours as needed for Pain for up to 30 days. Intended supply: 30 days 2/25/20 3/26/20  BG Pardo CNP   oxyCODONE HCl (OXY-IR) 10 MG immediate release tablet Take 10 mg by mouth 3 times daily.  Instructed to take am of procedure    Historical Provider, MD   senna-docusate (PERICOLACE) 8.6-50 MG per tablet Take 2 tablets by mouth daily 12/18/19 6/15/20  BG Pardo CNP   cyclobenzaprine (FLEXERIL) 10 MG tablet Take 10 mg by mouth 3 times daily as needed for Muscle spasms    Historical Provider, MD   Cholecalciferol (VITAMIN D3) 5000 units TABS Take 5,000 Units by mouth daily     Historical Provider, MD   pembrolizumab [facility]) 100 MG/4ML SOLN Infuse 200 mg intravenously every 21 days Last Dose -01/16/20  Next Dose -02/06/20 (hospital-acquired pneumonia) 2017    Hyperlipidemia     Lung cancer ([facility] [address] 2016    chemo and radiation    Osteoradionecrosis of jaw 2018    Paralyzed vocal cords     Thrombosis of testis     Tobacco dependence 2017       Past Surgical History:        Procedure Laterality Date    BACK SURGERY      KNEE ARTHROSCOPY      LUNG BIOPSY Left 2016    OTHER SURGICAL HISTORY  4/15/2016    cervical myelogram    SINUS SURGERY      TRACHEOSTOMY  2017    TRACHEOSTOMY  2017       Social History:    Social History     Tobacco Use    Smoking status: Current Some Day Smoker     Packs/day: 1.00     Years: 44.00     Pack years: 44.00     Types: Cigarettes     Start date: 0     Last attempt to quit: 2018     Years since quittin.4    Smokeless tobacco: Never Used    Tobacco comment: 2020   Substance Use Topics    Alcohol use: No     Alcohol/week: 0.0 standard drinks                                Ready to quit: Not Answered  Counseling given: Not Answered  Comment: 2020      Vital Signs (Current):   Vitals:    20 0958 20 1242   BP:  109/61   Pulse:  80   Resp:  20   Temp:  98.1 °F (36.7 °C)   TempSrc:  Temporal   SpO2:  92%   Weight: 204 lb (92.5 kg) 204 lb (92.5 kg)   Height: 5' 11\" (1.803 m) 5' 11\" (1.803 m)                                              BP Readings from Last 3 Encounters:   20 109/61   20 102/65   20 108/62       NPO Status: Time of last liquid consumption: 0900                        Time of last solid consumption: 2300                        Date of last liquid consumption: 20                        Date of last solid food consumption: 20    BMI:   Wt Readings from Last 3 Encounters:   20 204 lb (92.5 kg)   20 204 lb 3.2 oz (92.6 kg)   20 201 lb 12.8 oz (91.5 kg)     Body mass index is 28.45 kg/m².     CBC:   Lab Results   Component Value Date    WBC 6.2 2020    RBC 4.07 2020 HGB 10.4 01/27/2020    HCT 34.1 01/27/2020    MCV 83.8 01/27/2020    RDW 15.8 01/27/2020     01/27/2020       CMP:   Lab Results   Component Value Date     02/04/2020    K 4.8 02/04/2020    K 3.9 09/22/2019     02/04/2020    CO2 22 02/04/2020    BUN 20 02/04/2020    CREATININE 1.4 02/04/2020    GFRAA >60 02/04/2020    LABGLOM 51 02/04/2020    GLUCOSE 104 02/04/2020    GLUCOSE 130 02/22/2012    PROT 6.4 01/27/2020    CALCIUM 9.5 02/04/2020    BILITOT <0.2 01/27/2020    ALKPHOS 79 01/27/2020    AST 16 01/27/2020    ALT 21 01/27/2020       POC Tests: No results for input(s): POCGLU, POCNA, POCK, POCCL, POCBUN, POCHEMO, POCHCT in the last 72 hours. Coags:   Lab Results   Component Value Date    PROTIME 17.4 09/22/2019    INR 1.5 09/22/2019    APTT 29.9 09/22/2019       HCG (If Applicable): No results found for: PREGTESTUR, PREGSERUM, HCG, HCGQUANT     ABGs: No results found for: PHART, PO2ART, NOY3XFR, QLU4DSC, BEART, O4QGCSGS     Type & Screen (If Applicable):  No results found for: LABABO, LABRH    Anesthesia Evaluation    Airway: Mallampati: I  TM distance: >3 FB   Neck ROM: full  Mouth opening: > = 3 FB Dental:          Pulmonary:   (+) pneumonia (January 2020): resolved,  COPD (breathing is unlabored at rest):  shortness of breath:  decreased breath sounds,                            ROS comment: Chronic respiratory failure. Tobacco dependence. Cardiovascular:            Rhythm: regular  Rate: normal                    Neuro/Psych:               GI/Hepatic/Renal:   (+) renal disease (Acute kidney disease, resolving.):,          ROS comment: Esophageal stricture secondary to radiation therapy. .   Endo/Other:    (+) : arthritis: OA., malignancy/cancer (lung ca. pharyngeal CA, s/p laryngectomy). Abdominal:           Vascular:   + PVD, aortic or cerebral (Abdominal aortic aneurysm), .                                      Anesthesia Plan      MAC     ASA 4             Anesthetic

## 2020-03-05 NOTE — ANESTHESIA POSTPROCEDURE EVALUATION
Department of Anesthesiology  Postprocedure Note    Patient: Tosha Kelley MRN: [id number]  YOB: 1955  Date of evaluation: 3/5/2020  Time:  2:31 PM     Procedure Summary     Date:  03/05/20 Room / Location:  [facility] 03 / 106 [facility]    Anesthesia Start:  0480 Anesthesia Stop:  1233    Procedure:  BRONCHOSCOPY DIAGNOSTIC OR CELL [address] (N/A ) Diagnosis:  (TRACHEAL STENOSIS)    Surgeon:  Tess Talamantes MD Responsible Provider:  Saul Bonilla MD    Anesthesia Type:  MAC ASA Status:  4          Anesthesia Type: No value filed. Radames Phase I: Radames Score: 10    Radames Phase II: Radames Score: 9    Last vitals: Reviewed and per EMR flowsheets.        Anesthesia Post Evaluation    Patient location during evaluation: PACU  Patient participation: complete - patient participated  Level of consciousness: awake  Airway patency: patent  Nausea & Vomiting: no nausea and no vomiting  Complications: no  Cardiovascular status: hemodynamically stable  Respiratory status: acceptable  Hydration status: euvolemic

## 2020-03-06 LAB — GRAM STAIN ORDERABLE: NORMAL

## 2020-03-07 LAB
CULTURE, RESPIRATORY: NORMAL
SMEAR, RESPIRATORY: NORMAL

## 2020-03-17 RX ORDER — LORAZEPAM 0.5 MG/1
0.5 TABLET ORAL NIGHTLY PRN
Qty: 30 TABLET | Refills: 2 | Status: SHIPPED | OUTPATIENT
Start: 2020-03-17 | End: 2020-06-09 | Stop reason: SDUPTHER

## 2020-03-24 RX ORDER — OXYCODONE HYDROCHLORIDE 10 MG/1
10 TABLET ORAL EVERY 6 HOURS PRN
Qty: 120 TABLET | Refills: 0 | Status: SHIPPED
Start: 2020-03-24 | End: 2020-05-20 | Stop reason: SDUPTHER

## 2020-03-24 RX ORDER — PREGABALIN 150 MG/1
150 CAPSULE ORAL 3 TIMES DAILY
Qty: 90 CAPSULE | Refills: 5 | Status: ON HOLD | OUTPATIENT
Start: 2020-03-24 | End: 2020-09-17 | Stop reason: HOSPADM

## 2020-03-24 RX ORDER — DULOXETIN HYDROCHLORIDE 30 MG/1
30 CAPSULE, DELAYED RELEASE ORAL 2 TIMES DAILY
Qty: 180 CAPSULE | Refills: 1 | Status: SHIPPED
Start: 2020-03-24 | End: 2020-09-22 | Stop reason: SDUPTHER

## 2020-03-25 PROBLEM — N17.9 ACUTE RENAL FAILURE (ARF) (HCC): Status: RESOLVED | Noted: 2020-01-23 | Resolved: 2020-03-24

## 2020-04-06 LAB
FUNGUS (MYCOLOGY) CULTURE: ABNORMAL
FUNGUS STAIN: ABNORMAL
ORGANISM: ABNORMAL

## 2020-04-13 RX ORDER — AMOXICILLIN 250 MG
2 CAPSULE ORAL DAILY
Qty: 180 TABLET | Refills: 1 | Status: SHIPPED
Start: 2020-04-13 | End: 2020-09-04 | Stop reason: SDUPTHER

## 2020-04-16 RX ORDER — FENTANYL 75 UG/H
1 PATCH TRANSDERMAL
Qty: 10 PATCH | Refills: 0 | Status: SHIPPED
Start: 2020-04-16 | End: 2020-05-20 | Stop reason: SDUPTHER

## 2020-04-21 LAB
AFB CULTURE (MYCOBACTERIA): NORMAL
AFB SMEAR: NORMAL

## 2020-05-13 ENCOUNTER — HOSPITAL ENCOUNTER (OUTPATIENT)
Age: 65
Discharge: HOME | End: 2020-05-15
Payer: MEDICARE

## 2020-05-13 PROBLEM — Z43.1 ATTENTION TO G-TUBE (HCC): Status: RESOLVED | Noted: 2018-05-04 | Resolved: 2020-05-13

## 2020-05-13 PROBLEM — R33.8 ACUTE RETENTION OF URINE: Status: RESOLVED | Noted: 2017-05-27 | Resolved: 2020-05-13

## 2020-05-13 PROBLEM — N18.30 STAGE 3 CHRONIC KIDNEY DISEASE (HCC): Status: ACTIVE | Noted: 2020-05-13

## 2020-05-13 PROBLEM — I95.1 ORTHOSTATIC HYPOTENSION: Status: RESOLVED | Noted: 2017-07-08 | Resolved: 2020-05-13

## 2020-05-13 PROBLEM — J18.9 HAP (HOSPITAL-ACQUIRED PNEUMONIA): Status: RESOLVED | Noted: 2017-05-27 | Resolved: 2020-05-13

## 2020-05-13 PROBLEM — J96.21 ACUTE ON CHRONIC RESPIRATORY FAILURE WITH HYPOXIA (HCC): Status: RESOLVED | Noted: 2017-05-27 | Resolved: 2020-05-13

## 2020-05-13 PROBLEM — N17.9 ACUTE KIDNEY FAILURE (HCC): Status: RESOLVED | Noted: 2020-01-23 | Resolved: 2020-05-13

## 2020-05-13 PROBLEM — A41.9 SEPSIS (HCC): Status: RESOLVED | Noted: 2017-05-27 | Resolved: 2020-05-13
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
## 2020-05-13 PROBLEM — E78.49 OTHER HYPERLIPIDEMIA: Status: ACTIVE | Noted: 2020-05-13

## 2020-05-13 LAB
ANION GAP SERPL CALCULATED.3IONS-SCNC: 17 MMOL/L (ref 7–16)
BUN BLDV-MCNC: 20 MG/DL (ref 8–23)
CALCIUM SERPL-MCNC: 9.6 MG/DL (ref 8.6–10.2)
CHLORIDE BLD-SCNC: 102 MMOL/L (ref 98–107)
CHOLESTEROL, TOTAL: 163 MG/DL (ref 0–199)
CO2: 22 MMOL/L (ref 22–29)
CREAT SERPL-MCNC: 1.7 MG/DL (ref 0.7–1.2)
GFR AFRICAN AMERICAN: 49
GFR NON-AFRICAN AMERICAN: 41 ML/MIN/1.73
GLUCOSE BLD-MCNC: 92 MG/DL (ref 74–99)
HDLC SERPL-MCNC: 47 MG/DL
LDL CHOLESTEROL CALCULATED: 97 MG/DL (ref 0–99)
MAGNESIUM: 2 MG/DL (ref 1.6–2.6)
PHOSPHORUS: 3.1 MG/DL (ref 2.5–4.5)
POTASSIUM SERPL-SCNC: 4.2 MMOL/L (ref 3.5–5)
SODIUM BLD-SCNC: 141 MMOL/L (ref 132–146)
TRIGL SERPL-MCNC: 97 MG/DL (ref 0–149)
TSH SERPL DL<=0.05 MIU/L-ACNC: 3.45 UIU/ML (ref 0.27–4.2)
VLDLC SERPL CALC-MCNC: 19 MG/DL

## 2020-05-13 PROCEDURE — 80061 LIPID PANEL: CPT

## 2020-05-13 PROCEDURE — 84443 ASSAY THYROID STIM HORMONE: CPT

## 2020-05-13 PROCEDURE — 83735 ASSAY OF MAGNESIUM: CPT

## 2020-05-13 PROCEDURE — 84100 ASSAY OF PHOSPHORUS: CPT

## 2020-05-13 PROCEDURE — 80048 BASIC METABOLIC PNL TOTAL CA: CPT

## 2020-05-20 RX ORDER — OXYCODONE HYDROCHLORIDE 10 MG/1
10 TABLET ORAL EVERY 6 HOURS PRN
Qty: 120 TABLET | Refills: 0 | Status: SHIPPED
Start: 2020-05-20 | End: 2020-06-30 | Stop reason: SDUPTHER

## 2020-05-20 RX ORDER — FENTANYL 75 UG/H
1 PATCH TRANSDERMAL
Qty: 10 PATCH | Refills: 0 | Status: SHIPPED
Start: 2020-05-20 | End: 2020-06-09 | Stop reason: ALTCHOICE

## 2020-05-28 ENCOUNTER — TELEPHONE (OUTPATIENT)
Dept: RADIATION ONCOLOGY | Age: 65
End: 2020-05-28

## 2020-05-28 NOTE — TELEPHONE ENCOUNTER
RN placed call to Dr Pat Tabor, Medical Oncology at The [address] [place] [address] as well as Dr Nerissa Thompson, ENT at [facility] to request most recent office visit/physician's note. Fax number provided.

## 2020-05-29 ENCOUNTER — HOSPITAL ENCOUNTER (OUTPATIENT)
Dept: RADIATION ONCOLOGY | Age: 65
Discharge: HOME | End: 2020-05-29
Payer: MEDICARE

## 2020-05-29 VITALS — BODY MASS INDEX: 29.7 KG/M2 | WEIGHT: 219 LBS

## 2020-05-29 PROCEDURE — 99442 PR PHYS/QHP TELEPHONE EVALUATION 11-20 MIN: CPT | Performed by: RADIOLOGY

## 2020-05-29 NOTE — PROGRESS NOTES
Radiation Oncology   Follow Up Note  Conception Latha. Ivett Bishop MD MS DABR      5/29/2020  Alan Jacobson. Date of Service: 5/29/20        HPI:        -DIAG:   Lung ca - locally advanced then metastatic  -TX:   S/P multiple treatment courses including RT  -Interval HIST:  Today, Sammy Horner is notes weight gain, rashes and scar tissue in the neck the affect his QOL. Imaging reviewed prior and during visit:       PET 2/11/20:  Impression   1. Two focal suspicious areas of increased metabolic activity in the   base of the oral cavity/sublingual spaces as above commented.       2. No indication for metastatic lymph node adenopathy in the neck or   mediastinum.       3. No abnormal uptake of the radionuclide is seen at the level of the   lungs including multiple parenchymal opacities observed bilaterally on   recent CT chest.           -----          Jessica Zimmerman. is a 59 y.o. male evaluated via telephone on 5/29/2020. Consent:  He and/or health care decision maker is aware that he may receive a bill for this telephone service, depending on his insurance coverage, and has provided verbal consent to proceed: Yes      Documentation:  I communicated with the patient and/or health care decision maker about lung CA. Details of this discussion including any medical advice provided: NCCN based FU      I affirm this is a Patient Initiated Episode with an Established Patient who has not had a related appointment within my department in the past 7 days or scheduled within the next 24 hours. I also affirm that the pt is at home and that I, during this visit, am in my office at [facility].       Patient identification was verified at the start of the visit: Yes      Total Time: minutes: 11-20 minutes      Allyssa Haynes III       -----      Past Medical History:   Diagnosis Date    Abdominal aortic aneurysm without rupture ([facility] [address] 08/27/2018    stable per Ct 1/23/2020 / see epic    Acute bronchitis with COPD providing a summary and description of the detailed medical decision making as a basis for any and all recommendations made today (+/- a pertinent RBA discussion): literature and radiology reviews were performed as noted and applicable (89% or more time was spent in direct patient ) - as well as appropriate FU orders. Jairo Mary. Maylin Harvey MD [place] [address] Oncology  Cell: [phone]  [facility]:  [phone]   FAX: [phone]  [facility]:   [address] [place] [address] Avenue:    [phone]  [address] [place] [address] Road:  [phone]   FAX:  [phone]  Email: [email or web address]        NOTE: This report was transcribed using voice recognition software. Every effort was made to ensure accuracy; however, inadvertent computerized transcription errors may be present.

## 2020-06-09 ENCOUNTER — VIRTUAL VISIT (OUTPATIENT)
Dept: PALLATIVE CARE | Age: 65
End: 2020-06-09
Payer: MEDICARE

## 2020-06-09 PROCEDURE — 99213 OFFICE O/P EST LOW 20 MIN: CPT | Performed by: NURSE PRACTITIONER

## 2020-06-09 RX ORDER — LORAZEPAM 0.5 MG/1
0.5 TABLET ORAL NIGHTLY PRN
Qty: 30 TABLET | Refills: 2 | Status: SHIPPED | OUTPATIENT
Start: 2020-06-09 | End: 2020-09-07

## 2020-06-09 RX ORDER — FENTANYL 100 UG/H
1 PATCH TRANSDERMAL
Qty: 10 PATCH | Refills: 0 | Status: SHIPPED
Start: 2020-06-09 | End: 2020-07-07 | Stop reason: SDUPTHER

## 2020-06-30 ENCOUNTER — TELEPHONE (OUTPATIENT)
Dept: PALLATIVE CARE | Age: 65
End: 2020-06-30

## 2020-06-30 RX ORDER — OXYCODONE HYDROCHLORIDE 10 MG/1
10 TABLET ORAL EVERY 6 HOURS PRN
Qty: 120 TABLET | Refills: 0 | Status: SHIPPED | OUTPATIENT
Start: 2020-06-30 | End: 2020-07-30

## 2020-07-07 ENCOUNTER — VIRTUAL VISIT (OUTPATIENT)
Dept: PALLATIVE CARE | Age: 65
End: 2020-07-07
Payer: MEDICARE

## 2020-07-07 PROCEDURE — 99213 OFFICE O/P EST LOW 20 MIN: CPT | Performed by: NURSE PRACTITIONER

## 2020-07-07 RX ORDER — FENTANYL 100 UG/H
1 PATCH TRANSDERMAL
Qty: 10 PATCH | Refills: 0 | Status: SHIPPED | OUTPATIENT
Start: 2020-07-07 | End: 2020-08-06 | Stop reason: SDUPTHER

## 2020-07-07 NOTE — PROGRESS NOTES
Palliative Care Department  Palliative Care Telephone Encounter  Provider: Vivi PEDERSON-CNP    Flacokarli Salinas is a 59 y.o. male evaluated via telephone on 7/7/2020. Consent:  He and/or health care decision maker is aware that that he may receive a bill for this telephone service, depending on his insurance coverage, and has provided verbal consent to proceed: Yes      Documentation:  I communicated with the patient and/or health care decision maker regarding pain. Assessment/Plan   Chronic Pain due to Neoplasm/chronic lumbar radiculopathy:   -  Fentanyl increase to 100 mcg    -  Oxycodone increase to 15 mg Q 4 PRN, using 4-5 per day    -  Lyrica 150 mg TID    -  Refer to chronic pain management    Constipation:   -  Pericolace 2 tabs BID   -  Add Miralax     Depression/Anxiety:   - Cymbalta 30 mg BID   - Ativan 0.5 mg HS PRN      Details of this discussion including any medical advice provided:   A telephonic virtual visit was completed via telephone with Flaco Salinas today regarding the issues listed above. Margi Garcia is alert, awake, to voice and his concerns well, and does not display any signs of sedation or confusion during our conversation. He states that he continues to have pain primarily in his hip, stating on bad days it can be a 9 on a scale of 10. He states he is noticed no significant improvement in his pain with an increase in fentanyl to 100 mcg patch. He has also continue to use oxycodone 10 mg which he uses about 4-5 times per day, stating this provides only mild improvement. We discussed options at great length, and I do not recommend making any further changes to his fentanyl patch, and he will continue with fentanyl 100 mcg every 72 hours. I will have him increase his oxycodone 15 mg every 4 PRN, and I recommend referral to chronic pain management to be assessed for potential interventional therapies which may assist with his chronic lumbar radicular pain.   He does continue to complain of some constipation, and is utilizing Dolly-Colace 2 tablets twice per day, and was unable to start Movantik 12.5 mg daily due to the cost the medication. We discussed options and he will continue with Dolly-Colace and add MiraLAX daily. We otherwise will not make any further changes to his plan of care, and we will plan to follow-up with him again in approximately 4 weeks to reassess his needs. As always he is encouraged to call if he has any questions, concerns, change in his symptoms, or if he requires any refills prior to his next encounter. Pain Assessment   Ratin  Description: dull ache, sharp and stabbing  Duration: years  Frequency: daily  Location: hips, radiation to right leg to his foot  Alleviating Factors: relaxation and pain medication  Exacerbating Factors: standing and walking  Effect: change in function, interference with activities, sleep and mood    Surprise Symptom Assessment Score   Surprise Score 2020 2020 3/2/2020 2020 2019   Pain Score 9 7 7 6 4   Tiredness Score 5 6 6 5 3   Nausea Score Not nauseated Not nauseated Not nauseated Not nauseated Not nauseated   Depression Score 4 4 5 5 4   Anxiety Score 4 4 4 5 5   Drowsiness Score 3 4 5 5 2   Appetite Score 1 1 2 1 4   Wellbeing Score 2 3 4 4 5   Dyspnea Score 6 6 7 5 6   Other Problem Score 6 7 7 5 6   Total Assessment Score(calculated) 40 42 47 41 39     Controlled Substance Monitoring:  [place]ARRS reviewed 20  RX Monitoring 2020   Attestation -   Periodic Controlled Substance Monitoring Possible medication side effects, risk of tolerance/dependence & alternative treatments discussed. ;No signs of potential drug abuse or diversion identified. ;Assessed functional status. ;Obtaining appropriate analgesic effect of treatment. Chronic Pain > 50 MEDD Re-evaluated the status of the patient's underlying condition causing pain.    Chronic Pain > 80 MEDD Obtained or confirmed \"Medication Contract\" on file. Chronic Pain > 120 MEDD Engaged a pain medicine specialist as a prescriber or consultant. I affirm this episode is with an Established Patient who has not had a related appointment within my department in the past 7 days or scheduled within the next 24 hours. Total Time: minutes: 11-20 minutes    Brendon QUEEN  [facility] Palliative Medicine    Note: not billable if this call serves to triage the patient into an appointment for the relevant concern    Note: This report was completed using [employer] voice recognition software. Every effort has been made to ensure accuracy; however, inadvertent computerized transcription errors may be present.

## 2020-07-22 ENCOUNTER — OFFICE VISIT (OUTPATIENT)
Dept: PAIN MANAGEMENT | Age: 65
End: 2020-07-22
Payer: MEDICARE

## 2020-07-22 VITALS
DIASTOLIC BLOOD PRESSURE: 68 MMHG | OXYGEN SATURATION: 96 % | BODY MASS INDEX: 29.12 KG/M2 | HEIGHT: 71 IN | SYSTOLIC BLOOD PRESSURE: 112 MMHG | RESPIRATION RATE: 18 BRPM | WEIGHT: 208 LBS | TEMPERATURE: 98.5 F | HEART RATE: 70 BPM

## 2020-07-22 PROCEDURE — 99205 OFFICE O/P NEW HI 60 MIN: CPT | Performed by: ANESTHESIOLOGY

## 2020-07-22 NOTE — PROGRESS NOTES
Spoke with patient's wife about his up coming MRI in [facility]). Patient to get Bun and Creatinine before MRI orders in [facility]. No jewelery, patient to wear a mask. Patient's wife verbalized understanding of instructions.

## 2020-07-22 NOTE — PROGRESS NOTES
Patient:  Eleni Soni.,  1955  Date of Service:  20      Do you currently have any of the following:    Fever: No  Headache:  No  Cough: No  Shortness of breath: No  Exposed to anyone with these symptoms: No       Patient presents with complaints of back hip down the leg and knee   Right arm  pain that started 1 years ago and has been getting worse. Started off and on about a year ago now it has been getting worse. He states the pain began following started with the lung cancer     Pain is constant and is described as shooting, stabbing and unbearable. He rates the pain as a 10/10 on his worst day , 4/10 on his best day, and a 8/10 on average on the VAS scale. Pain does radiate to right leg. He  has numbness, tingling, weakness of the right leg. Alleviating factors include: nothing. Aggravating factors include:  dont matter can be sitting he will have pain moving around he will have pain . He states that the pain does keep him from sleeping at night. He took his last dose of oxy-ir  fentanyl patch . He is  on NSAIDS and  is not on anticoagulation medications to include none and is managed by . Previous treatments: Epidural Steroid Injection. Before they know it was a tumor had neck surgery     before they knew what it was the tumor crush the spine and broke T-1  Personal Expectations from this treatment: increase activity and decrease pain    /68   Pulse 70   Temp 98.5 °F (36.9 °C)   Resp 18   Ht 5' 11\" (1.803 m)   Wt 208 lb (94.3 kg)   SpO2 96%   BMI 29.01 kg/m²     No LMP for male patient.

## 2020-07-22 NOTE — PROGRESS NOTES
[facility] Pain Management        [address] [facility] Barbara Bryant [facility]  Dept: [phone]          Consult Note      Patient:  Hortensia Gutierrez,  1955    Date of Service:  20     Requesting Physician:  BG Montes De Oca - *    Reason for Consult:      Patient presents with complaints of low back pain    HISTORY OF PRESENT ILLNESS:      Mr. Hortensia Gutierrez is a 59 y.o. male presented today to [facility] for evaluation of Chronic low back pain for > a year. H/o Ca lung and Throat ca. S/p RT/ chemo- Has a tracheostomy. [facility] Under palliative medicine care for neuropathic pain and managed by medications including opioids. Pain is constant and is described as aching, sharp, stabbing and throbbing. Pain does radiate to both lower extremities- right side > left     He  has numbness, tingling of the right leg     Patient does not have bladder or bowel dysfunction. Alleviating factors include: rest.  Aggravating factors include: movement, walking, bending, lifting. Pain causes functional limitations/ limits Adl's : Yes     Nursing notes and details of the pain history reviewed. Please see intake notes for details. Previous treatments:   Physical Therapy : yes, HEP- as tolerated     Chiropractic treatment: no    Medications: - NSAID's : yes             - Membrane stabilizers : yes - gabapentin, Lyrica            - Opioids : yes: Duragesic, Oxycodone            - Adjuvants or Others : yes,     TENS Unit: no    Surgeries: no LS spine surgery. H/o prior Cervical fusion noted. Interventional Pain procedures/ nerve blocks: no    He has not been on anticoagulation medications no. He has not been on herbal supplements. He is not diabetic. H/O Smoking: +  H/O alcohol abuse : no  H/O Illicit drug use : no    Imaging:     PET scan: : Impression    1.  Two focal suspicious areas of daily Instructed to take am of procedure 3/19/20  Yes Toma Lechuga MD   predniSONE (DELTASONE) 10 MG tablet Take 10 mg by mouth daily Instructed to take am of procedure   Yes Historical Provider, MD   OXYGEN Inhale 4 L into the lungs nightly   Yes Historical Provider, MD   chlorhexidine (PERIDEX) 0.12 % solution Take 15 mLs by mouth daily Swish & spit qd 1/10/20  Yes Sylvia Quintana DO   cyclobenzaprine (FLEXERIL) 10 MG tablet Take 10 mg by mouth 3 times daily as needed for Muscle spasms   Yes Historical Provider, MD   Cholecalciferol (VITAMIN D3) 5000 units TABS Take 5,000 Units by mouth daily    Yes Historical Provider, MD   pembrolizumab (KEYTRUDA) 100 MG/4ML SOLN Infuse 200 mg intravenously every 21 days Last Dose -01/16/20  Next Dose -02/06/20   Yes Historical Provider, MD   Melatonin 10 MG TABS Take 10 mg by mouth nightly    Yes Historical Provider, MD   senna-docusate (PERICOLACE) 8.6-50 MG per tablet Take 2 tablets by mouth daily 4/13/20 10/10/20  BG Yancey - CNP   oxyCODONE HCl (OXY-IR) 10 MG immediate release tablet Take 10 mg by mouth 3 times daily.  Instructed to take am of procedure    Historical Provider, MD       Allergies   Allergen Reactions    Augmentin [Amoxicillin-Pot Clavulanate] Diarrhea       Social History     Socioeconomic History    Marital status:      Spouse name: Not on file    Number of children: Not on file    Years of education: Not on file    Highest education level: Not on file   Occupational History    Occupation: [employer] a [employer]- SSI     Comment: disability short term   Social Needs    Financial resource strain: Not on file    Food insecurity     Worry: Not on file     Inability: Not on file   [employer] needs     Medical: Not on file     Non-medical: Not on file   Tobacco Use    Smoking status: Current Some Day Smoker     Packs/day: 1.00     Years: 44.00     Pack years: 44.00     Types: Cigarettes     Start date: [address] [facility] [address] tobacco: Never Used    Tobacco comment: Pt quit in 9/26/2018& started back 2/2020   Substance and Sexual Activity    Alcohol use: No     Alcohol/week: 0.0 standard drinks    Drug use: No    Sexual activity: Not Currently     Partners: Female   Lifestyle    Physical activity     Days per week: Not on file     Minutes per session: Not on file    Stress: Not on file   Relationships    Social connections     Talks on phone: Not on file     Gets together: Not on file     Attends Episcopal service: Not on file     Active member of club or organization: Not on file     Attends meetings of clubs or organizations: Not on file     Relationship status: Not on file    Intimate partner violence     Fear of current or ex partner: Not on file     Emotionally abused: Not on file     Physically abused: Not on file     Forced sexual activity: Not on file   Other Topics Concern    Not on file   Social History Narrative    Works at [employer]. Lives in [facility]. Family History   Problem Relation Age of Onset   Miguel Rodriguez Cancer Mother         breast cancer    Cancer Father         prostate cancer    Diabetes Father        REVIEW OF SYSTEMS:     Patient specifically denies fever/chills, chest pain, shortness of breath, new bowel or bladder complaints. All other review of systems was negative. Review of Systems documented in the intake sheet by the patient reviewed.     PHYSICAL EXAMINATION:      /68   Pulse 70   Temp 98.5 °F (36.9 °C)   Resp 18   Ht 5' 11\" (1.803 m)   Wt 208 lb (94.3 kg)   SpO2 96%   BMI 29.01 kg/m²     General:      General appearance:  Pleasant and well-hydrated, in no distress and A & O x 3  Build:Overweight  Function: Rises from seated position easily and Moves about room without difficulty    HEENT:    Head:normocephalic, atraumatic  Pupils:regular, round, equal  Sclera: icterus absent  Tracheostomy +    Lungs:    Breathing:normal breathing pattern     CVS: degenerative changes involving bilateral sacroiliac    joints. Phone discussion:  I called the patient  On 7/30/2020 at 2.50 pm and informed the results of the imaging- patient appreciated the call. He has an appointment at [facility] for Biopsy on 8/5/2020 and will follow up with me after that. Significant time spent on counseling/ coordination fo care / review of records and review of images. Miah Zapata MD    CC:    BG Sloan - CNP  [address] [place] [address].   [place], [address] [place] [address]. [address] [place]

## 2020-07-24 ENCOUNTER — HOSPITAL ENCOUNTER (OUTPATIENT)
Dept: GENERAL RADIOLOGY | Age: 65
Discharge: HOME | End: 2020-07-26
Payer: MEDICARE

## 2020-07-24 ENCOUNTER — HOSPITAL ENCOUNTER (OUTPATIENT)
Age: 65
Discharge: HOME | End: 2020-07-26
Payer: MEDICARE

## 2020-07-24 ENCOUNTER — HOSPITAL ENCOUNTER (OUTPATIENT)
Age: 65
Discharge: HOME | End: 2020-07-24
Payer: MEDICARE

## 2020-07-24 LAB
BUN BLDV-MCNC: 14 MG/DL (ref 8–23)
CREAT SERPL-MCNC: 1.3 MG/DL (ref 0.7–1.2)
GFR AFRICAN AMERICAN: >60
GFR NON-AFRICAN AMERICAN: 55 ML/MIN/1.73

## 2020-07-24 PROCEDURE — 84520 ASSAY OF UREA NITROGEN: CPT

## 2020-07-24 PROCEDURE — 82565 ASSAY OF CREATININE: CPT

## 2020-07-24 PROCEDURE — 36415 COLL VENOUS BLD VENIPUNCTURE: CPT

## 2020-07-24 PROCEDURE — 72202 X-RAY EXAM SI JOINTS 3/> VWS: CPT

## 2020-07-28 ENCOUNTER — TELEPHONE (OUTPATIENT)
Dept: CARDIOLOGY | Age: 65
End: 2020-07-28

## 2020-07-28 ENCOUNTER — HOSPITAL ENCOUNTER (OUTPATIENT)
Dept: MRI IMAGING | Age: 65
Discharge: HOME | End: 2020-07-30
Payer: MEDICARE

## 2020-07-28 ENCOUNTER — TELEPHONE (OUTPATIENT)
Dept: PAIN MANAGEMENT | Age: 65
End: 2020-07-28

## 2020-07-28 PROCEDURE — 2580000003 HC RX 258: Performed by: RADIOLOGY

## 2020-07-28 PROCEDURE — 6360000004 HC RX CONTRAST MEDICATION: Performed by: RADIOLOGY

## 2020-07-28 PROCEDURE — A9577 INJ MULTIHANCE: HCPCS | Performed by: RADIOLOGY

## 2020-07-28 PROCEDURE — 72158 MRI LUMBAR SPINE W/O & W/DYE: CPT

## 2020-07-28 RX ORDER — SODIUM CHLORIDE 0.9 % (FLUSH) 0.9 %
10 SYRINGE (ML) INJECTION 2 TIMES DAILY
Status: DISCONTINUED | OUTPATIENT
Start: 2020-07-28 | End: 2020-07-31 | Stop reason: HOSPADM

## 2020-07-28 RX ADMIN — GADOBENATE DIMEGLUMINE 19 ML: 529 INJECTION, SOLUTION INTRAVENOUS at 13:31

## 2020-07-28 RX ADMIN — Medication 10 ML: at 13:30

## 2020-07-28 NOTE — TELEPHONE ENCOUNTER
SCHEDULED ECHO FOR 07-29-20. REVIEWED COVID CHECKLIST WITH PATIENT.     Electronically signed by Alex Galicia on 7/28/2020 at 9:35 AM

## 2020-07-28 NOTE — TELEPHONE ENCOUNTER
Patient's wife called in today to have Dr Noyola Post order a us for r/k blood clot. I advised her of 's note and said they should go to ER and have evaluation.

## 2020-07-29 ENCOUNTER — HOSPITAL ENCOUNTER (OUTPATIENT)
Dept: CARDIOLOGY | Age: 65
Discharge: HOME | End: 2020-07-29
Payer: MEDICARE

## 2020-07-29 LAB
LV EF: 58 %
LVEF MODALITY: NORMAL

## 2020-07-29 PROCEDURE — 93306 TTE W/DOPPLER COMPLETE: CPT

## 2020-07-31 ENCOUNTER — HOSPITAL ENCOUNTER (OUTPATIENT)
Dept: ULTRASOUND IMAGING | Age: 65
Discharge: HOME | End: 2020-08-02
Payer: MEDICARE

## 2020-07-31 PROCEDURE — 93971 EXTREMITY STUDY: CPT

## 2020-08-04 ENCOUNTER — TELEPHONE (OUTPATIENT)
Dept: VASCULAR SURGERY | Age: 65
End: 2020-08-04

## 2020-08-04 PROBLEM — I82.A12 DVT OF LEFT AXILLARY VEIN, ACUTE (HCC): Status: ACTIVE | Noted: 2020-08-04

## 2020-08-06 RX ORDER — OXYCODONE HYDROCHLORIDE 15 MG/1
15 TABLET ORAL EVERY 4 HOURS PRN
Qty: 42 TABLET | Refills: 0 | Status: SHIPPED | OUTPATIENT
Start: 2020-08-06 | End: 2020-08-11 | Stop reason: SDUPTHER

## 2020-08-06 RX ORDER — FENTANYL 100 UG/H
1 PATCH TRANSDERMAL
Qty: 10 PATCH | Refills: 0 | Status: SHIPPED | OUTPATIENT
Start: 2020-08-06 | End: 2020-09-05

## 2020-08-11 ENCOUNTER — OFFICE VISIT (OUTPATIENT)
Dept: PALLATIVE CARE | Age: 65
End: 2020-08-11
Payer: MEDICARE

## 2020-08-11 VITALS
HEART RATE: 85 BPM | DIASTOLIC BLOOD PRESSURE: 71 MMHG | SYSTOLIC BLOOD PRESSURE: 115 MMHG | WEIGHT: 208 LBS | OXYGEN SATURATION: 95 % | BODY MASS INDEX: 29.01 KG/M2

## 2020-08-11 PROCEDURE — 99211 OFF/OP EST MAY X REQ PHY/QHP: CPT | Performed by: NURSE PRACTITIONER

## 2020-08-11 PROCEDURE — 99214 OFFICE O/P EST MOD 30 MIN: CPT | Performed by: NURSE PRACTITIONER

## 2020-08-11 RX ORDER — OXYCODONE HYDROCHLORIDE 15 MG/1
15 TABLET ORAL EVERY 4 HOURS PRN
Qty: 180 TABLET | Refills: 0 | Status: SHIPPED | OUTPATIENT
Start: 2020-08-11 | End: 2020-09-17 | Stop reason: HOSPADM

## 2020-08-11 NOTE — PROGRESS NOTES
Palliative Medicine Outpatient Visit  2020  Provider: Aristeo PEDERSON-CNP    Referring Provider: Dr. Joaquin Dorado    Reason for Consult:  [] [address]  [x] Assist with goals of care  [] Transition of care  [x] Symptom Management    See below for recommendations, as indicated, concernin. Advanced Care Planning  2. Anticipatory Guidance  3. Transition of Care  4. Symptom Management      CC: Pain    HPI:   As per Dr. Fernando Lake assessment on 16:   Mr. Robert Thomas is a pleasant and cooperative 61year old man with a recent diagnosis of stage III NSCLC (probable) with impending cord compression. We recommend a concurrent approach to definitive management of this locally advanced non small cell lung cancer [chemo dosing per [address] record- see EMR]. Based on the clinical characteristic, this disease and stage is generally considered unresectable; optimal therapy tends to be a concurrent chemo-RT approach. Concomitant chemo-RT compared with sequential chemo-RT improved overall survival, primarily through better locoregional control, at the cost of manageable increases in acute esophageal toxicity as based on the Auperin metanalysis. These data demonstrate a benefit of concomitant chemo-RT over sequential chemo->RT on OS (HR 0.84, SS), with absolute benefit at 3-years of 5.7% (18% to 24%), 5-years 4.5% (11% to 15%). Interestingly, there was no difference in PFS (HR 0.9, p=0.07). However a decrease in locoregional progression (HR 0.777, SS), with absolute decrease of 6% at 5 years (35% to 29%) was shown. There was no difference on distant progression (HR 1.04, NS), with 5-year rate of about 40% Lontiffani Caulk Oncol 2010 May 1;28(13):4914-9885). Regarding the radiation dose, 60 Gy in 2Gy/fx was established long ago in RTOG 73-01, with several other trials showing a feasibility of much higher doses however with RT alone.  Interestingly in the concurrent era, RTOG 0617 tested higher dose arms (Concurrent RT + Carbo/Taxol +/- Cetuximab) these were closed early with \"negative\" results (longer term results and POFA needed), therefor 60 Gy in daily fractions with dual agent chemotherapy can be considered the standard (the Brooke and LAMP trials also assisted in the development of this paradigm). Fractionated external beam radiation therapy may or may not be delivered with intensity modulation +/- image guidance per daily cone beam depending on the specific patient anatomy and dose constraints as described per the RTOG and QUANTEC ---Justina Moss, Int J Radiat Oncol Biol Phys. 2010 Mar 1;76(3 Suppl):S10-9. The risks, benefits, alternatives, process and logistics of external beam radiation were reviewed (risks include but are not limited to worsening PULM function, esophagitis, esophageal structure, perforations, bleeding, paralysis and death). We answered all of the patient's questions to the best of our ability. Dom verbalized understanding and seemed satisfied. Radiation planning will commence within < 24 hours; the next step in management being the simulation scan, with external beam radiation to commence in a timely fashion thereafter. It was a pleasure meeting Monisha Rayo today and we appreciate the referral and opportunity to be involved in his care. We had an extensive discussion today regarding the course to date (including a focused review of the applicable radiographic and laboratory information), multidisciplinary approach to cancer care, and indications for external beam radiation therapy as a component therein. A literature review and multidisciplinary discussion was performed after seeing this patient due to the complexity of the medical decision making in this case. I personally spent greater than 60 minutes with this patient and performed the complete history and physical as above at today's visit, at least 45 minutes was in direct  regarding disease management.    *this pt will be simmed and likely begin treatment prior to a tissue diagnosis. It is my clinical judgement the pre test probability for malignancy is > 95 % and there may be significant functional decline in the time necessary to maintain a tissue diagnosis due to the primary tumor directly extending into the spinal column). I specifically verbalized all of this with Pedro Everett, his wife Jenise Gates, and his daughter Isael Brian all of who individually verbalized consent to RT treatment to begin prior to a tissue diagnosis. Peer reviewed requested. [facility] referral -- Dr. Marvel Ambriz reccommended --- pt will still pursue aggressive active treatment however this is a pain management consultation request.  *will hold off on DEX or any new pain meds until he sees Dr. Marvel Ambriz but we could start him of DEX in FND occurs or Sx worsens  -coordinated care with Dr. Cheney Signs this AM 4/25/16. We will treat the primary tumor and spinal column initially with considerations for second and third \"boost\" plans (nodes involved, primary boost etc.) in conjunction with CHEMO pending the workup / PET. MRI brain ordered by [employer].        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    Paralyzed vocal cords     Thrombosis of testis     Tobacco dependence 5/27/2017       Past Surgical History:   Procedure Laterality Date    BACK SURGERY      BRONCHOSCOPY N/A 3/5/2020    BRONCHOSCOPY DIAGNOSTIC OR CELL [address] ONLY performed by Ramona Stevens MD at [address] [facility] ARTHROSCOPY      LUNG awake, alert, able to voice their needs/thoughts   HEENT: normocephalic, atraumatic, sclera nonicteric, PERRLA, EOMI, MMM, Mildly raspy speech tone and quality with no sign of thrush, lesions or dryness and trachea intact without sign of infection  Neck: no LAD, no JVD, supple, no masses, normal speech, trachea midline,   Lungs: bilaterally clear to auscultation, good aeration, symmetric  Heart: regular rate and rhythm, no murmurs/rubs/gallops/ectopy appreciated, PMI WNL  Abd.: non-distended, soft, nontender, non-distended, normal bowel sounds. Ext.: no clubbing, cyanosis or edema, no joint tenderness  Skin: warm, adequate hydration without acute lesions  Neuro: awake, alert, oriented x 3, conversive,     [place] Symptom Assessment Score   Manning Score 7/7/2020 6/9/2020 3/2/2020 1/13/2020 12/2/2019   Pain Score 9 7 7 6 4   Tiredness Score 5 6 6 5 3   Nausea Score Not nauseated Not nauseated Not nauseated Not nauseated Not nauseated   Depression Score 4 4 5 5 4   Anxiety Score 4 4 4 5 5   Drowsiness Score 3 4 5 5 2   Appetite Score 1 1 2 1 4   Wellbeing Score 2 3 4 4 5   Dyspnea Score 6 6 7 5 6   Other Problem Score 6 7 7 5 6   Total Assessment Score(calculated) 40 42 47 41 39     Impression:  As per Dr. Tamar Knott assessment on 04/25/16:   Mr. Danielle Awad is a pleasant and cooperative 61year old man with a recent diagnosis of stage III NSCLC (probable) with impending cord compression. We recommend a concurrent approach to definitive management of this locally advanced non small cell lung cancer [chemo dosing per [address] record- see EMR]. Based on the clinical characteristic, this disease and stage is generally considered unresectable; optimal therapy tends to be a concurrent chemo-RT approach.  Concomitant chemo-RT compared with sequential chemo-RT improved overall survival, primarily through better locoregional control, at the cost of manageable increases in acute esophageal toxicity as based on the Auperin thereafter. It was a pleasure meeting Gabi Hart today and we appreciate the referral and opportunity to be involved in his care. We had an extensive discussion today regarding the course to date (including a focused review of the applicable radiographic and laboratory information), multidisciplinary approach to cancer care, and indications for external beam radiation therapy as a component therein. A literature review and multidisciplinary discussion was performed after seeing this patient due to the complexity of the medical decision making in this case. I personally spent greater than 60 minutes with this patient and performed the complete history and physical as above at today's visit, at least 45 minutes was in direct  regarding disease management. *this pt will be simmed and likely begin treatment prior to a tissue diagnosis. It is my clinical judgement the pre test probability for malignancy is > 95 % and there may be significant functional decline in the time necessary to maintain a tissue diagnosis due to the primary tumor directly extending into the spinal column). I specifically verbalized all of this with Gabi Hart, his wife Ayaz Ballard, and his daughter Amanda Mccarty all of who individually verbalized consent to RT treatment to begin prior to a tissue diagnosis. Peer reviewed requested. [facility] MED referral -- Dr. Reshma Bonilla reccommended --- pt will still pursue aggressive active treatment however this is a pain management consultation request.  *will hold off on DEX or any new pain meds until he sees Dr. Reshma Bonilla but we could start him of DEX in FND occurs or Sx worsens  -coordinated care with Dr. Tereza Fletcher this AM 4/25/16. We will treat the primary tumor and spinal column initially with considerations for second and third \"boost\" plans (nodes involved, primary boost etc.) in conjunction with CHEMO pending the workup / PET.  MRI brain ordered by [employer].   05/03/16:  OARRS report reviewed today revealing Norco prescriptions since May 2015 with the last being ×60 tablets for 5-325 milligram tablets filled on 04/26/16 prescribed by Cielo Raphael from [address]. Ultram ×40 tablets on 12/24/15. [place] Symptom Assessment Scale                                             Date:   Pain  [] 0  None  [] 1  [] 2  [] 3  [] 4  [] 5  [] 6  [] 7  [] 8  [] 9  [x] 10  Worst   Tiredness  [] 0  None  [] 1  [] 2  [] 3  [] 4  [] 5  [] 6  [] 7  [x] 8  [] 9  [] 10  Worst   Nausea  [] 0  None  [x] 1  [] 2  [] 3  [] 4  [] 5  [] 6  [] 7  [] 8  [] 9  [] 10  Worst   Depression  [] 0  None  [] 1  [] 2  [x] 3  [] 4  [] 5  [] 6  [] 7  [] 8  [] 9  [] 10  Worst   Anxiety  [] 0  None  [] 1  [x] 2  [] 3  [] 4  [] 5  [] 6  [] 7  [] 8  [] 9  [] 10  Worst   Drowsiness  [] 0  None  [] 1  [] 2  [] 3  [] 4  [x] 5  [] 6  [] 7  [] 8  [] 9  [] 10  Worst   Appetite  [] 0  None  [] 1  [] 2  [] 3  [x] 4  [] 5  [] 6  [] 7  [] 8  [] 9  [] 10  Worst   Wellbeing  [] 0  None  [] 1  [] 2  [] 3  [] 4  [] 5  [] 6  [] 7  [x] 8  [] 9  [] 10  Worst   Shortness of Breath  [] 0  None  [] 1  [x] 2  [] 3  [] 4  [] 5  [] 6  [] 7  [] 8  [] 9  [] 10  Worst   Constipation  [] 0  None  [] 1  [] 2  [] 3  [] 4  [x] 5  [] 6  [] 7  [] 8  [] 9  [] 10  Worst   Completed By:  [x]  Patient Report  []  Caregiver/Family  []  Nurse/Staff  Patient is currently a:  [x]   Palliative Medicine Patient  []   Hospice Patient  Medical records reviewed and Patient presents to the exam room today accompanied by his wife alert with no sign of sedation confusion able to voice his needs in detail. As per Cervical CT on 04/15/16:  IMPRESSION: ALERT: THIS IS AN ABNORMAL REPORT   Critical result: Findings communicated directly with Dr. Ivett Aguilar at   approximately 0949 hours on 4/15/2016.   1. Large mass consistent with malignancy in the mesial left lung apex   with anterior chest wall and left mediastinal invasion. There is also   osseous invasion of the T1 and T2 vertebral bodies and the left second   rib. Infection of spinal canal at the level of T1 with right lateral   displacement and compression of the thecal sac. See above for details. 2. Moderate centrilobular segment. 3. Postoperative cervical spine status post anterior cervical fusion   C2-C7, worse at the C3-C4 and C4-C5 motion segment level, see above   for details. Patient complains of significant pain described as a constant numbness and pain to his left arm left elbow with burning and numbness into his axilla and to the left side of his chest, drooping to his left eye with no swelling to the left forehead consistent with a Pancoast tumor and Vipin's syndrome. Patient states that he has had chronic pain for the past 2 years with cervical fusion one year ago. Patient works midnights and is having difficulty sleeping secondary to the pain only sleeping 2-3 hours a night. Patient also complains of a poor appetite, nausea without vomiting, significant constipation now moving his bowels every 3-4 days consistent with hard bowel movements, mild imbalance upon transitioning from sitting to standing which is self-limiting within seconds to minutes. Patient symptoms over the past several weeks he has tolerated well without symptoms Neurontin 300 mg t.i.d., Norco which he states does not help at all 5-325 milligrams tablets sometimes every 2 hours without help. Patient states that he has been on ibuprofen over the past year and now has escalated his dosage to 2200 mg at a time several times a day. Patient denies stomach upset. Patient also takes Zantac 150 mg b.i.d. Patient had been on a Medrol Dosepak one year ago without side effects. Concerning the patient's treatment patient is going for a biopsy this Friday, will be starting chemotherapy with Dr. Wade Thompson in which she has an appointment on May 19, and has begun radiation as per Dr. Bela Lawson last Wednesday which will occur 5 times a week through June 2 and then reevaluation.   Patient was introduced to Palliative Medicine enmeshment with our team.  Patient signed a pain contract, given a prescription for a urine drug screen and denies any illicit or street drugs. We also discussed the possibility of short-term versus long-term disability in which he is resistant to but I advised that with the conditions that he has both chronic and acute as well as the treatment but I would recommend pursuing disability. Options were discussed and today I made the following changes:  Discontinue Norco, Neurontin 300 mg t.i.d., ibuprofen. Today we increased his Neurontin to 600 mg t.i.d. prescribing 90 tablets with 2 refills, initiated Pamelor 10 mg q.h.s. 30 tablets with one refill,, initiated Percocet 5-325 milligrams tablets 1 p.o. q.6 hours p.r.n. ×120 tablets advising of the sedation potential and not taking it while at work work prior, Decadron 4 mg once daily in his morning since he works [employer] ×30 tablets with no refill, Protonix 40 mg daily 30 tablets with 2 refills, Zofran ODT 4 mg q.8 hours p.r.n. ×90 tablets with 1 refill, Dolly-Colace 2 tablets daily 60 tablets with 2 refills.   We will see the patient back in 2 weeks or sooner if he needs us.  05/17/16:  [place] Symptom Assessment Scale                                             Date:   Pain  [] 0  None  [] 1  [] 2  [] 3  [] 4  [] 5  [] 6  [] 7  [x] 8  [x] 9  [x] 10  Worst   Tiredness  [] 0  None  [] 1  [] 2  [] 3  [] 4  [] 5  [x] 6  [] 7  [] 8  [] 9  [] 10  Worst   Nausea  [] 0  None  [] 1  [x] 2  [] 3  [] 4  [] 5  [] 6  [] 7  [] 8  [] 9  [] 10  Worst   Depression  [] 0  None  [] 1  [x] 2  [] 3  [] 4  [] 5  [] 6  [] 7  [] 8  [] 9  [] 10  Worst   Anxiety  [] 0  None  [] 1  [x] 2  [] 3  [] 4  [] 5  [] 6  [] 7  [] 8  [] 9  [] 10  Worst   Drowsiness  [] 0  None  [] 1  [] 2  [] 3  [] 4  [x] 5  [] 6  [] 7  [] 8  [] 9  [] 10  Worst   Appetite  [] 0  None  [] 1  [] 2  [] 3  [] 4  [] 5  [] 6  [x] 7  [] 8  [] 9  [] 10  Worst   Wellbeing  [] 0  None  [] 1  [] 2  [] 3 [] 4  [x] 5  [] 6  [] 7  [] 8  [] 9  [] 10  Worst   Shortness of Breath  [] 0  None  [] 1  [] 2  [] 3  [x] 4  [] 5  [] 6  [] 7  [] 8  [] 9  [] 10  Worst   Constipation  [] 0  None  [] 1  [] 2  [] 3  [] 4  [] 5  [x] 6  [] 7  [] 8  [] 9  [] 10  Worst   Completed By:  [x]  Patient Report  []  Caregiver/Family  []  Nurse/Staff  Patient is currently a:  [x]   Palliative Medicine Patient  []   Hospice Patient  As per Beckie's phone call discussion on 05/09/16:  Patient's wife, Rama Amos, had called and left a voicemail this morning regarding patient's pain not being relieved by neurontin or percocet. She says the patient is back to taking the ibuprofen. Dr. Mccord Second notified. I called Rama Bette back with Dr. Elis Verde instructions: Patient needs to give decadron more time to become effective. Patient is not to take ibuprofen while on decadron. Patient may take 2 percocet every 6 hours only as needed for severe pain but needs to be aware that he will run out of percocet early if he does. Neurontin dose is not to change at this time. Rama Amos says that the patient cut the grass at home this weekend and now the pain is even more severe. Asked Rama Amos to tell patient to not cut the grass due to pain issues. Discussed with Rama Amos the many contraindications of taking large doses of ibuprofen. She is aware and does not encourage it, but she cannot stop him. She thinks that he will follow Dr. Elis Verde advice. Says they will be here next Tuesday for the patient's next clinic appointment. Agrees to call us if there are any other changes. Patient presents with his wife today looking more comfortable slightly more animated with his arms behind his head leaning back in the chair with no sign of confusion and/or sedation able to voice his needs. Patient states that occasionally he takes 2 Percocet at a time but this makes him \"loopy\". Patient states that 1 tablet usually lasts for approximately 4 hours.   Patient states he takes on average of 6 Percocet daily. Patient is not taking ibuprofen and is taking the Decadron stating that his appetite is somewhat better and he has gained 5 pounds since his last visit. Patient states he continues to awaken about every 2 hours at night secondary to pain. Patient is interested in taking something help with the pain so he does not have to depend on the Percocet as above. Wife states that Pamelor seems to help with his mood, appetite. Patient is starting short-term disability next week and will be seeing Dr. Markie Grove this week to review the biopsy results and to plan for chemotherapy. Options were discussed and today we are stopping the Decadron and will initiate Mobic 7.5 mg 1 p.o. q.12 hours 60 tablets with 2 refills, increasing the Pamelor to 25 mg q.h.s. 30 tablets with 2 refills, increasing the Neurontin to 900 mg t.i.d. and he was given prescriptions for 90 tablets of each capsule with 2 refills. We are continuing his Percocet in which he has 2 weeks left and prescribed him 90 tablets of Percocet 5-325 milligram tablets but making that q.4 hours p.r.n. instead of q.6 hours p.r.n.   We will see him back in 4 weeks or sooner if he needs us.  06/14/16:  [place] Symptom Assessment Scale                                             Date:   Pain  [] 0  None  [] 1  [] 2  [] 3  [] 4  [] 5  [x] 6  [] 7  [] 8  [] 9  [] 10  Worst   Tiredness  [] 0  None  [] 1  [] 2  [] 3  [x] 4  [] 5  [] 6  [] 7  [] 8  [] 9  [] 10  Worst   Nausea  [x] 0  None  [] 1  [] 2  [] 3  [] 4  [] 5  [] 6  [] 7  [] 8  [] 9  [] 10  Worst   Depression  [] 0  None  [] 1  [x] 2  [] 3  [] 4  [] 5  [] 6  [] 7  [] 8  [] 9  [] 10  Worst   Anxiety  [] 0  None  [x] 1  [] 2  [] 3  [] 4  [] 5  [] 6  [] 7  [] 8  [] 9  [] 10  Worst   Drowsiness  [] 0  None  [x] 1  [] 2  [] 3  [] 4  [] 5  [] 6  [] 7  [] 8  [] 9  [] 10  Worst   Appetite  [] 0  None  [] 1  [] 2  [] 3  [] 4  [] 5  [x] 6  [] 7  [] 8  [] 9  [] 10  Worst   Wellbeing  [] 0  None  [] 1  [] 2  [x] 3 [] 4  [] 5  [] 6  [] 7  [] 8  [] 9  [] 10  Worst   Shortness of Breath  [] 0  None  [] 1  [x] 2  [] 3  [] 4  [] 5  [] 6  [] 7  [] 8  [] 9  [] 10  Worst   Constipation  [] 0  None  [] 1  [] 2  [] 3  [] 4  [] 5  [] 6  [] 7  [x] 8  [] 9  [] 10  Worst   Completed By:  [x]  Patient Report  []  Caregiver/Family  []  Nurse/Staff  Patient is currently a:  [x]   Palliative Medicine Patient  []   Hospice Patient  Medical records reviewed and as per Dr. Xiao Rayo assessment on 06/07/16:  Caren Caballero is a 61y.o. year old male here for follow up. He had hid CT guided biopsy done on 5/6 /2016. The results showed Pulmonary adenocarcinoma, moderately to poorly differentiated, invasive, acinar type. He completed his course of XRT and currently is going under chemotherapy . He has done 2/6 cycles. He is tolerating it relatively well. Has not lost any weight. Pain is well controlled. 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 Will follow repeat imaging at that time  H/O nicotine dependence:  Probable COPD  PFTs in future(next office visit)  Patient presents to the exam room with a primary complaint of left arm neuropathic pain and numbness which is worse with decreased gripping strength to his left hand. Patient stated that he finished radiation therapy approximately 10 days ago and also has more of a coarse voice, morning cough productive of phlegm and a mild amount of dysphasia if he eats too quickly. Patient denies nausea or vomiting. Patient only using Carafate infrequently, Percocet 5-325 milligram tablets on the average of 3 a day sometimes 4. Patient states that the Percocet helps approximately % and does not cause oversedation.   Patient will also be taking dexamethasone 8 mg a day ×3 days surrounding his chemotherapy every 3 weeks which will start on June 21. Patient also started folic acid 1 mg daily 2 weeks ago. Options were discussed and although I see no signs of candidiasis orally I suspect this esophageal involvement therefore starting patient on Diflucan 100 mg daily ×14 days and a prescription for 14 tablets with one refill given today. Patient was also complaining of constipation of small hard bowel movements moving his bowels every 2-3 days therefore we are doubling his Dolly-Colace at 2 tablets b.i.d. 120 tablets with 2 refills given today. Patient also given a prescription for Percocet unchanged 5-325 milligram tablets 1 p.o. q.6 hours p.r.n. ×120 tablets today. We will see him back in 2 weeks or sooner if he needs us and in the future secondary to neuropathic complaints we may consider methadone.   06/28/16:  [place] Symptom Assessment Scale                                             Date:   Pain  [] 0  None  [] 1  [] 2  [x] 3  [] 4  [] 5  [] 6  [] 7  [] 8  [] 9  [] 10  Worst   Tiredness  [] 0  None  [x] 1  [] 2  [] 3  [] 4  [] 5  [] 6  [] 7  [] 8  [] 9  [] 10  Worst   Nausea  [x] 0  None  [] 1  [] 2  [] 3  [] 4  [] 5  [] 6  [] 7  [] 8  [] 9  [] 10  Worst   Depression  [] 0  None  [x] 1  [] 2  [] 3  [] 4  [] 5  [] 6  [] 7  [] 8  [] 9  [] 10  Worst   Anxiety  [] 0  None  [x] 1  [] 2  [] 3  [] 4  [] 5  [] 6  [] 7  [] 8  [] 9  [] 10  Worst   Drowsiness  [] 0  None  [x] 1  [] 2  [] 3  [] 4  [] 5  [] 6  [] 7  [] 8  [] 9  [] 10  Worst   Appetite  [] 0  None  [] 1  [] 2  [] 3  [] 4  [] 5  [x] 6  [] 7  [] 8  [] 9  [] 10  Worst   Wellbeing  [] 0  None  [] 1  [] 2  [x] 3  [] 4  [] 5  [] 6  [] 7  [] 8  [] 9  [] 10  Worst   Shortness of Breath  [] 0  None  [x] 1  [] 2  [] 3  [] 4  [] 5  [] 6  [] 7  [] 8  [] 9  [] 10  Worst   Constipation  [] 0  None  [] 1  [] 2  [x] 3  [] 4  [] 5  [] 6  [] 7  [] 8  [] 9  [] 10  Worst   Completed By:  [x]  Patient Report  []  Caregiver/Family  []  Nurse/Staff  Patient is currently a:  [x]   Palliative Medicine Patient  []   Hospice Patient  Medical records reviewed and patient finished radiation therapy as per Dr. Miguel Ángel Robbins recommendations. Patient states that he continues to take the Percocet every 6-8 hours which continues to help. Patient states he has three quarters of a bottle left. Patient had a second opinion at a cancer center and here they are keeping him on his initial chemotherapy for another 3 weeks and then restarting the second course. Patient will stop the dexamethasone and folic acid until his chemotherapy agent changes. Patient smiling and more interactive, jovial, more comfortable and states that his appetite is good, his hoarse voice has improved but not completely back to normal. Patient gained 2-1/2 pounds over the past 2 weeks. Patient states that all of his symptoms are either stable or headed in the right direction. Patient is balancing his Dolly-Colace with bowel movements which seems effective. Options were discussed and we are continuing the same and he needs no prescriptions today. We will see him back in 4 weeks or sooner if he needs us.   08/01/16:  [place] Symptom Assessment Scale                                             Date:   Pain  [] 0  None  [] 1  [] 2  [] 3  [x] 4  [] 5  [] 6  [] 7  [] 8  [] 9  [] 10  Worst   Tiredness  [] 0  None  [] 1  [x] 2  [] 3  [] 4  [] 5  [] 6  [] 7  [] 8  [] 9  [] 10  Worst   Nausea  [x] 0  None  [] 1  [] 2  [] 3  [] 4  [] 5  [] 6  [] 7  [] 8  [] 9  [] 10  Worst   Depression  [] 0  None  [x] 1  [] 2  [] 3  [] 4  [] 5  [] 6  [] 7  [] 8  [] 9  [] 10  Worst   Anxiety  [] 0  None  [] 1  [] 2  [x] 3  [] 4  [] 5  [] 6  [] 7  [] 8  [] 9  [] 10  Worst   Drowsiness  [] 0  None  [x] 1  [] 2  [] 3  [] 4  [] 5  [] 6  [] 7  [] 8  [] 9  [] 10  Worst   Appetite  [] 0  None  [] 1  [x] 2  [] 3  [] 4  [] 5  [] 6  [] 7  [] 8  [] 9  [] 10  Worst   Wellbeing  [] 0  None  [] 1  [x] 2  [] 3  [] 4  [] 5  [] 6  [] 7  [] 8  [] 9  [] 10  Worst   Shortness of Breath  [] 0  None [] 1  [] 2  [] 3  [x] 4  [] 5  [] 6  [] 7  [] 8  [] 9  [] 10  Worst   Constipation  [] 0  None  [] 1  [] 2  [] 3  [] 4  [] 5  [] 6  [x] 7  [] 8  [] 9  [] 10  Worst   Completed By:  [x]  Patient Report  []  Caregiver/Family  []  Nurse/Staff  Patient is currently a:  [x]   Palliative Medicine Patient  []   Hospice Patient  Medical records reviewed and as per radiation oncology's assessment on 07/06/16:  On 6/3/16, Pilar Davis completed 5000 cGy in 22 fractions directed to the left lung / spine for management of locally advanced lung CA. States that he's doing well overall. Eating well. Coughing occasionally with phlegm - light brown in color. Denies fever. SOB with yard work only. Following with Dr Paulette Clark for palliative med, states that pain is well controlled with pain medications he is taking. Pt is following with Dr Mihir Mitchell for medical oncology. Seen last week, continues on chemo. Tolerating chemo well. CT scan planned after next chemo cycle per patient report. Met with phycyoel in [facility] for med onc for second opinion. States that he had an MRI/CT scan at that time (approx 3 weeks after radiation was completed) which showed (per patient report) no tumor shrinkage but pt states that he understands radiation is continuing to work. No report available at this time. Planning to meet with a neurosurgeon at [facility] to discuss options for vertebral fractures from tumor per patient report. Appt with Dr Roxie Kussmaul (pulm) in October. Patient is doing well post-radiation completion. Explained to patient that he would need clearance from Dr Álvaro Mcdonald and Dr Mihir Mitchell prior to surgery being completed especially to the irradiated area. Verbalized understanding. CT scan re-imaging per Dr Mihir Mitchell. Pt's previous imaging done at [facility]. Asked patient to have results of reimaging faxed to us as well when it is done. I discussed follow up plans with Pilar Davis.  At this time, Dr Álvaro Mcdonald will see the patient back in 3 months for a post-radiation completion follow-up visit. Miguel Brooks is to follow up with other physicians involved in their care as directed (including but not limited to Medical Oncology, Primary Care, Pulmonary, and Surgery). Patient evaluated unaccompanied in the exam room by his wife with no sign of confusion and/or sedation able to voices needs. Patient states that he continues with increasing neuropathic pain, numbness and tingling involving his left arm and shoulder is now more distally as well as left mid to upper back pain. Patient takes Percocet unchanged approximately every 6-8 hours and is compliant with the Neurontin, Pamelor. Patient last week started physical therapy initially 2 times a week which will drop to once a week for a total of 6 weeks. Patient also was placed on an anabiotic as well as Diflucan ending 3 weeks ago for a throat infection which she feels cleared. Patient was also placed on a Medrol Dosepak by his oncologist which did not improve his left arm and back symptoms. Patient states that his back pain had improved significantly since all of his treatments. 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 Patient will be starting a new chemotherapy tomorrow as well as steroids therapy along with this. Options were discussed and patient desires something in addition to the above medications for his polyneuropathy symptoms. Patient has been on Lexapro 10 mg daily by his PCP for the past 3-4 years which she feels as helps. I gave the patient a prescription for Cymbalta 20 mg daily 30 tablets with one refill as well as refills on his other medications as above unchanged. Patient has 104 Percocet tablets left that we had prescribed via phone conversation. Patient advised to discuss the Lexapro and Cymbalta with his PCP Dr. Klever Rosa since he has seen him in the past for this condition.   I advised patient that we would not be best case scenario if he took both together and they voiced understanding. We will see him back in 4 weeks or sooner if they need us. 08/30/16:  Medical records reviewed and as per Dr. Stevan Gutierrez on 08/29/16:  -2 phase RT (initial = emergent) / locally advanced lung CA involving 2 vertebral bodies  Ryann Giraldo has completed radiation treatments to the left lung mass invading the spinal column, with high energy photons using an conformal technique at [address] [place] [address]. The patient received a total dose of 38 Gy in 16 fractions plus a 12 GY (6 fractions) boost to 50 Gy ( spinal cord at [facility] reviewed with pt). Second opinion recommend and obtained. Treatment Start Date: 4/27/16  Treatment Completion Date: 6/3/16  The treatments were well tolerated, without significant interruption (except for the re-plan)  RTOG Acute Toxicity Grade [ARMSC]: 1-2. (Pain / fatigue)  We will look forward seeing the patient back in 3 months for a follow-up with myself or our NP [scheduled and PRN toxicity checks in addition]. The patient knows to call with any questions or concerns. Please feel free to contact me at either office to discuss the care of this patient, or if I can be of any further assistance. Patient presents to the exam room in no acute distress alert with no sign of confusion and/or sedation able to voice his needs. Patient states that he has noticed some improvement especially to his thumb and index finger concerning the neuropathy with the addition of the Cymbalta. Patient is happening worsening back pain and is awaiting CT results next Tuesday to look at progression. Patient has lost 4 pounds and states that with chemotherapy his appetite has been decreased, he chronically has difficulty sleeping which has not changed. Patient states that he has taken Percocet on an average of 3 a day but when he had worsening back pain he was taking more frequent.     Options were discussed and I advised increasing the Cymbalta to 20 mg b.i.d. and provided in a prescription for 60 tablets with one refill. Patient continues on the Lexapro 10 mg daily and will be meeting with Dr. Mannie Epstein soon for assessment on possibly stopping the Lexapro versus continuing. I prescribed unchanged his Percocet 5-325 milligram tablets 1 p.o. q.6 hours p.r.n. ×120 tablets. We'll see him back in 4 weeks and look at the results of his CT scan at that time as well as benefit from the changes above.  09/27/16:  Medical records reviewed and patient presents today looking well without sign of sedation and/or confusion able to voice his needs accompanied by his wife. Patient complaining of sinus congestion, sore throat, raspy voice and recently saw his PCP who placed him on amoxicillin yesterday 500 mg t.i.d. Patient states that other than this he is feeling better with better control neuropathy on current regiment and off the Lexapro by his PCP. Patient reviewed how he is taking his medications which is appropriate and denies other new symptoms. Options were discussed and we are continuing the same medication regiment prescribing Percocet 5-3 and a 25 mg tablets today 1 p.o. q.6 hours p.r.n. in which he is taking crocs 23 a day ×120 tablets. Patient states that he has 11 days remaining of his Percocet. Patient has enough of the Cymbalta, gabapentin and was prescribed Diflucan 100 mg daily ×14 days with one refill. We will see him back in 4 weeks or sooner if he needs us. 10/24/16:   Medical records reviewed and as per Dr. Txe Clay on 10/14/16:  Vaughn Johnston is well known to me since the emergent R and concurrent course was completed. His spine pain is reduced. CHEMO continues. No early delayed RT effects. All questions answered. TOB cessation discussed again. FU with [facility] as planned and with me in 3 months or sooner PRN.   Patient presents to the exam room today without sign of sedation but less jovial looking more uncomfortable for follow-up complaining of right ear and right cheek pain stating that he went to urgent care Friday and was diagnosed with what he knows he has always had, TMJ. Patient also finished a Z-Eugenio by his PCP one week ago which did not help. Patient also complains of worsening left hand numbness and tingling. Patient states that he took 2 Percocet tablets last night which did help with the pain but then also made him sleepy. Patient compliant with his other medications and states that initially with the Diflucan his throat pain improved some but not significantly. Patient did state that he has a difficult time swallowing at times. Options were discussed and today we are increasing his Cymbalta to 30 mg b.i.d. ×60 tablets and 1 refill electronically prescribed. Patient will continue his Neurontin 900 mg t.i.d.  Today we initiated Duragesic 12 µg patch q.72 hours as directed ×10 patches and prescribed unchanged his Percocet 5-325 milligram tablets 1 p.o. q.6 hours p.r.n. ×120 tablets prescribed today. Patient will be seen back in 4 weeks or sooner and at this time consider increasing the Cymbalta, Percocet dosage, Duragesic patch and reassessing for thrush which is not present today. He will call if symptoms increase or if he has difficulty with the Duragesic in which she was reluctant to accept. 11/15/16:  Medical records reviewed and spirometry on 10/24/16:  DATA: Spirometry done in the office today demonstrates an FVC of 4.17 liters which is 85 % of predicted with an FEV1 of 3.10 liters which is 83 % of predicted. FEV1/FVC ratio is 74 %. Mid expiratory flow rates are 78% of predicted. Maximum voluntary ventilation is normal at 106 liters per minute or 74% of predicted. Total lung capacity is 6.83 liters which is 94% of predicted. DLCO is 12.43mm/min/mmHg which is 36% of predicted. Flow volume loop shows no signs of intrathoracic or extrathoracic obstruction. Impressions: No obstructive or restrictive lung disease.  Normal total lung capacity. Severe decrement in diffusing capacity  Patient presents to the exam room today accompanied by his wife at his baseline with no sign of sedation and/or confusion. Patient states that since his oncologist increased his Duragesic to two 12 µg patches his pain has improved approximately 75%. Patient states that he was able to decrease his Percocet to approximately 3 daily on the average. Patient has not noticed much of a difference since we increased his Cymbalta 1 month ago. Patient states that a left chest pain is new otherwise his symptoms are unchanged. 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 Patient desires to wean himself off of the Percocet as it does make him sleepy therefore did accept my suggestion of continues to titrate upward the Duragesic. Today we increased him to Duragesic 37 µg q.72 hours and provided a prescription for 10 of each strength patch. Patient was not given a prescription for his Percocet as he states he has not filled the last prescription I gave him. There are no signs of thrush but we will continue to monitor and patient compliant with his other medications. We will see him back in 4 weeks and evaluate his need for his breakthrough pain medicine and consider titrating the Duragesic further if indicated. We will question if he has returned to work. We will also consider increasing his Cymbalta to 40 mg b.i.d. if indicated. 12/13/16:  Medical records reviewed and as per radiation oncology's assessment on 11/30/16:  Patient is doing well post-radiation completion. CT scan on 11/28/16 showing paratracheal node enlargement and he is symptomatic with whispery voice/ sore throat -- ENT referral written today to Dr Cody Medina (physician per patient preference).  Will also update Dr Sandy Mora on imaging report and referral. Further imaging per med onc or ENT.  Pain well controlled. Continue with Dr Ester Stvoall (palliative medicine). Continue to follow with Dr Alecia Murcia (pulmonology). Smoking cessation reinforced again. Patient verbalized understanding. I discussed follow up plans with Falguni Vidal. At this time, Dr Kike Clayton will see the patient back in January 2017 for a post-radiation completion follow-up visit. Falguni Vidal is to follow up with other physicians involved in their care as directed (including but not limited to Medical Oncology, Primary Care, Pulmonary, and Surgery). The patient was given our contact number in the event that if at any time they change their mind and would like to return to the clinic to see either myself or one of the Radiation Oncologists, they can simply call us and we would be happy to see them. Thank you for involving us in the management of this extremely pleasant patient. More than 25 min was in direct contact with pt coordinating/giving care. >50% of the visit was spent in counseling the pt on the following: Follow up care  The nurses notes were reviewed and incorporated into this assessment and plan. Patient presents to the exam room today stating that he now is no longer taking Percocet and is comfortable on the patches. Patient denies new symptoms and stated that he saw his ENT yesterday who felt that he may need a trach. They saw their oncologist today who felt that the area could be stretched/dilated. Patient does \"breathes different\" as per the wife when he is sleeping but does not wake up choking or coughing. Patient does have a hoarse coarse voice quality. Patient is comfortable on the current regiment and we phoned in Neurontin as per [employer] prior to this visit. Options were discussed and we are continuing his Duragesic 37 µg q.72 hours and 10 patches of each strength prescribed today.   Patient needs no other prescriptions and we'll see him back in 4 weeks or sooner if he needs us.  01/09/17:  Medical records reviewed and patient followed up with radiation oncology to revisit them 3 months from now. Patient presents to the exam room today complaining of a tickling cough mostly at night but also throughout the day. Patient states that he is not sleeping as well secondary to this cough despite running a humidifier throughout the house with good humidity. Patient states that Dr. Salvador Mcgill had him on steroids ×2 weeks which helped but then oncology secondary to his immunotherapy wanted him off of this. Patient states that his pain is controlled on current regiment and he is noting fatigue and some depression secondary to not sleeping well secondary to the cough. Options were discussed and we refilled his Duragesic unchanged ×5 patches each strength, Dolly-Colace and prescribed Ativan 0.5 mg tablets 1/2-1 tablet q.h.s. p.r.n. ×30 tablets. We will see him back in 1 month or sooner if he needs us. 02/06/17:  Medical records reviewed and as per radiation oncology assessment from 01/30/17:  -pt seen To discuss hoarseness and CT findings  -Pedro Everett was asked to come in today to review the negative CT findings. He and hoarseness and I reviewed the RT treatment noting the laryngeal mean dose constraints WERE met. 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 CT reviewed with pt and wife. TOB cessations discussed, again at length. -FU with Dr. Cheney Signs for systemic immunotherapy and re-staging. -MM for Sx. Patient presents to the exam room today accompanied by his wife looking well in no acute distress without sign of sedation and/or confusion. Patient states that Ativan 0.5 mg q.h.s. help significantly with his sleep without sedation during the day. Patient also is compliant with his other medications without change stating that his pain is much better controlled.   Patient reviews with me his visit better controlled. Patient also still dealing with dryness and fissuring especially on his left hand across the dorsum of his knuckles and he will now try to splint his left index finger attempting to avoid bending at least at night. Options were discussed and I am refilling unchanged his Duragesic 37 µg ×10 patches of each strength. Secondary to fatigue he will initially try just wearing the 25 µg patch and then if pain worsens he will then apply a 37 µg dose and continuing unchanged. They voiced understanding. Of course he is continuing his Ativan, Cymbalta 40 mg b.i.d. and he will need refills of his Ativan next time when we see him in 4 weeks from now, possibly Pamelor and of course his patch or patches. Patient has gained almost 3 pounds over last month and we will continue to monitor. 05/09/17:  OARRS report reviewed today without any prescribing red flags. Medical records reviewed and as per radiation oncology assessment while an inpatient on 05/03/17:  ASSESSMENT/PLAN:  London Winter appears clinically stable. CT neck and fiberoptic laryngoscopic findings are discussed with the patient and his wife. I also had the opportunity to speak to Yuliana Miguel and NENO, today. I believe the CT findings of soft tissue attenuation at the piriform recess and arytenoids bilaterally are the results of the vocal cord paralysis and not a discrete tumor mass. 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 At diagnosis, the large left upper lung mass extended into the mediastinum and may be now contributing to the right recurrent laryngeal nerve involvement along with prior treatments. In any case, palliative radiation treatments directed to the hypopharynx would not be recommended.    Mr. Steph Bella remains adamantly opposed to undergoing a tracheostomy to protect his airway as discussed with  IMPRESSION:  1. Bilateral vocal cord palsy. 2. History of chronic obstructive pulmonary disease, not in acute  exacerbation. 3. History of nonsmall cell lung CA, currently receiving immunotherapy. 4. Subglottic narrowing. 5. Tobacco dependency. 6. Depression. 7. Hypercholesterolemia. As per ENT consult while an inpatient on 05/02/17:  Patient is a 65 yo male, history of COPD and lung adenocarcinoma, that presented to the ED late last night with complaints of shortness of breath and hoarse voice that worsened throughout the day yesterday. He has history of subglottic airway narrowing and has been seen by ENT for it. He denies any issues with shortness of breath at the time but says it feels like something is stuck in his throat. He had breathing treatments in the ED which said helped a little. He mentions that he had some diarrhea for the past few days but denies any other symptoms. Patient says he has cut back on his smoking, but still was smoking prior to coming into the hospital.  He has finished chemotherapy approximately the end of Nov 2016 and now currently getting immunotherapy with carbo/Avastin + Xgeva. His last treatment was 2 weeks ago and his next one is scheduled for May 9. Voice was worse yesterday and more SOB, better today. Was noted to have left vocal cord palsy in Dec 2016  EXAM - erythema, dryness of supraglottic larynx but no lesions. Fiberoptic laryngoscopy, both vocal cords in paramedian position, minimal abduction, right cord does medialize slightly with phonation. Unable to see subglottic  No neck mass  IMP - bilateral vocal cord palsy. Would be important to delineate status of mediastinum taking out both cords. Trellis Callander was discussed as alternative for improving breathing status. Pt does not wish to proceed with this at this time.  Risks, options discussed with pt and wife  Patient presents today with his wife and daughter without sign of sedation and/or confusion able to voice his needs. Patient appropriate and talking with a whispering/raspy voice without shortness of breath noted. Patient states there's been no change to the medications and he needs refills. Patient and family discussing the recommendations of a tracheostomy and all the above. Patient sees Dr. Dacia Ellis tomorrow and medical oncology on Thursday to further discuss treatment options. Patient also complaining of worsening numbness and tingling involving his right hand now. Options were discussed and we are refilling his medications unchanged as per Epic. Today I talked to them for 45 minutes about differences in emergent versus elective tracheostomy and answered their questions. Wife has been wanting the patient to quit work for quite some time and patient has been resistant admitting today that he feels as long as he can work in the cancer is not beating him. Patient is also worried about insurance coverage once he quits work. We had a long very cynthia discussion and patient admits that [facility] has to go\". Patient feels that over the next month he needs to make steps to end his employment for his health. I advised him to talk to social work, insurance, his work about options for [employer] and they voiced understanding. Patient's wife has researched different treatments 1 especially that they are only performing at [facility] and animals concerning electrical stimulation of vocal cords. She does admit that treatments are several years off as she has been told by them. Concerning the treatment including a tracheostomy of his specific case she will consider what his specialists above are recommending and gather information on details of the trach/treatment. He will then make his decision based on their recommendations. Patient states that he needs to have a tracheostomy then he would but he wants to know if there are any other options.   Patient did talk about not having an appetite and we are initiating difference with improvement of appetite but more so with sleeping all night now on the Remeron 7.5 mg q.h.s. without sedation but states there is significant room for improvement on his appetite. Patient also compliant with Neurontin 900 mg t.i.d., Cymbalta 40 mg b.i.d., Pamelor 25 mg q.h.s. and very infrequently takes is 0.5 mg q.h.s. Patient denies constipation and states that is moving his bowels well. Options were discussed and we are increasing his Remeron to 15 mg q.h.s. as per Epic and I gave him a prescription for the Duragesic 37 µg q.72 hours 10 patches of each strength. Patient has refills on his Neurontin 900 mg t.i.d., Ativan 0.5 mg q.h.s. p.r.n., Cymbalta 40 mg b.i.d. and Pamelor 25 mg q.h.s. We will continue us and monitor his weight and symptoms. Patient looks much more comfortable today. 07/25/17:  Medical records reviewed and discharge summary on 07/09/17:  Principal Problem:    Orthostatic hypotension  Active Problems:    Panlobular emphysema (HCC)    Malignant neoplasm of overlapping sites of left lung [facility])    Palliative care encounter    COPD (chronic obstructive pulmonary disease) (HCC)    Tobacco dependence    Severe protein-energy malnutrition ([facility] [address]    Anemia  Patient presents today looking very weak, tremulous and not as talkative but able to respond and answer in short sentences. Patient over the past 2-3 days as per wife's history has been very weak and with nausea vomiting, dehydration. Patient is being visited by home health but refuses to go back to the hospital stating that he realizes that he needs to go but he does not want to go back to the hospital at this time. Patient just left ENTs office finding a new right sided neck mass. Apparently the ENT discussed with Dr Noni Ortega and they will perform a PET scan to look at the extent of the lesions.   Typically patient goes to the blood and cancer Center every 3 weeks but chemotherapy is on hold secondary to the patient's weakness. Options were discussed and we are refilling his medications unchanged. We are ordering one liter of IV fluids today and via home health will try to get this at least 3 times a week. I reiterated to the patient that he should go to the emergency department and he voiced understanding but did not want to go despite our suggestions. Patient looks ill and depending on how he responds to the IV fluids may continued to decline significantly in the near future. 08/22/17:  Medical records reviewed and as per radiation oncology's assessment from 07/27/17:  Mr. Eloy Pruitt is a pleasant 64year old man with a new glottic lesion. This is either a distant progression form the lung cancer or a second primary. I have spoken with Dr. Abel Escamilla who recommend fractionated external beam radiation therapy prior to re-initiation of systmeic therapy as there is no other disease progression noted. We agree with this approach however a definitve dose may not be feasible due to previous RT as Dom knows. Certainly effective palliation is possible. The risks, benefits, alternatives, process and logistics of external beam radiation were reviewed. We answered all of the patient's questions to the best of our ability. Fransico Hendrickson and his wife verbalized understanding and seemed satisfied. Radiation planning will commence within 14 days; the next step in management being the simulation scan, with external beam radiation to commence in a timely fashion thereafter. Patient presents today accompanied by his wife in no acute distress looking so much better than he did in the past with no sign of sedation and/or confusion. Patient has been receiving IV fluids 3 times a week and has been eating and drinking better. Patient states that he has had difficulty falling asleep recently a couple times but also states that he has eaten later at night and is getting his bathroom remodel and might be worrying about this.   Patient also questioning if he should use the Neurontin suspension as he is able to take the pills and capsules without difficulty. Patient desires to use of the liquid since he has it. Patient states there's been no change in how he is taking his other medications the patient was started on prednisone 10 mg daily which is helping with his overall feelings and joint aches by medical oncology. Options were discussed and we are decreasing IV fluids to twice weekly and would decrease them further but patient will be soon starting radiation and had significant difficulty swallowing with his initial radiation. We will monitor and decrease the IV fluids down the road if we can keep his oral intake increased. Patient advised to eat and drink as much as he can within limits. I am refilling his Duragesic as per Epic and he will now use the Neurontin 250 mg per 5 ML suspension 750 mg t.i.d.,  Ativan sporadically as per Epic as well as his other medications. We will see him back in 4 weeks or sooner if he needs us. There is no sign of thrush today. 10/03/17:  Medical records reviewed and as per radiation oncology assessment from 09/27/17:  Comments: Dose 3000 cGy to the subglottic area. Patient has no complaints except mild sore throat. He has Magic mouthwash and its helping can eat. On examination the skin over the treated area looks good. Oral cavity shows no evidence of thrush. Tolerating treatment well  Patient presents to the exam room today accompanied by his wife without sign of sedation and/or confusion able to voice his needs. Patient looking better every time I see him and has gained 10 pounds as per our records. Patient states that his pain is controlled on the current regiment of Duragesic 37 µg q.72 hours, gabapentin 900 mg t.i.d that he likes taking better than the suspension. , Cymbalta 20 mg b.i.d., Pamelor 25 mg q.h.s. not needing anything further.   Patient is also compliant with Remeron 15 mg q.h.s. and very infrequently uses Ativan 0.5 mg q.h.s. because he is tired in the evenings. Patient was able to go outside of work around the house for approximately half hour the other day with fatigue afterwards. Patient does complain of not only physical fatigue but mental fatigue oftentimes forgetting common names even names of grandchildren. Patient asking if there is anything that would help with this. Patient is having his last radiation therapy today stating that he has a slight sore throat but is using Magic mouthwash 2-3 times a day which helps. Patient denies any new and/or uncontrolled symptoms otherwise. Options were discussed and secondary to patient's good oral intake we are stopping the IV fluids and will monitor. We may reinstate this in the future but he is doing well. We are continuing unchanged his Duragesic 37 µg q.72 hours prescribing 10 patches of each strength. He will continue his other medicines unchanged as above and today we are initiating Ritalin 5 mg each morning to help with his mental and physical fatigue. Patient educated on the medication and will call if he has any difficulties otherwise we will see him back in 4 weeks or sooner if he needs us. 10/31/17:  Medical records reviewed and as per radiation oncology assessment from 10/23/17:  -post Tx FU - no charge                        -no new Sx, skin changes resolving                        -KPS rebouned to 70-80 (looks much better today)  -RBA reviewed  -Q+A reviewed  -cont FU with MEDON, skin rash on hands defer to Dr. Tereza Fletcher  Patient presents with his wife in no acute distress without sign of sedation ambulating well able to voice his needs. Patient has complained of increasing fatigue over the past month and has actually had his Synthroid doubled by his PCP a few days ago. Patient will continue to follow-up with his PCP in 6 weeks from now for repeat TSH.   Patient did not notice a difference with his Ritalin 5 mg q.a.m. neither good or bad. Patient has remained active and actually is working with [employer] in his bathroom at home. Patient now finished with his radiation therapy for approximately 1 month. Patient recently saw his ENT who will be scoping him at the beginning of November. Patient states that there is been no change in how he is taking his medications and he has one box of each of the Duragesic strength remaining at home. Patient has complained of \"jumpy legs\" at night more so over the past 1 month despite the use of Ativan. Options were discussed and we are stopping the Ritalin in case this is exacerbating his \"jumpy legs\" but may try again down the road. I feel that most of his fatigue is exacerbated by the hypothyroidism and we will see how much of this symptom is remedied with normalization of his thyroid hormone. Today we are increasing his Cymbalta to 30 mg q.12 hours as he is noted worsening left hand neuropathy as well as the above symptoms. We are continuing unchanged his Duragesic 37 µg q.72 hours prescribing 5 patches of each strength. Patient will continue his other medications unchanged and we will see him back in 4 weeks or sooner if he needs us. 11/28/17:  Medical records reviewed and [facility] records revealing the dislodged PEG tube and reinsertion reviewed. Patient presents today accompanied by his wife in no acute distress at his baseline without sign of sedation and/or confusion able to voice his needs. Patient states that there is been no change to his symptoms and he has been more active painting around the house with increasing aches and pains especially around his neck and back. Patient states that his \"jumpy legs\" are better now that we stop the Ritalin. Patient comfortable on current regiment. Patient states that he sees his medical oncologist this week and will have a CT of the throat in December and a PET scan after the beginning of the year.   Options were discussed and we are refilling his Duragesic unchanged at 37 µg q.72 hours prescribing 10 patches of each strength, Patient has refills on unchanged Neurontin 900 mg t.i.d., Cymbalta 30 mg q.12 Remeron and Pamelor unchanged. We will see him back in 8 weeks or sooner if he needs us and he was advised to call us 1 week prior to running out of his Duragesic patches and we will prescribe electronically as he has been wonderfully appropriate and compliant. We will review the throat CT and PET scan as above as well as recommendations from his medical oncologist.  01/23/18:  Medical records reviewed and no documented physician visits in [facility] since I last saw him. Patient presents with his wife in no acute distress without sign of sedation and/or confusion able to voice his needs ambulating well. Patient has lost another 2 pounds and still has a decreased appetite despite taking Remeron 15 mg nightly. Patient is working out details with his employer/insurance and there may be a problem with pain from medication soon. Patient complains of significant fatigue and feels that it is the cold weather and wife states that oftentimes he will just sleep all day. There are small projects around the house but he does not feel like doing them. Options were discussed and I feel that his fatigue is from his decreased caloric intake. Patient is resistant to starting his tube feedings as recommended to utilize at least twice a day if he is not eating because he feels that it is a step backwards. I talked to them both in great detail and I challenged him to eat something at least 3 times a day or go back on the tube feedings and they voiced understanding. Today we are increasing his Remeron to 30 mg q.h.s., continuing unchanged Duragesic 37 µg q.72 hours prescribing 10 patches of each strength and I advised him that if these are too expensive to call us and he would consider going back on his Percocet if that is less expensive. He did not want to do that today. Patient will continue unchanged his Cymbalta 30 mg q.12 hours, Neurontin 900 mg t.i.d. and his Pamelor all of which she does not need refills today. We will see him back in 8 weeks or sooner if he needs us, monitor his weight and of course the situation with his medication coverage. 05/15/18:  3/20/18: [address] records reviewed and no documented physician visits in [facility] since last seen by Dr. Bonnie Stephenson on 1/23/18. Patient presents with his wife in no acute distress, ambulating well, no signs of sedation and/or confusion, is able to voice his needs and concerns well. Since his last office visit he has gained 7 pounds, and states that his appetite is good. He also states that his pain has been well controlled. He continues to have some daytime tiredness, but associates this with poor sleep secondary to restless legs. He states that his activity level has increased, and that he is very active around the home, recently working at reducing his bathroom. He states that he has no immediate concerns, that is most bothersome problem, hasn't been stressed legs at night. He states that is somewhat newer to him. We have a long discussion, it was revealed that he has had similar issues in the past, when he struck with sleep apnea, which often interrupted sleep. He states that he has been sleeping with his speaking valve on history, when he is supposed to be sleeping with a compressor. We discussed that he should attempt to follow the instructions of his pulmonary team regarding his airway at night. Options for continued treatment were discussed, and he will continue to use his Remeron 30 mg at at bedtime, continue Cymbalta 30 mg every 12 hours, continue Neurontin 900 mg 3 times a day, and his Pamelor 25 mg at at bedtime. He states that his pain has been well controlled, and in fact he states that he has been stretching his fentanyl patches out to about 4 days over the past week or so.   We discussed reducing his final dose, and he is agreeable to attempting this. We will prescribe today fentanyl 25 µg every 72 hours. He knows to call if pain worsens, or other uncontrolled symptoms arise. We will see him back in 8 weeks, or sooner if needed. As per phone discussion with Prabha Rodriguez on 05/04/18:  Call received with request for refill of fentanyl patch. They also requested reinitiation of the additional 12.5 µg patch, as per his prior regimen, due to increase in generalized pain. Will refill patient's 25 µg patch and reorder 12.5 µg patches. Patient is to follow-up the palliative medicine outpatient clinic on April 15. As per radiation oncology assessment from 05/03/18:  Mr. Shreyas Cox is a pleasant gentleman well know to me with a history of both lung cancer and laryngeal cancer s/p concurrent chemo-RT for the former and definitve intent fractionated external beam radiation therapy for the latter (with the highest dose the spinal cord could tolerate - see above). He is still on immunotherapy and his KPS is intact at KPS 70 WO new Sx. There is no evidence of disease recurrence or progression based on a detailed review of the available imaging, physical exam, and ROS (all personally performed prior to and during this follow up appointment today). The patient did verbalize understanding for the indications of continued FU including imaging and laboratory evaluation as applicable to this case; as well as appropriate lifestyle choices and health maintenance. All questions answered to the best of my ability. 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 Patient presents with wife ambulatory in no acute distress looking much more comfortable than I previously seen him in the past gaining 11 pounds stating that he is much more active at home with carpentry and working on his cars.   Patient reviews with me the increasing pain which is still experiencing describing as neuropathy involving his left leg and left arm requiring an increase of the fentanyl patch as above after we attempted to decrease. Patient compliant with 37 µg q.72 hours but does complain of worsening pain after the secondary the patch. Patient does take Tylenol for breakthrough pain. Patient's #1 complaint is difficulty falling and staying asleep which has been a chronic problem but improved in the wintertime. Patient for many years working midnights and afternoon shift and of course contributing to the problem. Patient continues to use Ativan 0.5 mg which creates sedation but still with difficulty falling asleep and staying asleep. Patient states that he has good energy throughout the day improved from previous despite difficulty sleeping. Patient currently denies restlessness of his legs as a contributing factor. They also talk about watching a movie when they cannot sleep but will try not doing so. Options were discussed and they will try melatonin 3 mg q.h.s. and call to have any difficulties. Other options down the road may be trazodone, Ambien or other sleep aids. They will call us in a couple of weeks and we may prescribe this. We are continuing Duragesic 37 µg q.72 hours and he will call when he is out of the patches. We are sending the patient to physical therapy to help with the peripheral neuropathy. We will see the patient back in 8 weeks or sooner if they need us. Patient knows that they can call us anytime. 07/10/18:  Medical records reviewed including physical therapy notes and no documented physician visits in [facility] since I last saw him. Patient presents today accompanied by his wife in no acute distress at his baseline without sign of sedation and/or confusion able to voice his needs. Patient states that there is no significant change in how he is doing other than he is sleeping better most nights with melatonin. Patient does complain of pain in his lower back down the back of his legs periodically, sometimes restless legs at night. Patient takes Tylenol for breakthrough pain and is compliant with his Duragesic patc hes having for remaining of each. Patient also requesting a refill on the Colace. Options were discussed and I offered Requip at night for restless legs or changing his medication profile for his lower back pain but he desires to keep everything the same for the time. I continued his Duragesic 37 µg q.72 hours prescribing 10 patches of each strength, refilled his Colace and he will continue his melatonin q.h.s., other medications unchanged including Ativan 0.5 mg, Remeron 30 mg at at bedtime, continue Cymbalta 30 mg every 12 hours, continue Neurontin 900 mg 3 times a day, and his Pamelor 25 mg at at bedtime. We are extending his appointment out to 12 weeks and he knows to call prior to running out of his fentanyl patches or if he needs anything. Patient congratulated that he is now in remission as per his oncologist.  10/02/18:  Medical records reviewed including assessment by Hoa Pop of palliative care on 09/24/18:  Spoke with patient and wife about discharge plan. The patient will be discharged to home with IV antibiotics and has a PICC line in place. He will continue current medications for symptom management. Reviewed medications with patient and family. Wife states that the patient has enough medication to last until next visit to palliative medicine outpatient clinic. Prescription provided for Marinol 2.5 mg twice a day ordered for appetite stimulation. Patient tolerating medication without side effects. The patient's wife will call the palliative medicine outpatient clinic to arrange an appointment in the next 2 weeks. [address] APRN-CNP  Patient presents today accompanied by his wife looking well without sign of sedation and/or confusion able to voice his needs. Patient states that slowly he is regaining his appetite but there is still significant room for improvement.   A shunt feels that the Marinol helps without side effects. Patient states his breathing is slowly improving but still not back to baseline. Patient has 5 patches of each strength of his fentanyl patches and states there's no change in how he is taking his medications. Patient denies other new and/or uncontrolled symptoms otherwise. Patient is following up with infectious disease on October 10 and will ask them about restarting his hyperbaric treatment secondary to his gum disease. Options were discussed and today we are doubling the Marinol to 5 mg b.i.d. as per Epic. Patient advised that if he does not notice a benefit or does not like the way it makes him feel he can always break the tablets in half and go back to his original dosage. Patient will continue his other medications unchanged including Duragesic 37 µg q.72 hours, Colace, melatonin q.h.s., Ativan 0.5 mg, Remeron 30 mg q.h.s., Cymbalta 30 mg q.12 hours, Neurontin 900 mg t.i.d., Pamelor 25 mg q.h.s. He will continue his other medications as well and we'll see him back in 4 weeks monitoring his weight. They know to call us at any time. 10/30/18:  Medical records reviewed and patient presents ambulatory in no acute distress accompanied by his wife without sign of sedation and/or confusion able to voice his needs. Patient appears frustrated today and states that he has significant shortness of breath with activity that has been limiting his activity since his pneumonia. Patient utilizes albuterol aerosols prescribed by his pulmonologist twice daily which does help for an hour or 2 as per his history. Patient has oxygen at home that he infrequently uses. Patient likes to remain active. Patient states there's no change in how he is taking his medications and I prescribed other than he stopped his Marinol secondary to a too high of a co-pay. Patient has gained 5 pounds stating that he is eating with an improved appetite.   Patient states he is on prednisone 10 mg daily. Options were discussed and he will call us prior to running out of his Duragesic, Ativan as above and we will electronically prescribed unchanged. Of course we are not continuing the Marinol as he is already stopped. Patient will continue his Duragesic 37 µg one patch q.72 hours, Colace, melatonin, Ativan, Remeron 30 mg q.h.s., Cymbalta 30 mg q.12 hours, Neurontin 900 mg t.i.d., Pamelor 25 mg q.h.s. I advised patient to use his albuterol aerosols more frequently as they are written q.6 hours p.r.n. by pulmonology. I am sending pulmonology and note informing them of his continued dyspnea especially upon exertion. I am wondering if DuoNeb around-the-clock may help him more with continued p.r.n. albuterol. Also a pulse dose of the prednisone may be beneficial as well. I advised the patient to use his oxygen more frequently. I will of course defer to pulmonology for these decisions. We will see the patient back in 4 weeks or sooner if he needs us and they know to call us at any time. 12/03/18:  Medical records reviewed and as per radiation oncology assessment from 11/12/18:  Mr. Dee Shore is a exceedingly pleasant 64year old man with locally advanced left lung cancer with spinal column involvement s/p palliative RT followed by a definitive concurrent course and then continued CHEMO followed by immunotherapy (the later continues - [facility]). He had a treatment response and maintained a reasonable QOL however a new lesion in the larynx was diagnosed this then was treated with fractionated external beam radiation therapy to the maximum dose allowed y the spinal cord (QUANTEC). [facility] presents today for FU.             Dom underwent RT initially due to newly diagnosed NSCLC with erosion of the mid thoracic, left side of the spinal column with probable cord involvement. After a short hypo fractionated course (emergent) the remainder of the RT was delivered with concurrent chemo.  The maximum dose was delivered as allowed by normal tissue tolerance (cord) [physics consult obtained], RBA reviewed with pt at that time. Potential chronic effects discussed today with pt and wife. He did get a second opinion at [facility] scanned in to [facility] but wants to stick with myself and Dr. Everton Saldana for his care. Dr. Everton Saldana initially prescribed Kaz Cables / Ada Coffin + Venita Lions. See [address] records for further [id number] including immunotherapy. Dom then came in with continued voice change and stridor that then prompted a tracheostomy (last year). 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  We prescribed fractionated external beam radiation therapy to the highest dose the spinal cord could tolerate per QUANTEC and he has had a response to treatment with improvement in his voice and stable KPS still on immunotherapy per Dr. Everton Saldana. He recently had a new pneumonia with questionable mass in the right side, continued FU pending. If this is malignancy (biopsy could be considered) then SBRT is a reasonable considerations. 2nd opinion offered and declined. KPS 70. He does continue to smoke. Cessation again discussed.     Mr. Chrissy Ambrose is a pleasant gentleman well know to me with a history of both lung cancer and laryngeal cancer s/p concurrent chemo-RT for the former and definitve intent fractionated external beam radiation therapy for the latter (with the highest dose the spinal cord could tolerate - see above) --- he has a recently diagnosis of pneumonia +/- mass with continue FU pending (SBRT could be considered for oligo metastatic disease vs 3rd primary depending on the clinical course.)  He is still on immunotherapy and his KPS is intact at KPS 70 WO new Sx perhaps SOB).  Leandro Moat is no evidence of disease recurrence or progression based on a detailed review of the available imaging, physical exam, and ROS (all personally performed prior to and during this follow up appointment today).  The patient did verbalize understanding for the indications of continued FU including imaging and laboratory evaluation as applicable to this case; as well as appropriate lifestyle choices and health maintenance.  All questions answered to the best of my ability. 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 Patient presents today accompanied by his wife in no acute distress at his baseline ambulating well without sign of sedation and/or confusion able to voice his needs. Patient states is no change in how he is taken his medications needing a refill on his fentanyl patches. Patient started pulmonary rehab last week and states that his breathing is slowly improving. Patient gained 3 pounds and denies new and/or uncontrolled symptoms otherwise. Options were discussed and we are continuing the same medications unchanged including Duragesic 37 µg one patch q.72 hours prescribing 10 of each strength, Colace, melatonin, Ativan, Remeron 30 mg q.h.s., Cymbalta 30 mg q.12 hours, Neurontin 900 mg t.i.d., Pamelor 25 mg q.h.s. We will see him back in 8 weeks and they know to call us at anytime with any questions and/or concerns. 01/28/19:  Medical records reviewed and as per pulmonology assessment on 12/07/18: I had the pleasure of seeing Conner Koch in our office in follow-up regarding his COPD, history of MRSA pneumonia and lung cancer. Overall he has been doing well. He has gained weight. He is attending pulmonary rehab. Complying with his use of budesonide and Perforomist.  Continues to be on Keytruda and chronic prednisone 10 mg daily. He continues to remain compliant with and benefit from the use of oxygen at 3 lpm with activity and nocturnal.  Also follows with Dr. Chad Vargas for palliative care for his pain control. Kelly Eng was seen today for copd and cancer.   Diagnoses and all orders for this visit:  Tracheostomy in place [facility])  History of staphylococcal pneumonia  -     XR CHEST STANDARD (2 VW)  -     Cancel: MRSA SCREENING CULTURE ONLY; Future  Pancoast tumor of left lung ([address]  Centrilobular emphysema (HCC)  Chronic respiratory failure with hypoxia (HCC)  Chest x-ray shows persistent  right upper lobe nodular density will  proceed with obtaining a CT scan considering patient's history. We'll check MRSA screening. Continue oxygen at 3 lpm with activity and nocturnal  Continue budesonide and Perforomist breathing treatments. FOLLOW UP  Return in about 3 days (around 12/10/2018) for ct scan. Chest CT results on 12/18/18:  Persistent ill-defined infiltrative soft tissue density in the left   upper lobe medially extending to the apex, abutting the mediastinum   and aortic arch which may represent inflammatory process like   pneumonia or malignancy. There is associated mixed sclerotic and lytic   lesion involving the T1 and T2 vertebral body and left second rib   posteriorly. Infectious involvement or malignancy are considered and   further assessment is recommended.       Improving pneumonic infiltrate in the right upper lobe. There is also   thoracic aortic aneurysm   Patient presents today And advised wife noted distress without sign of sedation and/or confusion able to voice his needs. Patient complaining of increasing lower back pain across his hips that the fentanyl patches are not helping. Patient also complaining of gum pain stating that he will be calling in oral surgeon soon as he has exposed bone and he cannot wear his dentures for more than a couple hours at a time. Patient also is now on 2 new nebulizers every 12 hours by pulmonology. Patient also asked to talk to me in private and his wife stepped out. Patient states he is having difficulty urinating despite his Flomax and I advised him to contact his urologist to discuss titration of medications.   Today we are decreasing his Pamelor from 25 q.h.s. to 10 mg q.h.s. secondary to anticholinergic effect. Patient also states that he is experiencing more depression without any suicidal ideations. Patient did state that he is thinking about stopping treatment and really does not like feeling this way. I talked to him about counseling and he was very receptive to counseling. Patient states that he protects his wife and does not want to tell her about these things and is not real close to his family to talk to them about intimate details. Patient did talk to his elder sister at one time whom he is close to. Patient also complains about sexual dysfunction and I discussed this with him as well being multi modality. Options were discussed and I made the above recommendations as well as today continuing his fentanyl 37 µg q.72 hours as we normally do and today we initiated p.r.n. Percocet 5-325 milligram tablets 1 p.o. q.6 hours p.r.n. holding for sedation prescribing 120 tablets today. We see what his opioid load requirement is and then add to the fentanyl patch if need be. Concerning his depression we are sending him to [facility] for counseling and recommendations. Today we are keeping him on Cymbalta 60 mg q.h.s., Remeron 30 mg q.h.s. and we did decrease his Pamelor from 25 mg to 10 mg q.h.s. secondary to BPH and his above complaints. I will be glad to discuss results with them if need be. I will see him back in 4 weeks or sooner if he needs us and I advised him that he can call us at anytime with any concerns and he voiced understanding and appreciation. 03/05/19:  Medical records reviewed and as per phone discussion with Carmella Billingsley our palliative care nurse on 02/28/19:  Call from [address] [place] [address] stating had not heard from Counseling group Dr. Sonny Rees had referred him to. This nurse had a confirmed fax that referral was made 1/28/19. Called over to [facility]. They did not receive referral. Referral resent and called to Alan to give update.    Patient presents today accompanied by his wife distress without sign of sedation and/or confusion able to voice his needs. Patient complaining of worsening pain to his hip and thigh groin and left side of his mouth. Patient going for scans today as per medical oncology. Patient takes 2-3 Percocet tablets remaining having approximately 60 tablets left stating that they do not help but sometimes causes mild sedation. Patient voices compliance with his other medications and cannot notice a difference from decreasing the Pamelor last time. Patient has gained 4 pounds as per our records. Patient also [facility] stating that it is going well and he relates well to his counselor. Options were discussed and we are continuing the Percocet 5-325 milligram tablets 1 p.o. q.6 hours p.r.n. and he will call prior to running out. Today we are increasing Duragesic to 50 µg one patch q.72 hours prescribing 10 patches today and he will set aside the 1 patches each of his old strength in a safe place and of course not use them. We will see him back in 4 weeks or sooner if he needs us. 04/09/19:  Medical records reviewed and no documented physician visits in [facility] since I last saw him. Patient presents today accompanied by his wife in no acute distress without sign of sedation able to voice his needs and bleeding well. Patient weight has remained the same. Patient noticed a difference with increasing the fentanyl and now only using Percocet 1-2 times a day but still with breakthrough pain. Patient restarted smoking despite Wellbutrin 75 mg b.i.d.  I talked to him about attending classes again, habit alteration, etc.  Patient also complains of right hip pain worse on rainy days diagnosed with arthritis via x-rays. Patient unable to take NSAIDs.   Patient does complain of fatigue with the Percocet and at times difficulty sleeping with melatonin 5 mg q.h.s. which helped in the past.  Options were discussed and he will try melatonin 10 mg q.h.s. and he did not want to adjust his pain medications at this time. I advised him that he can try breaking the Percocet in half taking a half tablet more frequently to see if this balances the pain relief with sedation. We prescribed fentanyl 50 µg patches ×10 on April 1 and refilled his Percocet 5-325 milligram tablets 1 p.o. q.6 hours p.r.n. prescribing 120 tablets today. Patient will continue the Wellbutrin 75 mg b.i.d. as well as the remainder of his medications that we prescribed. We'll see him back in 8 weeks or sooner if he needs us.    6/11/19  Medical Records reviewed. Yakelin Trejo presents today with his wife. He is alert, oriented, without signs of sedation or confusion, and is able to voice his needs and concerns well. He reports that his pain is currently well controlled. He was started on Cyclobenzaprine by his oncologist, and this has greatly improved his pain located in his right hip/thigh. He continues to utilize his fentanyl patches both 50 mcg and 12 mcg, but reports that he sometimes forgets to change them, and at times leaves them on for 4-5 days, and reports having about 5 patches remaining from his prior prescription and does not require a refill. He also continues to take his percocet 1-2 times per day, and reports that he has been alternating his percocet and cyclobenzaprine with good result. We discussed reducing his fentanyl to just the 50 mcg patch, but he does not wish to do that at this time. We discussed that dose reduction would be appropriate moving forward as his pain continues to improve. He continues to smoke, reporting approximately 1 pack per day and we further discussed his need to quit and the efforts he is taking to quit. He denies any other significant issues, and continues to take all of his other medications as prescribed. He will call when he needs any refills and we will see him back in 8 weeks.       8/27/19:  Per Dr. Álvaro Mcdonald on 8/15/19:  He recently had a new pneumonia with questionable mass in the right side which has resolved. Amanda Horne he is having WL, pain the lower back, left sided jaw pain, blood from the trach site and fullness around the trach site and voice change. Overall his performance capacity has rebouned and he is no following with Dr. Kieran Willard. We reviewed the course to date including partial resection for ORN. PALLMED following. ENT + PULM following for laryngeal scar tissue and cough. KPS has rebounded to a solid 70 (appears better than last visit). Ongoing Keytruda.  , Accompanied by his wife. He appears to be doing very well, continues to utilize all of his medications as previously prescribed unchanged. He does review with me his frustration related to his most recent pneumonia, as well as some of the difficulty he has had due to his laryngeal scar tissue, and he will continue to follow with ENT and pulmonary regarding that problem. He is otherwise very satisfied with his symptom management, and will make no changes to his regimen. He will continue unchanged Pamelor 10 mg daily, Ativan 0.5 mg nightly, Cymbalta 30 mg daily, Remeron 30 mg daily, and melatonin 10 mg nightly. For his pain he will continue unchanged fentanyl 62 mcg every 72 hours, utilizing both a 50 and 12 mcg patch, as well as Percocet 5-325 mg, of which he utilizes 2-3 times per day. We will provide a refill of both his fentanyl and Percocet today. We will have him return in approximately 8 weeks, and he is encouraged to call with any questions, concerns, or changes in his symptoms. 10/22/2019:   As per Dr. Markie Grove on 9/2/2019:  ASSESSMENT/PLAN :  60 yo male   Stage IV lung adenocarcinoma s/p XRT and chemotherapy  Primary H+N neoplasm  s/p XRT and currently on single agent keytruda  RLL PNA, staph  CT Neck and Chest with new mass in R vocal cord region and new mass in Lingula as above  - ID following and on Linezolid  - Breathing has significantly improved with new trach  - CT scans reviewed with wife and patient in detail. The Lingula lesion is small but new (5 x 5 mm), and the RLL lesion is nodular lesion appears new as well. There is a new mass as above in the L vocal cord area extending into the piriformis  - Recommend continuing antibiotics and resolving current infection. Then repeat scanning as outpatient with either CT or more likely PET scan to evaluate both the L vocal cord lesions and the newly noted lung lesions. He has already seen chemotherapy and is currently on immunotherapy. If recurrent disease is confirmed, options will be somewhat limited. Further, he continues to actively smoke  - Will follow  Leticia Santana presents today, accompanied by his wife. He is alert, only, able voice needs and concerns well, does not show any signs sedation or confusion. He continues to have pain, the worst locations being his back and right hip. He continues to utilize fentanyl 50+12 mcg patches changing every 72 hours, and utilizing Percocet 5-325 mg using 4 times per day. He also continues with gabapentin 900 mg 3 times daily, Cymbalta 60 mg twice daily, Pamelor 50 mg at bedtime, and Remeron 30 mg at bedtime. Overall he feels as though his pain is not significantly improved with the fentanyl, and he continues to have significant neuropathy in his hands which is not improved with gabapentin. Options were discussed at great length, we will increase his fentanyl to 75 mcg every 72 hours, as well as increase his Percocet 7.5-325 mg every 6 as needed, and we will DC the gabapentin and start Lyrica 100 mg 3 times daily. He will continue with Pamelor, Ativan, Cymbalta, and Remeron unchanged. Will make no further changes to his current plan of care, will have him return in approximately 4 weeks to reassess his needs. He is encouraged to call with any questions, concerns, or changes in symptoms. 11/18/2019:  Leticia Santana presented today with his wife.   He is alert, oriented, and is able to voice his needs and concerns well, without signs of sedation or confusion. He continues to have pain, which he states is worsening, and continues to be primarily located in his back and right hip. He continues to utilize a Fentanyl 75 mcg patch Q 72 hours and Percocet 7.5-325 mg Q 6 PRN with minimal improvement. He has utilized Lyrica 100 mg TID with no improvement. He also continues to use Cymbalta and Pamelor as previously, along with Remeron 30 mg HS. Options were discussed at great length, including options of rotating his long acting opioid, discussing options, risks, benefits of Methadone, Morphine, oxycodone. He is agreeable to Morphine. We will prescribe MS ER 30 mg BID, and increase his breakthrough pain medication to OxyIR 10 mg Q 6 PRN and we will increase his Lyrica to 150 mg TID. We will have him return in 2 weeks to further assess his needs. 12/2/2019:  Christiano Gardner presents today, accompanied by his wife. He is alert, oriented, able to voice his needs and concerns well, does not show any signs sedation confusion. Overall he states that he is doing well, saying that his pain has significantly improved with initiation of MS Contin 30 twice daily, he continues to utilize his oxycodone 1-2 times per day there, as well as his Lyrica 150 mg 3 times daily. He continues with all of his other medications, Remeron, nortriptyline, duloxetine, and lorazepam, as previously prescribed. Today he primarily complains of increased constipation, with bloating, and states that he has a bowel movement maybe 1-2 times per week. He has been utilizing Dolly-Colace 3 times daily, with minimal improvement. We discussed including prunes and prune juice to his diet, and he is encouraged to use MiraLAX 17 g daily, which he does have at home. He only requires a refill of his Lorazepam 0.5 mg at bedtime as needed, and we will provide this.   He will continue with MS Contin 30 mg twice daily, oxycodone 10 mg every 6 as needed, Lyrica 150 mg 3 times daily, as well as his Cymbalta 30 mg twice daily, mirtazapine 30 mg at bedtime, nortriptyline 10 mg at bedtime. We will have him return in approximately 5 to 6 weeks to reassess his needs, and he is encouraged to call with any questions, concerns, change in symptoms, or if he requires a refill. 1/13/2020:  Yakelin Trejo presents today, accompanied by his wife. He is alert, only, to voice and his concerns well, and appears to be at his baseline. Continues have pain, she states currently is primarily in his back and bilateral hips, rates it a 6 on a scale 10 today. Continues with MS Contin 30 mg 3 times daily, and oxycodone 10 mg, ordered every 6 PRN, of which he typically utilizes 2-3 times per day. Overall feels as though this is doing very well, and is happy with his current regimen. He also continues unchanged with Lyrica 150 3 times daily, Remeron 30 mg at bedtime, duloxetine 30 mg twice per day, and nortriptyline 10 mg nightly. He additionally continues to utilize Ativan 0.5 mg most every night. Continues to have mild to moderate constipation, but this is well managed utilizing Dolly-Colace and MiraLAX. Options were discussed, will make no changes to his analgesic regimen, will provide a refill of his MS Contin oxycodone today. After further discussion, we will also have him stop taking his nortriptyline, and he is given instructions to take this every other day for 1 week, then every third day for 1 week, then to stop it. He understands he can call if he notices any significant changes in his mood, sleep, or pain after this medication has been stopped. Otherwise will not make any further changes, and will have him return approximately 8 weeks to reassess his needs. He is encouraged to call if he has a questions, concerns, or changes in symptoms. 3/2/2020:  Yakelin Trejo presents today, with his wife.   He is alert, only, to voice and his concerns well, and appears to be at his baseline. He continues have pain, she states currently is primarily in his back and bilateral hips, rates it a 7 on a scale 10 today. He continues with MS Contin 30 mg 2 times daily, and oxycodone 10 mg which he reports utilizes 4-5 times per day, and he feels he is not getting any improvement in his pain. He also continues with Lyrica 150 mg TID, and Cymbalta 30 mg BID and Nortriptyline 10 mg daily, as he decided not to wean that medication off as previously discussed. We discussed options, and he would like to try fentanyl patch again. He will stop MS Contin and start Fentanyl 75 mg Q 72 hours. He will continue with Oxycodone 10 mg for breakthrough. We will have him return in 4 weeks to reassess. 6/9/2020: A telephonic virtual visit was completed via telephone with Deneen Chauhan. today regarding the issues listed above. Corrina Tejada is alert, awake, to voice and his concerns well, and does not display any signs of sedation or confusion during our conversation. He states that he continues to have pain primarily in his hip, stating on bad days it can be a 9 on a scale of 10, and on for good days it may be only a 4 on a scale of 10. He continues utilize fentanyl 75 mcg patch changing every 72 hours, as well as oxycodone 10 mg which he uses about 4-5 times per day. He states that he feels he is having more bad days than good days, noticing that his oxycodone has been less effective for his breakthrough pain. We discussed options, and he is agreeable to increase of the fentanyl patch to 100 mcg every 72 hours. He did not wish to make any change to his breakthrough pain medication at this time, and he has continued to utilize all of his other medications unchanged. He does continue to complain of some constipation, and is utilizing Dolly-Colace 2 tablets twice per day, with not much improvement in his constipation. We will add Movantik 12.5 mg daily.   We otherwise will not make any further changes to his plan of care, and we will plan to follow-up with him again in approximately 4 weeks to reassess his needs. As always he is encouraged to call if he has any questions, concerns, change in his symptoms, or if he requires any refills prior to his next encounter. 7/7/2020: A telephonic virtual visit was completed via telephone with Flaco Duarte. today regarding the issues listed above. Margi Garcia is alert, awake, to voice and his concerns well, and does not display any signs of sedation or confusion during our conversation. He states that he continues to have pain primarily in his hip, stating on bad days it can be a 9 on a scale of 10. He states he is noticed no significant improvement in his pain with an increase in fentanyl to 100 mcg patch. He has also continue to use oxycodone 10 mg which he uses about 4-5 times per day, stating this provides only mild improvement. We discussed options at great length, and I do not recommend making any further changes to his fentanyl patch, and he will continue with fentanyl 100 mcg every 72 hours. I will have him increase his oxycodone 15 mg every 4 PRN, and I recommend referral to chronic pain management to be assessed for potential interventional therapies which may assist with his chronic lumbar radicular pain. He does continue to complain of some constipation, and is utilizing Dolly-Colace 2 tablets twice per day, and was unable to start Movantik 12.5 mg daily due to the cost the medication. We discussed options and he will continue with Dolly-Colace and add MiraLAX daily. We otherwise will not make any further changes to his plan of care, and we will plan to follow-up with him again in approximately 4 weeks to reassess his needs. As always he is encouraged to call if he has any questions, concerns, change in his symptoms, or if he requires any refills prior to his next encounter. 8/11/2020:  Margi Garcia presents today with his wife.   He is alert, oriented, able to voice his needs and concerns well. He continues to have pain in his lower back, hips and knees. He continues to utilize fentanyl patch 100 mcg Q 72 hours, as well as oxycodone which was recently increased to 15 mg every 4 hours as needed. Pain for the most part is unchanged. He has been seen by pain management, and is in follow-up with them in the near future regarding the results of his MRI, and potentially discuss further interventional measures for management of his pain. He unfortunately was found to have a blood clot of his left upper extremity, and was started on Eliquis. Additionally he is followed up closely with [facility], and is planned to undergo further endoscopy, as well as biopsy and dilation of his esophagus. Otherwise his symptoms are fairly well-controlled, will not make any changes to his plan of care. We will plan to see him again approximate 4 weeks to reassess his needs, as always he is encouraged to call if there is any questions, concerns, or change in his symptoms prior to his next encounter. Fent 100 mcg  Oxy 15 mg Q 4 PRN  Lyrica 150 mg TID    New left arm clot on Eliquis, f/u with vascular    Follow up with pain management regarding DDD, maybe interventional therapy available      Controlled Substances Monitoring: [place] reviewed 8/11/20  RX Monitoring 7/7/2020   Attestation -   Periodic Controlled Substance Monitoring Possible medication side effects, risk of tolerance/dependence & alternative treatments discussed. ;No signs of potential drug abuse or diversion identified. ;Assessed functional status. ;Obtaining appropriate analgesic effect of treatment. Chronic Pain > 50 MEDD Re-evaluated the status of the patient's underlying condition causing pain. Chronic Pain > 80 MEDD Obtained or confirmed \"Medication Contract\" on file. Chronic Pain > 120 MEDD Engaged a pain medicine specialist as a prescriber or consultant. Time/Communication  Greater than 50% of time spent, total 30 minutes in face-to-face counseling and coordination of care regarding goals of care, symptom management, diagnosis and prognosis and see above. Camille QUEEN  8/11/2020      Note: This report was completed using [employer] voice recognition software. Every effort has been made to ensure accuracy; however, inadvertent computerized transcription errors may be present.

## 2020-08-17 ENCOUNTER — OFFICE VISIT (OUTPATIENT)
Dept: PAIN MANAGEMENT | Age: 65
End: 2020-08-17
Payer: MEDICARE

## 2020-08-17 ENCOUNTER — PREP FOR PROCEDURE (OUTPATIENT)
Dept: PAIN MANAGEMENT | Age: 65
End: 2020-08-17

## 2020-08-17 VITALS
DIASTOLIC BLOOD PRESSURE: 78 MMHG | TEMPERATURE: 98.5 F | BODY MASS INDEX: 29.12 KG/M2 | RESPIRATION RATE: 18 BRPM | SYSTOLIC BLOOD PRESSURE: 120 MMHG | WEIGHT: 208 LBS | OXYGEN SATURATION: 93 % | HEIGHT: 71 IN | HEART RATE: 80 BPM

## 2020-08-17 PROBLEM — M53.3 SACROILIAC DYSFUNCTION: Status: ACTIVE | Noted: 2020-08-17

## 2020-08-17 PROBLEM — M51.36 DDD (DEGENERATIVE DISC DISEASE), LUMBAR: Status: ACTIVE | Noted: 2020-08-17

## 2020-08-17 PROBLEM — M51.369 DDD (DEGENERATIVE DISC DISEASE), LUMBAR: Status: ACTIVE | Noted: 2020-08-17

## 2020-08-17 PROBLEM — F17.200 SMOKING: Status: ACTIVE | Noted: 2020-08-17

## 2020-08-17 PROBLEM — M48.061 SPINAL STENOSIS OF LUMBAR REGION: Status: ACTIVE | Noted: 2020-08-17

## 2020-08-17 PROBLEM — M47.817 LUMBOSACRAL SPONDYLOSIS WITHOUT MYELOPATHY: Status: ACTIVE | Noted: 2020-08-17

## 2020-08-17 PROBLEM — M54.16 LUMBAR RADICULOPATHY: Status: ACTIVE | Noted: 2020-08-17

## 2020-08-17 PROCEDURE — 99214 OFFICE O/P EST MOD 30 MIN: CPT | Performed by: ANESTHESIOLOGY

## 2020-08-17 NOTE — PROGRESS NOTES
DO   formoterol (PERFOROMIST) 20 MCG/2ML nebulizer solution Take 2 mLs by nebulization 2 times daily Instructed to take am of procedure 4/29/20  Yes Princess Vineet MD   senna-docusate (PERICOLACE) 8.6-50 MG per tablet Take 2 tablets by mouth daily 4/13/20 10/10/20 Yes BG Khan CNP   tamsulosin [facility]) 0.4 MG capsule Take 1 capsule by mouth nightly 4/1/20  Yes Sylvia Quintana DO   esomeprazole (NEXIUM) 40 MG delayed release capsule Take 1 capsule by mouth every morning (before breakfast) Instructed to take am of procedure 4/1/20  Yes Sylvia Quintana DO   pregabalin (LYRICA) 150 MG capsule Take 1 capsule by mouth 3 times daily for 180 days.  3/24/20 9/20/20 Yes BG Khan CNP   DULoxetine (CYMBALTA) 30 MG extended release capsule Take 1 capsule by mouth 2 times daily 3/24/20 9/20/20 Yes BG Khan CNP   budesonide (PULMICORT) 0.5 MG/2ML nebulizer suspension Take 2 mLs by nebulization 2 times daily Instructed to take am of procedure 3/24/20  Yes Princess Vineet MD   predniSONE (DELTASONE) 10 MG tablet Take 10 mg by mouth daily Instructed to take am of procedure   Yes Historical Provider, MD   OXYGEN Inhale 4 L into the lungs nightly   Yes Historical Provider, MD   chlorhexidine (PERIDEX) 0.12 % solution Take 15 mLs by mouth daily Swish & spit qd 1/10/20  Yes Sylvia Quintana DO   cyclobenzaprine (FLEXERIL) 10 MG tablet Take 10 mg by mouth 3 times daily as needed for Muscle spasms   Yes Historical Provider, MD   Cholecalciferol (VITAMIN D3) 5000 units TABS Take 5,000 Units by mouth daily    Yes Historical Provider, MD   pembrolizumab (KEYTRUDA) 100 MG/4ML SOLN Infuse 200 mg intravenously every 21 days Last Dose -01/16/20  Next Dose -02/06/20   Yes Historical Provider, MD   Melatonin 10 MG TABS Take 10 mg by mouth nightly    Yes Historical Provider, MD       Allergies   Allergen Reactions    Augmentin [Amoxicillin-Pot Clavulanate] Diarrhea       Social History     Socioeconomic negative. PHYSICAL EXAMINATION:      /78   Pulse 80   Temp 98.5 °F (36.9 °C)   Resp 18   Ht 5' 11\" (1.803 m)   Wt 208 lb (94.3 kg)   SpO2 93%   BMI 29.01 kg/m²   General:       General appearance:  Pleasant and well-hydrated, in no distress and A & O x 3  Build:Overweight  Function: Rises from seated position easily and Moves about room without difficulty     HEENT:     Head:normocephalic, atraumatic  Pupils:regular, round, equal  Sclera: icterus absent  Tracheostomy +     Lungs:     Breathing:normal breathing pattern      CVS:     RRR     Abdomen:     Shape:non-distended and normal  Tenderness:none  Guarding:none     Cervical spine:     Inspection:normal  Palpation:tenderness paravertebral muscles, tenderness trapezium, left, right and no  Range of motion:reduced flexion, extension, rotation bilaterally and is not painful. Spurling's: negative bilaterally     Thoracic spine:                Spine inspection:normal   Palpation:No tenderness over the midline and paraspinal area, bilaterally  Range of motion:normal in flexion, extension rotation bilateral and is not painful.     Lumbar spine:     Spine inspection: Normal   Palpation: Tenderness paravertebral muscles No bilaterally  Range of motion: Decreased, flexion Decreased, Lateral bending, extension and rotation bilaterally reduced is somewhat painful. Sacroiliac joint tenderness Yes right  Gaenslen's + right  Flowers Schooner + right  Piriformis tenderness: negative bilaterally  SLR : negative bilaterally  Trochanteric bursa tenderness: + right side  CVA tenderness:No      Musculoskeletal:     Trigger points in trapezius: No bilaterally  Trigger points in rhomboids: No bilaterally  Trigger points in Paravertebral: No bilaterally     Extremities:     Tremors:None bilaterally upper and lower  No LE edema  Left UE- some swelling + veins prominent.   Right hip ROM : pain +     Knee:     ROM : no pain   Joint line tenderness : no     Neurological:     Sensory: Normal to light touch - except for reduced sensation over the right leg     Motor:   Right  5/5              Left  5/5               Right Bicep 5/5           Left Bicep 5/5              Right Triceps 5/5       Left Triceps 5/5          Right Deltoid 5/5     Left Deltoid 5/5                  Right Quadriceps 5/5          Left Quadriceps 5/5           Right Gastrocnemius 5/5    Left Gastrocnemius 5/5  Right Ant Tibialis 5/5  Left Ant Tibialis 5/5     Reflexes:    Right Quadriceps reflex diminished  Left Quadriceps reflex 2+  Right Achilles reflex 1+  Left Achilles reflex 1+     Gait:no assistive device.     Dermatology:     Skin:no rashes or lesions noted       Assessment/Plan:        Diagnosis Orders   1. Sacroiliac dysfunction     2. DDD (degenerative disc disease), lumbar     3. Lumbar radiculopathy     4. Lumbosacral spondylosis without myelopathy     5. Spinal stenosis of lumbar region, unspecified whether neurogenic claudication present     6. DVT of left axillary vein, acute ([address]     7. Malignant neoplasm of overlapping sites of left lung ([address]     8. Smoking          59 y.o. male with H/o ca lung and ca throat - s/p chemo/. RT. Has a tracheostomy. Followed by Hemonc/ ENT at ([facility]) and Palliative care. On pain medications Duragesic/ Oxycodone/ Lyrica/ Cymbalta.     Has low back and lower extremity pain - right > left.     Right SIJ tenderness + and right trochanteric bursa tenderness +    Right Hip ROM pain + (consider hip inj in future)     MRI of the LS spine reviewed personally and discussed the findings with the patient. Significant changes noted at L3-4. L4-5     X-ray of SIJ - reviewed and discussed the findings. .     Will plan right sacroiliac joint injection and right trochanteric bursa injection under fluoroscopy. RBA discussed and patient agreed.     He has h/o left UE DVT and is on Eliquis    He can continue Eliquis for this procedure.     He will continue medical management with Palliative care.     He is anticipating biopsy/ CT scan at [facility].     Counseling :     Patient encouraged to stay active and to watch/lose weight     Encouraged to continue Regular home exercise program as tolerated - stretching / strengthening.     Smoking cessation counseling : yes      Treatment plan discussed with the patient including medication and procedure side effects.     Controlled Substances Monitoring:   [place] reviewed- 8/17/20: natty Mishra MD     CC:  Sylvia Quintana DO

## 2020-08-18 ENCOUNTER — TELEPHONE (OUTPATIENT)
Dept: PALLATIVE CARE | Age: 65
End: 2020-08-18

## 2020-08-18 NOTE — TELEPHONE ENCOUNTER
Received a PA request, [facility] [phone] and spoke with rep. [place]. PA is not needed as patient picked up 42 pills on 8/6 and now can  the rest of 23 day script of 138 tablets. So total number of pills recieved in 30 days is 180. Called to [facility], spoke with Isa Nelson and she verbalizes understanding. Called and notified Pharmacy.

## 2020-09-03 ENCOUNTER — TELEPHONE (OUTPATIENT)
Dept: PAIN MANAGEMENT | Age: 65
End: 2020-09-03

## 2020-09-03 NOTE — TELEPHONE ENCOUNTER
9-3-20-upon chart review it is noted that [id number] Richter takes Eliquis. He is scheduled for a procedure with Dr Noyola Post on 9-14-20, he is not to stop the Eliquis, left voice mail message.     Sumit Nye RN  Pain Management

## 2020-09-04 RX ORDER — AMOXICILLIN 250 MG
2 CAPSULE ORAL DAILY
Qty: 180 TABLET | Refills: 1 | Status: CANCELLED | OUTPATIENT
Start: 2020-09-04 | End: 2021-03-03

## 2020-09-04 RX ORDER — AMOXICILLIN 250 MG
2 CAPSULE ORAL DAILY
Qty: 180 TABLET | Refills: 1 | Status: SHIPPED | OUTPATIENT
Start: 2020-09-04 | End: 2020-09-23

## 2020-09-09 ENCOUNTER — HOSPITAL ENCOUNTER (EMERGENCY)
Age: 65
Discharge: HOME | DRG: 193 | End: 2020-09-10
Attending: EMERGENCY MEDICINE
Payer: MEDICARE

## 2020-09-09 ENCOUNTER — TELEPHONE (OUTPATIENT)
Dept: PAIN MANAGEMENT | Age: 65
End: 2020-09-09

## 2020-09-09 ENCOUNTER — APPOINTMENT (OUTPATIENT)
Dept: GENERAL RADIOLOGY | Age: 65
DRG: 193 | End: 2020-09-09
Payer: MEDICARE

## 2020-09-09 LAB
ALBUMIN SERPL-MCNC: 3.8 G/DL (ref 3.5–5.2)
ALP BLD-CCNC: 131 U/L (ref 40–129)
ALT SERPL-CCNC: 20 U/L (ref 0–40)
ANION GAP SERPL CALCULATED.3IONS-SCNC: 12 MMOL/L (ref 7–16)
APTT: 39.2 SEC (ref 24.5–35.1)
AST SERPL-CCNC: 18 U/L (ref 0–39)
BASOPHILS ABSOLUTE: 0.06 E9/L (ref 0–0.2)
BASOPHILS RELATIVE PERCENT: 0.7 % (ref 0–2)
BILIRUB SERPL-MCNC: 0.6 MG/DL (ref 0–1.2)
BUN BLDV-MCNC: 12 MG/DL (ref 8–23)
CALCIUM SERPL-MCNC: 9.9 MG/DL (ref 8.6–10.2)
CHLORIDE BLD-SCNC: 101 MMOL/L (ref 98–107)
CO2: 26 MMOL/L (ref 22–29)
CREAT SERPL-MCNC: 1.5 MG/DL (ref 0.7–1.2)
EOSINOPHILS ABSOLUTE: 0.36 E9/L (ref 0.05–0.5)
EOSINOPHILS RELATIVE PERCENT: 4.1 % (ref 0–6)
GFR AFRICAN AMERICAN: 57
GFR NON-AFRICAN AMERICAN: 47 ML/MIN/1.73
GLUCOSE BLD-MCNC: 77 MG/DL (ref 74–99)
HCT VFR BLD CALC: 39.2 % (ref 37–54)
HEMOGLOBIN: 12.1 G/DL (ref 12.5–16.5)
IMMATURE GRANULOCYTES #: 0.06 E9/L
IMMATURE GRANULOCYTES %: 0.7 % (ref 0–5)
INR BLD: 1.3
LACTIC ACID: 1 MMOL/L (ref 0.5–2.2)
LIPASE: 8 U/L (ref 13–60)
LYMPHOCYTES ABSOLUTE: 1.25 E9/L (ref 1.5–4)
LYMPHOCYTES RELATIVE PERCENT: 14.4 % (ref 20–42)
MCH RBC QN AUTO: 24.1 PG (ref 26–35)
MCHC RBC AUTO-ENTMCNC: 30.9 % (ref 32–34.5)
MCV RBC AUTO: 78.1 FL (ref 80–99.9)
MONOCYTES ABSOLUTE: 0.46 E9/L (ref 0.1–0.95)
MONOCYTES RELATIVE PERCENT: 5.3 % (ref 2–12)
NEUTROPHILS ABSOLUTE: 6.51 E9/L (ref 1.8–7.3)
NEUTROPHILS RELATIVE PERCENT: 74.8 % (ref 43–80)
PDW BLD-RTO: 18.6 FL (ref 11.5–15)
PLATELET # BLD: 289 E9/L (ref 130–450)
PMV BLD AUTO: 9.9 FL (ref 7–12)
POTASSIUM REFLEX MAGNESIUM: 4 MMOL/L (ref 3.5–5)
PRO-BNP: 1006 PG/ML (ref 0–125)
PROTHROMBIN TIME: 15.2 SEC (ref 9.3–12.4)
RBC # BLD: 5.02 E12/L (ref 3.8–5.8)
SARS-COV-2, NAAT: NOT DETECTED
SODIUM BLD-SCNC: 139 MMOL/L (ref 132–146)
TOTAL PROTEIN: 7.3 G/DL (ref 6.4–8.3)
TROPONIN: 0.03 NG/ML (ref 0–0.03)
WBC # BLD: 8.7 E9/L (ref 4.5–11.5)

## 2020-09-09 PROCEDURE — 83605 ASSAY OF LACTIC ACID: CPT

## 2020-09-09 PROCEDURE — 2580000003 HC RX 258: Performed by: STUDENT IN AN ORGANIZED HEALTH CARE EDUCATION/TRAINING PROGRAM

## 2020-09-09 PROCEDURE — 2700000000 HC OXYGEN THERAPY PER DAY

## 2020-09-09 PROCEDURE — 80053 COMPREHEN METABOLIC PANEL: CPT

## 2020-09-09 PROCEDURE — 93005 ELECTROCARDIOGRAM TRACING: CPT | Performed by: STUDENT IN AN ORGANIZED HEALTH CARE EDUCATION/TRAINING PROGRAM

## 2020-09-09 PROCEDURE — 99284 EMERGENCY DEPT VISIT MOD MDM: CPT

## 2020-09-09 PROCEDURE — 36415 COLL VENOUS BLD VENIPUNCTURE: CPT

## 2020-09-09 PROCEDURE — 83880 ASSAY OF NATRIURETIC PEPTIDE: CPT

## 2020-09-09 PROCEDURE — 83690 ASSAY OF LIPASE: CPT

## 2020-09-09 PROCEDURE — 99285 EMERGENCY DEPT VISIT HI MDM: CPT

## 2020-09-09 PROCEDURE — 85025 COMPLETE CBC W/AUTO DIFF WBC: CPT

## 2020-09-09 PROCEDURE — 85610 PROTHROMBIN TIME: CPT

## 2020-09-09 PROCEDURE — 84484 ASSAY OF TROPONIN QUANT: CPT

## 2020-09-09 PROCEDURE — U0002 COVID-19 LAB TEST NON-CDC: HCPCS

## 2020-09-09 PROCEDURE — 85730 THROMBOPLASTIN TIME PARTIAL: CPT

## 2020-09-09 PROCEDURE — 71045 X-RAY EXAM CHEST 1 VIEW: CPT

## 2020-09-09 RX ORDER — DOXYCYCLINE HYCLATE 100 MG/1
100 CAPSULE ORAL ONCE
Status: COMPLETED | OUTPATIENT
Start: 2020-09-09 | End: 2020-09-10

## 2020-09-09 RX ORDER — 0.9 % SODIUM CHLORIDE 0.9 %
500 INTRAVENOUS SOLUTION INTRAVENOUS ONCE
Status: COMPLETED | OUTPATIENT
Start: 2020-09-09 | End: 2020-09-09

## 2020-09-09 RX ADMIN — SODIUM CHLORIDE 500 ML: 9 INJECTION, SOLUTION INTRAVENOUS at 22:31

## 2020-09-09 ASSESSMENT — ENCOUNTER SYMPTOMS
WHEEZING: 0
VOMITING: 0
NAUSEA: 0
CONSTIPATION: 0
EYE PAIN: 0
COUGH: 1
SHORTNESS OF BREATH: 0
EYE REDNESS: 0
PHOTOPHOBIA: 0
APNEA: 0
TROUBLE SWALLOWING: 0
SORE THROAT: 0
CHEST TIGHTNESS: 0
ABDOMINAL PAIN: 0
RHINORRHEA: 0
BACK PAIN: 0
DIARRHEA: 0

## 2020-09-09 NOTE — TELEPHONE ENCOUNTER
Mrs. Duenas Aw called in, she did not take Alan for the COVID test today because yesterday his mucus was yellow. His mucus was clear this morning and she wanted to know if she can take him tomorrow. I did have her call surgery scheduling to make sure that was enough time, procedure is 9-14-20 with Dr. Carolina Man.

## 2020-09-10 ENCOUNTER — APPOINTMENT (OUTPATIENT)
Dept: GENERAL RADIOLOGY | Age: 65
DRG: 193 | End: 2020-09-10
Payer: MEDICARE

## 2020-09-10 ENCOUNTER — HOSPITAL ENCOUNTER (INPATIENT)
Age: 65
LOS: 7 days | Discharge: HOME | DRG: 193 | End: 2020-09-18
Attending: EMERGENCY MEDICINE | Admitting: INTERNAL MEDICINE
Payer: MEDICARE

## 2020-09-10 VITALS
WEIGHT: 211 LBS | OXYGEN SATURATION: 96 % | RESPIRATION RATE: 15 BRPM | TEMPERATURE: 98 F | DIASTOLIC BLOOD PRESSURE: 86 MMHG | BODY MASS INDEX: 29.54 KG/M2 | HEART RATE: 70 BPM | SYSTOLIC BLOOD PRESSURE: 130 MMHG | HEIGHT: 71 IN

## 2020-09-10 LAB
BACTERIA: ABNORMAL /HPF
BILIRUBIN URINE: NEGATIVE
BLOOD, URINE: ABNORMAL
CLARITY: CLEAR
COLOR: YELLOW
EKG ATRIAL RATE: 61 BPM
EKG P AXIS: 58 DEGREES
EKG P-R INTERVAL: 164 MS
EKG Q-T INTERVAL: 432 MS
EKG QRS DURATION: 100 MS
EKG QTC CALCULATION (BAZETT): 434 MS
EKG R AXIS: -7 DEGREES
EKG T AXIS: 43 DEGREES
EKG VENTRICULAR RATE: 61 BPM
GLUCOSE URINE: NEGATIVE MG/DL
KETONES, URINE: ABNORMAL MG/DL
LEUKOCYTE ESTERASE, URINE: NEGATIVE
NITRITE, URINE: NEGATIVE
PH UA: 7 (ref 5–9)
PROTEIN UA: NEGATIVE MG/DL
RBC UA: ABNORMAL /HPF (ref 0–2)
SPECIFIC GRAVITY UA: 1.01 (ref 1–1.03)
UROBILINOGEN, URINE: 0.2 E.U./DL
WBC UA: ABNORMAL /HPF (ref 0–5)

## 2020-09-10 PROCEDURE — 99284 EMERGENCY DEPT VISIT MOD MDM: CPT

## 2020-09-10 PROCEDURE — 84484 ASSAY OF TROPONIN QUANT: CPT

## 2020-09-10 PROCEDURE — U0002 COVID-19 LAB TEST NON-CDC: HCPCS

## 2020-09-10 PROCEDURE — 93005 ELECTROCARDIOGRAM TRACING: CPT | Performed by: EMERGENCY MEDICINE

## 2020-09-10 PROCEDURE — 85025 COMPLETE CBC W/AUTO DIFF WBC: CPT

## 2020-09-10 PROCEDURE — 99285 EMERGENCY DEPT VISIT HI MDM: CPT

## 2020-09-10 PROCEDURE — 71045 X-RAY EXAM CHEST 1 VIEW: CPT

## 2020-09-10 PROCEDURE — 83880 ASSAY OF NATRIURETIC PEPTIDE: CPT

## 2020-09-10 PROCEDURE — 83605 ASSAY OF LACTIC ACID: CPT

## 2020-09-10 PROCEDURE — 81001 URINALYSIS AUTO W/SCOPE: CPT

## 2020-09-10 PROCEDURE — 87040 BLOOD CULTURE FOR BACTERIA: CPT

## 2020-09-10 PROCEDURE — 80053 COMPREHEN METABOLIC PANEL: CPT

## 2020-09-10 PROCEDURE — 6370000000 HC RX 637 (ALT 250 FOR IP): Performed by: STUDENT IN AN ORGANIZED HEALTH CARE EDUCATION/TRAINING PROGRAM

## 2020-09-10 PROCEDURE — 93010 ELECTROCARDIOGRAM REPORT: CPT | Performed by: INTERNAL MEDICINE

## 2020-09-10 RX ORDER — DOXYCYCLINE HYCLATE 100 MG
100 TABLET ORAL 2 TIMES DAILY
Qty: 20 TABLET | Refills: 0 | Status: SHIPPED | OUTPATIENT
Start: 2020-09-10 | End: 2020-09-20

## 2020-09-10 RX ADMIN — DOXYCYCLINE HYCLATE 100 MG: 100 CAPSULE ORAL at 02:38

## 2020-09-10 NOTE — PROGRESS NOTES
RRT found patient on 40% trach mask. Spo2 88% BS rhonchi. Patient has a size 6 shiley cuff less trach. There was no inner cannula upon assessment. Patient wife stated\" he took it out to help him breath better\". RRT placed a size 6 disposable inner cannula in and suctioned patient for thick/brown mucus plug. Increased FIO2 to 50% Spo2 improved to 99% and BS improved, patient stable and resting now.

## 2020-09-10 NOTE — ED PROVIDER NOTES
[facility] ENCOUNTER      Pt Name: Becky Zhao. MRN: [id number]  Birthdate 1955  Date of evaluation: 9/9/2020      CHIEF COMPLAINT       Chief Complaint   Patient presents with    Fatigue     fatigue, loss of appetitie, sweats \"soaking wet when he came out of the bathroom about an hour ago today. \"    Hemoptysis     coughing up brown blood mixed with mucus started today         HPI  Becky Cai is a 72 y.o. male with a history of esophageal cancer, COPD, tracheostomy, who presents to the emergency department complaints of fatigue and diaphoresis. He states that about 2 hours prior to arrival he began to feel very tired and weak. He states he then felt clammy and sweaty. .  He states that the last couple of days he has coughed up more mucus than normal.  He states that there was a small amount of brown-colored blood in the mucus. He states this is not unusual for him. He denies any fevers, chest pain, shortness of breath, lightheadedness or syncope. The patient does not require supplemental oxygen at baseline. He denies any shortness of breath. Symptoms are intermittent, nothing seems to make them better or worse. Except as noted above the remainder of the review of systems was reviewed and negative. Review of Systems   Constitutional: Positive for chills, diaphoresis and fatigue. Negative for activity change and fever. HENT: Negative for rhinorrhea, sore throat and trouble swallowing. Eyes: Negative for photophobia, pain and redness. Respiratory: Positive for cough. Negative for apnea, chest tightness, shortness of breath and wheezing. Cardiovascular: Negative for chest pain, palpitations and leg swelling. Gastrointestinal: Negative for abdominal pain, constipation, diarrhea, nausea and vomiting. Endocrine: Negative for polyuria.    Genitourinary: Negative for difficulty urinating and dysuria. Musculoskeletal: Negative for back pain, neck pain and neck stiffness. Skin: Negative for pallor and rash. Neurological: Negative for dizziness, syncope, weakness, light-headedness and numbness. Psychiatric/Behavioral: Negative for confusion. The patient is not nervous/anxious. Physical Exam  Vitals signs and nursing note reviewed. Constitutional:       General: He is not in acute distress. Appearance: He is well-developed. Comments: Awake and alert. Sitting in the gurney in no obvious distress. HENT:      Head: Normocephalic and atraumatic. Mouth/Throat:      Mouth: Mucous membranes are moist.      Pharynx: No oropharyngeal exudate. Eyes:      General: No scleral icterus. Pupils: Pupils are equal, round, and reactive to light. Neck:      Musculoskeletal: Normal range of motion and neck supple. Cardiovascular:      Rate and Rhythm: Normal rate and regular rhythm. Heart sounds: Normal heart sounds. No murmur. Comments: 2+ radial and dorsal pedis pulses bilaterally  Pulmonary:      Effort: Pulmonary effort is normal. No respiratory distress. Breath sounds: Normal breath sounds. No wheezing. Abdominal:      Palpations: Abdomen is soft. Tenderness: There is no abdominal tenderness. There is no guarding or rebound. Musculoskeletal: Normal range of motion. General: No tenderness or deformity. Right lower leg: No edema. Left lower leg: No edema. Skin:     General: Skin is warm and dry. Capillary Refill: Capillary refill takes less than 2 seconds. Findings: No rash. Neurological:      General: No focal deficit present. Mental Status: He is alert and oriented to person, place, and time. Cranial Nerves: No cranial nerve deficit. Sensory: No sensory deficit. Motor: No weakness or abnormal muscle tone.    Psychiatric:         Mood and Affect: Mood normal.         Behavior: Behavior normal. Procedures     MDM   This is a 72year old male with a hx of esophageal cancer, tracheostomy, who presents with complaints of chills and diaphoressis prior to arrival. In the ED, he is in no respiratory distress, awake and alert. Mild amount of secretion to his tracheostomy that is brown in color. No active bleeding. Non hypoxic on room air. Chest xr shows minimal changes, no clear pneumonia. Cbc shows no leukocytosis. Metabolic panel shows normal electrolytes. covid negative. Pending ua at this time. Likely discharge home on doxycyline for further evaluation.                   --------------------------------------------- PAST HISTORY ---------------------------------------------  Past Medical History:  has a past medical history of Abdominal aortic aneurysm without rupture ([facility] [address] Acute bronchitis with COPD ([address] Apnea, Arthritis, COPD (chronic obstructive pulmonary disease) ([address] Depression with anxiety, Emphysema of lung ([facility] [address] Encounter for antineoplastic immunotherapy, HAP (hospital-acquired pneumonia), Hyperlipidemia, Lung cancer ([address] Osteoradionecrosis of jaw, Paralyzed vocal cords, Thrombosis of testis, and Tobacco dependence. Past Surgical History:  has a past surgical history that includes Knee arthroscopy; back surgery; sinus surgery; other surgical history (4/15/2016); Lung biopsy (Left, 5/6/2016); tracheostomy (05/24/2017); tracheostomy (05/24/2017); and bronchoscopy (N/A, 3/5/2020). Social History:  reports that he has been smoking cigarettes. He started smoking about 48 years ago. He has a 44.00 pack-year smoking history. He has never used smokeless tobacco. He reports that he does not drink alcohol or use drugs. Family History: family history includes Cancer in his father and mother; Diabetes in his father. The patients home medications have been reviewed.     Allergies: Augmentin [amoxicillin-pot clavulanate]    -------------------------------------------------- RESULTS (H) 9.3 - 12.4 sec    INR 1.3    Lactic Acid, Plasma   Result Value Ref Range    Lactic Acid 1.0 0.5 - 2.2 mmol/L   COVID-19   Result Value Ref Range    SARS-CoV-2, NAAT Not Detected Not Detected   EKG 12 Lead   Result Value Ref Range    Ventricular Rate 61 BPM    Atrial Rate 61 BPM    P-R Interval 164 ms    QRS Duration 100 ms    Q-T Interval 432 ms    QTc Calculation (Bazett) 434 ms    P Axis 58 degrees    R Axis -7 degrees    T Axis 43 degrees       Radiology:  XR CHEST PORTABLE   Final Result   normal chest   Left lung base greater than right lung base opacities, atelectasis or   consolidation. Redemonstrated interstitial opacities. ------------------------- NURSING NOTES AND VITALS REVIEWED ---------------------------  Date / Time Roomed:  9/9/2020  8:29 PM  ED Bed Assignment:  11/11    The nursing notes within the ED encounter and vital signs as below have been reviewed. BP (!) 152/85   Pulse 64   Temp 96.8 °F (36 °C)   Resp 18   Ht 5' 11\" (1.803 m)   Wt 211 lb (95.7 kg)   SpO2 95%   BMI 29.43 kg/m²   Oxygen Saturation Interpretation: Normal      ------------------------------------------ PROGRESS NOTES ------------------------------------------  I have spoken with the patient and discussed todays results, in addition to providing specific details for the plan of care and counseling regarding the diagnosis and prognosis. Their questions are answered at this time and they are agreeable with the plan. I discussed at length with them reasons for immediate return here for re evaluation. They will followup with their primary care physician by calling their office tomorrow. --------------------------------- ADDITIONAL PROVIDER NOTES ---------------------------------  At this time the patient is without objective evidence of an acute process requiring hospitalization or inpatient management.   They have remained hemodynamically stable throughout their entire ED visit and are stable for discharge with outpatient follow-up. The plan has been discussed in detail and they are aware of the specific conditions for emergent return, as well as the importance of follow-up. New Prescriptions    No medications on file       Diagnosis:  1. Chills    2. Tracheostomy dependent ([facility] [address]        Disposition:  Patient's disposition: Discharge to home  Patient's condition is stable.        Ovidio Gan DO  Resident  09/09/20 [id number]

## 2020-09-11 ENCOUNTER — APPOINTMENT (OUTPATIENT)
Dept: CT IMAGING | Age: 65
DRG: 193 | End: 2020-09-11
Payer: MEDICARE

## 2020-09-11 LAB
ALBUMIN SERPL-MCNC: 3.5 G/DL (ref 3.5–5.2)
ALP BLD-CCNC: 131 U/L (ref 40–129)
ALT SERPL-CCNC: 22 U/L (ref 0–40)
ANION GAP SERPL CALCULATED.3IONS-SCNC: 16 MMOL/L (ref 7–16)
AST SERPL-CCNC: 42 U/L (ref 0–39)
BASOPHILS ABSOLUTE: 0.04 E9/L (ref 0–0.2)
BASOPHILS RELATIVE PERCENT: 0.6 % (ref 0–2)
BILIRUB SERPL-MCNC: 0.8 MG/DL (ref 0–1.2)
BUN BLDV-MCNC: 10 MG/DL (ref 8–23)
C-REACTIVE PROTEIN: 14.9 MG/DL (ref 0–0.4)
CALCIUM SERPL-MCNC: 9.5 MG/DL (ref 8.6–10.2)
CHLORIDE BLD-SCNC: 96 MMOL/L (ref 98–107)
CO2: 21 MMOL/L (ref 22–29)
CREAT SERPL-MCNC: 1.3 MG/DL (ref 0.7–1.2)
EKG ATRIAL RATE: 73 BPM
EKG P AXIS: 49 DEGREES
EKG P-R INTERVAL: 164 MS
EKG Q-T INTERVAL: 416 MS
EKG QRS DURATION: 98 MS
EKG QTC CALCULATION (BAZETT): 458 MS
EKG R AXIS: -23 DEGREES
EKG T AXIS: 41 DEGREES
EKG VENTRICULAR RATE: 73 BPM
EOSINOPHILS ABSOLUTE: 0.25 E9/L (ref 0.05–0.5)
EOSINOPHILS RELATIVE PERCENT: 3.4 % (ref 0–6)
GFR AFRICAN AMERICAN: >60
GFR NON-AFRICAN AMERICAN: 55 ML/MIN/1.73
GLUCOSE BLD-MCNC: 89 MG/DL (ref 74–99)
HCT VFR BLD CALC: 40.9 % (ref 37–54)
HEMOGLOBIN: 13.1 G/DL (ref 12.5–16.5)
IMMATURE GRANULOCYTES #: 0.05 E9/L
IMMATURE GRANULOCYTES %: 0.7 % (ref 0–5)
LACTIC ACID: 1 MMOL/L (ref 0.5–2.2)
LACTIC ACID: 1.4 MMOL/L (ref 0.5–2.2)
LYMPHOCYTES ABSOLUTE: 0.85 E9/L (ref 1.5–4)
LYMPHOCYTES RELATIVE PERCENT: 11.7 % (ref 20–42)
MCH RBC QN AUTO: 24.3 PG (ref 26–35)
MCHC RBC AUTO-ENTMCNC: 32 % (ref 32–34.5)
MCV RBC AUTO: 75.7 FL (ref 80–99.9)
MONOCYTES ABSOLUTE: 0.43 E9/L (ref 0.1–0.95)
MONOCYTES RELATIVE PERCENT: 5.9 % (ref 2–12)
NEUTROPHILS ABSOLUTE: 5.65 E9/L (ref 1.8–7.3)
NEUTROPHILS RELATIVE PERCENT: 77.7 % (ref 43–80)
PDW BLD-RTO: 18.4 FL (ref 11.5–15)
PLATELET # BLD: 258 E9/L (ref 130–450)
PMV BLD AUTO: 10.1 FL (ref 7–12)
POTASSIUM SERPL-SCNC: 5.1 MMOL/L (ref 3.5–5)
PRO-BNP: 709 PG/ML (ref 0–125)
PROCALCITONIN: 0.15 NG/ML (ref 0–0.08)
RBC # BLD: 5.4 E12/L (ref 3.8–5.8)
SARS-COV-2, NAAT: NOT DETECTED
SEDIMENTATION RATE, ERYTHROCYTE: 25 MM/HR (ref 0–15)
SODIUM BLD-SCNC: 133 MMOL/L (ref 132–146)
TOTAL PROTEIN: 7.3 G/DL (ref 6.4–8.3)
TROPONIN: 0.02 NG/ML (ref 0–0.03)
WBC # BLD: 7.3 E9/L (ref 4.5–11.5)

## 2020-09-11 PROCEDURE — 6370000000 HC RX 637 (ALT 250 FOR IP): Performed by: INTERNAL MEDICINE

## 2020-09-11 PROCEDURE — 87040 BLOOD CULTURE FOR BACTERIA: CPT

## 2020-09-11 PROCEDURE — 83605 ASSAY OF LACTIC ACID: CPT

## 2020-09-11 PROCEDURE — 6360000004 HC RX CONTRAST MEDICATION: Performed by: RADIOLOGY

## 2020-09-11 PROCEDURE — 2580000003 HC RX 258: Performed by: EMERGENCY MEDICINE

## 2020-09-11 PROCEDURE — 6370000000 HC RX 637 (ALT 250 FOR IP): Performed by: NEUROMUSCULOSKELETAL MEDICINE & OMM

## 2020-09-11 PROCEDURE — 86140 C-REACTIVE PROTEIN: CPT

## 2020-09-11 PROCEDURE — 84145 PROCALCITONIN (PCT): CPT

## 2020-09-11 PROCEDURE — 94640 AIRWAY INHALATION TREATMENT: CPT

## 2020-09-11 PROCEDURE — 94664 DEMO&/EVAL PT USE INHALER: CPT

## 2020-09-11 PROCEDURE — 2580000003 HC RX 258: Performed by: INTERNAL MEDICINE

## 2020-09-11 PROCEDURE — 2060000000 HC ICU INTERMEDIATE R&B

## 2020-09-11 PROCEDURE — 6360000002 HC RX W HCPCS: Performed by: EMERGENCY MEDICINE

## 2020-09-11 PROCEDURE — 2700000000 HC OXYGEN THERAPY PER DAY

## 2020-09-11 PROCEDURE — 36415 COLL VENOUS BLD VENIPUNCTURE: CPT

## 2020-09-11 PROCEDURE — 85651 RBC SED RATE NONAUTOMATED: CPT

## 2020-09-11 PROCEDURE — 6360000002 HC RX W HCPCS: Performed by: INTERNAL MEDICINE

## 2020-09-11 PROCEDURE — 71275 CT ANGIOGRAPHY CHEST: CPT

## 2020-09-11 PROCEDURE — 93010 ELECTROCARDIOGRAM REPORT: CPT | Performed by: INTERNAL MEDICINE

## 2020-09-11 PROCEDURE — 74174 CTA ABD&PLVS W/CONTRAST: CPT

## 2020-09-11 PROCEDURE — 87450 HC DIRECT STREP B ANTIGEN: CPT

## 2020-09-11 RX ORDER — TAMSULOSIN HYDROCHLORIDE 0.4 MG/1
0.4 CAPSULE ORAL NIGHTLY
Status: DISCONTINUED | OUTPATIENT
Start: 2020-09-11 | End: 2020-09-18 | Stop reason: HOSPADM

## 2020-09-11 RX ORDER — BUPROPION HYDROCHLORIDE 75 MG/1
75 TABLET ORAL 2 TIMES DAILY
Status: DISCONTINUED | OUTPATIENT
Start: 2020-09-11 | End: 2020-09-11

## 2020-09-11 RX ORDER — LORAZEPAM 0.5 MG/1
0.5 TABLET ORAL NIGHTLY PRN
COMMUNITY
End: 2020-09-22 | Stop reason: SDUPTHER

## 2020-09-11 RX ORDER — ALBUTEROL SULFATE 0.63 MG/3ML
1 SOLUTION RESPIRATORY (INHALATION) EVERY 6 HOURS
Status: DISCONTINUED | OUTPATIENT
Start: 2020-09-11 | End: 2020-09-18 | Stop reason: HOSPADM

## 2020-09-11 RX ORDER — LEVOTHYROXINE SODIUM 0.12 MG/1
125 TABLET ORAL DAILY
Status: DISCONTINUED | OUTPATIENT
Start: 2020-09-11 | End: 2020-09-18 | Stop reason: HOSPADM

## 2020-09-11 RX ORDER — BUDESONIDE 0.5 MG/2ML
500 INHALANT ORAL 2 TIMES DAILY
Status: DISCONTINUED | OUTPATIENT
Start: 2020-09-11 | End: 2020-09-18 | Stop reason: HOSPADM

## 2020-09-11 RX ORDER — PREDNISONE 10 MG/1
10 TABLET ORAL DAILY
Status: DISCONTINUED | OUTPATIENT
Start: 2020-09-11 | End: 2020-09-18 | Stop reason: HOSPADM

## 2020-09-11 RX ORDER — SENNA AND DOCUSATE SODIUM 50; 8.6 MG/1; MG/1
2 TABLET, FILM COATED ORAL DAILY
Status: DISCONTINUED | OUTPATIENT
Start: 2020-09-11 | End: 2020-09-18 | Stop reason: HOSPADM

## 2020-09-11 RX ORDER — BUPROPION HYDROCHLORIDE 75 MG/1
75 TABLET ORAL 2 TIMES DAILY
Status: DISCONTINUED | OUTPATIENT
Start: 2020-09-11 | End: 2020-09-18 | Stop reason: HOSPADM

## 2020-09-11 RX ORDER — CHOLECALCIFEROL (VITAMIN D3) 50 MCG
5000 TABLET ORAL DAILY
Status: DISCONTINUED | OUTPATIENT
Start: 2020-09-11 | End: 2020-09-18 | Stop reason: HOSPADM

## 2020-09-11 RX ORDER — OXYCODONE HYDROCHLORIDE AND ACETAMINOPHEN 5; 325 MG/1; MG/1
1 TABLET ORAL EVERY 4 HOURS PRN
Status: DISCONTINUED | OUTPATIENT
Start: 2020-09-11 | End: 2020-09-18 | Stop reason: HOSPADM

## 2020-09-11 RX ORDER — MIRTAZAPINE 30 MG/1
30 TABLET, FILM COATED ORAL NIGHTLY
COMMUNITY
End: 2021-01-12

## 2020-09-11 RX ORDER — LORAZEPAM 0.5 MG/1
0.5 TABLET ORAL NIGHTLY PRN
Status: DISCONTINUED | OUTPATIENT
Start: 2020-09-11 | End: 2020-09-18 | Stop reason: HOSPADM

## 2020-09-11 RX ORDER — MIRTAZAPINE 15 MG/1
30 TABLET, FILM COATED ORAL NIGHTLY
Status: DISCONTINUED | OUTPATIENT
Start: 2020-09-11 | End: 2020-09-18 | Stop reason: HOSPADM

## 2020-09-11 RX ORDER — PANTOPRAZOLE SODIUM 40 MG/1
40 TABLET, DELAYED RELEASE ORAL
Status: DISCONTINUED | OUTPATIENT
Start: 2020-09-11 | End: 2020-09-18 | Stop reason: HOSPADM

## 2020-09-11 RX ORDER — ATORVASTATIN CALCIUM 40 MG/1
40 TABLET, FILM COATED ORAL NIGHTLY
Status: DISCONTINUED | OUTPATIENT
Start: 2020-09-11 | End: 2020-09-18 | Stop reason: HOSPADM

## 2020-09-11 RX ORDER — ARFORMOTEROL TARTRATE 15 UG/2ML
15 SOLUTION RESPIRATORY (INHALATION) 2 TIMES DAILY
Status: DISCONTINUED | OUTPATIENT
Start: 2020-09-11 | End: 2020-09-18 | Stop reason: HOSPADM

## 2020-09-11 RX ORDER — CHOLECALCIFEROL (VITAMIN D3) 125 MCG
10 CAPSULE ORAL NIGHTLY
Status: DISCONTINUED | OUTPATIENT
Start: 2020-09-11 | End: 2020-09-18 | Stop reason: HOSPADM

## 2020-09-11 RX ORDER — DOXYCYCLINE HYCLATE 100 MG/1
100 CAPSULE ORAL 2 TIMES DAILY
Status: DISCONTINUED | OUTPATIENT
Start: 2020-09-11 | End: 2020-09-18 | Stop reason: HOSPADM

## 2020-09-11 RX ORDER — CHLORHEXIDINE GLUCONATE 0.12 MG/ML
15 RINSE ORAL DAILY
Status: DISCONTINUED | OUTPATIENT
Start: 2020-09-11 | End: 2020-09-18 | Stop reason: HOSPADM

## 2020-09-11 RX ORDER — DULOXETIN HYDROCHLORIDE 30 MG/1
30 CAPSULE, DELAYED RELEASE ORAL 2 TIMES DAILY
Status: DISCONTINUED | OUTPATIENT
Start: 2020-09-11 | End: 2020-09-18 | Stop reason: HOSPADM

## 2020-09-11 RX ORDER — 0.9 % SODIUM CHLORIDE 0.9 %
1000 INTRAVENOUS SOLUTION INTRAVENOUS ONCE
Status: COMPLETED | OUTPATIENT
Start: 2020-09-11 | End: 2020-09-11

## 2020-09-11 RX ADMIN — Medication 5000 UNITS: at 11:23

## 2020-09-11 RX ADMIN — SODIUM CHLORIDE 25 ML: 9 INJECTION, SOLUTION INTRAVENOUS at 20:32

## 2020-09-11 RX ADMIN — SODIUM CHLORIDE 1000 ML: 9 INJECTION, SOLUTION INTRAVENOUS at 00:30

## 2020-09-11 RX ADMIN — OXYCODONE AND ACETAMINOPHEN 1 TABLET: 5; 325 TABLET ORAL at 11:24

## 2020-09-11 RX ADMIN — ALBUTEROL SULFATE 0.63 MG: 0.63 SOLUTION RESPIRATORY (INHALATION) at 09:14

## 2020-09-11 RX ADMIN — DULOXETINE HYDROCHLORIDE 30 MG: 30 CAPSULE, DELAYED RELEASE ORAL at 21:44

## 2020-09-11 RX ADMIN — CEFEPIME 2 G: 2 INJECTION, POWDER, FOR SOLUTION INTRAVENOUS at 02:24

## 2020-09-11 RX ADMIN — LEVOTHYROXINE SODIUM 125 MCG: 0.12 TABLET ORAL at 11:23

## 2020-09-11 RX ADMIN — ARFORMOTEROL TARTRATE 15 MCG: 15 SOLUTION RESPIRATORY (INHALATION) at 20:41

## 2020-09-11 RX ADMIN — ATORVASTATIN CALCIUM 40 MG: 40 TABLET, FILM COATED ORAL at 21:45

## 2020-09-11 RX ADMIN — BUDESONIDE 500 MCG: 0.5 SUSPENSION RESPIRATORY (INHALATION) at 09:14

## 2020-09-11 RX ADMIN — ALBUTEROL SULFATE 0.63 MG: 0.63 SOLUTION RESPIRATORY (INHALATION) at 13:43

## 2020-09-11 RX ADMIN — BUDESONIDE 500 MCG: 0.5 SUSPENSION RESPIRATORY (INHALATION) at 20:41

## 2020-09-11 RX ADMIN — PIPERACILLIN SODIUM AND TAZOBACTAM SODIUM 3.38 G: 3; .375 INJECTION, POWDER, LYOPHILIZED, FOR SOLUTION INTRAVENOUS at 22:47

## 2020-09-11 RX ADMIN — ARFORMOTEROL TARTRATE 15 MCG: 15 SOLUTION RESPIRATORY (INHALATION) at 09:14

## 2020-09-11 RX ADMIN — DULOXETINE HYDROCHLORIDE 30 MG: 30 CAPSULE, DELAYED RELEASE ORAL at 11:23

## 2020-09-11 RX ADMIN — PREDNISONE 10 MG: 10 TABLET ORAL at 11:23

## 2020-09-11 RX ADMIN — DOXYCYCLINE HYCLATE 100 MG: 100 CAPSULE ORAL at 21:44

## 2020-09-11 RX ADMIN — PANTOPRAZOLE SODIUM 40 MG: 40 TABLET, DELAYED RELEASE ORAL at 11:24

## 2020-09-11 RX ADMIN — VANCOMYCIN HYDROCHLORIDE 2000 MG: 10 INJECTION, POWDER, LYOPHILIZED, FOR SOLUTION INTRAVENOUS at 03:36

## 2020-09-11 RX ADMIN — PIPERACILLIN SODIUM AND TAZOBACTAM SODIUM 3.38 G: 3; .375 INJECTION, POWDER, LYOPHILIZED, FOR SOLUTION INTRAVENOUS at 14:59

## 2020-09-11 RX ADMIN — BUPROPION HYDROCHLORIDE 75 MG: 75 TABLET, FILM COATED ORAL at 21:44

## 2020-09-11 RX ADMIN — ALBUTEROL SULFATE 0.63 MG: 0.63 SOLUTION RESPIRATORY (INHALATION) at 20:41

## 2020-09-11 RX ADMIN — APIXABAN 5 MG: 5 TABLET, FILM COATED ORAL at 21:44

## 2020-09-11 RX ADMIN — MIRTAZAPINE 30 MG: 15 TABLET, FILM COATED ORAL at 21:48

## 2020-09-11 RX ADMIN — Medication 10 MG: at 21:44

## 2020-09-11 RX ADMIN — APIXABAN 5 MG: 5 TABLET, FILM COATED ORAL at 11:24

## 2020-09-11 RX ADMIN — BUPROPION HYDROCHLORIDE 75 MG: 75 TABLET, FILM COATED ORAL at 11:22

## 2020-09-11 RX ADMIN — DOXYCYCLINE HYCLATE 100 MG: 100 CAPSULE ORAL at 11:22

## 2020-09-11 RX ADMIN — IOPAMIDOL 110 ML: 755 INJECTION, SOLUTION INTRAVENOUS at 00:51

## 2020-09-11 RX ADMIN — TAMSULOSIN HYDROCHLORIDE 0.4 MG: 0.4 CAPSULE ORAL at 21:44

## 2020-09-11 RX ADMIN — LORAZEPAM 0.5 MG: 0.5 TABLET ORAL at 21:44

## 2020-09-11 ASSESSMENT — PAIN SCALES - GENERAL
PAINLEVEL_OUTOF10: 0
PAINLEVEL_OUTOF10: 0
PAINLEVEL_OUTOF10: 8
PAINLEVEL_OUTOF10: 0

## 2020-09-11 NOTE — CONSULTS
[facility] CONSULT NOTE    Reason for Consult: Pneumonia   Requesting Physician:  BG Alva - CNP    Chief complaint: Hypoxemia    History Obtained From: Patient, his family, and EMR    HISTORY OFPRESENT ILLNESS              The patient is a 72 y.o. 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 On admission, he was afebrile and hemodynamically stable with no leukocytosis. Urinalysis showed no pyuria. SARS-CoV-2 PCR was not detected. CTA of chest, abdomen and pelvis showed interstitial and scattered groundglass opacities bilaterally toward lung periphery and more predominant in the lung bases; no bowel obstruction or free air or fluid in the abdomen or pelvis; unremarkable liver, gallbladder, and pancreas; grossly normal kidneys with no hydronephrosis. He received a dose of vancomycin on admission. Cefepime and doxycycline were started on admission. ID service was subsequently consulted for further recommendations.     Past Medical History  Past Medical History:   Diagnosis Date    Abdominal aortic aneurysm without rupture ([facility] [address] 08/27/2018    stable per Ct 1/23/2020 / see epic    Acute bronchitis with COPD ([address] 5/27/2017    Apnea     Arthritis     COPD (chronic obstructive pulmonary disease) ([address]     Depression with anxiety     Emphysema of lung ([address]     Encounter for antineoplastic immunotherapy     HAP (hospital-acquired pneumonia) 5/27/2017    Hyperlipidemia     Lung cancer ([facility] [address] 04/11/2016    chemo and radiation  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    Paralyzed vocal cords     Thrombosis of testis     Tobacco dependence 5/27/2017       Current Facility-Administered Medications   Medication Dose Route Frequency Provider Last Rate Last Dose    albuterol (ACCUNEB) nebulizer solution 0.63 mg  1 ampule Nebulization Q6H Santiago Rios MD   0.63 mg at 09/11/20 0914    apixaban (ELIQUIS) tablet 5 mg  5 mg Oral BID Santiago Rios MD   5 mg at 09/11/20 1124    budesonide (PULMICORT) nebulizer suspension 500 mcg  500 mcg Nebulization BID Santiago Rios MD   500 mcg at 09/11/20 0914    chlorhexidine (PERIDEX) 0.12 % solution 15 mL  15 mL Mouth/Throat Daily Santiago Rios MD        vitamin D (CHOLECALCIFEROL) tablet 5,000 Units  5,000 Units Oral Daily Santiago Rios MD   5,000 Units at 09/11/20 1123    doxycycline hyclate (VIBRAMYCIN) capsule 100 mg  100 mg Oral BID Santiago Rios MD   100 mg at 09/11/20 1122    DULoxetine (CYMBALTA) extended release capsule 30 mg  30 mg Oral BID Santiago Rios MD   30 mg at 09/11/20 1123    pantoprazole (PROTONIX) tablet 40 mg  40 mg Oral QAM AC Santiago Rios MD   40 mg at 09/11/20 1124    Arformoterol Tartrate (BROVANA) nebulizer solution 15 mcg  15 mcg Nebulization BID Santiago Rios MD   15 mcg at 09/11/20 0914    levothyroxine (SYNTHROID) tablet 125 mcg  125 mcg Oral Daily Santiago Rios MD   125 mcg at 09/11/20 1123    melatonin tablet 10 mg  10 mg Oral Nightly Santiago Rios MD        oxyCODONE-acetaminophen (PERCOCET) 5-325 MG per tablet 1 tablet  1 tablet Oral Q4H PRN Santiago Rios MD   1 tablet at 09/11/20 1124    tamsulosin (FLOMAX) capsule 0.4 mg  0.4 mg Oral Nightly Santiago Rois MD        atorvastatin (LIPITOR) tablet 40 mg  40 mg Oral Nightly Santiago Rios MD        sennosides-docusate sodium (SENOKOT-S) 8.6-50 MG tablet 2 tablet  2 tablet Oral Daily Santiago Rios MD        predniSONE (DELTASONE) tablet 10 mg  10 mg Oral Daily Lazaro Wilson MD   10 mg at 09/11/20 1123    cefepime (MAXIPIME) 2 g IVPB extended (mini-bag)  2 g Intravenous Q12H Lazaro Wilson MD        And    0.9 % sodium chloride infusion admixture   Intravenous Q12H Lazaro Wilson MD        buPROPion [facility]) tablet 75 mg  75 mg Oral BID Lazaro Wilson MD   75 mg at 09/11/20 1122       Allergies   Allergen Reactions    Augmentin [Amoxicillin-Pot Clavulanate] Diarrhea       Surgical History  Past Surgical History:   Procedure Laterality Date    BACK SURGERY      BRONCHOSCOPY N/A 3/5/2020    BRONCHOSCOPY DIAGNOSTIC OR CELL [address] ONLY performed by Erika Kellogg MD at [address] [facility] ARTHROSCOPY      LUNG BIOPSY Left 5/6/2016    OTHER SURGICAL HISTORY  4/15/2016    cervical myelogram    SINUS SURGERY      TRACHEOSTOMY  05/24/2017    TRACHEOSTOMY  05/24/2017        Social History  Social History     Socioeconomic History    Marital status:    Occupational History    Occupation: [employer]- [employer]     Comment: disability short term   Tobacco Use    Smoking status: Current Some Day Smoker     Packs/day: 1.00     Years: 44.00     Pack years: 44.00     Types: Cigarettes     Start date: 1972    Smokeless tobacco: Never Used    Tobacco comment: Pt quit in 9/26/2018& started back 2/2020   Substance and Sexual Activity    Alcohol use: No     Alcohol/week: 0.0 standard drinks    Drug use: No   Social History Narrative    Works at [employer]. Lives in [place].        Family Medical History  Family History   Problem Relation Age of Onset   [de-identified] Cancer Mother         breast cancer    Cancer Father         prostate cancer    Diabetes Father        Review of Systems:  Constitutional: Had fever, had chills  Eyes: No vision changes, no retroorbital pain  ENT: No hearing changes, no ear pain  Respiratory: Has cough, has dyspnea  Cardiovascular: Has chest pain, no palpitations  Gastrointestinal: No abdominal pain, no diarrhea  Genitourinary: No dysuria, no hematuria  Integumentary: No rash, no itching  Musculoskeletal: Has left flank pain, no joint pain  Neurologic: No headache, no numbness in extremities    Physical Examination:  Vitals:    09/11/20 0556 09/11/20 0631 09/11/20 0656 09/11/20 0759   BP: 101/79 116/69  132/77   Pulse: 86 68  77   Resp:    22   Temp:   99.5 °F (37.5 °C) 97.5 °F (36.4 °C)   TempSrc:   Axillary Temporal   SpO2: 92% 98%  96%   Weight:       Height:         Constitutional: Alert, in mild distress  Eyes: Sclerae anicteric, no conjunctival erythema  ENT: No buccal lesion, no pharyngeal exudates  Neck: No nuchal rigidity, tracheostomy in place  Lungs: Decreased breath sounds, no crackles, no wheezes  Heart: Regular rate and rhythm, no murmurs  Abdomen: Bowel sounds present, soft, nontender  Skin: Warm and dry, no active dermatoses  Musculoskeletal: No joint erythema, no joint swelling    Labs, imaging, and medical records/notes were personally reviewed. Assessment:  Acute hypoxic respiratory failure  Pneumonia  Immunocompromised state   Lung adenocarcinoma on immunotherapy    Plan:  Change cefepime to piperacillin-tazobactam 3.375g q8h. Continue doxycycline 100 mg BID for now. Check urine Streptococcus pneumoniae and Legionella antigens. Check tracheal aspirate cultures. Follow up blood cultures. Thank you for involving me in the care of Wally Oneil. . I will continue to follow. Please do not hesitate to call for any questions or concerns.     Electronically signed by Gabriela Osman MD on 9/11/2020 at 1:38 PM

## 2020-09-11 NOTE — ED NOTES
Name: Shira Hernandez.   : 1955  MRN: [id number]    Date: 2020    Benefits of immediately proceeding with Radiology exam outweigh the risks and therefore the following is being waived:      [] Pregnancy test    [] Protocol for Iodine allergy    [] MRI questionnaire    [x] BUN/Creatinine        MD Coretta Soni MD  [phone]

## 2020-09-11 NOTE — ED NOTES
Bed: 15  Expected date:   Expected time:   Means of arrival:   Comments:  ems     Rosina Lr RN  09/10/20 9835

## 2020-09-11 NOTE — CARE COORDINATION
Met with patient and wife at bedside to discuss transition of care. He lives in a 2 story home with his wife. No assistive devices. He has oxygen and trach supplies through [facility]. Nebulizer through [facility]. Wife asking for some teaching with suction machine at discharge. They have machine and supplies. Call placed to [facility] Respiratory therapist-Elina--[phone] option #7. Wife would like in home teaching since she has not bused in a few years. Will update at bedside after speaking with Consuelo Booker. PCP . Pharmacy [address] [address] Both are planning for home at discharge. Awaiting Pulmonology consult. If C ordered, pt and wife agreeable to whomever is in network with availability. CM will continue to follow  Anurag Oconnor, RN  [facility] Case Management  [phone]--Received call back from Consuelo Booker. Pt needs suction supplies. Rx faxed to *[phone]. Will deliver to house and do teaching then.  Call resp therapist when pt is discharged

## 2020-09-11 NOTE — ED PROVIDER NOTES
Department of Emergency Medicine   ED  Provider Note  Admit Date/RoomTime: 9/10/2020 10:56 PM  ED Room: 15/15          History of Present Illness:  9/11/20, Time: 1:38 AM EDT  Chief Complaint   Patient presents with    Shortness of Breath     pt c/o sob and weakness and has not been eating. pt was seen here yesterday and discharged. Kieran Ferrer is a 72 y.o. male presenting to the ED for shortness of breath. Patient's been short of breath for the past 3 days. Nothing makes it better or worse, denies any associated pain. Does have a history of a trach. He says he was evaluated yesterday emergency department, started on doxycycline, and sent home. He says he developed additional shortness of breath today, along with more sputum from his trach area. Denies any fever, chills, chest pain, back pain, change in bowel bladder, neck pain or stiffness, lethargy, or any other symptoms. Review of Systems:   Pertinent positives and negatives are stated within HPI, all other systems reviewed and are negative.        --------------------------------------------- PAST HISTORY ---------------------------------------------  Past Medical History:  has a past medical history of Abdominal aortic aneurysm without rupture ([facility] [address] Acute bronchitis with COPD ([facility] [address] Apnea, Arthritis, COPD (chronic obstructive pulmonary disease) ([address] Depression with anxiety, Emphysema of lung ([facility] [address] Encounter for antineoplastic immunotherapy, HAP (hospital-acquired pneumonia), Hyperlipidemia, Lung cancer ([address] Osteoradionecrosis of jaw, Paralyzed vocal cords, Thrombosis of testis, and Tobacco dependence. Past Surgical History:  has a past surgical history that includes Knee arthroscopy; back surgery; sinus surgery; other surgical history (4/15/2016); Lung biopsy (Left, 5/6/2016); tracheostomy (05/24/2017); tracheostomy (05/24/2017); and bronchoscopy (N/A, 3/5/2020).     Social History:  reports that he has been smoking cigarettes. He started smoking about 48 years ago. He has a 44.00 pack-year smoking history. He has never used smokeless tobacco. He reports that he does not drink alcohol or use drugs. Family History: family history includes Cancer in his father and mother; Diabetes in his father. . Unless otherwise noted, family history is non contributory    The patients home medications have been reviewed. Allergies: Augmentin [amoxicillin-pot clavulanate]        ---------------------------------------------------PHYSICAL EXAM--------------------------------------    Constitutional/General: Alert and oriented x3  Head: Normocephalic and atraumatic  Eyes: PERRL, EOMI, sclera non icteric  Mouth: Oropharynx clear, handling secretions, no trismus, no asymmetry of the posterior oropharynx or uvular edema  Neck: Supple, full ROM, no stridor, no meningeal signs, trach intact  Respiratory: Coarse diffusely. Not in respiratory distress  Cardiovascular:  Regular tachycardia. Regular rhythm. 2+ distal pulses. Equal extremity pulses. Chest: No chest wall tenderness  GI:  Abdomen Soft, Non tender, Non distended. No rebound, guarding, or rigidity. No pulsatile masses. Musculoskeletal: Moves all extremities x 4. Warm and well perfused, no clubbing, cyanosis, or edema. Capillary refill <3 seconds  Integument: skin warm and dry. No rashes. Neurologic: GCS 15, no focal deficits, symmetric strength 5/5 in the upper and lower extremities bilaterally  Psychiatric: Normal Affect          -------------------------------------------------- RESULTS -------------------------------------------------  I have personally reviewed all laboratory and imaging results for this patient. Results are listed below.      LABS: (Lab results interpreted by me)  Results for orders placed or performed during the hospital encounter of 09/10/20   CBC Auto Differential   Result Value Ref Range    WBC 7.3 4.5 - 11.5 E9/L    RBC 5.40 3.80 - 5.80 E12/L    Hemoglobin Radiologist unless otherwise specified  CTA PULMONARY W CONTRAST   Final Result      1. Findings suggest bilateral pneumonia throughout both lungs on   background of chronic interstitial lung disease. 2. No pulmonary embolism. 3. Stable ascending thoracic aortic aneurysm measures up to 4.2 cm. 4. Stable fusiform infrarenal abdominal aortic aneurysm measures up to   3 cm. 5. Mild stenosis is seen involving origin of celiac trunk. 6. Moderate stenosis is seen involving proximal left renal artery and   mild stenosis is seen involving proximal right renal artery. ALERT:  THIS IS AN ABNORMAL REPORT. CTA ABDOMEN PELVIS W CONTRAST   Final Result      1. Findings suggest bilateral pneumonia throughout both lungs on   background of chronic interstitial lung disease. 2. No pulmonary embolism. 3. Stable ascending thoracic aortic aneurysm measures up to 4.2 cm. 4. Stable fusiform infrarenal abdominal aortic aneurysm measures up to   3 cm. 5. Mild stenosis is seen involving origin of celiac trunk. 6. Moderate stenosis is seen involving proximal left renal artery and   mild stenosis is seen involving proximal right renal artery. ALERT:  THIS IS AN ABNORMAL REPORT. XR CHEST PORTABLE   Final Result      1. Bibasilar opacities in the lungs appear chronic and likely   represent scarring. 2. No acute cardiopulmonary abnormality.             EKG Interpretation  Interpreted by emergency department physician, Dr. Siobhan Bowman     Sinus, rate 73, no STEMI      ------------------------- NURSING NOTES AND VITALS REVIEWED ---------------------------   The nursing notes within the ED encounter and vital signs as below have been reviewed by myself  /83   Pulse 80   Temp 97.7 °F (36.5 °C)   Resp 22   Ht 5' 11\" (1.803 m)   Wt 211 lb (95.7 kg)   SpO2 94%   BMI 29.43 kg/m²     Oxygen Saturation Interpretation: Improved after treatment    The patients available past medical records and past encounters were reviewed. ------------------------------ ED COURSE/MEDICAL DECISION MAKING----------------------  Medications   vancomycin (VANCOCIN) 2,000 mg in dextrose 5 % 500 mL IVPB (has no administration in time range)   cefepime (MAXIPIME) 2 g IVPB extended (mini-bag) (2 g Intravenous New Bag 9/11/20 0224)   0.9 % sodium chloride bolus (1,000 mLs Intravenous New Bag 9/11/20 0030)   iopamidol (ISOVUE-370) 76 % injection 110 mL (110 mLs Intravenous Given 9/11/20 0051)           The cardiac monitor revealed sinus with a heart rate in the 80s as interpreted by me. The cardiac monitor was ordered secondary to the patient's SOB and to monitor the patient for dysrhythmia. CPT 96222         Medical Decision Making:    Labs and imaging reviewed. Patient was hypoxic. Was placed on oxygen and responded. Blood cultures were obtained. Patient was started vancomycin and cefepime. Discussed with the hospitalist, patient was admitted. Counseling: The emergency provider has spoken with the patient and spouse/SO and discussed todays results, in addition to providing specific details for the plan of care and counseling regarding the diagnosis and prognosis. Questions are answered at this time and they are agreeable with the plan.       --------------------------------- IMPRESSION AND DISPOSITION ---------------------------------    IMPRESSION  1. Pneumonia due to organism    2. Hypoxia        DISPOSITION  Disposition: Admit to telemetry  Patient condition is stable        NOTE: This report was transcribed using voice recognition software.  Every effort was made to ensure accuracy; however, inadvertent computerized transcription errors may be present        Bailey Winter MD  09/11/20 2480

## 2020-09-11 NOTE — CONSULTS
Leia Henderson M.D.,[facility]  Shirlene Lyman D.O., F.GABINO., Jermaine Britton M.D. Jacklyn Villarreal M.D., Malik Choudhary M.D. Nayan Morel D.O. Patient:  Jamila Morgan. 72 y.o. male MRN: [id number]     Date of Service: 9/11/2020      PULMONARY CONSULTATION    Reason for Consultation: pneumonia,   respiratory failure with hypoxia, tracheostomy   Referring Physician: Dr. Rohan Pelletier MD    Communication with the referring physician will be sent via the electronic medical record. Chief Complaint: shortness of breath and cough    CODE STATUS: FULL     SUBJECTIVE:  HPI:  Jamila Glynn is a 72 y.o.  male who we are asked to evaluate in consultation for pneumonia. He is a known patient to our practice followed previously by Dr. Darlene Brown. Diagnosed in 2016 CRISTINA large hilar mass with chest wall and mediastinal invasion, moderately to poorly differentiated, invasive, acinar type adenocarcinoma of lung completed treatment with Taxol/carbo then Alimta/Carbo/Avastin. He had T 1 and T 2 metastasis to spine. 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 Follows with Dr Virgil Fung and Dr Ninoska Gallagher radiation oncology for skeletal metastasis, he is currently on Keytruda every 3 weeks . His wife is present at bedside to assist with answering questions. He has had a long term tracheostomy in place for the past 3 yrs, size 6 cuffless. He underwent bronchoscopy March 5, 2020 showing normal airways but severe bilateral vocal cord paralysis. He does not tolerate PMV capping for many months due to vocal cord paralysis. He was following at [facility] with ENT undergoing testing. He was to have biopsy of left neck mass to rule out cancer recurrence along with repeat CT imaging of neck by Dr. Blessing Horn. His wife states they told him that his tracheostomy needed to be changed and repositioned.    He has a history of recurrent pneumonia with MDR organisms. Most recently with candida from bronchoscopy March 2020 and previously in Jan 2020 with MRSA and serratia. His secretions appear thick. PMHx includes: abdominal aortic aneurysm stable per CT 1/2020, COPD, depression, recurrent pneumonia, arthritis, HLD,    His wife states he has not been himself over the past few days, becoming increasingly diaphoretic, decreased appetite not eating, and agitated. Denies fevers or chills. Denies sick contacts. No recent travel. Had dilatation of esophagus a few wks ago in August at [facility] for food getting \"stuck\". His home pulmonary regimen includes budesonide and albuterol. He takes prednisone 10 mg po daily for his arthritis . His wife brought him to the ED for evaluation on 9/9/2020 for fatigue and weakness. He was coughing up brown and blood mixed mucus. He was later discharged to home. His symptoms worsened and he felt like he could not catch his breath, therefore he to the ED for further evaluation. He also admits to left lower leg discomfort, he suffers from neuropathy from chemotherapy. CTA chest completed, no evidence of PE. There are irregular interstitial opacities throughout both lungs . Evidence of bilateral pneumonia with scattered groundglass opacities bilaterally. 3 cm stable Abdominal aortic aneurysm from prior imaging. No pneumothorax, no mediastinal or hilar adenopathy. No pleural or pericardial effusions noted. Lab testing-lactic acid 1.8, WBC 7.3, Hgb 13.1, covid 19 negative, Na 133, K 5.1, BUN 10, creat 1.3, troponin 0.03, 0.02, Pro BNP 1006.      Past Medical History:   Diagnosis Date    Abdominal aortic aneurysm without rupture ([address] 08/27/2018    stable per Ct 1/23/2020 / see epic    Acute bronchitis with COPD ([address] 5/27/2017    Apnea     Arthritis     COPD (chronic obstructive pulmonary disease) ([address]     Depression with anxiety     Emphysema of lung ([address]     Encounter for antineoplastic immunotherapy Not on file     Gets together: Not on file     Attends Religion service: Not on file     Active member of club or organization: Not on file     Attends meetings of clubs or organizations: Not on file     Relationship status: Not on file    Intimate partner violence     Fear of current or ex partner: Not on file     Emotionally abused: Not on file     Physically abused: Not on file     Forced sexual activity: Not on file   Other Topics Concern    Not on file   Social History Narrative    Works at [employer] Lives in [facility]. Smoking history: The patient is a smoker of cigarettes, still was smoking 1/2 ppd  X 31 yrs. . Started age 16    Occupation, supervisor  ETOH:   reports no history of alcohol use. Exposures: There  is not history of TB or TB exposure. There is not asbestos or silica dust exposure. The patient reports does not have coal, foundry, quarry or [employer] exposure. Recent travel history none. There is not  history of recreational or IV drug use. There is not hot tub exposure. The patient does not have any exotic pets, turtles or exotic birds.        Vaccines:       Immunization History   Administered Date(s) Administered    Influenza Virus Vaccine 10/24/2017, 01/17/2019, 10/16/2019    Influenza Whole 10/30/2015    Influenza, Quadv, IM, PF (6 mo and older Fluzone, Flulaval, Fluarix, and 3 yrs and older Afluria) 10/16/2019    Influenza, Triv, 3 Years and older, IM (Afluria (5 yrs and older) 01/17/2019    Pneumococcal Conjugate 13-valent ([id number]) 10/16/2019    Pneumococcal Polysaccharide ([id number]) 12/01/2015    Tdap (Boostrix, Adacel) 12/18/2019        Home Meds: Medications Prior to Admission: doxycycline hyclate (VIBRA-TABS) 100 MG tablet, Take 1 tablet by mouth 2 times daily for 10 days  senna-docusate (PERICOLACE) 8.6-50 MG per tablet, Take 2 tablets by mouth daily  apixaban (ELIQUIS) 5 MG TABS tablet, Take 1 tablet by mouth 2 times daily  Revefenacin (Jarret Garsia) 175 MCG/3ML SOLN, Inhale 3 mLs into the lungs daily Instructed to take am of procedure  buPROPion (WELLBUTRIN) 75 MG tablet, Take 1 tablet by mouth 2 times daily  simvastatin (ZOCOR) 40 MG tablet, Take 40 mg by mouth nightly  levothyroxine (SYNTHROID) 125 MCG tablet, Take 1 tablet by mouth Daily  formoterol (PERFOROMIST) 20 MCG/2ML nebulizer solution, Take 2 mLs by nebulization 2 times daily Instructed to take am of procedure  tamsulosin (FLOMAX) 0.4 MG capsule, Take 1 capsule by mouth nightly  esomeprazole (NEXIUM) 40 MG delayed release capsule, Take 1 capsule by mouth every morning (before breakfast) Instructed to take am of procedure  pregabalin (LYRICA) 150 MG capsule, Take 1 capsule by mouth 3 times daily for 180 days. DULoxetine (CYMBALTA) 30 MG extended release capsule, Take 1 capsule by mouth 2 times daily  budesonide (PULMICORT) 0.5 MG/2ML nebulizer suspension, Take 2 mLs by nebulization 2 times daily Instructed to take am of procedure  predniSONE (DELTASONE) 10 MG tablet, Take 10 mg by mouth daily Instructed to take am of procedure  oxyCODONE-acetaminophen (PERCOCET) 5-325 MG per tablet, Take 1 tablet by mouth every 4 hours as needed for Pain.  Break through  albuterol (ACCUNEB) 0.63 MG/3ML nebulizer solution, Take 3 mLs by nebulization every 6 hours DX: COPD J44.9  OXYGEN, Inhale 4 L into the lungs nightly  chlorhexidine (PERIDEX) 0.12 % solution, Take 15 mLs by mouth daily Swish & spit qd  cyclobenzaprine (FLEXERIL) 10 MG tablet, Take 10 mg by mouth 3 times daily as needed for Muscle spasms  Cholecalciferol (VITAMIN D3) 5000 units TABS, Take 5,000 Units by mouth daily   pembrolizumab (KEYTRUDA) 100 MG/4ML SOLN, Infuse 200 mg intravenously every 21 days Last Dose -01/16/20 Next Dose -02/06/20  Melatonin 10 MG TABS, Take 10 mg by mouth nightly     CURRENT MEDS :  Scheduled Meds:   albuterol  1 ampule Nebulization Q6H    apixaban  5 mg Oral BID    budesonide  500 mcg Nebulization BID    chlorhexidine 15 mL Mouth/Throat Daily    vitamin D  5,000 Units Oral Daily    doxycycline hyclate  100 mg Oral BID    DULoxetine  30 mg Oral BID    pantoprazole  40 mg Oral QAM AC    Arformoterol Tartrate  15 mcg Nebulization BID    levothyroxine  125 mcg Oral Daily    melatonin  10 mg Oral Nightly    tamsulosin  0.4 mg Oral Nightly    atorvastatin  40 mg Oral Nightly    sennosides-docusate sodium  2 tablet Oral Daily    predniSONE  10 mg Oral Daily    pembrolizumab  200 mg Intravenous Q21 Days    cefepime  2 g Intravenous Q12H    And    sodium chloride   Intravenous Q12H    buPROPion  75 mg Oral BID       Continuous Infusions: Allergies   Allergen Reactions    Augmentin [Amoxicillin-Pot Clavulanate] Diarrhea       REVIEW OF SYSTEMS:  Constitutional: +fatigue, sweats, malaise  Skin: Denies pigmentation, dark lesions, and rashes   HEENT: Denies hearing loss, tinnitus, ear drainage, epistaxis, sore throat, and hoarseness. Cardiovascular: Denies palpitations, chest pain, and chest pressure.   Respiratory: +dyspnea , cough, mucus+  Gastrointestinal: Denies nausea, vomiting, poor appetite, diarrhea, heartburn or reflux  Genitourinary: Denies dysuria, frequency, urgency or hematuria  Musculoskeletal: Denies myalgias, muscle weakness, and bone pain  Neurological: Denies dizziness, vertigo, headache, and focal weakness  Psychological: restless and anxious   Endocrine: Denies heat intolerance and cold intolerance  Hematopoietic/Lymphatic: Denies bleeding problems and blood transfusions    OBJECTIVE:   /77   Pulse 77   Temp 97.5 °F (36.4 °C) (Temporal)   Resp 22   Ht 5' 11\" (1.803 m)   Wt 211 lb (95.7 kg)   SpO2 96%   BMI 29.43 kg/m²   SpO2 Readings from Last 1 Encounters:   09/11/20 96%        I/O:  No intake or output data in the 24 hours ending 09/11/20 1132  Vent Information  FiO2 : 50 %  SpO2: 96 %  SpO2/FiO2 ratio: 184                Physical Exam:  General: The patient is lying in bed comfortably without any distress. Breathing is not labored  HEENT: Pupils are equal round and reactive to light, there are no oral lesions and no post-nasal drip  6 cuffless trache left side neck mass/discomfort  Neck: supple without adenopathy  Cardiovascular: regular rate and rhythm without murmur or gallop  Respiratory: coarse  to auscultation bilaterally without wheezing or crackles. Air entry is symmetric  Abdomen: soft, non-tender, non-distended, normal bowel sounds  Extremities: warm, no edema, no clubbing  Skin: no rash or lesion  Neurologic: CN II-XII grossly intact, no focal deficits    Pulmonary Function Testing personally reviewed and interpreted      Imaging personally reviewed:       1. Findings suggest bilateral pneumonia throughout both lungs on    background of chronic interstitial lung disease.         2. No pulmonary embolism.         3. Stable ascending thoracic aortic aneurysm measures up to 4.2 cm.         4. Stable fusiform infrarenal abdominal aortic aneurysm measures up to    3 cm.         5. Mild stenosis is seen involving origin of celiac trunk.         6. Moderate stenosis is seen involving proximal left renal artery and    mild stenosis is seen involving proximal right renal artery.         ALERT:  THIS IS AN ABNORMAL REPORT.             8/3/2020 CT neck at [facility]    No evidence of cervical soft tissue mass or lymphadenopathy. Asymmetric thickening and medialization of the left aryepiglottic fold is   again noted. Echo:7/29/2020  Conclusions      Summary   Normal left ventricle size. Normal left ventricle wall thickness   Normal diastolic function. Ejection fraction is visually estimated at 55-60%. Mild mitral regurgitation is present.          Labs:  Lab Results   Component Value Date    WBC 7.3 09/10/2020    HGB 13.1 09/10/2020    HCT 40.9 09/10/2020    MCV 75.7 09/10/2020    MCH 24.3 09/10/2020    MCHC 32.0 09/10/2020    RDW 18.4 09/10/2020     09/10/2020    MPV 10.1 09/10/2020 Lab Results   Component Value Date     09/10/2020    K 5.1 09/10/2020    K 4.0 09/09/2020    CL 96 09/10/2020    CO2 21 09/10/2020    BUN 10 09/10/2020    CREATININE 1.3 09/10/2020    LABALBU 3.5 09/10/2020    CALCIUM 9.5 09/10/2020    GFRAA >60 09/10/2020    LABGLOM 55 09/10/2020     Lab Results   Component Value Date    PROTIME 15.2 09/09/2020    INR 1.3 09/09/2020     Recent Labs     09/10/20  2334   PROBNP 709*     Recent Labs     09/09/20  2109 09/10/20  2334   TROPONINI 0.03 0.02     No results for input(s): PROCAL in the last 72 hours. This SmartLink has not been configured with any valid records. Micro:  No results for input(s): CULTRESP in the last 72 hours. No results for input(s): LABGRAM in the last 72 hours. No results for input(s): LEGUR in the last 72 hours. No results for input(s): STREPNEUMAGU in the last 72 hours. No results for input(s): LP1UAG in the last 72 hours. Assessment:  1. Acute on chronic respiratory failure with hypoxia  2. Recurrent Pneumonia with hx MDR organisms-MRSA, serratia  3. 2016 CRISTINA large hilar mass with chest wall and mediastinal invasion, moderately to poorly differentiated, invasive, acinar type stage IV adenocarcinoma of lung completed treatment with Taxol/carbo then Alimta/Carbo/Avastin, T 1, T 2 metastasis to spine  4. 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 Long term tracheostomy 6 cuffless  7. Laryngeal edema, supraglottic thrush   8. 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 Severe vocal cord paralysis not able to tolerate trache capping  10. COPD not in acute exacerbation  11. EF 16-37% normal diastolic function  12. Malnutrition   13. HTN  14. HLD  15. Neuropathy  16. Arthritis -chronic daily prednisone  17. Immunocompromised host  18. Hx heavy tobacco abuse      Plan:  1. Oxygen therapy keep >92%  2.  Routine trache care  3. Consult ID for abx management-recurrent pneumonia-placed on Zosyn , Doxycycline. Received vanco and cefepime in ED  4. Check respiratory cultures, procal, inflammatory markers, bacterial urine antigens  5. Continue bronchodilator regimen  6. Chronic daily prednisone 10 mg for arthritis  7. CTA chest reviewed  8. Follows with ENT at [facility] for glottic stenosis, laryngeal edema, vocal cord paralysis  9. Dr Chico Trevizo local oncology. Patient on keytruda every 3 wks  10. DVT,GI prophylaxis      Thank you for allowing me to participate in the care of Wally Oneil. .   Please feel free to call with questions. This plan of care was reviewed in collaboration with Dr. Samara Kwan    Electronically signed by BG Aguilar CNP on 9/11/2020 at 11:32 AM          I personally saw, examined, and cared for the patient. Labs, medications, radiographs reviewed. I agree with history exam and plans detailed in NP note.     Once pneumonia treated and improved will need to go back to [facility] for new trach/revision    Electronically signed by Humphrey Banuelos DO on 9/11/2020 at 4:41 PM

## 2020-09-11 NOTE — H&P
Asa Goldstein. is a 72 y.o. male presenting to the ED for shortness of breath. Patient's been short of breath for the past 3 days. Does have a history of a trach. unable to talk discussed with wife information taking from her  He says he was evaluated yesterday emergency department, started on doxycycline, and sent home. He says he developed additional shortness of breath today, along with more sputum from his trach area. Denies any fever, chills, chest pain, back pain, change in bowel bladder, neck pain or stiffness, lethargy, or any other symptoms. CTA chest pneumonia admitted for treatment. Past Medical History:   Diagnosis Date    Abdominal aortic aneurysm without rupture ([facility] 08/27/2018    stable per Ct 1/23/2020 / see epic    Acute bronchitis with COPD ([facility] 5/27/2017    Apnea     Arthritis     COPD (chronic obstructive pulmonary disease) ([facility]     Depression with anxiety     Emphysema of lung ([facility]     Encounter for antineoplastic immunotherapy     HAP (hospital-acquired pneumonia) 5/27/2017    Hyperlipidemia     Lung cancer ([facility] 04/11/2016    chemo and radiation    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    Paralyzed vocal cords     Thrombosis of testis     Tobacco dependence 5/27/2017       Past Surgical History:   Procedure Laterality Date    BACK SURGERY      BRONCHOSCOPY N/A 3/5/2020    BRONCHOSCOPY DIAGNOSTIC OR CELL 8 [address] Ibrahim ONLY performed by Adalberto Primrose, MD at [facility] ARTHROSCOPY      LUNG BIOPSY Left 5/6/2016    OTHER SURGICAL HISTORY  4/15/2016    cervical myelogram    SINUS SURGERY      TRACHEOSTOMY  05/24/2017    TRACHEOSTOMY  05/24/2017       Family History   Problem Relation Age of Onset    Cancer Mother         breast cancer    Cancer Father         prostate cancer    Diabetes Father        Prior to Admission medications    Medication Sig Start Date End Date Taking?  Authorizing Provider   doxycycline hyclate (VIBRA-TABS) 100 MG tablet Take 1 tablet by mouth 2 times daily for 10 days 9/10/20 9/20/20  Fay Workman MD   senna-docusate (PERICOLACE) 8.6-50 MG per tablet Take 2 tablets by mouth daily 9/4/20 3/3/21  BG Barreto CNP   apixaban (ELIQUIS) 5 MG TABS tablet Take 1 tablet by mouth 2 times daily 8/21/20 11/19/20  Sylvia Quintana DO   oxyCODONE-acetaminophen (PERCOCET) 5-325 MG per tablet Take 1 tablet by mouth every 4 hours as needed for Pain. Break through    Historical Provider, MD   Revefenacin (YUPELRI) 175 MCG/3ML SOLN Inhale 3 mLs into the lungs daily Instructed to take am of procedure 7/29/20   Cliff Xiao MD   buPROPion (WELLBUTRIN) 75 MG tablet Take 1 tablet by mouth 2 times daily 7/17/20   Cliff Xiao MD   simvastatin (ZOCOR) 40 MG tablet Take 40 mg by mouth nightly    Historical Provider, MD   albuterol (ACCUNEB) 0.63 MG/3ML nebulizer solution Take 3 mLs by nebulization every 6 hours DX: COPD J44.9 6/19/20   Cliff Xiao MD   levothyroxine (SYNTHROID) 125 MCG tablet Take 1 tablet by mouth Daily 5/8/20   Sylvia Quintana DO   formoterol (PERFOROMIST) 20 MCG/2ML nebulizer solution Take 2 mLs by nebulization 2 times daily Instructed to take am of procedure 4/29/20   Cliff Xiao MD   tamsulosin (FLOMAX) 0.4 MG capsule Take 1 capsule by mouth nightly 4/1/20   Sylvia Quintana DO   esomeprazole (NEXIUM) 40 MG delayed release capsule Take 1 capsule by mouth every morning (before breakfast) Instructed to take am of procedure 4/1/20   Sylvia Quintana DO   pregabalin (LYRICA) 150 MG capsule Take 1 capsule by mouth 3 times daily for 180 days.  3/24/20 9/20/20  GB Barreto CNP   DULoxetine (CYMBALTA) 30 MG extended release capsule Take 1 capsule by mouth 2 times daily 3/24/20 9/20/20  Lavanda Mike, APRN - CNP   budesonide (PULMICORT) 0.5 MG/2ML nebulizer suspension Take 2 mLs by nebulization 2 times daily Instructed to take am of procedure 3/24/20   Cliff Xiao MD   predniSONE (DELTASONE) 10 MG tablet Take 10 mg by mouth daily Instructed to take am of procedure    Historical Provider, MD   OXYGEN Inhale 4 L into the lungs nightly    Historical Provider, MD   chlorhexidine (PERIDEX) 0.12 % solution Take 15 mLs by mouth daily Swish & spit qd 1/10/20   Sylvia Quintana DO   cyclobenzaprine (FLEXERIL) 10 MG tablet Take 10 mg by mouth 3 times daily as needed for Muscle spasms    Historical Provider, MD   Cholecalciferol (VITAMIN D3) 5000 units TABS Take 5,000 Units by mouth daily     Historical Provider, MD   pembrolizumab (KEYTRUDA) 100 MG/4ML SOLN Infuse 200 mg intravenously every 21 days Last Dose -01/16/20  Next Dose -02/06/20    Historical Provider, MD   Melatonin 10 MG TABS Take 10 mg by mouth nightly     Historical Provider, MD        Allergies: Augmentin [amoxicillin-pot clavulanate]    Social History     Tobacco Use    Smoking status: Current Some Day Smoker     Packs/day: 1.00     Years: 44.00     Pack years: 44.00     Types: Cigarettes     Start date: 0    Smokeless tobacco: Never Used    Tobacco comment: Pt quit in 9/26/2018& started back 2/2020   Substance Use Topics    Alcohol use: No     Alcohol/week: 0.0 standard drinks        Review of Systems:  Respiratory: pos. For cough SOB  Cardiovascular: negative for chest pain   Gastrointestinal: negative for abdominal pain, diarrhea, nausea and vomiting  Genitourinary:negative for dysuria and hematuria  Derm: negative for rash and skin lesion(s)  Neurological: negative for seizures and tremors  Endocrine: negative for diabetic symptoms including polydipsia and polyuria    Physical Exam:  Vitals:    09/11/20 0656   BP:    Pulse:    Resp:    Temp: 99.5 °F (37.5 °C)   SpO2:       Skin:  Warm and dry. No rash or bruises  HEENT:  PERRLA, EOMI trach.   Neck:  No JVD, No thyromegaly, No carotid bruit  Cardiac:  RRR, No gallop or murmur  Lungs:  Scattered coarse rales   Abdomen: Normal bowel sounds, no HSM, non-tender  Extremities:  No clubbing, edema or cyanosis  Neurological:  Moves all extremities. Labs:    CBC with Differential:    Lab Results   Component Value Date    WBC 7.3 09/10/2020    RBC 5.40 09/10/2020    HGB 13.1 09/10/2020    HCT 40.9 09/10/2020     09/10/2020    MCV 75.7 09/10/2020    MCH 24.3 09/10/2020    MCHC 32.0 09/10/2020    RDW 18.4 09/10/2020    SEGSPCT 59 02/22/2012    LYMPHOPCT 11.7 09/10/2020    MONOPCT 5.9 09/10/2020    BASOPCT 0.6 09/10/2020    MONOSABS 0.43 09/10/2020    LYMPHSABS 0.85 09/10/2020    EOSABS 0.25 09/10/2020    BASOSABS 0.04 09/10/2020     CMP:    Lab Results   Component Value Date     09/10/2020    K 5.1 09/10/2020    K 4.0 09/09/2020    CL 96 09/10/2020    CO2 21 09/10/2020    BUN 10 09/10/2020    CREATININE 1.3 09/10/2020    GFRAA >60 09/10/2020    LABGLOM 55 09/10/2020    GLUCOSE 89 09/10/2020    GLUCOSE 130 02/22/2012    PROT 7.3 09/10/2020    LABALBU 3.5 09/10/2020    CALCIUM 9.5 09/10/2020    BILITOT 0.8 09/10/2020    ALKPHOS 131 09/10/2020    AST 42 09/10/2020    ALT 22 09/10/2020      Imaging: CTA chest   1. Findings suggest bilateral pneumonia throughout both lungs on    background of chronic interstitial lung disease.         2. No pulmonary embolism.         3. Stable ascending thoracic aortic aneurysm measures up to 4.2 cm.         4. Stable fusiform infrarenal abdominal aortic aneurysm measures up to    3 cm.         5. Mild stenosis is seen involving origin of celiac trunk.         6. Moderate stenosis is seen involving proximal left renal artery and    mild stenosis is seen involving proximal right renal artery. Assessment and Plan:    Patient Active Problem List   Diagnosis    Bilateral pneumonia ?organism     [facility]) [facility]    Tobacco dependence    Abdominal aortic aneurysm without rupture ([facility]    Lung Ca by Hx     Trach.

## 2020-09-12 LAB
L. PNEUMOPHILA SEROGP 1 UR AG: NORMAL
STREP PNEUMONIAE ANTIGEN, URINE: NORMAL

## 2020-09-12 PROCEDURE — 6370000000 HC RX 637 (ALT 250 FOR IP): Performed by: INTERNAL MEDICINE

## 2020-09-12 PROCEDURE — 2060000000 HC ICU INTERMEDIATE R&B

## 2020-09-12 PROCEDURE — 6360000002 HC RX W HCPCS: Performed by: INTERNAL MEDICINE

## 2020-09-12 PROCEDURE — 6370000000 HC RX 637 (ALT 250 FOR IP): Performed by: NEUROMUSCULOSKELETAL MEDICINE & OMM

## 2020-09-12 PROCEDURE — 2700000000 HC OXYGEN THERAPY PER DAY

## 2020-09-12 PROCEDURE — 94640 AIRWAY INHALATION TREATMENT: CPT

## 2020-09-12 PROCEDURE — 2580000003 HC RX 258: Performed by: INTERNAL MEDICINE

## 2020-09-12 PROCEDURE — 2580000003 HC RX 258: Performed by: NEUROMUSCULOSKELETAL MEDICINE & OMM

## 2020-09-12 RX ORDER — SODIUM CHLORIDE 9 MG/ML
INJECTION, SOLUTION INTRAVENOUS CONTINUOUS
Status: DISCONTINUED | OUTPATIENT
Start: 2020-09-12 | End: 2020-09-16

## 2020-09-12 RX ORDER — FENTANYL 100 UG/H
1 PATCH TRANSDERMAL
Status: DISCONTINUED | OUTPATIENT
Start: 2020-09-12 | End: 2020-09-18 | Stop reason: HOSPADM

## 2020-09-12 RX ADMIN — APIXABAN 5 MG: 5 TABLET, FILM COATED ORAL at 22:51

## 2020-09-12 RX ADMIN — PANTOPRAZOLE SODIUM 40 MG: 40 TABLET, DELAYED RELEASE ORAL at 05:56

## 2020-09-12 RX ADMIN — SODIUM CHLORIDE 25 ML: 9 INJECTION, SOLUTION INTRAVENOUS at 19:00

## 2020-09-12 RX ADMIN — APIXABAN 5 MG: 5 TABLET, FILM COATED ORAL at 09:15

## 2020-09-12 RX ADMIN — PREDNISONE 10 MG: 10 TABLET ORAL at 09:17

## 2020-09-12 RX ADMIN — BUPROPION HYDROCHLORIDE 75 MG: 75 TABLET, FILM COATED ORAL at 09:15

## 2020-09-12 RX ADMIN — PIPERACILLIN SODIUM AND TAZOBACTAM SODIUM 3.38 G: 3; .375 INJECTION, POWDER, LYOPHILIZED, FOR SOLUTION INTRAVENOUS at 05:56

## 2020-09-12 RX ADMIN — DULOXETINE HYDROCHLORIDE 30 MG: 30 CAPSULE, DELAYED RELEASE ORAL at 09:16

## 2020-09-12 RX ADMIN — SODIUM CHLORIDE 25 ML: 9 INJECTION, SOLUTION INTRAVENOUS at 03:16

## 2020-09-12 RX ADMIN — ALBUTEROL SULFATE 0.63 MG: 0.63 SOLUTION RESPIRATORY (INHALATION) at 12:17

## 2020-09-12 RX ADMIN — BUPROPION HYDROCHLORIDE 75 MG: 75 TABLET, FILM COATED ORAL at 22:51

## 2020-09-12 RX ADMIN — ALBUTEROL SULFATE 0.63 MG: 0.63 SOLUTION RESPIRATORY (INHALATION) at 20:11

## 2020-09-12 RX ADMIN — LEVOTHYROXINE SODIUM 125 MCG: 0.12 TABLET ORAL at 05:56

## 2020-09-12 RX ADMIN — DOXYCYCLINE HYCLATE 100 MG: 100 CAPSULE ORAL at 22:52

## 2020-09-12 RX ADMIN — ALBUTEROL SULFATE 0.63 MG: 0.63 SOLUTION RESPIRATORY (INHALATION) at 06:07

## 2020-09-12 RX ADMIN — ATORVASTATIN CALCIUM 40 MG: 40 TABLET, FILM COATED ORAL at 22:51

## 2020-09-12 RX ADMIN — DOCUSATE SODIUM 50 MG AND SENNOSIDES 8.6 MG 2 TABLET: 8.6; 5 TABLET, FILM COATED ORAL at 09:17

## 2020-09-12 RX ADMIN — PIPERACILLIN SODIUM AND TAZOBACTAM SODIUM 3.38 G: 3; .375 INJECTION, POWDER, LYOPHILIZED, FOR SOLUTION INTRAVENOUS at 22:51

## 2020-09-12 RX ADMIN — PIPERACILLIN SODIUM AND TAZOBACTAM SODIUM 3.38 G: 3; .375 INJECTION, POWDER, LYOPHILIZED, FOR SOLUTION INTRAVENOUS at 15:03

## 2020-09-12 RX ADMIN — ARFORMOTEROL TARTRATE 15 MCG: 15 SOLUTION RESPIRATORY (INHALATION) at 06:07

## 2020-09-12 RX ADMIN — LORAZEPAM 0.5 MG: 0.5 TABLET ORAL at 22:51

## 2020-09-12 RX ADMIN — BUDESONIDE 500 MCG: 0.5 SUSPENSION RESPIRATORY (INHALATION) at 06:08

## 2020-09-12 RX ADMIN — ARFORMOTEROL TARTRATE 15 MCG: 15 SOLUTION RESPIRATORY (INHALATION) at 20:11

## 2020-09-12 RX ADMIN — SODIUM CHLORIDE: 9 INJECTION, SOLUTION INTRAVENOUS at 22:56

## 2020-09-12 RX ADMIN — DULOXETINE HYDROCHLORIDE 30 MG: 30 CAPSULE, DELAYED RELEASE ORAL at 22:51

## 2020-09-12 RX ADMIN — TAMSULOSIN HYDROCHLORIDE 0.4 MG: 0.4 CAPSULE ORAL at 22:51

## 2020-09-12 RX ADMIN — BUDESONIDE 500 MCG: 0.5 SUSPENSION RESPIRATORY (INHALATION) at 20:11

## 2020-09-12 RX ADMIN — CHLORHEXIDINE GLUCONATE 0.12% ORAL RINSE 15 ML: 1.2 LIQUID ORAL at 09:16

## 2020-09-12 RX ADMIN — MIRTAZAPINE 30 MG: 15 TABLET, FILM COATED ORAL at 22:52

## 2020-09-12 RX ADMIN — DOXYCYCLINE HYCLATE 100 MG: 100 CAPSULE ORAL at 09:16

## 2020-09-12 RX ADMIN — OXYCODONE AND ACETAMINOPHEN 1 TABLET: 5; 325 TABLET ORAL at 22:51

## 2020-09-12 RX ADMIN — Medication 5000 UNITS: at 09:17

## 2020-09-12 RX ADMIN — Medication 10 MG: at 22:52

## 2020-09-12 ASSESSMENT — PAIN SCALES - GENERAL
PAINLEVEL_OUTOF10: 0
PAINLEVEL_OUTOF10: 6
PAINLEVEL_OUTOF10: 4
PAINLEVEL_OUTOF10: 6

## 2020-09-12 NOTE — PROGRESS NOTES
[facility] PROGRESS NOTE      Chief complaint: Follow-up of pneumonia    The patient is a 72 y.o. 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 On admission, he was afebrile and hemodynamically stable with no leukocytosis. Urinalysis showed no pyuria. SARS-CoV-2 PCR was not detected. CTA of chest, abdomen and pelvis showed interstitial and scattered groundglass opacities bilaterally toward lung periphery and more predominant in the lung bases; no bowel obstruction or free air or fluid in the abdomen or pelvis; unremarkable liver, gallbladder, and pancreas; grossly normal kidneys with no hydronephrosis. He received a dose of vancomycin on admission. Cefepime and doxycycline were started on admission. Subjective: Patient was seen and examined. No chills, no abdominal pain, no diarrhea, no rash, no itching. He reports feeling better. Objective:    Vitals:    09/12/20 0730   BP: (!) 108/58   Pulse: 90   Resp: 22   Temp: 98.3 °F (36.8 °C)   SpO2: 92%     Constitutional: Alert, not in distress  Respiratory: Clear breath sounds, no crackles, no wheezes  Cardiovascular: Regular rate and rhythm, no murmurs  Gastrointestinal: Bowel sounds present, soft, nontender  Skin: Warm and dry, no active dermatoses  Musculoskeletal: No joint swelling, no joint erythema    Labs, imaging, and medical records/notes were personally reviewed.     Assessment:  Acute hypoxic respiratory failure  Pneumonia  Immunocompromised state   Lung adenocarcinoma on immunotherapy     Plan:  Contiue piperacillin-tazobactam 3.375g q8h and doxycycline 100 mg BID for now. Follow up urine Streptococcus pneumoniae and Legionella antigens. Check tracheal aspirate cultures. Follow up blood cultures.     Thank you for involving me in the care of Wally Oneil. . I will continue to follow. Please do not hesitate to call for any questions or concerns.     Electronically signed by Sierra Humphries MD on 9/12/2020 at 9:33 AM

## 2020-09-12 NOTE — PLAN OF CARE
Problem: Falls - Risk of:  Goal: Will remain free from falls  Description: Will remain free from falls  Outcome: Met This Shift  Goal: Absence of physical injury  Description: Absence of physical injury  Outcome: Met This Shift     Problem: Breathing Pattern - Ineffective:  Goal: Ability to achieve and maintain a regular respiratory rate will improve  Description: Ability to achieve and maintain a regular respiratory rate will improve  Outcome: Met This Shift

## 2020-09-12 NOTE — PROGRESS NOTES
General Progress Note  9/12/2020 6:17 AM  Subjective:   Admit Date: 9/10/2020  PCP: Johnny Quintana DO  Interval History: covering for Dr. Terrence Deras. No acute issues overnight. Seen by ID and Pulmonary. Diet: DIET GENERAL;  Pain is:None  Nausea:None  Bowel Movement/Flatus yes    Data:   Scheduled Meds:   albuterol  1 ampule Nebulization Q6H    apixaban  5 mg Oral BID    budesonide  500 mcg Nebulization BID    chlorhexidine  15 mL Mouth/Throat Daily    vitamin D  5,000 Units Oral Daily    doxycycline hyclate  100 mg Oral BID    DULoxetine  30 mg Oral BID    pantoprazole  40 mg Oral QAM AC    Arformoterol Tartrate  15 mcg Nebulization BID    levothyroxine  125 mcg Oral Daily    melatonin  10 mg Oral Nightly    tamsulosin  0.4 mg Oral Nightly    atorvastatin  40 mg Oral Nightly    sennosides-docusate sodium  2 tablet Oral Daily    predniSONE  10 mg Oral Daily    buPROPion  75 mg Oral BID    piperacillin-tazobactam  3.375 g Intravenous Q8H    sodium chloride  25 mL Intravenous Q8H    mirtazapine  30 mg Oral Nightly     Continuous Infusions:  PRN Meds:oxyCODONE-acetaminophen, LORazepam  I/O last 3 completed shifts:   In: 120 [P.O.:120]  Out: -   I/O this shift:  In: 263 [I.V.:263]  Out: 500 [Urine:500]    Intake/Output Summary (Last 24 hours) at 9/12/2020 0617  Last data filed at 9/12/2020 0558  Gross per 24 hour   Intake 383 ml   Output 500 ml   Net -117 ml       CBC with Differential:    Lab Results   Component Value Date    WBC 7.3 09/10/2020    RBC 5.40 09/10/2020    HGB 13.1 09/10/2020    HCT 40.9 09/10/2020     09/10/2020    MCV 75.7 09/10/2020    MCH 24.3 09/10/2020    MCHC 32.0 09/10/2020    RDW 18.4 09/10/2020    SEGSPCT 59 02/22/2012    LYMPHOPCT 11.7 09/10/2020    MONOPCT 5.9 09/10/2020    BASOPCT 0.6 09/10/2020    MONOSABS 0.43 09/10/2020    LYMPHSABS 0.85 09/10/2020    EOSABS 0.25 09/10/2020    BASOSABS 0.04 09/10/2020     CMP:    Lab Results   Component Value Date     09/10/2020    K 5.1 09/10/2020    K 4.0 09/09/2020    CL 96 09/10/2020    CO2 21 09/10/2020    BUN 10 09/10/2020    CREATININE 1.3 09/10/2020    GFRAA >60 09/10/2020    LABGLOM 55 09/10/2020    GLUCOSE 89 09/10/2020    GLUCOSE 130 02/22/2012    PROT 7.3 09/10/2020    LABALBU 3.5 09/10/2020    CALCIUM 9.5 09/10/2020    BILITOT 0.8 09/10/2020    ALKPHOS 131 09/10/2020    AST 42 09/10/2020    ALT 22 09/10/2020     Magnesium:    Lab Results   Component Value Date    MG 2.0 05/13/2020     Phosphorus:    Lab Results   Component Value Date    PHOS 3.1 05/13/2020     PT/INR:    Lab Results   Component Value Date    PROTIME 15.2 09/09/2020    INR 1.3 09/09/2020     Last 3 Troponin:    Lab Results   Component Value Date    TROPONINI 0.02 09/10/2020    TROPONINI 0.03 09/09/2020    TROPONINI 0.02 01/23/2020     U/A:    Lab Results   Component Value Date    COLORU Yellow 09/10/2020    PHUR 7.0 09/10/2020    LABCAST MANY 01/23/2020    WBCUA NONE 09/10/2020    RBCUA 2-5 09/10/2020    BACTERIA FEW 09/10/2020    CLARITYU Clear 09/10/2020    SPECGRAV 1.015 09/10/2020    LEUKOCYTESUR Negative 09/10/2020    UROBILINOGEN 0.2 09/10/2020    BILIRUBINUR Negative 09/10/2020    BLOODU SMALL 09/10/2020    GLUCOSEU Negative 09/10/2020    AMORPHOUS FEW 01/23/2020     HgBA1c:  No components found for: HGBA1C  TSH:    Lab Results   Component Value Date    TSH 3.450 05/13/2020     -----------------------------------------------------------------    Objective:   Vitals: /76   Pulse 82   Temp 97.5 °F (36.4 °C) (Temporal)   Resp 24   Ht 5' 11\" (1.803 m)   Wt 211 lb (95.7 kg)   SpO2 92%   BMI 29.43 kg/m²   General appearance: alert, appears stated age and cooperative  Skin: Skin color, texture, turgor normal. No rashes or lesions  HEENT: Head: Normal, normocephalic, atraumatic.   Neck: no adenopathy, no carotid bruit, no JVD, supple, symmetrical, trachea midline and thyroid not enlarged, symmetric, no tenderness/mass/nodules  Lungs: diminished breath sounds bibasilar and dullness to percussion LLL, RLL and RML  Heart: regular rate and rhythm, S1, S2 normal, no murmur, click, rub or gallop  Abdomen: soft, non-tender; bowel sounds normal; no masses,  no organomegaly  Extremities: extremities normal, atraumatic, no cyanosis or edema  Neurologic: Mental status: Alert, oriented, thought content appropriate    Assessment:   Principal Problem:    Pneumonia  Active Problems:    COPD (chronic obstructive pulmonary disease) (HCC)    Tobacco dependence    Abdominal aortic aneurysm without rupture (HCC)    Pharyngeal cancer (HCC)    Benign prostatic hyperplasia without lower urinary tract symptoms    Acquired hypothyroidism    Other hyperlipidemia    DDD (degenerative disc disease), lumbar    Lumbar radiculopathy    Spinal stenosis of lumbar region  Resolved Problems:    * No resolved hospital problems. *    Plan:   1. Continue antibiotic regimen per ID. 2. Continue Pulmonary toiletry. 3. Will follow.     Noelle Rivera D.O.  6:17 AM  9/12/2020

## 2020-09-13 LAB
ALBUMIN SERPL-MCNC: 3.3 G/DL (ref 3.5–5.2)
ALP BLD-CCNC: 117 U/L (ref 40–129)
ALT SERPL-CCNC: 18 U/L (ref 0–40)
ANION GAP SERPL CALCULATED.3IONS-SCNC: 19 MMOL/L (ref 7–16)
AST SERPL-CCNC: 23 U/L (ref 0–39)
BASOPHILS ABSOLUTE: 0.05 E9/L (ref 0–0.2)
BASOPHILS RELATIVE PERCENT: 0.7 % (ref 0–2)
BILIRUB SERPL-MCNC: 0.5 MG/DL (ref 0–1.2)
BUN BLDV-MCNC: 16 MG/DL (ref 8–23)
CALCIUM SERPL-MCNC: 9.9 MG/DL (ref 8.6–10.2)
CHLORIDE BLD-SCNC: 97 MMOL/L (ref 98–107)
CO2: 19 MMOL/L (ref 22–29)
CREAT SERPL-MCNC: 1.6 MG/DL (ref 0.7–1.2)
EOSINOPHILS ABSOLUTE: 0.32 E9/L (ref 0.05–0.5)
EOSINOPHILS RELATIVE PERCENT: 4.2 % (ref 0–6)
GFR AFRICAN AMERICAN: 53
GFR NON-AFRICAN AMERICAN: 44 ML/MIN/1.73
GLUCOSE BLD-MCNC: 80 MG/DL (ref 74–99)
HCT VFR BLD CALC: 43.5 % (ref 37–54)
HEMOGLOBIN: 13.9 G/DL (ref 12.5–16.5)
IMMATURE GRANULOCYTES #: 0.05 E9/L
IMMATURE GRANULOCYTES %: 0.7 % (ref 0–5)
LYMPHOCYTES ABSOLUTE: 0.91 E9/L (ref 1.5–4)
LYMPHOCYTES RELATIVE PERCENT: 12 % (ref 20–42)
MCH RBC QN AUTO: 24.6 PG (ref 26–35)
MCHC RBC AUTO-ENTMCNC: 32 % (ref 32–34.5)
MCV RBC AUTO: 76.9 FL (ref 80–99.9)
MONOCYTES ABSOLUTE: 0.62 E9/L (ref 0.1–0.95)
MONOCYTES RELATIVE PERCENT: 8.2 % (ref 2–12)
NEUTROPHILS ABSOLUTE: 5.61 E9/L (ref 1.8–7.3)
NEUTROPHILS RELATIVE PERCENT: 74.2 % (ref 43–80)
PDW BLD-RTO: 19.1 FL (ref 11.5–15)
PLATELET # BLD: 304 E9/L (ref 130–450)
PMV BLD AUTO: 10 FL (ref 7–12)
POTASSIUM SERPL-SCNC: 4.1 MMOL/L (ref 3.5–5)
RBC # BLD: 5.66 E12/L (ref 3.8–5.8)
SODIUM BLD-SCNC: 135 MMOL/L (ref 132–146)
TOTAL PROTEIN: 7 G/DL (ref 6.4–8.3)
WBC # BLD: 7.6 E9/L (ref 4.5–11.5)

## 2020-09-13 PROCEDURE — 6370000000 HC RX 637 (ALT 250 FOR IP): Performed by: INTERNAL MEDICINE

## 2020-09-13 PROCEDURE — 85025 COMPLETE CBC W/AUTO DIFF WBC: CPT

## 2020-09-13 PROCEDURE — 2060000000 HC ICU INTERMEDIATE R&B

## 2020-09-13 PROCEDURE — 80053 COMPREHEN METABOLIC PANEL: CPT

## 2020-09-13 PROCEDURE — 2580000003 HC RX 258: Performed by: INTERNAL MEDICINE

## 2020-09-13 PROCEDURE — 94640 AIRWAY INHALATION TREATMENT: CPT

## 2020-09-13 PROCEDURE — 6360000002 HC RX W HCPCS: Performed by: INTERNAL MEDICINE

## 2020-09-13 PROCEDURE — 2700000000 HC OXYGEN THERAPY PER DAY

## 2020-09-13 PROCEDURE — 36415 COLL VENOUS BLD VENIPUNCTURE: CPT

## 2020-09-13 RX ADMIN — CHLORHEXIDINE GLUCONATE 0.12% ORAL RINSE 15 ML: 1.2 LIQUID ORAL at 10:11

## 2020-09-13 RX ADMIN — Medication 5000 UNITS: at 10:12

## 2020-09-13 RX ADMIN — BUDESONIDE 500 MCG: 0.5 SUSPENSION RESPIRATORY (INHALATION) at 20:25

## 2020-09-13 RX ADMIN — ALBUTEROL SULFATE 0.63 MG: 0.63 SOLUTION RESPIRATORY (INHALATION) at 07:41

## 2020-09-13 RX ADMIN — ALBUTEROL SULFATE 0.63 MG: 0.63 SOLUTION RESPIRATORY (INHALATION) at 02:20

## 2020-09-13 RX ADMIN — DULOXETINE HYDROCHLORIDE 30 MG: 30 CAPSULE, DELAYED RELEASE ORAL at 10:11

## 2020-09-13 RX ADMIN — BUDESONIDE 500 MCG: 0.5 SUSPENSION RESPIRATORY (INHALATION) at 07:41

## 2020-09-13 RX ADMIN — DOCUSATE SODIUM 50 MG AND SENNOSIDES 8.6 MG 2 TABLET: 8.6; 5 TABLET, FILM COATED ORAL at 10:12

## 2020-09-13 RX ADMIN — PIPERACILLIN SODIUM AND TAZOBACTAM SODIUM 3.38 G: 3; .375 INJECTION, POWDER, LYOPHILIZED, FOR SOLUTION INTRAVENOUS at 06:29

## 2020-09-13 RX ADMIN — ARFORMOTEROL TARTRATE 15 MCG: 15 SOLUTION RESPIRATORY (INHALATION) at 20:25

## 2020-09-13 RX ADMIN — SODIUM CHLORIDE 25 ML: 9 INJECTION, SOLUTION INTRAVENOUS at 03:00

## 2020-09-13 RX ADMIN — APIXABAN 5 MG: 5 TABLET, FILM COATED ORAL at 10:10

## 2020-09-13 RX ADMIN — PIPERACILLIN SODIUM AND TAZOBACTAM SODIUM 3.38 G: 3; .375 INJECTION, POWDER, LYOPHILIZED, FOR SOLUTION INTRAVENOUS at 17:29

## 2020-09-13 RX ADMIN — DOXYCYCLINE HYCLATE 100 MG: 100 CAPSULE ORAL at 10:11

## 2020-09-13 RX ADMIN — PANTOPRAZOLE SODIUM 40 MG: 40 TABLET, DELAYED RELEASE ORAL at 06:29

## 2020-09-13 RX ADMIN — BUPROPION HYDROCHLORIDE 75 MG: 75 TABLET, FILM COATED ORAL at 10:10

## 2020-09-13 RX ADMIN — PREDNISONE 10 MG: 10 TABLET ORAL at 10:12

## 2020-09-13 RX ADMIN — LEVOTHYROXINE SODIUM 125 MCG: 0.12 TABLET ORAL at 06:29

## 2020-09-13 RX ADMIN — ALBUTEROL SULFATE 0.63 MG: 0.63 SOLUTION RESPIRATORY (INHALATION) at 15:16

## 2020-09-13 RX ADMIN — ARFORMOTEROL TARTRATE 15 MCG: 15 SOLUTION RESPIRATORY (INHALATION) at 07:41

## 2020-09-13 RX ADMIN — ALBUTEROL SULFATE 0.63 MG: 0.63 SOLUTION RESPIRATORY (INHALATION) at 20:25

## 2020-09-13 ASSESSMENT — PAIN SCALES - GENERAL
PAINLEVEL_OUTOF10: 0
PAINLEVEL_OUTOF10: 0

## 2020-09-13 NOTE — PROGRESS NOTES
General Progress Note  9/13/2020 8:41 AM  Subjective:   Admit Date: 9/10/2020  PCP: Sylvia Quintana DO  Interval History: no acute issues overnight. Wife worried about po intake. So, IV fluids were started with blood work ordered this a.m. Diet: DIET GENERAL;  Pain is:None  Nausea:None  Bowel Movement/Flatus yes    Data:   Scheduled Meds:   fentaNYL  1 patch Transdermal Q72H    albuterol  1 ampule Nebulization Q6H    apixaban  5 mg Oral BID    budesonide  500 mcg Nebulization BID    chlorhexidine  15 mL Mouth/Throat Daily    vitamin D  5,000 Units Oral Daily    doxycycline hyclate  100 mg Oral BID    DULoxetine  30 mg Oral BID    pantoprazole  40 mg Oral QAM AC    Arformoterol Tartrate  15 mcg Nebulization BID    levothyroxine  125 mcg Oral Daily    melatonin  10 mg Oral Nightly    tamsulosin  0.4 mg Oral Nightly    atorvastatin  40 mg Oral Nightly    sennosides-docusate sodium  2 tablet Oral Daily    predniSONE  10 mg Oral Daily    buPROPion  75 mg Oral BID    piperacillin-tazobactam  3.375 g Intravenous Q8H    sodium chloride  25 mL Intravenous Q8H    mirtazapine  30 mg Oral Nightly     Continuous Infusions:   sodium chloride 75 mL/hr at 09/12/20 2256     PRN Meds:oxyCODONE-acetaminophen, LORazepam  I/O last 3 completed shifts: In: 120 [P.O.:120]  Out: -   No intake/output data recorded.     Intake/Output Summary (Last 24 hours) at 9/13/2020 0841  Last data filed at 9/12/2020 1859  Gross per 24 hour   Intake 0 ml   Output --   Net 0 ml       CBC with Differential:    Lab Results   Component Value Date    WBC 7.3 09/10/2020    RBC 5.40 09/10/2020    HGB 13.1 09/10/2020    HCT 40.9 09/10/2020     09/10/2020    MCV 75.7 09/10/2020    MCH 24.3 09/10/2020    MCHC 32.0 09/10/2020    RDW 18.4 09/10/2020    SEGSPCT 59 02/22/2012    LYMPHOPCT 11.7 09/10/2020    MONOPCT 5.9 09/10/2020    BASOPCT 0.6 09/10/2020    MONOSABS 0.43 09/10/2020    LYMPHSABS 0.85 09/10/2020    EOSABS 0.25 09/10/2020 BASOSABS 0.04 09/10/2020     CMP:    Lab Results   Component Value Date     09/10/2020    K 5.1 09/10/2020    K 4.0 09/09/2020    CL 96 09/10/2020    CO2 21 09/10/2020    BUN 10 09/10/2020    CREATININE 1.3 09/10/2020    GFRAA >60 09/10/2020    LABGLOM 55 09/10/2020    GLUCOSE 89 09/10/2020    GLUCOSE 130 02/22/2012    PROT 7.3 09/10/2020    LABALBU 3.5 09/10/2020    CALCIUM 9.5 09/10/2020    BILITOT 0.8 09/10/2020    ALKPHOS 131 09/10/2020    AST 42 09/10/2020    ALT 22 09/10/2020     Magnesium:    Lab Results   Component Value Date    MG 2.0 05/13/2020     Phosphorus:    Lab Results   Component Value Date    PHOS 3.1 05/13/2020     PT/INR:    Lab Results   Component Value Date    PROTIME 15.2 09/09/2020    INR 1.3 09/09/2020     Last 3 Troponin:    Lab Results   Component Value Date    TROPONINI 0.02 09/10/2020    TROPONINI 0.03 09/09/2020    TROPONINI 0.02 01/23/2020     U/A:    Lab Results   Component Value Date    COLORU Yellow 09/10/2020    PHUR 7.0 09/10/2020    LABCAST MANY 01/23/2020    WBCUA NONE 09/10/2020    RBCUA 2-5 09/10/2020    BACTERIA FEW 09/10/2020    CLARITYU Clear 09/10/2020    SPECGRAV 1.015 09/10/2020    LEUKOCYTESUR Negative 09/10/2020    UROBILINOGEN 0.2 09/10/2020    BILIRUBINUR Negative 09/10/2020    BLOODU SMALL 09/10/2020    GLUCOSEU Negative 09/10/2020    AMORPHOUS FEW 01/23/2020     HgBA1c:  No components found for: HGBA1C  TSH:    Lab Results   Component Value Date    TSH 3.450 05/13/2020     -----------------------------------------------------------------    Objective:   Vitals: BP (!) 95/59   Pulse 92   Temp 98.4 °F (36.9 °C) (Temporal)   Resp 25   Ht 5' 11\" (1.803 m)   Wt 211 lb (95.7 kg)   SpO2 92%   BMI 29.43 kg/m²   General appearance: alert, appears stated age and cooperative  Skin: Skin color, texture, turgor normal. No rashes or lesions  HEENT: Head: Normal, normocephalic, atraumatic.   Neck: no adenopathy, no carotid bruit, no JVD, supple, symmetrical, trachea midline and thyroid not enlarged, symmetric, no tenderness/mass/nodules  Lungs: diminished breath sounds bibasilar  Heart: regular rate and rhythm, S1, S2 normal, no murmur, click, rub or gallop  Abdomen: soft, non-tender; bowel sounds normal; no masses,  no organomegaly  Extremities: extremities normal, atraumatic, no cyanosis or edema  Neurologic: Mental status: Alert, oriented, thought content appropriate    Assessment:   Principal Problem:    Pneumonia  Active Problems:    COPD (chronic obstructive pulmonary disease) (HCC)    Tobacco dependence    Abdominal aortic aneurysm without rupture (HCC)    Pharyngeal cancer (HCC)    Benign prostatic hyperplasia without lower urinary tract symptoms    Acquired hypothyroidism    Other hyperlipidemia    DDD (degenerative disc disease), lumbar    Lumbar radiculopathy    Spinal stenosis of lumbar region  Resolved Problems:    * No resolved hospital problems. *    Plan:   1. IV fluids started yesterday. 2. Will check CBC and CMP today. 3. Dr. Castro to see in a.m. .    Veronica Triana D.O.  8:41 AM  9/13/2020

## 2020-09-13 NOTE — PROGRESS NOTES
[facility] PROGRESS NOTE      Chief complaint: Follow-up of pneumonia    The patient is a 72 y.o. 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 On admission, he was afebrile and hemodynamically stable with no leukocytosis. Urinalysis showed no pyuria. SARS-CoV-2 PCR was not detected. Urine Streptococcus pneumoniae and Legionella antigens were negative. CTA of chest, abdomen and pelvis showed interstitial and scattered groundglass opacities bilaterally toward lung periphery and more predominant in the lung bases; no bowel obstruction or free air or fluid in the abdomen or pelvis; unremarkable liver, gallbladder, and pancreas; grossly normal kidneys with no hydronephrosis. He received a dose of vancomycin on admission. Cefepime and doxycycline were started on admission. Subjective: Patient was seen and examined. He is less responsive today. According to his wife, he has been refusing to eat or drink. Objective:    Vitals:    09/13/20 0815   BP: (!) 95/59   Pulse: 92   Resp: 25   Temp: 98.4 °F (36.9 °C)   SpO2: 92%     Constitutional: Lethargic, not in distress  Respiratory: Clear breath sounds, no crackles, no wheezes  Cardiovascular: Regular rate and rhythm, no murmurs  Gastrointestinal: Bowel sounds present, soft, nontender  Skin: Warm and dry, no active dermatoses  Musculoskeletal: No joint swelling, no joint erythema    Labs, imaging, and medical records/notes were personally reviewed.     Assessment:  Acute hypoxic respiratory failure  Pneumonia  Immunocompromised state   Lung adenocarcinoma on immunotherapy     Plan:  Contiue piperacillin-tazobactam 3.375g q8h and doxycycline 100 mg BID for now. Check tracheal aspirate cultures. Follow up blood cultures.     Thank you for involving me in the care of Wally Oneil. . I will continue to follow. Please do not hesitate to call for any questions or concerns.     Electronically signed by Sierra Humphries MD on 9/13/2020 at 9:18 AM

## 2020-09-14 PROCEDURE — 2580000003 HC RX 258: Performed by: INTERNAL MEDICINE

## 2020-09-14 PROCEDURE — 6370000000 HC RX 637 (ALT 250 FOR IP): Performed by: NEUROMUSCULOSKELETAL MEDICINE & OMM

## 2020-09-14 PROCEDURE — 97161 PT EVAL LOW COMPLEX 20 MIN: CPT

## 2020-09-14 PROCEDURE — 6360000002 HC RX W HCPCS: Performed by: INTERNAL MEDICINE

## 2020-09-14 PROCEDURE — 2700000000 HC OXYGEN THERAPY PER DAY

## 2020-09-14 PROCEDURE — 97530 THERAPEUTIC ACTIVITIES: CPT

## 2020-09-14 PROCEDURE — 87186 SC STD MICRODIL/AGAR DIL: CPT

## 2020-09-14 PROCEDURE — 2060000000 HC ICU INTERMEDIATE R&B

## 2020-09-14 PROCEDURE — 87206 SMEAR FLUORESCENT/ACID STAI: CPT

## 2020-09-14 PROCEDURE — 94640 AIRWAY INHALATION TREATMENT: CPT

## 2020-09-14 PROCEDURE — 51702 INSERT TEMP BLADDER CATH: CPT

## 2020-09-14 PROCEDURE — 87070 CULTURE OTHR SPECIMN AEROBIC: CPT

## 2020-09-14 PROCEDURE — 2580000003 HC RX 258: Performed by: NEUROMUSCULOSKELETAL MEDICINE & OMM

## 2020-09-14 PROCEDURE — 97166 OT EVAL MOD COMPLEX 45 MIN: CPT

## 2020-09-14 PROCEDURE — 6370000000 HC RX 637 (ALT 250 FOR IP): Performed by: INTERNAL MEDICINE

## 2020-09-14 PROCEDURE — 89220 SPUTUM SPECIMEN COLLECTION: CPT

## 2020-09-14 RX ADMIN — Medication 10 MG: at 00:08

## 2020-09-14 RX ADMIN — TAMSULOSIN HYDROCHLORIDE 0.4 MG: 0.4 CAPSULE ORAL at 00:08

## 2020-09-14 RX ADMIN — DOXYCYCLINE HYCLATE 100 MG: 100 CAPSULE ORAL at 09:10

## 2020-09-14 RX ADMIN — BUPROPION HYDROCHLORIDE 75 MG: 75 TABLET, FILM COATED ORAL at 21:05

## 2020-09-14 RX ADMIN — DOXYCYCLINE HYCLATE 100 MG: 100 CAPSULE ORAL at 00:08

## 2020-09-14 RX ADMIN — ATORVASTATIN CALCIUM 40 MG: 40 TABLET, FILM COATED ORAL at 21:05

## 2020-09-14 RX ADMIN — LORAZEPAM 0.5 MG: 0.5 TABLET ORAL at 21:05

## 2020-09-14 RX ADMIN — BUPROPION HYDROCHLORIDE 75 MG: 75 TABLET, FILM COATED ORAL at 09:10

## 2020-09-14 RX ADMIN — CHLORHEXIDINE GLUCONATE 0.12% ORAL RINSE 15 ML: 1.2 LIQUID ORAL at 09:11

## 2020-09-14 RX ADMIN — APIXABAN 5 MG: 5 TABLET, FILM COATED ORAL at 09:10

## 2020-09-14 RX ADMIN — ALBUTEROL SULFATE 0.63 MG: 0.63 SOLUTION RESPIRATORY (INHALATION) at 18:46

## 2020-09-14 RX ADMIN — DULOXETINE HYDROCHLORIDE 30 MG: 30 CAPSULE, DELAYED RELEASE ORAL at 09:10

## 2020-09-14 RX ADMIN — ARFORMOTEROL TARTRATE 15 MCG: 15 SOLUTION RESPIRATORY (INHALATION) at 18:46

## 2020-09-14 RX ADMIN — ARFORMOTEROL TARTRATE 15 MCG: 15 SOLUTION RESPIRATORY (INHALATION) at 09:18

## 2020-09-14 RX ADMIN — BUDESONIDE 500 MCG: 0.5 SUSPENSION RESPIRATORY (INHALATION) at 09:18

## 2020-09-14 RX ADMIN — PREDNISONE 10 MG: 10 TABLET ORAL at 09:10

## 2020-09-14 RX ADMIN — OXYCODONE AND ACETAMINOPHEN 1 TABLET: 5; 325 TABLET ORAL at 00:08

## 2020-09-14 RX ADMIN — DOCUSATE SODIUM 50 MG AND SENNOSIDES 8.6 MG 2 TABLET: 8.6; 5 TABLET, FILM COATED ORAL at 09:10

## 2020-09-14 RX ADMIN — PIPERACILLIN SODIUM AND TAZOBACTAM SODIUM 3.38 G: 3; .375 INJECTION, POWDER, LYOPHILIZED, FOR SOLUTION INTRAVENOUS at 06:21

## 2020-09-14 RX ADMIN — Medication 5000 UNITS: at 09:10

## 2020-09-14 RX ADMIN — PIPERACILLIN SODIUM AND TAZOBACTAM SODIUM 3.38 G: 3; .375 INJECTION, POWDER, LYOPHILIZED, FOR SOLUTION INTRAVENOUS at 15:05

## 2020-09-14 RX ADMIN — TAMSULOSIN HYDROCHLORIDE 0.4 MG: 0.4 CAPSULE ORAL at 21:05

## 2020-09-14 RX ADMIN — PIPERACILLIN SODIUM AND TAZOBACTAM SODIUM 3.38 G: 3; .375 INJECTION, POWDER, LYOPHILIZED, FOR SOLUTION INTRAVENOUS at 23:00

## 2020-09-14 RX ADMIN — BUDESONIDE 500 MCG: 0.5 SUSPENSION RESPIRATORY (INHALATION) at 18:46

## 2020-09-14 RX ADMIN — PIPERACILLIN SODIUM AND TAZOBACTAM SODIUM 3.38 G: 3; .375 INJECTION, POWDER, LYOPHILIZED, FOR SOLUTION INTRAVENOUS at 00:00

## 2020-09-14 RX ADMIN — SODIUM CHLORIDE: 9 INJECTION, SOLUTION INTRAVENOUS at 06:26

## 2020-09-14 RX ADMIN — DULOXETINE HYDROCHLORIDE 30 MG: 30 CAPSULE, DELAYED RELEASE ORAL at 00:08

## 2020-09-14 RX ADMIN — ALBUTEROL SULFATE 0.63 MG: 0.63 SOLUTION RESPIRATORY (INHALATION) at 01:45

## 2020-09-14 RX ADMIN — APIXABAN 5 MG: 5 TABLET, FILM COATED ORAL at 21:05

## 2020-09-14 RX ADMIN — OXYCODONE AND ACETAMINOPHEN 1 TABLET: 5; 325 TABLET ORAL at 21:05

## 2020-09-14 RX ADMIN — DOXYCYCLINE HYCLATE 100 MG: 100 CAPSULE ORAL at 21:05

## 2020-09-14 RX ADMIN — DULOXETINE HYDROCHLORIDE 30 MG: 30 CAPSULE, DELAYED RELEASE ORAL at 21:05

## 2020-09-14 RX ADMIN — MIRTAZAPINE 30 MG: 15 TABLET, FILM COATED ORAL at 21:05

## 2020-09-14 RX ADMIN — ALBUTEROL SULFATE 0.63 MG: 0.63 SOLUTION RESPIRATORY (INHALATION) at 09:17

## 2020-09-14 RX ADMIN — MIRTAZAPINE 30 MG: 15 TABLET, FILM COATED ORAL at 00:08

## 2020-09-14 RX ADMIN — LEVOTHYROXINE SODIUM 125 MCG: 0.12 TABLET ORAL at 06:20

## 2020-09-14 RX ADMIN — PANTOPRAZOLE SODIUM 40 MG: 40 TABLET, DELAYED RELEASE ORAL at 06:20

## 2020-09-14 RX ADMIN — ALBUTEROL SULFATE 0.63 MG: 0.63 SOLUTION RESPIRATORY (INHALATION) at 14:41

## 2020-09-14 RX ADMIN — Medication 10 MG: at 21:05

## 2020-09-14 RX ADMIN — ATORVASTATIN CALCIUM 40 MG: 40 TABLET, FILM COATED ORAL at 00:08

## 2020-09-14 RX ADMIN — APIXABAN 5 MG: 5 TABLET, FILM COATED ORAL at 00:08

## 2020-09-14 RX ADMIN — SODIUM CHLORIDE 25 ML: 9 INJECTION, SOLUTION INTRAVENOUS at 10:41

## 2020-09-14 ASSESSMENT — PAIN SCALES - GENERAL
PAINLEVEL_OUTOF10: 1
PAINLEVEL_OUTOF10: 6
PAINLEVEL_OUTOF10: 0
PAINLEVEL_OUTOF10: 7

## 2020-09-14 NOTE — PROGRESS NOTES
Date:2020  Patient Name: Gaby Ortega. MRN: [id number]  : 1955  Room: [address] [place] [address]    Referring Provider: Lazaro Wilson MD    Evaluating OT: Brendon Holland [place] #[id number]    AM-PAC Daily Activity Raw Score: 15/24    Recommended Adaptive Equipment: To be further assessed      Diagnosis:    1. Pneumonia due to organism    2. Hypoxia       Pertinent Medical History: COPD, paralyzed vocal cords, lung CA, depression, anxiety      Precautions:  Falls, chronic trach. trach mask,  marr     Home Living: Pt lives with wife n a 2 story with 1 step(s) to enter and 0 rail(s); bed/bath on 1st   Bathroom setup: tub shower with no DME   Equipment owned: ww    Prior Level of Function: indep with ADLs , indep with IADLs; ambulated with no AD  Driving: ?? Occupation: retired    Pain Level: none  Cognition: A&O: 4/4; Follows 2 step directions   Memory:  good    Sequencing:  good    Problem solving:  fair    Judgement/safety:  fair      Functional Assessment:   Initial Eval Status  Date: 20 Treatment Status  Date: STG=LTG  Time frame: 5-7 days   Feeding  general diet  currently pt has not been able to eat. SBA for ice chips  Independent    Grooming Minimal Assist   Independent    UB Dressing Minimal Assist   Independent    LB Dressing Mod Assist   Independent    Bathing Moderate Assist  Modified [place]    Toileting Maximal Assist   Stand by Assist    Bed Mobility  Supine to sit: NT   Sit to supine: NT   Supine to sit:  Independent   Sit to supine: Independent    Functional Transfers Sit to stand: Minimal Assist   Stand to sit: Minimal Assist   Stand pivot: NT   Modified [place]    Functional Mobility  (Ambulation) Min A with ww 2-3 steps forward/back   Poor activity tolerance  Mod indep with ww  Functional distance   Balance Sitting:     Static:  wfl    Dynamic:SBA  Standing: min A     Activity Tolerance Poor   1-2 minute standing tolerance x two attempts     Visual/  Perceptual Glasses: yes BUE  ROM/Strength/  Fine motor Coordination RUE: ROM WFL     Strength: grossly 3+/5      Strength:  WFL     Coordination: WFL    LUE: ROM  WFL     Strength: grossly 3+/5      Strength:  WFL     Coordination: WFL  Increase BUE strength to 4/5  to increase functional transfers to mod indep     Hearing: WFL   Sensation:   No c/o numbness or tingling   Tone:  WFL   Edema: none noted    Comments: Upon arrival patient sitting in bedside chair. Wife present in room. .   At end of session, patient in chair with call light and phone within reach, all lines and tubes intact. Overall patient demonstrated decreased activity tolerance,  decreased  independence and safety during completion of ADL/functional transfer/mobility tasks. Pt would benefit from continued skilled OT to increase safety and independence with completion of ADL/IADL tasks for functional independence and quality of life. Treatment: OT treatment provided this date includes:    Mobility-  Instruction/training on safety and improved independence with /functional transfers  and functional mobility. Standing 1 minute first trial, 1,.5 minute on second trial with support of walker.             Assessment of current deficits   Functional mobility [x]  ADLs [x] Strength [x]  Cognition []  Functional transfers  [x] IADLs [x] Safety Awareness [x]  Endurance [x]  Fine Motor Coordination [] Balance [x] Vision/perception [] Sensation []   Gross Motor Coordination [] ROM [] Delirium []                  Motor Control []    Plan of Care: OT  1-3x/week for 5-7 days PRN   [x] ADL retraining/AE recommendations   [x] Energy Conservation Techniques/Strategies      [] Neuromuscular Re-Education      [x] Functional Transfer Training         [x] Functional Mobility Training          [] Cognitive Re-Training          [] Splinting/Positioning Needs           [x] Therapeutic Activity   [x]Therapeutic Exercise   [] Visual/Perceptual   [] Delirium Prevention/Treatment

## 2020-09-14 NOTE — PROGRESS NOTES
Angela Urbina M.D.,[facility]  Carlos Clinton D.O., F.ASimaCSimaOSimaI., Roslyn Cook M.D. Graham Rayo M.D., Tamela Carter M.D. Michael Perez D.O. Daily Pulmonary Progress Note    Patient:  Kandace Holstein. 72 y.o. male MRN: [id number]     Date of Service: 9/14/2020      Synopsis     We are following patient for pneumonia    \"CC\" cough, shortness of breath    Code status: FULL       Subjective      Patient was seen and examined. Lying in bed in no distress. Oxygen at 40% [place]. Inner cannula was removed . He denies dyspnea. Cough improved. Not as much mucus expectorated this am. No fevers. Review of Systems:   Constitutional: +fatigue, sweats, malaise  Skin: Denies pigmentation, dark lesions, and rashes   HEENT: Denies hearing loss, tinnitus, ear drainage, epistaxis, sore throat, and hoarseness. Cardiovascular: Denies palpitations, chest pain, and chest pressure.   Respiratory: +dyspnea , cough, mucus+  Gastrointestinal: Denies nausea, vomiting, poor appetite, diarrhea, heartburn or reflux  Genitourinary: Denies dysuria, frequency, urgency or hematuria  Musculoskeletal: Denies myalgias, muscle weakness, and bone pain  Neurological: Denies dizziness, vertigo, headache, and focal weakness  Psychological: anxiety much improved  Endocrine: Denies heat intolerance and cold intolerance  Hematopoietic/Lymphatic: Denies bleeding problems and blood transfusions    24-hour events:  None     Objective   Vitals: /67   Pulse 85   Temp 96.9 °F (36.1 °C) (Temporal)   Resp 16   Ht 5' 11\" (1.803 m)   Wt 211 lb (95.7 kg)   SpO2 92%   BMI 29.43 kg/m²     I/O:    Intake/Output Summary (Last 24 hours) at 9/14/2020 1453  Last data filed at 9/14/2020 0649  Gross per 24 hour   Intake 1000 ml   Output 1000 ml   Net 0 ml       Vent Information  FiO2 : 40 %  SpO2: 92 %  SpO2/FiO2 ratio: 242.5                CURRENT MEDS :  Scheduled Meds:   fentaNYL  1 patch Transdermal Q72H    albuterol  1

## 2020-09-14 NOTE — PROGRESS NOTES
Admit Date: 9/10/2020    Subjective:     Week end event reviewed  Feels better     Objective:     No data found. I/O last 3 completed shifts: In: 1000 [I.V.:1000]  Out: 1000 [Urine:1000]  No intake/output data recorded. HEENT: Normal  NECK: Thyroid normal. Trach. CVS: RRR  RS: coarse rales   ABD: Soft. Non tender. No mass. Normal BS. BACK: Skin normal.  EXT: No edema. Non tender. Pulses present.    NEURO:no focal deficit       Scheduled Meds:   fentaNYL  1 patch Transdermal Q72H    albuterol  1 ampule Nebulization Q6H    apixaban  5 mg Oral BID    budesonide  500 mcg Nebulization BID    chlorhexidine  15 mL Mouth/Throat Daily    vitamin D  5,000 Units Oral Daily    doxycycline hyclate  100 mg Oral BID    DULoxetine  30 mg Oral BID    pantoprazole  40 mg Oral QAM AC    Arformoterol Tartrate  15 mcg Nebulization BID    levothyroxine  125 mcg Oral Daily    melatonin  10 mg Oral Nightly    tamsulosin  0.4 mg Oral Nightly    atorvastatin  40 mg Oral Nightly    sennosides-docusate sodium  2 tablet Oral Daily    predniSONE  10 mg Oral Daily    buPROPion  75 mg Oral BID    piperacillin-tazobactam  3.375 g Intravenous Q8H    sodium chloride  25 mL Intravenous Q8H    mirtazapine  30 mg Oral Nightly     Continuous Infusions:   sodium chloride 75 mL/hr at 09/14/20 0626       CBC with Differential:    Lab Results   Component Value Date    WBC 7.6 09/13/2020    RBC 5.66 09/13/2020    HGB 13.9 09/13/2020    HCT 43.5 09/13/2020     09/13/2020    MCV 76.9 09/13/2020    MCH 24.6 09/13/2020    MCHC 32.0 09/13/2020    RDW 19.1 09/13/2020    SEGSPCT 59 02/22/2012    LYMPHOPCT 12.0 09/13/2020    MONOPCT 8.2 09/13/2020    BASOPCT 0.7 09/13/2020    MONOSABS 0.62 09/13/2020    LYMPHSABS 0.91 09/13/2020    EOSABS 0.32 09/13/2020    BASOSABS 0.05 09/13/2020     CMP:    Lab Results   Component Value Date     09/13/2020    K 4.1 09/13/2020    K 4.0 09/09/2020    CL 97 09/13/2020    CO2 19 09/13/2020 BUN 16 09/13/2020    CREATININE 1.6 09/13/2020    GFRAA 53 09/13/2020    LABGLOM 44 09/13/2020    PROT 7.0 09/13/2020    LABALBU 3.3 09/13/2020    CALCIUM 9.9 09/13/2020    BILITOT 0.5 09/13/2020    ALKPHOS 117 09/13/2020    AST 23 09/13/2020    ALT 18 09/13/2020     PT/INR:    Lab Results   Component Value Date    PROTIME 15.2 09/09/2020    INR 1.3 09/09/2020       Assessment:     Principal Problem:    Pneumonia    COPD (chronic obstructive pulmonary disease) (HCC)    Tobacco dependence    Abdominal aortic aneurysm without rupture (HCC)    Pharyngeal cancer (HCC)    Benign prostatic hyperplasia without lower urinary tract symptoms    Acquired hypothyroidism    Other hyperlipidemia    DDD (degenerative disc disease), lumbar    Lumbar radiculopathy    Spinal stenosis of lumbar region      Plan:   Continue support ,ATB per ID

## 2020-09-14 NOTE — PROGRESS NOTES
Physical Therapy  Physical Therapy Initial Assessment     Name: Joycelyn Wolff. : 1955  MRN: [id number]    Referring Provider:  Santiago Rios MD    Date of Service: 2020    Evaluating PT:  Chao Zhang PT, DPT. [id number]    Room #:  7867/6888-C  Diagnosis:  Pneumonia   Reason for admission:  Increasing SOB  Precautions:  Falls, trach  Pertinent PMHx: lung CA, HLD, COPD, arthritis, vocal cord paralysis, jaw osteoradionecrosis, AAA  Procedures: none  Equipment Recommendations:  TBD    SUBJECTIVE:  Pt lives with wife in a 2 story home with 1 stair(s) to enter and 0 rail(s). Pt sleeps on recliner on 1st floor and bath is on 1st floor. Pt ambulated with no AD PTA. Pt independent for ADL performance. OBJECTIVE:   Initial Evaluation  Date:  Treatment   Short Term/ Long Term   Goals   AM-PAC 6 Clicks      Was pt agreeable to Eval/treatment? Yes      Does pt have pain?  Denies pain     Bed Mobility  Rolling: NT  Supine to sit: SBA  Sit to supine: NT  Scooting: SBA  Independent    Transfers Sit to stand: [place]  Stand to sit: SBA  Stand pivot: [place] no AD  Independent    Ambulation    5 feet with no AD [place]    >50 feet with AAD mod I vs no AD independent   Stair negotiation: ascended and descended  NT  >1 steps with 0 rail independent   ROM BUE:  See OT eval   BLE:  WFL     Strength BUE:  See OT eval   BLE:  knee ext 5/5  Ankle DF 5/5  Increase by 1/3 MMT grade    Balance Sitting EOB:  Independent  Dynamic Standing:  [place]  Sitting EOB:  indep  Dynamic Standing:  indep     -Pt is A & O x 4  -Sensation:  unremarkable   -Edema:  unremarkable     Therapeutic Exercises:  functional activity     Patient education  Pt educated on safety, sequencing of transfers, and role of PT    Patient response to education:   Pt verbalized understanding Pt demonstrated skill Pt requires further education in this area   Yes  With assist Yes      ASSESSMENT:    Comments:  Pt received supine in bed and agreeable to PT session   Pt unable to hold conversation with trach but wife in room providing information. Pt able to nod head yes and no. Able to sit up to EOB without hands on assistance. Slow moving needing extended time to transfer. Light lift assist needed for sit<>stand with VCs. Able to side step and pivot transfer to chair with light steadying assistance. Pt showing slow, waddled pattern. Seated in chair to end session. Pt with all needs met and call light in reach. Pt would benefit from continued PT POC to address functional deficits described above. Treatment:  Patient practiced and was instructed in the following treatment:     Patient education provided continuously throughout session for sequencing, safety maintenance, and improving any deficits found during the evaluation.  Bed mobility training - pt given verbal and tactile cues to facilitate proper sequencing and safety during rolling and supine>sit     STS and pivot transfer training - pt educated on proper hand and foot placement, safety and sequencing, and use of momentum to safely complete sit<>stand and pivot transfers with hands on assistance to complete task safely     Gait training- pt was given verbal and tactile cues to facilitate increased step height with improved balance during ambulation as well as provided with physical assistance to complete task. Pt's/ family goals   1. Return home     Patient and or family understand(s) diagnosis, prognosis, and plan of care. Yes     PLAN:    Current Treatment Recommendations   [] Strengthening     [] ROM   [x] Balance Training   [x] Endurance Training   [x] Transfer Training   [x] Gait Training   [] Stair Training   [] Positioning   [] Safety and Education Training   [] Patient/Caregiver Education   [] HEP  [] Other     Frequency of treatments: 2-5x/week x 1-2 weeks.     Time in  1305  Time out  1325    Total Treatment Time 10 minutes     Evaluation Time includes thorough review of current medical information, gathering information on past medical history/social history and prior level of function, completion of standardized testing/informal observation of tasks, assessment of data and education on plan of care and goals.     CPT codes:  [x] Low Complexity PT evaluation 34498  [] Moderate Complexity PT evaluation 70349  [] High Complexity PT evaluation 61396  [] PT Re-evaluation 10728  [] Gait training 47568 - minutes  [] Manual therapy 62792 - minutes  [x] Therapeutic activities 15555 10 minutes  [] Therapeutic exercises 85296 - minutes  [] Neuromuscular reeducation 38649 - minutes     Ramila Rico, PT, DPT  [id number]

## 2020-09-14 NOTE — CONSULTS
[facility]  Hematology/Oncology  Consult      Patient Name: Kieran Garcia. YOB: 1955  PCP: Augustine Dunbar DO   Referring Provider:      Reason for Consultation:   Chief Complaint   Patient presents with    Shortness of Breath     pt c/o sob and weakness and has not been eating. pt was seen here yesterday and discharged. History of Present Illness:  79-year-old man with history of stage IV adenocarcinoma of the lung well-known to me diagnosed in 2016 and has done remarkably well and has been over the past 3 years on single agent pembrolizumab. His PDL 1 expression was elevated at 60%. He unfortunately continues to smoke and is hospitalized again with COPD and exacerbation and pneumonia. 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 Multiple biopsies of the neck area done in 2020 were negative for malignancy and they were performed at [facility] recently  Patient was admitted through the ER with worsening dyspnea  CTA of the chest showed findings of bilateral pneumonia with chronic interstitial lung disease but no pulmonary embolus. CTA of abdomen and pelvis showed stable ascending thoracic aortic aneurysm measuring 4.2 cm and stable small 3 cm infrarenal abdominal aortic aneurysm. CBC was unremarkable except for microcytosis with elevated RBC count likely related to a thalassemia trait. His CMP shows impaired renal function with elevation in his serum creatinine to 1.6 and this is above his baseline and likely related to recent CT scan dyes. He has been initiated on IV antibiotics with Zosyn along with aerosol treatments.     Diagnostic Data:     Past Medical History:   Diagnosis Date    Abdominal aortic aneurysm without rupture [address] [address] 08/27/2018    stable per Ct 1/23/2020 / see epic    Acute bronchitis with COPD (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    Paralyzed vocal cords     Thrombosis of testis     Tobacco dependence 5/27/2017       Patient Active Problem List    Diagnosis Date Noted    Sacroiliac dysfunction 08/17/2020    DDD (degenerative disc disease), lumbar 08/17/2020    Lumbar radiculopathy 08/17/2020    Lumbosacral spondylosis without myelopathy 08/17/2020    Spinal stenosis of lumbar region 08/17/2020    Smoking 08/17/2020    DVT of left axillary vein, acute ([address] 08/04/2020    Stage 3 chronic kidney disease ([address] 05/13/2020    Other hyperlipidemia 05/13/2020    Radiation-induced esophageal stricture 10/16/2019    Acute rhinitis 07/01/2019    Pharyngeal cancer ([address] 04/17/2019    Osteoarthritis of right hip 04/17/2019    Benign prostatic hyperplasia without lower urinary tract symptoms 04/17/2019    Acquired hypothyroidism 04/17/2019    Palliative care by specialist     Pneumonia 09/17/2018    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    Abdominal aortic aneurysm without rupture ([address] 08/27/2018    Anemia 07/08/2017    COPD (chronic obstructive pulmonary disease) ([address] 05/27/2017    Tobacco dependence 05/27/2017    Malignant neoplasm of overlapping sites of left lung ([address] 04/25/2016        Past Surgical History:   Procedure Laterality Date    BACK SURGERY      BRONCHOSCOPY N/A 3/5/2020    BRONCHOSCOPY DIAGNOSTIC OR CELL [address] ONLY performed by Basilio Arnold MD at [address] [facility] ARTHROSCOPY      LUNG BIOPSY Left 5/6/2016    OTHER SURGICAL HISTORY  4/15/2016    cervical myelogram    SINUS SURGERY      TRACHEOSTOMY  05/24/2017    TRACHEOSTOMY  05/24/2017       Family History  Family History   Problem Relation Age of Onset    Cancer Mother         breast cancer    Cancer Father         prostate cancer    Diabetes Father        Social History    TOBACCO:   reports that he has been smoking cigarettes. He started smoking about 48 years ago. He has a 44.00 pack-year smoking history. He has never used smokeless tobacco.  ETOH:   reports no history of alcohol use. Home Medications  Prior to Admission medications    Medication Sig Start Date End Date Taking? Authorizing Provider   LORazepam (ATIVAN) 0.5 MG tablet Take 0.5 mg by mouth nightly as needed for Anxiety.    Yes Historical Provider, MD   mirtazapine (REMERON) 30 MG tablet Take 30 mg by mouth nightly   Yes Historical Provider, MD   doxycycline hyclate (VIBRA-TABS) 100 MG tablet Take 1 tablet by mouth 2 times daily for 10 days 9/10/20 9/20/20 Yes Ophelia Mtz MD   senna-docusate (PERICOLACE) 8.6-50 MG per tablet Take 2 tablets by mouth daily 9/4/20 3/3/21 Yes BG Arellano - CNP   apixaban (ELIQUIS) 5 MG TABS tablet Take 1 tablet by mouth 2 times daily 8/21/20 11/19/20 Yes Sylvia Quintana DO   Revefenacin (YUPELRI) 175 MCG/3ML SOLN Inhale 3 mLs into the lungs daily Instructed to take am of procedure 7/29/20  Yes Arnol Gu MD   buPROPion (WELLBUTRIN) 75 MG tablet Take 1 tablet by mouth 2 times daily 7/17/20  Yes Arnol Gu MD   simvastatin (ZOCOR) 40 MG tablet Take 40 mg by mouth nightly   Yes Historical Provider, MD   levothyroxine (SYNTHROID) 125 MCG tablet Take 1 tablet by mouth Daily 5/8/20  Yes Sylvia Quintana DO   formoterol (PERFOROMIST) 20 MCG/2ML nebulizer solution Take 2 mLs by nebulization 2 times daily Instructed to take am of procedure 4/29/20  Yes Arnol Gu MD   tamsulosin (FLOMAX) 0.4 MG capsule Take 1 capsule by mouth nightly 4/1/20  Yes Sylvia Quintana DO   esomeprazole (NEXIUM) 40 MG delayed release capsule Take 1 capsule by mouth every morning (before breakfast) Instructed to take am of procedure 4/1/20 Yes Sylvia Quintana DO   pregabalin (LYRICA) 150 MG capsule Take 1 capsule by mouth 3 times daily for 180 days. 3/24/20 9/20/20 Yes BG Hoffman - CNP   DULoxetine (CYMBALTA) 30 MG extended release capsule Take 1 capsule by mouth 2 times daily 3/24/20 9/20/20 Yes Aristeo Rosalesing, APRN - CNP   budesonide (PULMICORT) 0.5 MG/2ML nebulizer suspension Take 2 mLs by nebulization 2 times daily Instructed to take am of procedure 3/24/20  Yes Pearl Chery MD   predniSONE (DELTASONE) 10 MG tablet Take 10 mg by mouth daily Instructed to take am of procedure   Yes Historical Provider, MD   oxyCODONE-acetaminophen (PERCOCET) 5-325 MG per tablet Take 1 tablet by mouth every 4 hours as needed for Pain. Break through    Historical Provider, MD   albuterol (ACCUNEB) 0.63 MG/3ML nebulizer solution Take 3 mLs by nebulization every 6 hours DX: COPD J44.9 6/19/20   Pearl Chery MD   OXYGEN Inhale 4 L into the lungs nightly    Historical Provider, MD   chlorhexidine (PERIDEX) 0.12 % solution Take 15 mLs by mouth daily Swish & spit qd 1/10/20   Sylvia Quintana DO   cyclobenzaprine (FLEXERIL) 10 MG tablet Take 10 mg by mouth 3 times daily as needed for Muscle spasms    Historical Provider, MD   Cholecalciferol (VITAMIN D3) 5000 units TABS Take 5,000 Units by mouth daily     Historical Provider, MD   pembrolizumab (KEYTRUDA) 100 MG/4ML SOLN Infuse 200 mg intravenously every 21 days Last Dose -01/16/20  Next Dose -02/06/20    Historical Provider, MD   Melatonin 10 MG TABS Take 10 mg by mouth nightly     Historical Provider, MD       Allergies  Allergies   Allergen Reactions    Augmentin [Amoxicillin-Pot Clavulanate] Diarrhea       Review of Systems: Relevant for dyspnea on exertion and chronic back and hip pain     Constitutional:  No fever chills or rigors.  Eyes: No changes in vision, discharge, or pain   ENT: No Headaches, hearing loss or vertigo. No mouth sores or sore throat. No change in taste or smell. (H) 2020    NEUTOPHILPCT 74.2 2020    MONOPCT 8.2 2020    BASOPCT 0.7 2020    NEUTROABS 5.61 2020    LYMPHSABS 0.91 (L) 2020    MONOSABS 0.62 2020    EOSABS 0.32 2020    BASOSABS 0.05 2020       Lab Results   Component Value Date     2020    K 4.1 2020    CL 97 (L) 2020    CO2 19 (L) 2020    BUN 16 2020    CREATININE 1.6 (H) 2020    GLUCOSE 80 2020    CALCIUM 9.9 2020    PROT 7.0 2020    LABALBU 3.3 (L) 2020    BILITOT 0.5 2020    ALKPHOS 117 2020    AST 23 2020    ALT 18 2020    LABGLOM 44 2020    GFRAA 53 2020       No results found for: IRON, TIBC, FERRITIN        Radiology-    Xr Chest Portable    Result Date: 9/10/2020  Patient MRN: [id number] : 1955 Age:  72 years Gender: Male Order Date: 9/10/2020 11:17 PM Exam: XR CHEST PORTABLE Indication:   Shortness of breath Shortness of breath Comparison: 2020 FINDINGS: Bibasilar opacities appear chronic and likely represent scarring. No definite consolidation is seen. The cardiomediastinal contour is unremarkable. No pneumothorax or pleural effusion. A tracheostomy tube is seen, which is grossly well positioned. 1. Bibasilar opacities in the lungs appear chronic and likely represent scarring. 2. No acute cardiopulmonary abnormality. Xr Chest Portable    Result Date: 2020  Patient MRN: [id number] : 1955 Age:  72 years Gender: Male Order Date: 2020 9:15 PM Exam: XR CHEST PORTABLE Number of Images: 1 view Indication:   shortness of breath shortness of breath Comparison: 2020. Findings: Stable position of tracheostomy. Cardiac silhouette is stable. Atherosclerotic calcification of the aorta. Prominent vasculature is normal. No pneumothorax or pleural effusion. Left lung base greater than right lung base opacities. Mild interstitial prominence.      normal chest Left lung base greater than right lung base opacities, atelectasis or consolidation. Redemonstrated interstitial opacities. Cta Abdomen Pelvis W Contrast    Result Date: 2020  Patient MRN:  [id number] : 1955 Age: 72 years Gender: Male Order Date:  2020 12:51 AM EXAM: CTA PULMONARY W CONTRAST, CTA ABDOMEN PELVIS W CONTRAST COMPARISON: None INDICATION:  pe pe TECHNIQUE: Unenhanced CT was performed from lung apices to the symphysis pubis, then Contiguous axial images through the abdomen and pelvis were obtained following intravenous contrast using standard CT angiographic protocol. Sagittal and coronal MIP images were reconstructed from the axial acquisition. Additional 3-D reconstructions were presented to aid in interpretation of this examination. Low-dose CT acquisition technique included one of the following options; 1. Automated exposure control, 2. Adjustment of mA and/or kV according to the patient's size or 3. Use of iterative reconstruction. FINDINGS: There are no filling defects seen within the pulmonary arteries to suggest pulmonary arterial embolism. There is a ascending thoracic aortic aneurysm which measures 4.2 cm in greatest dimension. This finding is similar compared to the previous examination. Descending thoracic aorta measures 2.6 cm in greatest dimension. There is no evidence of thoracic aortic dissection. No evidence of mural hematoma on unenhanced CT. No abnormal mediastinal fluid collections. The heart is normal in size. No pericardial effusion. There are irregular interstitial opacities throughout both lungs. Scattered groundglass opacities are also present throughout both lungs notable toward lung periphery and more predominant in the lung bases. There is no evidence of pleural effusion. There is peribronchial thickening bilaterally. There is no pneumothorax. No mediastinal or hilar lymphadenopathy.  There is a fusiform infrarenal abdominal aortic aneurysm which measures up to 3 cm which is similar in size compared to prior from 2020. There is no evidence of abdominal aortic dissection. Celiac axis is patent. Mild stenosis is seen involving origin of celiac axis. Superior mesenteric artery is patent. Atherosclerotic plaque is seen at the origin of the right renal artery with mild proximal stenosis. Moderate stenosis is seen involving proximal left renal artery. Inferior mesenteric artery is patent atherosclerotic calcifications are associated with common iliac arteries. External iliac arteries are patent. Common femoral arteries are patent. There is no bowel obstruction, free air, or free fluid in the abdomen or pelvis. Liver is unremarkable. Gallbladder is unremarkable. No evidence of acute pancreatitis. Spleen is nonenlarged. There is grossly normal attenuation associated with both kidneys. There is no hydronephrosis. 1. Findings suggest bilateral pneumonia throughout both lungs on background of chronic interstitial lung disease. 2. No pulmonary embolism. 3. Stable ascending thoracic aortic aneurysm measures up to 4.2 cm. 4. Stable fusiform infrarenal abdominal aortic aneurysm measures up to 3 cm. 5. Mild stenosis is seen involving origin of celiac trunk. 6. Moderate stenosis is seen involving proximal left renal artery and mild stenosis is seen involving proximal right renal artery. ALERT:  THIS IS AN ABNORMAL REPORT. Cta Pulmonary W Contrast    Result Date: 2020  Patient MRN:  [id number] : 1955 Age: 72 years Gender: Male Order Date:  2020 12:51 AM EXAM: CTA PULMONARY W CONTRAST, CTA ABDOMEN PELVIS W CONTRAST COMPARISON: None INDICATION:  pe pe TECHNIQUE: Unenhanced CT was performed from lung apices to the symphysis pubis, then Contiguous axial images through the abdomen and pelvis were obtained following intravenous contrast using standard CT angiographic protocol. Sagittal and coronal MIP images were reconstructed from the axial acquisition.  Additional 3-D reconstructions were presented to aid in interpretation of this examination. Low-dose CT acquisition technique included one of the following options; 1. Automated exposure control, 2. Adjustment of mA and/or kV according to the patient's size or 3. Use of iterative reconstruction. FINDINGS: There are no filling defects seen within the pulmonary arteries to suggest pulmonary arterial embolism. There is a ascending thoracic aortic aneurysm which measures 4.2 cm in greatest dimension. This finding is similar compared to the previous examination. Descending thoracic aorta measures 2.6 cm in greatest dimension. There is no evidence of thoracic aortic dissection. No evidence of mural hematoma on unenhanced CT. No abnormal mediastinal fluid collections. The heart is normal in size. No pericardial effusion. There are irregular interstitial opacities throughout both lungs. Scattered groundglass opacities are also present throughout both lungs notable toward lung periphery and more predominant in the lung bases. There is no evidence of pleural effusion. There is peribronchial thickening bilaterally. There is no pneumothorax. No mediastinal or hilar lymphadenopathy. There is a fusiform infrarenal abdominal aortic aneurysm which measures up to 3 cm which is similar in size compared to prior from January 23, 2020. There is no evidence of abdominal aortic dissection. Celiac axis is patent. Mild stenosis is seen involving origin of celiac axis. Superior mesenteric artery is patent. Atherosclerotic plaque is seen at the origin of the right renal artery with mild proximal stenosis. Moderate stenosis is seen involving proximal left renal artery. Inferior mesenteric artery is patent atherosclerotic calcifications are associated with common iliac arteries. External iliac arteries are patent. Common femoral arteries are patent. There is no bowel obstruction, free air, or free fluid in the abdomen or pelvis. Liver is unremarkable. Gallbladder is unremarkable. No evidence of acute pancreatitis. Spleen is nonenlarged. There is grossly normal attenuation associated with both kidneys. There is no hydronephrosis. 1. Findings suggest bilateral pneumonia throughout both lungs on background of chronic interstitial lung disease. 2. No pulmonary embolism. 3. Stable ascending thoracic aortic aneurysm measures up to 4.2 cm. 4. Stable fusiform infrarenal abdominal aortic aneurysm measures up to 3 cm. 5. Mild stenosis is seen involving origin of celiac trunk. 6. Moderate stenosis is seen involving proximal left renal artery and mild stenosis is seen involving proximal right renal artery. ALERT:  THIS IS AN ABNORMAL REPORT. 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 He initially required palliative radiation therapy along with concurrent chemotherapy with weekly Taxol and carboplatinum. This was followed by systemic chemotherapy with Alimta carboplatinum and Avastin with excellent response. He had been on Xgeva for palliation of skeletal metastasis but it was discontinued in 2018 because of early ONJ. He has no targetable mutation with negative EGFR/ALK/Ros 1. In 2017 his cellblock was sent for PDL 1 and it was strongly expressed at 60%  He has been on single agent Keytruda since 2017 and he has achieved a complete remission  In 2017 he developed a second head and neck primary involving the vocal cords and required radiation therapy  Multiple head and neck biopsies most recent one was at [facility] few weeks ago and was negative for recurrent malignancy.     Tobacco abuse and he continues to smoke daily despite his lung and head and neck malignancies  COPD and exacerbation with bilateral pneumonia  Stable thoracic and abdominal aneurysm    To continue IV antibiotics  He will be maintained on Keytruda after discharge  If poor oral; intake continues may need TF        Jimmy Peng M.D., F.A.C.P.   Electronically signed 9/14/2020 at 5:56 PM

## 2020-09-14 NOTE — PROGRESS NOTES
Patient's abdomen was distended and patient had no urine output throughout the day. Bladder scanner showed 966 mL in the bladder. Dr. Giselle Menon notified and a marr was ordered and placed shortly after. Patient instantly voided 875 mL. Will continue to monitor.

## 2020-09-14 NOTE — PROGRESS NOTES
[facility] PROGRESS NOTE      Chief complaint: Follow-up of pneumonia    The patient is a 72 y.o. 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 On admission, he was afebrile and hemodynamically stable with no leukocytosis. Urinalysis showed no pyuria. SARS-CoV-2 PCR was not detected. Urine Streptococcus pneumoniae and Legionella antigens were negative. CTA of chest, abdomen and pelvis showed interstitial and scattered groundglass opacities bilaterally toward lung periphery and more predominant in the lung bases; no bowel obstruction or free air or fluid in the abdomen or pelvis; unremarkable liver, gallbladder, and pancreas; grossly normal kidneys with no hydronephrosis. He received a dose of vancomycin on admission. Cefepime and doxycycline were started on admission. Subjective: Patient was seen and examined. He is more awake today, reports feeling better, no abdominal pain, no diarrhea, continues to have poor appetite. Objective:    Vitals:    09/14/20 0800   BP: 114/67   Pulse: 85   Resp: 16   Temp: 96.9 °F (36.1 °C)   SpO2: 92%     Constitutional: Alert, not in distress  Respiratory: Clear breath sounds, no crackles, no wheezes  Cardiovascular: Regular rate and rhythm, no murmurs  Gastrointestinal: Bowel sounds present, soft, nontender  Skin: Warm and dry, no active dermatoses  Musculoskeletal: No joint swelling, no joint erythema    Labs, imaging, and medical records/notes were personally reviewed.     Assessment:  Acute hypoxic respiratory failure  Pneumonia  Immunocompromised state   Lung adenocarcinoma on immunotherapy     Plan:  Contiue piperacillin-tazobactam 3.375g q8h and doxycycline 100 mg BID for now. Check tracheal aspirate cultures. Follow up blood cultures.     Thank you for involving me in the care of Wally Oneil. . I will continue to follow. Please do not hesitate to call for any questions or concerns.     Electronically signed by Janice Maldonado MD on 9/14/2020 at 9:58 AM

## 2020-09-15 ENCOUNTER — APPOINTMENT (OUTPATIENT)
Dept: GENERAL RADIOLOGY | Age: 65
DRG: 193 | End: 2020-09-15
Payer: MEDICARE

## 2020-09-15 LAB
BLOOD CULTURE, ROUTINE: NORMAL
CULTURE, BLOOD 2: NORMAL
PROSTATE SPECIFIC ANTIGEN: 1.84 NG/ML (ref 0–4)

## 2020-09-15 PROCEDURE — 2060000000 HC ICU INTERMEDIATE R&B

## 2020-09-15 PROCEDURE — 99222 1ST HOSP IP/OBS MODERATE 55: CPT | Performed by: NURSE PRACTITIONER

## 2020-09-15 PROCEDURE — G0103 PSA SCREENING: HCPCS

## 2020-09-15 PROCEDURE — 6360000002 HC RX W HCPCS: Performed by: INTERNAL MEDICINE

## 2020-09-15 PROCEDURE — 88112 CYTOPATH CELL ENHANCE TECH: CPT

## 2020-09-15 PROCEDURE — 2700000000 HC OXYGEN THERAPY PER DAY

## 2020-09-15 PROCEDURE — 6370000000 HC RX 637 (ALT 250 FOR IP): Performed by: INTERNAL MEDICINE

## 2020-09-15 PROCEDURE — 2580000003 HC RX 258: Performed by: INTERNAL MEDICINE

## 2020-09-15 PROCEDURE — 36415 COLL VENOUS BLD VENIPUNCTURE: CPT

## 2020-09-15 PROCEDURE — 6370000000 HC RX 637 (ALT 250 FOR IP): Performed by: NEUROMUSCULOSKELETAL MEDICINE & OMM

## 2020-09-15 PROCEDURE — 94640 AIRWAY INHALATION TREATMENT: CPT

## 2020-09-15 PROCEDURE — 71045 X-RAY EXAM CHEST 1 VIEW: CPT

## 2020-09-15 RX ADMIN — Medication 5000 UNITS: at 08:05

## 2020-09-15 RX ADMIN — ARFORMOTEROL TARTRATE 15 MCG: 15 SOLUTION RESPIRATORY (INHALATION) at 08:01

## 2020-09-15 RX ADMIN — DOXYCYCLINE HYCLATE 100 MG: 100 CAPSULE ORAL at 21:29

## 2020-09-15 RX ADMIN — DULOXETINE HYDROCHLORIDE 30 MG: 30 CAPSULE, DELAYED RELEASE ORAL at 21:29

## 2020-09-15 RX ADMIN — OXYCODONE AND ACETAMINOPHEN 1 TABLET: 5; 325 TABLET ORAL at 08:06

## 2020-09-15 RX ADMIN — SODIUM CHLORIDE 25 ML: 9 INJECTION, SOLUTION INTRAVENOUS at 11:43

## 2020-09-15 RX ADMIN — ALBUTEROL SULFATE 0.63 MG: 0.63 SOLUTION RESPIRATORY (INHALATION) at 13:05

## 2020-09-15 RX ADMIN — MIRTAZAPINE 30 MG: 15 TABLET, FILM COATED ORAL at 21:29

## 2020-09-15 RX ADMIN — CHLORHEXIDINE GLUCONATE 0.12% ORAL RINSE 15 ML: 1.2 LIQUID ORAL at 08:05

## 2020-09-15 RX ADMIN — APIXABAN 5 MG: 5 TABLET, FILM COATED ORAL at 08:05

## 2020-09-15 RX ADMIN — PIPERACILLIN SODIUM AND TAZOBACTAM SODIUM 3.38 G: 3; .375 INJECTION, POWDER, LYOPHILIZED, FOR SOLUTION INTRAVENOUS at 23:31

## 2020-09-15 RX ADMIN — PANTOPRAZOLE SODIUM 40 MG: 40 TABLET, DELAYED RELEASE ORAL at 06:10

## 2020-09-15 RX ADMIN — ALBUTEROL SULFATE 0.63 MG: 0.63 SOLUTION RESPIRATORY (INHALATION) at 08:01

## 2020-09-15 RX ADMIN — PREDNISONE 10 MG: 10 TABLET ORAL at 08:05

## 2020-09-15 RX ADMIN — BUPROPION HYDROCHLORIDE 75 MG: 75 TABLET, FILM COATED ORAL at 21:29

## 2020-09-15 RX ADMIN — PIPERACILLIN SODIUM AND TAZOBACTAM SODIUM 3.38 G: 3; .375 INJECTION, POWDER, LYOPHILIZED, FOR SOLUTION INTRAVENOUS at 14:41

## 2020-09-15 RX ADMIN — BUPROPION HYDROCHLORIDE 75 MG: 75 TABLET, FILM COATED ORAL at 08:05

## 2020-09-15 RX ADMIN — DULOXETINE HYDROCHLORIDE 30 MG: 30 CAPSULE, DELAYED RELEASE ORAL at 08:05

## 2020-09-15 RX ADMIN — ALBUTEROL SULFATE 0.63 MG: 0.63 SOLUTION RESPIRATORY (INHALATION) at 21:05

## 2020-09-15 RX ADMIN — Medication 10 MG: at 21:29

## 2020-09-15 RX ADMIN — TAMSULOSIN HYDROCHLORIDE 0.4 MG: 0.4 CAPSULE ORAL at 21:29

## 2020-09-15 RX ADMIN — LEVOTHYROXINE SODIUM 125 MCG: 0.12 TABLET ORAL at 06:10

## 2020-09-15 RX ADMIN — ARFORMOTEROL TARTRATE 15 MCG: 15 SOLUTION RESPIRATORY (INHALATION) at 21:05

## 2020-09-15 RX ADMIN — DOCUSATE SODIUM 50 MG AND SENNOSIDES 8.6 MG 2 TABLET: 8.6; 5 TABLET, FILM COATED ORAL at 08:06

## 2020-09-15 RX ADMIN — BUDESONIDE 500 MCG: 0.5 SUSPENSION RESPIRATORY (INHALATION) at 21:05

## 2020-09-15 RX ADMIN — APIXABAN 5 MG: 5 TABLET, FILM COATED ORAL at 21:29

## 2020-09-15 RX ADMIN — BUDESONIDE 500 MCG: 0.5 SUSPENSION RESPIRATORY (INHALATION) at 08:01

## 2020-09-15 RX ADMIN — DOXYCYCLINE HYCLATE 100 MG: 100 CAPSULE ORAL at 08:05

## 2020-09-15 RX ADMIN — ATORVASTATIN CALCIUM 40 MG: 40 TABLET, FILM COATED ORAL at 21:29

## 2020-09-15 RX ADMIN — PIPERACILLIN SODIUM AND TAZOBACTAM SODIUM 3.38 G: 3; .375 INJECTION, POWDER, LYOPHILIZED, FOR SOLUTION INTRAVENOUS at 06:09

## 2020-09-15 ASSESSMENT — PAIN SCALES - GENERAL
PAINLEVEL_OUTOF10: 0
PAINLEVEL_OUTOF10: 2
PAINLEVEL_OUTOF10: 4

## 2020-09-15 NOTE — CONSULTS
Palliative Care Department  Palliative Care Initial Consult  Provider: [address] [place] [address] Day: 6    Referring Provider:  Dr. Mel Bolden    Reason for Consult:  \"Know in outpatient setting\"    Chief Complaint: Hortensia Gutierrez is a 72 y.o. male with chief complaint of SOB    Assessment/Plan      Active Hospital Problems    Diagnosis Date Noted    Lumbar radiculopathy [M54.16] 08/17/2020    DDD (degenerative disc disease), lumbar [M51.36] 08/17/2020    Spinal stenosis of lumbar region [M48.061] 08/17/2020    Other hyperlipidemia [E78.49] 05/13/2020    Pharyngeal cancer ([facility] [C14.0] 04/17/2019    Benign prostatic hyperplasia without lower urinary tract symptoms [N40.0] 04/17/2019    Acquired hypothyroidism [E03.9] 04/17/2019    Pneumonia [J18.9] 09/17/2018    Abdominal aortic aneurysm without rupture ([facility] [address] [I71.4] 08/27/2018    COPD (chronic obstructive pulmonary disease) ([facility] [facility] [J44.9] 05/27/2017    Tobacco dependence [F17.200] 05/27/2017       PNA:   -  Per ID and Pulm. [facility]    Palliative Care Encounter/Recommendations:      - Goals of care: live longer, improve or maintain function/quality of life and continue current management     - Code Status: full code     - Symptom Management:    Symptom Medications Number Doses in Past 24 hrs   Pain Fentanyl 100 mcg patch Q 72  Percocet 5-325 mg Q 4 PRN   1  2   Nausea/vomiting     Bowel Regime/constipation Senna-S 2 tabs daily 1   Anxiety/agitation/depression Wellbutrin  Cymbalta 30 mg BID  Mirtazapine 30 mg HS  Lorazepam 0.5 mg HS PRN 1  2  1  1   SOB Brovana  Albuterol  Pulmicort  Prednisone 10 mg Daily    Appetite Mirtazapine 30 mg HS    Sleep Melatonin 10 mg HS 1        -  No immediate PM needs, will follow along as needed.     Subjective:     HPI:  Hortensia Gutierrez is a 72 y.o. 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 Wade Thompson. He has a chronic tracheostomy with recent follow up at [facility] s/p neck biopsies - for malignancy. He presented now with complains of SOB, as well as poor oral intake. He is being managed for bilateral PNA and UTI, with ID and pulmonology following. PM has been consulted as he is known well to us in the outpatient setting and the wife request our service to see him while inpatient. Subjective/Events/Discussions:  9/15/2020:  Denny Taveras is seen today at the bedside with his wife. He is alert and oriented. He states he is feeling better today, with some improvement in appetite and he is trying to eat lunch currently. She also state he continues to be SOB but this is also improved. His pain remains unchanged since admission and is well controlled with his fentanyl patch and percocet for break through pain. He wishes to remain a full code and is hoping to improve enough to follow up with ENT in [facility] regarding his tracheostomy as previously planned. He otherwise has not immediate PM needs and we will be available as needed.     - Family Meeting:   Participants:Spouse and patient   Family meeting was held to discuss:goals of care, prior expressed wishes, symptom management and discharge plan     Past Medical History:   Diagnosis Date    Abdominal aortic aneurysm without rupture ([address] 08/27/2018    stable per Ct 1/23/2020 / see epic    Acute bronchitis with COPD ([address] 5/27/2017    Apnea     Arthritis     COPD (chronic obstructive pulmonary disease) ([address]     Depression with anxiety     Emphysema of lung ([address]     Encounter for antineoplastic immunotherapy     HAP (hospital-acquired pneumonia) 5/27/2017    Hyperlipidemia     Lung cancer ([address] 04/11/2016    chemo and radiation    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 ARTHROSCOPY      LUNG BIOPSY Left 5/6/2016    OTHER SURGICAL HISTORY  4/15/2016    cervical myelogram    SINUS SURGERY      TRACHEOSTOMY  05/24/2017    TRACHEOSTOMY  05/24/2017       Family History   Problem Relation Age of Onset    Cancer Mother         breast cancer    Cancer Father         prostate cancer    Diabetes Father        Allergies   Allergen Reactions    Augmentin [Amoxicillin-Pot Clavulanate] Diarrhea       ROS: UNLESS STATED ABOVE PATIENT DENIES:  CONSTITUTIONAL:  fever, chill, rigors, nausea, vomiting, fatigue. HEENT: blurry vision, double vision, hearing problem, tinnitus, hoarseness, dysphagia, odynophagia  RESPIRATORY: cough, shortness of breath, sputum expectoration. CARDIOVASCULAR:  Chest pain/pressure, palpitation, syncope, irregular beats  GASTROINTESTINAL:  abdominal or rectal pain, diarrhea, constipation, . GENITOURINARY:  Burning, frequency, urgency, incontinence, discharge  INTEGUMENTARY: rash, wound, pruritis  HEMATOLOGIC/LYMPHATIC:  Swelling, sores, gum bleeding, easy bruising, pica.   MUSCULOSKELETAL:  pain, edema, joint swelling or redness  NEUROLOGICAL:  light headed, dizziness, loss of consciousness, weakness, change in memory, seizures, tremors    Objective:     Physical Exam  /73   Pulse 76   Temp 96.8 °F (36 °C) (Temporal)   Resp 16   Ht 5' 11\" (1.803 m)   Wt 211 lb (95.7 kg)   SpO2 93%   BMI 29.43 kg/m²     Gen:  Alert, appears stated age, well nourished, in no acute distress  HEENT:  Normocephalic, conjunctiva pink, no drainage, mucosa moist  Neck:  Supple, trach  Lungs:  CTA bilaterally, no audible rhonchi or wheezes noted  Heart[de-identified]  RRR, no murmur, rub, or gallop noted during exam  Abd:  Soft, non tender, non distended, BS+  :

## 2020-09-15 NOTE — CONSULTS
[address]                  [address] [place],  [facility]                                  CONSULTATION    PATIENT NAME: Vern Lopez                   :        1955  MED REC NO:   [id number]                            ROOM:       1943  ACCOUNT NO:   [de-identified]                           ADMIT DATE: 09/10/2020  PROVIDER:     Alice Dockery MD    CONSULT DATE:  09/15/2020    LOCATION:  He is in room 4512. CHIEF COMPLAINT:  Hematuria. HISTORY OF PRESENT ILLNESS:  This 49-year-old male who lives  independently with his wife has been found to have left lung cancer in  2016, being treated by Dr. Markie Grove. The patient also had a pharyngeal  cancer which was treated with radiation. The patient has a tracheostomy  in place. He came to the hospital with weakness and shortness of  breath. The patient was found to have distention of his urinary  bladder. A Sosa catheter was put in place. The urine was initially  grossly clear; however, it subsequently became bloody. The patient states that he has been noticing difficulty voiding. He  does take Flomax at home which somewhat helps. He has not been  incontinent, has not had gross hematuria. However, he did have a  similar episode when Sosa catheter was inserted several years ago. His  serum creatinine is 1.6. He did have a CAT scan of his abdomen in the  past.  His upper tracts appeared to be unremarkable with no  hydronephrosis. His other problems consist of lumbar radiculopathy,  hyperlipidemia, spinal stenosis, abdominal and thoracic aortic  aneurysms, COPD, tobacco dependence. PHYSICAL EXAMINATION:  GENERAL:  The patient is alert, oriented. He can speak _____ obstructs  his tracheostomy. He does not appear to be in any distress. ABDOMEN:  Soft. GENITOURINARY:  Penis, testes are normal.  Sosa catheter is in place. The urine is starting to clear.     IMPRESSION:  Hematuria, most likely secondary to decompression of his  chronically distended bladder. RECOMMENDATIONS:  At this time is to check urine for cytology, leave the  Sosa catheter in place. In the future depending on the patient's level  of mobility, a trial of voiding can be done. We will follow him as an  outpatient.                   Sofie Abel MD    D: 09/15/2020 15:11:53       T: 09/15/2020 15:17:41     [id number]  Job#: [id number]     Doc#: [id number]    CC:

## 2020-09-15 NOTE — PROGRESS NOTES
Coordination of care discussion and chart review with PM team, patient known from prior admissions  and outpatient clinic. LSW met at bedside, he is a/o x4. He has a trach with an o2 mask. He communicates well. . He has a history of lung cancer, copd. He admitted with shortness of breath, pneumonia. He states his wife is his next of kin for decisions, and prior code statsu was full code. No immediate PM psychosocial needs identified for Stacie Zuniga. .  will remain available for on-going psychosocial support, as needed.

## 2020-09-15 NOTE — PROGRESS NOTES
Admit Date: 9/10/2020    Subjective:     Awake feels fine no complaint     Objective:     Patient Vitals for the past 8 hrs:   Resp SpO2   09/15/20 0413 18 95 %   09/15/20 0042 18 96 %     I/O last 3 completed shifts: In: 3517 [P.O.:420; I.V.:825]  Out: 775 [Urine:775]  No intake/output data recorded. HEENT: Normal  NECK: Thyroid normal. Trach. CVS: RRR  RS: coarse rales   ABD: Soft. Non tender. No mass. Normal BS. EXT: No edema. Non tender. Pulses present. Skin intact.   NEURO: Intact      Scheduled Meds:   fentaNYL  1 patch Transdermal Q72H    albuterol  1 ampule Nebulization Q6H    apixaban  5 mg Oral BID    budesonide  500 mcg Nebulization BID    chlorhexidine  15 mL Mouth/Throat Daily    vitamin D  5,000 Units Oral Daily    doxycycline hyclate  100 mg Oral BID    DULoxetine  30 mg Oral BID    pantoprazole  40 mg Oral QAM AC    Arformoterol Tartrate  15 mcg Nebulization BID    levothyroxine  125 mcg Oral Daily    melatonin  10 mg Oral Nightly    tamsulosin  0.4 mg Oral Nightly    atorvastatin  40 mg Oral Nightly    sennosides-docusate sodium  2 tablet Oral Daily    predniSONE  10 mg Oral Daily    buPROPion  75 mg Oral BID    piperacillin-tazobactam  3.375 g Intravenous Q8H    sodium chloride  25 mL Intravenous Q8H    mirtazapine  30 mg Oral Nightly     Continuous Infusions:   sodium chloride 75 mL/hr at 09/14/20 0626       CBC with Differential:    Lab Results   Component Value Date    WBC 7.6 09/13/2020    RBC 5.66 09/13/2020    HGB 13.9 09/13/2020    HCT 43.5 09/13/2020     09/13/2020    MCV 76.9 09/13/2020    MCH 24.6 09/13/2020    MCHC 32.0 09/13/2020    RDW 19.1 09/13/2020    SEGSPCT 59 02/22/2012    LYMPHOPCT 12.0 09/13/2020    MONOPCT 8.2 09/13/2020    BASOPCT 0.7 09/13/2020    MONOSABS 0.62 09/13/2020    LYMPHSABS 0.91 09/13/2020    EOSABS 0.32 09/13/2020    BASOSABS 0.05 09/13/2020     CMP:    Lab Results   Component Value Date     09/13/2020    K 4.1 09/13/2020 K 4.0 09/09/2020    CL 97 09/13/2020    CO2 19 09/13/2020    BUN 16 09/13/2020    CREATININE 1.6 09/13/2020    GFRAA 53 09/13/2020    LABGLOM 44 09/13/2020    PROT 7.0 09/13/2020    LABALBU 3.3 09/13/2020    CALCIUM 9.9 09/13/2020    BILITOT 0.5 09/13/2020    ALKPHOS 117 09/13/2020    AST 23 09/13/2020    ALT 18 09/13/2020     PT/INR:    Lab Results   Component Value Date    PROTIME 15.2 09/09/2020    INR 1.3 09/09/2020       Assessment:     Principal Problem:    Pneumonia bilateral     Acute respiratory failure     COPD (chronic obstructive pulmonary disease) (HCC)    Tobacco dependence    Abdominal aortic aneurysm without rupture (HCC)    Pharyngeal cancer (HCC)    Benign prostatic hyperplasia without lower urinary tract symptoms    Hx adeno ca lungs stage IV    Vocal cord squamous cell ca       Plan:   Continue same management

## 2020-09-15 NOTE — PROGRESS NOTES
and dry, no active dermatoses  Musculoskeletal: No joint swelling, no joint erythema    Labs, imaging, and medical records/notes were personally reviewed. Assessment:  Acute hypoxic respiratory failure  Pneumonia  Immunocompromised state   Lung adenocarcinoma on immunotherapy     Plan:  Contiue piperacillin-tazobactam 3.375g q8h and doxycycline 100 mg BID for now. Follow up tracheal aspirate cultures. Follow up blood cultures.     Thank you for involving me in the care of Wally Oneil. . I will continue to follow. Please do not hesitate to call for any questions or concerns.     Electronically signed by Jewell Saucedo MD on 9/15/2020 at 10:23 AM

## 2020-09-15 NOTE — CARE COORDINATION
Plan at discharge remains Home. Wife requesting trach and suction tesching at discharge. Call placed to Xiomy Celaya, resp therapist with [facility]. They will deliver suction supplies and do teaching upon discharge. Will need to call resp therapist when pt is discharged [phone]. Wife very supportive, she will provide transport.  CM to follow  Julee Izaguirre, RN  [facility] Case Management  [phone]

## 2020-09-15 NOTE — PROGRESS NOTES
Khadar Burris M.D.,[facility]  Luellen Scheuermann, D.O., F.A.C.O.I., [facility] Julien M.D. Eder Hawk M.D., Lillian Fernando M.D. Tor Sanchez D.O. Daily Pulmonary Progress Note    Patient:  Frederick Finely. 72 y.o. male MRN: [id number]     Date of Service: 9/15/2020      Synopsis     We are following patient for pneumonia    \"CC\" cough, shortness of breath    Code status: FULL       Subjective      Patient was seen and examined. Lying in bed in no distress. Oxygen at 40% CATINA. Inner cannula was removed he states he can breath easier. He denies dyspnea. Cough improved. Not as much mucus expectorated this am. No fevers. Review of Systems:   Constitutional: +fatigue, sweats, malaise  Skin: Denies pigmentation, dark lesions, and rashes   HEENT: Denies hearing loss, tinnitus, ear drainage, epistaxis, sore throat, and hoarseness. Cardiovascular: Denies palpitations, chest pain, and chest pressure.   Respiratory: +dyspnea , cough, mucus+  Gastrointestinal: Denies nausea, vomiting, poor appetite, diarrhea, heartburn or reflux  Genitourinary: Denies dysuria, frequency, urgency or hematuria  Musculoskeletal: Denies myalgias, muscle weakness, and bone pain  Neurological: Denies dizziness, vertigo, headache, and focal weakness  Psychological: anxiety much improved  Endocrine: Denies heat intolerance and cold intolerance  Hematopoietic/Lymphatic: Denies bleeding problems and blood transfusions    24-hour events:  None     Objective   Vitals: BP 98/60   Pulse 80   Temp 97.3 °F (36.3 °C) (Temporal)   Resp 16   Ht 5' 11\" (1.803 m)   Wt 211 lb (95.7 kg)   SpO2 93%   BMI 29.43 kg/m²     I/O:    Intake/Output Summary (Last 24 hours) at 9/15/2020 1151  Last data filed at 9/15/2020 1015  Gross per 24 hour   Intake 1305 ml   Output 775 ml   Net 530 ml       Vent Information  FiO2 : 40 %  SpO2: 93 %  SpO2/FiO2 ratio: 232.5                CURRENT MEDS :  Scheduled Meds:   fentaNYL  1 patch Transdermal Q72H    albuterol  1 ampule Nebulization Q6H    apixaban  5 mg Oral BID    budesonide  500 mcg Nebulization BID    chlorhexidine  15 mL Mouth/Throat Daily    vitamin D  5,000 Units Oral Daily    doxycycline hyclate  100 mg Oral BID    DULoxetine  30 mg Oral BID    pantoprazole  40 mg Oral QAM AC    Arformoterol Tartrate  15 mcg Nebulization BID    levothyroxine  125 mcg Oral Daily    melatonin  10 mg Oral Nightly    tamsulosin  0.4 mg Oral Nightly    atorvastatin  40 mg Oral Nightly    sennosides-docusate sodium  2 tablet Oral Daily    predniSONE  10 mg Oral Daily    buPROPion  75 mg Oral BID    piperacillin-tazobactam  3.375 g Intravenous Q8H    sodium chloride  25 mL Intravenous Q8H    mirtazapine  30 mg Oral Nightly       Physical Exam:   General: The patient is lying in bed comfortably without any distress. Breathing is not labored  HEENT: Pupils are equal round and reactive to light, there are no oral lesions and no post-nasal drip  6 cuffless trache   Neck: supple without adenopathy  Cardiovascular: regular rate and rhythm without murmur or gallop  Respiratory: less coarse  to auscultation bilaterally without wheezing or crackles. Air entry is symmetric  Abdomen: soft, non-tender, non-distended, normal bowel sounds  Extremities: warm, no edema, no clubbing  Skin: no rash or lesion  Neurologic: CN II-XII grossly intact, no focal deficits    Pertinent/ New Labs and Imaging Studies     Imaging Personally Reviewed:     1. Findings suggest bilateral pneumonia throughout both lungs on    background of chronic interstitial lung disease.         2. No pulmonary embolism.         3. Stable ascending thoracic aortic aneurysm measures up to 4.2 cm.         4. Stable fusiform infrarenal abdominal aortic aneurysm measures up to    3 cm.         5. Mild stenosis is seen involving origin of celiac trunk.         6.  Moderate stenosis is seen involving proximal left renal artery and    mild stenosis is seen involving proximal right renal artery.         ALERT:  THIS IS AN ABNORMAL REPORT.              8/3/2020 CT neck at [facility]    No evidence of cervical soft tissue mass or lymphadenopathy. Asymmetric thickening and medialization of the left aryepiglottic fold is   again noted.        Echo:7/29/2020  Conclusions      Summary   Normal left ventricle size.   Normal left ventricle wall thickness   Normal diastolic function.   Ejection fraction is visually estimated at 55-60%.  Mild mitral regurgitation is present.                     Labs:  Lab Results   Component Value Date    WBC 7.6 09/13/2020    HGB 13.9 09/13/2020    HCT 43.5 09/13/2020    MCV 76.9 09/13/2020    MCH 24.6 09/13/2020    MCHC 32.0 09/13/2020    RDW 19.1 09/13/2020     09/13/2020    MPV 10.0 09/13/2020     Lab Results   Component Value Date     09/13/2020    K 4.1 09/13/2020    K 4.0 09/09/2020    CL 97 09/13/2020    CO2 19 09/13/2020    BUN 16 09/13/2020    CREATININE 1.6 09/13/2020    LABALBU 3.3 09/13/2020    CALCIUM 9.9 09/13/2020    GFRAA 53 09/13/2020    LABGLOM 44 09/13/2020     Lab Results   Component Value Date    PROTIME 15.2 09/09/2020    INR 1.3 09/09/2020     No results for input(s): PROBNP in the last 72 hours. No results for input(s): PROCAL in the last 72 hours. This SmartLink has not been configured with any valid records. Micro:  Recent Labs     09/14/20  1500   CULTRESP Oral Pharyngeal Alva reduced     No results for input(s): LABGRAM in the last 72 hours. No results for input(s): LEGUR in the last 72 hours. No results for input(s): STREPNEUMAGU in the last 72 hours. No results for input(s): LP1UAG in the last 72 hours. Results for Paty Fuentes (MRN [id number]) as of 9/14/2020 14:55   Ref. Range 9/10/2020 23:58   SARS-CoV-2, NAAT Latest Ref Range: Not Detected  Not Detected        Assessment:    1. Acute on chronic respiratory failure with hypoxia  2.  Recurrent Pneumonia with hx MDR organisms-MRSA, serratia  3. 2016 CRISTINA large hilar mass with chest wall and mediastinal invasion, moderately to poorly differentiated, invasive, acinar type stage IV adenocarcinoma of lung completed treatment with Taxol/carbo then Alimta/Carbo/Avastin, T 1, T 2 metastasis to spine  4. 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 Long term tracheostomy 6 cuffless  7. Laryngeal edema, supraglottic thrush   8. 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 Severe vocal cord paralysis not able to tolerate trache capping  10. COPD not in acute exacerbation  11. EF 16-90% normal diastolic function  12. Malnutrition   13. HTN  14. HLD  15. Neuropathy  16. Arthritis -chronic daily prednisone  17. Immunocompromised host  18. Hx heavy tobacco abuse      Plan:   1. Oxygen therapy keep >92%  2. Routine trache care  3. ID for abx management-recurrent pneumonia-placed on Zosyn , Doxycycline. Received vanco and cefepime in ED  4. Check respiratory cultures-pending, procal 0.15, inflammatory markers sed rate 25 CRP 14.9 , bacterial urine antigens-negative  5. Continue bronchodilator regimen  6. Chronic daily prednisone 10 mg for arthritis  7. CTA chest reviewed. CXR in am  8. Follows with [facility] at [facility] for trache management, glottic stenosis, laryngeal edema, vocal cord paralysis  9. Dr Terrence Crabtree local oncology. Patient on keytruda every 3 wks  10. Monitor caloric intake, may need peg for nutritional supplementation if not eating   11. DVT,GI prophylaxis  12. Palliative medicine following   13. Follows with Dr. Jeffry Servin outpatient  This plan of care was reviewed in collaboration with Dr. Jovanny Xie  Electronically signed by BG Mosley - CNP on 9/15/2020 at 11:51 AM      I personally saw, examined, and cared for the patient. Labs, medications, radiographs reviewed.  I agree with history exam and plans detailed in NP note. Reviewed with wife at bedside. Will need OP f/u at [facility] for trach.     Electronically signed by Sohail Dorsey DO on 9/15/2020 at 1:45 PM

## 2020-09-16 LAB
BLOOD CULTURE, ROUTINE: NORMAL
CULTURE, BLOOD 2: NORMAL

## 2020-09-16 PROCEDURE — 6370000000 HC RX 637 (ALT 250 FOR IP): Performed by: INTERNAL MEDICINE

## 2020-09-16 PROCEDURE — 6360000002 HC RX W HCPCS: Performed by: INTERNAL MEDICINE

## 2020-09-16 PROCEDURE — 31502 CHANGE OF WINDPIPE AIRWAY: CPT

## 2020-09-16 PROCEDURE — 2580000003 HC RX 258: Performed by: NEUROMUSCULOSKELETAL MEDICINE & OMM

## 2020-09-16 PROCEDURE — 94640 AIRWAY INHALATION TREATMENT: CPT

## 2020-09-16 PROCEDURE — 2060000000 HC ICU INTERMEDIATE R&B

## 2020-09-16 PROCEDURE — 99232 SBSQ HOSP IP/OBS MODERATE 35: CPT | Performed by: STUDENT IN AN ORGANIZED HEALTH CARE EDUCATION/TRAINING PROGRAM

## 2020-09-16 PROCEDURE — 2580000003 HC RX 258: Performed by: INTERNAL MEDICINE

## 2020-09-16 PROCEDURE — 6370000000 HC RX 637 (ALT 250 FOR IP): Performed by: NEUROMUSCULOSKELETAL MEDICINE & OMM

## 2020-09-16 PROCEDURE — 2700000000 HC OXYGEN THERAPY PER DAY

## 2020-09-16 RX ADMIN — MIRTAZAPINE 30 MG: 15 TABLET, FILM COATED ORAL at 21:29

## 2020-09-16 RX ADMIN — Medication 5000 UNITS: at 09:58

## 2020-09-16 RX ADMIN — DOXYCYCLINE HYCLATE 100 MG: 100 CAPSULE ORAL at 09:59

## 2020-09-16 RX ADMIN — DULOXETINE HYDROCHLORIDE 30 MG: 30 CAPSULE, DELAYED RELEASE ORAL at 21:29

## 2020-09-16 RX ADMIN — BUDESONIDE 500 MCG: 0.5 SUSPENSION RESPIRATORY (INHALATION) at 20:05

## 2020-09-16 RX ADMIN — DOXYCYCLINE HYCLATE 100 MG: 100 CAPSULE ORAL at 21:29

## 2020-09-16 RX ADMIN — APIXABAN 5 MG: 5 TABLET, FILM COATED ORAL at 21:29

## 2020-09-16 RX ADMIN — BUPROPION HYDROCHLORIDE 75 MG: 75 TABLET, FILM COATED ORAL at 09:58

## 2020-09-16 RX ADMIN — DOCUSATE SODIUM 50 MG AND SENNOSIDES 8.6 MG 2 TABLET: 8.6; 5 TABLET, FILM COATED ORAL at 09:59

## 2020-09-16 RX ADMIN — PANTOPRAZOLE SODIUM 40 MG: 40 TABLET, DELAYED RELEASE ORAL at 06:36

## 2020-09-16 RX ADMIN — SODIUM CHLORIDE: 9 INJECTION, SOLUTION INTRAVENOUS at 06:36

## 2020-09-16 RX ADMIN — ARFORMOTEROL TARTRATE 15 MCG: 15 SOLUTION RESPIRATORY (INHALATION) at 20:04

## 2020-09-16 RX ADMIN — ALBUTEROL SULFATE 0.63 MG: 0.63 SOLUTION RESPIRATORY (INHALATION) at 20:05

## 2020-09-16 RX ADMIN — PIPERACILLIN SODIUM AND TAZOBACTAM SODIUM 3.38 G: 3; .375 INJECTION, POWDER, LYOPHILIZED, FOR SOLUTION INTRAVENOUS at 09:58

## 2020-09-16 RX ADMIN — ALBUTEROL SULFATE 0.63 MG: 0.63 SOLUTION RESPIRATORY (INHALATION) at 02:35

## 2020-09-16 RX ADMIN — BUPROPION HYDROCHLORIDE 75 MG: 75 TABLET, FILM COATED ORAL at 21:29

## 2020-09-16 RX ADMIN — BUDESONIDE 500 MCG: 0.5 SUSPENSION RESPIRATORY (INHALATION) at 07:51

## 2020-09-16 RX ADMIN — ALBUTEROL SULFATE 0.63 MG: 0.63 SOLUTION RESPIRATORY (INHALATION) at 07:51

## 2020-09-16 RX ADMIN — APIXABAN 5 MG: 5 TABLET, FILM COATED ORAL at 09:58

## 2020-09-16 RX ADMIN — PIPERACILLIN SODIUM AND TAZOBACTAM SODIUM 3.38 G: 3; .375 INJECTION, POWDER, LYOPHILIZED, FOR SOLUTION INTRAVENOUS at 17:01

## 2020-09-16 RX ADMIN — DULOXETINE HYDROCHLORIDE 30 MG: 30 CAPSULE, DELAYED RELEASE ORAL at 09:58

## 2020-09-16 RX ADMIN — LEVOTHYROXINE SODIUM 125 MCG: 0.12 TABLET ORAL at 06:36

## 2020-09-16 RX ADMIN — PREDNISONE 10 MG: 10 TABLET ORAL at 09:58

## 2020-09-16 RX ADMIN — ATORVASTATIN CALCIUM 40 MG: 40 TABLET, FILM COATED ORAL at 21:29

## 2020-09-16 RX ADMIN — Medication 10 MG: at 21:29

## 2020-09-16 RX ADMIN — ALBUTEROL SULFATE 0.63 MG: 0.63 SOLUTION RESPIRATORY (INHALATION) at 13:24

## 2020-09-16 RX ADMIN — TAMSULOSIN HYDROCHLORIDE 0.4 MG: 0.4 CAPSULE ORAL at 21:29

## 2020-09-16 RX ADMIN — CHLORHEXIDINE GLUCONATE 0.12% ORAL RINSE 15 ML: 1.2 LIQUID ORAL at 09:59

## 2020-09-16 RX ADMIN — ARFORMOTEROL TARTRATE 15 MCG: 15 SOLUTION RESPIRATORY (INHALATION) at 07:51

## 2020-09-16 ASSESSMENT — PAIN SCALES - GENERAL
PAINLEVEL_OUTOF10: 0

## 2020-09-16 NOTE — PROGRESS NOTES
Phill Naranjo M.D.,[facility]  Rebeka Russell D.O., F.A.C.O.I., Pipe Antunez M.D. Jolynn Hernández M.D., Nahid Carrasco M.D. Heidy Duenas D.O. Daily Pulmonary Progress Note    Patient:  Aakash Adnerson. 72 y.o. male MRN: [id number]     Date of Service: 9/16/2020      Synopsis     We are following patient for pneumonia    \"CC\" cough, shortness of breath    Code status: FULL       Subjective      Patient was seen and examined. Lying in bed in no distress. Oxygen at 40% CATINA. Inner cannula was removed he states he can breath easier. Dr. Samara Kwan to exchanged trache at bedside today. He denies dyspnea. Cough improved. Not as much mucus expectorated this am. No fevers. resp cultures with staph aureus. Final  I & S pending     Review of Systems:   Constitutional: +fatigue, and malaise improved  Skin: Denies pigmentation, dark lesions, and rashes   HEENT: Denies hearing loss, tinnitus, ear drainage, epistaxis, sore throat, and hoarseness. Cardiovascular: Denies palpitations, chest pain, and chest pressure.   Respiratory: +dyspnea , cough, scant mucus+  Gastrointestinal: Denies nausea, vomiting, poor appetite, diarrhea, heartburn or reflux  Genitourinary: Denies dysuria, frequency, urgency or hematuria  Musculoskeletal: Denies myalgias, muscle weakness, and bone pain  Neurological: Denies dizziness, vertigo, headache, and focal weakness  Psychological: anxiety much improved  Endocrine: Denies heat intolerance and cold intolerance  Hematopoietic/Lymphatic: Denies bleeding problems and blood transfusions    24-hour events:  None     Objective   Vitals: /68   Pulse 74   Temp 98 °F (36.7 °C) (Temporal)   Resp 20   Ht 5' 11\" (1.803 m)   Wt 211 lb (95.7 kg)   SpO2 (!) 89%   BMI 29.43 kg/m²     I/O:    Intake/Output Summary (Last 24 hours) at 9/16/2020 1325  Last data filed at 9/16/2020 0636  Gross per 24 hour   Intake 1134 ml   Output 1050 ml   Net 84 ml       Vent Information  Skin Assessment: Clean, dry, & intact  Equipment Changed: Humidification  FiO2 : 50 %  SpO2: (!) 89 %  SpO2/FiO2 ratio: 178                CURRENT MEDS :  Scheduled Meds:   fentaNYL  1 patch Transdermal Q72H    albuterol  1 ampule Nebulization Q6H    apixaban  5 mg Oral BID    budesonide  500 mcg Nebulization BID    chlorhexidine  15 mL Mouth/Throat Daily    vitamin D  5,000 Units Oral Daily    doxycycline hyclate  100 mg Oral BID    DULoxetine  30 mg Oral BID    pantoprazole  40 mg Oral QAM AC    Arformoterol Tartrate  15 mcg Nebulization BID    levothyroxine  125 mcg Oral Daily    melatonin  10 mg Oral Nightly    tamsulosin  0.4 mg Oral Nightly    atorvastatin  40 mg Oral Nightly    sennosides-docusate sodium  2 tablet Oral Daily    predniSONE  10 mg Oral Daily    buPROPion  75 mg Oral BID    piperacillin-tazobactam  3.375 g Intravenous Q8H    sodium chloride  25 mL Intravenous Q8H    mirtazapine  30 mg Oral Nightly       Physical Exam:   General: The patient is lying in bed comfortably without any distress. Breathing is not labored  HEENT: Pupils are equal round and reactive to light, there are no oral lesions and no post-nasal drip  6 cuffless trache   Neck: supple without adenopathy  Cardiovascular: regular rate and rhythm without murmur or gallop  Respiratory: less coarse  to auscultation bilaterally without wheezing or crackles. Air entry is symmetric  Abdomen: soft, non-tender, non-distended, normal bowel sounds  Extremities: warm, no edema, no clubbing  Skin: no rash or lesion  Neurologic: CN II-XII grossly intact, no focal deficits    Pertinent/ New Labs and Imaging Studies     Imaging Personally Reviewed:     1. Findings suggest bilateral pneumonia throughout both lungs on    background of chronic interstitial lung disease.         2.  No pulmonary embolism.         3. Stable ascending thoracic aortic aneurysm measures up to 4.2 cm.         4. Stable fusiform infrarenal abdominal aortic aneurysm measures up to    3 cm.         5. Mild stenosis is seen involving origin of celiac trunk.         6. Moderate stenosis is seen involving proximal left renal artery and    mild stenosis is seen involving proximal right renal artery.         ALERT:  THIS IS AN ABNORMAL REPORT.              8/3/2020 CT neck [facility]    No evidence of cervical soft tissue mass or lymphadenopathy. Asymmetric thickening and medialization of the left aryepiglottic fold is   again noted.        Echo:7/29/2020  Conclusions      Summary   Normal left ventricle size.   Normal left ventricle wall thickness   Normal diastolic function.   Ejection fraction is visually estimated at 55-60%.  Mild mitral regurgitation is present.                     Labs:  Lab Results   Component Value Date    WBC 7.6 09/13/2020    HGB 13.9 09/13/2020    HCT 43.5 09/13/2020    MCV 76.9 09/13/2020    MCH 24.6 09/13/2020    MCHC 32.0 09/13/2020    RDW 19.1 09/13/2020     09/13/2020    MPV 10.0 09/13/2020     Lab Results   Component Value Date     09/13/2020    K 4.1 09/13/2020    K 4.0 09/09/2020    CL 97 09/13/2020    CO2 19 09/13/2020    BUN 16 09/13/2020    CREATININE 1.6 09/13/2020    LABALBU 3.3 09/13/2020    CALCIUM 9.9 09/13/2020    GFRAA 53 09/13/2020    LABGLOM 44 09/13/2020     Lab Results   Component Value Date    PROTIME 15.2 09/09/2020    INR 1.3 09/09/2020     No results for input(s): PROBNP in the last 72 hours. No results for input(s): PROCAL in the last 72 hours. This SmartLink has not been configured with any valid records. Micro:  Recent Labs     09/14/20  1500   CULTRESP Oral Pharyngeal Alva reduced  Oral Pharyngeal Alva reduced  *  Light growth  Sensitivity to follow       No results for input(s): LABGRAM in the last 72 hours. No results for input(s): LEGUR in the last 72 hours. No results for input(s): STREPNEUMAGU in the last 72 hours.   No results for input(s): LP1UAG in the last 72 hours. Results for Elfego Galdamez (MRN [id number]) as of 9/14/2020 14:55   Ref. Range 9/10/2020 23:58   SARS-CoV-2, NAAT Latest Ref Range: Not Detected  Not Detected        Assessment:    1. Acute on chronic respiratory failure with hypoxia  2. Recurrent Pneumonia with hx MDR organisms-MRSA, serratia  3. 2016 CRISTINA large hilar mass with chest wall and mediastinal invasion, moderately to poorly differentiated, invasive, acinar type stage IV adenocarcinoma of lung completed treatment with Taxol/carbo then Alimta/Carbo/Avastin, T 1, T 2 metastasis to spine  4. 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 Long term tracheostomy 6 cuffless  7. Laryngeal edema, supraglottic thrush   8. 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 Severe vocal cord paralysis not able to tolerate trache capping  10. COPD not in acute exacerbation  11. EF 27-11% normal diastolic function  12. Malnutrition   13. HTN  14. HLD  15. Neuropathy  16. Arthritis -chronic daily prednisone  17. Immunocompromised host  18. Hx heavy tobacco abuse      Plan:   1. Oxygen therapy keep >92%  2. Routine trache care. Change at bedside today with Dr Guerda Crocker  3. ID for abx management-recurrent pneumonia-placed on Zosyn , Doxycycline. Received vanco and cefepime in ED  4. Check respiratory cultures-staph aureus, procal 0.15, inflammatory markers sed rate 25 CRP 14.9 , bacterial urine antigens-negative  5. Continue bronchodilator regimen  6. Chronic daily prednisone 10 mg for arthritis  7. CTA chest reviewed. CXR today  8. Follows with ENT at [facility] for trache management, glottic stenosis, laryngeal edema, vocal cord paralysis  9. Dr Paulette Lyons local oncology. Patient on keytruda every 3 wks  10. Monitor caloric intake, may need peg for nutritional supplementation if not eating   11. DVT,GI prophylaxis  12.  Palliative medicine following

## 2020-09-16 NOTE — PROGRESS NOTES
[facility]  Hematology/Oncology        Patient Name: Ashley Hernandez. YOB: 1955  PCP: Fide Novak DO   Referring Provider:      Reason for Consultation:   Chief Complaint   Patient presents with    Shortness of Breath     pt c/o sob and weakness and has not been eating. pt was seen here yesterday and discharged. Subjective:  Patient states he feels well overall. Continues on the IV antibiotics. Resting comfortably in bed with wife at bedside. History of Present Illness:  59-year-old man with history of stage IV adenocarcinoma of the lung well-known to me diagnosed in 2016 and has done remarkably well and has been over the past 3 years on single agent pembrolizumab. His PDL 1 expression was elevated at 60%. He unfortunately continues to smoke and is hospitalized again with COPD and exacerbation and pneumonia. 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 Multiple biopsies of the neck area done in 2020 were negative for malignancy and they were performed at [facility] recently  Patient was admitted through the ER with worsening dyspnea  CTA of the chest showed findings of bilateral pneumonia with chronic interstitial lung disease but no pulmonary embolus. CTA of abdomen and pelvis showed stable ascending thoracic aortic aneurysm measuring 4.2 cm and stable small 3 cm infrarenal abdominal aortic aneurysm. CBC was unremarkable except for microcytosis with elevated RBC count likely related to a thalassemia trait. His CMP shows impaired renal function with elevation in his serum creatinine to 1.6 and this is above his baseline and likely related to recent CT scan dyes. He has been initiated on IV antibiotics with Zosyn along with aerosol treatments.     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    Paralyzed vocal cords     Thrombosis of testis     Tobacco dependence 5/27/2017       Patient Active Problem List    Diagnosis Date Noted    Sacroiliac dysfunction 08/17/2020    DDD (degenerative disc disease), lumbar 08/17/2020    Lumbar radiculopathy 08/17/2020    Lumbosacral spondylosis without myelopathy 08/17/2020    Spinal stenosis of lumbar region 08/17/2020    Smoking 08/17/2020    DVT of left axillary vein, acute ([address] 08/04/2020    Stage 3 chronic kidney disease ([address] 05/13/2020    Other hyperlipidemia 05/13/2020    Radiation-induced esophageal stricture 10/16/2019    Acute rhinitis 07/01/2019    Pharyngeal cancer ([address] 04/17/2019    Osteoarthritis of right hip 04/17/2019    Benign prostatic hyperplasia without lower urinary tract symptoms 04/17/2019    Acquired hypothyroidism 04/17/2019    Palliative care by specialist     Pneumonia 09/17/2018    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    Abdominal aortic aneurysm without rupture ([address] 08/27/2018    Anemia 07/08/2017    COPD (chronic obstructive pulmonary disease) ([address] 05/27/2017    Tobacco dependence 05/27/2017    Malignant neoplasm of overlapping sites of left lung ([address] 04/25/2016        Past Surgical History:   Procedure Laterality Date    BACK SURGERY      BRONCHOSCOPY N/A 3/5/2020    BRONCHOSCOPY DIAGNOSTIC OR CELL 8 [address]varez[address] ONLY performed by Pearl Chery MD at [facility] ENDOSCOPY    KNEE ARTHROSCOPY      LUNG BIOPSY Left 5/6/2016    OTHER SURGICAL HISTORY  4/15/2016    cervical myelogram    SINUS SURGERY      TRACHEOSTOMY  05/24/2017    TRACHEOSTOMY  05/24/2017       Family History  Family History   Problem Relation Age of Onset    Cancer Mother         breast cancer    Cancer Father         prostate cancer    Diabetes Father        Social History    TOBACCO:   reports that he has been smoking cigarettes. He started smoking about 48 years ago. He has a 44.00 pack-year smoking history. He has never used smokeless tobacco.  ETOH:   reports no history of alcohol use. Home Medications  Prior to Admission medications    Medication Sig Start Date End Date Taking? Authorizing Provider   LORazepam (ATIVAN) 0.5 MG tablet Take 0.5 mg by mouth nightly as needed for Anxiety.    Yes Historical Provider, MD   mirtazapine (REMERON) 30 MG tablet Take 30 mg by mouth nightly   Yes Historical Provider, MD   doxycycline hyclate (VIBRA-TABS) 100 MG tablet Take 1 tablet by mouth 2 times daily for 10 days 9/10/20 9/20/20 Yes Tano Patel MD   senna-docusate (PERICOLACE) 8.6-50 MG per tablet Take 2 tablets by mouth daily 9/4/20 3/3/21 Yes BG Deutsch - CNP   apixaban (ELIQUIS) 5 MG TABS tablet Take 1 tablet by mouth 2 times daily 8/21/20 11/19/20 Yes Sylvia Quintana DO   Revefenacin (YUPELRI) 175 MCG/3ML SOLN Inhale 3 mLs into the lungs daily Instructed to take am of procedure 7/29/20  Yes Denise Doll MD   buPROPion (WELLBUTRIN) 75 MG tablet Take 1 tablet by mouth 2 times daily 7/17/20  Yes Denise Doll MD   simvastatin (ZOCOR) 40 MG tablet Take 40 mg by mouth nightly   Yes Historical Provider, MD   levothyroxine (SYNTHROID) 125 MCG tablet Take 1 tablet by mouth Daily 5/8/20  Yes Sylvia Quintana DO   formoterol (PERFOROMIST) 20 MCG/2ML nebulizer solution Take 2 mLs by nebulization 2 times daily Instructed to take am of procedure 4/29/20  Yes Denise Doll MD   tamsulosin (FLOMAX) 0.4 MG capsule Take 1 capsule by mouth nightly 4/1/20  Yes Sylvia Quintana DO   esomeprazole (NEXIUM) 40 MG delayed release capsule Take 1 capsule by mouth every morning (before breakfast) Instructed to take am of procedure 4/1/20  Yes Sylvia Quintana DO   pregabalin (LYRICA) 150 MG capsule Take 1 capsule by mouth 3 times daily for 180 days. 3/24/20 9/20/20 Yes BG Casas CNP   DULoxetine (CYMBALTA) 30 MG extended release capsule Take 1 capsule by mouth 2 times daily 3/24/20 9/20/20 Yes BG Casas CNP   budesonide (PULMICORT) 0.5 MG/2ML nebulizer suspension Take 2 mLs by nebulization 2 times daily Instructed to take am of procedure 3/24/20  Yes Erika Kellogg MD   predniSONE (DELTASONE) 10 MG tablet Take 10 mg by mouth daily Instructed to take am of procedure   Yes Historical Provider, MD   oxyCODONE-acetaminophen (PERCOCET) 5-325 MG per tablet Take 1 tablet by mouth every 4 hours as needed for Pain. Break through    Historical Provider, MD   albuterol (ACCUNEB) 0.63 MG/3ML nebulizer solution Take 3 mLs by nebulization every 6 hours DX: COPD J44.9 6/19/20   Erika Kellogg MD   OXYGEN Inhale 4 L into the lungs nightly    Historical Provider, MD   chlorhexidine (PERIDEX) 0.12 % solution Take 15 mLs by mouth daily Swish & spit qd 1/10/20   Sylvia Quintana DO   cyclobenzaprine (FLEXERIL) 10 MG tablet Take 10 mg by mouth 3 times daily as needed for Muscle spasms    Historical Provider, MD   Cholecalciferol (VITAMIN D3) 5000 units TABS Take 5,000 Units by mouth daily     Historical Provider, MD   pembrolizumab (KEYTRUDA) 100 MG/4ML SOLN Infuse 200 mg intravenously every 21 days Last Dose -01/16/20  Next Dose -02/06/20    Historical Provider, MD   Melatonin 10 MG TABS Take 10 mg by mouth nightly     Historical Provider, MD       Allergies  Allergies   Allergen Reactions    Augmentin [Amoxicillin-Pot Clavulanate] Diarrhea       Review of Systems: Relevant for dyspnea on exertion and chronic back and hip pain     Constitutional:  No fever chills or rigors.     Eyes: No changes in vision, discharge, or pain   ENT: No Headaches, hearing loss or vertigo. No mouth sores or sore throat. No change in taste or smell.  Cardiovascular: Positive chest wall pain and dyspnea   Respiratory: Has moderate chronic cough, Has occasional sputum production. Has no hemoptysis,     Gastrointestinal: No abdominal pain, appetite loss, blood in stools. No change in bowel habits. No hematemesis    Genitourinary: Patient acknowledges no dysuria, trouble voiding, or hematuria. No nocturia or increased frequency.  Musculoskeletal: No gait disturbance, weakness or joint complaints.  Integumentary: No rash or pruritis.  Neurological: No headache, diplopia, change in muscle strength, numbness or tingling. No change in gait, balance, coordination, mood, affect, memory, mentation, behavior.  Psychiatric: No anxiety, or depression.  Endocrine: No temperature intolerance. No excessive thirst, fluid intake, or urination. No tremor.  Hematologic/Lymphatic: No abnormal bruising or bleeding, blood clots or swollen lymph nodes.  Allergic/Immunologic: No nasal congestion or hives. Objective  /68   Pulse 74   Temp 98 °F (36.7 °C) (Temporal)   Resp 20   Ht 5' 11\" (1.803 m)   Wt 211 lb (95.7 kg)   SpO2 92%   BMI 29.43 kg/m²     Physical Exam:     General: Alert, in no acute distress,   Head and neck : PERRLA, EOMI . Sclera non icteric. Oropharynx : Clear  Neck: no JVD,  no adenopathy, trach in place, postradiation fibrotic changes in neck  Heart: Regular rate and regular rhythm,   Lungs: Coarse breath sounds with scattered rhonchi  Extremities: No edema,no cyanosis, no clubbing. Abdomen: Soft, non-tender;no masses, no organomegaly  Skin:  No rash. Neurologic:Cranial nerves grossly intact. No focal motor or sensory deficits .     Recent Laboratory Data-   Lab Results   Component Value Date    WBC 7.6 09/13/2020    HGB 13.9 09/13/2020    HCT 43.5 09/13/2020    MCV 76.9 (L) 09/13/2020     09/13/2020 LYMPHOPCT 12.0 (L) 2020    RBC 5.66 2020    MCH 24.6 (L) 2020    MCHC 32.0 2020    RDW 19.1 (H) 2020    NEUTOPHILPCT 74.2 2020    MONOPCT 8.2 2020    BASOPCT 0.7 2020    NEUTROABS 5.61 2020    LYMPHSABS 0.91 (L) 2020    MONOSABS 0.62 2020    EOSABS 0.32 2020    BASOSABS 0.05 2020       Lab Results   Component Value Date     2020    K 4.1 2020    CL 97 (L) 2020    CO2 19 (L) 2020    BUN 16 2020    CREATININE 1.6 (H) 2020    GLUCOSE 80 2020    CALCIUM 9.9 2020    PROT 7.0 2020    LABALBU 3.3 (L) 2020    BILITOT 0.5 2020    ALKPHOS 117 2020    AST 23 2020    ALT 18 2020    LABGLOM 44 2020    GFRAA 53 2020       No results found for: IRON, TIBC, FERRITIN        Radiology-    Xr Chest Portable    Result Date: 9/10/2020  Patient MRN: [id number] : 1955 Age:  72 years Gender: Male Order Date: 9/10/2020 11:17 PM Exam: XR CHEST PORTABLE Indication:   Shortness of breath Shortness of breath Comparison: 2020 FINDINGS: Bibasilar opacities appear chronic and likely represent scarring. No definite consolidation is seen. The cardiomediastinal contour is unremarkable. No pneumothorax or pleural effusion. A tracheostomy tube is seen, which is grossly well positioned. 1. Bibasilar opacities in the lungs appear chronic and likely represent scarring. 2. No acute cardiopulmonary abnormality. Xr Chest Portable    Result Date: 2020  Patient MRN: [id number] : 1955 Age:  72 years Gender: Male Order Date: 2020 9:15 PM Exam: XR CHEST PORTABLE Number of Images: 1 view Indication:   shortness of breath shortness of breath Comparison: 2020. Findings: Stable position of tracheostomy. Cardiac silhouette is stable. Atherosclerotic calcification of the aorta. Prominent vasculature is normal. No pneumothorax or pleural effusion.  Left lung base greater than right lung base opacities. Mild interstitial prominence. normal chest Left lung base greater than right lung base opacities, atelectasis or consolidation. Redemonstrated interstitial opacities. Cta Abdomen Pelvis W Contrast    Result Date: 2020  Patient MRN:  [id number] : 1955 Age: 72 years Gender: Male Order Date:  2020 12:51 AM EXAM: CTA PULMONARY W CONTRAST, CTA ABDOMEN PELVIS W CONTRAST COMPARISON: None INDICATION:  pe pe TECHNIQUE: Unenhanced CT was performed from lung apices to the symphysis pubis, then Contiguous axial images through the abdomen and pelvis were obtained following intravenous contrast using standard CT angiographic protocol. Sagittal and coronal MIP images were reconstructed from the axial acquisition. Additional 3-D reconstructions were presented to aid in interpretation of this examination. Low-dose CT acquisition technique included one of the following options; 1. Automated exposure control, 2. Adjustment of mA and/or kV according to the patient's size or 3. Use of iterative reconstruction. FINDINGS: There are no filling defects seen within the pulmonary arteries to suggest pulmonary arterial embolism. There is a ascending thoracic aortic aneurysm which measures 4.2 cm in greatest dimension. This finding is similar compared to the previous examination. Descending thoracic aorta measures 2.6 cm in greatest dimension. There is no evidence of thoracic aortic dissection. No evidence of mural hematoma on unenhanced CT. No abnormal mediastinal fluid collections. The heart is normal in size. No pericardial effusion. There are irregular interstitial opacities throughout both lungs. Scattered groundglass opacities are also present throughout both lungs notable toward lung periphery and more predominant in the lung bases. There is no evidence of pleural effusion. There is peribronchial thickening bilaterally. There is no pneumothorax.  No mediastinal or hilar lymphadenopathy. There is a fusiform infrarenal abdominal aortic aneurysm which measures up to 3 cm which is similar in size compared to prior from 2020. There is no evidence of abdominal aortic dissection. Celiac axis is patent. Mild stenosis is seen involving origin of celiac axis. Superior mesenteric artery is patent. Atherosclerotic plaque is seen at the origin of the right renal artery with mild proximal stenosis. Moderate stenosis is seen involving proximal left renal artery. Inferior mesenteric artery is patent atherosclerotic calcifications are associated with common iliac arteries. External iliac arteries are patent. Common femoral arteries are patent. There is no bowel obstruction, free air, or free fluid in the abdomen or pelvis. Liver is unremarkable. Gallbladder is unremarkable. No evidence of acute pancreatitis. Spleen is nonenlarged. There is grossly normal attenuation associated with both kidneys. There is no hydronephrosis. 1. Findings suggest bilateral pneumonia throughout both lungs on background of chronic interstitial lung disease. 2. No pulmonary embolism. 3. Stable ascending thoracic aortic aneurysm measures up to 4.2 cm. 4. Stable fusiform infrarenal abdominal aortic aneurysm measures up to 3 cm. 5. Mild stenosis is seen involving origin of celiac trunk. 6. Moderate stenosis is seen involving proximal left renal artery and mild stenosis is seen involving proximal right renal artery. ALERT:  THIS IS AN ABNORMAL REPORT.      Cta Pulmonary W Contrast    Result Date: 2020  Patient MRN:  [id number] : 1955 Age: 72 years Gender: Male Order Date:  2020 12:51 AM EXAM: CTA PULMONARY W CONTRAST, CTA ABDOMEN PELVIS W CONTRAST COMPARISON: None INDICATION:  pe pe TECHNIQUE: Unenhanced CT was performed from lung apices to the symphysis pubis, then Contiguous axial images through the abdomen and pelvis were obtained following intravenous contrast using standard CT angiographic protocol. Sagittal and coronal MIP images were reconstructed from the axial acquisition. Additional 3-D reconstructions were presented to aid in interpretation of this examination. Low-dose CT acquisition technique included one of the following options; 1. Automated exposure control, 2. Adjustment of mA and/or kV according to the patient's size or 3. Use of iterative reconstruction. FINDINGS: There are no filling defects seen within the pulmonary arteries to suggest pulmonary arterial embolism. There is a ascending thoracic aortic aneurysm which measures 4.2 cm in greatest dimension. This finding is similar compared to the previous examination. Descending thoracic aorta measures 2.6 cm in greatest dimension. There is no evidence of thoracic aortic dissection. No evidence of mural hematoma on unenhanced CT. No abnormal mediastinal fluid collections. The heart is normal in size. No pericardial effusion. There are irregular interstitial opacities throughout both lungs. Scattered groundglass opacities are also present throughout both lungs notable toward lung periphery and more predominant in the lung bases. There is no evidence of pleural effusion. There is peribronchial thickening bilaterally. There is no pneumothorax. No mediastinal or hilar lymphadenopathy. There is a fusiform infrarenal abdominal aortic aneurysm which measures up to 3 cm which is similar in size compared to prior from January 23, 2020. There is no evidence of abdominal aortic dissection. Celiac axis is patent. Mild stenosis is seen involving origin of celiac axis. Superior mesenteric artery is patent. Atherosclerotic plaque is seen at the origin of the right renal artery with mild proximal stenosis. Moderate stenosis is seen involving proximal left renal artery. Inferior mesenteric artery is patent atherosclerotic calcifications are associated with common iliac arteries. External iliac arteries are patent.  Common femoral arteries are patent. There is no bowel obstruction, free air, or free fluid in the abdomen or pelvis. Liver is unremarkable. Gallbladder is unremarkable. No evidence of acute pancreatitis. Spleen is nonenlarged. There is grossly normal attenuation associated with both kidneys. There is no hydronephrosis. 1. Findings suggest bilateral pneumonia throughout both lungs on background of chronic interstitial lung disease. 2. No pulmonary embolism. 3. Stable ascending thoracic aortic aneurysm measures up to 4.2 cm. 4. Stable fusiform infrarenal abdominal aortic aneurysm measures up to 3 cm. 5. Mild stenosis is seen involving origin of celiac trunk. 6. Moderate stenosis is seen involving proximal left renal artery and mild stenosis is seen involving proximal right renal artery. ALERT:  THIS IS AN ABNORMAL REPORT. ASSESSMENT/PLAN : 58-year-old man    Stage IV adenocarcinoma of the lung diagnosed in 2016 when he presented with Vipin syndrome and large left upper lobe apical mass with mediastinal lymphadenopathy and T1-T2 vertebral body destruction. He initially required palliative radiation therapy along with concurrent chemotherapy with weekly Taxol and carboplatinum. This was followed by systemic chemotherapy with Alimta carboplatinum and Avastin with excellent response. He had been on Xgeva for palliation of skeletal metastasis but it was discontinued in 2018 because of early ONJ. He has no targetable mutation with negative EGFR/ALK/Ros 1. In 2017 his cellblock was sent for PDL 1 and it was strongly expressed at 60%  He has been on single agent Keytruda since 2017 and he has achieved a complete remission  In 2017 he developed a second head and neck primary involving the vocal cords and required radiation therapy  Multiple head and neck biopsies most recent one was at [facility] few weeks ago and was negative for recurrent malignancy.     Tobacco abuse and he continues to smoke daily despite his lung and head and neck malignancies  COPD and exacerbation with bilateral pneumonia  Stable thoracic and abdominal aneurysm    To continue IV antibiotics  He will be maintained on Keytruda after discharge  If poor oral; intake continues may need TF    9/16/20  - PSA 1.84  - Continue on IV antibiotics  - CXR shows slight worsening of left lower lobe atelectasis/ infiltrate. - He will be maintained on Keytruda after discharge  - If poor oral intake continues  ,he may need TF    Aguila Mora M.D., F.A.C.P.   Electronically signed 9/16/2020 at 11:46 AM

## 2020-09-16 NOTE — PROGRESS NOTES
Admit Date: 9/10/2020    Subjective:     Awake feels better     Objective:     Patient Vitals for the past 8 hrs:   BP Temp Temp src Pulse Resp SpO2   09/16/20 0732 121/68 98 °F (36.7 °C) Temporal 74 20 95 %   09/16/20 0531 107/67 97.9 °F (36.6 °C) Temporal 81 16 --   09/16/20 0257 -- -- -- -- 18 --     I/O last 3 completed shifts: In: [address] [P. O.:600; I.V.:554; IV Piggyback:100]  Out: 1050 [Urine:1050]  No intake/output data recorded. HEENT: Normal  NECK: Thyroid normal. No carotid bruit. Trach. CVS: RRR  RS: Clear. No wheeze. No rhonchi. ABD: Soft. Non tender. No mass. Normal BS. EXT: No edema. Non tender. Pulses present.    NEURO:no focal deficit       Scheduled Meds:   fentaNYL  1 patch Transdermal Q72H    albuterol  1 ampule Nebulization Q6H    apixaban  5 mg Oral BID    budesonide  500 mcg Nebulization BID    chlorhexidine  15 mL Mouth/Throat Daily    vitamin D  5,000 Units Oral Daily    doxycycline hyclate  100 mg Oral BID    DULoxetine  30 mg Oral BID    pantoprazole  40 mg Oral QAM AC    Arformoterol Tartrate  15 mcg Nebulization BID    levothyroxine  125 mcg Oral Daily    melatonin  10 mg Oral Nightly    tamsulosin  0.4 mg Oral Nightly    atorvastatin  40 mg Oral Nightly    sennosides-docusate sodium  2 tablet Oral Daily    predniSONE  10 mg Oral Daily    buPROPion  75 mg Oral BID    piperacillin-tazobactam  3.375 g Intravenous Q8H    sodium chloride  25 mL Intravenous Q8H    mirtazapine  30 mg Oral Nightly     Continuous Infusions:   sodium chloride 75 mL/hr at 09/16/20 0636       CBC with Differential:    Lab Results   Component Value Date    WBC 7.6 09/13/2020    RBC 5.66 09/13/2020    HGB 13.9 09/13/2020    HCT 43.5 09/13/2020     09/13/2020    MCV 76.9 09/13/2020    MCH 24.6 09/13/2020    MCHC 32.0 09/13/2020    RDW 19.1 09/13/2020    SEGSPCT 59 02/22/2012    LYMPHOPCT 12.0 09/13/2020    MONOPCT 8.2 09/13/2020    BASOPCT 0.7 09/13/2020    MONOSABS 0.62 09/13/2020 LYMPHSABS 0.91 09/13/2020    EOSABS 0.32 09/13/2020    BASOSABS 0.05 09/13/2020     CMP:    Lab Results   Component Value Date     09/13/2020    K 4.1 09/13/2020    K 4.0 09/09/2020    CL 97 09/13/2020    CO2 19 09/13/2020    BUN 16 09/13/2020    CREATININE 1.6 09/13/2020    GFRAA 53 09/13/2020    LABGLOM 44 09/13/2020    PROT 7.0 09/13/2020    LABALBU 3.3 09/13/2020    CALCIUM 9.9 09/13/2020    BILITOT 0.5 09/13/2020    ALKPHOS 117 09/13/2020    AST 23 09/13/2020    ALT 18 09/13/2020     PT/INR:    Lab Results   Component Value Date    PROTIME 15.2 09/09/2020    INR 1.3 09/09/2020       Assessment:     Principal Problem:    Pneumonia    COPD (chronic obstructive pulmonary disease) (HCC)    Tobacco dependence    Abdominal aortic aneurysm without rupture (HCC)    Pharyngeal cancer (HCC)    Benign prostatic hyperplasia without lower urinary tract symptoms    Urinary retention     Lumbar radiculopathy    Lung Ca       Plan:   Improvement,continue same management home when cleared by others

## 2020-09-16 NOTE — PROGRESS NOTES
Palliative Care Department  Palliative Care Initial Consult  Provider: Ruthy Reaves MD    Hospital Day: 7    Referring Provider:  Dr. Williams Scales    Reason for Consult:  \"Know in outpatient setting\"    Chief Complaint: Osbaldo Boles is a 72 y.o. male with chief complaint of SOB    Assessment/Plan      Active Hospital Problems    Diagnosis Date Noted    Lumbar radiculopathy [M54.16] 08/17/2020    DDD (degenerative disc disease), lumbar [M51.36] 08/17/2020    Spinal stenosis of lumbar region [M48.061] 08/17/2020    Other hyperlipidemia [E78.49] 05/13/2020    Pharyngeal cancer ([facility] [C14.0] 04/17/2019    Benign prostatic hyperplasia without lower urinary tract symptoms [N40.0] 04/17/2019    Acquired hypothyroidism [E03.9] 04/17/2019    Pneumonia [J18.9] 09/17/2018    Abdominal aortic aneurysm without rupture ([facility] [I71.4] 08/27/2018    COPD (chronic obstructive pulmonary disease) ([facility] [J44.9] 05/27/2017    Tobacco dependence [F17.200] 05/27/2017       PNA:   -  Per ID and Pulm[facility] care    Palliative Care Encounter/Recommendations:      - Goals of care: live longer, improve or maintain function/quality of life and continue current management     - Code Status: full code     - Symptom Management:    Symptom Medications Number Doses in Past 24 hrs   Pain Fentanyl 100 mcg patch Q 72  Percocet 5-325 mg Q 4 PRN   1  1   Nausea/vomiting     Bowel Regime/constipation Senna-S 2 tabs daily 1   Anxiety/agitation/depression Wellbutrin  Cymbalta 30 mg BID  Mirtazapine 30 mg HS  Lorazepam 0.5 mg HS PRN 1  2  1  1   SOB Brovana  Albuterol  Pulmicort  Prednisone 10 mg Daily    Appetite Mirtazapine 30 mg HS    Sleep Melatonin 10 mg HS 1        -  No immediate PM needs, will follow along as needed.     Subjective:     HPI:  Osbaldo Boles is a 72 y.o. 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 Solomon Lanza. He has a chronic tracheostomy with recent follow up at [facility] s/p neck biopsies - for malignancy. He presented now with complains of SOB, as well as poor oral intake. He is being managed for bilateral PNA and UTI, with ID and pulmonology following. PM has been consulted as he is known well to us in the outpatient setting and the wife request our service to see him while inpatient. Subjective/Events/Discussions:  9/15/2020:  Melonie Cogan is seen today at the bedside with his wife. He is alert and oriented. He states he is feeling better today, with some improvement in appetite and he is trying to eat lunch currently. She also state he continues to be SOB but this is also improved. His pain remains unchanged since admission and is well controlled with his fentanyl patch and percocet for break through pain. He wishes to remain a full code and is hoping to improve enough to follow up with ENT in [facility] regarding his tracheostomy as previously planned. He otherwise has not immediate PM needs and we will be available as needed. 9/16/20  Met with Melonie Cogan and spouse at bedside. He is not complaining about any pain, last dose of oxycodone was used that 8 AM yesterday. His pain is well controlled with the fentanyl patch.  his wife is worried about his eating habits, he told me that he has not had an appetite since he has been in the hospital.  He is currently on Remeron 30 mg at at bedtime.     - Family Meeting:   Participants:Spouse and patient   Family meeting was held to discuss:goals of care, prior expressed wishes, symptom management and discharge plan         Objective:     Physical Exam  /68   Pulse 74   Temp 98 °F (36.7 °C) (Temporal)   Resp 20   Ht 5' 11\" (1.803 m)   Wt 211 lb (95.7 kg)   SpO2 92%   BMI 29.43 kg/m²     Gen:  Alert, appears stated age, well nourished, in no acute distress  HEENT:  Normocephalic, conjunctiva pink, no drainage, mucosa moist  Neck:  Supple, trach  Lungs:  CTA bilaterally, no audible rhonchi or wheezes noted  Heart[de-identified]  RRR, no murmur, rub, or gallop noted during exam  Abd:  Soft, non tender, non distended, BS+  :  marr  M/S/Ext:  Moving all extremities, no edema, pulses present  Skin:  Warm and dry  Neuro:  PERRL, Alert, oriented x 3; following commands    Chester Symptom Assessment Score   Chester Score 8/11/2020 7/7/2020 6/9/2020 3/2/2020 1/13/2020   Pain Score 6 9 7 7 6   Tiredness Score 2 5 6 6 5   Nausea Score Not nauseated Not nauseated Not nauseated Not nauseated Not nauseated   Depression Score 4 4 4 5 5   Anxiety Score 4 4 4 4 5   Drowsiness Score 4 3 4 5 5   Appetite Score 1 1 1 2 1   Wellbeing Score 5 2 3 4 4   Dyspnea Score 7 6 6 7 5   Other Problem Score 6 6 7 7 5   Total Assessment Score(calculated) 39 40 42 47 41       Assessed by: patient and provider. Current Medications:  Inpatient medications reviewed: yes    Results/Verification of Data Review  Objective data reviewed: labs, images, records, medication use, vitals and chart      - Advanced Directives: None   -Surrogate/Legal NOK: Spouse    Contacts:  Nitin Keys [phone])    - Referrals to: none today    Time/Communication  Greater than 50% of time spent, total 25 minutes in counseling and coordination of care at the bedside regarding goals of care, symptom management, diagnosis and prognosis and see above. Madhavi Fragoso MD  Palliative Medicine    Discussed patient and the plan of care with the other IDT members of Palliative Care, and with patient and family. Thank you for allowing Palliative Medicine to participate in the care of Wally Oneil. .    Note: This report was completed using computerEcociclus voiced recognition software. Every effort has been made to ensure accuracy; however, inadvertent computerized transcription errors may be present.

## 2020-09-16 NOTE — PROGRESS NOTES
atraumatic  Pulmonary/Chest: normal air movement, no respiratory distress, trach  Abdomen: soft, non-tender, non-distended  Genitourinary: Sosa draining yellow urine  Extremities: no cyanosis, clubbing or edema         Labs:     No results for input(s): NA, K, CL, CO2, BUN, CREATININE, GLUCOSE, CALCIUM in the last 72 hours.     Lab Results   Component Value Date    HGB 13.9 09/13/2020    HCT 43.5 09/13/2020         Assessment/Plan:  Urinary retention  Gross hematuria      UA unremarkable  Urine cytology pending  PSA reviewed, 1.84  CT abdomen pelvis on 9/11/2020 without any evidence of any obstructive uropathy  Continue the Flomax  Continue the Sosa catheter  Continue manual irrigations  The patient is adamant that he has voiding trial prior to discharge as he does not want Sosa at home  We will plan for voiding trial prior to discharge per his request, orders placed  Nursing to notify urology of Sosa replaced due to failed voiding trial prior to discharge  Will need outpatient follow-up after discharge for STEPHANIE,  bladder scan, and possible office cystoscopy  No further  interventions are planned at this time  Please call us with any further questions or concerns    Kei Kemp, APRN - CNP   [facility]

## 2020-09-16 NOTE — PROGRESS NOTES
nontender  Skin: Warm and dry, no active dermatoses  Musculoskeletal: No joint swelling, no joint erythema    Labs, imaging, and medical records/notes were personally reviewed. Assessment:  Acute hypoxic respiratory failure  Pneumonia  Immunocompromised state   Lung adenocarcinoma on immunotherapy     Plan:  Contiue piperacillin-tazobactam 3.375g q8h and doxycycline 100 mg BID for now. Anticipate switch to oral antibiotic course pending tracheal aspirate culture and antibiotic sensitivity. Follow up tracheal aspirate cultures.     Thank you for involving me in the care of Wally Oneil. . I will continue to follow. Please do not hesitate to call for any questions or concerns.     Electronically signed by Kiran Gupta MD on 9/16/2020 at 10:29 AM

## 2020-09-17 LAB
CULTURE, RESPIRATORY: ABNORMAL
CULTURE, RESPIRATORY: ABNORMAL
ORGANISM: ABNORMAL
SMEAR, RESPIRATORY: ABNORMAL

## 2020-09-17 PROCEDURE — 88112 CYTOPATH CELL ENHANCE TECH: CPT

## 2020-09-17 PROCEDURE — 2700000000 HC OXYGEN THERAPY PER DAY

## 2020-09-17 PROCEDURE — 6370000000 HC RX 637 (ALT 250 FOR IP): Performed by: INTERNAL MEDICINE

## 2020-09-17 PROCEDURE — 2580000003 HC RX 258

## 2020-09-17 PROCEDURE — 6370000000 HC RX 637 (ALT 250 FOR IP): Performed by: NEUROMUSCULOSKELETAL MEDICINE & OMM

## 2020-09-17 PROCEDURE — 2060000000 HC ICU INTERMEDIATE R&B

## 2020-09-17 PROCEDURE — 94640 AIRWAY INHALATION TREATMENT: CPT

## 2020-09-17 PROCEDURE — 2580000003 HC RX 258: Performed by: INTERNAL MEDICINE

## 2020-09-17 PROCEDURE — 6360000002 HC RX W HCPCS: Performed by: INTERNAL MEDICINE

## 2020-09-17 PROCEDURE — 97535 SELF CARE MNGMENT TRAINING: CPT

## 2020-09-17 RX ORDER — SODIUM CHLORIDE 0.9 % (FLUSH) 0.9 %
SYRINGE (ML) INJECTION
Status: COMPLETED
Start: 2020-09-17 | End: 2020-09-17

## 2020-09-17 RX ADMIN — ARFORMOTEROL TARTRATE 15 MCG: 15 SOLUTION RESPIRATORY (INHALATION) at 19:47

## 2020-09-17 RX ADMIN — ALBUTEROL SULFATE 0.63 MG: 0.63 SOLUTION RESPIRATORY (INHALATION) at 02:30

## 2020-09-17 RX ADMIN — MIRTAZAPINE 30 MG: 15 TABLET, FILM COATED ORAL at 22:13

## 2020-09-17 RX ADMIN — ALBUTEROL SULFATE 0.63 MG: 0.63 SOLUTION RESPIRATORY (INHALATION) at 14:22

## 2020-09-17 RX ADMIN — ALBUTEROL SULFATE 0.63 MG: 0.63 SOLUTION RESPIRATORY (INHALATION) at 05:53

## 2020-09-17 RX ADMIN — BUDESONIDE 500 MCG: 0.5 SUSPENSION RESPIRATORY (INHALATION) at 05:54

## 2020-09-17 RX ADMIN — BUPROPION HYDROCHLORIDE 75 MG: 75 TABLET, FILM COATED ORAL at 22:14

## 2020-09-17 RX ADMIN — PANTOPRAZOLE SODIUM 40 MG: 40 TABLET, DELAYED RELEASE ORAL at 07:48

## 2020-09-17 RX ADMIN — DOXYCYCLINE HYCLATE 100 MG: 100 CAPSULE ORAL at 09:03

## 2020-09-17 RX ADMIN — APIXABAN 5 MG: 5 TABLET, FILM COATED ORAL at 09:01

## 2020-09-17 RX ADMIN — DOCUSATE SODIUM 50 MG AND SENNOSIDES 8.6 MG 2 TABLET: 8.6; 5 TABLET, FILM COATED ORAL at 09:03

## 2020-09-17 RX ADMIN — ARFORMOTEROL TARTRATE 15 MCG: 15 SOLUTION RESPIRATORY (INHALATION) at 05:54

## 2020-09-17 RX ADMIN — Medication 5000 UNITS: at 09:08

## 2020-09-17 RX ADMIN — CHLORHEXIDINE GLUCONATE 0.12% ORAL RINSE 15 ML: 1.2 LIQUID ORAL at 09:04

## 2020-09-17 RX ADMIN — TAMSULOSIN HYDROCHLORIDE 0.4 MG: 0.4 CAPSULE ORAL at 22:13

## 2020-09-17 RX ADMIN — Medication 10 MG: at 22:13

## 2020-09-17 RX ADMIN — LEVOTHYROXINE SODIUM 125 MCG: 0.12 TABLET ORAL at 07:48

## 2020-09-17 RX ADMIN — PREDNISONE 10 MG: 10 TABLET ORAL at 09:03

## 2020-09-17 RX ADMIN — ALBUTEROL SULFATE 0.63 MG: 0.63 SOLUTION RESPIRATORY (INHALATION) at 19:47

## 2020-09-17 RX ADMIN — BUDESONIDE 500 MCG: 0.5 SUSPENSION RESPIRATORY (INHALATION) at 19:47

## 2020-09-17 RX ADMIN — DULOXETINE HYDROCHLORIDE 30 MG: 30 CAPSULE, DELAYED RELEASE ORAL at 22:13

## 2020-09-17 RX ADMIN — SODIUM CHLORIDE, PRESERVATIVE FREE 10 ML: 5 INJECTION INTRAVENOUS at 14:39

## 2020-09-17 RX ADMIN — BUPROPION HYDROCHLORIDE 75 MG: 75 TABLET, FILM COATED ORAL at 09:03

## 2020-09-17 RX ADMIN — DOXYCYCLINE HYCLATE 100 MG: 100 CAPSULE ORAL at 22:13

## 2020-09-17 RX ADMIN — APIXABAN 5 MG: 5 TABLET, FILM COATED ORAL at 22:13

## 2020-09-17 RX ADMIN — ATORVASTATIN CALCIUM 40 MG: 40 TABLET, FILM COATED ORAL at 22:13

## 2020-09-17 RX ADMIN — DULOXETINE HYDROCHLORIDE 30 MG: 30 CAPSULE, DELAYED RELEASE ORAL at 09:04

## 2020-09-17 ASSESSMENT — PAIN SCALES - GENERAL
PAINLEVEL_OUTOF10: 0

## 2020-09-17 NOTE — PROGRESS NOTES
Occupational Therapy  Treatment Session    Date:2020  Patient Name: Asa Goldstein. MRN: [id number]  : 1955  Room: [address] [place] [address]    Referring Provider: Melisa Loera MD    Evaluating OT: [place] #[id number]    AM-PAC Daily Activity Raw Score: 16    Recommended Adaptive Equipment: To be further assessed      Diagnosis:    1. Pneumonia due to organism    2. Hypoxia       Pertinent Medical History: COPD, paralyzed vocal cords, lung CA, depression, anxiety      Precautions:  Falls, chronic trach. trach mask 12L,  marr     Home Living: Pt lives with wife n a 2 story with 1 step(s) to enter and 0 rail(s); bed/bath on 1st   Bathroom setup: tub shower with no DME   Equipment owned: ww, Shower Chair    Prior Level of Function: indep with ADLs , indep with IADLs; ambulated with no AD  Driving: ?? Occupation: retired    Pain Level: denies pain this session    Cognition: A&O: 4/4; Follows 2 step directions INDly   Memory:  good    Sequencing:  good    Problem solving:  good   Judgement/safety:  good(-)     Functional Assessment:   Initial Eval Status  Date: 20 Treatment Status  Date:    20 STG=LTG  Time frame: 5-7 days   Feeding  general diet  currently pt has not been able to eat.   SBA for ice chips IND Independent    Grooming Minimal Assist  Close SUP/Set up    Required Close SUP for safety w/ standing balance/tolerance, able to wash hands at sink - required seated rest break after 1 task   Independent    UB Dressing Minimal Assist  Min A/Set up    Min A to don robe while seated in chair, unable to tolerate item retrieval   Independent    LB Dressing Mod Assist  NT Independent    Bathing Moderate Assist NT    Pt/Family ed re: benefits of use of Shower Chair for Bathing at d/c - Energy conservation, improved safety w/ transfer   Modified [place]    Toileting Maximal Assist  NT    Pt declined Stand by Assist    Bed Mobility  Supine to sit: NT   Sit to supine: NT  NT Supine to sit: Independent   Sit to supine: Independent    Functional Transfers Sit to stand: Minimal Assist   Stand to sit: Minimal Assist   Stand pivot: NT  Sit to stand: Minimal Assist   Stand to sit: Minimal Assist     Required Min A for safety to stand from armed chair ~ 4-5x, pt ed for hand placement   Modified [place]    Functional Mobility  (Ambulation) Min A with ww 2-3 steps forward/back   Poor activity tolerance Min A w/ [employer]    Short household distances in room 2x, Short distances chair<>bathroom and in bathroom w/ use of WW, slightly unsteady, no major LOB, pt ed for walker safety - required seated rest break after each trial - O2 sats declined to 82% 1st x ~ 20' requiring ~ 2 min seated rest break, extensive pt/family ed for safety/energy conservation, set up of chairs in the home for rest breaks   Mod indep with ww  Functional distance   Balance Sitting:     Static:  wfl    Dynamic:SBA  Standing: min A Sitting:     Static:  IND in armed chair    Dynamic:SBA in chair    Standing:     Static:  Close SUP w/ [employer]      Dynamic:  Min A w/ functional ax    Activity Tolerance Poor   1-2 minute standing tolerance x two attempts Fair    Required multiple seated rest breaks d/t hypoxia w/ Trach Mask 12L, increased time for each task as a result  Extensive pt/family ed re: energy conservation techs      Visual/  Perceptual Glasses: yes          BUE  ROM/Strength/  Fine motor Coordination RUE: ROM WFL     Strength: grossly 3+/5      Strength:  WFL     Coordination: WFL    LUE: ROM  WFL     Strength: grossly 3+/5      Strength:  WFL     Coordination: WFL NT Increase BUE strength to 4/5  to increase functional transfers to mod indep     Comments: Upon arrival patient found sitting in bedside chair. Wife present in room. At end of session, patient was properly positioned in b/s chair with call light and phone within reach, all lines and tubes intact. O2 Trach Mask in place w/ 12L.   Overall patient demonstrated decreased activity tolerance,  decreased  independence and safety during completion of ADL/functional transfer/mobility tasks. Pt would benefit from continued skilled OT to increase safety and independence with completion of ADL/IADL tasks for functional independence and quality of life. Treatment: OT treatment provided this date includes:    Mobility-  Instruction/training on safety and improved independence with /functional transfers  and functional mobility.  ADLs/Functional Ax:  Use of Adaptive equipment/DME/AD for safety/energy conservation w/ ax - Tub Chair, Adaptive equip      Energy Conservation Techs:  Use of energy conservation techs to improve overall endurance, IND and safety w/ functional ax     Plan of Care: OT  1-3x/week for 5-7 days PRN   [x] ADL retraining/AE recommendations   [x] Energy Conservation Techniques/Strategies      [x] Neuromuscular Re-Education      [x] Functional Transfer Training         [x] Functional Mobility Training          [] Cognitive Re-Training          [] Splinting/Positioning Needs           [x] Therapeutic Activity   [x]Therapeutic Exercise   [] Visual/Perceptual   [] Delirium Prevention/Treatment   [x] Positioning to Improve Functional [place], Safety, and Skin Integrity   [x] Patient and/or Family Education to Increase Safety and Functional Stanley   [] Other:              Rehab Potential: Good for established goals     Patient / Family Goal: Return home with family assist next date    Patient and/or family were instructed on functional diagnosis, prognosis/goals and OT plan of care. Demonstrated good understanding.               Time In: 1557  Time Out: 1651  Total Treatment Time:  54 minutes    Min Units   OT Eval Low 08958      OT Eval Medium 34563     OT Eval High 89081       OT Re-Eval [id number]       Therapeutic Ex 55923       Therapeutic Activities 99795     ADL/Self Care 86139 34 4   Orthotic Management [address]       Neuro Re-Ed 44619 Non-Billable Time       TOTAL TIMED TREATMENT [address] [place], [place], OTR/L  # [id number]

## 2020-09-17 NOTE — PLAN OF CARE
Problem: Falls - Risk of:  Goal: Will remain free from falls  Description: Will remain free from falls  Outcome: Met This Shift     Problem: Falls - Risk of:  Goal: Absence of physical injury  Description: Absence of physical injury  Outcome: Met This Shift     Problem: Breathing Pattern - Ineffective:  Goal: Ability to achieve and maintain a regular respiratory rate will improve  Description: Ability to achieve and maintain a regular respiratory rate will improve  Outcome: Met This Shift     Problem: Skin Integrity:  Goal: Will show no infection signs and symptoms  Description: Will show no infection signs and symptoms  Outcome: Met This Shift

## 2020-09-17 NOTE — PROGRESS NOTES
Admit Date: 9/10/2020    Subjective:     Feels fine want to go home without Sosa     Objective:     Patient Vitals for the past 8 hrs:   BP Temp Temp src Pulse Resp SpO2   09/17/20 1000 -- -- -- -- 18 97 %   09/17/20 0815 108/66 98.1 °F (36.7 °C) Temporal 84 20 95 %   09/17/20 0235 -- -- -- -- 16 92 %     I/O last 3 completed shifts: In: 3934 [P.O.:240; I.V.:967; IV Piggyback:100]  Out: 500 [Urine:500]  No intake/output data recorded. HEENT: Normal  NECK: Thyroid normal. No carotid bruit. Trach. CVS: RRR  RS: Clear. No wheeze. No rhonchi. Good airflow bilaterally. ABD: Soft. Non tender. No mass. Normal BS. EXT: No edema. Non tender. Pulses present. Skin intact.   NEURO: no focal deficit       Scheduled Meds:   sodium chloride flush        fentaNYL  1 patch Transdermal Q72H    albuterol  1 ampule Nebulization Q6H    apixaban  5 mg Oral BID    budesonide  500 mcg Nebulization BID    chlorhexidine  15 mL Mouth/Throat Daily    vitamin D  5,000 Units Oral Daily    doxycycline hyclate  100 mg Oral BID    DULoxetine  30 mg Oral BID    pantoprazole  40 mg Oral QAM AC    Arformoterol Tartrate  15 mcg Nebulization BID    levothyroxine  125 mcg Oral Daily    melatonin  10 mg Oral Nightly    tamsulosin  0.4 mg Oral Nightly    atorvastatin  40 mg Oral Nightly    sennosides-docusate sodium  2 tablet Oral Daily    predniSONE  10 mg Oral Daily    buPROPion  75 mg Oral BID    mirtazapine  30 mg Oral Nightly     Continuous Infusions:    CBC with Differential:    Lab Results   Component Value Date    WBC 7.6 09/13/2020    RBC 5.66 09/13/2020    HGB 13.9 09/13/2020    HCT 43.5 09/13/2020     09/13/2020    MCV 76.9 09/13/2020    MCH 24.6 09/13/2020    MCHC 32.0 09/13/2020    RDW 19.1 09/13/2020    SEGSPCT 59 02/22/2012    LYMPHOPCT 12.0 09/13/2020    MONOPCT 8.2 09/13/2020    BASOPCT 0.7 09/13/2020    MONOSABS 0.62 09/13/2020    LYMPHSABS 0.91 09/13/2020    EOSABS 0.32 09/13/2020    BASOSABS 0.05 09/13/2020     CMP:    Lab Results   Component Value Date     09/13/2020    K 4.1 09/13/2020    K 4.0 09/09/2020    CL 97 09/13/2020    CO2 19 09/13/2020    BUN 16 09/13/2020    CREATININE 1.6 09/13/2020    GFRAA 53 09/13/2020    LABGLOM 44 09/13/2020    PROT 7.0 09/13/2020    LABALBU 3.3 09/13/2020    CALCIUM 9.9 09/13/2020    BILITOT 0.5 09/13/2020    ALKPHOS 117 09/13/2020    AST 23 09/13/2020    ALT 18 09/13/2020     PT/INR:    Lab Results   Component Value Date    PROTIME 15.2 09/09/2020    INR 1.3 09/09/2020       Assessment:     Principal Problem:    Pneumonia    COPD (chronic obstructive pulmonary disease) (HCC)    Tobacco dependence    Abdominal aortic aneurysm without rupture (HCC)    Pharyngeal cancer (HCC)    Benign prostatic hyperplasia without lower urinary tract symptoms        Plan:   Stable discharge when cleared by other

## 2020-09-17 NOTE — PROGRESS NOTES
[facility]    Hematology/Oncology        Patient Name: Gaby Ortega. YOB: 1955  PCP: Mirian Darden DO   Referring Provider:      Reason for Consultation:   Chief Complaint   Patient presents with    Shortness of Breath     pt c/o sob and weakness and has not been eating. pt was seen here yesterday and discharged. Subjective:    Patient states he feels today. Continues on the IV antibiotics and tolerating well     History of Present Illness:  66-year-old man with history of stage IV adenocarcinoma of the lung well-known to me diagnosed in 2016 and has done remarkably well and has been over the past 3 years on single agent pembrolizumab. His PDL 1 expression was elevated at 60%. He unfortunately continues to smoke and is hospitalized again with COPD and exacerbation and pneumonia. 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 Multiple biopsies of the neck area done in 2020 were negative for malignancy and they were performed at [facility] recently  Patient was admitted through the ER with worsening dyspnea  CTA of the chest showed findings of bilateral pneumonia with chronic interstitial lung disease but no pulmonary embolus. CTA of abdomen and pelvis showed stable ascending thoracic aortic aneurysm measuring 4.2 cm and stable small 3 cm infrarenal abdominal aortic aneurysm. CBC was unremarkable except for microcytosis with elevated RBC count likely related to a thalassemia trait. His CMP shows impaired renal function with elevation in his serum creatinine to 1.6 and this is above his baseline and likely related to recent CT scan dyes. He has been initiated on IV antibiotics with Zosyn along with aerosol treatments.     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    Paralyzed vocal cords     Thrombosis of testis     Tobacco dependence 5/27/2017       Patient Active Problem List    Diagnosis Date Noted    Sacroiliac dysfunction 08/17/2020    DDD (degenerative disc disease), lumbar 08/17/2020    Lumbar radiculopathy 08/17/2020    Lumbosacral spondylosis without myelopathy 08/17/2020    Spinal stenosis of lumbar region 08/17/2020    Smoking 08/17/2020    DVT of left axillary vein, acute ([address] 08/04/2020    Stage 3 chronic kidney disease ([address] 05/13/2020    Other hyperlipidemia 05/13/2020    Radiation-induced esophageal stricture 10/16/2019    Acute rhinitis 07/01/2019    Pharyngeal cancer ([address] 04/17/2019    Osteoarthritis of right hip 04/17/2019    Benign prostatic hyperplasia without lower urinary tract symptoms 04/17/2019    Acquired hypothyroidism 04/17/2019    Palliative care by specialist     Pneumonia 09/17/2018    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    Abdominal aortic aneurysm without rupture ([address] 08/27/2018    Anemia 07/08/2017    COPD (chronic obstructive pulmonary disease) ([address] 05/27/2017    Tobacco dependence 05/27/2017    Malignant neoplasm of overlapping sites of left lung ([address] 04/25/2016        Past Surgical History:   Procedure Laterality Date    BACK SURGERY      BRONCHOSCOPY N/A 3/5/2020    BRONCHOSCOPY DIAGNOSTIC OR CELL [address] ONLY performed by Desiree Fischer MD at [facility] ENDOSCOPY    KNEE ARTHROSCOPY      LUNG BIOPSY Left 5/6/2016    OTHER SURGICAL HISTORY  4/15/2016    cervical myelogram    SINUS SURGERY      TRACHEOSTOMY 05/24/2017    TRACHEOSTOMY  05/24/2017       Family History  Family History   Problem Relation Age of Onset    Cancer Mother         breast cancer    Cancer Father         prostate cancer    Diabetes Father        Social History    TOBACCO:   reports that he has been smoking cigarettes. He started smoking about 48 years ago. He has a 44.00 pack-year smoking history. He has never used smokeless tobacco.  ETOH:   reports no history of alcohol use. Home Medications  Prior to Admission medications    Medication Sig Start Date End Date Taking? Authorizing Provider   LORazepam (ATIVAN) 0.5 MG tablet Take 0.5 mg by mouth nightly as needed for Anxiety.    Yes Historical Provider, MD   mirtazapine (REMERON) 30 MG tablet Take 30 mg by mouth nightly   Yes Historical Provider, MD   doxycycline hyclate (VIBRA-TABS) 100 MG tablet Take 1 tablet by mouth 2 times daily for 10 days 9/10/20 9/20/20 Yes Felipa Marroquin MD   sensidney-docusate (PERICOLACE) 8.6-50 MG per tablet Take 2 tablets by mouth daily 9/4/20 3/3/21 Yes BG Hollingsworth - CNP   apixaban (ELIQUIS) 5 MG TABS tablet Take 1 tablet by mouth 2 times daily 8/21/20 11/19/20 Yes Sylvia Quintana DO   Revefenacin (YUPELRI) 175 MCG/3ML SOLN Inhale 3 mLs into the lungs daily Instructed to take am of procedure 7/29/20  Yes Nell Mena MD   buPROPion (WELLBUTRIN) 75 MG tablet Take 1 tablet by mouth 2 times daily 7/17/20  Yes Nell Mena MD   simvastatin (ZOCOR) 40 MG tablet Take 40 mg by mouth nightly   Yes Historical Provider, MD   levothyroxine (SYNTHROID) 125 MCG tablet Take 1 tablet by mouth Daily 5/8/20  Yes Sylvia Quintana DO   formoterol (PERFOROMIST) 20 MCG/2ML nebulizer solution Take 2 mLs by nebulization 2 times daily Instructed to take am of procedure 4/29/20  Yes Nell Mena MD   tamsulosin (FLOMAX) 0.4 MG capsule Take 1 capsule by mouth nightly 4/1/20  Yes Sylvia Quintana DO   esomeprazole (NEXIUM) 40 MG delayed release capsule Take 1 capsule by mouth every morning (before breakfast) Instructed to take am of procedure 4/1/20  Yes Sylvia Quintana DO   pregabalin (LYRICA) 150 MG capsule Take 1 capsule by mouth 3 times daily for 180 days. 3/24/20 9/20/20 Yes BG Lazo CNP   DULoxetine (CYMBALTA) 30 MG extended release capsule Take 1 capsule by mouth 2 times daily 3/24/20 9/20/20 Yes BG Lazo CNP   budesonide (PULMICORT) 0.5 MG/2ML nebulizer suspension Take 2 mLs by nebulization 2 times daily Instructed to take am of procedure 3/24/20  Yes Hedwig Libman, MD   predniSONE (DELTASONE) 10 MG tablet Take 10 mg by mouth daily Instructed to take am of procedure   Yes Historical Provider, MD   oxyCODONE-acetaminophen (PERCOCET) 5-325 MG per tablet Take 1 tablet by mouth every 4 hours as needed for Pain. Break through    Historical Provider, MD   albuterol (ACCUNEB) 0.63 MG/3ML nebulizer solution Take 3 mLs by nebulization every 6 hours DX: COPD J44.9 6/19/20   Hedwig Libman, MD   OXYGEN Inhale 4 L into the lungs nightly    Historical Provider, MD   chlorhexidine (PERIDEX) 0.12 % solution Take 15 mLs by mouth daily Swish & spit qd 1/10/20   Sylvia Quintana DO   cyclobenzaprine (FLEXERIL) 10 MG tablet Take 10 mg by mouth 3 times daily as needed for Muscle spasms    Historical Provider, MD   Cholecalciferol (VITAMIN D3) 5000 units TABS Take 5,000 Units by mouth daily     Historical Provider, MD   pembrolizumab (KEYTRUDA) 100 MG/4ML SOLN Infuse 200 mg intravenously every 21 days Last Dose -01/16/20  Next Dose -02/06/20    Historical Provider, MD   Melatonin 10 MG TABS Take 10 mg by mouth nightly     Historical Provider, MD       Allergies  Allergies   Allergen Reactions    Augmentin [Amoxicillin-Pot Clavulanate] Diarrhea       Review of Systems: Relevant for dyspnea on exertion and chronic back and hip pain     Constitutional:  No fever chills or rigors.     Eyes: No changes in vision, discharge, or pain   ENT: No Headaches, hearing loss or vertigo. No mouth sores or sore throat. No change in taste or smell.  Cardiovascular: Positive chest wall pain and dyspnea   Respiratory: Has moderate chronic cough, Has occasional sputum production. Has no hemoptysis,     Gastrointestinal: No abdominal pain, appetite loss, blood in stools. No change in bowel habits. No hematemesis    Genitourinary: Patient acknowledges no dysuria, trouble voiding, or hematuria. No nocturia or increased frequency.  Musculoskeletal: No gait disturbance, weakness or joint complaints.  Integumentary: No rash or pruritis.  Neurological: No headache, diplopia, change in muscle strength, numbness or tingling. No change in gait, balance, coordination, mood, affect, memory, mentation, behavior.  Psychiatric: No anxiety, or depression.  Endocrine: No temperature intolerance. No excessive thirst, fluid intake, or urination. No tremor.  Hematologic/Lymphatic: No abnormal bruising or bleeding, blood clots or swollen lymph nodes.  Allergic/Immunologic: No nasal congestion or hives. Objective  /68   Pulse 78   Temp 97.7 °F (36.5 °C) (Temporal)   Resp 20   Ht 5' 11\" (1.803 m)   Wt 211 lb (95.7 kg)   SpO2 97%   BMI 29.43 kg/m²     Physical Exam:     General: Alert, in no acute distress,   Head and neck : PERRLA, EOMI . Sclera non icteric. Oropharynx : Clear  Neck: no JVD,  no adenopathy, trach in place, postradiation fibrotic changes in neck  Heart: Regular rate and regular rhythm,   Lungs: Coarse breath sounds with scattered rhonchi  Extremities: No edema,no cyanosis, no clubbing. Abdomen: Soft, non-tender;no masses, no organomegaly  Skin:  No rash. Neurologic:Cranial nerves grossly intact. No focal motor or sensory deficits .     Recent Laboratory Data-   Lab Results   Component Value Date    WBC 7.6 09/13/2020    HGB 13.9 09/13/2020    HCT 43.5 09/13/2020    MCV 76.9 (L) 09/13/2020     09/13/2020    LYMPHOPCT 12.0 (L) 09/13/2020    RBC 5.66 2020    MCH 24.6 (L) 2020    MCHC 32.0 2020    RDW 19.1 (H) 2020    NEUTOPHILPCT 74.2 2020    MONOPCT 8.2 2020    BASOPCT 0.7 2020    NEUTROABS 5.61 2020    LYMPHSABS 0.91 (L) 2020    MONOSABS 0.62 2020    EOSABS 0.32 2020    BASOSABS 0.05 2020       Lab Results   Component Value Date     2020    K 4.1 2020    CL 97 (L) 2020    CO2 19 (L) 2020    BUN 16 2020    CREATININE 1.6 (H) 2020    GLUCOSE 80 2020    CALCIUM 9.9 2020    PROT 7.0 2020    LABALBU 3.3 (L) 2020    BILITOT 0.5 2020    ALKPHOS 117 2020    AST 23 2020    ALT 18 2020    LABGLOM 44 2020    GFRAA 53 2020       No results found for: IRON, TIBC, FERRITIN        Radiology-    Xr Chest Portable    Result Date: 9/10/2020  Patient MRN: [id number] : 1955 Age:  72 years Gender: Male Order Date: 9/10/2020 11:17 PM Exam: XR CHEST PORTABLE Indication:   Shortness of breath Shortness of breath Comparison: 2020 FINDINGS: Bibasilar opacities appear chronic and likely represent scarring. No definite consolidation is seen. The cardiomediastinal contour is unremarkable. No pneumothorax or pleural effusion. A tracheostomy tube is seen, which is grossly well positioned. 1. Bibasilar opacities in the lungs appear chronic and likely represent scarring. 2. No acute cardiopulmonary abnormality. Xr Chest Portable    Result Date: 2020  Patient MRN: [id number] : 1955 Age:  72 years Gender: Male Order Date: 2020 9:15 PM Exam: XR CHEST PORTABLE Number of Images: 1 view Indication:   shortness of breath shortness of breath Comparison: 2020. Findings: Stable position of tracheostomy. Cardiac silhouette is stable. Atherosclerotic calcification of the aorta. Prominent vasculature is normal. No pneumothorax or pleural effusion.  Left lung base greater than right lung base opacities. Mild interstitial prominence. normal chest Left lung base greater than right lung base opacities, atelectasis or consolidation. Redemonstrated interstitial opacities. Cta Abdomen Pelvis W Contrast    Result Date: 2020  Patient MRN:  [id number] : 1955 Age: 72 years Gender: Male Order Date:  2020 12:51 AM EXAM: CTA PULMONARY W CONTRAST, CTA ABDOMEN PELVIS W CONTRAST COMPARISON: None INDICATION:  pe pe TECHNIQUE: Unenhanced CT was performed from lung apices to the symphysis pubis, then Contiguous axial images through the abdomen and pelvis were obtained following intravenous contrast using standard CT angiographic protocol. Sagittal and coronal MIP images were reconstructed from the axial acquisition. Additional 3-D reconstructions were presented to aid in interpretation of this examination. Low-dose CT acquisition technique included one of the following options; 1. Automated exposure control, 2. Adjustment of mA and/or kV according to the patient's size or 3. Use of iterative reconstruction. FINDINGS: There are no filling defects seen within the pulmonary arteries to suggest pulmonary arterial embolism. There is a ascending thoracic aortic aneurysm which measures 4.2 cm in greatest dimension. This finding is similar compared to the previous examination. Descending thoracic aorta measures 2.6 cm in greatest dimension. There is no evidence of thoracic aortic dissection. No evidence of mural hematoma on unenhanced CT. No abnormal mediastinal fluid collections. The heart is normal in size. No pericardial effusion. There are irregular interstitial opacities throughout both lungs. Scattered groundglass opacities are also present throughout both lungs notable toward lung periphery and more predominant in the lung bases. There is no evidence of pleural effusion. There is peribronchial thickening bilaterally. There is no pneumothorax. No mediastinal or hilar lymphadenopathy.  There is a fusiform infrarenal abdominal aortic aneurysm which measures up to 3 cm which is similar in size compared to prior from 2020. There is no evidence of abdominal aortic dissection. Celiac axis is patent. Mild stenosis is seen involving origin of celiac axis. Superior mesenteric artery is patent. Atherosclerotic plaque is seen at the origin of the right renal artery with mild proximal stenosis. Moderate stenosis is seen involving proximal left renal artery. Inferior mesenteric artery is patent atherosclerotic calcifications are associated with common iliac arteries. External iliac arteries are patent. Common femoral arteries are patent. There is no bowel obstruction, free air, or free fluid in the abdomen or pelvis. Liver is unremarkable. Gallbladder is unremarkable. No evidence of acute pancreatitis. Spleen is nonenlarged. There is grossly normal attenuation associated with both kidneys. There is no hydronephrosis. 1. Findings suggest bilateral pneumonia throughout both lungs on background of chronic interstitial lung disease. 2. No pulmonary embolism. 3. Stable ascending thoracic aortic aneurysm measures up to 4.2 cm. 4. Stable fusiform infrarenal abdominal aortic aneurysm measures up to 3 cm. 5. Mild stenosis is seen involving origin of celiac trunk. 6. Moderate stenosis is seen involving proximal left renal artery and mild stenosis is seen involving proximal right renal artery. ALERT:  THIS IS AN ABNORMAL REPORT. Cta Pulmonary W Contrast    Result Date: 2020  Patient MRN:  [id number] : 1955 Age: 72 years Gender: Male Order Date:  2020 12:51 AM EXAM: CTA PULMONARY W CONTRAST, CTA ABDOMEN PELVIS W CONTRAST COMPARISON: None INDICATION:  pe pe TECHNIQUE: Unenhanced CT was performed from lung apices to the symphysis pubis, then Contiguous axial images through the abdomen and pelvis were obtained following intravenous contrast using standard CT angiographic protocol.  Sagittal and coronal MIP images were reconstructed from the axial acquisition. Additional 3-D reconstructions were presented to aid in interpretation of this examination. Low-dose CT acquisition technique included one of the following options; 1. Automated exposure control, 2. Adjustment of mA and/or kV according to the patient's size or 3. Use of iterative reconstruction. FINDINGS: There are no filling defects seen within the pulmonary arteries to suggest pulmonary arterial embolism. There is a ascending thoracic aortic aneurysm which measures 4.2 cm in greatest dimension. This finding is similar compared to the previous examination. Descending thoracic aorta measures 2.6 cm in greatest dimension. There is no evidence of thoracic aortic dissection. No evidence of mural hematoma on unenhanced CT. No abnormal mediastinal fluid collections. The heart is normal in size. No pericardial effusion. There are irregular interstitial opacities throughout both lungs. Scattered groundglass opacities are also present throughout both lungs notable toward lung periphery and more predominant in the lung bases. There is no evidence of pleural effusion. There is peribronchial thickening bilaterally. There is no pneumothorax. No mediastinal or hilar lymphadenopathy. There is a fusiform infrarenal abdominal aortic aneurysm which measures up to 3 cm which is similar in size compared to prior from January 23, 2020. There is no evidence of abdominal aortic dissection. Celiac axis is patent. Mild stenosis is seen involving origin of celiac axis. Superior mesenteric artery is patent. Atherosclerotic plaque is seen at the origin of the right renal artery with mild proximal stenosis. Moderate stenosis is seen involving proximal left renal artery. Inferior mesenteric artery is patent atherosclerotic calcifications are associated with common iliac arteries. External iliac arteries are patent. Common femoral arteries are patent.  There is no bowel obstruction, free air, or free fluid in the abdomen or pelvis. Liver is unremarkable. Gallbladder is unremarkable. No evidence of acute pancreatitis. Spleen is nonenlarged. There is grossly normal attenuation associated with both kidneys. There is no hydronephrosis. 1. Findings suggest bilateral pneumonia throughout both lungs on background of chronic interstitial lung disease. 2. No pulmonary embolism. 3. Stable ascending thoracic aortic aneurysm measures up to 4.2 cm. 4. Stable fusiform infrarenal abdominal aortic aneurysm measures up to 3 cm. 5. Mild stenosis is seen involving origin of celiac trunk. 6. Moderate stenosis is seen involving proximal left renal artery and mild stenosis is seen involving proximal right renal artery. ALERT:  THIS IS AN ABNORMAL REPORT. ASSESSMENT/PLAN : 70-year-old man    Stage IV adenocarcinoma of the lung diagnosed in 2016 when he presented with Vipin syndrome and large left upper lobe apical mass with mediastinal lymphadenopathy and T1-T2 vertebral body destruction. He initially required palliative radiation therapy along with concurrent chemotherapy with weekly Taxol and carboplatinum. This was followed by systemic chemotherapy with Alimta carboplatinum and Avastin with excellent response. He had been on Xgeva for palliation of skeletal metastasis but it was discontinued in 2018 because of early ONJ. He has no targetable mutation with negative EGFR/ALK/Ros 1. In 2017 his cellblock was sent for PDL 1 and it was strongly expressed at 60%  He has been on single agent Keytruda since 2017 and he has achieved a complete remission  In 2017 he developed a second head and neck primary involving the vocal cords and required radiation therapy  Multiple head and neck biopsies most recent one was at [facility] few weeks ago and was negative for recurrent malignancy.     Tobacco abuse and he continues to smoke daily despite his lung and head and neck malignancies  COPD and exacerbation with bilateral pneumonia  Stable thoracic and abdominal aneurysm    To continue IV antibiotics  He will be maintained on Keytruda after discharge  If poor oral; intake continues may need TF    9/16/20  - PSA 1.84  - Continue on IV antibiotics  - CXR shows slight worsening of left lower lobe atelectasis/ infiltrate.     - He will be maintained on Keytruda after discharge  - If poor oral intake continues  ,he may need TF    9/17/20  - Abx stopping today per ID  - To continue outpatient [facility] Shanks on DC  - Will follow with Dr. Dean MOSES for DC from oncology    Juan Luna MD  Electronically signed 9/17/2020 at 4:59 PM

## 2020-09-17 NOTE — PROGRESS NOTES
Palliative Medicine  Progress Note    For d/c when ok by all others. To continue to follow up in palliative outpatient clinic. There are no further PM needs at this time. PM will now sign off. If new PM needs arise, please re-consult. Thank you. Camille PEDERSON-CNP  Palliative Medicine    Note: This report was completed using computerClinicIQ voiced recognition software. Every effort has been made to ensure accuracy; however, inadvertent computerized transcription errors may be present.

## 2020-09-17 NOTE — CARE COORDINATION
[phone] [facility] script for new trach size and supplies signed by pulmonary. Changed to #[id number]. Discussed with patient that he will need new supplies, called [facility] and faxed [phone] [facility] script. They will deliver later today if patient released today. Will notify them at discharge. For questions I can be reached at [phone].  Kadeem Hogan [place]

## 2020-09-17 NOTE — PROGRESS NOTES
Phill Naranjo M.D.,[facility]  Rebeka Russell D.O., F.A.C.O.I., Mo Garcia M.D. Jolynn Hernández M.D., Nahid Carrasco M.D. Heidy Duenas D.O. Daily Pulmonary Progress Note    Patient:  Aakash Anderson. 72 y.o. male MRN: [id number]     Date of Service: 9/17/2020      Synopsis     We are following patient for pneumonia    \"CC\" cough, shortness of breath    Code status: FULL       Subjective      Patient was seen and examined. Lying in bed in no distress. Oxygen at 40% CATINA. Inner cannula was removed he states he can breath easier. Dr. Samara Kwan exchanged trache to 6 cuffless non fenestrated at bedside today. He denies dyspnea. Cough improved. Not as much mucus expectorated this am. No fevers. resp cultures 9/14/2020  with staph aureus, clindamycin resistant. Review of Systems:   Constitutional: +fatigue, and malaise improved  Skin: Denies pigmentation, dark lesions, and rashes   HEENT: Denies hearing loss, tinnitus, ear drainage, epistaxis, sore throat, and hoarseness. Cardiovascular: Denies palpitations, chest pain, and chest pressure.   Respiratory: +dyspnea , cough, scant mucus+  Gastrointestinal: Denies nausea, vomiting, poor appetite, diarrhea, heartburn or reflux  Genitourinary: Denies dysuria, frequency, urgency or hematuria  Musculoskeletal: Denies myalgias, muscle weakness, and bone pain  Neurological: Denies dizziness, vertigo, headache, and focal weakness  Psychological: anxiety much improved  Endocrine: Denies heat intolerance and cold intolerance  Hematopoietic/Lymphatic: Denies bleeding problems and blood transfusions    24-hour events:  None     Objective   Vitals: /66   Pulse 84   Temp 98.1 °F (36.7 °C) (Temporal)   Resp 18   Ht 5' 11\" (1.803 m)   Wt 211 lb (95.7 kg)   SpO2 97%   BMI 29.43 kg/m²     I/O:    Intake/Output Summary (Last 24 hours) at 9/17/2020 1005  Last data filed at 9/16/2020 2131  Gross per 24 hour   Intake 1307 ml   Output 500 ml Net 807 ml       Vent Information  Skin Assessment: Clean, dry, & intact  Equipment Changed: Humidification  FiO2 : (S) 60 %  SpO2: 97 %  SpO2/FiO2 ratio: 161.67                CURRENT MEDS :  Scheduled Meds:   sodium chloride flush        fentaNYL  1 patch Transdermal Q72H    albuterol  1 ampule Nebulization Q6H    apixaban  5 mg Oral BID    budesonide  500 mcg Nebulization BID    chlorhexidine  15 mL Mouth/Throat Daily    vitamin D  5,000 Units Oral Daily    doxycycline hyclate  100 mg Oral BID    DULoxetine  30 mg Oral BID    pantoprazole  40 mg Oral QAM AC    Arformoterol Tartrate  15 mcg Nebulization BID    levothyroxine  125 mcg Oral Daily    melatonin  10 mg Oral Nightly    tamsulosin  0.4 mg Oral Nightly    atorvastatin  40 mg Oral Nightly    sennosides-docusate sodium  2 tablet Oral Daily    predniSONE  10 mg Oral Daily    buPROPion  75 mg Oral BID    piperacillin-tazobactam  3.375 g Intravenous Q8H    sodium chloride  25 mL Intravenous Q8H    mirtazapine  30 mg Oral Nightly       Physical Exam:   General: The patient is lying in bed comfortably without any distress. Breathing is not labored  HEENT: Pupils are equal round and reactive to light, there are no oral lesions and no post-nasal drip  6 cuffless non fen  trache exchanged 9/16 at bedside  Neck: supple without adenopathy  Cardiovascular: regular rate and rhythm without murmur or gallop  Respiratory: less coarse  to auscultation bilaterally without wheezing or crackles. Air entry is symmetric  Abdomen: soft, non-tender, non-distended, normal bowel sounds  Extremities: warm, no edema, no clubbing  Skin: no rash or lesion  Neurologic: CN II-XII grossly intact, no focal deficits    Pertinent/ New Labs and Imaging Studies     Imaging Personally Reviewed:     1. Findings suggest bilateral pneumonia throughout both lungs on    background of chronic interstitial lung disease.         2.  No pulmonary embolism.         3. Stable ascending thoracic aortic aneurysm measures up to 4.2 cm.         4. Stable fusiform infrarenal abdominal aortic aneurysm measures up to    3 cm.         5. Mild stenosis is seen involving origin of celiac trunk.         6. Moderate stenosis is seen involving proximal left renal artery and    mild stenosis is seen involving proximal right renal artery.         ALERT:  THIS IS AN ABNORMAL REPORT.              8/3/2020 CT neck at [facility]    No evidence of cervical soft tissue mass or lymphadenopathy. Asymmetric thickening and medialization of the left aryepiglottic fold is   again noted.        Echo:7/29/2020  Conclusions      Summary   Normal left ventricle size.   Normal left ventricle wall thickness   Normal diastolic function.   Ejection fraction is visually estimated at 55-60%.  Mild mitral regurgitation is present.                     Labs:  Lab Results   Component Value Date    WBC 7.6 09/13/2020    HGB 13.9 09/13/2020    HCT 43.5 09/13/2020    MCV 76.9 09/13/2020    MCH 24.6 09/13/2020    MCHC 32.0 09/13/2020    RDW 19.1 09/13/2020     09/13/2020    MPV 10.0 09/13/2020     Lab Results   Component Value Date     09/13/2020    K 4.1 09/13/2020    K 4.0 09/09/2020    CL 97 09/13/2020    CO2 19 09/13/2020    BUN 16 09/13/2020    CREATININE 1.6 09/13/2020    LABALBU 3.3 09/13/2020    CALCIUM 9.9 09/13/2020    GFRAA 53 09/13/2020    LABGLOM 44 09/13/2020     Lab Results   Component Value Date    PROTIME 15.2 09/09/2020    INR 1.3 09/09/2020     No results for input(s): PROBNP in the last 72 hours. No results for input(s): PROCAL in the last 72 hours. This SmartLink has not been configured with any valid records. Micro:  Recent Labs     09/14/20  1500   CULTRESP Oral Pharyngeal Alva reduced*  Light growth  This isolate is presumed to be resistant based on the  detection of inducible Clindamycin resistance. Clindamycin  may still be effective in some patients.        No results for input(s): LABGRAM in the last 72 hours. No results for input(s): LEGUR in the last 72 hours. No results for input(s): STREPNEUMAGU in the last 72 hours. No results for input(s): LP1UAG in the last 72 hours. Results for Danny Guerrero (MRN [id number]) as of 9/14/2020 14:55   Ref. Range 9/10/2020 23:58   SARS-CoV-2, NAAT Latest Ref Range: Not Detected  Not Detected        Assessment:    1. Acute on chronic respiratory failure with hypoxia  2. Recurrent Pneumonia with hx MDR organisms-MRSA, serratia  3. 2016 CRISTINA large hilar mass with chest wall and mediastinal invasion, moderately to poorly differentiated, invasive, acinar type stage IV adenocarcinoma of lung completed treatment with Taxol/carbo then Alimta/Carbo/Avastin, T 1, T 2 metastasis to spine  4. 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 Long term tracheostomy 6 cuffless  7. Laryngeal edema, supraglottic thrush   8. 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 Severe vocal cord paralysis not able to tolerate trache capping  10. COPD not in acute exacerbation  11. EF 72-32% normal diastolic function  12. Malnutrition   13. HTN  14. HLD  15. Neuropathy  16. Arthritis -chronic daily prednisone  17. Immunocompromised host  18. Hx heavy tobacco abuse      Plan:   1. Oxygen therapy keep >92% FIO2 40%  2. Routine trache care. Changed at bedside 9/16 6 cuffless non fen with Dr Isabel Arias  3. ID for abx management-recurrent pneumonia-placed on Zosyn , Doxycycline. Received vanco and cefepime in ED  4. 9/14 respiratory cultures-staph aureus- clindamycin resistant, procal 0.15, inflammatory markers sed rate 25 CRP 14.9 , bacterial urine antigens-negative  5. Continue bronchodilator regimen  6. Chronic daily prednisone 10 mg for arthritis  7. CTA chest reviewed. CXR 9/15  8.  Follows with ENT at [facility] for trache management and possible revision, glottic stenosis, laryngeal edema, vocal cord paralysis upon dc  9. Dr Miguel Goldstein local oncology. Patient on keytruda every 3 wks  10. Monitor caloric intake, may need peg for nutritional supplementation if not eating   11. DVT,GI prophylaxis  12. Palliative medicine following   13. Follows with Dr. Jewell Morton outpatient  This plan of care was reviewed in collaboration with Dr. Rehan Cunha  Electronically signed by BG Bauman CNP on 9/17/2020 at 10:05 AM       I personally saw, examined, and cared for the patient. Labs, medications, radiographs reviewed. I agree with history exam and plans detailed in NP note. [facility] for [employer]   Tobacco cessation counseling   Recommended to  and wife to discuss tracheostomy removal with ent at [address], M.D.    Pulmonary/Critical Care Medicine

## 2020-09-18 VITALS
SYSTOLIC BLOOD PRESSURE: 104 MMHG | HEART RATE: 86 BPM | OXYGEN SATURATION: 95 % | HEIGHT: 71 IN | BODY MASS INDEX: 29.54 KG/M2 | DIASTOLIC BLOOD PRESSURE: 68 MMHG | TEMPERATURE: 98.1 F | WEIGHT: 211 LBS | RESPIRATION RATE: 20 BRPM

## 2020-09-18 PROCEDURE — 94640 AIRWAY INHALATION TREATMENT: CPT

## 2020-09-18 PROCEDURE — 6370000000 HC RX 637 (ALT 250 FOR IP): Performed by: INTERNAL MEDICINE

## 2020-09-18 PROCEDURE — 2700000000 HC OXYGEN THERAPY PER DAY

## 2020-09-18 PROCEDURE — 6360000002 HC RX W HCPCS: Performed by: INTERNAL MEDICINE

## 2020-09-18 RX ADMIN — ALBUTEROL SULFATE 0.63 MG: 0.63 SOLUTION RESPIRATORY (INHALATION) at 08:40

## 2020-09-18 RX ADMIN — PANTOPRAZOLE SODIUM 40 MG: 40 TABLET, DELAYED RELEASE ORAL at 08:28

## 2020-09-18 RX ADMIN — PREDNISONE 10 MG: 10 TABLET ORAL at 08:28

## 2020-09-18 RX ADMIN — BUDESONIDE 500 MCG: 0.5 SUSPENSION RESPIRATORY (INHALATION) at 08:40

## 2020-09-18 RX ADMIN — LEVOTHYROXINE SODIUM 125 MCG: 0.12 TABLET ORAL at 08:28

## 2020-09-18 RX ADMIN — ALBUTEROL SULFATE 0.63 MG: 0.63 SOLUTION RESPIRATORY (INHALATION) at 02:02

## 2020-09-18 RX ADMIN — APIXABAN 5 MG: 5 TABLET, FILM COATED ORAL at 08:28

## 2020-09-18 RX ADMIN — Medication 5000 UNITS: at 08:28

## 2020-09-18 RX ADMIN — BUPROPION HYDROCHLORIDE 75 MG: 75 TABLET, FILM COATED ORAL at 08:29

## 2020-09-18 RX ADMIN — CHLORHEXIDINE GLUCONATE 0.12% ORAL RINSE 15 ML: 1.2 LIQUID ORAL at 08:36

## 2020-09-18 RX ADMIN — ARFORMOTEROL TARTRATE 15 MCG: 15 SOLUTION RESPIRATORY (INHALATION) at 08:40

## 2020-09-18 RX ADMIN — DOCUSATE SODIUM 50 MG AND SENNOSIDES 8.6 MG 2 TABLET: 8.6; 5 TABLET, FILM COATED ORAL at 08:27

## 2020-09-18 RX ADMIN — DOXYCYCLINE HYCLATE 100 MG: 100 CAPSULE ORAL at 08:28

## 2020-09-18 RX ADMIN — DULOXETINE HYDROCHLORIDE 30 MG: 30 CAPSULE, DELAYED RELEASE ORAL at 08:29

## 2020-09-18 ASSESSMENT — PAIN SCALES - GENERAL: PAINLEVEL_OUTOF10: 0

## 2020-09-18 NOTE — CARE COORDINATION
Discharge order noted on chart. [facility] following, they will deliver supplies to the home today. Wife to provide transport.  CM to follow  Kaylee Cruz, RN  [facility] Case Management  [phone]

## 2020-09-18 NOTE — PROGRESS NOTES
Rashad Mart M.D.,[facility]  Masood Piña D.O., F.A.C.O.I., Hoa Johns M.D. Cezar Forrester M.D., Marc Cano M.D. Robin Cardenas D.O. Daily Pulmonary Progress Note    Patient:  Jose Antonio Trevizo. 72 y.o. male MRN: [id number]     Date of Service: 9/18/2020      Synopsis     We are following patient for pneumonia    \"CC\" cough, shortness of breath    Code status: FULL       Subjective      Patient was seen and examined. Lying in bed in no distress. Oxygen at 40% CATIAN. Inner cannula was removed he states he can breath easier. He denies dyspnea. Cough improved. Not as much mucus expectorated this am. No fevers. resp cultures 9/14/2020  with staph aureus, clindamycin resistant. Review of Systems:   Constitutional: +fatigue, and malaise improved  Skin: Denies pigmentation, dark lesions, and rashes   HEENT: Denies hearing loss, tinnitus, ear drainage, epistaxis, sore throat, and hoarseness. Cardiovascular: Denies palpitations, chest pain, and chest pressure.   Respiratory: +dyspnea , cough, scant mucus+  Gastrointestinal: Denies nausea, vomiting, poor appetite, diarrhea, heartburn or reflux  Genitourinary: Denies dysuria, frequency, urgency or hematuria  Musculoskeletal: Denies myalgias, muscle weakness, and bone pain  Neurological: Denies dizziness, vertigo, headache, and focal weakness  Psychological: anxiety much improved  Endocrine: Denies heat intolerance and cold intolerance  Hematopoietic/Lymphatic: Denies bleeding problems and blood transfusions    24-hour events:  None     Objective   Vitals: /68   Pulse 86   Temp 98.1 °F (36.7 °C) (Temporal)   Resp 20   Ht 5' 11\" (1.803 m)   Wt 211 lb (95.7 kg)   SpO2 95%   BMI 29.43 kg/m²     I/O:    Intake/Output Summary (Last 24 hours) at 9/18/2020 0900  Last data filed at 9/17/2020 8442  Gross per 24 hour   Intake 240 ml   Output 330 ml   Net -90 ml       Vent Information  Skin Assessment: Clean, dry, & intact  Equipment Changed: Humidification  FiO2 : (S) 40 %  SpO2: 95 %  SpO2/FiO2 ratio: 190                CURRENT MEDS :  Scheduled Meds:   fentaNYL  1 patch Transdermal Q72H    albuterol  1 ampule Nebulization Q6H    apixaban  5 mg Oral BID    budesonide  500 mcg Nebulization BID    chlorhexidine  15 mL Mouth/Throat Daily    vitamin D  5,000 Units Oral Daily    doxycycline hyclate  100 mg Oral BID    DULoxetine  30 mg Oral BID    pantoprazole  40 mg Oral QAM AC    Arformoterol Tartrate  15 mcg Nebulization BID    levothyroxine  125 mcg Oral Daily    melatonin  10 mg Oral Nightly    tamsulosin  0.4 mg Oral Nightly    atorvastatin  40 mg Oral Nightly    sennosides-docusate sodium  2 tablet Oral Daily    predniSONE  10 mg Oral Daily    buPROPion  75 mg Oral BID    mirtazapine  30 mg Oral Nightly       Physical Exam:   General: The patient is lying in bed comfortably without any distress. Breathing is not labored  HEENT: Pupils are equal round and reactive to light, there are no oral lesions and no post-nasal drip  6 cuffless trache exchanged 9/16 at bedside  Neck: supple without adenopathy  Cardiovascular: regular rate and rhythm without murmur or gallop  Respiratory: less coarse  to auscultation bilaterally without wheezing or crackles. Air entry is symmetric  Abdomen: soft, non-tender, non-distended, normal bowel sounds  Extremities: warm, no edema, no clubbing  Skin: no rash or lesion  Neurologic: CN II-XII grossly intact, no focal deficits    Pertinent/ New Labs and Imaging Studies     Imaging Personally Reviewed:     1. Findings suggest bilateral pneumonia throughout both lungs on    background of chronic interstitial lung disease.         2. No pulmonary embolism.         3. Stable ascending thoracic aortic aneurysm measures up to 4.2 cm.         4. Stable fusiform infrarenal abdominal aortic aneurysm measures up to    3 cm.         5. Mild stenosis is seen involving origin of celiac trunk.      6. Moderate stenosis is seen involving proximal left renal artery and    mild stenosis is seen involving proximal right renal artery.         ALERT:  THIS IS AN ABNORMAL REPORT.              8/3/2020 CT [facility]    No evidence of cervical soft tissue mass or lymphadenopathy. Asymmetric thickening and medialization of the left aryepiglottic fold is   again noted.        Echo:7/29/2020  Conclusions      Summary   Normal left ventricle size.   Normal left ventricle wall thickness   Normal diastolic function.   Ejection fraction is visually estimated at 55-60%.  Mild mitral regurgitation is present.                     Labs:  Lab Results   Component Value Date    WBC 7.6 09/13/2020    HGB 13.9 09/13/2020    HCT 43.5 09/13/2020    MCV 76.9 09/13/2020    MCH 24.6 09/13/2020    MCHC 32.0 09/13/2020    RDW 19.1 09/13/2020     09/13/2020    MPV 10.0 09/13/2020     Lab Results   Component Value Date     09/13/2020    K 4.1 09/13/2020    K 4.0 09/09/2020    CL 97 09/13/2020    CO2 19 09/13/2020    BUN 16 09/13/2020    CREATININE 1.6 09/13/2020    LABALBU 3.3 09/13/2020    CALCIUM 9.9 09/13/2020    GFRAA 53 09/13/2020    LABGLOM 44 09/13/2020     Lab Results   Component Value Date    PROTIME 15.2 09/09/2020    INR 1.3 09/09/2020     No results for input(s): PROBNP in the last 72 hours. No results for input(s): PROCAL in the last 72 hours. This SmartLink has not been configured with any valid records. Micro:  No results for input(s): CULTRESP in the last 72 hours. No results for input(s): LABGRAM in the last 72 hours. No results for input(s): LEGUR in the last 72 hours. No results for input(s): STREPNEUMAGU in the last 72 hours. No results for input(s): LP1UAG in the last 72 hours. Results for Papa Klein (MRN [id number]) as of 9/14/2020 14:55   Ref. Range 9/10/2020 23:58   SARS-CoV-2, NAAT Latest Ref Range: Not Detected  Not Detected        Assessment:    1.  Acute on chronic respiratory failure with hypoxia  2. Recurrent Pneumonia with hx MDR organisms-MRSA, serratia  3. 2016 CRISTINA large hilar mass with chest wall and mediastinal invasion, moderately to poorly differentiated, invasive, acinar type stage IV adenocarcinoma of lung completed treatment with Taxol/carbo then Alimta/Carbo/Avastin, T 1, T 2 metastasis to spine  4. 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 Long term tracheostomy 6 cuffless trache exchanged 9/16   7. Laryngeal edema, supraglottic thrush   8. 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 Severe vocal cord paralysis not able to tolerate trache capping  10. COPD not in acute exacerbation  11. EF 54-91% normal diastolic function  12. Malnutrition   13. HTN  14. HLD  15. Neuropathy  16. Arthritis -chronic daily prednisone  17. Immunocompromised host on Keytruda Q 3 wks  18. Hx heavy tobacco abuse      Plan:   1. Oxygen therapy keep >92% FIO2 40%  2. Routine trache care. 3. Completed ABX per ID  4. 9/14 respiratory cultures-staph aureus- clindamycin resistant, procal 0.15, inflammatory markers sed rate 25 CRP 14.9 , bacterial urine antigens-negative  5. Continue bronchodilator regimen  6. Chronic daily prednisone 10 mg for arthritis  7. CTA chest reviewed. CXR 9/15  8. Follows with ENT at [facility] for trache management and possible revision, with hx of glottic stenosis,  laryngeal edema, vocal cord paralysis    9. Dr Mihir Mitchell local oncology. Patient on keytruda every 3 wks  10. Monitor caloric intake, may need peg for nutritional supplementation if not eating   11. DVT,GI prophylaxis  12. Palliative medicine following -continue as outpatient  13. Tobacco cessation counseling  14. OK TO DC from a pulmonary perspective  15.  Follows with Dr. Roxie Kussmaul outpatient message routed for follow up  This plan of care was reviewed in collaboration with  Rylee  Electronically signed by BG You CNP on 9/18/2020 at 9:55 AM        I personally saw, examined, and cared for the patient. Labs, medications, radiographs reviewed. I agree with history exam and plans detailed in NP note. Parisa Macedo M.D.    Pulmonary/Critical Care Medicine

## 2020-09-18 NOTE — DISCHARGE SUMMARY
Discharge Summary    Date: 9/18/2020  Patient Name: Jumana Levy. YOB: 1955 Age: 72 y.o. Admit Date: 9/10/2020  Discharge Date: 9/18/2020  Discharge Condition: [address]    Admission Diagnosis  Pneumonia (J18.9); Pneumonia (J18.9)     Discharge Diagnosis  Principal Problem: PneumoniaActive Problems: COPD (chronic obstructive pulmonary disease) (HCC) Tobacco dependence Abdominal aortic aneurysm without rupture (HCC) Pharyngeal cancer (HCC) Benign prostatic hyperplasia without lower urinary tract symptoms  Acquired hypothyroidism  Other hyperlipidemia  DDD (degenerative disc disease), lumbar  Lumbar radiculopathy  Spinal stenosis of lumbar regionResolved Problems:  * No resolved hospital problems. [facility] Stay  Narrative of Hospital Course:  Admitted from ER with increase SOB known with lung and vocal cord CA on chemo RX found with pneumonia seen by ID ,pulmonary urology due to urinary retention and oncology treated accordingly improvrd stabilized discharged home     Consultants:  [address] [place] [address] TO HEM/ONCIP CONSULT TO UROLOGY    Surgeries/procedures Performed:       Treatments:            Discharge Plan/Disposition:  Home    Hospital/Incidental Findings Requiring Follow Up:    Patient Instructions:    Diet: Regular Diet    Activity:Activity as Tolerated  For number of days (if applicable): Other Instructions:    Provider Follow-Up:   No follow-ups on file.      Significant Diagnostic Studies:    Recent Labs:  Admission on 09/10/2020, Discharged on 09/18/2020WBC                                           Date: 09/10/2020Value: 7.3         Ref range: 4.5 - 11.5 E9/L    Status: FinalRBC                                           Date: 09/10/2020Value: 5.40        Ref range: 3.80 - 5.80 E12/L  Status: FinalHemoglobin                                    Date: 09/10/2020Value: 13.1        Ref range: 12.5 - 16.5 g/dL   Status: FinalHematocrit                                    Date: 09/10/2020Value: 40.9        Ref range: 37.0 - 54.0 %      Status: FinalMCV                                           Date: 09/10/2020Value: 75.7*       Ref range: 80.0 - 99.9 fL     Status: [address]                                           Date: 09/10/2020Value: 24.3*       Ref range: 26.0 - 35.0 pg     Status: [address]                                          Date: 09/10/2020Value: 32.0        Ref range: 32.0 - 34.5 %      Status: FinalRDW                                           Date: 09/10/2020Value: 18.4*       Ref range: 11.5 - 15.0 fL     Status: FinalPlatelets                                     Date: 09/10/2020Value: 258         Ref range: 130 - 450 E9/L     Status: FinalMPV                                           Date: 09/10/2020Value: 10.1        Ref range: 7.0 - 12.0 fL      Status: FinalNeutrophils %                                 Date: 09/10/2020Value: 77.7        Ref range: 43.0 - 80.0 %      Status: FinalImmature Granulocytes %                       Date: 09/10/2020Value: 0.7         Ref range: 0.0 - 5.0 %        Status: FinalLymphocytes %                                 Date: 09/10/2020Value: 11.7*       Ref range: 20.0 - 42.0 %      Status: FinalMonocytes %                                   Date: 09/10/2020Value: 5.9         Ref range: 2.0 - 12.0 %       Status: FinalEosinophils %                                 Date: 09/10/2020Value: 3.4         Ref range: 0.0 - 6.0 %        Status: FinalBasophils %                                   Date: 09/10/2020Value: 0.6         Ref range: 0.0 - 2.0 %        Status: FinalNeutrophils Absolute                          Date: 09/10/2020Value: 5.65        Ref range: 1.80 - 7.30 E9/L   Status: FinalImmature Granulocytes #                       Date: 09/10/2020Value: 0.05        Ref range: E9/L               Status: FinalLymphocytes Absolute Date: 09/10/2020Value: 0.85*       Ref range: 1.50 - 4.00 E9/L   Status: FinalMonocytes Absolute                            Date: 09/10/2020Value: 0.43        Ref range: 0.10 - 0.95 E9/L   Status: FinalEosinophils Absolute                          Date: 09/10/2020Value: 0.25        Ref range: 0.05 - 0.50 E9/L   Status: FinalBasophils Absolute                            Date: 09/10/2020Value: 0.04        Ref range: 0.00 - 0.20 E9/L   Status: FinalSodium                                        Date: 09/10/2020Value: 133         Ref range: 132 - 146 mmol/L   Status: FinalPotassium                                     Date: 09/10/2020Value: 5.1*        Ref range: 3.5 - 5.0 mmol/L   Status: Final              Comment: Specimen is moderately Hemolyzed. Result may be artificially increased. Chloride                                      Date: 09/10/2020Value: 96*         Ref range: 98 - 107 mmol/L    Status: FinalCO2                                           Date: 09/10/2020Value: 21*         Ref range: 22 - 29 mmol/L     Status: FinalAnion Gap                                     Date: 09/10/2020Value: 16          Ref range: 7 - 16 mmol/L      Status: FinalGlucose                                       Date: 09/10/2020Value: 89          Ref range: 74 - 99 mg/dL      Status: FinalBUN                                           Date: 09/10/2020Value: 10          Ref range: 8 - 23 mg/dL       Status: FinalCREATININE                                    Date: 09/10/2020Value: 1.3*        Ref range: 0.7 - 1.2 mg/dL    Status: FinalGFR Non-                      Date: 09/10/2020Value: 55          Ref range: >=60 mL/min/1.73   Status: Final              Comment: Chronic Kidney Disease: less than 60 ml/min/1.73 sq.m. Kidney Failure: less than 15 ml/min/1.73 sq. m. Results valid for patients 18 years and older. GFR                           Date: 09/10/2020Value: >60           Status: Drinda Prader Date: 09/10/2020Value: 41          Ref range: degrees            Status: FinalTroponin                                      Date: 09/10/2020Value: 0.02        Ref range: 0.00 - 0.03 ng/mL  Status: Final              Comment: SPECIMEN IS MODERATELY HEMOLYZED. Result may be artificially decreased. TROPONIN T BLOOD LEVELS:       0.03 ng/mL     Upper Reference Limit0. 04 - 0.09 ng/mL     Possible myocardial injury    >= 0.10 ng/mL     Myocardial injuryPro-BNP                                       Date: 09/10/2020Value: 709*        Ref range: 0 - 125 pg/mL      Status: FinalLactic Acid                                   Date: 09/10/2020Value: 1.4         Ref range: 0.5 - 2.2 mmol/L   Status: FinalLactic Acid                                   Date: 09/11/2020Value: 1.0         Ref range: 0.5 - 2.2 mmol/L   Status: [id number], NAAT                              Date: 09/10/2020Value: Not Detected                   Ref range: Not Detected       Status: Final              Comment: Rapid NAAT:   Negative results should be treated as presumptive and,if inconsistent with clinical signs and symptoms or necessary forpatient management, should be tested with an alternative molecularassay. Negative results do not preclude SARS-CoV-2 infection andshould not be used as the sole basis for patient management decisions. This test has been authorized by the FDA under an Emergency UseAuthorization (EUA) for use by authorized laboratories. Fact sheet for [employer] sheet for Patients: Jessika.dk: Isothermal Nucleic Acid AmplificationCulture, Blood 2                              Date: 09/11/2020Value: 5 Days no growth                     Status: FinalBlood Culture, Routine                        Date: 09/11/2020Value: 5 Days no growth                     Status: FinalProcalcitonin Date: 09/11/2020Value: 0.15*       Ref range: 0.00 - 0.08 ng/mL  Status: Final              Comment: Suspected Sepsis:Low likelihood of sepsis  <.50 ng/mLIncreased likelihood of sepsis 0.50-2.00 ng/mLAntibiotics encouragedHigh risk of sepsis/shock   >2.00 ng/mLAntibiotics strongly encouragedSuspected Lower Respiratory Tract Infections:Low likelihood of bacterial infection  <0.24 ng/mLIncreased likelihood of bacterial infection >0.24 ng/mLAntibiotics encouragedWith successful antibiotic therapy, PCT levels should decreaserapidly. (Half-life of 24 to 36 hours. )Procalcitonin values from samples collected within the first6 hours of systemic infection may still be low. Retesting may be indicated. Values from day 1 and day 4 can be entered into the Change inProcalcitonin Calculator to determine the patient'sMortality Risk http://www.saldivar.info/. com)In healthy neonates, plasma Procalcitonin (PCT) concentrationsincrease gradually after birth, reaching peak values at about24 hours of age then decrease to normal values below 0.5                        ng/mLby 48-72 hours of age. CRP                                           Date: 09/11/2020Value: 14.9*       Ref range: 0.0 - 0.4 mg/dL    Status: FinalSed Rate                                      Date: 09/11/2020Value: 25*         Ref range: 0 - 15 mm/Hr       Status: FinalSTREP PNEUMONIAE ANTIGEN, URINE               Date: 09/11/2020Value:               Status: Final                 Value:Presumptive negative- suggests no current or recentpneumococcal infection. Infection due to Strep pneumoniae cannot beruled out since the antigen present in the samplemay be below the detection limit of the test.Normal Range:Presumptive NegativeL. pneumophila Serogp 1 Ur Ag                 Date: 09/11/2020Value:               Status: Final                 Value:Presumptive Negative -suggesting no recent or current infectionswith Legionella pneumophila serogroup 1. Infection to Legionella cannot be ruled out since other serogroupsand species may cause infection, antigen may not be present inearly infection, or level of antigen may be below thedetection limit. Normal Range: Presumptive NegativeWBC                                           Date: 09/13/2020Value: 7.6         Ref range: 4.5 - 11.5 E9/L    Status: FinalRBC                                           Date: 09/13/2020Value: 5.66        Ref range: 3.80 - 5.80 E12/L  Status: FinalHemoglobin                                    Date: 09/13/2020Value: 13.9        Ref range: 12.5 - 16.5 g/dL   Status: FinalHematocrit                                    Date: 09/13/2020Value: 43.5        Ref range: 37.0 - 54.0 %      Status: FinalMCV                                           Date: 09/13/2020Value: 76.9*       Ref range: 80.0 - 99.9 fL     Status: [address]                                           Date: 09/13/2020Value: 24.6*       Ref range: 26.0 - 35.0 pg     Status: [address]                                          Date: 09/13/2020Value: 32.0        Ref range: 32.0 - 34.5 %      Status: FinalRDW                                           Date: 09/13/2020Value: 19.1*       Ref range: 11.5 - 15.0 fL     Status: FinalPlatelets                                     Date: 09/13/2020Value: 304         Ref range: 130 - 450 E9/L     Status: FinalMPV                                           Date: 09/13/2020Value: 10.0        Ref range: 7.0 - 12.0 fL      Status: FinalNeutrophils %                                 Date: 09/13/2020Value: 74.2        Ref range: 43.0 - 80.0 %      Status: FinalImmature Granulocytes %                       Date: 09/13/2020Value: 0.7         Ref range: 0.0 - 5.0 %        Status: FinalLymphocytes %                                 Date: 09/13/2020Value: 12.0*       Ref range: 20.0 - 42.0 %      Status: FinalMonocytes %                                   Date: 09/13/2020Value: 8.2         Ref range: 2.0 - 12.0 %       Status: FinalEosinophils %                                 Date: 09/13/2020Value: 4.2         Ref range: 0.0 - 6.0 %        Status: FinalBasophils %                                   Date: 09/13/2020Value: 0.7         Ref range: 0.0 - 2.0 %        Status: FinalNeutrophils Absolute                          Date: 09/13/2020Value: 5.61        Ref range: 1.80 - 7.30 E9/L   Status: FinalImmature Granulocytes #                       Date: 09/13/2020Value: 0.05        Ref range: E9/L               Status: FinalLymphocytes Absolute                          Date: 09/13/2020Value: 0.91*       Ref range: 1.50 - 4.00 E9/L   Status: FinalMonocytes Absolute                            Date: 09/13/2020Value: 0.62        Ref range: 0.10 - 0.95 E9/L   Status: FinalEosinophils Absolute                          Date: 09/13/2020Value: 0.32        Ref range: 0.05 - 0.50 E9/L   Status: FinalBasophils Absolute                            Date: 09/13/2020Value: 0.05        Ref range: 0.00 - 0.20 E9/L   Status: FinalSodium                                        Date: 09/13/2020Value: 135         Ref range: 132 - 146 mmol/L   Status: FinalPotassium                                     Date: 09/13/2020Value: 4.1         Ref range: 3.5 - 5.0 mmol/L   Status: FinalChloride                                      Date: 09/13/2020Value: 97*         Ref range: 98 - 107 mmol/L    Status: FinalCO2                                           Date: 09/13/2020Value: 19*         Ref range: 22 - 29 mmol/L     Status: FinalAnion Gap                                     Date: 09/13/2020Value: 19*         Ref range: 7 - 16 mmol/L      Status: FinalGlucose                                       Date: 09/13/2020Value: 80          Ref range: 74 - 99 mg/dL      Status: FinalBUN                                           Date: 09/13/2020Value: 16          Ref range: 8 - 23 mg/dL       Status: FinalCREATININE                                    Date: 09/13/2020Value: 1.6*        Ref range: 0.7 - 1.2 mg/dL    Status: FinalGFR Non-                      Date: 09/13/2020Value: 44          Ref range: >=60 mL/min/1.73   Status: Final              Comment: Chronic Kidney Disease: less than 60 ml/min/1.73 sq.m. Kidney Failure: less than 15 ml/min/1.73 sq. m. Results valid for patients 18 years and older. GFR                           Date: 09/13/2020Value: 53            Status: FinalCalcium                                       Date: 09/13/2020Value: 9.9         Ref range: 8.6 - 10.2 mg/dL   Status: FinalTotal Protein                                 Date: 09/13/2020Value: 7.0         Ref range: 6.4 - 8.3 g/dL     Status: FinalAlb                                           Date: 09/13/2020Value: 3.3*        Ref range: 3.5 - 5.2 g/dL     Status: FinalTotal Bilirubin                               Date: 09/13/2020Value: 0.5         Ref range: 0.0 - 1.2 mg/dL    Status: FinalAlkaline Phosphatase                          Date: 09/13/2020Value: 117         Ref range: 40 - 129 U/L       Status: FinalALT                                           Date: 09/13/2020Value: 18          Ref range: 0 - 40 U/L         Status: FinalAST                                           Date: 09/13/2020Value: 23          Ref range: 0 - 39 U/L         Status: FinalCULTURE, RESPIRATORY                          Date: 09/14/2020Value: Oral Pharyngeal Alva reduced                     Status: Emile Brown, Respiratory                            Date: 09/14/2020Value:               Status: Final                 Value:Group 5: >25 PMN's/LPF and <10 Epithelial cells/LPFAbundant Polymorphonuclear leukocytesModerate Epithelial cellsRare Gram positive diplococciOrganism                                      Date: 09/14/2020Value: Staphylococcus aureus                     Status: FinalCULTURE, RESPIRATORY                          Date: 09/14/2020Value:               Status: Final                 Value:Light growthThis isolate is presumed to be resistant based on thedetection of inducible Clindamycin resistance. Clindamycinmay still be effective in some patients. PSA                                           Date: 09/15/2020Value: 1.84        Ref range: 0.00 - 4.00 ng/mL  Status: Final------------    Radiology last 7 days:  Xr Chest PortableResult Date: 9/15/2020Slight worsening of left lower lobe atelectasis/infiltrate      Pending Labs   Order Current Status  Culture, Respiratory Collected (09/13/20 1238)  Cytology, Non-Gyn Collected (09/15/20 5281)  Cytology, Non-Gyn In process      Discharge Medications    Discharge Medication List as of 9/18/2020 11:17 AM    Discharge Medication List as of 9/18/2020 11:17 AM    Discharge Medication List as of 9/18/2020 11:17 Elray Kandis these medications which have NOT CHANGEDLORazepam (ATIVAN) 0.5 MG tabletTake 0.5 mg by mouth nightly as needed for Anxiety. Historical Medmirtazapine (REMERON) 30 MG tabletTake 30 mg by mouth nightlyHistorical Meddoxycycline hyclate (VIBRA-TABS) 100 MG tabletTake 1 tablet by mouth 2 times daily for 10 days, Disp-20 tablet,R-0Printsenna-docusate (PERICOLACE) 8.6-50 MG per tabletTake 2 tablets by mouth daily, Disp-180 tablet,R-1Normalapixaban (ELIQUIS) 5 MG TABS tabletTake 1 tablet by mouth 2 times daily, Disp-180 tablet,R-0NormaloxyCODONE-acetaminophen (PERCOCET) 5-325 MG per tabletTake 1 tablet by mouth every 4 hours as needed for Pain.  Break throughHistorical MedbuPROPion (WELLBUTRIN) 75 MG tabletTake 1 tablet by mouth 2 times daily, Disp-60 tablet,R-3Normalsimvastatin (ZOCOR) 40 MG tabletTake 40 mg by mouth nightlyHistorical Medalbuterol (ACCUNEB) 0.63 MG/3ML nebulizer solutionTake 3 mLs by nebulization every 6 hours DX: COPD J44.9, Disp-270 mL, R-3Normallevothyroxine (SYNTHROID) 125 MCG tabletTake 1 tablet by mouth Daily, Disp-90 tablet, R-0Normalformoterol (PERFOROMIST) 20 MCG/2ML nebulizer solutionTake 2 mLs by nebulization 2 times daily Instructed to take am of procedure, Disp-120 mL, R-5Normaltamsulosin (FLOMAX) 0.4 MG capsuleTake 1 capsule by mouth nightly, Disp-90 capsule, R-1Normalesomeprazole (NEXIUM) 40 MG delayed release capsuleTake 1 capsule by mouth every morning (before breakfast) Instructed to take am of procedure, Disp-90 capsule, R-1NormalDULoxetine (CYMBALTA) 30 MG extended release capsuleTake 1 capsule by mouth 2 times daily, Disp-180 capsule, R-1Normalbudesonide (PULMICORT) 0.5 MG/2ML nebulizer suspensionTake 2 mLs by nebulization 2 times daily Instructed to take am of procedure, Disp-60 ampule, R-5NormalpredniSONE (DELTASONE) 10 MG tabletTake 10 mg by mouth daily Instructed to take am of procedureHistorical MedOXYGENInhale 4 L into the lungs nightlyHistorical Medchlorhexidine (PERIDEX) 0.12 % solutionTake 15 mLs by mouth daily Swish & spit qd, Disp-473 mL, R-2NormalCholecalciferol (VITAMIN D3) 5000 units TABSTake 5,000 Units by mouth daily Historical MedMelatonin 10 MG TABSTake 10 mg by mouth nightly Historical Med    Discharge Medication List as of 9/18/2020 11:17 AMSTOP taking these medicationsoxyCODONE (OXY-IR) 15 MG immediate release tabletComments:Reason for Stopping:Revefenacin (YUPELRI) 175 MCG/3ML SOLNComments:Reason for Stopping:pregabalin (LYRICA) 150 MG capsuleComments:Reason for Stopping:cyclobenzaprine (FLEXERIL) 10 MG tabletComments:Reason for Stopping:pembrolizumab ([place]) 100 MG/4ML SOLNComments:Reason for Stopping:    Time Spent on Discharge:3E] minutes were spent in patient examination, evaluation, counseling as well as medication reconciliation, prescriptions for required medications, discharge plan, and follow up.     Electronically signed by Miguel Ángel Munoz MD on 9/18/20 at 5:10 PM EDT

## 2020-09-18 NOTE — PROGRESS NOTES
Admit Date: 9/10/2020    Subjective:     Feels fine no complaint voiding fine     Objective:     No data found. I/O last 3 completed shifts: In: 240 [P.O.:240]  Out: 830 [Urine:830]  No intake/output data recorded. HEENT: Normal  NECK: Thyroid normal. No carotid bruit. Trach. CVS: RRR  RS: Clear. No wheeze. No rhonchi. Good airflow bilaterally. ABD: Soft. Non tender. No mass. Normal BS. EXT: No edema. Non tender. Pulses present.    NEURO: Intact      Scheduled Meds:   fentaNYL  1 patch Transdermal Q72H    albuterol  1 ampule Nebulization Q6H    apixaban  5 mg Oral BID    budesonide  500 mcg Nebulization BID    chlorhexidine  15 mL Mouth/Throat Daily    vitamin D  5,000 Units Oral Daily    doxycycline hyclate  100 mg Oral BID    DULoxetine  30 mg Oral BID    pantoprazole  40 mg Oral QAM AC    Arformoterol Tartrate  15 mcg Nebulization BID    levothyroxine  125 mcg Oral Daily    melatonin  10 mg Oral Nightly    tamsulosin  0.4 mg Oral Nightly    atorvastatin  40 mg Oral Nightly    sennosides-docusate sodium  2 tablet Oral Daily    predniSONE  10 mg Oral Daily    buPROPion  75 mg Oral BID    mirtazapine  30 mg Oral Nightly     Continuous Infusions:    CBC with Differential:    Lab Results   Component Value Date    WBC 7.6 09/13/2020    RBC 5.66 09/13/2020    HGB 13.9 09/13/2020    HCT 43.5 09/13/2020     09/13/2020    MCV 76.9 09/13/2020    MCH 24.6 09/13/2020    MCHC 32.0 09/13/2020    RDW 19.1 09/13/2020    SEGSPCT 59 02/22/2012    LYMPHOPCT 12.0 09/13/2020    MONOPCT 8.2 09/13/2020    BASOPCT 0.7 09/13/2020    MONOSABS 0.62 09/13/2020    LYMPHSABS 0.91 09/13/2020    EOSABS 0.32 09/13/2020    BASOSABS 0.05 09/13/2020     CMP:    Lab Results   Component Value Date     09/13/2020    K 4.1 09/13/2020    K 4.0 09/09/2020    CL 97 09/13/2020    CO2 19 09/13/2020    BUN 16 09/13/2020    CREATININE 1.6 09/13/2020    GFRAA 53 09/13/2020    LABGLOM 44 09/13/2020    PROT 7.0 09/13/2020 LABALBU 3.3 09/13/2020    CALCIUM 9.9 09/13/2020    BILITOT 0.5 09/13/2020    ALKPHOS 117 09/13/2020    AST 23 09/13/2020    ALT 18 09/13/2020     PT/INR:    Lab Results   Component Value Date    PROTIME 15.2 09/09/2020    INR 1.3 09/09/2020       Assessment:     Principal Problem:    Pneumonia    COPD (chronic obstructive pulmonary disease) (HCC)    Tobacco dependence    Abdominal aortic aneurysm without rupture (HCC)    Pharyngeal cancer (HCC)    Benign prostatic hyperplasia without lower urinary tract symptoms    Acquired hypothyroidism          Plan:   Stable for discharge

## 2020-09-21 ENCOUNTER — CARE COORDINATION (OUTPATIENT)
Dept: CARE COORDINATION | Age: 65
End: 2020-09-21

## 2020-09-21 PROCEDURE — 1111F DSCHRG MED/CURRENT MED MERGE: CPT | Performed by: FAMILY MEDICINE

## 2020-09-21 NOTE — CARE COORDINATION
[facility] Transitions Initial Follow Up Call    Call within 2 business days of discharge: Yes    Patient: Gale Bedolla. Patient : 1955   MRN: [id number]  Reason for Admission: Pneumonia due to organism  Discharge Date: 20 RARS: Readmission Risk Score: 23      Attempted to reach patient's spouse by phone regarding post hospital discharge follow up. HIPAA compliant message left on voicemail; CTN contact information provided. Patient's spouse is listed on patient's HIPAA form. Notified Elliott Morales at Dr. Tyler Cowden office that patient may be called/scheduled for a hospital follow up/TCM visit; hospital discharge on 2020. Patient has follow up scheduled with Palliative Care on 2020.

## 2020-09-22 ENCOUNTER — OFFICE VISIT (OUTPATIENT)
Dept: PALLATIVE CARE | Age: 65
End: 2020-09-22
Payer: MEDICARE

## 2020-09-22 ENCOUNTER — TELEPHONE (OUTPATIENT)
Dept: PHARMACY | Facility: CLINIC | Age: 65
End: 2020-09-22

## 2020-09-22 VITALS
BODY MASS INDEX: 26.82 KG/M2 | DIASTOLIC BLOOD PRESSURE: 65 MMHG | WEIGHT: 192.3 LBS | SYSTOLIC BLOOD PRESSURE: 99 MMHG | HEART RATE: 90 BPM | OXYGEN SATURATION: 95 %

## 2020-09-22 PROCEDURE — 99213 OFFICE O/P EST LOW 20 MIN: CPT | Performed by: STUDENT IN AN ORGANIZED HEALTH CARE EDUCATION/TRAINING PROGRAM

## 2020-09-22 PROCEDURE — 99212 OFFICE O/P EST SF 10 MIN: CPT | Performed by: STUDENT IN AN ORGANIZED HEALTH CARE EDUCATION/TRAINING PROGRAM

## 2020-09-22 RX ORDER — DULOXETIN HYDROCHLORIDE 30 MG/1
30 CAPSULE, DELAYED RELEASE ORAL 2 TIMES DAILY
Qty: 180 CAPSULE | Refills: 1 | Status: SHIPPED
Start: 2020-09-22 | End: 2021-03-19

## 2020-09-22 RX ORDER — LORAZEPAM 0.5 MG/1
0.5 TABLET ORAL NIGHTLY PRN
Qty: 30 TABLET | Refills: 0 | Status: SHIPPED
Start: 2020-09-22 | End: 2020-10-27 | Stop reason: SDUPTHER

## 2020-09-22 NOTE — CARE COORDINATION
Justa 45 Transitions Initial Follow Up Call    Call within 2 business days of discharge: Yes    Patient: Becky Zhao. Patient : 1955   MRN: [id number]  Reason for Admission: Pneumonia due to organism  Discharge Date: 20 RARS: Readmission Risk Score: 23      Last Discharge [address] [address]       Complaint Diagnosis Description Type Department Provider    9/10/20 Shortness of Breath Pneumonia due to organism . .. ED to Hosp-Admission (Discharged) (ADMITTED) [facility] [address], MD; Neptali Caller. .. Spoke with patient's spouse, Dilan Julien. Dulce Sky is listed on the patient's HIPAA form. CTN explained role and reason for call, and Dulce Sky is receptive to participating in hospital follow up call on behalf of the patient. Challenges to be reviewed by the provider   Additional needs identified to be addressed with provider No  none    Discussed COVID-19 related testing which was available at this time. Test results were negative. Patient informed of results, if available? Patient aware of results. Method of communication with provider : chart routing, phone    Advance Care Planning:   Does patient have an Advance Directive:  decision maker updated. Was this a readmission? No  Patient stated reason for admission: Dulce Sky reports patient had difficulty breathing, difficulty urinating, oxygen level was low, and patient broke out in a sweat  Patients top risk factors for readmission: COPD, Disease progression. Known lung and vocal cord CA; IV immunotherapy Q 3 weeks    Care Transition Nurse (CTN) contacted the caregiver by telephone to perform post hospital discharge assessment. Verified name and  with caregiver as identifiers. Provided introduction to self, and explanation of the CTN role. CTN reviewed discharge instructions, medical action plan and red flags with caregiver who verbalized understanding.  Caregiver given an opportunity to ask questions and does not have any further questions or concerns at this time. Were discharge instructions available to patient? Discharge instructions available to caregiver. . Reviewed appropriate site of care based on symptoms and resources available to patient including: PCP, Specialist and When to call 911. The caregiver agrees to contact the PCP office for questions related to patient's healthcare. Medication reconciliation was performed with caregiver, who verbalizes understanding of administration of home medications. Advised obtaining a 90-day supply of all daily and as-needed medications. Covid Risk Education    Patient has following risk factors of: COPD and CVD. Education provided regarding infection prevention, and signs and symptoms of COVID-19 and when to seek medical attention with caregiver who verbalized understanding. Discussed exposure protocols and quarantine From CDC: Are you at higher risk for severe illness?   and given an opportunity for questions and concerns. The parent agrees to contact the COVID-19 hotline [phone] or PCP office for questions related to COVID-19. For more information on steps you can take to protect yourself, see CDC's How to Protect Yourself     Patient/family/caregiver given information for [facility] and agrees to enroll N/A  Patient's preferred e-mail: N/A  Patient's preferred phone number: N/A    Discussed follow-up appointments. If no appointment was previously scheduled, appointment scheduling offered: Jun Cuevas reports patient has appointment today with Palliative Care. Jun Cuevas reports she will call PCP to schedule f/u appointment. Plan for follow-up call in 7-10 days based on severity of symptoms and risk factors. Plan for next call: review f/u appointment with Palliative Care  CTN provided contact information for future needs.         Non-face-to-face services provided:  Obtained and reviewed discharge summary and/or continuity of care documents   -Medication review completed with patient's spouse   -Patient's spouse reports she will contact Pulmonologist regarding continuing Cara Sarks.  -1111f entered    Care Transitions 24 Hour Call    Do you have any ongoing symptoms?:  Yes  Patient-reported symptoms:  Shortness of Breath, Fatigue, Weakness  -shortness of breath back to baseline   -TM @ 4 L at hs and prn; humidified oxygen at hs  -nebulizer treatments 3 times daily  -Isa Omar reports patient has suction machine; has not needed. Interventions for patient-reported symptoms:  Notified PCP/Physician (Comment: note forwarded to PCP for review)  Do you have a copy of your discharge instructions?:  Yes  Do you have all of your prescriptions and are they filled?:  Yes  Have you been contacted by a [employer] [address]?:  No  Have you scheduled your follow up appointment?:  No  Were you discharged with any Home Care or Post Acute Services:  No  Do you feel like you have everything you need to keep you well at home?:  Yes  Care Transitions Interventions: Forward note to Clinical Pharmacist for review    Isalizzy Nelson is pleasant in conversation and provides the following patient update:  -patient is ambulating with FWW   -Patient reports he is urinating without difficulty and describes urine as clear. Isa Nelson reports she will  a urinal for the patient to use so that she can monitor patient's urine output.  -Isa Nelson reports she confirmed with Palliative Care patient to continue Oxycodone 15 mg take one tablet every 4 hours as needed for pain.  -Isa Nelson reports Palliative Care to re-evaluate patient regarding resuming of Lyrica. -Isa Nelson reports she will call today to schedule patient appointments with PCP,  ID,  Dr. Param Kuhn,  Dr. Christy Bauer, and ENT at [facility]. -awaiting call back from Dr. Francia Alvarenga office to schedule appointment  -reviewed/discussed Bleeding Precautions    Discussed would like to follow patient over the next few weeks, and Isa Nelson is receptive to receiving follow up calls.    Emotional support provided. Encouraged Casie to call with any questions or concerns; CTN contact information provided.        Follow Up  Future Appointments   Date Time Provider Alfredo Dozieri   9/22/2020  2:00 PM [facility] [address] [facility]   9/30/2020  2:30 PM Sylvia Quintana DO [facility]    10/29/2020  2:15 PM Ivette Carolina MD [facility]   11/13/2020 10:30 AM Sylvia Quintana DO [facility]    12/4/2020  9:30 AM Vanessa Yap MD [facility] Bryant Hernandez RN

## 2020-09-22 NOTE — PROGRESS NOTES
Department of Palliative Medicine  Ambulatory Note  Provider: Manuel Nice MD        Chief Complaint: Zachariah Romero is a 72 y.o. male with chief complaint of pain      HPI  Mr. 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 Assessment/Plan     Lung cancer   - Stage 4    - Follows with Dr. Kd Schreiber   - Received radiation therapy, along with chemotherapy     Chronic Pain due to Neoplasm/chronic lumbar radiculopathy:           -  Fentanyl increase to 100 mcg               -  Cont Oxycodone increase to 15 mg Q 4 PRN, using 3-4 per day                        -  Lyrica 150 mg TID for neuropathy, hand tingling sensation   - Will undergo further evaluation from pain management for intervention                Constipation:              -  Pericolace 2 tabs BID              -  Add Miralax      Depression/Anxiety:   - Cymbalta 30 mg BID              - Ativan 0.5 mg HS PRN    Insomnia    - lorazepam 0.5 mg HS   - melatonin 5mg HS      Follow Up:  8 weeks. Encouraged to call with any questions, concerns, needs, or changes in symptoms. Subjective:       Zachariah Romero is a 72 y.o. male with significant past medical history of lung cancer who was seen by [address] [place] [address] after his recent visit to the hospital.  He is present with his wife at his side. They want to make sure if they can continue medications or not. After reviewing their medications, it seems like Lyrica was stopped. Patient is complaining about numbness and tingling sensation in his left hand. I recommended that he restart his Lyrica. He is currently on fentanyl 100 mcg, with oxycodone breakthrough. He has used 3-4 in daily as needed. For constipation he is currently on senna and MiraLAX.   For his anxiety and depression, he is on Cymbalta and Ativan. No nausea or vomiting reported, no sedation from the medications. Pain Assessment   Ratin  Description: aching and shooting  Duration: years  Frequency: daily  Location: hands, back   Alleviating Factors: pain medication  Exacerbating Factors: unable to associate with any factor  Effect: change in function, sleep and relationships        Objective:     Physical Exam  Wt Readings from Last 3 Encounters:   20 192 lb 4.8 oz (87.2 kg)   09/10/20 211 lb (95.7 kg)   20 211 lb (95.7 kg)     BP 99/65   Pulse 90   Wt 192 lb 4.8 oz (87.2 kg)   SpO2 95%   BMI 26.82 kg/m²     Gen:  Alert, appears stated age, well nourished, in no acute distress  HEENT:  Speaks by holding trach  Neck:  Supple  Lungs:  CTA bilaterally, no audible rhonchi or wheezes noted  Heart[de-identified]  RRR, no murmur, rub, or gallop noted during exam  Abd:  Soft, non tender, non distended, BS+  M/S/Ext:  Moving all extremities, no edema, pulses present  Skin:  Warm and dry  Neuro:  PERRL, Alert, oriented x 3; following commands    Phoenix Symptom Assessment Score   Phoenix Score 2020 2020 2020 2020 3/2/2020   Pain Score 4 6 9 7 7   Tiredness Score 5 2 5 6 6   Nausea Score Not nauseated Not nauseated Not nauseated Not nauseated Not nauseated   Depression Score 4 4 4 4 5   Anxiety Score 5 4 4 4 4   Drowsiness Score 3 4 3 4 5   Appetite Score 4 1 1 1 2   Wellbeing Score 5 5 2 3 4   Dyspnea Score 5 7 6 6 7   Other Problem Score 4 6 6 7 7   Total Assessment Score(calculated) 39 39 40 42 47       Assessed by: patient. Current Medications:  Medications reviewed: yes    Controlled Substances Monitoring: OARRS reviewed 20. RX Monitoring 2020   Attestation -   Periodic Controlled Substance Monitoring Possible medication side effects, risk of tolerance/dependence & alternative treatments discussed. ;No signs of potential drug abuse or diversion identified. ;Assessed functional status. ;Obtaining appropriate analgesic effect of treatment. Chronic Pain > 50 MEDD Re-evaluated the status of the patient's underlying condition causing pain. Chronic Pain > 80 MEDD Obtained or confirmed \"Medication Contract\" on file. Chronic Pain > 120 MEDD Engaged a pain medicine specialist as a prescriber or consultant. Diamond Judd MD  Palliative Care Department     Time/Communication  Greater than 50% of time spent, total 20 minutes in face-to-face counseling and coordination of care regarding symptom management.

## 2020-09-23 ENCOUNTER — SCHEDULED TELEPHONE ENCOUNTER (OUTPATIENT)
Dept: PHARMACY | Facility: CLINIC | Age: 65
End: 2020-09-23

## 2020-09-23 RX ORDER — PREGABALIN 150 MG/1
150 CAPSULE ORAL 3 TIMES DAILY
COMMUNITY
End: 2020-10-07 | Stop reason: SDUPTHER

## 2020-09-23 RX ORDER — SENNA AND DOCUSATE SODIUM 50; 8.6 MG/1; MG/1
2 TABLET, FILM COATED ORAL 2 TIMES DAILY
COMMUNITY
End: 2021-01-12 | Stop reason: SDUPTHER

## 2020-09-23 RX ORDER — ALBUTEROL SULFATE 0.63 MG/3ML
1 SOLUTION RESPIRATORY (INHALATION) EVERY 6 HOURS PRN
COMMUNITY

## 2020-09-23 RX ORDER — FENTANYL 100 UG/H
1 PATCH TRANSDERMAL
COMMUNITY
End: 2020-09-28 | Stop reason: SDUPTHER

## 2020-09-23 RX ORDER — OXYCODONE HYDROCHLORIDE 15 MG/1
15 TABLET ORAL EVERY 4 HOURS PRN
COMMUNITY
End: 2020-10-07 | Stop reason: SDUPTHER

## 2020-09-23 RX ORDER — REVEFENACIN 175 UG/3ML
3 SOLUTION RESPIRATORY (INHALATION) DAILY
COMMUNITY
End: 2020-10-13 | Stop reason: SDUPTHER

## 2020-09-23 NOTE — TELEPHONE ENCOUNTER
CLINICAL PHARMACY NOTE  Post-Discharge Transitions of Care (JAM)    Non-face-to-face services provided:  Assessment and support for treatment adherence and medication management-Medication reconciliation completed 9/23/2020    Subjective/Objective:  Joycelyn Brand is a 72 y.o. male. Patient was discharged from [facility] on 9/18/2020 with a diagnosis of pneumonia. Patient outreach to review discharge medications and provide medication review and management. Spoke with wife, Isa Nelson, on HIPPA    Allergies   Allergen Reactions    Augmentin [Amoxicillin-Pot Clavulanate] Diarrhea     Discharge Medications (as per discharging medication list found AVS): These are medications you told us you were taking at home, CONTINUE taking them after you leave the hospital   Medication Sig Notes    LORazepam (ATIVAN) 0.5 MG tablet Take 1 tablet by mouth nightly as needed for Anxiety for up to 30 days.  DULoxetine (CYMBALTA) 30 MG extended release capsule Take 1 capsule by mouth 2 times daily     mirtazapine (REMERON) 30 MG tablet Take 30 mg by mouth nightly     senna-docusate (PERICOLACE) 8.6-50 MG per tablet Take 2 tablets by mouth daily BID     apixaban (ELIQUIS) 5 MG TABS tablet Take 1 tablet by mouth 2 times daily     oxyCODONE-acetaminophen (PERCOCET) 5-325 MG per tablet Take 1 tablet by mouth every 4 hours as needed for Pain. Break through Not on Percocet. On oxycodone 15 mg q4h prn pain    buPROPion (WELLBUTRIN) 75 MG tablet Take 1 tablet by mouth 2 times daily     simvastatin (ZOCOR) 40 MG tablet Take 40 mg by mouth nightly     albuterol (ACCUNEB) 0.63 MG/3ML nebulizer solution Take 3 mLs by nebulization every 6 hours DX: COPD J44.9  prn. Uses formoterol BID.     levothyroxine (SYNTHROID) 125 MCG tablet Take 1 tablet by mouth Daily     formoterol (PERFOROMIST) 20 MCG/2ML nebulizer solution Take 2 mLs by nebulization 2 times daily Instructed to take am of procedure     tamsulosin (FLOMAX) 0.4 MG capsule Take 1 capsule by mouth nightly     esomeprazole (NEXIUM) 40 MG delayed release capsule Take 1 capsule by mouth every morning (before breakfast) Instructed to take am of procedure     budesonide (PULMICORT) 0.5 MG/2ML nebulizer suspension Take 2 mLs by nebulization 2 times daily Instructed to take am of procedure     predniSONE (DELTASONE) 10 MG tablet Take 10 mg by mouth daily Instructed to take am of procedure     OXYGEN Inhale 4 L into the lungs nightly     chlorhexidine (PERIDEX) 0.12 % solution Take 15 mLs by mouth daily Swish & spit qd     Cholecalciferol (VITAMIN D3) 5000 units TABS Take 5,000 Units by mouth daily      Melatonin 10 MG TABS Take 10 mg by mouth nightly       These are the medications you have told us you were taking at home, STOP taking them after you leave the hospital   · cyclobenzaprine 10 MG tablet (FLEXERIL) - per Eleazar Queen had not taken for a few years. Keeping off medication list.  · Keytruda 100 MG/4ML Soln - Dr. Shakir Maravilla 9/17 note \"He will be maintained on Keytruda after discharge. \" Eleazar Queen states they spoke with Dr. Lyn Jade office who anticipates this medication will continue. Follows-up with Dr. Michelle Peng tomorrow 9/24 per Eleazar Queen. Can contact after appointment and add back to medication list if resumed. · oxyCODONE 15 MG immediate release tablet (OXY-IR)- Per palliative appointment 9/22 patient to remain on oxycodone- added back to medication list.  · pregabalin 150 MG capsule (Regi Pierre) - restart per palliative care 9/22/2020  · [place] 175 MCG/3ML Soln- Per 9/22 telephone encounter \"He is to continue [place] per DR. Parkinson\" - once daily nebulizer    Additional Medications:  - Duragesic (Fentanyl) 100 mcg/hr; place 1 onto the skin every 72 hours. Note this medication is mentioned in palliative care note from yesterday as well that patient is on. Meds to Beds:No    CrCl cannot be calculated (Unknown ideal weight.).    CrCL ~49 mL/min (calculated using sCr 1.6 mg/dL, Ht 71\", IBW 75.3 kg, using C-G equation)  eGFR: 44 mL/min/1.73 m^2    Assessment/Plan:  - Medication reconciliation completed. Number of medications reviewed: 25    - Number of discrepancies: 1. Instructions per discharge list provided except per below documentation. Identified medication discrepancies/issues:   · Category 4 (1):  1. Medication list updated    - Care[facility] active medication list updated:  · Medications Added (3):  Oxycodone, Yupelri, Duragesic  · Medications Removed (1):  Percocet  · Medications Changed (2):  Pericolace, albuterol    - Identified Potential Medication Interactions: The following clinically significant interactions were identified via AmSafe Interaction Analysis as category D or higher: . Othelia Abebe Lyrica, mirtazapine, and lorazepam with Fentanyl and oxycodone- CNS Depressants may enhance the CNS depressant effect of Opioid Agonists. Counseled on these interactions and signs and symptoms of CNS depression. Patient has been on these medications- counseled to monitor closely.     - Renal Dosing: No renal adjustments necessary.    - Follow up appointment date (7 days for more severe illness, 14 days for others):    · Dr. Jennifer Rodriguez 9/24/2020 per [address] [facility] Appointments   Date Time Provider Alfredo Sotomayor   9/30/2020  2:30 PM Sylvia Quintana DO [facility]   10/29/2020  2:15 PM Joaquin Cano MD [facility]   11/13/2020 10:30 AM Sylvia Bucemely DO [facility]   11/17/2020  2:00 PM SCHEDULE, [facility]ALLIATIVE CARE [facility]   12/4/2020  9:30 AM Justin Thompson MD [facility]       Thank you,    Ellen Garcia, PharmD  [address]  Phone: O: [phone], Toll free: [phone], option 7

## 2020-09-24 RX ORDER — PEMBROLIZUMAB 25 MG/ML
200 INJECTION, SOLUTION INTRAVENOUS
COMMUNITY

## 2020-09-24 NOTE — TELEPHONE ENCOUNTER
Per nurse at Dr. Khushboo Grimes office, Marika Ibrahim resumed today. Patient got at appointment today. Keytruda 200 mg IV [id number].     Jose Angel Mandel, PharmD, [address] [place], [facility]  [facility] Pharmacist  O: [phone]  Department, toll free: [phone], option 7     CLINICAL PHARMACY NOTE   POST-DISCHARGE TELEPHONE FOLLOW-UP ADDENDUM  For Pharmacy Admin Tracking Only  TCM Call Made?: No  [address] Patient?: Yes  Total # of Interventions Recommended: 1 -   - Updated Order #: 1  Total # Interventions Accepted: 1  Intervention Severity:   - Level 1 Intervention Present?: No   - Level 2 #: 0   - Level 3 #: 0  Outreach Status: Review Complete  Care Coordinator Outreach to Patient?: Yes  Provider Contacted?: Yes - Office Called  Waiting on response from: n/a  Time Spent (min): 30

## 2020-09-28 RX ORDER — FENTANYL 100 UG/H
1 PATCH TRANSDERMAL
Qty: 10 PATCH | Refills: 0 | Status: SHIPPED | OUTPATIENT
Start: 2020-09-28 | End: 2020-10-29 | Stop reason: SDUPTHER

## 2020-09-29 ENCOUNTER — CARE COORDINATION (OUTPATIENT)
Dept: CARE COORDINATION | Age: 65
End: 2020-09-29

## 2020-09-29 NOTE — CARE COORDINATION
[facility] Transitions Follow Up Call    2020    Patient: Jose Antonio Chu Patient : 1955   MRN: [id number]  Reason for Admission: Pneumonia due to organism  Discharge Date: 20 RARS: Readmission Risk Score: 23           Needs to be reviewed by the provider   Additional needs identified to be addressed with provider No  No identified needs. Method of communication with provider : chart routing    Care Transition Nurse (CTN) contacted the spouse/caregiver, James Saenz by telephone to follow up after admission on 9/10/2020. Eleonora Grey Verified name and  with spouse/caregiver,Casie as identifiers. James Saenz is listed on the patient's HIPAA form. Addressed changes since last contact: Discussed medication review was completed with [address] [address] Pharmacist.  Discharged needs reviewed: James Saenz reports she received instructions for patient to resume Lyrica and Murtaza Locus. Follow up appointment completed? Patient had f/u appointment with Palliative Care on 2020. James Saenz reports patient has scheduled future appointment with Dr. Abdelrahman Caro. Advance Care Planning:   Does patient have an Advance Directive:  decision maker updated. CTN reviewed discharge instructions, medical action plan and red flags with caregiver and discussed any barriers to care and/or understanding of plan of care after discharge. Discussed appropriate site of care based on symptoms and resources available to patient including: PCP and Specialist. The caregiver agrees to contact the PCP office for questions related to their healthcare. Patients top risk factors for readmission: Patients top risk factors for readmission: COPD, Disease progression. Known lung and vocal cord CA; IV immunotherapy Q 3 weeks  Interventions to address risk factors: assess symptom management    Discussed follow-up appointments.  If no appointment was previously scheduled, appointment scheduling offered: Casie Armstrong reports all f/u appointments have been scheduled for the patient. Is follow up appointment scheduled within 7 days of discharge? Yes, patient had f/u with Palliative Care on 9/22/2020. Non-[facility] follow up appointment(s): Martha Lance reports patient had f/u appointment with Dr. Barb Moreno. Plan for follow-up call in 7-10 days based on severity of symptoms and risk factors. Plan for next call: Discuss/review future needs. CTN provided contact information for future needs. Care Transitions Subsequent and Final Call    Subsequent and Final Calls  Do you have any ongoing symptoms?:  Yes  Patient-reported symptoms:  Shortness of Breath, Fatigue, Weakness  -Casie reports patient with no worsening in symptoms  Do you have any questions related to your medications?:  No  Do you currently have any active services?:  No  Do you have any needs or concerns that I can assist you with?:  No  Identified Barriers:  Impairment  Care Transitions Interventions; No identified needs. Martha Lance reports the following:   -patient ambulating with FWW   -shortness of breath with exertion is unchanged   -oxygen at 4 L via TM at hs and prn  -patient's appetite is improving  -patient is urinating without difficulty; describes urine is clear/yellow    Supportive listening and emotional support provided. Encouraged Casie to call with any questions or concerns; CTN contact information provided.        Follow Up  Future Appointments   Date Time Provider Alfredo Sotomayor   9/30/2020  2:30 PM Sylvia Quintana  [facility]   10/29/2020  2:15 PM Michael Covarrubias MD [facility]   11/13/2020 10:30 AM Sylvia Quintana DO [facility]   11/17/2020  2:00 PM SCHEDULE, [facility] PALLIATIVE CARE [facility]   11/24/2020  2:20 PM MD [facility]   12/4/2020  9:30 AM MD VON Becerra, MALLY

## 2020-09-30 PROBLEM — J15.211 PNEUMONIA DUE TO METHICILLIN SUSCEPTIBLE STAPHYLOCOCCUS AUREUS (MSSA) (HCC): Status: ACTIVE | Noted: 2020-09-30

## 2020-10-05 ENCOUNTER — PREP FOR PROCEDURE (OUTPATIENT)
Dept: PAIN MANAGEMENT | Age: 65
End: 2020-10-05

## 2020-10-05 ENCOUNTER — OFFICE VISIT (OUTPATIENT)
Dept: PAIN MANAGEMENT | Age: 65
End: 2020-10-05
Payer: MEDICARE

## 2020-10-05 VITALS
WEIGHT: 193 LBS | HEIGHT: 71 IN | TEMPERATURE: 98.1 F | BODY MASS INDEX: 27.02 KG/M2 | SYSTOLIC BLOOD PRESSURE: 130 MMHG | RESPIRATION RATE: 18 BRPM | HEART RATE: 90 BPM | OXYGEN SATURATION: 95 % | DIASTOLIC BLOOD PRESSURE: 70 MMHG

## 2020-10-05 PROCEDURE — 99214 OFFICE O/P EST MOD 30 MIN: CPT | Performed by: ANESTHESIOLOGY

## 2020-10-05 PROCEDURE — 99213 OFFICE O/P EST LOW 20 MIN: CPT | Performed by: ANESTHESIOLOGY

## 2020-10-05 NOTE — PROGRESS NOTES
Do you currently have any of the following:    Fever: No  Headache:  No  Cough: No  Shortness of breath: No  Exposed to anyone with these symptoms: No         Alan Grayson. presents to the [address] [place] [address] on 10/5/2020. Juliette Espinoza is complaining of pain in the back down the legs . The pain is constant. The pain is described as aching, throbbing, shooting and stabbing. Pain is rated on his best day at a 4, on his worst day at a 10, and on average at a 7 on the VAS scale. He took his last dose of Lyrica and oxy ir . Any procedures since your last visit: No, with none % relief. Pacemaker or defibrilator: No managed by none. He is not on NSAIDS and is  on anticoagulation medications to include Eliquis and is managed by . Sylvia Quintana DO    .     /70   Pulse 90   Temp 98.1 °F (36.7 °C)   Resp 18   Ht 5' 11\" (1.803 m)   Wt 193 lb (87.5 kg)   SpO2 95%   BMI 26.92 kg/m²      No LMP for male patient.

## 2020-10-05 NOTE — PROGRESS NOTES
[facility] Pain Management        [address], [facility] Barbara Cavanaugh  Dept: [phone]        Follow up Note      Justyn Spaulding. Date of Visit:  10/5/2020    CC:  Patient presents for follow up   Chief Complaint   Patient presents with    Pain     right hip lower back into the foot       HPI:  Chronic low back pain for > a year.     H/o Ca lung and Throat ca.      S/p RT/ chemo- Has a tracheostomy.     Has been treated by oncology.     Under palliative medicine care for neuropathic pain and managed by medications including opioids.     Pain is constant and is described as aching, sharp, stabbing and throbbing.      Pain does radiate to both lower extremities- right side > left     Pain is unchanged. Change in quality of symptoms:no. Medication side effects:none. Has been diagnosed with DVT of the UE and is on anticoagulation. Nursing notes and details of the pain history reviewed. Please see intake notes for details. He was hospitalized recently for pneumonia and UTI. Doing well now.     Previous treatments:   Physical Therapy : yes, HEP- as tolerated      Chiropractic treatment: no     Medications: - NSAID's : yes                        - Membrane stabilizers : yes - gabapentin, Lyrica                       - Opioids : yes: Duragesic, Oxycodone                       - Adjuvants or Others : yes,      TENS Unit: no     Surgeries: no LS spine surgery. H/o prior Cervical fusion noted.     Interventional Pain procedures/ nerve blocks: no     He has been on anticoagulation medications - Eliquis.     He has not been on herbal supplements.       He is not diabetic.     H/O Smoking: +  H/O alcohol abuse : no  H/O Illicit drug use : no     Imaging:   MRI of LS spine on 7/28/2020 reviewed:  Impression         1.  Severe central canal stenosis and moderate bilateral neural    foraminal narrowing at L3-4 and L4-5.         2. Grade 1 anterolisthesis of L3 on L4 and L4 on L5.         3. Stable BACK SURGERY      BRONCHOSCOPY N/A 3/5/2020    BRONCHOSCOPY DIAGNOSTIC OR CELL [address] ONLY performed by Dang Simmons MD at [address] [facility] ARTHROSCOPY      LUNG BIOPSY Left 5/6/2016    OTHER SURGICAL HISTORY  4/15/2016    cervical myelogram    SINUS SURGERY      TRACHEOSTOMY  05/24/2017    TRACHEOSTOMY  05/24/2017       Prior to Admission medications    Medication Sig Start Date End Date Taking? Authorizing Provider   budesonide (PULMICORT) 0.5 MG/2ML nebulizer suspension Take 2 mLs by nebulization 2 times daily DX: COPD J44.9 10/2/20  Yes Dang Simmons MD   fentaNYL (DURAGESIC) 100 MCG/HR Place 1 patch onto the skin every 72 hours for 30 days. 9/28/20 10/28/20 Yes Valeriy Manriquez MD   pembrolizumab [facility]) 100 MG/4ML SOLN Infuse 200 mg intravenously every 21 days   Yes Historical Provider, MD   oxyCODONE (OXY-IR) 15 MG immediate release tablet Take 15 mg by mouth every 4 hours as needed for Pain. Yes Historical Provider, MD   pregabalin (LYRICA) 150 MG capsule Take 150 mg by mouth 3 times daily. Yes Historical Provider, MD   sennosides-docusate sodium (SENOKOT-S) 8.6-50 MG tablet Take 2 tablets by mouth 2 times daily   Yes Historical Provider, MD   albuterol (ACCUNEB) 0.63 MG/3ML nebulizer solution Take 1 ampule by nebulization every 6 hours as needed for Shortness of Breath   Yes Historical Provider, MD   Revefenacin (YUPELRI) 175 MCG/3ML SOLN Inhale 3 mLs into the lungs daily   Yes Historical Provider, MD   LORazepam (ATIVAN) 0.5 MG tablet Take 1 tablet by mouth nightly as needed for Anxiety for up to 30 days.  9/22/20 10/22/20 Yes Valeriy Manriquez MD   DULoxetine (CYMBALTA) 30 MG extended release capsule Take 1 capsule by mouth 2 times daily 9/22/20 3/21/21 Yes Valeriy Manriquez MD   mirtazapine (REMERON) 30 MG tablet Take 30 mg by mouth nightly   Yes Historical Provider, MD   apixaban (ELIQUIS) 5 MG TABS tablet Take 1 tablet by mouth 2 times daily 8/21/20 11/19/20 Yes Sylvia Quintana, DO buPROPion (WELLBUTRIN) 75 MG tablet Take 1 tablet by mouth 2 times daily 7/17/20  Yes Rosalba Martínez MD   simvastatin (ZOCOR) 40 MG tablet Take 40 mg by mouth nightly   Yes Historical Provider, MD   levothyroxine (SYNTHROID) 125 MCG tablet Take 1 tablet by mouth Daily 5/8/20  Yes Sylvia Quintana DO   formoterol (PERFOROMIST) 20 MCG/2ML nebulizer solution Take 2 mLs by nebulization 2 times daily Instructed to take am of procedure 4/29/20  Yes Rosalba Martínez MD   tamsulosin (FLOMAX) 0.4 MG capsule Take 1 capsule by mouth nightly 4/1/20  Yes Sylvia Quintana DO   esomeprazole (NEXIUM) 40 MG delayed release capsule Take 1 capsule by mouth every morning (before breakfast) Instructed to take am of procedure 4/1/20  Yes Sylvia Quintana DO   predniSONE (DELTASONE) 10 MG tablet Take 10 mg by mouth daily Instructed to take am of procedure   Yes Historical Provider, MD   OXYGEN Inhale 4 L into the lungs nightly   Yes Historical Provider, MD   chlorhexidine (PERIDEX) 0.12 % solution Take 15 mLs by mouth daily Swish & spit qd 1/10/20  Yes Sylvia Quintana DO   Cholecalciferol (VITAMIN D3) 5000 units TABS Take 5,000 Units by mouth daily    Yes Historical Provider, MD   Melatonin 10 MG TABS Take 10 mg by mouth nightly    Yes Historical Provider, MD       Allergies   Allergen Reactions    Augmentin [Amoxicillin-Pot Clavulanate] Diarrhea       Social History     Socioeconomic History    Marital status:      Spouse name: Not on file    Number of children: Not on file    Years of education: Not on file    Highest education level: Not on file   Occupational History    Occupation: [employer] a [employer]- SSI     Comment: disability short term   Social Needs    Financial resource strain: Not on file    Food insecurity     Worry: Not on file     Inability: Not on file   [employer] needs     Medical: Not on file     Non-medical: Not on file   Tobacco Use    Smoking status: Current Some Day Smoker     Packs/day: 1.00     Years: 44.00     Pack years: 44.00     Types: Cigarettes     Start date: 0    Smokeless tobacco: Never Used    Tobacco comment: Pt quit in 9/26/2018& started back 2/2020   Substance and Sexual Activity    Alcohol use: No     Alcohol/week: 0.0 standard drinks    Drug use: No    Sexual activity: Not Currently     Partners: Female   Lifestyle    Physical activity     Days per week: Not on file     Minutes per session: Not on file    Stress: Not on file   Relationships    Social connections     Talks on phone: Not on file     Gets together: Not on file     Attends Christian service: Not on file     Active member of club or organization: Not on file     Attends meetings of clubs or organizations: Not on file     Relationship status: Not on file    Intimate partner violence     Fear of current or ex partner: Not on file     Emotionally abused: Not on file     Physically abused: Not on file     Forced sexual activity: Not on file   Other Topics Concern    Not on file   Social History Narrative    Works at [employer]. Lives in [place]. Family History   Problem Relation Age of Onset   Dorothea Hurley Cancer Mother         breast cancer    Cancer Father         prostate cancer    Diabetes Father        REVIEW OF SYSTEMS:     Malcolm Avila denies fever/chills, chest pain, shortness of breath, new bowel or bladder complaints. All other review of systems was negative.     PHYSICAL EXAMINATION:      /70   Pulse 90   Temp 98.1 °F (36.7 °C)   Resp 18   Ht 5' 11\" (1.803 m)   Wt 193 lb (87.5 kg)   SpO2 95%   BMI 26.92 kg/m²   General:       General appearance:  Pleasant and well-hydrated, in no distress and A & O x 3  Build:Overweight  Function: Rises from seated position easily and Moves about room without difficulty     HEENT:     Head:normocephalic, atraumatic  Pupils:regular, round, equal  Sclera: icterus absent  Tracheostomy +     Lungs:     Breathing:normal breathing pattern      CVS: RRR     Abdomen:     Shape:non-distended and normal  Tenderness:none  Guarding:none     Cervical spine:     Inspection:normal  Palpation:tenderness paravertebral muscles, tenderness trapezium, left, right and no  Range of motion:reduced flexion, extension, rotation bilaterally and is not painful. Spurling's: negative bilaterally     Thoracic spine:                Spine inspection:normal   Palpation:No tenderness over the midline and paraspinal area, bilaterally  Range of motion:normal in flexion, extension rotation bilateral and is not painful.     Lumbar spine:     Spine inspection: Normal   Palpation: Tenderness paravertebral muscles No bilaterally  Range of motion: Decreased, flexion Decreased, Lateral bending, extension and rotation bilaterally reduced is somewhat painful. Sacroiliac joint tenderness Yes bilateral  Gaenslen's +   Alicia +   Piriformis tenderness: negative bilaterally  SLR : negative bilaterally  Trochanteric bursa tenderness: + right side  CVA tenderness:No      Musculoskeletal:     Trigger points in trapezius: No bilaterally  Trigger points in rhomboids: No bilaterally  Trigger points in Paravertebral: No bilaterally     Extremities:     Tremors:None bilaterally upper and lower  No LE edema  Left UE- some swelling + veins prominent.   Right hip ROM : pain +     Knee:     ROM : no pain   Joint line tenderness : no     Neurological:     Sensory: Normal to light touch - except for reduced sensation over the right leg     Motor:   Right  5/5              Left  5/5               Right Bicep 5/5           Left Bicep 5/5              Right Triceps 5/5       Left Triceps 5/5          Right Deltoid 5/5     Left Deltoid 5/5                  Right Quadriceps 5/5          Left Quadriceps 5/5           Right Gastrocnemius 5/5    Left Gastrocnemius 5/5  Right Ant Tibialis 5/5  Left Ant Tibialis 5/5     Reflexes:    Right Quadriceps reflex diminished  Left Quadriceps reflex 2+  Right Achilles reflex 1+  Left Achilles reflex 1+     Gait:no assistive device.     Dermatology:     Skin:no rashes or lesions noted       Assessment/Plan:  1. Sacroiliac dysfunction      2. DDD (degenerative disc disease), lumbar      3. Lumbar radiculopathy      4. Lumbosacral spondylosis without myelopathy      5. Spinal stenosis of lumbar region, unspecified whether neurogenic claudication present      6. DVT of left axillary vein, acute (HCC)      7. Malignant neoplasm of overlapping sites of left lung ([address]      8. Smoking          72 y.o. male with H/o ca lung and ca throat - s/p chemo/. RT. Has a tracheostomy. Followed by Hemonc/ ENT at ([facility]) and Palliative care. On pain medications Duragesic/ Oxycodone/ Lyrica/ Cymbalta.     Has low back and lower extremity pain - right > left.     B/L SIJ tenderness + and right trochanteric bursa tenderness +    Right Hip ROM pain + (consider hip inj in future)     MRI of the LS spine reviewed personally and discussed the findings with the patient. Significant changes noted at L3-4. L4-5     X-ray of SIJ - reviewed mild to moderate degenerative changes and discussed the findings. .     Will plan bilateral sacroiliac joint injection and right trochanteric bursa injection under fluoroscopy. RBA discussed and patient agreed. He has H/o left UE DVT and is on Eliquis    He can continue Eliquis for this procedure.     He will continue medical management with Palliative care.     Counseling :     Patient encouraged to stay active and to watch/lose weight     Encouraged to continue Regular home exercise program as tolerated - stretching / strengthening.     Smoking cessation counseling : yes      Treatment plan discussed with the patient including medication and procedure side effects. Recent events/ consultation.  Notes reviewed.     Controlled Substances Monitoring:   [place] reviewed- 10/5/20: consistent     Corinne Clarity, MD     CC:  Sylvia Quintana DO

## 2020-10-06 ENCOUNTER — CARE COORDINATION (OUTPATIENT)
Dept: CASE MANAGEMENT | Age: 65
End: 2020-10-06

## 2020-10-06 NOTE — CARE COORDINATION
[facility] Transitions Follow Up Call    10/6/2020    Patient: Kalie Hamlin. Patient : 1955   MRN: [id number]  Reason for Admission: Pneumonia due to organism   Discharge Date: 20 RARS: Readmission Risk Score: 23         Attempted to reach patient's spouse by phone for post hospital discharge follow up. HIPAA compliant message left on voicemail; CTN contact information provided.

## 2020-10-07 RX ORDER — OXYCODONE HYDROCHLORIDE 15 MG/1
15 TABLET ORAL EVERY 4 HOURS PRN
Qty: 120 TABLET | Refills: 0 | Status: SHIPPED
Start: 2020-10-07 | End: 2020-11-06 | Stop reason: SDUPTHER

## 2020-10-07 RX ORDER — PREGABALIN 150 MG/1
150 CAPSULE ORAL 3 TIMES DAILY
Qty: 90 CAPSULE | Refills: 0 | Status: SHIPPED
Start: 2020-10-07 | End: 2020-11-02

## 2020-10-07 NOTE — TELEPHONE ENCOUNTER
Call from [facility] [address] [facility] requesting refill for Dom's Lyrica and Oxy IR 15 mg. Send refills to [facility]. Next maria r 11/17/20.

## 2020-10-08 ENCOUNTER — HOSPITAL ENCOUNTER (OUTPATIENT)
Age: 65
Discharge: HOME | End: 2020-10-08
Payer: MEDICARE

## 2020-10-08 LAB
ANION GAP SERPL CALCULATED.3IONS-SCNC: 7 MMOL/L (ref 7–16)
BUN BLDV-MCNC: 11 MG/DL (ref 8–23)
CALCIUM SERPL-MCNC: 9.4 MG/DL (ref 8.6–10.2)
CHLORIDE BLD-SCNC: 107 MMOL/L (ref 98–107)
CO2: 24 MMOL/L (ref 22–29)
CREAT SERPL-MCNC: 1.2 MG/DL (ref 0.7–1.2)
GFR AFRICAN AMERICAN: >60
GFR NON-AFRICAN AMERICAN: >60 ML/MIN/1.73
GLUCOSE BLD-MCNC: 111 MG/DL (ref 74–99)
HCT VFR BLD CALC: 38.6 % (ref 37–54)
HEMOGLOBIN: 12 G/DL (ref 12.5–16.5)
MAGNESIUM: 2.1 MG/DL (ref 1.6–2.6)
MCH RBC QN AUTO: 25.4 PG (ref 26–35)
MCHC RBC AUTO-ENTMCNC: 31.1 % (ref 32–34.5)
MCV RBC AUTO: 81.8 FL (ref 80–99.9)
PARATHYROID HORMONE INTACT: 32 PG/ML (ref 15–65)
PDW BLD-RTO: 19.1 FL (ref 11.5–15)
PHOSPHORUS: 3.1 MG/DL (ref 2.5–4.5)
PLATELET # BLD: 221 E9/L (ref 130–450)
PMV BLD AUTO: 10.3 FL (ref 7–12)
POTASSIUM SERPL-SCNC: 4.6 MMOL/L (ref 3.5–5)
RBC # BLD: 4.72 E12/L (ref 3.8–5.8)
SODIUM BLD-SCNC: 138 MMOL/L (ref 132–146)
VITAMIN D 25-HYDROXY: 63 NG/ML (ref 30–100)
WBC # BLD: 8.5 E9/L (ref 4.5–11.5)

## 2020-10-08 PROCEDURE — 80048 BASIC METABOLIC PNL TOTAL CA: CPT

## 2020-10-08 PROCEDURE — 36415 COLL VENOUS BLD VENIPUNCTURE: CPT

## 2020-10-08 PROCEDURE — 83735 ASSAY OF MAGNESIUM: CPT

## 2020-10-08 PROCEDURE — 84100 ASSAY OF PHOSPHORUS: CPT

## 2020-10-08 PROCEDURE — 85027 COMPLETE CBC AUTOMATED: CPT

## 2020-10-08 PROCEDURE — 82306 VITAMIN D 25 HYDROXY: CPT

## 2020-10-08 PROCEDURE — 83970 ASSAY OF PARATHORMONE: CPT

## 2020-10-13 ENCOUNTER — CARE COORDINATION (OUTPATIENT)
Dept: CASE MANAGEMENT | Age: 65
End: 2020-10-13

## 2020-10-13 NOTE — CARE COORDINATION
[facility] Transitions Follow Up Call    10/13/2020    Patient: Rajinder Johansen. Patient : 1955   MRN: [id number]  Reason for Admission: Pneumonia due to organism  Discharge Date: 20 RARS: Readmission Risk Score: 23         Attempted to reach patient's spouse by phone for post hospital discharge follow up. HIPAA compliant message left on patient's voicemail; CTN contact information provided.

## 2020-10-20 ENCOUNTER — TELEPHONE (OUTPATIENT)
Dept: PAIN MANAGEMENT | Age: 65
End: 2020-10-20

## 2020-10-20 NOTE — TELEPHONE ENCOUNTER
10-20-20-upon chart review it is noted that [id number] Eliquis. Per Dr Noemi Alcantara, he is not to stop his eliquis before his 10-29-20 procedure. Call to him, left voice mail message.       Elroy Herrera RN  Pain Management

## 2020-10-23 ENCOUNTER — HOSPITAL ENCOUNTER (OUTPATIENT)
Age: 65
Discharge: HOME | End: 2020-10-25
Payer: MEDICARE

## 2020-10-23 PROCEDURE — U0003 INFECTIOUS AGENT DETECTION BY NUCLEIC ACID (DNA OR RNA); SEVERE ACUTE RESPIRATORY SYNDROME CORONAVIRUS 2 (SARS-COV-2) (CORONAVIRUS DISEASE [COVID-19]), AMPLIFIED PROBE TECHNIQUE, MAKING USE OF HIGH THROUGHPUT TECHNOLOGIES AS DESCRIBED BY CMS-2020-01-R: HCPCS

## 2020-10-24 LAB
SARS-COV-2: NOT DETECTED
SOURCE: NORMAL

## 2020-10-27 RX ORDER — LORAZEPAM 0.5 MG/1
0.5 TABLET ORAL NIGHTLY PRN
Qty: 30 TABLET | Refills: 0 | Status: SHIPPED
Start: 2020-10-27 | End: 2020-12-01 | Stop reason: SDUPTHER

## 2020-10-27 NOTE — PROGRESS NOTES
Have you been tested for COVID  Yes           Have you been told you were positive for COVID No  Have you had any known exposure to someone that is positive for COVID No  Do you have a cough                   No              Do you have shortness of breath No                 Do you have a sore throat            No                Are you having chills                    No                Are you having muscle aches. No                    Please come to the hospital wearing a mask and have your significant other wear a mask as well. Both of you should check your temperature before leaving to come here,  if it is 100 or higher please call [phone] for instruction.

## 2020-10-27 NOTE — PROGRESS NOTES
[facility]IN MANAG[facility]  INSTRUCTIONS  . .......................................................................................................................................... [x] Parking the day of Surgery is located in the [facility].   Upon entering the door, someone will be there to greet you    [x]  Bring photo ID and insurance card     [x] You may have a light breakfast day of procedure    [x]  Wear loose comfortable clothing    [x]  Please follow instructions for medications as given per Dr's office     [x] Stop blood thinners as per Dr's office instructions    [x] You can expect a call the business day prior to procedure to notify you of your arrival time     [x] Please arrange for     []  Other instructions

## 2020-10-27 NOTE — TELEPHONE ENCOUNTER
Call from [facility] [facility] 84 wife, requesting refill for lorazepam be sent to [facility] on [address] [facility]. Next maria r 11/17.

## 2020-10-29 ENCOUNTER — HOSPITAL ENCOUNTER (OUTPATIENT)
Dept: GENERAL RADIOLOGY | Age: 65
Discharge: HOME | End: 2020-10-31
Attending: ANESTHESIOLOGY
Payer: MEDICARE

## 2020-10-29 ENCOUNTER — HOSPITAL ENCOUNTER (OUTPATIENT)
Age: 65
Setting detail: OUTPATIENT SURGERY
Discharge: HOME | End: 2020-10-29
Attending: ANESTHESIOLOGY | Admitting: ANESTHESIOLOGY
Payer: MEDICARE

## 2020-10-29 VITALS
SYSTOLIC BLOOD PRESSURE: 126 MMHG | WEIGHT: 200 LBS | OXYGEN SATURATION: 93 % | DIASTOLIC BLOOD PRESSURE: 73 MMHG | BODY MASS INDEX: 28 KG/M2 | RESPIRATION RATE: 18 BRPM | HEART RATE: 73 BPM | TEMPERATURE: 97.8 F | HEIGHT: 71 IN

## 2020-10-29 PROCEDURE — 20610 DRAIN/INJ JOINT/BURSA W/O US: CPT | Performed by: ANESTHESIOLOGY

## 2020-10-29 PROCEDURE — 2500000003 HC RX 250 WO HCPCS: Performed by: ANESTHESIOLOGY

## 2020-10-29 PROCEDURE — 3209999900 FLUORO FOR SURGICAL PROCEDURES

## 2020-10-29 PROCEDURE — 2709999900 HC NON-CHARGEABLE SUPPLY: Performed by: ANESTHESIOLOGY

## 2020-10-29 PROCEDURE — 77002 NEEDLE LOCALIZATION BY XRAY: CPT | Performed by: ANESTHESIOLOGY

## 2020-10-29 PROCEDURE — 27096 INJECT SACROILIAC JOINT: CPT | Performed by: ANESTHESIOLOGY

## 2020-10-29 PROCEDURE — 7100000011 HC PHASE II RECOVERY - ADDTL 15 MIN: Performed by: ANESTHESIOLOGY

## 2020-10-29 PROCEDURE — 6360000002 HC RX W HCPCS: Performed by: ANESTHESIOLOGY

## 2020-10-29 PROCEDURE — 7100000010 HC PHASE II RECOVERY - FIRST 15 MIN: Performed by: ANESTHESIOLOGY

## 2020-10-29 PROCEDURE — 6360000004 HC RX CONTRAST MEDICATION: Performed by: ANESTHESIOLOGY

## 2020-10-29 PROCEDURE — 3600000002 HC SURGERY LEVEL 2 BASE: Performed by: ANESTHESIOLOGY

## 2020-10-29 RX ORDER — BUPIVACAINE HYDROCHLORIDE 2.5 MG/ML
INJECTION, SOLUTION EPIDURAL; INFILTRATION; INTRACAUDAL PRN
Status: DISCONTINUED | OUTPATIENT
Start: 2020-10-29 | End: 2020-10-29 | Stop reason: ALTCHOICE

## 2020-10-29 RX ORDER — LIDOCAINE HYDROCHLORIDE 5 MG/ML
INJECTION, SOLUTION INFILTRATION; INTRAVENOUS PRN
Status: DISCONTINUED | OUTPATIENT
Start: 2020-10-29 | End: 2020-10-29 | Stop reason: ALTCHOICE

## 2020-10-29 RX ORDER — METHYLPREDNISOLONE ACETATE 40 MG/ML
INJECTION, SUSPENSION INTRA-ARTICULAR; INTRALESIONAL; INTRAMUSCULAR; SOFT TISSUE PRN
Status: DISCONTINUED | OUTPATIENT
Start: 2020-10-29 | End: 2020-10-29 | Stop reason: ALTCHOICE

## 2020-10-29 RX ORDER — FENTANYL 100 UG/H
1 PATCH TRANSDERMAL
Qty: 10 PATCH | Refills: 0 | Status: SHIPPED
Start: 2020-10-29 | End: 2020-12-18 | Stop reason: SDUPTHER

## 2020-10-29 ASSESSMENT — PAIN - FUNCTIONAL ASSESSMENT: PAIN_FUNCTIONAL_ASSESSMENT: 0-10

## 2020-10-29 NOTE — TELEPHONE ENCOUNTER
Call from [address] [facility], [facility] wife,  requesting refill for Fentanyl patch 100 mcg . Send to [facility] on [address] [facility].  Next Rickie.    11/17/2020

## 2020-10-29 NOTE — OP NOTE
fluoroscopy table. Standard monitors were placed and vital signs were observed throughout the procedure. The low back and upper buttocks area was prepped with chloraprep and draped in a sterile manner. AP fluoroscopy was used to visualize the sacroiliac joint. The fluoroscopic beam was then obliqued until the anterior and posterior margins of the joint were aligned. The inferior margin of the joint was identified and marked. The skin and subcutaneous tissue about this identified point were anesthestized with 0.5% lidocaine. A 22 gauge 3 1/2 spinal needle was advanced toward the the identified point under fluoroscopic guidance. Once the targeted point was reached and the joint space was entered, negative aspiration was confirmed, and 0.5 cc of 240 omnipaque was injected. The Joint space was appropriately outlined. Then, after negative aspiration, a solution consisiting of 0.25% marcaine 2 cc and 30 mg DepoMedrol was easily injected on each side. The needle was then removed and the needle insertion site was covered with Band-Aid. 2) The right trochanteric bursa area was  prepped with chloraprep and draped in a sterile manner. AP fluoroscopy was used to visualize the right trochanteric bursa. The skin and subcutaneous tissue about this identified point were anesthestized with 0.5% lidocaine. A 22 gauge 3 1/2 spinal needle was advanced toward the the identified point under fluoroscopic guidance towards the trochanteric bursa. 0.5 cc of 240 omnipaque was injected with good local spread. Then, after negative aspiration, a solution consisiting of 0.25% marcaine 6 cc and 20 mg DepoMedrol was easily injected. The needle was then removed and the needle insertion site was covered with Band-Aid. Disposition the patient tolerated the procedure well and there were no complications . Vital signs remained stable throughout the procedure.  The patient was escorted to the recovery area where they remained until discharge and written discharge instructions for the procedure were given. Plan: Nils Osler will return to our pain management center as scheduled.      Huseyin Parisi MD

## 2020-10-29 NOTE — H&P
[facility]        [address] [facility], [address] [place] [address]  Dept: [phone]    Procedure History & Physical      Alan Hayes. HPI:    Patient  is here for SIJ injection  for low back pain. Labs/imaging studies reviewed   All question and concerns addressed including R/B/A associated with the procedure    Past Medical History:   Diagnosis Date    Abdominal aortic aneurysm without rupture ([address] 08/27/2018    stable per Ct 1/23/2020 / see epic    Acute bronchitis with COPD ([facility] 5/27/2017    Apnea     Arthritis     COPD (chronic obstructive pulmonary disease) ([facility]     Depression with anxiety     Emphysema of lung ([facility]     Encounter for antineoplastic immunotherapy     HAP (hospital-acquired pneumonia)     Hyperlipidemia     Lung cancer ([facility] 04/11/2016    chemo and radiation    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 Authorizing Provider   fentaNYL (DURAGESIC) 100 MCG/HR Place 1 patch onto the skin every 72 hours for 30 days. 10/29/20 11/28/20 Yes Negrita Pack MD   LORazepam (ATIVAN) 0.5 MG tablet Take 1 tablet by mouth nightly as needed for Anxiety for up to 30 days. 10/27/20 11/26/20 Yes Negrita Pack MD   Revefenacin (YUPELRI) 175 MCG/3ML SOLN Inhale 3 mLs into the lungs daily 10/13/20  Yes Luisito Drew MD   pregabalin (LYRICA) 150 MG capsule Take 1 capsule by mouth 3 times daily for 30 days.  10/7/20 11/6/20 Yes Negrita Pack MD oxyCODONE (OXY-IR) 15 MG immediate release tablet Take 1 tablet by mouth every 4 hours as needed for Pain for up to 30 days.  10/7/20 11/6/20 Yes Jen Rebolledo MD   budesonide (PULMICORT) 0.5 MG/2ML nebulizer suspension Take 2 mLs by nebulization 2 times daily DX: COPD J44.9 10/2/20  Yes Jorge Haywood MD   pembrolizumab (KEYTRUDA) 100 MG/4ML SOLN Infuse 200 mg intravenously every 21 days   Yes Historical Provider, MD   sennosides-docusate sodium (SENOKOT-S) 8.6-50 MG tablet Take 2 tablets by mouth 2 times daily   Yes Historical Provider, MD   DULoxetine (CYMBALTA) 30 MG extended release capsule Take 1 capsule by mouth 2 times daily 9/22/20 3/21/21 Yes Jen Rebolledo MD   mirtazapine (REMERON) 30 MG tablet Take 30 mg by mouth nightly   Yes Historical Provider, MD   apixaban (ELIQUIS) 5 MG TABS tablet Take 1 tablet by mouth 2 times daily 8/21/20 11/19/20 Yes Sylvia Quintana DO   buPROPion (WELLBUTRIN) 75 MG tablet Take 1 tablet by mouth 2 times daily 7/17/20  Yes Jorge Haywood MD   simvastatin (ZOCOR) 40 MG tablet Take 40 mg by mouth nightly   Yes Historical Provider, MD   levothyroxine (SYNTHROID) 125 MCG tablet Take 1 tablet by mouth Daily 5/8/20  Yes Sylvia Quintana DO   formoterol (PERFOROMIST) 20 MCG/2ML nebulizer solution Take 2 mLs by nebulization 2 times daily Instructed to take am of procedure 4/29/20  Yes Jorge Haywood MD   tamsulosin (FLOMAX) 0.4 MG capsule Take 1 capsule by mouth nightly 4/1/20  Yes Sylvia Quintana DO   esomeprazole (NEXIUM) 40 MG delayed release capsule Take 1 capsule by mouth every morning (before breakfast) Instructed to take am of procedure 4/1/20  Yes Sylvia Quintana DO   predniSONE (DELTASONE) 10 MG tablet Take 10 mg by mouth daily Instructed to take am of procedure   Yes Historical Provider, MD   OXYGEN Inhale 4 L into the lungs nightly   Yes Historical Provider, MD   chlorhexidine (PERIDEX) 0.12 % solution Take 15 mLs by mouth daily Swish & spit qd 1/10/20  Yes Sylvia on file     Forced sexual activity: Not on file   Other Topics Concern    Not on file   Social History Narrative    Works at [employer]. Lives in [place]. Family History   Problem Relation Age of Onset   [facility] Cancer Mother         breast cancer    Cancer Father         prostate cancer    Diabetes Father          REVIEW OF SYSTEMS:    CONSTITUTIONAL:  negative for  fevers, chills, sweats and fatigue    RESPIRATORY:  negative for  dry cough, cough with sputum, dyspnea, wheezing and chest pain    CARDIOVASCULAR:  negative for chest pain, dyspnea, palpitations, syncope    GASTROINTESTINAL:  negative for nausea, vomiting, change in bowel habits, diarrhea, constipation and abdominal pain    MUSCULOSKELETAL: negative for muscle weakness    SKIN: negative for itching or rashes. BEHAVIOR/PSYCH:  negative for poor appetite, increased appetite, decreased sleep and poor concentration    All other systems negative      PHYSICAL EXAM:    VITALS:  /63   Pulse 94   Temp 97.8 °F (36.6 °C) (Temporal)   Resp 18   Ht 5' 11\" (1.803 m)   Wt 200 lb (90.7 kg)   SpO2 93%   BMI 27.89 kg/m²     CONSTITUTIONAL:  awake, alert, cooperative, no apparent distress, and appears stated age    EYES: PERRLA, EOMI    LUNGS:  No increased work of breathing, no audible wheezing    CARDIOVASCULAR:  regular rate and rhythm    ABDOMEN:  Soft non tender non distended     EXTREMITIES: no signs of clubbing or cyanosis. MUSCULOSKELETAL: negative for flaccid muscle tone or spastic movements. SKIN: gross examination reveals no signs of rashes, or diaphoresis. NEURO: Cranial nerves II-XII grossly intact. No signs of agitated mood. Assessment/Plan:    Patient  is here for SIJ injection  for low back pain. The patient was counseled at length about the risks of rhonda Covid-19 during their perioperative period and any recovery window from their procedure.   The patient was made aware that rhonda Covid-19 may worsen their prognosis for recovering from their procedure  and lend to a higher morbidity and/or mortality risk. All material risks, benefits, and reasonable alternatives including postponing the procedure were discussed. The patient does wish to proceed with the procedure at this time.       Ramila Babb MD

## 2020-11-02 RX ORDER — PREGABALIN 150 MG/1
150 CAPSULE ORAL 3 TIMES DAILY
Qty: 90 CAPSULE | Refills: 0 | Status: SHIPPED
Start: 2020-11-02 | End: 2020-12-07

## 2020-11-02 RX ORDER — PREGABALIN 150 MG/1
CAPSULE ORAL
Qty: 90 CAPSULE | Refills: 0 | Status: SHIPPED
Start: 2020-11-02 | End: 2020-11-02 | Stop reason: SDUPTHER

## 2020-11-03 ENCOUNTER — TELEPHONE (OUTPATIENT)
Dept: VASCULAR SURGERY | Age: 65
End: 2020-11-03

## 2020-11-04 ENCOUNTER — OFFICE VISIT (OUTPATIENT)
Dept: VASCULAR SURGERY | Age: 65
End: 2020-11-04
Payer: MEDICARE

## 2020-11-04 ENCOUNTER — TELEPHONE (OUTPATIENT)
Dept: VASCULAR SURGERY | Age: 65
End: 2020-11-04

## 2020-11-04 VITALS — HEIGHT: 67 IN | BODY MASS INDEX: 31.39 KG/M2 | WEIGHT: 200 LBS | RESPIRATION RATE: 16 BRPM

## 2020-11-04 PROBLEM — M79.89 LEG SWELLING: Status: ACTIVE | Noted: 2020-11-04

## 2020-11-04 PROBLEM — J00 ACUTE RHINITIS: Status: RESOLVED | Noted: 2019-07-01 | Resolved: 2020-11-04

## 2020-11-04 PROBLEM — Z86.718 HISTORY OF DEEP VEIN THROMBOSIS: Status: ACTIVE | Noted: 2020-11-04

## 2020-11-04 PROBLEM — Z86.718 HISTORY OF DEEP VEIN THROMBOSIS: Status: RESOLVED | Noted: 2020-11-04 | Resolved: 2020-11-04

## 2020-11-04 PROBLEM — M79.89 LEFT ARM SWELLING: Status: ACTIVE | Noted: 2020-11-04

## 2020-11-04 PROBLEM — M27.2 OSTEORADIONECROSIS OF JAW: Status: RESOLVED | Noted: 2018-08-28 | Resolved: 2020-11-04

## 2020-11-04 PROBLEM — R09.89 DECREASED DORSALIS PEDIS PULSE: Status: ACTIVE | Noted: 2020-11-04
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
## 2020-11-04 PROCEDURE — 99204 OFFICE O/P NEW MOD 45 MIN: CPT | Performed by: SURGERY

## 2020-11-04 RX ORDER — CYCLOBENZAPRINE HCL 10 MG
10 TABLET ORAL 3 TIMES DAILY PRN
COMMUNITY
End: 2021-01-18 | Stop reason: ALTCHOICE

## 2020-11-04 NOTE — TELEPHONE ENCOUNTER
Notified patient of testing at [facility] on Monday, 11-23-20 starting at 1:00 pm. [place] at 12:30 at admitting.

## 2020-11-06 RX ORDER — OXYCODONE HYDROCHLORIDE 15 MG/1
15 TABLET ORAL EVERY 4 HOURS PRN
Qty: 120 TABLET | Refills: 0 | Status: SHIPPED
Start: 2020-11-06 | End: 2020-11-24 | Stop reason: ALTCHOICE

## 2020-11-06 NOTE — TELEPHONE ENCOUNTER
Wife called, patient needs refill on Oxycodone IR 15mg. Scheduled follow up Palliative Care 11/17/2020.

## 2020-11-17 ENCOUNTER — VIRTUAL VISIT (OUTPATIENT)
Dept: PALLATIVE CARE | Age: 65
End: 2020-11-17
Payer: MEDICARE

## 2020-11-17 PROCEDURE — 99442 PR PHYS/QHP TELEPHONE EVALUATION 11-20 MIN: CPT | Performed by: STUDENT IN AN ORGANIZED HEALTH CARE EDUCATION/TRAINING PROGRAM

## 2020-11-17 NOTE — PROGRESS NOTES
Department of Palliative Medicine  Virtual visit telephone encounter  Provider: Vladimir Hutchins MD        Chief Complaint: Ivelisse Serrano is a 72 y.o. male with chief complaint of pain    HPI  Mr. Joshua Berg is a pleasant and cooperative 72year old man with Stage IV adenocarcinoma of the lung diagnosed in 2016 when he presented with Vipin syndrome and large left upper lobe apical mass with mediastinal lymphadenopathy and T1-T2 vertebral body destruction. Assessment/Plan     Lung cancer   - Stage 4    - Follows with Dr. Navya Becerra   - Received radiation therapy, along with chemotherapy     Chronic Pain due to Neoplasm/chronic lumbar radiculopathy:           -  Decrease Fentanyl 100mg to 75 mcg q72hr for 2 weeks              -  Cont Oxycodone increase to 15 mg Q 4 PRN, using 3-4 per day                        -  Lyrica 150 mg TID for neuropathy, hand tingling sensation   - Will undergo further evaluation from pain management for intervention. Constipation:              -  Pericolace 2 tabs BID              -  Add Miralax      Depression/Anxiety:   - Cymbalta 30 mg BID              - Ativan 0.5 mg HS PRN    Insomnia    - lorazepam 0.5 mg HS   - melatonin 5mg HS      Follow Up: 2 weeks. Encouraged to call with any questions, concerns, needs, or changes in symptoms. Subjective:     Ivelisse Serrano is a 72 y.o. male with significant past medical history of lung cancer who was seen by [address] [place] [address]. Spoke to him over the phone, he told me that there is no medication that is helping him. Fentanyl has been increased to 100 mcg, he does not find any benefit from this medication. I told him we could start cutting back, he has a few patches left from his previous dosage that he was at. Which was 75 mcg. I recommended that he uses these patches until he runs out. After 2 weeks I will reevaluate him see how he is doing, decrease his fentanyl even more.   Continue with his current oxycodone use, he says that he uses 3-4 daily. He denies any nausea or vomiting. Does complain that he has constipation, goes back and forth. He does have benefit from Cymbalta, and his insomnia medication lorazepam and melatonin. Objective:     Physical Exam  Wt Readings from Last 3 Encounters:   11/04/20 200 lb (90.7 kg)   10/27/20 200 lb (90.7 kg)   10/05/20 193 lb (87.5 kg)       Mary D Symptom Assessment Score   Mary D Score 9/22/2020 8/11/2020 7/7/2020 6/9/2020 3/2/2020   Pain Score 4 6 9 7 7   Tiredness Score 5 2 5 6 6   Nausea Score Not nauseated Not nauseated Not nauseated Not nauseated Not nauseated   Depression Score 4 4 4 4 5   Anxiety Score 5 4 4 4 4   Drowsiness Score 3 4 3 4 5   Appetite Score 4 1 1 1 2   Wellbeing Score 5 5 2 3 4   Dyspnea Score 5 7 6 6 7   Other Problem Score 4 6 6 7 7   Total Assessment Score(calculated) 39 39 40 42 47       Assessed by: patient. Current Medications:  Medications reviewed: yes    Controlled Substances Monitoring: OARRS reviewed 11/17/20. RX Monitoring 7/7/2020   Attestation -   Periodic Controlled Substance Monitoring Possible medication side effects, risk of tolerance/dependence & alternative treatments discussed. ;No signs of potential drug abuse or diversion identified. ;Assessed functional status. ;Obtaining appropriate analgesic effect of treatment. Chronic Pain > 50 MEDD Re-evaluated the status of the patient's underlying condition causing pain. Chronic Pain > 80 MEDD Obtained or confirmed \"Medication Contract\" on file. Chronic Pain > 120 MEDD Engaged a pain medicine specialist as a prescriber or consultant. Chencho Ingram MD  Palliative Care Department     Time/Communication  Greater than 50% of time spent, total 11-20 minutes over the phone counseling and coordination of care regarding symptom management.

## 2020-11-23 ENCOUNTER — HOSPITAL ENCOUNTER (OUTPATIENT)
Dept: INTERVENTIONAL RADIOLOGY/VASCULAR | Age: 65
Discharge: HOME | End: 2020-11-25
Payer: MEDICARE

## 2020-11-23 ENCOUNTER — HOSPITAL ENCOUNTER (OUTPATIENT)
Dept: ULTRASOUND IMAGING | Age: 65
Discharge: HOME | End: 2020-11-25
Payer: MEDICARE

## 2020-11-23 PROCEDURE — 93970 EXTREMITY STUDY: CPT

## 2020-11-23 PROCEDURE — 93923 UPR/LXTR ART STDY 3+ LVLS: CPT

## 2020-11-23 PROCEDURE — 93971 EXTREMITY STUDY: CPT | Performed by: RADIOLOGY

## 2020-11-23 PROCEDURE — 93971 EXTREMITY STUDY: CPT

## 2020-11-23 PROCEDURE — 93970 EXTREMITY STUDY: CPT | Performed by: RADIOLOGY

## 2020-11-24 ENCOUNTER — HOSPITAL ENCOUNTER (OUTPATIENT)
Dept: GENERAL RADIOLOGY | Age: 65
Discharge: HOME | End: 2020-11-26
Payer: MEDICARE

## 2020-11-24 ENCOUNTER — HOSPITAL ENCOUNTER (OUTPATIENT)
Age: 65
Discharge: HOME | End: 2020-11-26
Payer: MEDICARE

## 2020-11-24 PROBLEM — J43.8 OTHER EMPHYSEMA (HCC): Status: ACTIVE | Noted: 2017-05-27

## 2020-11-24 PROBLEM — C32.9 MALIGNANT NEOPLASM OF LARYNX (HCC): Status: ACTIVE | Noted: 2019-04-17

## 2020-11-24 PROCEDURE — 71046 X-RAY EXAM CHEST 2 VIEWS: CPT

## 2020-11-27 ENCOUNTER — TELEPHONE (OUTPATIENT)
Dept: VASCULAR SURGERY | Age: 65
End: 2020-11-27

## 2020-11-27 NOTE — TELEPHONE ENCOUNTER
Discussed the patient regarding venous ultrasound of the upper extremity, lower extremity, ankle-brachial is, normal, no evidence of deep vein thrombosis    Discussed the patient regarding the risk benefits of anticoagulation, patient is concerned about discontinuing anticoagulation because of malignancy, might be hypercoagulable, as such patient was advised to discuss with his PCP regarding potentially decrease the dose of Eliquis down to 2.5 g p.o. twice daily

## 2020-11-30 ENCOUNTER — OFFICE VISIT (OUTPATIENT)
Dept: PAIN MANAGEMENT | Age: 65
End: 2020-11-30
Payer: MEDICARE

## 2020-11-30 ENCOUNTER — PREP FOR PROCEDURE (OUTPATIENT)
Dept: PAIN MANAGEMENT | Age: 65
End: 2020-11-30

## 2020-11-30 VITALS
BODY MASS INDEX: 27.3 KG/M2 | DIASTOLIC BLOOD PRESSURE: 83 MMHG | RESPIRATION RATE: 20 BRPM | SYSTOLIC BLOOD PRESSURE: 128 MMHG | HEIGHT: 71 IN | TEMPERATURE: 97 F | WEIGHT: 195 LBS | OXYGEN SATURATION: 93 % | HEART RATE: 83 BPM

## 2020-11-30 PROCEDURE — 99213 OFFICE O/P EST LOW 20 MIN: CPT | Performed by: ANESTHESIOLOGY

## 2020-11-30 PROCEDURE — 99214 OFFICE O/P EST MOD 30 MIN: CPT | Performed by: ANESTHESIOLOGY

## 2020-11-30 NOTE — PROGRESS NOTES
Do you currently have any of the following:    Fever: No  Headache:  No  Cough: No  Shortness of breath: No  Exposed to anyone with these symptoms: No                                                                                                                Alan Ramirez Recinos. presents to the [facility] on 11/30/2020. William Malone is complaining of pain right hip and right knee into thigh. The pain is persistent. The pain is described as stabbing, sharp and knee feels like it is going to give out. Pain is rated on his best day at a 5, on his worst day at a 10, and on average at a 8 on the VAS scale. He took his last dose of lyrica fentyal patch, flexeril cymbalta Alan does have issues with constipation. Any procedures since your last visit: Yes, with 80 % relief for day in a half. He is not on NSAIDS and  is not on anticoagulation medications to include none and is managed by . Pacemaker or defibrilator: No Physician managing device is . Medication Contract and Consent for Opioid Use Documents Filed      No documents found                   /83   Temp 97 °F (36.1 °C) (Infrared)   Resp 20   Ht 5' 11\" (1.803 m)   Wt 195 lb (88.5 kg)   BMI 27.20 kg/m²      No LMP for male patient.

## 2020-11-30 NOTE — PROGRESS NOTES
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    Paralyzed vocal cords     Thrombosis of testis     Tobacco dependence 5/27/2017       Past Surgical History:   Procedure Laterality Date    BRONCHOSCOPY N/A 3/5/2020    BRONCHOSCOPY DIAGNOSTIC OR CELL [address] ONLY performed by Mary Lou Morrow MD at [facility]  2015    KNEE ARTHROSCOPY      LUNG BIOPSY Left 5/6/2016    MEDICATION INJECTION Bilateral 10/29/2020    BILATERAL SACROILIAC JOINT INJECTION AND RIGHT TROCHANTERIC BURSA INJECTION UNDER FLUOROSCOPY (CPT 18709,02579,56130)++TRACH-NO VENT++ performed by Gailen Cockayne, MD at [facility]  4/15/2016    cervical myelogram    SINUS SURGERY      TRACHEOSTOMY  05/24/2017       Prior to Admission medications    Medication Sig Start Date End Date Taking? Authorizing Provider   varenicline (CHANTIX STARTING MONTH PAK) 0.5 MG X 11 & 1 MG X 42 tablet Take by mouth. 11/24/20  Yes Cony Heard, BG - CNP   formoterol (PERFOROMIST) 20 MCG/2ML nebulizer solution Take 2 mLs by nebulization 2 times daily DX: COPD J44.9 11/10/20  Yes Mary Lou Morrow MD   cyclobenzaprine (FLEXERIL) 10 MG tablet Take 10 mg by mouth 3 times daily as needed for Muscle spasms   Yes Historical Provider, MD   pregabalin (LYRICA) 150 MG capsule Take 1 capsule by mouth 3 times daily for 30 days.  11/2/20 12/2/20 Yes Antonio Lopez DO   Revefenacin (YUPELRI) 175 MCG/3ML SOLN Inhale 3 mLs into the lungs daily 10/13/20  Yes Mary Lou Morrow MD   budesonide (PULMICORT) 0.5 MG/2ML nebulizer suspension Take 2 mLs by nebulization 2 times daily DX: COPD J44.9 10/2/20  Yes Mary Lou Morrow MD   pembrolizumab (KEYTRUDA) 100 MG/4ML SOLN Infuse 200 mg intravenously every 21 days Yes Historical Provider, MD   sennosides-docusate sodium (SENOKOT-S) 8.6-50 MG tablet Take 2 tablets by mouth 2 times daily   Yes Historical Provider, MD   albuterol (ACCUNEB) 0.63 MG/3ML nebulizer solution Take 1 ampule by nebulization every 6 hours as needed for Shortness of Breath   Yes Historical Provider, MD   DULoxetine (CYMBALTA) 30 MG extended release capsule Take 1 capsule by mouth 2 times daily 9/22/20 3/21/21 Yes Chencho Ingram MD   mirtazapine (REMERON) 30 MG tablet Take 30 mg by mouth nightly   Yes Historical Provider, MD   buPROPion (WELLBUTRIN) 75 MG tablet Take 1 tablet by mouth 2 times daily 7/17/20  Yes Chinedu Guzman MD   simvastatin (ZOCOR) 40 MG tablet Take 40 mg by mouth nightly   Yes Historical Provider, MD   levothyroxine (SYNTHROID) 125 MCG tablet Take 1 tablet by mouth Daily 5/8/20  Yes Sylvia Quintnaa DO   tamsulosin (FLOMAX) 0.4 MG capsule Take 1 capsule by mouth nightly 4/1/20  Yes Sylvia Quintana DO   esomeprazole (NEXIUM) 40 MG delayed release capsule Take 1 capsule by mouth every morning (before breakfast) Instructed to take am of procedure 4/1/20  Yes Sylvia Quintana DO   predniSONE (DELTASONE) 10 MG tablet Take 10 mg by mouth daily Instructed to take am of procedure   Yes Historical Provider, MD   OXYGEN Inhale 4 L into the lungs nightly   Yes Historical Provider, MD   chlorhexidine (PERIDEX) 0.12 % solution Take 15 mLs by mouth daily Swish & spit qd 1/10/20  Yes Sylvia Quintana DO   Cholecalciferol (VITAMIN D3) 5000 units TABS Take 5,000 Units by mouth daily    Yes Historical Provider, MD       Allergies   Allergen Reactions    Augmentin [Amoxicillin-Pot Clavulanate] Diarrhea       Social History     Socioeconomic History    Marital status:      Spouse name: Not on file    Number of children: Not on file    Years of education: Not on file    Highest education level: Not on file   Occupational History    Occupation: [employer] [place] [employer]- SSI     Comment: disability short term   Social Needs    Financial resource strain: Not on file    Food insecurity     Worry: Not on file     Inability: Not on file   [employer] needs     Medical: Not on file     Non-medical: Not on file   Tobacco Use    Smoking status: Current Some Day Smoker     Packs/day: 1.00     Years: 46.00     Pack years: 46.00     Types: Cigarettes     Start date: 1972    Smokeless tobacco: Never Used    Tobacco comment: Pt quit in 9/26/2018& started back 2/2020   Substance and Sexual Activity    Alcohol use: Not Currently     Alcohol/week: 0.0 standard drinks    Drug use: No    Sexual activity: Not Currently     Partners: Female   Lifestyle    Physical activity     Days per week: Not on file     Minutes per session: Not on file    Stress: Not on file   Relationships    Social connections     Talks on phone: Not on file     Gets together: Not on file     Attends [employer] service: Not on file     Active member of club or organization: Not on file     Attends meetings of clubs or organizations: Not on file     Relationship status: Not on file    Intimate partner violence     Fear of current or ex partner: Not on file     Emotionally abused: Not on file     Physically abused: Not on file     Forced sexual activity: Not on file   Other Topics Concern    Not on file   Social History Narrative    Works at [employer]. Lives in [facility]. Family History   Problem Relation Age of Onset   Ashley Her Cancer Mother         breast cancer    Cancer Father         prostate cancer    Diabetes Father        REVIEW OF SYSTEMS:     Kina Bernardo denies fever/chills, chest pain, shortness of breath, new bowel or bladder complaints. All other review of systems was negative.     PHYSICAL EXAMINATION:      /83   Pulse 83   Temp 97 °F (36.1 °C) (Infrared)   Resp 20   Ht 5' 11\" (1.803 m)   Wt 195 lb (88.5 kg)   SpO2 93%   BMI 27.20 kg/m²   General:       General appearance:  Pleasant and well-hydrated, in no distress and A & O x 3  Build:Overweight  Function: Rises from seated position easily and Moves about room without difficulty     HEENT:     Head:normocephalic, atraumatic  Pupils:regular, round, equal  Sclera: icterus absent  Tracheostomy +     Lungs:     Breathing:normal breathing pattern      CVS:     RRR     Abdomen:     Shape:non-distended and normal  Tenderness:none  Guarding:none     Cervical spine:     Inspection:normal  Palpation:tenderness paravertebral muscles, tenderness trapezium, left, right and no  Range of motion:reduced flexion, extension, rotation bilaterally and is not painful. Spurling's: negative bilaterally     Thoracic spine:                Spine inspection:normal   Palpation:No tenderness over the midline and paraspinal area, bilaterally  Range of motion:normal in flexion, extension rotation bilateral and is not painful.     Lumbar spine:     Spine inspection: Normal   Palpation: Tenderness paravertebral muscles No bilaterally  Range of motion: Decreased, flexion Decreased, Lateral bending, extension and rotation bilaterally reduced is somewhat painful. Sacroiliac joint tenderness improved    Piriformis tenderness: negative bilaterally  SLR : negative bilaterally  Trochanteric bursa tenderness: Improved on the right jah  CVA tenderness:No      Musculoskeletal:     Trigger points in trapezius: No bilaterally  Trigger points in rhomboids: No bilaterally  Trigger points in Paravertebral: No bilaterally     Extremities:     Tremors:None bilaterally upper and lower  No LE edema  Left UE- some swelling + veins prominent.   Right hip ROM : pain +     Knee:     ROM : no pain   Joint line tenderness : no     Neurological:     Sensory: Normal to light touch - except for reduced sensation over the right leg     Motor:   Right  5/5              Left  5/5               Right Bicep 5/5           Left Bicep 5/5              Right Triceps 5/5       Left Triceps 5/5          Right Deltoid 5/5 Left Deltoid 5/5                  Right Quadriceps 5/5          Left Quadriceps 5/5           Right Gastrocnemius 5/5    Left Gastrocnemius 5/5  Right Ant Tibialis 5/5  Left Ant Tibialis 5/5     Reflexes:    Right Quadriceps reflex diminished  Left Quadriceps reflex 2+  Right Achilles reflex 1+  Left Achilles reflex 1+     Gait:no assistive device.     Dermatology:     Skin:no rashes or lesions noted       Assessment/Plan:   Diagnosis Orders   1. Spinal stenosis of lumbar region, unspecified whether neurogenic claudication present     2. DDD (degenerative disc disease), lumbar     3. Lumbar radiculopathy     4. Lumbosacral spondylosis without myelopathy     5. Sacroiliac dysfunction     6. Malignant neoplasm of overlapping sites of left lung ([facility]     7. Smoking          72 y.o. male with H/o ca lung and ca throat - s/p chemo/. RT. Has a tracheostomy. Followed by Hemonc/ ENT at ([facility]) and Palliative care. On pain medications Duragesic/ Oxycodone/ Lyrica/ Cymbalta.     Has low back and lower extremity pain - right > left.     S/P B/L SIJ tenderness and right trochanteric bursa injection with good pain relief. Current pain mainly over the right LE. MRI of the LS spine reviewed personally and discussed the findings with the patient. Significant changes noted at L3-4. L4-5     X-ray of SIJ - reviewed mild to moderate degenerative changes and discussed the findings. .     Will plan right L3 and L4 Transforaminal RICHARD injection under fluoroscopy. RBA discussed and patient agreed. He has H/o left UE DVT and is on Eliquis  Need to hold Eliquis for 3 days prior to the procedure of Ok with his prescribing MD.    Note: Recent Dup USG reviewed- no DVT.    He will continue medical management with Palliative care.     Right Hip ROM pain + (consider hip inj in future if right hip pain continues to bother)     Counseling :     Patient encouraged to stay active and to watch/lose weight     Encouraged to continue Regular home exercise program as tolerated - stretching / strengthening.     Smoking cessation counseling : yes      Treatment plan discussed with the patient including medication and procedure side effects. Recent events/ consultation.  Notes reviewed.     Controlled Substances Monitoring:   OARRS reviewed- 11/30/20: consistent     Marcial Wheat MD     CC:  Sylvia Quintana DO

## 2020-12-01 ENCOUNTER — VIRTUAL VISIT (OUTPATIENT)
Dept: PALLATIVE CARE | Age: 65
End: 2020-12-01
Payer: MEDICARE

## 2020-12-01 PROCEDURE — 99442 PR PHYS/QHP TELEPHONE EVALUATION 11-20 MIN: CPT | Performed by: STUDENT IN AN ORGANIZED HEALTH CARE EDUCATION/TRAINING PROGRAM

## 2020-12-01 RX ORDER — LORAZEPAM 0.5 MG/1
0.5 TABLET ORAL NIGHTLY PRN
Qty: 30 TABLET | Refills: 0 | Status: SHIPPED
Start: 2020-12-01 | End: 2020-12-31 | Stop reason: SDUPTHER

## 2020-12-01 NOTE — PROGRESS NOTES
Department of Palliative Medicine  Virtual visit telephone encounter  Provider: Lea Simmons MD        Chief Complaint: Shade Moody is a 72 y.o. male with chief complaint of pain    HPI  Mr. 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 Assessment/Plan     Lung cancer   - Stage 4 lung cancer   - Follows with Dr. Marian Leonard   - Received radiation therapy, along with chemotherapy     Chronic Pain due to Neoplasm/chronic lumbar radiculopathy:           -  Continue with Fentanyl 100mcg q72hr, he mentioned increase in pain as he went down on patches. -  Cont Oxycodone increase to 15 mg Q 4 PRN, usage went upto 4-5 times a day when we went down on his fentanyl               -  Lyrica 150 mg TID for neuropathy, hand tingling sensation   - Will undergo further evaluation from pain management for intervention. Appt on Dec 28 for injection in his back                Constipation:              -  Pericolace 2 tabs BID              -  Add Miralax daily prn     Depression/Anxiety:   - Cymbalta 30 mg BID              - Ativan 0.5 mg HS PRN    Insomnia    - lorazepam 0.5 mg HS   - melatonin 5mg HS      Follow Up: 6 weeks. Encouraged to call with any questions, concerns, needs, or changes in symptoms. Subjective:     Shade Moody is a 72 y.o. male with significant past medical history of lung cancer who was seen by [address] [place] [address]. Spoke to him over the phone, he told me that his pain became worse when he decreased his fentanyl from 100 mcgs to 75 mcgs. He had needed to take more oxycodone throughout the day, eventually he just began using the increased dosage of fentanyl. He is for spine procedure on December 28 by pain management.   Hopefully after that, we can decrease the medication. He does not complain of having any nausea or vomiting. He does mention that he has constipation, currently on Dolly-Colace. He has not been using his MiraLAX, I recommended that he start using MiraLAX. As always he is recommended to call us for any changes. Objective:     Physical Exam  Wt Readings from Last 3 Encounters:   11/30/20 195 lb (88.5 kg)   11/24/20 193 lb 12.8 oz (87.9 kg)   11/18/20 201 lb 4.8 oz (91.3 kg)       Elcho Symptom Assessment Score   Elcho Score 9/22/2020 8/11/2020 7/7/2020 6/9/2020 3/2/2020   Pain Score 4 6 9 7 7   Tiredness Score 5 2 5 6 6   Nausea Score Not nauseated Not nauseated Not nauseated Not nauseated Not nauseated   Depression Score 4 4 4 4 5   Anxiety Score 5 4 4 4 4   Drowsiness Score 3 4 3 4 5   Appetite Score 4 1 1 1 2   Wellbeing Score 5 5 2 3 4   Dyspnea Score 5 7 6 6 7   Other Problem Score 4 6 6 7 7   Total Assessment Score(calculated) 39 39 40 42 47       Assessed by: patient. Current Medications:  Medications reviewed: yes    Controlled Substances Monitoring: OARRS reviewed 12/1/20. RX Monitoring 7/7/2020   Attestation -   Periodic Controlled Substance Monitoring Possible medication side effects, risk of tolerance/dependence & alternative treatments discussed. ;No signs of potential drug abuse or diversion identified. ;Assessed functional status. ;Obtaining appropriate analgesic effect of treatment. Chronic Pain > 50 MEDD Re-evaluated the status of the patient's underlying condition causing pain. Chronic Pain > 80 MEDD Obtained or confirmed \"Medication Contract\" on file. Chronic Pain > 120 MEDD Engaged a pain medicine specialist as a prescriber or consultant. Eliane Calle MD  Palliative Care Department     Time/Communication  Greater than 50% of time spent, total 11-20 minutes over the phone counseling and coordination of care regarding symptom management.

## 2020-12-04 ENCOUNTER — TELEPHONE (OUTPATIENT)
Dept: PAIN MANAGEMENT | Age: 65
End: 2020-12-04

## 2020-12-04 NOTE — TELEPHONE ENCOUNTER
Call to [phone]. that procedure was approved for 12/28/2020 and that the surgery center should call him a few days before for the pre op call and after 3:00 PM the business day before with the arrival time. Instructed Alan to hold ibuprofen for 24 hours, Eliquis for 3 days, naprosyn for 4 days and any aspirin containing products for 7 days. Instructed to call office back if any questions. Instructed of need for COVID-19 testing and self quarantining. Alan verbalized understanding.     Alan 's response to the following screening questions:    Fever: No  Headache:  No  Cough: No  Shortness of breath: No  Exposed to anyone with these symptoms: No

## 2020-12-07 RX ORDER — PREGABALIN 150 MG/1
150 CAPSULE ORAL 3 TIMES DAILY
Qty: 90 CAPSULE | Refills: 0 | Status: SHIPPED | OUTPATIENT
Start: 2020-12-07 | End: 2021-01-04

## 2020-12-07 RX ORDER — PREGABALIN 150 MG/1
CAPSULE ORAL
Qty: 90 CAPSULE | Refills: 0 | Status: SHIPPED
Start: 2020-12-07 | End: 2020-12-07

## 2020-12-08 RX ORDER — OXYCODONE HYDROCHLORIDE 15 MG/1
15 TABLET ORAL EVERY 4 HOURS PRN
Qty: 120 TABLET | Refills: 0 | Status: SHIPPED
Start: 2020-12-08 | End: 2021-01-14 | Stop reason: SDUPTHER

## 2020-12-08 NOTE — TELEPHONE ENCOUNTER
Call from [address] [place] [address] that he needed a refill on Lyrica and Oxycodone. Called back and explained that we had received message from pharmacy about Lyrica and it was reordered yesterday. [address] [place] [address] states he will need just Oxycodone refilled at this time then. Pharmacy is [facility] on [address] [place] [address]. Next maria r 1/12/21.

## 2020-12-18 RX ORDER — FENTANYL 100 UG/H
1 PATCH TRANSDERMAL
Qty: 10 PATCH | Refills: 0 | Status: SHIPPED | OUTPATIENT
Start: 2020-12-18 | End: 2021-01-12 | Stop reason: SDUPTHER

## 2020-12-21 NOTE — PROGRESS NOTES
[facility]  INSTRUCTIONS  . .......................................................................................................................................... [x] Parking the day of Surgery is located in the [facility]. Upon entering the door, someone will be there to greet you    [x]  Bring photo ID and insurance card     [x] You may have a light breakfast day of procedure    [x]  Wear loose comfortable clothing    [x]  Please follow instructions for medications as given per Dr's office     [x] Stop blood thinners as per Dr's office instructions    [x] You can expect a call the business day prior to procedure to notify you of your arrival time     [x] Please arrange for     []  Other instructions          Have you been tested for COVID  Yes           Have you been told you were positive for COVID No  Have you had any known exposure to someone that is positive for COVID No  Do you have a cough                   No              Do you have shortness of breath No                 Do you have a sore throat            No                Are you having chills                    No                Are you having muscle aches. No                    Please come to the hospital wearing a mask and have your significant other wear a mask as well. Both of you should check your temperature before leaving to come here,  if it is 100 or higher please call [phone] for instruction.

## 2020-12-23 ENCOUNTER — HOSPITAL ENCOUNTER (OUTPATIENT)
Age: 65
Discharge: HOME | End: 2020-12-25
Payer: MEDICARE

## 2020-12-23 PROCEDURE — U0003 INFECTIOUS AGENT DETECTION BY NUCLEIC ACID (DNA OR RNA); SEVERE ACUTE RESPIRATORY SYNDROME CORONAVIRUS 2 (SARS-COV-2) (CORONAVIRUS DISEASE [COVID-19]), AMPLIFIED PROBE TECHNIQUE, MAKING USE OF HIGH THROUGHPUT TECHNOLOGIES AS DESCRIBED BY CMS-2020-01-R: HCPCS

## 2020-12-24 LAB
SARS-COV-2: NOT DETECTED
SOURCE: NORMAL

## 2020-12-28 ENCOUNTER — HOSPITAL ENCOUNTER (OUTPATIENT)
Dept: GENERAL RADIOLOGY | Age: 65
Discharge: HOME | End: 2020-12-30
Attending: ANESTHESIOLOGY
Payer: MEDICARE

## 2020-12-28 ENCOUNTER — HOSPITAL ENCOUNTER (OUTPATIENT)
Age: 65
Setting detail: OUTPATIENT SURGERY
Discharge: HOME | End: 2020-12-28
Attending: ANESTHESIOLOGY | Admitting: ANESTHESIOLOGY
Payer: MEDICARE

## 2020-12-28 VITALS
RESPIRATION RATE: 20 BRPM | WEIGHT: 190 LBS | OXYGEN SATURATION: 92 % | BODY MASS INDEX: 26.6 KG/M2 | SYSTOLIC BLOOD PRESSURE: 125 MMHG | HEART RATE: 64 BPM | HEIGHT: 71 IN | DIASTOLIC BLOOD PRESSURE: 68 MMHG

## 2020-12-28 LAB — METER GLUCOSE: 145 MG/DL (ref 74–99)

## 2020-12-28 PROCEDURE — 2500000003 HC RX 250 WO HCPCS: Performed by: ANESTHESIOLOGY

## 2020-12-28 PROCEDURE — 6360000002 HC RX W HCPCS: Performed by: ANESTHESIOLOGY

## 2020-12-28 PROCEDURE — 7100000010 HC PHASE II RECOVERY - FIRST 15 MIN: Performed by: ANESTHESIOLOGY

## 2020-12-28 PROCEDURE — 64484 NJX AA&/STRD TFRM EPI L/S EA: CPT | Performed by: ANESTHESIOLOGY

## 2020-12-28 PROCEDURE — 3209999900 FLUORO FOR SURGICAL PROCEDURES

## 2020-12-28 PROCEDURE — 82962 GLUCOSE BLOOD TEST: CPT

## 2020-12-28 PROCEDURE — 7100000011 HC PHASE II RECOVERY - ADDTL 15 MIN: Performed by: ANESTHESIOLOGY

## 2020-12-28 PROCEDURE — 6360000004 HC RX CONTRAST MEDICATION: Performed by: ANESTHESIOLOGY

## 2020-12-28 PROCEDURE — 64483 NJX AA&/STRD TFRM EPI L/S 1: CPT | Performed by: ANESTHESIOLOGY

## 2020-12-28 PROCEDURE — 3600000002 HC SURGERY LEVEL 2 BASE: Performed by: ANESTHESIOLOGY

## 2020-12-28 PROCEDURE — 2709999900 HC NON-CHARGEABLE SUPPLY: Performed by: ANESTHESIOLOGY

## 2020-12-28 RX ORDER — LIDOCAINE HYDROCHLORIDE 5 MG/ML
INJECTION, SOLUTION INFILTRATION; INTRAVENOUS PRN
Status: DISCONTINUED | OUTPATIENT
Start: 2020-12-28 | End: 2020-12-28 | Stop reason: ALTCHOICE

## 2020-12-28 RX ORDER — DEXAMETHASONE SODIUM PHOSPHATE 10 MG/ML
INJECTION, SOLUTION INTRAMUSCULAR; INTRAVENOUS PRN
Status: DISCONTINUED | OUTPATIENT
Start: 2020-12-28 | End: 2020-12-28 | Stop reason: ALTCHOICE

## 2020-12-28 ASSESSMENT — PAIN DESCRIPTION - DESCRIPTORS: DESCRIPTORS: ACHING;DISCOMFORT

## 2020-12-28 ASSESSMENT — PAIN - FUNCTIONAL ASSESSMENT: PAIN_FUNCTIONAL_ASSESSMENT: 0-10

## 2020-12-28 ASSESSMENT — PAIN SCALES - GENERAL: PAINLEVEL_OUTOF10: 0

## 2020-12-28 NOTE — OP NOTE
Operative Note      Patient: Elliott Russell. YOB: 1955  MRN: [id number]    Date of Procedure: 2020    Pre-Op Diagnosis: LUMBAR RADICULOPATHY, lumbar DDD    Post-Op Diagnosis: Same       Procedure(s):  # 1 LUMBAR TRANSFORAMINAL EPIDURAL STEROID INJECTION RIGHT L3 AND L4 UNDER FLUOROSCOPIC GUIDANCE    CPT: [facility]    Surgeon(s):  Gailen Cockayne, MD    Assistant:   * No surgical staff found *    Anesthesia: None    Estimated Blood Loss (mL): Minimal    Complications: None    Specimens:   * No specimens in log *    Implants:  * No implants in log *      Drains: * No LDAs found *    Findings: good needle placement    Detailed Description of Procedure:   2020    Patient: Elliott Russell. :  1955  Age:  72 y.o. Sex:  male     PRE-OPERATIVE DIAGNOSIS: Lumbar disc displacement, lumbar neural foraminal stenosis, lumbar radiculopathy. POST-OPERATIVE DIAGNOSIS: Same. PROCEDURE: Right Transforaminal epidural steroid injection under fluoroscopic guidance at foraminal level L3 and L4 (#1). SURGEON: Gailen Cockayne, MD    ANESTHESIA: Local    ESTIMATED BLOOD LOSS: None.  ______________________________________________________________________  BRIEF HISTORY: Elliott Westbrook comes in today for the first Right transforaminal epidural steroid injection under fluoroscopic guidance at foraminal level L3 and L4 . The potential complications of this procedure were discussed with him again today. He has elected to undergo the aforementioned procedure. Ivy complete History & Physical examination were reviewed in depth, a copy of which is in the chart. DESCRIPTION OF PROCEDURE:    After confirming written and informed consent, a time-out was performed and Ivy name and date of birth, the procedure to be performed as well as the plan for the location of the needle insertion were confirmed. The patient was brought into the procedure room and placed in the prone position on the fluoroscopy table. Standard monitors were placed and vital signs were observed throughout the procedure. The area of the lumbar spine was prepped with chloraprep and draped in a sterile manner. The vertebral body was identified with AP fluoroscopy. An oblique view was obtained to better visualize the inferior junction of the pedicle and transverse process . The 6 o'clock position of the pedicle was marked and identified. The skin and subcutaneous tissue were anesthetized with 0.5% lidocaine. Two # 22 gauge 3.5 inch pencil point needle was directed toward the targeted point under fluoroscopy until bone was contacted. The needle was then walked inferiorly until the neural foramen was entered . A lateral fluoroscopic view was then used to place the needle tip in the middle of the foramen. Negative aspiration was confirmed for blood and CSF and 1 cc of Omnipaque 240 contrast was injected at each level under live fluoroscopy. Appropriate neurograms were observed under AP fluoroscopy. Then after negative aspiration, a solution of the 3 cc of 0.5% lidocaine and 8 mg mg Dexamethasone was easily injected at each level. The needles were removed with constant aspiration technique. The patient back was cleaned and a bandage was placed over the needle insertion points    Disposition the patient tolerated the procedure well and there were no complications . Vital signs remained stable throughout the procedure. The patient was escorted to the recovery area where they remained until discharge and written discharge instructions for the procedure were given. Plan: Julianne Ling will return to our pain management center as scheduled.      Hola Monterroso MD

## 2020-12-28 NOTE — PROGRESS NOTES
1114: Discharge instructions reviewed, verbalized understanding. 1122: Steady gait, denies any new numbness or tingling.

## 2020-12-28 NOTE — H&P
[facility]        [address] [facility], [address] [facility]  Dept: [phone]    Procedure History & Physical      Alan Rosas. HPI:    Patient  is here for Lumbar TFESI  for low back/ LE pain. Labs/imaging studies reviewed   All question and concerns addressed including R/B/A associated with the procedure    Past Medical History:   Diagnosis Date    Abdominal aortic aneurysm without rupture ([address] 08/27/2018    stable per Ct 1/23/2020 / see epic    Acute bronchitis with COPD ([address] 5/27/2017    Arthritis     COPD (chronic obstructive pulmonary disease) ([address]     Decreased dorsalis pedis pulse 11/4/2020    Depression with anxiety     Emphysema of lung ([address]     Encounter for antineoplastic immunotherapy     HAP (hospital-acquired pneumonia)     resolved    History of deep vein thrombosis 11/4/2020    Hyperlipidemia     Leg swelling 11/4/2020    Lung cancer ([address] 04/11/2016    chemo and radiation    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    Paralyzed vocal cords     Thrombosis of testis     Tobacco dependence 5/27/2017       Past Surgical History:   Procedure Laterality Date    BRONCHOSCOPY N/A 3/5/2020    BRONCHOSCOPY DIAGNOSTIC OR CELL [address] ONLY performed by Surya Torre MD at [address] [place] [address]  2015    KNEE ARTHROSCOPY      LUNG BIOPSY Left 5/6/2016    MEDICATION INJECTION Bilateral 10/29/2020    BILATERAL SACROILIAC JOINT INJECTION AND RIGHT TROCHANTERIC BURSA INJECTION UNDER FLUOROSCOPY (CPT 02682,44997,69785)++TRACH-NO VENT++ performed by Shakir Calvo MD at [facility]. [facility] HISTORY  4/15/2016    cervical myelogram    SINUS SURGERY      TRACHEOSTOMY  05/24/2017    # 6 Shiley (disposable)       Prior to Admission medications    Medication Sig Start Date End Date Taking? Authorizing Provider   fentaNYL (DURAGESIC) 100 MCG/HR Place 1 patch onto the skin every 72 hours for 30 days.  12/18/20 1/17/21 Yes Dontrell Singh MD apixaban (ELIQUIS) 2.5 MG TABS tablet Take 1 tablet by mouth 2 times daily 12/8/20 1/7/21 Yes Sylvia Quintana DO   oxyCODONE (OXY-IR) 15 MG immediate release tablet Take 1 tablet by mouth every 4 hours as needed for Pain for up to 30 days. 12/8/20 1/7/21 Yes Negrita Pack MD   buPROPion [facility]) 75 MG tablet take 1 tablet by mouth twice a day 12/7/20  Yes Luisito Drew MD   pregabalin (LYRICA) 150 MG capsule Take 1 capsule by mouth 3 times daily for 30 days. 12/7/20 1/6/21 Yes BG Curran CNP   LORazepam (ATIVAN) 0.5 MG tablet Take 1 tablet by mouth nightly as needed for Anxiety for up to 30 days.  12/1/20 12/31/20 Yes Negrita Pack MD   formoterol (PERFOROMIST) 20 MCG/2ML nebulizer solution Take 2 mLs by nebulization 2 times daily DX: COPD J44.9 11/10/20  Yes Luisito Drew MD   cyclobenzaprine (FLEXERIL) 10 MG tablet Take 10 mg by mouth 3 times daily as needed for Muscle spasms   Yes Historical Provider, MD   Revefenacin (YUPELRI) 175 MCG/3ML SOLN Inhale 3 mLs into the lungs daily 10/13/20  Yes Luisito Drew MD   budesonide (PULMICORT) 0.5 MG/2ML nebulizer suspension Take 2 mLs by nebulization 2 times daily DX: COPD J44.9 10/2/20  Yes Luisito Drew MD   pembrolizumab (KEYTRUDA) 100 MG/4ML SOLN Infuse 200 mg intravenously every 21 days   Yes Historical Provider, MD   sennosides-docusate sodium (SENOKOT-S) 8.6-50 MG tablet Take 2 tablets by mouth 2 times daily   Yes Historical Provider, MD   albuterol (ACCUNEB) 0.63 MG/3ML nebulizer solution Take 1 ampule by nebulization every 6 hours as needed for Shortness of Breath   Yes Historical Provider, MD   DULoxetine (CYMBALTA) 30 MG extended release capsule Take 1 capsule by mouth 2 times daily 9/22/20 3/21/21 Yes Negrita Pack MD   mirtazapine (REMERON) 30 MG tablet Take 30 mg by mouth nightly   Yes Historical Provider, MD   simvastatin (ZOCOR) 40 MG tablet Take 40 mg by mouth nightly   Yes Historical Provider, MD levothyroxine (SYNTHROID) 125 MCG tablet Take 1 tablet by mouth Daily 5/8/20  Yes Sylvia Quintana, DO   tamsulosin (FLOMAX) 0.4 MG capsule Take 1 capsule by mouth nightly 4/1/20  Yes Sylvia Quintana, DO   esomeprazole (NEXIUM) 40 MG delayed release capsule Take 1 capsule by mouth every morning (before breakfast) Instructed to take am of procedure 4/1/20  Yes Sylvia Quintana DO   predniSONE (DELTASONE) 10 MG tablet Take 10 mg by mouth daily    Yes Historical Provider, MD   OXYGEN Inhale 4 L into the lungs nightly   Yes Historical Provider, MD   chlorhexidine (PERIDEX) 0.12 % solution Take 15 mLs by mouth daily Swish & spit qd 1/10/20  Yes Sylvia Quintana DO   Cholecalciferol (VITAMIN D3) 5000 units TABS Take 5,000 Units by mouth daily    Yes Historical Provider, MD   varenicline (CHANTIX STARTING MONTH PAK) 0.5 MG X 11 & 1 MG X 42 tablet Take by mouth.  11/24/20   BG Rawls - CNP       Allergies   Allergen Reactions    Augmentin [Amoxicillin-Pot Clavulanate] Diarrhea       Social History     Socioeconomic History    Marital status:      Spouse name: Not on file    Number of children: Not on file    Years of education: Not on file    Highest education level: Not on file   Occupational History    Occupation: [employer] a [employer]- [employer]     Comment: disability short term   Social Needs    Financial resource strain: Not on file    Food insecurity     Worry: Not on file     Inability: Not on file   [employer] needs     Medical: Not on file     Non-medical: Not on file   Tobacco Use    Smoking status: Current Some Day Smoker     Packs/day: 1.00     Years: 46.00     Pack years: 46.00     Types: Cigarettes     Start date: 1972    Smokeless tobacco: Never Used    Tobacco comment: Pt quit in 9/26/2018& started back 2/2020   Substance and Sexual Activity    Alcohol use: Not Currently     Alcohol/week: 0.0 standard drinks    Drug use: No    Sexual activity: Not on file   Lifestyle  Physical activity     Days per week: Not on file     Minutes per session: Not on file    Stress: Not on file   Relationships    Social connections     Talks on phone: Not on file     Gets together: Not on file     Attends [employer]alian service: Not on file     Active member of club or organization: Not on file     Attends meetings of clubs or organizations: Not on file     Relationship status: Not on file    Intimate partner violence     Fear of current or ex partner: Not on file     Emotionally abused: Not on file     Physically abused: Not on file     Forced sexual activity: Not on file   Other Topics Concern    Not on file   Social History Narrative    Works at [employer] Lives in [facility]. Family History   Problem Relation Age of Onset   [employer] Cancer Mother         breast cancer    Cancer Father         prostate cancer    Diabetes Father          REVIEW OF SYSTEMS:    CONSTITUTIONAL:  negative for  fevers, chills, sweats and fatigue    RESPIRATORY:  negative for  dry cough, cough with sputum, dyspnea, wheezing and chest pain    CARDIOVASCULAR:  negative for chest pain, dyspnea, palpitations, syncope    GASTROINTESTINAL:  negative for nausea, vomiting, change in bowel habits, diarrhea, constipation and abdominal pain    MUSCULOSKELETAL: negative for muscle weakness    SKIN: negative for itching or rashes. BEHAVIOR/PSYCH:  negative for poor appetite, increased appetite, decreased sleep and poor concentration    All other systems negative      PHYSICAL EXAM:    VITALS:  /71   Pulse 73   Resp 18   Ht 5' 11\" (1.803 m)   Wt 190 lb (86.2 kg)   SpO2 95%   BMI 26.50 kg/m²     CONSTITUTIONAL:  awake, alert, cooperative, no apparent distress, and appears stated age    EYES: PERRLA, EOMI    LUNGS:  No increased work of breathing, no audible wheezing    CARDIOVASCULAR:  regular rate and rhythm    ABDOMEN:  Soft non tender non distended     EXTREMITIES: no signs of clubbing or cyanosis. MUSCULOSKELETAL: negative for flaccid muscle tone or spastic movements. SKIN: gross examination reveals no signs of rashes, or diaphoresis. NEURO: Cranial nerves II-XII grossly intact. No signs of agitated mood. Assessment/Plan:    Patient  is here for Lumbar TFESI  for low back/ LE pain. The patient was counseled at length about the risks of rhonda Covid-19 during their perioperative period and any recovery window from their procedure. The patient was made aware that rhonda Covid-19  may worsen their prognosis for recovering from their procedure  and lend to a higher morbidity and/or mortality risk. All material risks, benefits, and reasonable alternatives including postponing the procedure were discussed. The patient does wish to proceed with the procedure at this time.       Rian Gallagher MD

## 2020-12-31 RX ORDER — LORAZEPAM 0.5 MG/1
0.5 TABLET ORAL NIGHTLY PRN
Qty: 30 TABLET | Refills: 0 | Status: SHIPPED | OUTPATIENT
Start: 2020-12-31 | End: 2021-01-12 | Stop reason: SDUPTHER

## 2020-12-31 RX ORDER — MIRTAZAPINE 30 MG/1
30 TABLET, FILM COATED ORAL NIGHTLY
Qty: 90 TABLET | Refills: 3 | Status: SHIPPED | OUTPATIENT
Start: 2020-12-31 | End: 2021-01-12 | Stop reason: SDUPTHER

## 2020-12-31 NOTE — TELEPHONE ENCOUNTER
Call from [address] [facility] requesting refill for lorazepam and for mirtazapine. Pharmacy is [facility], [address]. Next maria r 1/12/21.

## 2021-01-03 DIAGNOSIS — G89.3 CHRONIC PAIN DUE TO NEOPLASM: ICD-10-CM

## 2021-01-03 DIAGNOSIS — Z51.5 PALLIATIVE CARE BY SPECIALIST: ICD-10-CM

## 2021-01-04 RX ORDER — PREGABALIN 150 MG/1
150 CAPSULE ORAL 3 TIMES DAILY
Qty: 90 CAPSULE | Refills: 2 | Status: SHIPPED
Start: 2021-01-04 | End: 2021-04-05

## 2021-01-06 ENCOUNTER — CARE COORDINATION (OUTPATIENT)
Dept: CARE COORDINATION | Age: 66
End: 2021-01-06

## 2021-01-06 NOTE — CARE COORDINATION
Care Coordination Services explained to patient. Patient verbalizes understanding, but declines at this time. Patient encouraged to call PCP office with any questions/concerns.

## 2021-01-12 ENCOUNTER — VIRTUAL VISIT (OUTPATIENT)
Dept: PALLATIVE CARE | Age: 66
End: 2021-01-12
Payer: MEDICARE

## 2021-01-12 DIAGNOSIS — G89.3 CHRONIC PAIN DUE TO NEOPLASM: ICD-10-CM

## 2021-01-12 DIAGNOSIS — C34.82 MALIGNANT NEOPLASM OF OVERLAPPING SITES OF LEFT LUNG (HCC): ICD-10-CM

## 2021-01-12 DIAGNOSIS — Z51.5 PALLIATIVE CARE BY SPECIALIST: ICD-10-CM

## 2021-01-12 PROCEDURE — 99442 PR PHYS/QHP TELEPHONE EVALUATION 11-20 MIN: CPT | Performed by: STUDENT IN AN ORGANIZED HEALTH CARE EDUCATION/TRAINING PROGRAM

## 2021-01-12 RX ORDER — MIRTAZAPINE 30 MG/1
30 TABLET, FILM COATED ORAL NIGHTLY
Qty: 90 TABLET | Refills: 3 | Status: SHIPPED
Start: 2021-01-12 | End: 2021-05-25 | Stop reason: CLARIF

## 2021-01-12 RX ORDER — LORAZEPAM 0.5 MG/1
0.5 TABLET ORAL NIGHTLY PRN
Qty: 30 TABLET | Refills: 0 | Status: SHIPPED | OUTPATIENT
Start: 2021-01-12 | End: 2021-02-11

## 2021-01-12 RX ORDER — SENNA AND DOCUSATE SODIUM 50; 8.6 MG/1; MG/1
2 TABLET, FILM COATED ORAL 2 TIMES DAILY
Qty: 120 TABLET | Refills: 2 | Status: SHIPPED | OUTPATIENT
Start: 2021-01-12 | End: 2021-04-12

## 2021-01-12 RX ORDER — FENTANYL 100 UG/H
1 PATCH TRANSDERMAL
Qty: 10 PATCH | Refills: 0 | Status: SHIPPED | OUTPATIENT
Start: 2021-01-12 | End: 2021-02-11

## 2021-01-12 NOTE — PROGRESS NOTES
Department of Palliative Medicine  Virtual visit telephone encounter  Provider: Iesha Nelson MD        Chief Complaint: Mai Ott is a 72 y.o. male with chief complaint of pain    HPI  Mr. 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 Assessment/Plan     Lung cancer   - Stage 4 lung cancer   - Follows with Dr. Katie Hadley   - Received radiation therapy, along with chemotherapy     Chronic Pain due to Neoplasm/chronic lumbar radiculopathy:           -  Continue with Fentanyl 100mcg q72hr              -  Cont Oxycodone increase to 15 mg Q 4 PRN uses 3 pills               -  Lyrica 150 mg TID for neuropathy, hand tingling sensation   -  Pain injection into his back was successful, he will return to pain clinic on Jan 14 for evaluation. Constipation:              -  Pericolace 2 tabs BID              -  Add Miralax daily prn     Depression/Anxiety:   - Cymbalta 30 mg BID              - Ativan 0.5 mg HS PRN    Insomnia    - lorazepam 0.5 mg HS   - Mirtazipine    - melatonin 5mg HS      Follow Up: 6 weeks. Encouraged to call with any questions, concerns, needs, or changes in symptoms. Subjective:     Mai Ott is a 72 y.o. male with significant past medical history of lung cancer who was seen by [address] [place] [address]. Last time I saw him he went to pain management to have a back procedure done. He received epidural shot, he has found this of much benefit. He has another appointment on January 14 of this month. He has been tolerating fentanyl 100 mcg, his oxycodone usage has decreased. He only needs may be 3 at most 4 pills daily. He tells me that Lyrica is helping as well. He has constipation from all the narcotics I refilled his senna S.   He is complaining of having weight loss as well, I added the mirtazapine back to his regimen. Also lorazepam for his anxiety. Objective:     Physical Exam  Wt Readings from Last 3 Encounters:   12/28/20 190 lb (86.2 kg)   11/30/20 195 lb (88.5 kg)   11/24/20 193 lb 12.8 oz (87.9 kg)       [place] Symptom Assessment Score   [place] Score 9/22/2020 8/11/2020 7/7/2020 6/9/2020 3/2/2020   Pain Score 4 6 9 7 7   Tiredness Score 5 2 5 6 6   Nausea Score Not nauseated Not nauseated Not nauseated Not nauseated Not nauseated   Depression Score 4 4 4 4 5   Anxiety Score 5 4 4 4 4   Drowsiness Score 3 4 3 4 5   Appetite Score 4 1 1 1 2   Wellbeing Score 5 5 2 3 4   Dyspnea Score 5 7 6 6 7   Other Problem Score 4 6 6 7 7   Total Assessment Score(calculated) 39 39 40 42 47       Assessed by: patient. Current Medications:  Medications reviewed: yes    Controlled Substances Monitoring: [place] reviewed 1/12/21. RX Monitoring 7/7/2020   Attestation -   Periodic Controlled Substance Monitoring Possible medication side effects, risk of tolerance/dependence & alternative treatments discussed. ;No signs of potential drug abuse or diversion identified. ;Assessed functional status. ;Obtaining appropriate analgesic effect of treatment. Chronic Pain > 50 MEDD Re-evaluated the status of the patient's underlying condition causing pain. Chronic Pain > 80 MEDD Obtained or confirmed \"Medication Contract\" on file. Chronic Pain > 120 MEDD Engaged a pain medicine specialist as a prescriber or consultant. Ector Bradley MD  Palliative Care Department     Time/Communication  Greater than 50% of time spent, total 11-20 minutes over the phone counseling and coordination of care regarding symptom management.

## 2021-01-14 DIAGNOSIS — Z51.5 PALLIATIVE CARE BY SPECIALIST: ICD-10-CM

## 2021-01-14 DIAGNOSIS — G89.3 CHRONIC PAIN DUE TO NEOPLASM: ICD-10-CM

## 2021-01-14 RX ORDER — OXYCODONE HYDROCHLORIDE 15 MG/1
15 TABLET ORAL EVERY 4 HOURS PRN
Qty: 120 TABLET | Refills: 0 | Status: SHIPPED
Start: 2021-01-14 | End: 2021-02-17 | Stop reason: SDUPTHER

## 2021-01-18 ENCOUNTER — OFFICE VISIT (OUTPATIENT)
Dept: PAIN MANAGEMENT | Age: 66
End: 2021-01-18
Payer: MEDICARE

## 2021-01-18 VITALS
WEIGHT: 194 LBS | HEART RATE: 73 BPM | SYSTOLIC BLOOD PRESSURE: 105 MMHG | BODY MASS INDEX: 27.16 KG/M2 | HEIGHT: 71 IN | TEMPERATURE: 97.6 F | DIASTOLIC BLOOD PRESSURE: 62 MMHG | OXYGEN SATURATION: 90 % | RESPIRATION RATE: 18 BRPM

## 2021-01-18 DIAGNOSIS — C32.9 MALIGNANT NEOPLASM OF LARYNX (HCC): ICD-10-CM

## 2021-01-18 DIAGNOSIS — F17.200 SMOKING: ICD-10-CM

## 2021-01-18 DIAGNOSIS — M48.061 SPINAL STENOSIS OF LUMBAR REGION, UNSPECIFIED WHETHER NEUROGENIC CLAUDICATION PRESENT: ICD-10-CM

## 2021-01-18 DIAGNOSIS — M47.817 LUMBOSACRAL SPONDYLOSIS WITHOUT MYELOPATHY: ICD-10-CM

## 2021-01-18 DIAGNOSIS — M51.36 DDD (DEGENERATIVE DISC DISEASE), LUMBAR: Primary | ICD-10-CM

## 2021-01-18 DIAGNOSIS — M54.16 LUMBAR RADICULOPATHY: ICD-10-CM

## 2021-01-18 DIAGNOSIS — C34.82 MALIGNANT NEOPLASM OF OVERLAPPING SITES OF LEFT LUNG (HCC): Chronic | ICD-10-CM

## 2021-01-18 PROCEDURE — 99213 OFFICE O/P EST LOW 20 MIN: CPT | Performed by: ANESTHESIOLOGY

## 2021-01-18 NOTE — PROGRESS NOTES
[facility] - BE[facility]AVIOR[facility] HE[facility] Pain Management        [address] [facility], [place]  Dept: [phone]        Follow up Note      Dorthey Soulier. Date of Visit:  1/18/2021    CC:  Patient presents for follow up   Chief Complaint   Patient presents with    Follow-up     1 LUMBAR TRANSFORAMINAL EPIDURAL STEROID INJECTION RIGHT L3 AND L4 UNDER FLUOROSCOPIC GUIDANCE    CPT: 50860 [id number]       HPI:  Chronic low back pain for > a year.     H/o Ca lung and Throat ca.      S/p RT/ chemo- Has a tracheostomy.     Has been treated by oncology.     Under palliative medicine care for neuropathic pain and managed by medications including opioids.     Pain is constant and is described as aching, sharp, stabbing and throbbing.      Pain does radiate to both lower extremities- right side > left     Pain is unchanged. Change in quality of symptoms:no. Medication side effects:none. Has been diagnosed with DVT of the UE and is on anticoagulation. USG- no DVT on 11/23/2020    Nursing notes and details of the pain history reviewed. Please see intake notes for details. S/P BL SIJ injection and right trochanteric bursa injection on 10/29/2020 > 70% relief of the SIJ pain. S/P Right L3 & L4 TFESI on 12/28/2020 with > 50% pain relief. Continues to have intermittent low back and right LE pain. Continues HEP. Nursing notes and details of the pain history reviewed. Please see intake notes for details. Previous treatments:   Physical Therapy : yes, HEP- as tolerated      Chiropractic treatment: no     Medications: - NSAID's : yes                        - Membrane stabilizers : yes - gabapentin, Lyrica                       - Opioids : yes: Duragesic, Oxycodone                       - Adjuvants or Others : yes,      TENS Unit: no     Surgeries: no LS spine surgery.  H/o prior Cervical fusion noted.     Interventional Pain procedures/ nerve blocks: no     He has been on anticoagulation medications - Eliquis.     He has not been on herbal supplements.       He is not diabetic.     H/O Smoking: +  H/O alcohol abuse : no  H/O Illicit drug use : no     Imaging:   MRI of LS spine on 7/28/2020 reviewed:  Impression         1. Severe central canal stenosis and moderate bilateral neural    foraminal narrowing at L3-4 and L4-5.         2. Grade 1 anterolisthesis of L3 on L4 and L4 on L5.         3. Stable distal abdominal aortic aneurysm measuring 3 cm in diameter.       X-ray of the sacroiliac joints done on 7/24/2020: Impression    Mild-to-moderate degenerative changes involving bilateral sacroiliac    joints.         PET scan: 2/20120: Impression    1. Two focal suspicious areas of increased metabolic activity in the    base of the oral cavity/sublingual spaces as above commented.         2. No indication for metastatic lymph node adenopathy in the neck or    mediastinum.         3. No abnormal uptake of the radionuclide is seen at the level of the    lungs including multiple parenchymal opacities observed bilaterally on    recent CT chest.                 Mri of the right femur and Hip: 5/20219:  Impression         1. Moderate right knee effusion with mild synovitis. This is of    unclear etiology. This finding can be better characterized with MRI of    the right knee without contrast if clinically indicated. 2. Small scrotal hydroceles of unclear clinical significance. 3. No evidence for metastatic disease.       Xray of the right hip: 3/2019:      Impression     Moderate degenerative changes of the right hip.            Potential Aberrant Drug-Related Behavior:no      OARRS report[de-identified]  Reviewed today 1/18/21  getting meds form Palliative.     Past Medical History:   Diagnosis Date    Abdominal aortic aneurysm without rupture ([facility] 08/27/2018    stable per Ct 1/23/2020 / see epic    Acute bronchitis with COPD ([facility] 5/27/2017    Arthritis     COPD (chronic obstructive pulmonary disease) (HCC)     Decreased dorsalis pedis pulse 11/4/2020    Depression with anxiety     Emphysema of lung ([facility]     Encounter for antineoplastic immunotherapy     HAP (hospital-acquired pneumonia)     resolved    History of deep vein thrombosis 11/4/2020    Hyperlipidemia     Leg swelling 11/4/2020    Lung cancer ([facility] 04/11/2016    chemo and radiation    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    Paralyzed vocal cords     Thrombosis of testis     Tobacco dependence 5/27/2017       Past Surgical History:   Procedure Laterality Date    BRONCHOSCOPY N/A 3/5/2020    BRONCHOSCOPY DIAGNOSTIC OR CELL [address] ONLY performed by Dominga Alfaro MD at [address] [facility]  2015    KNEE ARTHROSCOPY      LUNG BIOPSY Left 5/6/2016    MEDICATION INJECTION Bilateral 10/29/2020    BILATERAL SACROILIAC JOINT INJECTION AND RIGHT TROCHANTERIC BURSA INJECTION UNDER FLUOROSCOPY (CPT 26164,49587,01539)++TRACH-NO VENT++ performed by Nighat Eddy MD at [address] [facility]  4/15/2016    cervical myelogram    PAIN MANAGEMENT PROCEDURE Right 12/28/2020    # 1 LUMBAR TRANSFORAMINAL EPIDURAL STEROID INJECTION RIGHT L3 AND L4 UNDER FLUOROSCOPIC GUIDANCE performed by Nighat Eddy MD at [address]  05/24/2017    # 6 Shiley (disposable)       Prior to Admission medications    Medication Sig Start Date End Date Taking? Authorizing Provider   oxyCODONE (OXY-IR) 15 MG immediate release tablet Take 1 tablet by mouth every 4 hours as needed for Pain for up to 30 days. 1/14/21 2/13/21 Yes Dandre Hudson MD   fentaNYL (DURAGESIC) 100 MCG/HR Place 1 patch onto the skin every 72 hours for 30 days. 1/12/21 2/11/21 Yes Dandre Hudson MD   LORazepam (ATIVAN) 0.5 MG tablet Take 1 tablet by mouth nightly as needed for Anxiety for up to 30 days.  1/12/21 2/11/21 Yes Dandre Hudson MD   mirtazapine (REMERON) 30 MG tablet Take 1 tablet by mouth nightly 1/12/21 1/7/22 Yes Dandre Hudson MD sennosides-docusate sodium (SENOKOT-S) 8.6-50 MG tablet Take 2 tablets by mouth 2 times daily 1/12/21 4/12/21 Yes Silvina Spear MD   pregabalin (LYRICA) 150 MG capsule Take 1 capsule by mouth 3 times daily for 90 days. 1/4/21 4/4/21 Yes BG Maguire - CNP   buPROPion [facility]) 75 MG tablet take 1 tablet by mouth twice a day 12/7/20  Yes Lucian Galvez MD   varenicline (CHANTIX STARTING MONTH PAK) 0.5 MG X 11 & 1 MG X 42 tablet Take by mouth.  11/24/20  Yes BG Ricketts CNP   formoterol (PERFOROMIST) 20 MCG/2ML nebulizer solution Take 2 mLs by nebulization 2 times daily DX: COPD J44.9 11/10/20  Yes Lucian Galvez MD   Revefenacin (YUPELRI) 175 MCG/3ML SOLN Inhale 3 mLs into the lungs daily 10/13/20  Yes Lucian Galvez MD   budesonide (PULMICORT) 0.5 MG/2ML nebulizer suspension Take 2 mLs by nebulization 2 times daily DX: COPD J44.9 10/2/20  Yes Lucian Galvez MD   pembrolizumab (KEYTRUDA) 100 MG/4ML SOLN Infuse 200 mg intravenously every 21 days   Yes Historical Provider, MD   albuterol (ACCUNEB) 0.63 MG/3ML nebulizer solution Take 1 ampule by nebulization every 6 hours as needed for Shortness of Breath   Yes Historical Provider, MD   DULoxetine (CYMBALTA) 30 MG extended release capsule Take 1 capsule by mouth 2 times daily 9/22/20 3/21/21 Yes Silvina Spear MD   simvastatin (ZOCOR) 40 MG tablet Take 40 mg by mouth nightly   Yes Historical Provider, MD   levothyroxine (SYNTHROID) 125 MCG tablet Take 1 tablet by mouth Daily 5/8/20  Yes Sylvia Quintana DO   tamsulosin (FLOMAX) 0.4 MG capsule Take 1 capsule by mouth nightly 4/1/20  Yes Sylvia Quintana DO   esomeprazole (NEXIUM) 40 MG delayed release capsule Take 1 capsule by mouth every morning (before breakfast) Instructed to take am of procedure 4/1/20  Yes Sylvia Quintana DO   predniSONE (DELTASONE) 10 MG tablet Take 10 mg by mouth daily    Yes Historical Provider, MD   OXYGEN Inhale 4 L into the lungs nightly   Yes Historical Provider, MD   chlorhexidine (PERIDEX) 0.12 % solution Take 15 mLs by mouth daily Swish & spit qd 1/10/20  Yes Sylvia Quintana DO   Cholecalciferol (VITAMIN D3) 5000 units TABS Take 5,000 Units by mouth daily    Yes Historical Provider, MD   apixaban (ELIQUIS) 2.5 MG TABS tablet Take 1 tablet by mouth 2 times daily 12/8/20 1/7/21  Sylvia Quintana DO       Allergies   Allergen Reactions    Augmentin [Amoxicillin-Pot Clavulanate] Diarrhea       Social History     Socioeconomic History    Marital status:      Spouse name: Not on file    Number of children: Not on file    Years of education: Not on file    Highest education level: Not on file   Occupational History    Occupation: supervior a tube mill- SSI     Comment: disability short term   Social Needs    Financial resource strain: Not on file    Food insecurity     Worry: Not on file     Inability: Not on file   [employer] needs     Medical: Not on file     Non-medical: Not on file   Tobacco Use    Smoking status: Current Some Day Smoker     Packs/day: 1.00     Years: 46.00     Pack years: 46.00     Types: Cigarettes     Start date: 1972    Smokeless tobacco: Never Used    Tobacco comment: Pt quit in 9/26/2018& started back 2/2020   Substance and Sexual Activity    Alcohol use: Not Currently     Alcohol/week: 0.0 standard drinks    Drug use: No    Sexual activity: Yes     Partners: Female   Lifestyle    Physical activity     Days per week: Not on file     Minutes per session: Not on file    Stress: Not on file   Relationships    Social connections     Talks on phone: Not on file     Gets together: Not on file     Attends Latter-day service: Not on file     Active member of club or organization: Not on file     Attends meetings of clubs or organizations: Not on file     Relationship status: Not on file    Intimate partner violence     Fear of current or ex partner: Not on file     Emotionally abused: Not on file     Physically abused: Not on file     Forced sexual activity: Not on file   Other Topics Concern    Not on file   Social History Narrative    Works at [employer]. Lives in [facility]. Family History   Problem Relation Age of Onset   [facility] Cancer Mother         breast cancer    Cancer Father         prostate cancer    Diabetes Father        REVIEW OF SYSTEMS:     Alise Holter denies fever/chills, chest pain, shortness of breath, new bowel or bladder complaints. All other review of systems was negative. PHYSICAL EXAMINATION:      /62   Pulse 73   Temp 97.6 °F (36.4 °C) (Infrared)   Resp 18   Ht 5' 11\" (1.803 m)   Wt 194 lb (88 kg)   SpO2 90%   BMI 27.06 kg/m²   General:       General appearance:  Pleasant and well-hydrated, in no distress and A & O x 3  Build:Overweight  Function: Rises from seated position easily and Moves about room without difficulty     HEENT:     Head:normocephalic, atraumatic  Pupils:regular, round, equal  Sclera: icterus absent  Tracheostomy +     Lungs:     Breathing:normal breathing pattern      CVS:     RRR     Abdomen:     Shape:non-distended and normal  Tenderness:none  Guarding:none     Cervical spine:     Inspection:normal  Palpation:tenderness paravertebral muscles, tenderness trapezium, left, right and no  Range of motion:reduced flexion, extension, rotation bilaterally and is not painful. Spurling's: negative bilaterally     Thoracic spine:                Spine inspection:normal   Palpation:No tenderness over the midline and paraspinal area, bilaterally  Range of motion:normal in flexion, extension rotation bilateral and is not painful.     Lumbar spine:     Spine inspection: Normal   Palpation: Tenderness paravertebral muscles No bilaterally  Range of motion: Decreased, flexion Decreased, Lateral bending, extension and rotation bilaterally reduced is somewhat painful.   Sacroiliac joint tenderness improved  Piriformis tenderness: negative bilaterally  SLR : negative bilaterally  Trochanteric bursa tenderness: Improved on the right side  CVA tenderness:No      Musculoskeletal:     Trigger points in trapezius: No bilaterally  Trigger points in rhomboids: No bilaterally  Trigger points in Paravertebral: No bilaterally     Extremities:     Tremors:None bilaterally upper and lower  No LE edema  Left UE- some swelling + veins prominent. Right hip ROM : pain +     Knee:     ROM : no pain   Joint line tenderness : no     Neurological:     Sensory: Normal to light touch - except for reduced sensation over the right leg     Motor:   Right  5/5              Left  5/5               Right Bicep 5/5           Left Bicep 5/5              Right Triceps 5/5       Left Triceps 5/5          Right Deltoid 5/5     Left Deltoid 5/5                  Right Quadriceps 5/5          Left Quadriceps 5/5           Right Gastrocnemius 5/5    Left Gastrocnemius 5/5  Right Ant Tibialis 5/5  Left Ant Tibialis 5/5     Reflexes:    Right Quadriceps reflex diminished  Left Quadriceps reflex 2+  Right Achilles reflex 1+  Left Achilles reflex 1+     Gait:no assistive device.     Dermatology:     Skin:no rashes or lesions noted       Assessment/Plan:   Diagnosis Orders   1. DDD (degenerative disc disease), lumbar     2. Lumbar radiculopathy     3. Lumbosacral spondylosis without myelopathy     4. Spinal stenosis of lumbar region, unspecified whether neurogenic claudication present     5. Malignant neoplasm of larynx (HCC)     6. Smoking          72 y.o. male with H/o ca lung and ca throat - s/p chemo/. RT. Has a tracheostomy. Followed by Hemonc/ ENT at ([facility]) and Palliative care. On pain medications Duragesic/ Oxycodone/ Lyrica/ Cymbalta.     Has low back and lower extremity pain - right > left.     S/P B/L SIJ injection and right trochanteric bursa injection with good pain relief. MRI of the LS spine : Significant changes noted at L3-4.  L4-5     X-ray of SIJ - reviewed mild to moderate degenerative changes and discussed the findings. S/P # 1 TFESI right L3/ L4 with > 40-50% relief. Over all doing well now. If pain recurs significantly, will plan # 2 right L3 and L4 Transforaminal RICHARD injection under fluoroscopy. He has H/o left UE DVT and is on Eliquis    Need to hold Eliquis for 3 days prior to the procedure. Note: Recent Dup USG reviewed- no DVT.    He will continue medical management with Palliative care. Right Hip ROM pain + (consider hip inj in future if right hip pain continues to bother)    F/U in 2 months.     Counseling :     Patient encouraged to stay active and to watch/lose weight     Encouraged to continue Regular home exercise program as tolerated - stretching / strengthening.     Smoking cessation counseling : yes      Treatment plan discussed with the patient including medication and procedure side effects. Recent events/ consultation.  Notes reviewed.     Controlled Substances Monitoring:   OARRS reviewed- 1/18/21: consistent     Izabel Arteaga MD     CC:  Sylvia Quintana DO

## 2021-01-18 NOTE — PROGRESS NOTES
Do you currently have any of the following:    Fever: No  Headache:  No  Cough: No  Shortness of breath: No  Exposed to anyone with these symptoms: No                                                                                                                Alan Martineszehra Purdy. presents to the [facility] on 1/18/2021. Robert Young is complaining of pain lower back down right leg to thigh. The pain is constant. The pain is described as stabbing and sharp. Pain is rated on his best day at a 3, on his worst day at a 8, and on average at a 6 on the VAS scale. He took his last dose of fentaynl patch, oxydodone,cymbalta today . Robert Young does have issues with constipation. Any procedures since your last visit: Yes, with 40 % relief. He is not on NSAIDS and  is  on anticoagulation medications to include Eliquis and is managed by Sylvia Quintana DO  . Pacemaker or defibrillator: No Physician managing device is . Medication Contract and Consent for Opioid Use Documents Filed      No documents found                   /62   Pulse 73   Temp 97.6 °F (36.4 °C) (Infrared)   Resp 18   Ht 5' 11\" (1.803 m)   Wt 194 lb (88 kg)   SpO2 90%   BMI 27.06 kg/m²      No LMP for male patient.

## 2021-01-21 ENCOUNTER — HOSPITAL ENCOUNTER (OUTPATIENT)
Dept: RADIATION ONCOLOGY | Age: 66
Discharge: HOME | End: 2021-01-21
Payer: MEDICARE

## 2021-01-21 DIAGNOSIS — C32.9 MALIGNANT NEOPLASM OF LARYNX (HCC): Primary | ICD-10-CM

## 2021-01-21 PROCEDURE — 99442 PR PHYS/QHP TELEPHONE EVALUATION 11-20 MIN: CPT | Performed by: NURSE PRACTITIONER

## 2021-01-21 NOTE — PROGRESS NOTES
Telephone visit due to COVID-19    Patient identified: yes  Patient consented for telehealth services: yes  Patient location: home  Provider location: [facility]  Persons on today's call: patient and wife          Radiation Oncology   Follow Up Note        1/21/2021    Alan Sagastume. HPI:  Damian Chacon. is a pleasant 72 y.o. male with a diagnosis of locally advanced left lung cancer with spinal column involvement s/p palliative RT followed by a definitive concurrent course and then continued CHEMO followed by immunotherapy (the later continues - Keytruda per [facility]). The patient underwent a course of radiation therapy to the subglottic mass, which was completed on 10/3/17. He completed 3800 cGy in 19 fractions. Prior to this, he received 5000 cGy in 22 fractions to the left lung and spine for metastatic lung cancer (completed 6/3/16). States he is doing as well as he can be. No new symptoms to report at this time. Denies swallowing pain, tightness with swallowing, or any new lumps/bumps. He has been having trach issues and is following with Dr Abiola Clemons at [facility] for planned trach repositioning because \"the trach is too high right now and affects my breathing. \" He was planned to have this surgery last year but held off due to Covid. Hopes to be able to have the surgery soon. States that he has had pain to left side of neck toward collar bone for \"quite some time. \" Initially there was some swelling but the swelling has gone down. Pain remains persistent. States that pain physician and Dr Edmund Ramirez all aware of this and scan were ordered that showed nothing. He is on pain meds through pain clinic and receives injections as needed for spine pain too. Patient is following with Dr Edmund Ramirez for medical oncology. Continues on Keytruda. Visits as scheduled. Smokes 1/2 pack per day and this is decreased from 2 PPD one year ago. Continues to try to cut back.       Past Medical History:   Diagnosis Date    Abdominal aortic aneurysm without rupture ([facility] 08/27/2018    stable per Ct 1/23/2020 / see epic    Acute bronchitis with COPD ([facility] 5/27/2017    Arthritis     COPD (chronic obstructive pulmonary disease) (HCC)     Decreased dorsalis pedis pulse 11/4/2020    Depression with anxiety     Emphysema of lung ([facility]     Encounter for antineoplastic immunotherapy     HAP (hospital-acquired pneumonia)     resolved    History of deep vein thrombosis 11/4/2020    Hyperlipidemia     Leg swelling 11/4/2020    Lung cancer ([facility] 04/11/2016    chemo and radiation    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    Paralyzed vocal cords     Thrombosis of testis     Tobacco dependence 5/27/2017         Past Surgical History:   Procedure Laterality Date    BRONCHOSCOPY N/A 3/5/2020    BRONCHOSCOPY DIAGNOSTIC OR CELL [address] ONLY performed by Casimiro Ennis MD at [address] [place] [address]  2015    KNEE ARTHROSCOPY      LUNG BIOPSY Left 5/6/2016    MEDICATION INJECTION Bilateral 10/29/2020    BILATERAL SACROILIAC JOINT INJECTION AND RIGHT TROCHANTERIC BURSA INJECTION UNDER FLUOROSCOPY (CPT 62013,78571,05635)++TRACH-NO VENT++ performed by Clara Kenney MD at [facility]. [facility] HISTORY  4/15/2016    cervical myelogram    PAIN MANAGEMENT PROCEDURE Right 12/28/2020    # 1 LUMBAR TRANSFORAMINAL EPIDURAL STEROID INJECTION RIGHT L3 AND L4 UNDER FLUOROSCOPIC GUIDANCE performed by Clara Kenney MD at [address]  05/24/2017    # 6 Shiley (disposable)         Social History     Socioeconomic History    Marital status:      Spouse name: Not on file    Number of children: Not on file    Years of education: Not on file    Highest education level: Not on file   Occupational History    Occupation: supervior a tube mill- SSI     Comment: disability short term   Social Needs    Financial resource strain: Not on file    Food insecurity     Worry: Not on file Inability: Not on file    Transportation needs     Medical: Not on file     Non-medical: Not on file   Tobacco Use    Smoking status: Current Some Day Smoker     Packs/day: 1.00     Years: 46.00     Pack years: 46.00     Types: Cigarettes     Start date: 0    Smokeless tobacco: Never Used    Tobacco comment: Pt quit in 9/26/2018& started back 2/2020   Substance and Sexual Activity    Alcohol use: Not Currently     Alcohol/week: 0.0 standard drinks    Drug use: No    Sexual activity: Yes     Partners: Female   Lifestyle    Physical activity     Days per week: Not on file     Minutes per session: Not on file    Stress: Not on file   Relationships    Social connections     Talks on phone: Not on file     Gets together: Not on file     Attends Christian service: Not on file     Active member of club or organization: Not on file     Attends meetings of clubs or organizations: Not on file     Relationship status: Not on file    Intimate partner violence     Fear of current or ex partner: Not on file     Emotionally abused: Not on file     Physically abused: Not on file     Forced sexual activity: Not on file   Other Topics Concern    Not on file   Social History Narrative    Works at [email or web address]. Lives in [place]. Family History   Problem Relation Age of Onset   [place] Cancer Mother         breast cancer    Cancer Father         prostate cancer    Diabetes Father        Allergies:   Augmentin [amoxicillin-pot clavulanate]      Current Outpatient Medications   Medication Sig Dispense Refill    oxyCODONE (OXY-IR) 15 MG immediate release tablet Take 1 tablet by mouth every 4 hours as needed for Pain for up to 30 days. 120 tablet 0    fentaNYL (DURAGESIC) 100 MCG/HR Place 1 patch onto the skin every 72 hours for 30 days. 10 patch 0    LORazepam (ATIVAN) 0.5 MG tablet Take 1 tablet by mouth nightly as needed for Anxiety for up to 30 days.  30 tablet 0    mirtazapine (REMERON) 30 MG tablet Take 1 tablet by mouth nightly 90 tablet 3    sennosides-docusate sodium (SENOKOT-S) 8.6-50 MG tablet Take 2 tablets by mouth 2 times daily 120 tablet 2    pregabalin (LYRICA) 150 MG capsule Take 1 capsule by mouth 3 times daily for 90 days. 90 capsule 2    apixaban (ELIQUIS) 2.5 MG TABS tablet Take 1 tablet by mouth 2 times daily 60 tablet 2    buPROPion (WELLBUTRIN) 75 MG tablet take 1 tablet by mouth twice a day 60 tablet 3    varenicline (CHANTIX STARTING MONTH JONY) 0.5 MG X 11 & 1 MG X 42 tablet Take by mouth.  1 box 0    formoterol (PERFOROMIST) 20 MCG/2ML nebulizer solution Take 2 mLs by nebulization 2 times daily DX: COPD J44.9 120 mL 5    Revefenacin (YUPELRI) 175 MCG/3ML SOLN Inhale 3 mLs into the lungs daily 90 mL 5    budesonide (PULMICORT) 0.5 MG/2ML nebulizer suspension Take 2 mLs by nebulization 2 times daily DX: COPD J44.9 120 mL 6    pembrolizumab (KEYTRUDA) 100 MG/4ML SOLN Infuse 200 mg intravenously every 21 days      albuterol (ACCUNEB) 0.63 MG/3ML nebulizer solution Take 1 ampule by nebulization every 6 hours as needed for Shortness of Breath      DULoxetine (CYMBALTA) 30 MG extended release capsule Take 1 capsule by mouth 2 times daily 180 capsule 1    simvastatin (ZOCOR) 40 MG tablet Take 40 mg by mouth nightly      levothyroxine (SYNTHROID) 125 MCG tablet Take 1 tablet by mouth Daily 90 tablet 0    tamsulosin (FLOMAX) 0.4 MG capsule Take 1 capsule by mouth nightly 90 capsule 1    esomeprazole (NEXIUM) 40 MG delayed release capsule Take 1 capsule by mouth every morning (before breakfast) Instructed to take am of procedure 90 capsule 1    predniSONE (DELTASONE) 10 MG tablet Take 10 mg by mouth daily       OXYGEN Inhale 4 L into the lungs nightly      chlorhexidine (PERIDEX) 0.12 % solution Take 15 mLs by mouth daily Swish & spit qd 473 mL 2    Cholecalciferol (VITAMIN D3) 5000 units TABS Take 5,000 Units by mouth daily        No current facility-administered medications for this encounter. REVIEW OF SYSTEMS:       Constitutional:  No fever chills or rigors.  Eyes: No changes in vision, discharge, or pain   ENT: No Headaches, hearing loss or vertigo. No mouth sores or sore throat. No change in taste or smell.  Cardiovascular: No chest discomfort, dyspnea on exertion, palpitations or loss of consciousness or phlebitis.  Respiratory: Has no cough or wheezing, Has no sputum production. Has no hemoptysis, has no pleuritic pain.  Gastrointestinal: No abdominal pain, appetite loss, blood in stools. No change in bowel habits. No hematemesis    Genitourinary: Patient acknowledges no dysuria, trouble voiding, or hematuria. No nocturia or increased frequency.  Musculoskeletal: No gait disturbance, weakness or joint complaints.  Integumentary: No rash or pruritis.  Neurological: No headache, diplopia, change in muscle strength, numbness or tingling. No change in gait, balance, coordination, mood, affect, memory, mentation, behavior.  Psychiatric: No anxiety, or depression.  Endocrine: No temperature intolerance. No excessive thirst, fluid intake, or urination. No tremor.  Hematologic/Lymphatic: No abnormal bruising or bleeding, blood clots or swollen lymph nodes.  Allergic/Immunologic: No nasal congestion or hives. IMAGING:    No new imaging in Epic. ASSESSMENT/PLAN:    locally advanced left lung cancer with spinal column involvement s/p palliative RT followed by a definitive concurrent course and then continued CHEMO followed by immunotherapy (the later continues - Keytruda per [facility]). The patient underwent a course of radiation therapy to the subglottic mass, which was completed on 10/3/17. He completed 3800 cGy in 19 fractions. Prior to this, he received 5000 cGy in 22 fractions to the left lung and spine for metastatic lung cancer (completed 6/3/16). Patient is doing fair post-radiation completion. Skin care reviewed.  Signs and symptoms of recurrence reviewed. Imaging per med onc. Trach:  following with Dr Marin White at [facility] for planned trach repositioning because \"the trach is too high right now and affects my breathing. \" He was planned to have this surgery last year but held off due to Covid. Hopes to be able to have the surgery soon. Patient is following with Dr Tobias Bloch for medical oncology. Continues on Keytruda. Visits as scheduled. Smokes 1/2 pack per day and this is decreased from 2 PPD one year ago. Continues to try to cut back. Encouraged smoking cessation      I discussed follow up plans with José Cristina. At this time, Dr Marquis Curiel will see the patient back on a PRN basis as he is following closely with med onc for his cancer care. Instructed to follow up with other providers involved in their care as directed (including but not limited to Medical Oncology, Primary Care, Pulmonary, and Surgery). The patient was given our contact number in the event that if at any time they change their mind and would like to return to the clinic to see either myself or one of the Radiation Oncologists, they can simply call us and we would be happy to see them. Thank you for involving us in the management of this extremely pleasant patient. Sincerely,    Wagner Portillo, MSN, RN, APRN-CNP  Nurse Practitioner for Radiation Oncology    PHYSICIANS [facility]) [facility]: [phone] (JANICE: [phone])  [facility]) [facility]: [phone] ([facility]: [phone])  [address] [place] [address]) [facility]:  [phone] (JAYLEN:  [phone])      I AFFIRM this is a Patient Initiated Episode with a Patient who has not had a related appointment within my department in the past 7 days or scheduled within the next 24 hours.     Total time: 10-20 min

## 2021-02-17 DIAGNOSIS — Z51.5 PALLIATIVE CARE BY SPECIALIST: ICD-10-CM

## 2021-02-17 DIAGNOSIS — G89.3 CHRONIC PAIN DUE TO NEOPLASM: ICD-10-CM

## 2021-02-17 RX ORDER — OXYCODONE HYDROCHLORIDE 15 MG/1
15 TABLET ORAL EVERY 4 HOURS PRN
Qty: 120 TABLET | Refills: 0 | Status: SHIPPED | OUTPATIENT
Start: 2021-02-17 | End: 2021-03-19

## 2021-02-23 ENCOUNTER — OFFICE VISIT (OUTPATIENT)
Dept: PALLATIVE CARE | Age: 66
End: 2021-02-23
Payer: MEDICARE

## 2021-02-23 VITALS
BODY MASS INDEX: 28.45 KG/M2 | OXYGEN SATURATION: 95 % | SYSTOLIC BLOOD PRESSURE: 139 MMHG | HEART RATE: 86 BPM | WEIGHT: 204 LBS | DIASTOLIC BLOOD PRESSURE: 80 MMHG

## 2021-02-23 DIAGNOSIS — G89.3 CHRONIC PAIN DUE TO NEOPLASM: ICD-10-CM

## 2021-02-23 LAB
AMPHETAMINE SCREEN, URINE: NOT DETECTED
BARBITURATE SCREEN URINE: NOT DETECTED
BENZODIAZEPINE SCREEN, URINE: NOT DETECTED
CANNABINOID SCREEN URINE: NOT DETECTED
COCAINE METABOLITE SCREEN URINE: NOT DETECTED
FENTANYL SCREEN, URINE: POSITIVE
Lab: ABNORMAL
METHADONE SCREEN, URINE: NOT DETECTED
OPIATE SCREEN URINE: NOT DETECTED
OXYCODONE URINE: POSITIVE
PHENCYCLIDINE SCREEN URINE: NOT DETECTED

## 2021-02-23 PROCEDURE — 99212 OFFICE O/P EST SF 10 MIN: CPT

## 2021-02-23 PROCEDURE — 99213 OFFICE O/P EST LOW 20 MIN: CPT | Performed by: STUDENT IN AN ORGANIZED HEALTH CARE EDUCATION/TRAINING PROGRAM

## 2021-02-23 RX ORDER — FENTANYL 75 UG/H
1 PATCH TRANSDERMAL
Qty: 7 PATCH | Refills: 0 | Status: SHIPPED
Start: 2021-02-23 | End: 2021-02-23 | Stop reason: SDUPTHER

## 2021-02-23 RX ORDER — FENTANYL 75 UG/H
1 PATCH TRANSDERMAL
Qty: 10 PATCH | Refills: 0 | Status: SHIPPED
Start: 2021-02-23 | End: 2021-03-23 | Stop reason: CLARIF

## 2021-02-23 NOTE — PROGRESS NOTES
Department of Palliative Medicine  Virtual visit telephone encounter  Provider: Silvina Spear MD        Chief Complaint: Bettina Baez is a 72 y.o. male with chief complaint of pain    HPI  Mr. 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 Assessment/Plan     Lung cancer   - Stage 4 lung cancer   - Follows with Dr. William Francis   - Received radiation therapy, along with chemotherapy     Chronic Pain due to Neoplasm/chronic lumbar radiculopathy:           -  Decrease Fentanyl patch to 75mcg q72hr              -  Cont Oxycodone increase to 15 mg Q 4 PRN uses 2-3 pills               -  Lyrica 150 mg TID for neuropathy, hand tingling sensation   -  Pain injection into his back was successful, . Constipation:              -  Pericolace 2 tabs BID              -  Add Miralax daily prn     Depression/Anxiety:   - Cymbalta 30 mg BID              -DC Ativan 0.5 mg HS PRN    Insomnia    - D/c lorazepam 0.5 mg HS   - Cont Mirtazipine    - melatonin 5mg HS      Follow Up: 4 weeks  Encouraged to call with any questions, concerns, needs, or changes in symptoms. Subjective:     Bettina Baez is a 72 y.o. male with significant past medical history of lung cancer who was seen by [address] [place] [address]. Today comes after having the epidural to his back, his pain is much decreased. He continues using oxycodone about 2-3 times daily. We are speaking about decreasing his fentanyl patch from 100 mics to 75 mcg. He does not complain of nausea or vomiting, he is currently has a constant randle with constipation. Sometimes is hard, sometimes his diarrhea. He is not using Ativan any longer. Continues using mirtazapine as this has helped him, to put on weight.   We will continue the rest of the medications. Objective:     Physical Exam  Wt Readings from Last 3 Encounters:   02/23/21 204 lb (92.5 kg)   01/18/21 194 lb (88 kg)   12/28/20 190 lb (86.2 kg)      Gen:  Alert, appears stated age, well nourished, in no acute distress  HEENT:  Speaks by holding trach  Neck:  Supple  Lungs:  CTA bilaterally, no audible rhonchi or wheezes noted  Heart[de-identified]  RRR, no murmur, rub, or gallop noted during exam  Abd:  Soft, non tender, non distended, BS+  M/S/Ext:  Moving all extremities, no edema, pulses present  Skin:  Warm and dry  Neuro:  PERRL, Alert, oriented x 3; following commands      [employer] Symptom Assessment Score   [employer] Score 2/23/2021 9/22/2020 8/11/2020 7/7/2020 6/9/2020   Pain Score 3 4 6 9 7   Tiredness Score 4 5 2 5 6   Nausea Score Not nauseated Not nauseated Not nauseated Not nauseated Not nauseated   Depression Score 3 4 4 4 4   Anxiety Score 3 5 4 4 4   Drowsiness Score 5 3 4 3 4   Appetite Score 2 4 1 1 1   Wellbeing Score 4 5 5 2 3   Dyspnea Score 6 5 7 6 6   Other Problem Score 5 4 6 6 7   Total Assessment Score(calculated) 35 39 39 40 42     Assessed by: patient. Current Medications:  Medications reviewed: yes    Controlled Substances Monitoring: [place] reviewed 2/23/21. RX Monitoring 7/7/2020   Attestation -   Periodic Controlled Substance Monitoring Possible medication side effects, risk of tolerance/dependence & alternative treatments discussed. ;No signs of potential drug abuse or diversion identified. ;Assessed functional status. ;Obtaining appropriate analgesic effect of treatment. Chronic Pain > 50 MEDD Re-evaluated the status of the patient's underlying condition causing pain. Chronic Pain > 80 MEDD Obtained or confirmed \"Medication Contract\" on file. Chronic Pain > 120 MEDD Engaged a pain medicine specialist as a prescriber or consultant.          Americo Santiago MD  Palliative Care Department     Time/Communication  Time/Communication  Greater than 50% of time spent, total 20 minutes in face-to-face counseling and coordination of care regarding symptom management.

## 2021-02-27 ENCOUNTER — IMMUNIZATION (OUTPATIENT)
Dept: PRIMARY CARE CLINIC | Age: 66
End: 2021-02-27
Payer: MEDICARE

## 2021-02-27 LAB
6AM URINE: <10 NG/ML
CODEINE, URINE: <20 NG/ML
FENTANYL URINE: 10.2 NG/ML
HYDROCODONE, URINE: <20 NG/ML
HYDROMORPHONE, URINE: <20 NG/ML
MORPHINE URINE: <20 NG/ML
NORFENTANYL, URINE: 157.9 NG/ML
NORHYDROCODONE, URINE: <20 NG/ML
NOROXYCODONE, URINE: >4000 NG/ML
NOROXYMORPHONE, URINE: 55 NG/ML
OXYCODONE, URINE CONFIRMATION: 1237 NG/ML
OXYMORPHONE, URINE: <20 NG/ML

## 2021-02-27 PROCEDURE — 91300 COVID-19, PFIZER VACCINE 30MCG/0.3ML DOSE: CPT | Performed by: INTERNAL MEDICINE

## 2021-02-27 PROCEDURE — 0001A COVID-19, PFIZER VACCINE 30MCG/0.3ML DOSE: CPT | Performed by: INTERNAL MEDICINE

## 2021-03-08 ENCOUNTER — OFFICE VISIT (OUTPATIENT)
Dept: PAIN MANAGEMENT | Age: 66
End: 2021-03-08
Payer: MEDICARE

## 2021-03-08 VITALS
OXYGEN SATURATION: 94 % | HEART RATE: 78 BPM | BODY MASS INDEX: 28.56 KG/M2 | HEIGHT: 71 IN | RESPIRATION RATE: 16 BRPM | SYSTOLIC BLOOD PRESSURE: 124 MMHG | WEIGHT: 204 LBS | TEMPERATURE: 98.2 F | DIASTOLIC BLOOD PRESSURE: 68 MMHG

## 2021-03-08 DIAGNOSIS — M47.817 LUMBOSACRAL SPONDYLOSIS WITHOUT MYELOPATHY: ICD-10-CM

## 2021-03-08 DIAGNOSIS — M54.16 LUMBAR RADICULOPATHY: ICD-10-CM

## 2021-03-08 DIAGNOSIS — M53.3 SACROILIAC DYSFUNCTION: ICD-10-CM

## 2021-03-08 DIAGNOSIS — M51.36 DDD (DEGENERATIVE DISC DISEASE), LUMBAR: Primary | ICD-10-CM

## 2021-03-08 PROCEDURE — 99213 OFFICE O/P EST LOW 20 MIN: CPT | Performed by: ANESTHESIOLOGY

## 2021-03-08 NOTE — PROGRESS NOTES
Do you currently have any of the following:    Fever: No  Headache:  No  Cough: No  Shortness of breath: No  Exposed to anyone with these symptoms: No         Alan Hanley. presents to the [facility] on 3/8/2021. Renetta Gupta is complaining of pain rt. Hip to foot lower back The pain is intermittent. The pain is described as stabbing. Pain is rated on his best day at a 2, on his worst day at a 7, and on average at a 2 on the VAS scale. He took his last dose of      Any procedures since your last visit:     Pacemaker or defibrillator: No managed by . He is not on NSAIDS and is  on anticoagulation medications to include Eliquis and is managed by PCP     Medication Contract and Consent for Opioid Use Documents Filed      No documents found                /68   Pulse 78   Temp 98.2 °F (36.8 °C) (Oral)   Resp 16   Ht 5' 11\" (1.803 m)   Wt 204 lb (92.5 kg)   SpO2 94%   BMI 28.45 kg/m²      No LMP for male patient.

## 2021-03-08 NOTE — PROGRESS NOTES
[facility]        [address] [facility], [address] [facility]  Dept: [phone]        Follow up Note      Bryson Bennett. Date of Visit:  3/8/2021    CC:  Patient presents for follow up   Chief Complaint   Patient presents with    Follow-up     post procedure        HPI:  Chronic low back pain for > a year.     H/o Ca lung and Throat ca. S/p RT/ chemo- Has a tracheostomy. Has been treated by oncology.     Under palliative medicine care for neuropathic pain and managed by medications including opioids.     Pain does radiate to both lower extremities- right side > left     Pain is unchanged. Change in quality of symptoms:no. Medication side effects:none. On anticoagulation for h/o DVT    S/P BL SIJ injection and right trochanteric bursa injection on 10/29/2020 > 70% relief of the SIJ pain. S/P Right L3 & L4 TFESI on 12/28/2020 with > 50% pain relief. Has intermittent low back and right LE pain. But not as significant. Continues HEP. Nursing notes and details of the pain history reviewed. Please see intake notes for details. Previous treatments:   Physical Therapy : yes, HEP- as tolerated      Chiropractic treatment: no     Medications: - NSAID's : yes                        - Membrane stabilizers : yes - gabapentin, Lyrica                       - Opioids : yes: Duragesic, Oxycodone                       - Adjuvants or Others : yes,      TENS Unit: no     Surgeries: no LS spine surgery. H/o prior Cervical fusion noted.     He has been on anticoagulation medications - Eliquis.     He has not been on herbal supplements.       He is not diabetic.     H/O Smoking: +  H/O alcohol abuse : no  H/O Illicit drug use : no     Imaging:   MRI of LS spine on 7/28/2020 reviewed:  Impression         1.  Severe central canal stenosis and moderate bilateral neural    foraminal narrowing at L3-4 and L4-5.         2. Grade 1 anterolisthesis of L3 on L4 and L4 on L5.         3. Stable distal abdominal aortic aneurysm measuring 3 cm in diameter.       X-ray of the sacroiliac joints done on 7/24/2020: Impression    Mild-to-moderate degenerative changes involving bilateral sacroiliac    joints.         PET scan: 2/20120: Impression    1. Two focal suspicious areas of increased metabolic activity in the    base of the oral cavity/sublingual spaces as above commented.         2. No indication for metastatic lymph node adenopathy in the neck or    mediastinum.         3. No abnormal uptake of the radionuclide is seen at the level of the    lungs including multiple parenchymal opacities observed bilaterally on    recent CT chest.                 Mri of the right femur and Hip: 5/20219:  Impression         1. Moderate right knee effusion with mild synovitis. This is of    unclear etiology. This finding can be better characterized with MRI of    the right knee without contrast if clinically indicated. 2. Small scrotal hydroceles of unclear clinical significance. 3. No evidence for metastatic disease.       Xray of the right hip: 3/2019:      Impression     Moderate degenerative changes of the right hip.            Potential Aberrant Drug-Related Behavior:no      OARRS report[de-identified]  Reviewed today 3/8/21  getting meds form Palliative.     Past Medical History:   Diagnosis Date    Abdominal aortic aneurysm without rupture ([address] 08/27/2018    stable per Ct 1/23/2020 / see epic    Acute bronchitis with COPD ([address] 5/27/2017    Arthritis     COPD (chronic obstructive pulmonary disease) ([address]     Decreased dorsalis pedis pulse 11/4/2020    Depression with anxiety     Emphysema of lung ([address]     Encounter for antineoplastic immunotherapy     HAP (hospital-acquired pneumonia)     resolved    History of deep vein thrombosis 11/4/2020    Hyperlipidemia     Leg swelling 11/4/2020    Lung cancer ([address] 04/11/2016    chemo and radiation    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    Paralyzed vocal cords  Thrombosis of testis     Tobacco dependence 5/27/2017       Past Surgical History:   Procedure Laterality Date    BRONCHOSCOPY N/A 3/5/2020    BRONCHOSCOPY DIAGNOSTIC OR CELL [address] ONLY performed by Charles Robins MD at [address] [place] [address]  2015    KNEE ARTHROSCOPY      LUNG BIOPSY Left 5/6/2016    MEDICATION INJECTION Bilateral 10/29/2020    BILATERAL SACROILIAC JOINT INJECTION AND RIGHT TROCHANTERIC BURSA INJECTION UNDER FLUOROSCOPY (CPT 86390,20314,27732)++TRACH-NO VENT++ performed by Rosa Elena Hodges MD at [facility] HISTORY  4/15/2016    cervical myelogram    PAIN MANAGEMENT PROCEDURE Right 12/28/2020    # 1 LUMBAR TRANSFORAMINAL EPIDURAL STEROID INJECTION RIGHT L3 AND L4 UNDER FLUOROSCOPIC GUIDANCE performed by Rosa Elena Hodges MD at [address]  05/24/2017    # 6 Shiley (disposable)       Prior to Admission medications    Medication Sig Start Date End Date Taking? Authorizing Provider   fentaNYL (DURAGESIC) 75 MCG/HR Place 1 patch onto the skin every 72 hours for 30 days. 2/23/21 3/25/21 Yes Tal Knott MD   oxyCODONE (OXY-IR) 15 MG immediate release tablet Take 1 tablet by mouth every 4 hours as needed for Pain for up to 30 days. 2/17/21 3/19/21 Yes Tal Knott MD   mirtazapine (REMERON) 30 MG tablet Take 1 tablet by mouth nightly 1/12/21 1/7/22 Yes Tal Knott MD   sennosides-docusate sodium (SENOKOT-S) 8.6-50 MG tablet Take 2 tablets by mouth 2 times daily 1/12/21 4/12/21 Yes Tal Knott MD   pregabalin (LYRICA) 150 MG capsule Take 1 capsule by mouth 3 times daily for 90 days. 1/4/21 4/4/21 Yes BG French CNP   buPROPion [facility]) 75 MG tablet take 1 tablet by mouth twice a day 12/7/20  Yes Charles Robins MD   varenicline (CHANTIX STARTING MONTH PAK) 0.5 MG X 11 & 1 MG X 42 tablet Take by mouth.  11/24/20  Yes Cony K Horner, APRN - CNP   formoterol (PERFOROMIST) 20 MCG/2ML nebulizer solution Take 2 mLs by nebulization 2 times daily DX: COPD J44.9 11/10/20  Yes Dianna Navarro MD   Revefenacin (YUPELRI) 175 MCG/3ML SOLN Inhale 3 mLs into the lungs daily 10/13/20  Yes Dianna Navarro MD   budesonide (PULMICORT) 0.5 MG/2ML nebulizer suspension Take 2 mLs by nebulization 2 times daily DX: COPD J44.9 10/2/20  Yes Dianna Navarro MD   pembrolizumab (KEYTRUDA) 100 MG/4ML SOLN Infuse 200 mg intravenously every 21 days   Yes Historical Provider, MD   albuterol (ACCUNEB) 0.63 MG/3ML nebulizer solution Take 1 ampule by nebulization every 6 hours as needed for Shortness of Breath   Yes Historical Provider, MD   DULoxetine (CYMBALTA) 30 MG extended release capsule Take 1 capsule by mouth 2 times daily 9/22/20 3/21/21 Yes Sheree Castanon MD   simvastatin (ZOCOR) 40 MG tablet Take 40 mg by mouth nightly   Yes Historical Provider, MD   levothyroxine (SYNTHROID) 125 MCG tablet Take 1 tablet by mouth Daily 5/8/20  Yes Sylvia Quintana DO   tamsulosin (FLOMAX) 0.4 MG capsule Take 1 capsule by mouth nightly 4/1/20  Yes Sylvia Quintana DO   esomeprazole (NEXIUM) 40 MG delayed release capsule Take 1 capsule by mouth every morning (before breakfast) Instructed to take am of procedure 4/1/20  Yes Sylvia Quintana DO   predniSONE (DELTASONE) 10 MG tablet Take 10 mg by mouth daily    Yes Historical Provider, MD   OXYGEN Inhale 4 L into the lungs nightly   Yes Historical Provider, MD   chlorhexidine (PERIDEX) 0.12 % solution Take 15 mLs by mouth daily Swish & spit qd 1/10/20  Yes Sylvia Quintana DO   Cholecalciferol (VITAMIN D3) 5000 units TABS Take 5,000 Units by mouth daily    Yes Historical Provider, MD   apixaban (ELIQUIS) 2.5 MG TABS tablet Take 1 tablet by mouth 2 times daily 12/8/20 1/7/21  Sylvia Quintana DO       Allergies   Allergen Reactions    Augmentin [Amoxicillin-Pot Clavulanate] Diarrhea       Social History     Socioeconomic History    Marital status:      Spouse name: Not on file    Number of children: Not on file    Years of education: Not on file    Highest education level: Not on file   Occupational History    Occupation: supervior a tube mill- SSI     Comment: disability short term   Social Needs    Financial resource strain: Not on file    Food insecurity     Worry: Not on file     Inability: Not on file   [employer] needs     Medical: Not on file     Non-medical: Not on file   Tobacco Use    Smoking status: Current Some Day Smoker     Packs/day: 1.00     Years: 46.00     Pack years: 46.00     Types: Cigarettes     Start date: 1972    Smokeless tobacco: Never Used    Tobacco comment: Pt quit in 9/26/2018& started back 2/2020   Substance and Sexual Activity    Alcohol use: Not Currently     Alcohol/week: 0.0 standard drinks    Drug use: No    Sexual activity: Yes     Partners: Female   Lifestyle    Physical activity     Days per week: Not on file     Minutes per session: Not on file    Stress: Not on file   Relationships    Social connections     Talks on phone: Not on file     Gets together: Not on file     Attends Sikh service: Not on file     Active member of club or organization: Not on file     Attends meetings of clubs or organizations: Not on file     Relationship status: Not on file    Intimate partner violence     Fear of current or ex partner: Not on file     Emotionally abused: Not on file     Physically abused: Not on file     Forced sexual activity: Not on file   Other Topics Concern    Not on file   Social History Narrative    Works at [employer]. Lives in [facility]. Family History   Problem Relation Age of Onset   [place] Cancer Mother         breast cancer    Cancer Father         prostate cancer    Diabetes Father        REVIEW OF SYSTEMS:     Lesa Watson denies fever/chills, chest pain, shortness of breath, new bowel or bladder complaints. All other review of systems was negative.     PHYSICAL EXAMINATION:      /68   Pulse 78   Temp 98.2 °F (36.8 °C) (Oral)   Resp 16   Ht 5' 11\" (1.803 m)   Wt 204 lb (92.5 kg)   SpO2 94%   BMI 28.45 kg/m²   General:       General appearance:  Pleasant and well-hydrated, in no distress and A & O x 3  Build:Overweight  Function: Rises from seated position easily and Moves about room without difficulty     HEENT:     Head:normocephalic, atraumatic  Pupils:regular, round, equal  Sclera: icterus absent  Tracheostomy +     Lungs:     Breathing:normal breathing pattern      CVS:     RRR     Abdomen:     Shape:non-distended and normal     Cervical spine:     Inspection:normal     Thoracic spine:                Spine inspection:normal      Lumbar spine:     Spine inspection: Normal   Palpation: Tenderness paravertebral muscles No bilaterally  Range of motion: Decreased, flexion Decreased, Lateral bending, extension and rotation bilaterally reduced is somewhat painful. Sacroiliac joint tenderness improved  Piriformis tenderness: negative bilaterally  SLR : negative bilaterally  Trochanteric bursa tenderness: Improved on the right side  CVA tenderness:No      Musculoskeletal:     Trigger points no   Extremities:     Tremors:None bilaterally upper and lower  No LE edema    Neurological:     Sensory: Normal to light touch - except for reduced sensation over the right leg     Motor:   Right  5/5              Left  5/5               Right Bicep 5/5           Left Bicep 5/5              Right Triceps 5/5       Left Triceps 5/5          Right Deltoid 5/5     Left Deltoid 5/5                  Right Quadriceps 5/5          Left Quadriceps 5/5           Right Gastrocnemius 5/5    Left Gastrocnemius 5/5  Right Ant Tibialis 5/5  Left Ant Tibialis 5/5     Reflexes:    NO changes.     Gait:no assistive device.     Dermatology:     Skin:no rashes or lesions noted       Assessment/Plan:   Diagnosis Orders   1. DDD (degenerative disc disease), lumbar     2. Lumbar radiculopathy     3. Lumbosacral spondylosis without myelopathy     4. Sacroiliac dysfunction          72 y.o. male with H/o ca lung and ca throat - s/p chemo/. RT. Has a tracheostomy. Followed by Hemonc/ ENT at ([facility]) and Palliative care. On pain medications.     Has low back and lower extremity pain - right > left. S/P B/L SIJ injection and right trochanteric bursa injection with good pain relief. MRI of the LS spine : Significant changes noted at L3-4. L4-5     S/P # 1 TFESI right L3/ L4 with > 50% relief. Over all doing well now. He has reduced his pain meds. If pain recurs significantly, will plan repeat/ # 2 right L3 and L4 Transforaminal RICHARD injection under fluoroscopy. He can call us to schedule when needed. He has H/o left UE DVT and is on Eliquis Need to hold Eliquis for 3 days prior to the procedure. He will continue medical management with Palliative care. Right Hip ROM pain + (consider hip inj in future if right hip pain continues to bother). F/U in 3 months.     Counseling :     Patient encouraged to stay active and to watch/lose weight     Encouraged to continue Regular home exercise program as tolerated - stretching / strengthening.     Smoking cessation counseling : yes      Treatment plan discussed with the patient including medication and procedure side effects. Recent events/ consultation.  Notes reviewed.     Controlled Substances Monitoring:   OARRS reviewed- 3/8/21: natty Covington MD     CC:  Sylvia Quintana DO

## 2021-03-19 RX ORDER — DULOXETIN HYDROCHLORIDE 30 MG/1
CAPSULE, DELAYED RELEASE ORAL
Qty: 180 CAPSULE | Refills: 1 | Status: SHIPPED
Start: 2021-03-19 | End: 2021-10-04 | Stop reason: SDUPTHER

## 2021-03-23 ENCOUNTER — VIRTUAL VISIT (OUTPATIENT)
Dept: PALLATIVE CARE | Age: 66
End: 2021-03-23
Payer: MEDICARE

## 2021-03-23 ENCOUNTER — IMMUNIZATION (OUTPATIENT)
Dept: PRIMARY CARE CLINIC | Age: 66
End: 2021-03-23
Payer: MEDICARE

## 2021-03-23 DIAGNOSIS — G89.3 CHRONIC PAIN DUE TO NEOPLASM: ICD-10-CM

## 2021-03-23 PROCEDURE — 0002A COVID-19, PFIZER VACCINE 30MCG/0.3ML DOSE: CPT | Performed by: NURSE PRACTITIONER

## 2021-03-23 PROCEDURE — 99442 PR PHYS/QHP TELEPHONE EVALUATION 11-20 MIN: CPT | Performed by: STUDENT IN AN ORGANIZED HEALTH CARE EDUCATION/TRAINING PROGRAM

## 2021-03-23 PROCEDURE — 91300 COVID-19, PFIZER VACCINE 30MCG/0.3ML DOSE: CPT | Performed by: NURSE PRACTITIONER

## 2021-03-23 RX ORDER — FENTANYL 50 UG/H
1 PATCH TRANSDERMAL
Qty: 5 PATCH | Refills: 0 | Status: SHIPPED | OUTPATIENT
Start: 2021-03-30 | End: 2021-04-14

## 2021-03-23 NOTE — PROGRESS NOTES
Department of Palliative Medicine  Virtual visit telephone encounter  Provider: Cora Nelson MD        Chief Complaint: Carmella Gonzalez is a 72 y.o. male with chief complaint of pain    HPI  Mr. 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 Assessment/Plan     Lung cancer   - Stage 4 lung cancer   - Follows with Dr. Damien Donaldson   - Received radiation therapy, along with chemotherapy     Chronic Pain due to Neoplasm/chronic lumbar radiculopathy:           -  Decrease Fentanyl patch to 50mcg q72hr              -  decrease Oxycodone 15 mg Q 4 PRN uses 2 pills, recommended him using half a pill. -  Lyrica 150 mg TID for neuropathy, hand tingling sensation   -  Pain injection into his back was successful, . Constipation:              -  Pericolace 2 tabs BID              -  Add Miralax daily prn     Depression/Anxiety:   - Cymbalta 30 mg BID              -DC Ativan 0.5 mg HS PRN    Insomnia    - D/c lorazepam 0.5 mg HS   - Cont Mirtazipine    - melatonin 5mg HS      Follow Up: 4 weeks  Encouraged to call with any questions, concerns, needs, or changes in symptoms. Subjective:     Carmella Gonzalez is a 72 y.o. male with significant past medical history of lung cancer who was seen by [facility]. [facility] his pain is about the same it has decreased ever since he has had the epidural shot. I have decreased his fentanyl patch even more so he will be at 50 mics in 2 weeks, along with his oxycodone we are trying to wean him slowly. I recommended that he start using 7.5 mg of oxycodone, he has been only using 15 mg 2 hours daily.   He denies any nausea or vomiting he says constipation is an ongoing randle, hopefully with the decrease in oxycodone that should cause relief from some of this constipation as well. Objective:     Physical Exam  Wt Readings from Last 3 Encounters:   03/08/21 204 lb (92.5 kg)   02/23/21 204 lb (92.5 kg)   01/18/21 194 lb (88 kg)       [place] Symptom Assessment Score   [place] Score 9/22/2020 8/11/2020 7/7/2020 6/9/2020 3/2/2020   Pain Score 4 6 9 7 7   Tiredness Score 5 2 5 6 6   Nausea Score Not nauseated Not nauseated Not nauseated Not nauseated Not nauseated   Depression Score 4 4 4 4 5   Anxiety Score 5 4 4 4 4   Drowsiness Score 3 4 3 4 5   Appetite Score 4 1 1 1 2   Wellbeing Score 5 5 2 3 4   Dyspnea Score 5 7 6 6 7   Other Problem Score 4 6 6 7 7   Total Assessment Score(calculated) 39 39 40 42 47       Assessed by: patient. Current Medications:  Medications reviewed: yes    Controlled Substances Monitoring: OARRS reviewed 3/23/21. RX Monitoring 7/7/2020   Attestation -   Periodic Controlled Substance Monitoring Possible medication side effects, risk of tolerance/dependence & alternative treatments discussed. ;No signs of potential drug abuse or diversion identified. ;Assessed functional status. ;Obtaining appropriate analgesic effect of treatment. Chronic Pain > 50 MEDD Re-evaluated the status of the patient's underlying condition causing pain. Chronic Pain > 80 MEDD Obtained or confirmed \"Medication Contract\" on file. Chronic Pain > 120 MEDD Engaged a pain medicine specialist as a prescriber or consultant. Fidel Mccollum MD  Palliative Care Department     Time/Communication  Greater than 50% of time spent, total 11-20 minutes over the phone counseling and coordination of care regarding symptom management.

## 2021-04-04 DIAGNOSIS — G89.3 CHRONIC PAIN DUE TO NEOPLASM: ICD-10-CM

## 2021-04-04 DIAGNOSIS — Z51.5 PALLIATIVE CARE BY SPECIALIST: ICD-10-CM

## 2021-04-05 RX ORDER — PREGABALIN 150 MG/1
CAPSULE ORAL
Qty: 90 CAPSULE | Refills: 2 | Status: SHIPPED
Start: 2021-04-05 | End: 2021-07-06 | Stop reason: SDUPTHER

## 2021-04-20 ENCOUNTER — OFFICE VISIT (OUTPATIENT)
Dept: PALLATIVE CARE | Age: 66
End: 2021-04-20
Payer: MEDICARE

## 2021-04-20 VITALS
HEART RATE: 85 BPM | SYSTOLIC BLOOD PRESSURE: 105 MMHG | DIASTOLIC BLOOD PRESSURE: 69 MMHG | OXYGEN SATURATION: 96 % | WEIGHT: 210 LBS | BODY MASS INDEX: 29.29 KG/M2

## 2021-04-20 DIAGNOSIS — Z51.5 PALLIATIVE CARE BY SPECIALIST: ICD-10-CM

## 2021-04-20 PROCEDURE — 99213 OFFICE O/P EST LOW 20 MIN: CPT | Performed by: STUDENT IN AN ORGANIZED HEALTH CARE EDUCATION/TRAINING PROGRAM

## 2021-04-20 PROCEDURE — 99212 OFFICE O/P EST SF 10 MIN: CPT | Performed by: STUDENT IN AN ORGANIZED HEALTH CARE EDUCATION/TRAINING PROGRAM

## 2021-04-20 RX ORDER — FENTANYL 25 UG/H
1 PATCH TRANSDERMAL
Qty: 10 PATCH | Refills: 0 | Status: SHIPPED
Start: 2021-04-20 | End: 2021-05-25 | Stop reason: SDUPTHER

## 2021-04-20 NOTE — PROGRESS NOTES
Department of Palliative Medicine  Virtual visit telephone encounter  Provider: Miranda Rivas MD        Chief Complaint: Bjorn Perrin is a 72 y.o. male with chief complaint of pain    HPI  Mr. 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 Assessment/Plan     Lung cancer   - Stage 4 lung cancer   - Follows with Dr. PAUL THOMAS   - Received radiation therapy, along with chemotherapy     Chronic Pain due to Neoplasm/chronic lumbar radiculopathy:           -  Decrease Fentanyl patch to 25mcg q72hr              -  decrease Oxycodone 15 mg Q 4 PRN uses 1 pill daily, recommended him using half a pill. -  Lyrica 150 mg TID for neuropathy, hand tingling sensation   -  Pain injection into his back was successful, . Constipation:              -  Pericolace 2 tabs BID              -  Add Miralax daily prn     Depression/Anxiety:   - Cymbalta 30 mg BID              -DC Ativan 0.5 mg HS PRN    Insomnia    - D/c lorazepam 0.5 mg HS   -Decrease Mirtazipine 30 mg to 15 mg   - melatonin 5mg HS      Follow Up: 4 weeks  Encouraged to call with any questions, concerns, needs, or changes in symptoms. Subjective:     Bjorn Perrin is a 72 y.o. male with significant past medical history of lung cancer who was seen by [address] [place] [address]. S spoke to Reji Montes today in the office, he complains of pain occasionally. He has not needed to use his oxycodone for about a week. I recommended that we start decreasing his fentanyl patch, I will decrease this by 25mg to 25 mg every 72 hours. I have also encouraged him to cut his pills of oxycodone 15 mg in half. Patient's mirtazapine 30 mg was for his sleep, recommended to decrease the pill in half.   Does not complain of nausea vomiting or constipation. Objective:     Physical Exam  Wt Readings from Last 3 Encounters:   03/08/21 204 lb (92.5 kg)   02/23/21 204 lb (92.5 kg)   01/18/21 194 lb (88 kg)      Gen:  Alert, appears stated age, well nourished, in no acute distress  HEENT:  Speaks by holding trach  Neck:  Supple  Lungs:  CTA bilaterally, no audible rhonchi or wheezes noted  Heart[de-identified]  RRR, no murmur, rub, or gallop noted during exam  Abd:  Soft, non tender, non distended, BS+  M/S/Ext:  Moving all extremities, no edema, pulses present  Skin:  Warm and dry  Neuro:  PERRL, Alert, oriented x 3; following commands      [place] Symptom Assessment Score   [place] Score 2/23/2021 9/22/2020 8/11/2020 7/7/2020 6/9/2020   Pain Score 3 4 6 9 7   Tiredness Score 4 5 2 5 6   Nausea Score Not nauseated Not nauseated Not nauseated Not nauseated Not nauseated   Depression Score 3 4 4 4 4   Anxiety Score 3 5 4 4 4   Drowsiness Score 5 3 4 3 4   Appetite Score 2 4 1 1 1   Wellbeing Score 4 5 5 2 3   Dyspnea Score 6 5 7 6 6   Other Problem Score 5 4 6 6 7   Total Assessment Score(calculated) 35 39 39 40 42     Assessed by: patient. Current Medications:  Medications reviewed: yes    Controlled Substances Monitoring: OARRS reviewed 4/20/21. RX Monitoring 7/7/2020   Attestation -   Periodic Controlled Substance Monitoring Possible medication side effects, risk of tolerance/dependence & alternative treatments discussed. ;No signs of potential drug abuse or diversion identified. ;Assessed functional status. ;Obtaining appropriate analgesic effect of treatment. Chronic Pain > 50 MEDD Re-evaluated the status of the patient's underlying condition causing pain. Chronic Pain > 80 MEDD Obtained or confirmed \"Medication Contract\" on file. Chronic Pain > 120 MEDD Engaged a pain medicine specialist as a prescriber or consultant.          Carmelita Wilde MD  Palliative Care Department     Time/Communication  Time/Communication  Greater than 50% of time spent, total 20 minutes in face-to-face counseling and coordination of care regarding symptom management.

## 2021-05-18 DIAGNOSIS — E03.9 ACQUIRED HYPOTHYROIDISM: ICD-10-CM

## 2021-05-18 PROBLEM — Z93.0 TRACHEOSTOMY IN PLACE (HCC): Status: ACTIVE | Noted: 2021-05-18

## 2021-05-18 PROBLEM — J15.211 PNEUMONIA DUE TO METHICILLIN SUSCEPTIBLE STAPHYLOCOCCUS AUREUS (MSSA) (HCC): Status: RESOLVED | Noted: 2020-09-30 | Resolved: 2021-05-18

## 2021-05-18 PROBLEM — G62.0 CHEMOTHERAPY-INDUCED PERIPHERAL NEUROPATHY (HCC): Status: ACTIVE | Noted: 2021-05-18

## 2021-05-18 PROBLEM — T45.1X5A CHEMOTHERAPY-INDUCED PERIPHERAL NEUROPATHY (HCC): Status: ACTIVE | Noted: 2021-05-18

## 2021-05-18 LAB — TSH SERPL DL<=0.05 MIU/L-ACNC: 0.56 UIU/ML (ref 0.27–4.2)

## 2021-05-18 NOTE — ED PROVIDER NOTES
Mohinder, Manual 1990     Office/Outpatient Visit    Visit Date: Wed, Oct 23, 2019 12:53 pm    Provider: Maria Elena Montelongo N.P. (Assistant: Vivi Orelily MA)    Location: [facility]        Electronically signed by Maria Elena Montelongo N.P. on  10/23/2019 02:40:03 PM                             SUBJECTIVE:        CC:     Kailash is a 29 year old male.  This is his first visit to the clinic.  trouble swallowing, painful when eating and having heartburn, stomach bloating also.          HPI:         PHQ-9 Depression Screening: Completed form scanned and in chart; Total Score 0         With regard to the abdominal pain, other specified site, Kailash complains of abdominal pain that is diffuse in location.  It began 2 weeks ago.  The onset of pain occurred while eating.  He characterizes it as aching and cramping.  It is of moderate intensity.  He estimates that the frequency of pain is several times daily.  The typical duration is the majority of the day.  Aggravating factors include meals.  The pain is relieved with antacids.  He denies change in bowel habits, constipation, diarrhea, fever, nausea and vomiting.  No family members or other contacts are similarly ill.  Kailash denies recent travel.  He has no known pertinent medical conditions.  Pertinent surgical history includes prior appendectomy.      ROS:     CONSTITUTIONAL:  Negative for chills, fatigue and fever.      CARDIOVASCULAR:  Negative for chest pain, orthopnea, paroxysmal nocturnal dyspnea and pedal edema.      RESPIRATORY:  Negative for dyspnea and cough.      GASTROINTESTINAL:  Positive for abdominal pain and acid reflux symptoms ( acid taste in mouth; burning in throat and upper chest; indigestion and belching ).   Negative for constipation, diarrhea or nausea.      PSYCHIATRIC:  Negative for anxiety and depression.          [facility]/FM/:     Last Reviewed on 10/23/2019 01:09 PM by Maria Elena Montelongo    Past Medical History:             PAST  -------------------------------------------------  All laboratory and radiology results have been personally reviewed by myself   LABS:  Results for orders placed or performed during the hospital encounter of 09/17/18   Troponin   Result Value Ref Range    Troponin <0.01 0.00 - 0.03 ng/mL   CBC Auto Differential   Result Value Ref Range    WBC 10.8 4.5 - 11.5 E9/L    RBC 4.71 3.80 - 5.80 E12/L    Hemoglobin 13.0 12.5 - 16.5 g/dL    Hematocrit 40.2 37.0 - 54.0 %    MCV 85.4 80.0 - 99.9 fL    MCH 27.6 26.0 - 35.0 pg    MCHC 32.3 32.0 - 34.5 %    RDW 15.5 (H) 11.5 - 15.0 fL    Platelets 311 442 - 151 E9/L    MPV 8.9 7.0 - 12.0 fL    Neutrophils % 85.3 (H) 43.0 - 80.0 %    Immature Granulocytes % 0.6 0.0 - 5.0 %    Lymphocytes % 6.5 (L) 20.0 - 42.0 %    Monocytes % 4.5 2.0 - 12.0 %    Eosinophils % 2.6 0.0 - 6.0 %    Basophils % 0.5 0.0 - 2.0 %    Neutrophils # 9.18 (H) 1.80 - 7.30 E9/L    Immature Granulocytes # 0.06 E9/L    Lymphocytes # 0.70 (L) 1.50 - 4.00 E9/L    Monocytes # 0.48 0.10 - 0.95 E9/L    Eosinophils # 0.28 0.05 - 0.50 E9/L    Basophils # 0.05 0.00 - 0.20 N7/F   Basic Metabolic Panel   Result Value Ref Range    Sodium 132 132 - 146 mmol/L    Potassium 4.7 3.5 - 5.0 mmol/L    Chloride 94 (L) 98 - 107 mmol/L    CO2 25 22 - 29 mmol/L    Anion Gap 13 7 - 16 mmol/L    Glucose 106 74 - 109 mg/dL    BUN 20 8 - 23 mg/dL    CREATININE 1.4 (H) 0.7 - 1.2 mg/dL    GFR Non-African American 51 >=60 mL/min/1.73    GFR African American >60     Calcium 9.7 8.6 - 10.2 mg/dL   Protime-INR   Result Value Ref Range    Protime 16.9 (H) 9.3 - 12.4 sec    INR 1.5    APTT   Result Value Ref Range    aPTT 37.5 (H) 24.5 - 35.1 sec   Brain Natriuretic Peptide   Result Value Ref Range    Pro- (H) 0 - 125 pg/mL   CBC WITH AUTO DIFFERENTIAL   Result Value Ref Range    WBC 7.8 4.5 - 11.5 E9/L    RBC 4.28 3.80 - 5.80 E12/L    Hemoglobin 11.8 (L) 12.5 - 16.5 g/dL    Hematocrit 37.2 37.0 - 54.0 %    MCV 86.9 80.0 - 99.9 fL    [facility] MEDICAL HISTORY     UNREMARKABLE         Surgical History:         Appendectomy: age 17;         Family History:     Father: Healthy     Mother:    Positive for Type 2 Diabetes;     Brother(s): 1 brother alive and healhty brother(s) total         Social History:     Occupation: Super for [employer]     Marital Status:      Children: 2 children         Tobacco/Alcohol/Supplements:     Last Reviewed on 10/23/2019 01:09 PM by Maria Elena Montelongo    Tobacco: He has never smoked.          Substance Abuse History:     Last Reviewed on 10/23/2019 01:09 PM by Maria Elena Montelongo        Mental Health History:     Last Reviewed on 10/23/2019 01:09 PM by Maria Elena Montelongo        Communicable Diseases (eg STDs):     Last Reviewed on 10/23/2019 01:09 PM by Maria Elena Montelongo            Current Problems:     Last Reviewed on 10/23/2019 01:09 PM by Maria Elena Montelongo    Screening for depression         Immunizations:     Fluzone Quadrivalent (3+ years) 10/23/2019         Allergies:     Last Reviewed on 10/23/2019 01:09 PM by Maria Elena Montelongo    Ceclor:        Current Medications:     Last Reviewed on 10/23/2019 01:09 PM by Maria Elena Montelongo    None        OBJECTIVE:        Vitals:         Current: 10/23/2019 1:01:27 PM    Ht:  6 ft, 1 in;  Wt: 210.6 lbs;  BMI: 27.8    T: 98.4 F (oral);  BP: 125/91 mm Hg (left arm, sitting);  P: 83 bpm (left arm (BP Cuff), sitting)        Exams:     PHYSICAL EXAM:     GENERAL: Vitals recorded well developed, well nourished;  well groomed;  no apparent distress;     RESPIRATORY: normal respiratory rate and pattern with no distress; normal breath sounds with no rales, rhonchi, wheezes or rubs;     CARDIOVASCULAR: normal rate; rhythm is regular;  normal S1; normal S2; no systolic murmur; no cyanosis; no edema;     GASTROINTESTINAL: moderate epigastric and RUQ pain;  normal bowel sounds; no organomegaly;     NEUROLOGIC: GROSSLY INTACT     PSYCHIATRIC:  appropriate affect and  27.6 26.0 - 35.0 pg    MCHC 31.7 (L) 32.0 - 34.5 %    RDW 15.6 (H) 11.5 - 15.0 fL    Platelets 682 047 - 447 E9/L    MPV 9.2 7.0 - 12.0 fL    Neutrophils % 85.6 (H) 43.0 - 80.0 %    Immature Granulocytes % 0.6 0.0 - 5.0 %    Lymphocytes % 5.3 (L) 20.0 - 42.0 %    Monocytes % 4.6 2.0 - 12.0 %    Eosinophils % 3.5 0.0 - 6.0 %    Basophils % 0.4 0.0 - 2.0 %    Neutrophils # 6.68 1.80 - 7.30 E9/L    Immature Granulocytes # 0.05 E9/L    Lymphocytes # 0.41 (L) 1.50 - 4.00 E9/L    Monocytes # 0.36 0.10 - 0.95 E9/L    Eosinophils # 0.27 0.05 - 0.50 E9/L    Basophils # 0.03 0.00 - 0.20 E9/L    Anisocytosis 1+    Comprehensive Metabolic Panel w/ Reflex to MG   Result Value Ref Range    Sodium 132 132 - 146 mmol/L    Potassium reflex Magnesium 4.0 3.5 - 5.0 mmol/L    Chloride 97 (L) 98 - 107 mmol/L    CO2 24 22 - 29 mmol/L    Anion Gap 11 7 - 16 mmol/L    Glucose 84 74 - 109 mg/dL    BUN 15 8 - 23 mg/dL    CREATININE 1.2 0.7 - 1.2 mg/dL    GFR Non-African American >60 >=60 mL/min/1.73    GFR African American >60     Calcium 8.7 8.6 - 10.2 mg/dL    Total Protein 6.3 (L) 6.4 - 8.3 g/dL    Alb 3.0 (L) 3.5 - 5.2 g/dL    Total Bilirubin 0.6 0.0 - 1.2 mg/dL    Alkaline Phosphatase 141 (H) 40 - 129 U/L    ALT 15 0 - 40 U/L    AST 17 0 - 39 U/L   EKG 12 Lead   Result Value Ref Range    Ventricular Rate 95 BPM    Atrial Rate 95 BPM    P-R Interval 188 ms    QRS Duration 94 ms    Q-T Interval 342 ms    QTc Calculation (Bazett) 429 ms    P Axis 66 degrees    R Axis -27 degrees    T Axis 9 degrees       RADIOLOGY:  Interpreted by Radiologist.  CTA CHEST W CONTRAST   Final Result      1. No evidence for a pulmonary embolism. 2. Consolidation within the right upper lobe consistent with   pneumonia. 3. Borderline mediastinal lymphadenopathy which may be reactive in   nature. This is similar to the prior study. XR CHEST PORTABLE   Final Result   Right upper lobe pneumonia.  Follow-up after appropriate medical   therapy is suggested demeanor; normal speech pattern; grossly normal memory;         Procedures:     Vaccination against other viral diseases, Influenza     1. Influenza, seasonal (children 3 years to adult): 0.5 ml unit dose given IM in the right upper arm; administered by KG;  lot number [id number]; expires 06/30/2020 Regarding contraindications to an Influenza vaccine: Denies moderate/severe illness with/without fever; serious reaction to eggs, egg proteins, gentamicin, gelatin, arginine, neomycin or polymixin; serious reaction after recieving previous influenza vaccines; and history of Guillain-[place] Syndrome.              ASSESSMENT:           V79.0   Z13.31  Screening for depression              DDx:     789.09   R10.84  Abdominal pain, other specified site              DDx:     V04.81   Z23  Vaccination against other viral diseases, Influenza              DDx:         ORDERS:         Meds Prescribed:       Pantoprazole 20mg Tablets, Delayed Release One tablet QD  #30 (Thirty) tablet(s) Refills: 0         Lab Orders:       [id number]  [facility] - [facility] CBC w/o diff  (Send-Out)         13867  COMP - [facility] Comp. Metabolic Panel  (Send-Out)         48782  [facility] H.pylori Breath test  (Send-Out)         51911  [facility] Ur[facility]ysis, automated, with micro  (Send-Out)           Procedures Ordered:       [id number]  Immunization administration; one vaccine  (In-House)           Other Orders:       [id number]  Influenza virus vaccine, quadrivalent, split virus, preservative free 3 years of age & older  (In-House)           Depression screen negative  (In-House)                   PLAN:          Screening for depression     MIPS PHQ-9 Depression Screening: Completed form scanned and in chart; Total Score 0; Negative Depression Screen           Orders:         Depression screen negative  (In-House)            Abdominal pain, other specified site     LABORATORY:  Labs ordered to be performed today include CBC W/O DIFF, Comprehensive metabolic panel,  H. pylori breath test, and urinalysis with micro.            Prescriptions:       Pantoprazole 20mg Tablets, Delayed Release One tablet QD  #30 (Thirty) tablet(s) Refills: 0           Orders:       [id number]  [facility]2 - [facility] CBC w/o diff  (Send-Out)         96678  COMP - [facility] Comp. Metabolic Panel  (Send-Out)         42908  [facility] - [facility] H.pylori Breath test  (Send-Out)         19006  [facility] - [facility] Urinalysis, automated, with micro  (Send-Out)            Vaccination against other viral diseases, Influenza           Orders:       69874  Immunization administration; one vaccine  (In-House)         44191  Influenza virus vaccine, quadrivalent, split virus, preservative free 3 years of age & older  (In-House)               CHARGE CAPTURE:           Primary Diagnosis:     V79.0 Screening for depression            Z13.31    Encounter for screening for depression              Orders:          [id number]   Office visit - new pt, level 3  (In-House)                Depression screen negative  (In-House)           789.09 Abdominal pain, other specified site            R10.84    Generalized abdominal pain    V04.81 Vaccination against other viral diseases, Influenza            Z23    Encounter for immunization              Orders:          [id number]   Immunization administration; one vaccine  (In-House)             01695   Influenza virus vaccine, quadrivalent, split virus, preservative free 3 years of age & older  (In-House)                   to ensure resolution. ALERT:  THIS IS AN ABNORMAL REPORT          ------------------------- NURSING NOTES AND VITALS REVIEWED ---------------------------   The nursing notes within the ED encounter and vital signs as below have been reviewed. /66   Pulse 88   Temp 98.1 °F (36.7 °C) (Temporal)   Resp 26   Ht 5' 11\" (1.803 m)   Wt 180 lb (81.6 kg)   SpO2 95%   BMI 25.10 kg/m²   Oxygen Saturation Interpretation: Abnormal and Improved after treatment      ---------------------------------------------------PHYSICAL EXAM--------------------------------------      Constitutional/General: Alert and oriented x3, well appearing, non toxic in NAD  Head: Normocephalic and atraumatic  Eyes: PERRL, EOMI  Mouth: Oropharynx clear, handling secretions, no trismus  Neck: Supple, full ROM,   Pulmonary: Lungs clear to auscultation bilaterally, no wheezes, rales, or rhonchi. Not in respiratory distress  Cardiovascular:  Regular rate and rhythm, no murmurs, gallops, or rubs. 2+ distal pulses  Abdomen: Soft, non tender, non distended,   Extremities: Moves all extremities x 4. Warm and well perfused  Skin: warm and dry without rash  Neurologic: GCS 15,  Psych: Normal Affect    EKG 1537   Sinus rate 95. No acute st changes compared to prior.     ------------------------------ ED COURSE/MEDICAL DECISION MAKING----------------------  Medications   albuterol (ACCUNEB) nebulizer solution 0.63 mg (not administered)   buPROPion [facility]) tablet 75 mg (75 mg Oral Given 9/18/18 0937)   docusate sodium (COLACE) capsule 100 mg (100 mg Oral Given 9/18/18 0936)   DULoxetine (CYMBALTA) extended release capsule 30 mg (30 mg Oral Given 9/18/18 0937)   fentaNYL (DURAGESIC) 12 MCG/HR 1 patch (not administered)   fentaNYL (DURAGESIC) 25 MCG/HR 1 patch (not administered)   gabapentin (NEURONTIN) capsule 900 mg (900 mg Oral Given 9/18/18 0936)   levothyroxine (SYNTHROID) tablet 125 mcg (125 mcg Oral Given 9/18/18 0937)   LORazepam (ATIVAN) tablet 0.5 mg (not administered)   melatonin tablet 5 mg (not administered)   mirtazapine (REMERON) tablet 30 mg (not administered)   nortriptyline (PAMELOR) capsule 25 mg (not administered)   predniSONE (DELTASONE) tablet 10 mg (10 mg Oral Given 9/18/18 0936)   simvastatin (ZOCOR) tablet 40 mg (not administered)   tamsulosin (FLOMAX) capsule 0.4 mg (0.4 mg Oral Given 9/18/18 0936)   calcium-vitamin D (OSCAL-500) 500-200 MG-UNIT per tablet 1 tablet (1 tablet Oral Given 9/18/18 0936)   enoxaparin (LOVENOX) injection 40 mg (40 mg Subcutaneous Given 9/18/18 0938)   levofloxacin (LEVAQUIN) 500 MG/100ML infusion 500 mg (not administered)   0.9 % sodium chloride infusion (not administered)   0.9 % sodium chloride bolus (0 mLs Intravenous Stopped 9/17/18 2022)   albuterol (PROVENTIL) nebulizer solution 2.5 mg (2.5 mg Nebulization Given 9/17/18 1615)   iopamidol (ISOVUE-370) 76 % injection 75 mL (60 mLs Intravenous Given 9/17/18 1806)   vancomycin (VANCOCIN) 1,250 mg in dextrose 5 % 250 mL IVPB (0 mg Intravenous Stopped 9/18/18 0040)   cefepime (MAXIPIME) 2 g IVPB minibag (0 g Intravenous Stopped 9/17/18 2037)   ondansetron (ZOFRAN) injection 4 mg (4 mg Intravenous Given 9/17/18 2007)   0.9 % sodium chloride bolus (0 mLs Intravenous Stopped 9/18/18 0040)         ED COURSE:       Medical Decision Making:    Patient had acute onset of dyspnea and relatively clear lungs so I was concerned for pe given his cancer history. CTA shows no pe but does find pneumonia. Patient is immunocompromised so broad spectrum abx initiated. Discussed with admitting physician for further evaluation and management. Counseling: The emergency provider has spoken with the patient and discussed todays results, in addition to providing specific details for the plan of care and counseling regarding the diagnosis and prognosis.   Questions are answered at this time and they are agreeable with the plan.      --------------------------------- IMPRESSION AND DISPOSITION ---------------------------------    IMPRESSION  1. Pneumonia due to organism        DISPOSITION  Disposition: Admit to telemetry  Patient condition is stable      NOTE: This report was transcribed using voice recognition software.  Every effort was made to ensure accuracy; however, inadvertent computerized transcription errors may be present        Grecia Oliveros MD  09/18/18 2353

## 2021-05-25 ENCOUNTER — OFFICE VISIT (OUTPATIENT)
Dept: PALLATIVE CARE | Age: 66
End: 2021-05-25
Payer: MEDICARE

## 2021-05-25 VITALS
HEART RATE: 74 BPM | BODY MASS INDEX: 28.59 KG/M2 | WEIGHT: 205 LBS | SYSTOLIC BLOOD PRESSURE: 92 MMHG | DIASTOLIC BLOOD PRESSURE: 60 MMHG | OXYGEN SATURATION: 92 %

## 2021-05-25 DIAGNOSIS — G89.3 CHRONIC PAIN DUE TO NEOPLASM: Primary | ICD-10-CM

## 2021-05-25 DIAGNOSIS — Z51.5 PALLIATIVE CARE BY SPECIALIST: ICD-10-CM

## 2021-05-25 PROCEDURE — 99212 OFFICE O/P EST SF 10 MIN: CPT | Performed by: STUDENT IN AN ORGANIZED HEALTH CARE EDUCATION/TRAINING PROGRAM

## 2021-05-25 PROCEDURE — 99213 OFFICE O/P EST LOW 20 MIN: CPT | Performed by: STUDENT IN AN ORGANIZED HEALTH CARE EDUCATION/TRAINING PROGRAM

## 2021-05-25 RX ORDER — FENTANYL 25 UG/H
1 PATCH TRANSDERMAL
Qty: 10 PATCH | Refills: 0 | Status: SHIPPED
Start: 2021-05-25 | End: 2021-07-02 | Stop reason: SDUPTHER

## 2021-05-25 RX ORDER — MIRTAZAPINE 7.5 MG/1
7.5 TABLET, FILM COATED ORAL NIGHTLY
Qty: 30 TABLET | Refills: 0 | Status: SHIPPED
Start: 2021-05-25 | End: 2021-06-17

## 2021-05-25 NOTE — PROGRESS NOTES
Department of Palliative Medicine  Virtual visit telephone encounter  Provider: Aleena Small MD MD        Chief Complaint: Mariangel Choudhary. is a 72 y.o. male with chief complaint of pain    HPI  Mr. 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 Assessment/Plan     Lung cancer   - Stage 4 lung cancer   - Follows with Dr. Heladio Simmons   - Received radiation therapy, along with chemotherapy     Chronic Pain due to Neoplasm/chronic lumbar radiculopathy:           -  cont Fentanyl patch to 25mcg q72hr              -  decrease Oxycodone to 7.5 mg Q 4 PRN uses 1-2 pill daily              -  Lyrica 150 mg TID for neuropathy, hand tingling sensation   -  Pain injection into his back was successful, . Constipation:              -  Pericolace 2 tabs BID              -  Add Miralax daily prn     Depression/Anxiety:   - Cymbalta 30 mg BID              - DC Ativan 0.5 mg HS PRN    Insomnia    - D/c lorazepam 0.5 mg HS   - Decrease Mirtazipine to 7.5mg   - melatonin 5mg HS    Follow Up: 6 weeks  Encouraged to call with any questions, concerns, needs, or changes in symptoms. Subjective:     Mariangel Choudhary. is a 72 y.o. male with significant past medical history of lung cancer who was seen by [address] [place] [address]. Spoke to patient about his pain control, he mentions that 25 mcg of fentanyl patch has been working terrifically. He continues to decrease his oxycodone, currently utilizing 7.5 mg every 6 hours as needed, he has only needed 1 to 2 tablets daily. He has utilized Lyrica and Cymbalta as well, he still continues to complain of mirtazapine.   Weight has been stable, I will decrease his mirtazapine to 7.5 mg.     Objective:     Physical Exam  Wt Readings from Last 3 Encounters: 05/18/21 206 lb 4.8 oz (93.6 kg)   04/20/21 210 lb (95.3 kg)   03/08/21 204 lb (92.5 kg)      Gen:  Alert, appears stated age, well nourished, in no acute distress  HEENT:  Speaks by holding trach  Neck:  Supple  Lungs:  CTA bilaterally, no audible rhonchi or wheezes noted  Heart[de-identified]  RRR, no murmur, rub, or gallop noted during exam  Abd:  Soft, non tender, non distended, BS+  M/S/Ext:  Moving all extremities, no edema, pulses present  Skin:  Warm and dry  Neuro:  PERRL, Alert, oriented x 3; following commands      [place] Symptom Assessment Score   [place] Score 5/25/2021 2/23/2021 9/22/2020 8/11/2020 7/7/2020   Pain Score 4 3 4 6 9   Tiredness Score 7 4 5 2 5   Nausea Score Not nauseated Not nauseated Not nauseated Not nauseated Not nauseated   Depression Score 1 3 4 4 4   Anxiety Score 2 3 5 4 4   Drowsiness Score 6 5 3 4 3   Appetite Score Best appetite 2 4 1 1   Wellbeing Score 1 4 5 5 2   Dyspnea Score 6 6 5 7 6   Other Problem Score Best possible response 5 4 6 6   Total Assessment Score(calculated) 27 35 39 39 40         Assessed by: patient. Current Medications:  Medications reviewed: yes    Controlled Substances Monitoring: [place] reviewed 5/25/21. RX Monitoring 7/7/2020   Attestation -   Periodic Controlled Substance Monitoring Possible medication side effects, risk of tolerance/dependence & alternative treatments discussed. ;No signs of potential drug abuse or diversion identified. ;Assessed functional status. ;Obtaining appropriate analgesic effect of treatment. Chronic Pain > 50 MEDD Re-evaluated the status of the patient's underlying condition causing pain. Chronic Pain > 80 MEDD Obtained or confirmed \"Medication Contract\" on file. Chronic Pain > 120 MEDD Engaged a pain medicine specialist as a prescriber or consultant.          Marietta Rizo MD MD  Palliative Care Department     Time/Communication  Time/Communication  Greater than 50% of time spent, total 20 minutes in face-to-face counseling

## 2021-05-28 ENCOUNTER — TELEPHONE (OUTPATIENT)
Dept: PALLATIVE CARE | Age: 66
End: 2021-05-28

## 2021-05-28 DIAGNOSIS — Z51.5 PALLIATIVE CARE BY SPECIALIST: ICD-10-CM

## 2021-05-28 DIAGNOSIS — G89.3 CHRONIC PAIN DUE TO NEOPLASM: ICD-10-CM

## 2021-05-28 RX ORDER — OXYCODONE HYDROCHLORIDE 5 MG/1
7.5 TABLET ORAL EVERY 8 HOURS PRN
Qty: 135 TABLET | Refills: 0 | Status: SHIPPED
Start: 2021-05-28 | End: 2021-07-06 | Stop reason: SDUPTHER

## 2021-05-28 NOTE — TELEPHONE ENCOUNTER
Tried working on prior authorization for [place] [employer] [facility] Oxycodone 7.5 mg. Spoke to Pharmacist about this medication. Prescription is special order because it does not have a generic form, it is Brand only . Patient would still have considerable out of pocket cost even with prior auth approval. Discussed with Dr. Zoie Feldman and new prescription sent. Called to [facility] and explained new orders of Oxy IR 5 MG tablet , but to take 1 and 1/2 tablets to equal 7.5 mg every 8 hours as needed. Alan verbalizes understanding.

## 2021-06-17 RX ORDER — MIRTAZAPINE 7.5 MG/1
7.5 TABLET, FILM COATED ORAL NIGHTLY
Qty: 30 TABLET | Refills: 0 | Status: SHIPPED
Start: 2021-06-17 | End: 2021-09-28

## 2021-07-02 DIAGNOSIS — Z51.5 PALLIATIVE CARE BY SPECIALIST: ICD-10-CM

## 2021-07-02 RX ORDER — FENTANYL 25 UG/H
1 PATCH TRANSDERMAL
Qty: 10 PATCH | Refills: 0 | Status: SHIPPED | OUTPATIENT
Start: 2021-07-02 | End: 2021-08-01

## 2021-07-02 NOTE — TELEPHONE ENCOUNTER
Wife left message, patient needs refill on fentanyl 25mcg patches. Palliative Care follow up scheduled 7/6/21.

## 2021-07-06 ENCOUNTER — OFFICE VISIT (OUTPATIENT)
Dept: PALLATIVE CARE | Age: 66
End: 2021-07-06
Payer: MEDICARE

## 2021-07-06 VITALS
SYSTOLIC BLOOD PRESSURE: 101 MMHG | BODY MASS INDEX: 29.01 KG/M2 | WEIGHT: 208 LBS | DIASTOLIC BLOOD PRESSURE: 64 MMHG | HEART RATE: 76 BPM | OXYGEN SATURATION: 97 %

## 2021-07-06 DIAGNOSIS — Z51.5 PALLIATIVE CARE BY SPECIALIST: ICD-10-CM

## 2021-07-06 DIAGNOSIS — G89.3 CHRONIC PAIN DUE TO NEOPLASM: ICD-10-CM

## 2021-07-06 PROCEDURE — 99212 OFFICE O/P EST SF 10 MIN: CPT | Performed by: NURSE PRACTITIONER

## 2021-07-06 PROCEDURE — 99213 OFFICE O/P EST LOW 20 MIN: CPT | Performed by: STUDENT IN AN ORGANIZED HEALTH CARE EDUCATION/TRAINING PROGRAM

## 2021-07-06 RX ORDER — PREGABALIN 150 MG/1
CAPSULE ORAL
Qty: 90 CAPSULE | Refills: 2 | Status: SHIPPED
Start: 2021-07-06 | End: 2021-10-12 | Stop reason: SDUPTHER

## 2021-07-06 RX ORDER — OXYCODONE HYDROCHLORIDE 5 MG/1
5 TABLET ORAL EVERY 8 HOURS PRN
Qty: 90 TABLET | Refills: 0 | Status: SHIPPED
Start: 2021-07-06 | End: 2021-08-17 | Stop reason: SDUPTHER

## 2021-07-06 NOTE — PROGRESS NOTES
Department of Palliative Medicine  Virtual visit telephone encounter  Provider: Danya Crodero MD         Chief Complaint: [place] Sprain. is a 72 y.o. male with chief complaint of pain    HPI  Mr. Bro Weeks is a pleasant and cooperative 72year old man with Stage IV adenocarcinoma of the lung diagnosed in 2016 when he presented with Vipin syndrome and large left upper lobe apical mass with mediastinal lymphadenopathy and T1-T2 vertebral body destruction. Assessment/Plan     Lung cancer   - Stage 4 lung cancer   - Follows with Dr. PAUL THOMAS   - Received radiation therapy, along with chemotherapy     Chronic Pain due to Neoplasm/chronic lumbar radiculopathy:           -  cont Fentanyl patch to 25mcg q72hr, try to decrease to 12. 5mcg on next visit. -  Cont Oxycodone at 7.5 mg BID PRN uses 1-2 pill daily ( prescribed as Oxycodone 5 q8hrs prn)              -  Lyrica 150 mg TID for neuropathy, hand tingling sensation   -  Pain injection into his back was successful, decrease's pain by 75%                Constipation:              -  Pericolace 2 tabs BID              -  Add Miralax daily prn     Depression/Anxiety:   - Cymbalta 30 mg BID              - DC Ativan 0.5 mg HS PRN    Insomnia    - D/c lorazepam 0.5 mg HS   - Decrease Mirtazipine to 7.5mg   - melatonin 5mg HS    Follow Up: 6 weeks  Encouraged to call with any questions, concerns, needs, or changes in symptoms. Subjective:     [place] Sprain. is a 72 y.o. male with significant past medical history of lung cancer who was seen by [address] [place] [address]. Spoke with patient about his pain. He rates it a 4 out of 10. Complains that he is having sharp pains shooting down his legs. Continues utilizing oxycodone 7.5 mg twice daily. The prescription is for oxycodone 5 mg, he will cut a pill in half. We have been decreasing his medications, we can consider decreasing fentanyl patch from 25mcg to 12.5 mcg.   He still needs his Lyrica 150 mg 3 times daily for neuropathy. He says the epidural he has received to his back, has decrease his pain by 75%. Currently utilizing Cymbalta 30 mg twice daily. Patient denies any nausea vomiting, or constipation    Objective:     Physical Exam  Wt Readings from Last 3 Encounters:   07/06/21 208 lb (94.3 kg)   05/25/21 205 lb (93 kg)   05/18/21 206 lb 4.8 oz (93.6 kg)      Gen:  Alert, appears stated age, well nourished, in no acute distress  HEENT:  Speaks by holding trach  Neck:  Supple  Lungs:  CTA bilaterally, no audible rhonchi or wheezes noted  Heart[de-identified]  RRR, no murmur, rub, or gallop noted during exam  Abd:  Soft, non tender, non distended, BS+  M/S/Ext:  Moving all extremities, no edema, pulses present  Skin:  Warm and dry  Neuro:  PERRL, Alert, oriented x 3; following commands      Keeling Symptom Assessment Score   Keeling Score 7/6/2021 5/25/2021 2/23/2021 9/22/2020 8/11/2020   Pain Score 4 4 3 4 6   Tiredness Score 5 7 4 5 2   Nausea Score Not nauseated Not nauseated Not nauseated Not nauseated Not nauseated   Depression Score 4 1 3 4 4   Anxiety Score 6 2 3 5 4   Drowsiness Score 5 6 5 3 4   Appetite Score Best appetite Best appetite 2 4 1   Wellbeing Score 3 1 4 5 5   Dyspnea Score 5 6 6 5 7   Other Problem Score 3 Best possible response 5 4 6   Total Assessment Score(calculated) 35 27 35 39 39         Assessed by: patient. Current Medications:  Medications reviewed: yes    Controlled Substances Monitoring: OARRS reviewed 7/6/21. RX Monitoring 7/7/2020   Attestation -   Periodic Controlled Substance Monitoring Possible medication side effects, risk of tolerance/dependence & alternative treatments discussed. ;No signs of potential drug abuse or diversion identified. ;Assessed functional status. ;Obtaining appropriate analgesic effect of treatment. Chronic Pain > 50 MEDD Re-evaluated the status of the patient's underlying condition causing pain.    Chronic Pain > 80 MEDD Obtained or confirmed \"Medication Contract\" on file. Chronic Pain > 120 MEDD Engaged a pain medicine specialist as a prescriber or consultant.          Jack Ramírez MD   Palliative Care Department     Time/Communication  Greater than 50% of time spent, total 20 minutes in face-to-face counseling and coordination of care regarding symptom management.

## 2021-07-21 ENCOUNTER — OFFICE VISIT (OUTPATIENT)
Dept: ENT CLINIC | Age: 66
End: 2021-07-21
Payer: MEDICARE

## 2021-07-21 VITALS
TEMPERATURE: 97.6 F | WEIGHT: 212 LBS | SYSTOLIC BLOOD PRESSURE: 125 MMHG | DIASTOLIC BLOOD PRESSURE: 81 MMHG | HEIGHT: 71 IN | BODY MASS INDEX: 29.68 KG/M2 | OXYGEN SATURATION: 96 % | HEART RATE: 80 BPM

## 2021-07-21 DIAGNOSIS — Z93.0 TRACHEOSTOMY DEPENDENCE (HCC): Primary | ICD-10-CM

## 2021-07-21 PROCEDURE — 31575 DIAGNOSTIC LARYNGOSCOPY: CPT | Performed by: OTOLARYNGOLOGY

## 2021-07-21 PROCEDURE — 99214 OFFICE O/P EST MOD 30 MIN: CPT | Performed by: OTOLARYNGOLOGY

## 2021-07-21 RX ORDER — LANOLIN ALCOHOL/MO/W.PET/CERES
10 CREAM (GRAM) TOPICAL NIGHTLY PRN
COMMUNITY
End: 2022-04-19

## 2021-07-21 ASSESSMENT — ENCOUNTER SYMPTOMS
TROUBLE SWALLOWING: 1
DIARRHEA: 0
COLOR CHANGE: 0
APNEA: 0
CHEST TIGHTNESS: 0
GASTROINTESTINAL NEGATIVE: 1
ABDOMINAL PAIN: 0
VOMITING: 0
VOICE CHANGE: 1
EYE PAIN: 0
RESPIRATORY NEGATIVE: 1
EYE DISCHARGE: 0
EYES NEGATIVE: 1
SHORTNESS OF BREATH: 0

## 2021-07-21 NOTE — PROGRESS NOTES
Subjective:      Patient ID:  Lex Garcia is a 72 y.o. male. HPI:    Patient presents today for trach eval. Condition has been present for 4 year(s). Pt was having obstruction and trach was placed as a planned procedure. Pt states the trach is at an angle and is difficult to breath sometimes. Pt had chemoradiation in 2018-9 for laryngeal cancer. Pt is also having green discharge from the nose. Had sinus surgery 2x.       Past Medical History:   Diagnosis Date    Abdominal aortic aneurysm without rupture ([address] 08/27/2018    stable per Ct 1/23/2020 / see epic    Acute bronchitis with COPD ([address] 5/27/2017    Arthritis     COPD (chronic obstructive pulmonary disease) ([address]     Decreased dorsalis pedis pulse 11/4/2020    Depression with anxiety     Emphysema of lung ([address]     Encounter for antineoplastic immunotherapy     HAP (hospital-acquired pneumonia)     resolved    History of deep vein thrombosis 11/4/2020    Hyperlipidemia     Leg swelling 11/4/2020    Lung cancer ([facility] 04/11/2016    chemo and radiation    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    Paralyzed vocal cords     Thrombosis of testis     Tobacco dependence 5/27/2017     Past Surgical History:   Procedure Laterality Date    BRONCHOSCOPY N/A 3/5/2020    BRONCHOSCOPY DIAGNOSTIC OR CELL [address] ONLY performed by Dang Simmons MD at [address] [place] [address]  2015    KNEE ARTHROSCOPY      LUNG BIOPSY Left 5/6/2016    MEDICATION INJECTION Bilateral 10/29/2020    BILATERAL SACROILIAC JOINT INJECTION AND RIGHT TROCHANTERIC BURSA INJECTION UNDER FLUOROSCOPY (CPT 78032,95559,47432)++TRACH-NO VENT++ performed by Rian Gallagher MD at [facility] HISTORY  4/15/2016    cervical myelogram    PAIN MANAGEMENT PROCEDURE Right 12/28/2020    # 1 LUMBAR TRANSFORAMINAL EPIDURAL STEROID INJECTION RIGHT L3 AND L4 UNDER FLUOROSCOPIC GUIDANCE performed by Rian Gallagher MD at [place] [address] Abused:     Physically Abused:     Sexually Abused: Allergies   Allergen Reactions    Augmentin [Amoxicillin-Pot Clavulanate] Diarrhea       Review of Systems   Constitutional: Negative. Negative for appetite change. HENT: Positive for trouble swallowing and voice change. Eyes: Negative. Negative for pain, discharge and visual disturbance. Respiratory: Negative. Negative for apnea, chest tightness and shortness of breath. Cardiovascular: Negative. Negative for chest pain, palpitations and leg swelling. Gastrointestinal: Negative. Negative for abdominal pain, diarrhea and vomiting. Endocrine: Negative for cold intolerance, heat intolerance and polydipsia. Genitourinary: Negative. Negative for dysuria, flank pain and hematuria. Musculoskeletal: Negative. Negative for arthralgias, gait problem and neck pain. Skin: Negative. Negative for color change, pallor and rash. Allergic/Immunologic: Negative for environmental allergies, food allergies and immunocompromised state. Neurological: Negative. Negative for dizziness, numbness and headaches. Hematological: Negative for adenopathy. Psychiatric/Behavioral: Negative. Negative for behavioral problems and hallucinations. All other systems reviewed and are negative. Objective:     Vitals:    07/21/21 1525   BP: 125/81   Pulse: 80   Temp: 97.6 °F (36.4 °C)   SpO2: 96%     Physical Exam  Vitals reviewed. Constitutional:       Appearance: He is well-developed. HENT:      Head: Normocephalic and atraumatic. Right Ear: Hearing, tympanic membrane, ear canal and external ear normal.      Left Ear: Hearing, tympanic membrane, ear canal and external ear normal.      Nose: Nose normal.      Mouth/Throat:      Pharynx: Uvula midline. Eyes:      Conjunctiva/sclera: Conjunctivae normal.      Pupils: Pupils are equal, round, and reactive to light. Neck:      Trachea: Tracheostomy present.      Cardiovascular:      Rate and Rhythm: Normal rate and regular rhythm. Heart sounds: Normal heart sounds. Pulmonary:      Effort: Pulmonary effort is normal.      Breath sounds: Normal breath sounds. Abdominal:      General: Bowel sounds are normal.      Palpations: Abdomen is soft. Musculoskeletal:      Cervical back: Normal range of motion and neck supple. Neurological:      Mental Status: He is alert and oriented to person, place, and time. Endoscopy Procedure Note    Pre-operative Diagnosis: trach placed and history of throat cancer    Post-operative Diagnosis: same    Indications: Hoarseness, dysphagia or aspiration - not able to be clearly evaluated by indirect laryngoscopy    Anesthesia: Lidocaine 2% and Casa-Synephrine 1/2%    Endoscopy Type:  nasal endoscopy, nasopharyngoscopy, laryngoscopy    Procedure Details   With the patient sitting upright in the examining chair informed consent was obtained. The bilateral side(s) of the nose were topically anesthetized with spray. After waiting an appropriate period of time for anesthesia/ vasoconstriction to become effective, the 0-degree  flexible laryngoscope was passed through the left side(s) of the nose, and the nose, nasopharynx, oropharynx, hypopharynx and larynx were examined. An identical procedure was performed on the contralateral side. Examination was performed during quiet respiration and with phonation. I was present for the entire procedure. The following findings were noted. Findings:  Mucosa:  pale and boggy   Nasal septum:  normal   Turbinates:  pale, swollen, edematous   Adenoid:  normal   Eustachian tubes:  normal   Mucous stranding:  present   Lesions:  absent   Modified Reed's Maneuver not indicated   Larynx Vocal cords immobile bilaterally, small movement of the left side with minimal opening. Trach scope showes opening to the henrique. Condition:  Stable    Complications:  None    Pictures:                                        Assessment:       Diagnosis Orders   1. Tracheostomy dependence ([address]                Plan:      I recommend:    Tracheal revision    The procedure risks and benefits were discussed with the patient and family. Pt and family understood and decided to proceed with the surgery. Risks of any anesthesia are:  Reactions to medicines   Breathing problems  Risks of any surgery are:  Bleeding   Infection     Specific risks of surgery:       Follow up in 1 week(s)

## 2021-07-21 NOTE — PATIENT INSTRUCTIONS
Thank you for choosing our [facility] practice. We are committed to your medical treatment and  care. If you need to reschedule or cancel your surgery or follow up  appointment, please call the surgery scheduler at [phone]. INSTRUCTIONS FOR SURGERY Tracheal Revision     Nothing to eat or drink after midnight the night before surgery unless surgery is at [facility] or otherwise instructed by the hospital.    DO NOT TAKE ANY ASPIRIN PRODUCTS 7 days prior to surgery-unless required by your cardiologist or primary care physician. Tylenol only. No Advil, Motrin, Aleve, or Ibuprofen    Any illegal drugs in your system (including Marijuana even if legally prescribed) will result in your surgery being cancelled. Please be sure to check with our office or the hospital on time frame for the drugs to be out of your system. Should your insurance change at any time you must contact our office. Failure to do so may result in your surgery being rescheduled. If you need paperwork filled out for work, you must give the office 2 weeks to complete and submit the forms.       [address], [address], [facility] will call you a couple days prior to your surgery and give you further instructions, if any questions call them at [phone]

## 2021-08-04 DIAGNOSIS — G89.3 CHRONIC PAIN DUE TO NEOPLASM: Primary | ICD-10-CM

## 2021-08-04 RX ORDER — FENTANYL 12 UG/H
1 PATCH TRANSDERMAL
Qty: 10 PATCH | Refills: 0 | Status: SHIPPED
Start: 2021-08-04 | End: 2021-09-03 | Stop reason: SDUPTHER

## 2021-08-17 ENCOUNTER — OFFICE VISIT (OUTPATIENT)
Dept: PALLATIVE CARE | Age: 66
End: 2021-08-17
Payer: MEDICARE

## 2021-08-17 VITALS
DIASTOLIC BLOOD PRESSURE: 61 MMHG | OXYGEN SATURATION: 96 % | WEIGHT: 207.4 LBS | BODY MASS INDEX: 28.93 KG/M2 | SYSTOLIC BLOOD PRESSURE: 98 MMHG | HEART RATE: 84 BPM

## 2021-08-17 DIAGNOSIS — G89.3 CHRONIC PAIN DUE TO NEOPLASM: ICD-10-CM

## 2021-08-17 PROCEDURE — 99213 OFFICE O/P EST LOW 20 MIN: CPT | Performed by: NURSE PRACTITIONER

## 2021-08-17 PROCEDURE — 99212 OFFICE O/P EST SF 10 MIN: CPT | Performed by: NURSE PRACTITIONER

## 2021-08-17 RX ORDER — OXYCODONE HYDROCHLORIDE 5 MG/1
5 TABLET ORAL EVERY 8 HOURS PRN
Qty: 90 TABLET | Refills: 0 | Status: SHIPPED
Start: 2021-08-17 | End: 2021-10-04 | Stop reason: SDUPTHER

## 2021-08-17 NOTE — PROGRESS NOTES
Department of Palliative Medicine  Virtual visit telephone encounter  Provider: BG Mancini - CNP         Chief Complaint: Sultana Oropeza is a 72 y.o. male with chief complaint of pain    HPI  Mr. 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 Assessment/Plan     Lung cancer   - Stage 4 lung cancer   - Follows with Dr. Brianda Rene   - Received radiation therapy, along with chemotherapy     Chronic Pain due to Neoplasm/chronic lumbar radiculopathy:           -  cont Fentanyl patch 12.5mcg               -  Cont Oxycodone at 5 mg BID PRN uses 1-2 pill daily               -  Lyrica 150 mg TID for neuropathy, hand tingling sensation   -  Pain injection into his back was successful, decrease's pain by 75%                Constipation:              -  Pericolace 2 tabs BID              -  Add Miralax daily prn     Depression/Anxiety:   - Cymbalta 30 mg BID              - DC Ativan 0.5 mg HS PRN    Insomnia    - D/c lorazepam 0.5 mg HS   - D/C Mirtazipine to 7.5mg   - melatonin 5mg HS    Follow Up: 8 weeks  Encouraged to call with any questions, concerns, needs, or changes in symptoms. Subjective:     Sultana Oropeza is a 72 y.o. male with significant past medical history of lung cancer who was seen by [address] [place] [address]. He was accompanied by his wife Darshana Ruiz. He states that he has been doing well overall. He does complain of shortness of breath and he explained that he will need to have a revision of his tracheostomy. He does continue to smoke. His appetite has been good and he has been trying to maintain his weight and not gain any more weight. He denies any nausea.       Objective:     Physical Exam  Wt Readings from Last 3 Encounters:   08/17/21 207 lb 6.4 oz (94.1 kg) 07/27/21 210 lb (95.3 kg)   07/21/21 212 lb (96.2 kg)      Gen:  Alert, appears stated age, well nourished, in no acute distress  HEENT:  Speaks by holding trach  Neck:  Supple  Lungs:  CTA bilaterally, no audible rhonchi or wheezes noted  Heart[de-identified]  RRR, no murmur, rub, or gallop noted during exam  Abd:  Soft, non tender, non distended, BS+  M/S/Ext:  Moving all extremities, no edema, pulses present  Skin:  Warm and dry  Neuro:   Alert, oriented x 3; following commands      [place] Symptom Assessment Score   [place] Score 8/17/2021 7/6/2021 5/25/2021 2/23/2021 9/22/2020   Pain Score 4 4 4 3 4   Tiredness Score 3 5 7 4 5   Nausea Score Not nauseated Not nauseated Not nauseated Not nauseated Not nauseated   Depression Score 3 4 1 3 4   Anxiety Score 3 6 2 3 5   Drowsiness Score 2 5 6 5 3   Appetite Score Best appetite Best appetite Best appetite 2 4   Wellbeing Score 3 3 1 4 5   Dyspnea Score 5 5 6 6 5   Other Problem Score 3 3 Best possible response 5 4   Total Assessment Score(calculated) 26 35 27 35 39       Assessed by: patient. Current Medications:  Medications reviewed: yes    Controlled Substances Monitoring: OARRS reviewed 8/17/21. RX Monitoring 7/7/2020   Attestation -   Periodic Controlled Substance Monitoring Possible medication side effects, risk of tolerance/dependence & alternative treatments discussed. ;No signs of potential drug abuse or diversion identified. ;Assessed functional status. ;Obtaining appropriate analgesic effect of treatment. Chronic Pain > 50 MEDD Re-evaluated the status of the patient's underlying condition causing pain. Chronic Pain > 80 MEDD Obtained or confirmed \"Medication Contract\" on file. Chronic Pain > 120 MEDD Engaged a pain medicine specialist as a prescriber or consultant.      Ko Bashir, BG - CNP   Palliative Care Department     Time/Communication  Greater than 50% of time spent, total 20 minutes in face-to-face counseling and coordination of care regarding symptom management.

## 2021-09-03 DIAGNOSIS — G89.3 CHRONIC PAIN DUE TO NEOPLASM: ICD-10-CM

## 2021-09-03 RX ORDER — FENTANYL 12 UG/H
1 PATCH TRANSDERMAL
Qty: 10 PATCH | Refills: 0 | Status: SHIPPED
Start: 2021-09-03 | End: 2021-10-08 | Stop reason: SDUPTHER

## 2021-09-03 NOTE — TELEPHONE ENCOUNTER
Call from Deborah Calloway wife requesting a refill for Fentanyl patches. Pharmacy is [facility] on [address] [place] [address]. Next maria r 10/12/21.

## 2021-09-22 ENCOUNTER — PREP FOR PROCEDURE (OUTPATIENT)
Dept: PAIN MANAGEMENT | Age: 66
End: 2021-09-22

## 2021-09-22 ENCOUNTER — OFFICE VISIT (OUTPATIENT)
Dept: PAIN MANAGEMENT | Age: 66
End: 2021-09-22
Payer: MEDICARE

## 2021-09-22 VITALS
BODY MASS INDEX: 28.42 KG/M2 | OXYGEN SATURATION: 98 % | TEMPERATURE: 98 F | HEART RATE: 89 BPM | SYSTOLIC BLOOD PRESSURE: 128 MMHG | DIASTOLIC BLOOD PRESSURE: 80 MMHG | RESPIRATION RATE: 16 BRPM | HEIGHT: 71 IN | WEIGHT: 203 LBS

## 2021-09-22 DIAGNOSIS — M54.16 LUMBAR RADICULOPATHY: ICD-10-CM

## 2021-09-22 DIAGNOSIS — M51.36 DDD (DEGENERATIVE DISC DISEASE), LUMBAR: Primary | ICD-10-CM

## 2021-09-22 DIAGNOSIS — M47.817 LUMBOSACRAL SPONDYLOSIS WITHOUT MYELOPATHY: ICD-10-CM

## 2021-09-22 DIAGNOSIS — M53.3 SACROILIAC DYSFUNCTION: ICD-10-CM

## 2021-09-22 DIAGNOSIS — G62.0 CHEMOTHERAPY-INDUCED PERIPHERAL NEUROPATHY (HCC): ICD-10-CM

## 2021-09-22 DIAGNOSIS — T45.1X5A CHEMOTHERAPY-INDUCED PERIPHERAL NEUROPATHY (HCC): ICD-10-CM

## 2021-09-22 PROCEDURE — 99214 OFFICE O/P EST MOD 30 MIN: CPT | Performed by: ANESTHESIOLOGY

## 2021-09-22 PROCEDURE — 99213 OFFICE O/P EST LOW 20 MIN: CPT | Performed by: ANESTHESIOLOGY

## 2021-09-22 NOTE — PROGRESS NOTES
Do you currently have any of the following:    Fever: No  Headache:  No  Cough: No  Shortness of breath: No  Exposed to anyone with these symptoms: No         Alan Mata. presents to the [address] [place] [address] on 9/22/2021. Gisselle Burch is complaining of pain lower back and down right leg. The pain is persistent  . The pain is described as aching, colicky, throbbing, shooting and stabbing. Pain is rated on his best day at a 4, on his worst day at a 8, and on average at a 5 on the VAS scale. He took his last dose of Lyrica and fentyl patch, oxycodone. Any procedures since your last visit: No,  Pacemaker or defibrillator: No    He is not on NSAIDS and is not on anticoagulation medications to include none. Medication Contract and Consent for Opioid Use Documents Filed     Patient Documents       Type of Document Status Date Received Received By Description     Medication Contract Received 3/24/2021  7:56 AM Omid Mcbride 3-24-21 Palliative                /80   Pulse 89   Temp 98 °F (36.7 °C) (Infrared)   Resp 16   Ht 5' 11\" (1.803 m)   Wt 203 lb (92.1 kg)   SpO2 98%   BMI 28.31 kg/m²      No LMP for male patient.

## 2021-09-22 NOTE — PROGRESS NOTES
[facility]   Bryan, Becka Cavanaugh  Dept: [phone]      Follow up Note      Randa Kayla. Date of Visit:  9/22/2021    CC:  Patient presents for follow up   Chief Complaint   Patient presents with    Back Pain    Other     done right leg to foot       HPI:  Chronic low back pain for > a year.     H/o Ca lung and Throat ca. S/p RT/ chemo- Has a tracheostomy. Has been treated by oncology.     Under palliative medicine care for neuropathic pain and managed by medications including opioids.     Pain does radiate to lower extremities- right side > left     On anticoagulation for h/o DVT    S/P BL SIJ injection and right trochanteric bursa injection on 10/29/2020 > 70% relief of the SIJ pain. S/P Right L3 & L4 TFESI on 12/28/2020 with > 50% pain relief. Has recurrence of low back and right LE pain. Continues HEP. Nursing notes and details of the pain history reviewed. Please see intake notes for details. Previous treatments:   Physical Therapy : yes, HEP- as tolerated      Chiropractic treatment: no     Medications: - NSAID's : yes                        - Membrane stabilizers : yes - gabapentin, Lyrica                       - Opioids : yes: Duragesic, Oxycodone                       - Adjuvants or Others : yes     TENS Unit: no     Surgeries: no LS spine surgery. H/o prior Cervical fusion noted.     He has been on anticoagulation medications - Eliquis.     He has not been on herbal supplements.       He is not diabetic.     H/O Smoking: +  H/O alcohol abuse : no  H/O Illicit drug use : no     Imaging:   MRI of LS spine on 7/28/2020 reviewed:  Impression         1. Severe central canal stenosis and moderate bilateral neural    foraminal narrowing at L3-4 and L4-5.         2. Grade 1 anterolisthesis of L3 on L4 and L4 on L5.         3. Stable distal abdominal aortic aneurysm measuring 3 cm in diameter.       X-ray of the sacroiliac joints done on 7/24/2020:   Impression Mild-to-moderate degenerative changes involving bilateral sacroiliac    joints.       VL LE segmental PP2020:  arrative   Normal ankle arm index with good arterial flow to both feet including   the toes based upon the pulse volume recordings           PET scan: : Impression    1. Two focal suspicious areas of increased metabolic activity in the    base of the oral cavity/sublingual spaces as above commented.         2. No indication for metastatic lymph node adenopathy in the neck or    mediastinum.         3. No abnormal uptake of the radionuclide is seen at the level of the    lungs including multiple parenchymal opacities observed bilaterally on    recent CT chest.                 Mri of the right femur and Hip: :  Impression         1. Moderate right knee effusion with mild synovitis. This is of    unclear etiology. This finding can be better characterized with MRI of    the right knee without contrast if clinically indicated. 2. Small scrotal hydroceles of unclear clinical significance. 3. No evidence for metastatic disease.       Xray of the right hip: 3/2019:      Impression     Moderate degenerative changes of the right hip.            Potential Aberrant Drug-Related Behavior:no      OARRS report[de-identified]  Reviewed today 21  getting meds form Palliative.     Past Medical History:   Diagnosis Date    Abdominal aortic aneurysm without rupture ([address] 2018    stable per Ct 2020 / see epic    Acute bronchitis with COPD ([address] 2017    Arthritis     COPD (chronic obstructive pulmonary disease) ([address]     Decreased dorsalis pedis pulse 2020    Depression with anxiety     Emphysema of lung ([address]     Encounter for antineoplastic immunotherapy     HAP (hospital-acquired pneumonia)     resolved    History of deep vein thrombosis 2020    Hyperlipidemia     Leg swelling 2020    Lung cancer ([address] 2016    chemo and radiation    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 [address] HISTORY  4/15/2016    cervical myelogram    PAIN MANAGEMENT PROCEDURE Right 12/28/2020    # 1 LUMBAR TRANSFORAMINAL EPIDURAL STEROID INJECTION RIGHT L3 AND L4 UNDER FLUOROSCOPIC GUIDANCE performed by Arlin Ramires MD at [address]  05/24/2017    # 6 Shiley (disposable)       Prior to Admission medications    Medication Sig Start Date End Date Taking? Authorizing Provider   levothyroxine (SYNTHROID) 125 MCG tablet Take 1 tablet by mouth Daily 9/17/21  Yes Sylvia Quintana DO   fentaNYL (DURAGESIC) 12 MCG/HR Place 1 patch onto the skin every 72 hours for 30 days.  9/3/21 10/3/21 Yes BG Curran - CNP   buPROPion [facility]) 75 MG tablet take 1 tablet by mouth twice a day 8/11/21  Yes Luisito Drew MD   albuterol (PROVENTIL) (2.5 MG/3ML) 0.083% nebulizer solution Take 3 mLs by nebulization every 6 hours as needed for Wheezing 7/27/21  Yes Luisito Drew MD   sodium chloride, Inhalant, 3 % nebulizer solution Take 4 mLs by nebulization as needed for Cough 7/27/21  Yes Luisito Drew MD   melatonin 3 MG TABS tablet Take 10 mg by mouth nightly as needed   Yes Historical Provider, MD   formoterol (PERFOROMIST) 20 MCG/2ML nebulizer solution Take 2 mLs by nebulization every 12 hours DX: COPD J44.9, BILL MEDICARE PART B 7/7/21  Yes Luisito Drew MD mirtazapine (REMERON) 7.5 MG tablet take 1 tablet by mouth nightly 6/17/21  Yes Dontrell Singh MD   chlorhexidine (PERIDEX) 0.12 % solution Take 15 mLs by mouth daily Swish & spit qd 5/18/21  Yes Sylvia Quintana DO   budesonide (PULMICORT) 0.5 MG/2ML nebulizer suspension Take 2 mLs by nebulization 2 times daily DX: COPD J44.9 5/18/21  Yes Surya Torre MD   esomeprazole (NEXIUM) 40 MG delayed release capsule Take 1 capsule by mouth every morning (before breakfast) 3/26/21  Yes Sylvia Quintana DO   DULoxetine (CYMBALTA) 30 MG extended release capsule take 1 capsule by mouth twice a day 3/19/21  Yes Dontrell Singh MD   Revefenacin (YUPELRI) 175 MCG/3ML SOLN Inhale 3 mLs into the lungs daily 10/13/20  Yes Surya Torre MD   pembrolizumab (KEYTRUDA) 100 MG/4ML SOLN Infuse 200 mg intravenously every 21 days   Yes Historical Provider, MD   albuterol (ACCUNEB) 0.63 MG/3ML nebulizer solution Take 1 ampule by nebulization every 6 hours as needed for Shortness of Breath   Yes Historical Provider, MD   simvastatin (ZOCOR) 40 MG tablet Take 40 mg by mouth nightly   Yes Historical Provider, MD   tamsulosin (FLOMAX) 0.4 MG capsule Take 1 capsule by mouth nightly 4/1/20  Yes Sylvia Quintana DO   predniSONE (DELTASONE) 10 MG tablet Take 10 mg by mouth daily    Yes Historical Provider, MD   OXYGEN Inhale 4 L into the lungs nightly   Yes Historical Provider, MD   Cholecalciferol (VITAMIN D3) 5000 units TABS Take 5,000 Units by mouth daily    Yes Historical Provider, MD   pregabalin (LYRICA) 150 MG capsule Take 1 capsule by mouth 3 times daily 7/6/21 9/6/21  Dontrell Singh MD   apixaban Ranell Milling) 2.5 MG TABS tablet Take 1 tablet by mouth 2 times daily 12/8/20 7/21/21  Sylvia Quintana DO       Allergies   Allergen Reactions    Augmentin [Amoxicillin-Pot Clavulanate] Diarrhea       Social History     Socioeconomic History    Marital status:      Spouse name: Not on file    Number of children: Not on file    Years of education: Not on file    Highest education level: Not on file   Occupational History    Occupation: [employer]- SSI     Comment: disability short term   Tobacco Use    Smoking status: Current Some Day Smoker     Packs/day: 0.25     Years: 46.00     Pack years: 11.50     Types: Cigarettes     Start date: 0    Smokeless tobacco: Never Used    Tobacco comment: Pt quit in 9/26/2018& started back 2/2020 smoking . 5 ppd   Vaping Use    Vaping Use: Never used   Substance and Sexual Activity    Alcohol use: Not Currently     Comment: beer sometimes    Drug use: No    Sexual activity: Yes     Partners: Female   Other Topics Concern    Not on file   Social History Narrative    Works at [employer]. Lives in [place]. Social Determinants of Health     Financial Resource Strain: Low Risk     Difficulty of Paying Living Expenses: Not very hard   Food Insecurity: No Food Insecurity    Worried About Running Out of Food in the Last Year: Never true    Edward of Food in the Last Year: Never true   Transportation Needs:     Lack of Transportation (Medical):      Lack of Transportation (Non-Medical):    Physical Activity:     Days of Exercise per Week:     Minutes of Exercise per Session:    Stress:     Feeling of Stress :    Social Connections:     Frequency of Communication with Friends and Family:     Frequency of Social Gatherings with Friends and Family:     Attends Yazidi Services:     Active Member of Clubs or Organizations:     Attends Club or Organization Meetings:     Marital Status:    Intimate Partner Violence:     Fear of Current or Ex-Partner:     Emotionally Abused:     Physically Abused:     Sexually Abused:        Family History   Problem Relation Age of Onset    Cancer Mother         breast cancer    Cancer Father         prostate cancer    Diabetes Father        REVIEW OF SYSTEMS:     Kasandra Hogue denies fever/chills, chest pain, shortness of breath, new bowel or bladder complaints. All other review of systems was negative. PHYSICAL EXAMINATION:      /80   Pulse 89   Temp 98 °F (36.7 °C) (Infrared)   Resp 16   Ht 5' 11\" (1.803 m)   Wt 203 lb (92.1 kg)   SpO2 98%   BMI 28.31 kg/m²   General:       General appearance:  Pleasant and well-hydrated, in no distress and A & O x 3  Build:Overweight  Function: Rises from seated position easily and Moves about room without difficulty     HEENT:     Head:normocephalic, atraumatic  Pupils:regular, round, equal  Sclera: icterus absent  Tracheostomy +     Lungs:     Breathing:normal breathing pattern      CVS:     RRR     Abdomen:     Shape:non-distended and normal     Cervical spine:     Inspection:normal     Thoracic spine:                Spine inspection:normal      Lumbar spine:     Spine inspection: Normal   Palpation: Tenderness paravertebral muscles No bilaterally  Range of motion: Decreased, flexion Decreased, Lateral bending, extension and rotation bilaterally reduced is somewhat painful. Sacroiliac joint tenderness improved  Piriformis tenderness: negative bilaterally  SLR : negative bilaterally  Trochanteric bursa tenderness: Improved on the right side  CVA tenderness:No      Musculoskeletal:     Trigger points no   Extremities:     Tremors:None bilaterally upper and lower  No LE edema    Neurological:     Sensory: Normal to light touch - except for reduced sensation over the right leg     Motor:                   Right Quadriceps 5/5          Left Quadriceps 5/5           Right Gastrocnemius 5/5    Left Gastrocnemius 5/5  Right Ant Tibialis 5/5  Left Ant Tibialis 5/5     Reflexes:    NO changes.     Gait:no assistive device.     Dermatology:     Skin:no rashes or lesions noted     Assessment/Plan:   Diagnosis Orders   1. DDD (degenerative disc disease), lumbar      2. Lumbar radiculopathy      3. Lumbosacral spondylosis without myelopathy      4.  Sacroiliac dysfunction          77 y.o. male with H/o ca lung and ca throat - s/p chemo/. RT. Has a tracheostomy. Followed by Hemonc/ ENT at [facility]) and also follows with Palliative care. On pain medications.     Has low back and lower extremity pain - right. MRI of the LS spine : Significant changes noted at L3-4. L4-5     S/P # 1 TFESI right L3/ L4 in Dec 2020 with > 50% relief for > 6 months. Now has recurrence of similar pain. Plan:  # 2 right L3 and L4 Transforaminal RICHARD injection under fluoroscopy. He has H/o left UE DVT and is on Eliquis Need to hold Eliquis for 3 days prior to the procedure. He will continue medical management with Palliative care. Right Hip ROM pain + (consider hip inj in future if right hip pain continues to bother). F/U in 3 months. Note: Planning ENT Eval at [facility] in October.     Counseling :     Patient encouraged to stay active and to watch/lose weight     Encouraged to continue Regular home exercise program as tolerated - stretching / strengthening.     Smoking cessation counseling : yes      Treatment plan discussed with the patient including medication and procedure side effects. Recent events/ consultation.  Notes reviewed.     Controlled Substances Monitoring:   OARRS reviewed- 9/22/21: consistent     Cheryl Jolley MD     CC:  Sylvia Quintana DO

## 2021-09-24 ENCOUNTER — TELEPHONE (OUTPATIENT)
Dept: PAIN MANAGEMENT | Age: 66
End: 2021-09-24

## 2021-09-24 NOTE — TELEPHONE ENCOUNTER
Call to [phone]. that procedure was approved for 9/30/2021 and that the surgery center should call him a few days before for the pre op call and after 3:00 PM the business day before with the arrival time. Instructed Alan to hold ibuprofen for 24 hours, Eliquis for 3 day, naprosyn for 4 days and any aspirin containing products or fish oil for 7 days. Instructed to call office back if any questions. Alan verbalized understanding.

## 2021-09-28 RX ORDER — OXYCODONE HYDROCHLORIDE 10 MG/1
10 TABLET ORAL EVERY 8 HOURS PRN
COMMUNITY
End: 2021-10-04

## 2021-09-28 NOTE — PROGRESS NOTES
[facility]  INSTRUCTIONS  . .......................................................................................................................................... [x] Parking the day of Surgery is located in the [facility].   Upon entering the door, make immediate right into the surgery reception room    [x]  Bring photo ID and insurance card     [x] You may have a light breakfast day of procedure    [x]  Wear loose comfortable clothing    [x]  Please follow instructions for medications as given per Dr's office    [x] You can expect a call the business day prior to procedure to notify you of your arrival time     [x] Please arrange for     []  Other instructions

## 2021-09-28 NOTE — PROGRESS NOTES
Have you been tested for COVID  Yes           Have you been told you were positive for COVID No  Have you had any known exposure to someone that is positive for COVID No  Do you have a cough                   No              Do you have shortness of breath No                 Do you have a sore throat            No                Are you having chills                    No                Are you having muscle aches. No                    Please come to the hospital wearing a mask and have your significant other wear a mask as well. Both of you should check your temperature before leaving to come here,  if it is 100 or higher please call [phone] for instruction.

## 2021-09-30 ENCOUNTER — HOSPITAL ENCOUNTER (OUTPATIENT)
Dept: GENERAL RADIOLOGY | Age: 66
Discharge: HOME | End: 2021-10-02
Attending: ANESTHESIOLOGY
Payer: MEDICARE

## 2021-09-30 ENCOUNTER — HOSPITAL ENCOUNTER (OUTPATIENT)
Age: 66
Setting detail: OUTPATIENT SURGERY
Discharge: HOME | End: 2021-09-30
Attending: ANESTHESIOLOGY | Admitting: ANESTHESIOLOGY
Payer: MEDICARE

## 2021-09-30 VITALS
HEART RATE: 73 BPM | DIASTOLIC BLOOD PRESSURE: 60 MMHG | RESPIRATION RATE: 16 BRPM | SYSTOLIC BLOOD PRESSURE: 99 MMHG | HEIGHT: 71 IN | OXYGEN SATURATION: 98 % | TEMPERATURE: 96.8 F | BODY MASS INDEX: 28.42 KG/M2 | WEIGHT: 203 LBS

## 2021-09-30 DIAGNOSIS — R52 PAIN: ICD-10-CM

## 2021-09-30 PROCEDURE — 2709999900 HC NON-CHARGEABLE SUPPLY: Performed by: ANESTHESIOLOGY

## 2021-09-30 PROCEDURE — 2500000003 HC RX 250 WO HCPCS: Performed by: ANESTHESIOLOGY

## 2021-09-30 PROCEDURE — 6360000002 HC RX W HCPCS: Performed by: ANESTHESIOLOGY

## 2021-09-30 PROCEDURE — 64484 NJX AA&/STRD TFRM EPI L/S EA: CPT | Performed by: ANESTHESIOLOGY

## 2021-09-30 PROCEDURE — 3600000002 HC SURGERY LEVEL 2 BASE: Performed by: ANESTHESIOLOGY

## 2021-09-30 PROCEDURE — 64483 NJX AA&/STRD TFRM EPI L/S 1: CPT | Performed by: ANESTHESIOLOGY

## 2021-09-30 PROCEDURE — 3209999900 FLUORO FOR SURGICAL PROCEDURES

## 2021-09-30 PROCEDURE — 6360000004 HC RX CONTRAST MEDICATION: Performed by: ANESTHESIOLOGY

## 2021-09-30 PROCEDURE — 7100000011 HC PHASE II RECOVERY - ADDTL 15 MIN: Performed by: ANESTHESIOLOGY

## 2021-09-30 PROCEDURE — 7100000010 HC PHASE II RECOVERY - FIRST 15 MIN: Performed by: ANESTHESIOLOGY

## 2021-09-30 PROCEDURE — 3600000012 HC SURGERY LEVEL 2 ADDTL 15MIN: Performed by: ANESTHESIOLOGY

## 2021-09-30 RX ORDER — METHYLPREDNISOLONE ACETATE 40 MG/ML
INJECTION, SUSPENSION INTRA-ARTICULAR; INTRALESIONAL; INTRAMUSCULAR; SOFT TISSUE PRN
Status: DISCONTINUED | OUTPATIENT
Start: 2021-09-30 | End: 2021-09-30 | Stop reason: ALTCHOICE

## 2021-09-30 RX ORDER — LIDOCAINE HYDROCHLORIDE 5 MG/ML
INJECTION, SOLUTION INFILTRATION; INTRAVENOUS PRN
Status: DISCONTINUED | OUTPATIENT
Start: 2021-09-30 | End: 2021-09-30 | Stop reason: ALTCHOICE

## 2021-09-30 RX ORDER — DEXAMETHASONE SODIUM PHOSPHATE 10 MG/ML
INJECTION, SOLUTION INTRAMUSCULAR; INTRAVENOUS PRN
Status: DISCONTINUED | OUTPATIENT
Start: 2021-09-30 | End: 2021-09-30 | Stop reason: ALTCHOICE

## 2021-09-30 ASSESSMENT — PAIN - FUNCTIONAL ASSESSMENT: PAIN_FUNCTIONAL_ASSESSMENT: 0-10

## 2021-09-30 ASSESSMENT — PAIN SCALES - GENERAL: PAINLEVEL_OUTOF10: 0

## 2021-09-30 NOTE — OP NOTE
Operative Note      Patient: Ramana Ortiz YOB: 1955  MRN: [id number]    Date of Procedure: 2021    Pre-Op Diagnosis: DEGENERATIVE DISC DISEASE, lumbar radicular pain. Post-Op Diagnosis: Same       Procedure(s):  LUMBAR TRANSFORAMINAL EPIDURAL STEROID INJECTION RIGHT L3 AND L4 UNDER FLUOROSCOPIC GUIDANCE     Surgeon(s):  Balaji Morales MD    Assistant:   * No surgical staff found *    Anesthesia: Local    Estimated Blood Loss (mL): Minimal    Complications: None    Specimens:   * No specimens in log *    Implants:  * No implants in log *      Drains: * No LDAs found *    Findings: good needle placement    Detailed Description of Procedure:   2021    Patient: Ramana Hansen. :  1955  Age:  77 y.o. Sex:  male     PRE-OPERATIVE DIAGNOSIS: Lumbar disc displacement, lumbar neural foraminal stenosis, lumbar radiculopathy. POST-OPERATIVE DIAGNOSIS: Same. PROCEDURE: Right Transforaminal epidural steroid injection under fluoroscopic guidance at foraminal level L3 and L4.    SURGEON: Balaji Morales MD    ANESTHESIA: Local    ESTIMATED BLOOD LOSS: None.  ______________________________________________________________________  BRIEF HISTORY: Ramana Ortiz comes in today for the  Right transforaminal epidural steroid injection under fluoroscopic guidance at foraminal level L3 and L4 . The potential complications of this procedure were discussed with him again today. He has elected to undergo the aforementioned procedure. Ivy complete History & Physical examination were reviewed in depth, a copy of which is in the chart. DESCRIPTION OF PROCEDURE:    After confirming written and informed consent, a time-out was performed and Ivy name and date of birth, the procedure to be performed as well as the plan for the location of the needle insertion were confirmed.     The patient was brought into the procedure room and placed in the prone position on the fluoroscopy table. Standard monitors were placed and vital signs were observed throughout the procedure. The area of the lumbar spine was prepped with chloraprep and draped in a sterile manner. The vertebral body was identified with AP fluoroscopy. An oblique view was obtained to better visualize the inferior junction of the pedicle and transverse process . The 6 o'clock position of the pedicle was marked and identified. The skin and subcutaneous tissue were anesthetized with 0.5% lidocaine. Two # 22 gauge 3.5 inch pencil point needle was directed toward the targeted point under fluoroscopy until bone was contacted. The needle was then walked inferiorly until the neural foramen was entered . A lateral fluoroscopic view was then used to place the needle tip in the middle of the foramen. Negative aspiration was confirmed for blood and CSF and 0.5-1 cc of Omnipaque 240 contrast was injected at each level under live fluoroscopy. Appropriate neurograms were observed under AP fluoroscopy. Then after negative aspiration, a solution of the 2 cc of 0.5% lidocaine and 8 mg Dexamethasone was easily injected at each level. The needles were removed with constant aspiration technique. The patient back was cleaned and a bandage was placed over the needle insertion points    Disposition the patient tolerated the procedure well and there were no complications . Vital signs remained stable throughout the procedure. The patient was escorted to the recovery area where they remained until discharge and written discharge instructions for the procedure were given. Plan: Dhruv Carpenter will return to our pain management center as scheduled.      Catracho Andres MD

## 2021-09-30 NOTE — PROGRESS NOTES
Went over discharge instructions with patient and wife. Both verbalized understanding of instructions.

## 2021-09-30 NOTE — H&P
[facility]  [place], [address] [place] [address]  Dept: [phone]    Procedure History & Physical      Alannirmal Astudillo. HPI:    Patient  is here for Lumbar TFESI for low back/ LE pain.   Labs/imaging studies reviewed   All question and concerns addressed including R/B/A associated with the procedure    Past Medical History:   Diagnosis Date    Abdominal aortic aneurysm without rupture ([facility] 08/27/2018    stable per Ct 1/23/2020 / see epic    Acute bronchitis with COPD ([address] 5/27/2017    Arthritis     COPD (chronic obstructive pulmonary disease) ([address]     Decreased dorsalis pedis pulse 11/4/2020    Depression with anxiety     Emphysema of lung ([facility]     Encounter for antineoplastic immunotherapy     HAP (hospital-acquired pneumonia)     resolved    History of deep vein thrombosis 11/4/2020    Hyperlipidemia     Leg swelling 11/4/2020    Lung cancer ([facility] 04/11/2016    chemo and radiation    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)     #6 Elsa       Past Surgical History:   Procedure Laterality Date    BRONCHOSCOPY N/A 3/5/2020    BRONCHOSCOPY DIAGNOSTIC OR CELL [facility] ONLY performed by Bethel Alcantara MD at [address] [place] [address]  2015    KNEE ARTHROSCOPY      LUNG BIOPSY Left 5/6/2016    MEDICATION INJECTION Bilateral 10/29/2020    BILATERAL SACROILIAC JOINT INJECTION AND RIGHT TROCHANTERIC BURSA INJECTION UNDER FLUOROSCOPY (CPT 99390,53257,56566)++TRACH-NO VENT++ performed by Sveta Dale MD at [facility] HISTORY  4/15/2016    cervical myelogram    PAIN MANAGEMENT PROCEDURE Right 12/28/2020    # 1 LUMBAR TRANSFORAMINAL EPIDURAL STEROID INJECTION RIGHT L3 AND L4 UNDER FLUOROSCOPIC GUIDANCE performed by Sveta Dale MD at [address] [place] [address] Right     SINUS SURGERY      TRACHEOSTOMY  05/24/2017    # 6 Elsa (disposable)       Prior to Admission medications    Medication Sig Start Date End Date Taking? Authorizing Provider   oxyCODONE HCl (OXY-IR) 10 MG immediate release tablet Take 10 mg by mouth every 8 hours as needed for Pain. Yes Historical Provider, MD   levothyroxine (SYNTHROID) 125 MCG tablet Take 1 tablet by mouth Daily 9/17/21  Yes Sylvia Quintana DO   fentaNYL (DURAGESIC) 12 MCG/HR Place 1 patch onto the skin every 72 hours for 30 days.  9/3/21 10/3/21 Yes BG Guerra - CNP   buPROPion [facility]) 75 MG tablet take 1 tablet by mouth twice a day 8/11/21  Yes Elma Olivarez MD   albuterol (PROVENTIL) (2.5 MG/3ML) 0.083% nebulizer solution Take 3 mLs by nebulization every 6 hours as needed for Wheezing 7/27/21  Yes Elma Olivarez MD   sodium chloride, Inhalant, 3 % nebulizer solution Take 4 mLs by nebulization as needed for Cough 7/27/21  Yes Elma Olivarez MD   melatonin 3 MG TABS tablet Take 10 mg by mouth nightly as needed   Yes Historical Provider, MD   formoterol (PERFOROMIST) 20 MCG/2ML nebulizer solution Take 2 mLs by nebulization every 12 hours DX: COPD J44.9, BILL MEDICARE PART B 7/7/21  Yes Elma Olivarez MD   pregabalin (LYRICA) 150 MG capsule Take 1 capsule by mouth 3 times daily 7/6/21 9/28/21 Yes Biju Wynne MD   chlorhexidine (PERIDEX) 0.12 % solution Take 15 mLs by mouth daily Swish & spit qd 5/18/21  Yes Sylvia Quintana DO   budesonide (PULMICORT) 0.5 MG/2ML nebulizer suspension Take 2 mLs by nebulization 2 times daily DX: COPD J44.9 5/18/21  Yes Elma Olivarez MD   DULoxetine (CYMBALTA) 30 MG extended release capsule take 1 capsule by mouth twice a day 3/19/21  Yes Biju Wynne MD   apixaban (ELIQUIS) 2.5 MG TABS tablet Take 1 tablet by mouth 2 times daily 12/8/20 9/28/21 Yes Sylvia Quintana DO   Revefenacin (YUPELRI) 175 MCG/3ML SOLN Inhale 3 mLs into the lungs daily 10/13/20  Yes Elma Olivarez MD   pembrolizumab (KEYTRUDA) 100 MG/4ML SOLN Infuse 200 mg intravenously every 21 days   Yes Historical Provider, MD   albuterol (ACCUNEB) 0.63 MG/3ML nebulizer solution Take 1 ampule by nebulization every 6 hours as needed for Shortness of Breath   Yes Historical Provider, MD   simvastatin (ZOCOR) 40 MG tablet Take 40 mg by mouth nightly   Yes Historical Provider, MD   tamsulosin (FLOMAX) 0.4 MG capsule Take 1 capsule by mouth nightly 4/1/20  Yes Sylvia Quintana DO   predniSONE (DELTASONE) 10 MG tablet Take 10 mg by mouth daily    Yes Historical Provider, MD   OXYGEN Inhale 4 L into the lungs nightly   Yes Historical Provider, MD   Cholecalciferol (VITAMIN D3) 5000 units TABS Take 5,000 Units by mouth daily    Yes Historical Provider, MD       Allergies   Allergen Reactions    Augmentin [Amoxicillin-Pot Clavulanate] Diarrhea       Social History     Socioeconomic History    Marital status:      Spouse name: Not on file    Number of children: Not on file    Years of education: Not on file    Highest education level: Not on file   Occupational History    Occupation: [employer]- [employer]     Comment: disability short term   Tobacco Use    Smoking status: Current Some Day Smoker     Packs/day: 0.25     Years: 46.00     Pack years: 11.50     Types: Cigarettes     Start date: 1972    Smokeless tobacco: Never Used    Tobacco comment: Pt quit in 9/26/2018& started back 2/2020 smoking . 5 ppd   Vaping Use    Vaping Use: Never used   Substance and Sexual Activity    Alcohol use: Not Currently     Comment: beer sometimes    Drug use: No    Sexual activity: Yes     Partners: Female   Other Topics Concern    Not on file   Social History Narrative    Works at [employer]. Lives in [facility].      Social Determinants of Health     Financial Resource Strain: Low Risk     Difficulty of Paying Living Expenses: Not very hard   Food Insecurity: No Food Insecurity    Worried About Running Out of Food in the Last Year: Never true    Edward of Food in the Last Year: Never true   Transportation Needs:     Lack of Transportation (Medical):  Lack of Transportation (Non-Medical):    Physical Activity:     Days of Exercise per Week:     Minutes of Exercise per Session:    Stress:     Feeling of Stress :    Social Connections:     Frequency of Communication with Friends and Family:     Frequency of Social Gatherings with Friends and Family:     Attends Jehovah's witness Services:     Active Member of Clubs or Organizations:     Attends Club or Organization Meetings:     Marital Status:    Intimate Partner Violence:     Fear of Current or Ex-Partner:     Emotionally Abused:     Physically Abused:     Sexually Abused:        Family History   Problem Relation Age of Onset    Cancer Mother         breast cancer    Cancer Father         prostate cancer    Diabetes Father          REVIEW OF SYSTEMS:    CONSTITUTIONAL:  negative for  fevers, chills, sweats and fatigue    RESPIRATORY:  negative for  dry cough, cough with sputum, dyspnea, wheezing and chest pain    CARDIOVASCULAR:  negative for chest pain, dyspnea, palpitations, syncope    GASTROINTESTINAL:  negative for nausea, vomiting, change in bowel habits, diarrhea, constipation and abdominal pain    MUSCULOSKELETAL: negative for muscle weakness    SKIN: negative for itching or rashes.     BEHAVIOR/PSYCH:  negative for poor appetite, increased appetite, decreased sleep and poor concentration    All other systems negative      PHYSICAL EXAM:    VITALS:  /66   Pulse 85   Temp 96.8 °F (36 °C) (Temporal)   Resp 20   Ht 5' 11\" (1.803 m)   Wt 203 lb (92.1 kg)   SpO2 93%   BMI 28.31 kg/m²     CONSTITUTIONAL:  awake, alert, cooperative, no apparent distress, and appears stated age    EYES: PERRLA, EOMI    LUNGS:  No increased work of breathing, no audible wheezing    CARDIOVASCULAR:  regular rate and rhythm    ABDOMEN:  Soft non tender non distended     EXTREMITIES: no signs of clubbing or cyanosis. MUSCULOSKELETAL: negative for flaccid muscle tone or spastic movements. SKIN: gross examination reveals no signs of rashes, or diaphoresis. NEURO: Cranial nerves II-XII grossly intact. No signs of agitated mood. Assessment/Plan:    Patient  is here for Lumbar TFESI for low back/ LE pain. The patient was counseled at length about the risks of rhonda Covid-19 during their perioperative period and any recovery window from their procedure. The patient was made aware that rhonda Covid-19  may worsen their prognosis for recovering from their procedure  and lend to a higher morbidity and/or mortality risk. All material risks, benefits, and reasonable alternatives including postponing the procedure were discussed. The patient does wish to proceed with the procedure at this time.       Doss Schilder, MD

## 2021-10-04 DIAGNOSIS — G89.3 CHRONIC PAIN DUE TO NEOPLASM: ICD-10-CM

## 2021-10-04 RX ORDER — OXYCODONE HYDROCHLORIDE 5 MG/1
5 TABLET ORAL EVERY 8 HOURS PRN
Qty: 90 TABLET | Refills: 0 | Status: SHIPPED
Start: 2021-10-04 | End: 2021-11-05 | Stop reason: SDUPTHER

## 2021-10-04 RX ORDER — DULOXETIN HYDROCHLORIDE 30 MG/1
CAPSULE, DELAYED RELEASE ORAL
Qty: 180 CAPSULE | Refills: 1 | Status: SHIPPED
Start: 2021-10-04 | End: 2022-01-11 | Stop reason: SDUPTHER

## 2021-10-04 NOTE — TELEPHONE ENCOUNTER
Call from [address] [facility] requesting refills on Oxy IR and on Duloxetine. Pharmacy is [facility] on [address] [place] [address]. Next maria r 10/12/21.

## 2021-10-08 DIAGNOSIS — G89.3 CHRONIC PAIN DUE TO NEOPLASM: ICD-10-CM

## 2021-10-08 RX ORDER — FENTANYL 12 UG/H
1 PATCH TRANSDERMAL
Qty: 10 PATCH | Refills: 0 | Status: SHIPPED
Start: 2021-10-08 | End: 2021-11-05 | Stop reason: SDUPTHER

## 2021-10-08 NOTE — TELEPHONE ENCOUNTER
Urgent call from Angel Block wife stating he forgot to ask for refill for Fentanyl and they are going to their daughters leaving this afternoon. She is hoping to get refill form [facility] on [address] [place] [address]. Before they leave. Next maria r 10/12/21.

## 2021-10-12 ENCOUNTER — OFFICE VISIT (OUTPATIENT)
Dept: PALLATIVE CARE | Age: 66
End: 2021-10-12
Payer: MEDICARE

## 2021-10-12 VITALS
HEART RATE: 81 BPM | SYSTOLIC BLOOD PRESSURE: 103 MMHG | DIASTOLIC BLOOD PRESSURE: 61 MMHG | OXYGEN SATURATION: 94 % | BODY MASS INDEX: 27.48 KG/M2 | WEIGHT: 197 LBS

## 2021-10-12 DIAGNOSIS — Z51.5 PALLIATIVE CARE BY SPECIALIST: ICD-10-CM

## 2021-10-12 DIAGNOSIS — G89.3 CHRONIC PAIN DUE TO NEOPLASM: ICD-10-CM

## 2021-10-12 PROCEDURE — 99213 OFFICE O/P EST LOW 20 MIN: CPT | Performed by: NURSE PRACTITIONER

## 2021-10-12 PROCEDURE — 99212 OFFICE O/P EST SF 10 MIN: CPT | Performed by: NURSE PRACTITIONER

## 2021-10-12 RX ORDER — PREGABALIN 150 MG/1
150 CAPSULE ORAL 3 TIMES DAILY
Qty: 270 CAPSULE | Refills: 1 | Status: SHIPPED
Start: 2021-10-12 | End: 2022-04-05 | Stop reason: SDUPTHER

## 2021-10-12 NOTE — PROGRESS NOTES
Department of Palliative Medicine  Virtual visit telephone encounter  Provider: BG Izaguirre - CNP         Chief Complaint: Lencho Deluca is a 77 y.o. male with chief complaint of pain    HPI  Mr. 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 Assessment/Plan     Lung cancer   - Stage 4 lung cancer   - Follows with Dr. Shelby Reason   - Received radiation therapy, along with chemotherapy     Chronic Pain due to Neoplasm/chronic lumbar radiculopathy:           - Fentanyl patch 12.5mcg               - Oxycodone at 5 mg BID PRN uses 1-2 pill daily               -  Lyrica 150 mg TID for neuropathy, hand tingling sensation                Constipation:              -  Pericolace 2 tabs BID              -  Add Miralax daily prn     Depression/Anxiety:   - Cymbalta 30 mg BID              Insomnia    - melatonin 5mg HS    Follow Up: 3 months  Encouraged to call with any questions, concerns, needs, or changes in symptoms. Subjective:     Lencho Deluca is a 72 y.o. male with significant past medical history of lung cancer who was seen by [address] [place] [address]. He was accompanied by his wife Jeffry Ruiz. He states that he has been doing well overall. He does complain of some shortness of breath and he is to have a revision of his tracheostomy on Oct. 22. He does continue to smoke. His appetite has been good and he has been trying to maintain his weight and not gain any more weight. He denies any nausea. His pain is well controlled with his current regimen. There are no changes needed at this time.        Objective:     Physical Exam  Wt Readings from Last 3 Encounters:   10/12/21 197 lb (89.4 kg)   09/30/21 203 lb (92.1 kg)   09/22/21 203 lb (92.1 kg)      Gen:  Alert, appears stated age, well nourished, in no acute distress  HEENT:  Speaks by holding trach  Neck:  Supple  Lungs:  CTA bilaterally, no audible rhonchi or wheezes noted  Heart[de-identified]  RRR, no murmur, rub, or gallop noted during exam  Abd:  Soft, non tender, non distended, BS+  M/S/Ext:  Moving all extremities, no edema, pulses present  Skin:  Warm and dry  Neuro:   Alert, oriented x 3; following commands      [place] Symptom Assessment Score   [place] Score 10/12/2021 8/17/2021 7/6/2021 5/25/2021 2/23/2021   Pain Score 4 4 4 4 3   Tiredness Score 1 3 5 7 4   Nausea Score Not nauseated Not nauseated Not nauseated Not nauseated Not nauseated   Depression Score 3 3 4 1 3   Anxiety Score 5 3 6 2 3   Drowsiness Score Not drowsy 2 5 6 5   Appetite Score Best appetite Best appetite Best appetite Best appetite 2   Wellbeing Score 2 3 3 1 4   Dyspnea Score 5 5 5 6 6   Other Problem Score 4 3 3 Best possible response 5   Total Assessment Score(calculated) 24 26 35 27 35       Assessed by: patient. Current Medications:  Medications reviewed: yes    Controlled Substances Monitoring: [place]AR[place] reviewed 10/12/21. RX Monitoring 10/12/2021   Attestation -   Periodic Controlled Substance Monitoring Possible medication side effects, risk of tolerance/dependence & alternative treatments discussed. ;No signs of potential drug abuse or diversion identified. ;Assessed functional status. ;Obtaining appropriate analgesic effect of treatment.    Chronic Pain > 50 MEDD -   Chronic Pain > 80 MEDD -   Chronic Pain > 120 MEDD -     BG Denny - CNP   Palliative Care Department     Time/Communication  Greater than 50% of time spent, total 25 minutes in face-to-face counseling and coordination of care regarding symptom management.

## 2021-11-05 DIAGNOSIS — G89.3 CHRONIC PAIN DUE TO NEOPLASM: ICD-10-CM

## 2021-11-05 RX ORDER — OXYCODONE HYDROCHLORIDE 5 MG/1
5 TABLET ORAL EVERY 8 HOURS PRN
Qty: 90 TABLET | Refills: 0 | Status: SHIPPED
Start: 2021-11-05 | End: 2021-12-08 | Stop reason: SDUPTHER

## 2021-11-05 RX ORDER — FENTANYL 12 UG/H
1 PATCH TRANSDERMAL
Qty: 10 PATCH | Refills: 0 | Status: SHIPPED
Start: 2021-11-05 | End: 2021-12-08 | Stop reason: SDUPTHER

## 2021-11-05 NOTE — TELEPHONE ENCOUNTER
Call from Carol Shelton requesting refill for Alan's Fentanyl patch and Oxy IR. Pharmacy is [facility] on [address] [place] [address].  Next maria r 1/11/2022

## 2021-11-17 ENCOUNTER — TELEPHONE (OUTPATIENT)
Dept: VASCULAR SURGERY | Age: 66
End: 2021-11-17

## 2021-11-19 DIAGNOSIS — E03.9 ACQUIRED HYPOTHYROIDISM: ICD-10-CM

## 2021-11-19 LAB
T3 UPTAKE PERCENT: 36.6 % (ref 22.5–37)
T4 TOTAL: 7.8 MCG/DL (ref 4.5–11.7)
TSH SERPL DL<=0.05 MIU/L-ACNC: 0.15 UIU/ML (ref 0.27–4.2)

## 2021-12-01 ENCOUNTER — TELEPHONE (OUTPATIENT)
Dept: VASCULAR SURGERY | Age: 66
End: 2021-12-01

## 2021-12-01 ENCOUNTER — OFFICE VISIT (OUTPATIENT)
Dept: VASCULAR SURGERY | Age: 66
End: 2021-12-01
Payer: MEDICARE

## 2021-12-01 VITALS — HEIGHT: 71 IN | BODY MASS INDEX: 27.3 KG/M2 | WEIGHT: 195 LBS

## 2021-12-01 DIAGNOSIS — Z86.718 HISTORY OF DEEP VEIN THROMBOSIS: ICD-10-CM

## 2021-12-01 DIAGNOSIS — I71.40 ABDOMINAL AORTIC ANEURYSM WITHOUT RUPTURE: Primary | ICD-10-CM

## 2021-12-01 PROBLEM — I82.A12 DVT OF LEFT AXILLARY VEIN, ACUTE (HCC): Status: RESOLVED | Noted: 2020-08-04 | Resolved: 2021-12-01

## 2021-12-01 PROBLEM — R09.89 DECREASED DORSALIS PEDIS PULSE: Status: RESOLVED | Noted: 2020-11-04 | Resolved: 2021-12-01

## 2021-12-01 PROCEDURE — 99213 OFFICE O/P EST LOW 20 MIN: CPT | Performed by: SURGERY

## 2021-12-01 NOTE — PROGRESS NOTES
Chief Complaint:   Chief Complaint   Patient presents with    Check-Up     AAA         HPI: Patient came to the office, for the evaluation of abnormal abdominal aortic aneurysm, approximately 3 cm noted in the last CT scan of abdomen, denies any abdominal pain or back pain    Patient also history of DVT of the left arm, axilla vein thrombosis, has been on anticoagulation, because of underlying malignancy, carcinoma of the lung, treated with chemotherapy radiation, subsequently patient developed carcinoma of the larynx, laryngectomy and tracheostomy with osteoradionecrosis, was concerned because of underlying malignancy, DVT, that he might be prone for recurrent DVT, as such he want remain on long-term anticoagulation, and currently I will Eliquis 2.5 mg p.o. twice daily, follows up with his hematologist oncologist Dr. Little Duque    Patient denies any history of abdominal pain or back pain      Patient denies any focal lateralizing neurological symptoms like loss of speech, vision or loss of function of extremity    Patient can walk a few blocks , and denies any symptoms of rest pain    Allergies   Allergen Reactions    Augmentin [Amoxicillin-Pot Clavulanate] Diarrhea       Current Outpatient Medications   Medication Sig Dispense Refill    levothyroxine (SYNTHROID) 112 MCG tablet Take 1 tablet by mouth Daily 30 tablet 2    fentaNYL (DURAGESIC) 12 MCG/HR Place 1 patch onto the skin every 72 hours for 30 days. 10 patch 0    oxyCODONE (ROXICODONE) 5 MG immediate release tablet Take 1 tablet by mouth every 8 hours as needed for Pain (hold for sedation) for up to 30 days. 90 tablet 0    pregabalin (LYRICA) 150 MG capsule Take 1 capsule by mouth 3 times daily for 180 days.  270 capsule 1    DULoxetine (CYMBALTA) 30 MG extended release capsule take 1 capsule by mouth twice a day 180 capsule 1    buPROPion (WELLBUTRIN) 75 MG tablet take 1 tablet by mouth twice a day 60 tablet 3    albuterol (PROVENTIL) (2.5 MG/3ML) 0.083% nebulizer solution Take 3 mLs by nebulization every 6 hours as needed for Wheezing 120 each 3    sodium chloride, Inhalant, 3 % nebulizer solution Take 4 mLs by nebulization as needed for Cough 15 mL 5    melatonin 3 MG TABS tablet Take 10 mg by mouth nightly as needed      formoterol (PERFOROMIST) 20 MCG/2ML nebulizer solution Take 2 mLs by nebulization every 12 hours DX: COPD J44.9, BILL MEDICARE PART B 120 mL 5    chlorhexidine (PERIDEX) 0.12 % solution Take 15 mLs by mouth daily Swish & spit qd 473 mL 2    budesonide (PULMICORT) 0.5 MG/2ML nebulizer suspension Take 2 mLs by nebulization 2 times daily DX: COPD J44.9 360 mL 3    Revefenacin (YUPELRI) 175 MCG/3ML SOLN Inhale 3 mLs into the lungs daily 90 mL 5    pembrolizumab (KEYTRUDA) 100 MG/4ML SOLN Infuse 200 mg intravenously every 21 days      albuterol (ACCUNEB) 0.63 MG/3ML nebulizer solution Take 1 ampule by nebulization every 6 hours as needed for Shortness of Breath      simvastatin (ZOCOR) 40 MG tablet Take 40 mg by mouth nightly      tamsulosin (FLOMAX) 0.4 MG capsule Take 1 capsule by mouth nightly 90 capsule 1    predniSONE (DELTASONE) 10 MG tablet Take 10 mg by mouth daily       OXYGEN Inhale 4 L into the lungs nightly      Cholecalciferol (VITAMIN D3) 5000 units TABS Take 5,000 Units by mouth daily       apixaban (ELIQUIS) 2.5 MG TABS tablet Take 1 tablet by mouth 2 times daily 60 tablet 2     No current facility-administered medications for this visit.        Past Medical History:   Diagnosis Date    Abdominal aortic [facility] without rupture ([facility] 08/27/2018    stable per Ct 1/23/2020 / see epic    Acute bronchitis with COPD ([facility] 5/27/2017    Arthritis     COPD (chronic obstructive pulmonary disease) ([facility]     Decreased dorsalis pedis pulse 11/4/2020    Depression with anxiety     Emphysema of lung (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    Paralyzed vocal cords     Thrombosis of testis     Tobacco dependence 5/27/2017    Tracheostomy in place [facility])     #6 Elsa       Past Surgical History:   Procedure Laterality Date    BRONCHOSCOPY N/A 3/5/2020    BRONCHOSCOPY DIAGNOSTIC OR CELL [address] ONLY performed by Jack Su MD at [address] [place] [address]  2015    KNEE ARTHROSCOPY      LUNG BIOPSY Left 5/6/2016    MEDICATION INJECTION Bilateral 10/29/2020    BILATERAL SACROILIAC JOINT INJECTION AND RIGHT TROCHANTERIC BURSA INJECTION UNDER FLUOROSCOPY (CPT 07109,58255,58675)++TRACH-NO VENT++ performed by Yohana Espinoza MD at [place] [address]  4/15/2016    cervical myelogram    PAIN MANAGEMENT PROCEDURE Right 12/28/2020    # 1 LUMBAR TRANSFORAMINAL EPIDURAL STEROID INJECTION RIGHT L3 AND L4 UNDER FLUOROSCOPIC GUIDANCE performed by Yohana Espinoza MD at [place] [address] Right 9/30/2021    LUMBAR TRANSFORAMINAL EPIDURAL STEROID INJECTION RIGHT L3 AND L4 UNDER FLUOROSCOPIC GUIDANCE (CPT 25898) performed by Yohana Espinoza MD at [address] [place] [address] Right     SINUS SURGERY      TRACHEOSTOMY  05/24/2017    # 6 Elsa (disposable)    TRACHEOSTOMY         Family History   Problem Relation Age of Onset    Cancer Mother         breast cancer    Cancer Father         prostate cancer    Diabetes Father        Social History     Socioeconomic History    Marital status:      Spouse name: Not on file    Number of children: Not on file    Years of education: Not on file    Highest education level: Not on file   Occupational History    Occupation: [employer]- [employer]     Comment: disability short term   Tobacco Use    Smoking status: Former Smoker     Packs/day: 0.25     Years: 46.00     Pack improve.

## 2021-12-01 NOTE — TELEPHONE ENCOUNTER
Left message regarding ultrasound at [address] [facility] on Monday, 12-6-21 at 9:00 am. [place] at 8:45 am. NPO after midnight except heart and BP meds with sips of water.

## 2021-12-06 ENCOUNTER — HOSPITAL ENCOUNTER (OUTPATIENT)
Dept: ULTRASOUND IMAGING | Age: 66
Discharge: HOME | End: 2021-12-08
Payer: MEDICARE

## 2021-12-06 DIAGNOSIS — I71.40 ABDOMINAL AORTIC ANEURYSM WITHOUT RUPTURE: ICD-10-CM

## 2021-12-06 PROCEDURE — 76775 US EXAM ABDO BACK WALL LIM: CPT

## 2021-12-08 DIAGNOSIS — G89.3 CHRONIC PAIN DUE TO NEOPLASM: ICD-10-CM

## 2021-12-08 RX ORDER — OXYCODONE HYDROCHLORIDE 5 MG/1
5 TABLET ORAL EVERY 8 HOURS PRN
Qty: 90 TABLET | Refills: 0 | Status: SHIPPED
Start: 2021-12-08 | End: 2022-01-11 | Stop reason: SDUPTHER

## 2021-12-08 RX ORDER — FENTANYL 12 UG/H
1 PATCH TRANSDERMAL
Qty: 10 PATCH | Refills: 0 | Status: SHIPPED
Start: 2021-12-08 | End: 2022-01-11 | Stop reason: SDUPTHER

## 2021-12-08 NOTE — TELEPHONE ENCOUNTER
Call from [facility] [address] wife Benton Holder requesting refills for Fentanyl patch and Oxy IR. Pharmacy is [facility] on [address] [place] [address]. Next maria r 1/11/22.

## 2021-12-12 ENCOUNTER — CLINICAL DOCUMENTATION (OUTPATIENT)
Dept: VASCULAR SURGERY | Age: 66
End: 2021-12-12

## 2021-12-12 NOTE — PROGRESS NOTES
The ultrasound abdomen was personally reviewed by me, maximum diameter of the abdominal aortic aneurysm, 3 cm, unchanged from 2 years ago

## 2021-12-13 ENCOUNTER — TELEPHONE (OUTPATIENT)
Dept: VASCULAR SURGERY | Age: 66
End: 2021-12-13

## 2021-12-20 ENCOUNTER — HOSPITAL ENCOUNTER (OUTPATIENT)
Dept: GENERAL RADIOLOGY | Age: 66
Discharge: HOME | End: 2021-12-22
Payer: MEDICARE

## 2021-12-20 ENCOUNTER — PREP FOR PROCEDURE (OUTPATIENT)
Dept: PAIN MANAGEMENT | Age: 66
End: 2021-12-20

## 2021-12-20 ENCOUNTER — OFFICE VISIT (OUTPATIENT)
Dept: PAIN MANAGEMENT | Age: 66
End: 2021-12-20
Payer: MEDICARE

## 2021-12-20 VITALS
WEIGHT: 195 LBS | HEART RATE: 74 BPM | TEMPERATURE: 96.9 F | DIASTOLIC BLOOD PRESSURE: 78 MMHG | OXYGEN SATURATION: 98 % | RESPIRATION RATE: 16 BRPM | HEIGHT: 71 IN | BODY MASS INDEX: 27.3 KG/M2 | SYSTOLIC BLOOD PRESSURE: 118 MMHG

## 2021-12-20 DIAGNOSIS — M25.512 LEFT SHOULDER PAIN, UNSPECIFIED CHRONICITY: ICD-10-CM

## 2021-12-20 DIAGNOSIS — M53.3 SACROILIAC DYSFUNCTION: Primary | ICD-10-CM

## 2021-12-20 DIAGNOSIS — M51.36 DDD (DEGENERATIVE DISC DISEASE), LUMBAR: ICD-10-CM

## 2021-12-20 DIAGNOSIS — M70.62 TROCHANTERIC BURSITIS OF LEFT HIP: ICD-10-CM

## 2021-12-20 DIAGNOSIS — M47.817 LUMBOSACRAL SPONDYLOSIS WITHOUT MYELOPATHY: ICD-10-CM

## 2021-12-20 DIAGNOSIS — M48.061 SPINAL STENOSIS OF LUMBAR REGION, UNSPECIFIED WHETHER NEUROGENIC CLAUDICATION PRESENT: ICD-10-CM

## 2021-12-20 PROCEDURE — 73030 X-RAY EXAM OF SHOULDER: CPT

## 2021-12-20 PROCEDURE — 99213 OFFICE O/P EST LOW 20 MIN: CPT | Performed by: ANESTHESIOLOGY

## 2021-12-20 PROCEDURE — 99214 OFFICE O/P EST MOD 30 MIN: CPT | Performed by: ANESTHESIOLOGY

## 2021-12-20 NOTE — PROGRESS NOTES
[facility] Pain Management   [place], [address] [facility]  Dept: [phone]      Follow up Note      Shayy Mariee. Date of Visit:  2021    CC:  Patient presents for follow up   Chief Complaint   Patient presents with    Follow-up     lower back rt. hip/thigh        HPI:  Chronic low back pain for > a year.     H/o Ca lung and Throat ca. S/p RT/ chemo- Has a tracheostomy. Has been treated by oncology.     Under palliative medicine care for neuropathic pain and managed by medications including opioids.     Pain does radiate to lower extremities- right side > left     On anticoagulation for h/o DVT    Has right low back pain and right lateral hip pain. Continues HEP. Nursing notes and details of the pain history reviewed. Please see intake notes for details. Previous treatments:   Physical Therapy : yes, HEP- as tolerated      Chiropractic treatment: no     Medications: - NSAID's : yes                        - Membrane stabilizers : yes - gabapentin, Lyrica                       - Opioids : yes: Duragesic, Oxycodone                       - Adjuvants or Others : yes     Surgeries: no LS spine surgery. H/o prior Cervical fusion noted.     He has been on anticoagulation medications - Eliquis.     He has not been on herbal supplements.       He is not diabetic.     H/O Smoking: +  H/O alcohol abuse : no  H/O Illicit drug use : no     Imaging:   MRI of LS spine on 2020 reviewed:  Impression         1. Severe central canal stenosis and moderate bilateral neural    foraminal narrowing at L3-4 and L4-5.         2. Grade 1 anterolisthesis of L3 on L4 and L4 on L5.         3. Stable distal abdominal aortic aneurysm measuring 3 cm in diameter.       X-ray of the sacroiliac joints done on 2020:   Impression    Mild-to-moderate degenerative changes involving bilateral sacroiliac    joints.       VL LE segmental PP2020:  arrative   Normal ankle arm index with good arterial flow to both feet including   the toes based upon the pulse volume recordings           PET scan: 2/20120: Impression    1. Two focal suspicious areas of increased metabolic activity in the    base of the oral cavity/sublingual spaces as above commented.         2. No indication for metastatic lymph node adenopathy in the neck or    mediastinum.         3. No abnormal uptake of the radionuclide is seen at the level of the    lungs including multiple parenchymal opacities observed bilaterally on    recent CT chest.                 Mri of the right femur and Hip: 5/20219:  Impression         1. Moderate right knee effusion with mild synovitis. This is of    unclear etiology. This finding can be better characterized with MRI of    the right knee without contrast if clinically indicated. 2. Small scrotal hydroceles of unclear clinical significance. 3. No evidence for metastatic disease.       Xray of the right hip: 3/2019:      Impression     Moderate degenerative changes of the right hip.            Potential Aberrant Drug-Related Behavior:no      OARRS report[de-identified]  Reviewed today 12/20/21  getting meds form Palliative.     Past Medical History:   Diagnosis Date    Abdominal aortic aneurysm without rupture ([address] 08/27/2018    stable per Ct 1/23/2020 / see epic    Acute bronchitis with COPD ([address] 5/27/2017    Arthritis     COPD (chronic obstructive pulmonary disease) ([address]     Decreased dorsalis pedis pulse 11/4/2020    Depression with anxiety     Emphysema of lung ([address]     Encounter for antineoplastic immunotherapy     HAP (hospital-acquired pneumonia)     resolved    History of deep vein thrombosis 11/4/2020    Hyperlipidemia     Leg swelling 11/4/2020    Lung cancer ([address] 04/11/2016    chemo and radiation    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    Paralyzed vocal cords     Thrombosis of testis     Tobacco dependence 5/27/2017    Tracheostomy in place [facility])     #6 Elsa       Past Surgical History:   Procedure Laterality Date    BRONCHOSCOPY N/A 3/5/2020    BRONCHOSCOPY DIAGNOSTIC OR CELL [address] ONLY performed by Sulema Villar MD at [facility]  2015    KNEE ARTHROSCOPY      LUNG BIOPSY Left 5/6/2016    MEDICATION INJECTION Bilateral 10/29/2020    BILATERAL SACROILIAC JOINT INJECTION AND RIGHT TROCHANTERIC BURSA INJECTION UNDER FLUOROSCOPY (CPT 41016,39362,51195)++TRACH-NO VENT++ performed by Janice Patel MD at [facility]. [facility] HISTORY  4/15/2016    cervical myelogram    PAIN MANAGEMENT PROCEDURE Right 12/28/2020    # 1 LUMBAR TRANSFORAMINAL EPIDURAL STEROID INJECTION RIGHT L3 AND L4 UNDER FLUOROSCOPIC GUIDANCE performed by Janice Patel MD at [facility] Right 9/30/2021    LUMBAR TRANSFORAMINAL EPIDURAL STEROID INJECTION RIGHT L3 AND L4 UNDER FLUOROSCOPIC GUIDANCE (CPT 73581) performed by Janice Patel MD at [facility] Right     SINUS SURGERY      TRACHEOSTOMY  05/24/2017    # 6 Shiley (disposable)    TRACHEOSTOMY         Prior to Admission medications    Medication Sig Start Date End Date Taking? Authorizing Provider   buPROPion (WELLBUTRIN) 75 MG tablet take 1 tablet by mouth twice a day 12/14/21  Yes Sulema Villar MD   fentaNYL (DURAGESIC) 12 MCG/HR Place 1 patch onto the skin every 72 hours for 30 days. 12/8/21 1/7/22 Yes BG Kaur - CNP   oxyCODONE (ROXICODONE) 5 MG immediate release tablet Take 1 tablet by mouth every 8 hours as needed for Pain (hold for sedation) for up to 30 days. 12/8/21 1/7/22 Yes BG Kaur - CNP   levothyroxine (SYNTHROID) 112 MCG tablet Take 1 tablet by mouth Daily 11/22/21  Yes Sylvia Quintana DO   pregabalin (LYRICA) 150 MG capsule Take 1 capsule by mouth 3 times daily for 180 days.  10/12/21 4/10/22 Yes BG Kaur - CNP   DULoxetine (CYMBALTA) 30 MG extended release capsule take 1 capsule by mouth twice a day 10/4/21  Yes Elsy Solorzano, APRN - CNP   albuterol (PROVENTIL) (2.5 MG/3ML) 0.083% nebulizer solution Take 3 mLs by nebulization every 6 hours as needed for Wheezing 7/27/21  Yes Soren Sanz MD   sodium chloride, Inhalant, 3 % nebulizer solution Take 4 mLs by nebulization as needed for Cough 7/27/21  Yes Soren Sanz MD   melatonin 3 MG TABS tablet Take 10 mg by mouth nightly as needed   Yes Historical Provider, MD   formoterol (PERFOROMIST) 20 MCG/2ML nebulizer solution Take 2 mLs by nebulization every 12 hours DX: COPD J44.9, BILL MEDICARE PART B 7/7/21  Yes Soren Sanz MD   chlorhexidine (PERIDEX) 0.12 % solution Take 15 mLs by mouth daily Swish & spit qd 5/18/21  Yes Sylvia Quintana DO   budesonide (PULMICORT) 0.5 MG/2ML nebulizer suspension Take 2 mLs by nebulization 2 times daily DX: COPD J44.9 5/18/21  Yes Soren Sanz MD   Revefenacin (YUPELRI) 175 MCG/3ML SOLN Inhale 3 mLs into the lungs daily 10/13/20  Yes Soren Sanz MD   pembrolizumab (KEYTRUDA) 100 MG/4ML SOLN Infuse 200 mg intravenously every 21 days   Yes Historical Provider, MD   albuterol (ACCUNEB) 0.63 MG/3ML nebulizer solution Take 1 ampule by nebulization every 6 hours as needed for Shortness of Breath   Yes Historical Provider, MD   simvastatin (ZOCOR) 40 MG tablet Take 40 mg by mouth nightly   Yes Historical Provider, MD   tamsulosin (FLOMAX) 0.4 MG capsule Take 1 capsule by mouth nightly 4/1/20  Yes Sylvia Quintana DO   predniSONE (DELTASONE) 10 MG tablet Take 10 mg by mouth daily    Yes Historical Provider, MD   OXYGEN Inhale 4 L into the lungs nightly   Yes Historical Provider, MD   Cholecalciferol (VITAMIN D3) 5000 units TABS Take 5,000 Units by mouth daily    Yes Historical Provider, MD   apixaban (ELIQUIS) 2.5 MG TABS tablet Take 1 tablet by mouth 2 times daily 12/8/20 11/19/21  Sylvia Quintana DO       Allergies   Allergen Reactions    Augmentin [Amoxicillin-Pot Clavulanate] Diarrhea       Social History     Socioeconomic History    Marital status:      Spouse name: Not on file    Number of children: Not on file    Years of education: Not on file    Highest education level: Not on file   Occupational History    Occupation: [employer]- [employer]     Comment: disability short term   Tobacco Use    Smoking status: Former Smoker     Packs/day: 0.25     Years: 46.00     Pack years: 11.50     Types: Cigarettes     Start date: 1972    Smokeless tobacco: Never Used    Tobacco comment: Pt quit in 9/26/2018& started back 2/2020 smoking . 5 ppd   Vaping Use    Vaping Use: Never used   Substance and Sexual Activity    Alcohol use: Yes     Comment: beer sometimes    Drug use: No    Sexual activity: Yes     Partners: Female   Other Topics Concern    Not on file   Social History Narrative    Works at [email or web address]. Lives in [place]. Social Determinants of Health     Financial Resource Strain: Low Risk     Difficulty of Paying Living Expenses: Not very hard   Food Insecurity: No Food Insecurity    Worried About Running Out of Food in the Last Year: Never true    Edward of Food in the Last Year: Never true   Transportation Needs:     Lack of Transportation (Medical): Not on file    Lack of Transportation (Non-Medical):  Not on file   Physical Activity:     Days of Exercise per Week: Not on file    Minutes of Exercise per Session: Not on file   Stress:     Feeling of Stress : Not on file   Social Connections:     Frequency of Communication with Friends and Family: Not on file    Frequency of Social Gatherings with Friends and Family: Not on file    Attends Gnosticist Services: Not on file    Active Member of Clubs or Organizations: Not on file    Attends Club or Organization Meetings: Not on file    Marital Status: Not on file   Intimate Partner Violence:     Fear of Current or Ex-Partner: Not on file    Emotionally Abused: Not on file    Physically Abused: Not on file    Sexually Abused: Not on file Housing Stability:     Unable to Pay for Housing in the Last Year: Not on file    Number of Places Lived in the Last Year: Not on file    Unstable Housing in the Last Year: Not on file       Family History   Problem Relation Age of Onset    Cancer Mother         breast cancer    Cancer Father         prostate cancer    Diabetes Father        REVIEW OF SYSTEMS:     Keith Anderson denies fever/chills, chest pain, shortness of breath, new bowel or bladder complaints. All other review of systems was negative. PHYSICAL EXAMINATION:      /78   Pulse 74   Temp 96.9 °F (36.1 °C) (Infrared)   Resp 16   Ht 5' 11\" (1.803 m)   Wt 195 lb (88.5 kg)   SpO2 98%   BMI 27.20 kg/m²   General:       General appearance:  Pleasant and well-hydrated, in no distress and A & O x 3  Build:Overweight  Function: Rises from seated position easily and Moves about room without difficulty     HEENT:     Head:normocephalic, atraumatic  Pupils:regular, round, equal  Sclera: icterus absent  Tracheostomy +     Lungs:     Breathing:normal breathing pattern      CVS:     RRR     Abdomen:     Shape:non-distended and normal     Cervical spine:     Inspection:normal     Thoracic spine:                Spine inspection:normal      Lumbar spine:     Spine inspection: Normal   Palpation: Tenderness paravertebral muscles No bilaterally  Range of motion: Decreased, flexion Decreased, Lateral bending, extension and rotation bilaterally reduced is somewhat painful.   Sacroiliac joint tenderness + right side  + Gaenslen's, + fredrick's : right  Piriformis tenderness: negative bilaterally  SLR : negative bilaterally  Trochanteric bursa tenderness: + the right side     Musculoskeletal:     Trigger points no   Extremities:     Tremors:None bilaterally upper and lower  No LE edema    Left shoulder: Tenderness +, ROM limited and pain +    Neurological:     Sensory: Normal to light touch - except for reduced sensation over the right leg     Motor: Right Quadriceps 5/5          Left Quadriceps 5/5           Right Gastrocnemius 5/5    Left Gastrocnemius 5/5  Right Ant Tibialis 5/5  Left Ant Tibialis 5/5      Gait:no assistive device.     Dermatology:     Skin:no rashes or lesions noted     Assessment/Plan:   Diagnosis Orders   1. DDD (degenerative disc disease), lumbar      2. Lumbar radiculopathy      3. Lumbosacral spondylosis without myelopathy      4. Sacroiliac dysfunction          77 y.o. male with H/o ca lung and ca throat - s/p chemo/. RT. Has a tracheostomy. Followed by Hemonc/ ENT at [facility]) and also follows with Palliative care. On pain medications.     Has low back and lower extremity pain - right. MRI of the LS spine : Significant changes noted at L3-4. L4-5     S/P recent TFESI : moderate relief. Current pain right low back: SIJ tenderness + and right lateral hip pain: trochanteric bursa tenderness. H/o recent fall and injury to left shoulder. Plan:  Right SI joint and right trochanteric bursa injection under fluoroscopy. He has H/o left UE DVT and is on Eliquis      OK to continue Eliquis for SIJ/ trochanteric bursa injection. He will continue medical management with Palliative care. Will get Xray left shoulder to r/o significant shoulder pathology from recent fall. Addendum: 12/20/21:   Impression   1. Minimal degenerative changes of the [facility] joint. ZT lido samples- given. If continued shoulder pain, recommend ortho eval.     Counseling :Patient encouraged to stay active and to continue Regular home exercise program as tolerated - stretching / strengthening.     Smoking cessation counseling : yes      Treatment plan discussed with the patient including medication and procedure side effects. Recent events/ consultation.  Notes reviewed.     Controlled Substances Monitoring:     OARRS reviewed- 12/20/21: consistent     Vikki Rodriguez MD     CC:  Sylvia Quintana DO

## 2021-12-20 NOTE — PROGRESS NOTES
Do you currently have any of the following:    Fever: No  Headache:  No  Cough: No  Shortness of breath: No  Exposed to anyone with these symptoms: No         Alan Harrell. presents to the [address] [place] [address] on 12/20/2021. Elena Marti is complaining of pain rt. Lower back hip,thigh  The pain is constant. The pain is described as aching. Pain is rated on his best day at a 4, on his worst day at a 10, and on average at a 7 on the VAS scale. He took his last dose of Duragesic Patch /oxycodone      Any procedures since your last visit:     Pacemaker or defibrillator: No managed by    He is not on NSAIDS and is  on anticoagulation medications to include Eliquis and is managed by PCP     Medication Contract and Consent for Opioid Use Documents Filed     Patient Documents     Type of Document Status Date Received Received By Description    Medication Contract Received 3/24/2021  7:56 AM Star Phlegm 3-24-21 Palliative                /78   Pulse 74   Temp 96.9 °F (36.1 °C) (Infrared)   Resp 16   Ht 5' 11\" (1.803 m)   Wt 195 lb (88.5 kg)   SpO2 98%   BMI 27.20 kg/m²      No LMP for male patient.

## 2021-12-23 ENCOUNTER — TELEPHONE (OUTPATIENT)
Dept: PAIN MANAGEMENT | Age: 66
End: 2021-12-23

## 2021-12-23 NOTE — TELEPHONE ENCOUNTER
Call to [phone]. that procedure was approved for 01/06/2022 and that the surgery center should call him a few days before for the pre op call. Advised him NOT to stop his eliquis. Left voice mail message.      Homer Agosto, RN  Pain Management

## 2021-12-27 DIAGNOSIS — E03.9 ACQUIRED HYPOTHYROIDISM: ICD-10-CM

## 2021-12-27 LAB — TSH SERPL DL<=0.05 MIU/L-ACNC: 0.54 UIU/ML (ref 0.27–4.2)

## 2022-01-05 NOTE — PROGRESS NOTES
Have you been tested for COVID  No           Have you been told you were positive for COVID No  Have you had any known exposure to someone that is positive for COVID No  Do you have a cough                   No              Do you have shortness of breath No                 Do you have a sore throat            No                Are you having chills                    No                Are you having muscle aches. No                    Please come to the hospital wearing a mask and have your significant other wear a mask as well. Both of you should check your temperature before leaving to come here,  if it is 100 or higher please call [phone] for instruction. [facility] PAIN MANAGEMENT  INSTRUCTIONS  . .......................................................................................................................................... [x] Parking the day of Surgery is located in the [facility].   Upon entering the door, make immediate right into the surgery reception room    [x]  Bring photo ID and insurance card     [x] You may have a light breakfast day of procedure    [x]  Wear loose comfortable clothing    [x]  Please follow instructions for medications as given per Dr's office    [x] You can expect a call the business day prior to procedure to notify you of your arrival time     [x] Please arrange for

## 2022-01-06 ENCOUNTER — HOSPITAL ENCOUNTER (OUTPATIENT)
Dept: GENERAL RADIOLOGY | Age: 67
Setting detail: OUTPATIENT SURGERY
Discharge: HOME | End: 2022-01-08
Attending: ANESTHESIOLOGY
Payer: MEDICARE

## 2022-01-06 ENCOUNTER — HOSPITAL ENCOUNTER (OUTPATIENT)
Age: 67
Setting detail: OUTPATIENT SURGERY
Discharge: HOME | End: 2022-01-06
Attending: ANESTHESIOLOGY | Admitting: ANESTHESIOLOGY
Payer: MEDICARE

## 2022-01-06 VITALS
SYSTOLIC BLOOD PRESSURE: 94 MMHG | BODY MASS INDEX: 27.3 KG/M2 | HEART RATE: 72 BPM | WEIGHT: 195 LBS | DIASTOLIC BLOOD PRESSURE: 62 MMHG | RESPIRATION RATE: 16 BRPM | OXYGEN SATURATION: 93 % | HEIGHT: 71 IN | TEMPERATURE: 98.7 F

## 2022-01-06 DIAGNOSIS — R52 PAIN MANAGEMENT: ICD-10-CM

## 2022-01-06 PROBLEM — M70.60 TROCHANTERIC BURSITIS: Status: ACTIVE | Noted: 2022-01-06

## 2022-01-06 PROCEDURE — 6360000004 HC RX CONTRAST MEDICATION: Performed by: ANESTHESIOLOGY

## 2022-01-06 PROCEDURE — 20610 DRAIN/INJ JOINT/BURSA W/O US: CPT | Performed by: ANESTHESIOLOGY

## 2022-01-06 PROCEDURE — 7100000011 HC PHASE II RECOVERY - ADDTL 15 MIN: Performed by: ANESTHESIOLOGY

## 2022-01-06 PROCEDURE — 27096 INJECT SACROILIAC JOINT: CPT | Performed by: ANESTHESIOLOGY

## 2022-01-06 PROCEDURE — 7100000010 HC PHASE II RECOVERY - FIRST 15 MIN: Performed by: ANESTHESIOLOGY

## 2022-01-06 PROCEDURE — 77002 NEEDLE LOCALIZATION BY XRAY: CPT | Performed by: ANESTHESIOLOGY

## 2022-01-06 PROCEDURE — 3600000002 HC SURGERY LEVEL 2 BASE: Performed by: ANESTHESIOLOGY

## 2022-01-06 PROCEDURE — 2500000003 HC RX 250 WO HCPCS: Performed by: ANESTHESIOLOGY

## 2022-01-06 PROCEDURE — 2709999900 HC NON-CHARGEABLE SUPPLY: Performed by: ANESTHESIOLOGY

## 2022-01-06 PROCEDURE — 6360000002 HC RX W HCPCS: Performed by: ANESTHESIOLOGY

## 2022-01-06 PROCEDURE — 3209999900 FLUORO FOR SURGICAL PROCEDURES

## 2022-01-06 RX ORDER — METHYLPREDNISOLONE ACETATE 40 MG/ML
INJECTION, SUSPENSION INTRA-ARTICULAR; INTRALESIONAL; INTRAMUSCULAR; SOFT TISSUE PRN
Status: DISCONTINUED | OUTPATIENT
Start: 2022-01-06 | End: 2022-01-06 | Stop reason: ALTCHOICE

## 2022-01-06 RX ORDER — LIDOCAINE HYDROCHLORIDE 5 MG/ML
INJECTION, SOLUTION INFILTRATION; INTRAVENOUS PRN
Status: DISCONTINUED | OUTPATIENT
Start: 2022-01-06 | End: 2022-01-06 | Stop reason: ALTCHOICE

## 2022-01-06 RX ORDER — BUPIVACAINE HYDROCHLORIDE 2.5 MG/ML
INJECTION, SOLUTION EPIDURAL; INFILTRATION; INTRACAUDAL PRN
Status: DISCONTINUED | OUTPATIENT
Start: 2022-01-06 | End: 2022-01-06 | Stop reason: ALTCHOICE

## 2022-01-06 ASSESSMENT — PAIN - FUNCTIONAL ASSESSMENT: PAIN_FUNCTIONAL_ASSESSMENT: 0-10

## 2022-01-06 ASSESSMENT — PAIN SCALES - GENERAL: PAINLEVEL_OUTOF10: 0

## 2022-01-06 ASSESSMENT — PAIN DESCRIPTION - DESCRIPTORS: DESCRIPTORS: ACHING;DISCOMFORT

## 2022-01-06 NOTE — OP NOTE
Operative Note      Patient: Saran Torre. YOB: 1955  MRN: [id number]    Date of Procedure: 2022    Pre-Op Diagnosis: Sacroiliac dysfunction, trochanteric bursitis    Post-Op Diagnosis: Same       Procedure(s):  1) RIGHT SACROILIAC JOINT INJECTION UNDER FLUOROSCOPY  2) RIGHT TROCHANTARIC BURSA INJECTION UNDER FLUOROSCOPY    Surgeon(s):  Marjan Rossi MD    Assistant:   * No surgical staff found *    Anesthesia: Local    Estimated Blood Loss (mL): Minimal    Complications: None    Specimens:   * No specimens in log *    Implants:  * No implants in log *      Drains: * No LDAs found *    Findings: good needle placement    Detailed Description of Procedure:   2022    Patient: Saran Torre. :  1955  Age:  77 y.o. Sex:  male     PRE-OPERATIVE DIAGNOSIS:   Sacroiliac dysfunction, trochanteric bursitis    POST-OPERATIVE DIAGNOSIS: Same. PROCEDURE:    1) RIGHT SACROILIAC JOINT INJECTION UNDER FLUOROSCOPY  2) RIGHT TROCHANTARIC BURSA INJECTION UNDER FLUOROSCOPY    SURGEON:  Marjan Rossi MD    ANESTHESIA: Local    ESTIMATED BLOOD LOSS: None.  ______________________________________________________________________  BRIEF HISTORY:  Saran Sanchez comes in today for  The above procedure. The potential complications as well as the procedure in detail were explained to him today. He has elected to undergo the aforementioned procedure. Alan's complete History & Physical examination were reviewed in depth, a copy of which is in the chart. DESCRIPTION OF PROCEDURE:    After confirming written and informed consent, a time-out was performed and Alans name and date of birth, the procedure to be performed as well as the plan for the location of the needle insertion were confirmed. 1) The patient was brought into the procedure room and placed in the prone position on the fluoroscopy table.  Standard monitors were placed and vital signs were observed throughout the procedure. The low back and upper buttocks area and lateral hip area was prepped with chloraprep and draped in a sterile manner. AP fluoroscopy was used to visualize the sacroiliac joint. The fluoroscopic beam was then obliqued until the anterior and posterior margins of the joint were aligned. The inferior margin of the joint was identified and marked. The skin and subcutaneous tissue about this identified point were anesthestized with 0.5% lidocaine. A 22 gauge 3-1/2 spinal needle was advanced toward the the identified point under fluoroscopic guidance. Once the targeted point was reached and the joint space was entered, negative aspiration was confirmed, and 0.5 cc of 240 omnipaque was injected. The  Joint space was appropriately outlined. Then, after negative aspiration, a solution consisiting of 0.25% marcaine 2 cc and 40 mg DepoMedrol was easily injected. The needle was then removed and the needle insertion site was covered with Band-Aid. At this point, the right trochanteric area was identified and marked under X ray. The skin and subcutaneous tissue about this identified point were anesthestized with 0.5% lidocaine. A 22 gauge 3-1/2 spinal needle was advanced toward the the identified point under fluoroscopic guidance until the right trochanteric bursa was contacted. Once the targeted point was reached, negative aspiration was confirmed, and 0.5 cc of 240 omnipaque was injected with good local spread. Then, after negative aspiration, a solution consisiting of 0.25% marcaine 6 cc and 20 mg DepoMedrol was easily injected. The needle was then removed and the needle insertion site was covered with Band-Aid. Disposition the patient tolerated the procedure well and there were no complications . Vital signs remained stable throughout the procedure.  The patient was escorted to the recovery area where they remained until discharge and written discharge instructions for the procedure were given.    Plan: Jason Merlos will return to our pain management center as scheduled.      Leslie Lopez MD

## 2022-01-06 NOTE — H&P
JAZMINE RICKS [facility] Pain Management  [place], [address]  Dept: [phone]    Procedure History & Physical      Alan Desiree Dejesus. HPI:    Patient  is here for SIJ and trochanteric bursitis for low back and lateral hip pain.   Labs/imaging studies reviewed   All question and concerns addressed including R/B/A associated with the procedure    Past Medical History:   Diagnosis Date    Abdominal aortic aneurysm without rupture ([address] 08/27/2018    follows with Dr Payton Olguin    Acute bronchitis with COPD ([address] 5/27/2017    Arthritis     COPD (chronic obstructive pulmonary disease) (HCC)     Decreased dorsalis pedis pulse 11/4/2020    Depression with anxiety     Emphysema of lung ([address]     Encounter for antineoplastic immunotherapy     HAP (hospital-acquired pneumonia)     resolved    History of deep vein thrombosis 11/4/2020    Hyperlipidemia     Lung cancer ([facility] 04/11/2016    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    Paralyzed vocal cords     Thrombosis of testis     Tracheostomy in place ([facility]     #6 Elsa       Past Surgical History:   Procedure Laterality Date    BRONCHOSCOPY N/A 3/5/2020    BRONCHOSCOPY DIAGNOSTIC OR CELL [address] ONLY performed by Rocky Becker MD at [address] [place] [address]  2015    KNEE ARTHROSCOPY      LUNG BIOPSY Left 5/6/2016    MEDICATION INJECTION Bilateral 10/29/2020    BILATERAL SACROILIAC JOINT INJECTION AND RIGHT TROCHANTERIC BURSA INJECTION UNDER FLUOROSCOPY (CPT 94872,03776,75203)++TRACH-NO VENT++ performed by Jenny Weeks MD at [facility]. [facility] HISTORY  4/15/2016    cervical myelogram    PAIN MANAGEMENT PROCEDURE Right 12/28/2020    # 1 LUMBAR TRANSFORAMINAL EPIDURAL STEROID INJECTION RIGHT L3 AND L4 UNDER FLUOROSCOPIC GUIDANCE performed by Jenny Weeks MD [facility] Right 9/30/2021    LUMBAR TRANSFORAMINAL EPIDURAL STEROID INJECTION RIGHT L3 AND L4 UNDER FLUOROSCOPIC GUIDANCE (CPT 93577) performed by Neo Condon MD at [facility] Right     SINUS SURGERY      TRACHEOSTOMY  05/24/2017    # 6 Shiley (disposable)    TRACHEOSTOMY         Prior to Admission medications    Medication Sig Start Date End Date Taking? Authorizing Provider   simvastatin (ZOCOR) 40 MG tablet Take 1 tablet by mouth nightly 12/27/21  Yes Sylvia Quintana DO   fluocinonide (LIDEX) 0.05 % cream Apply topically 2 times daily. 12/27/21  Yes Sylvia Quintana DO   buPROPion (WELLBUTRIN) 75 MG tablet take 1 tablet by mouth twice a day 12/14/21  Yes Fortino Grubbs MD   fentaNYL (DURAGESIC) 12 MCG/HR Place 1 patch onto the skin every 72 hours for 30 days. 12/8/21 1/7/22 Yes BG Parson CNP   oxyCODONE (ROXICODONE) 5 MG immediate release tablet Take 1 tablet by mouth every 8 hours as needed for Pain (hold for sedation) for up to 30 days. 12/8/21 1/7/22 Yes BG Parson CNP   levothyroxine (SYNTHROID) 112 MCG tablet Take 1 tablet by mouth Daily 11/22/21  Yes Sylvia Quintana DO   pregabalin (LYRICA) 150 MG capsule Take 1 capsule by mouth 3 times daily for 180 days.  10/12/21 4/10/22 Yes BG Parson CNP   DULoxetine (CYMBALTA) 30 MG extended release capsule take 1 capsule by mouth twice a day 10/4/21  Yes BG Montilla CNP   albuterol (PROVENTIL) (2.5 MG/3ML) 0.083% nebulizer solution Take 3 mLs by nebulization every 6 hours as needed for Wheezing 7/27/21  Yes Fortino Grubbs MD   sodium chloride, Inhalant, 3 % nebulizer solution Take 4 mLs by nebulization as needed for Cough 7/27/21  Yes Fortino Grubbs MD   melatonin 3 MG TABS tablet Take 10 mg by mouth nightly as needed   Yes Historical Provider, MD   formoterol (PERFOROMIST) 20 MCG/2ML nebulizer solution Take 2 mLs by nebulization every 12 hours DX: COPD J44.9, BILL MEDICARE PART B 7/7/21  Yes Fortino Grubbs MD   chlorhexidine (PERIDEX) 0.12 % solution Take 15 mLs by mouth daily Swish & spit qd 5/18/21  Yes Sylvia Quintana DO   budesonide (PULMICORT) 0.5 MG/2ML nebulizer suspension Take 2 mLs by nebulization 2 times daily DX: COPD J44.9 21  Yes Grisel German MD   apixaban (ELIQUIS) 2.5 MG TABS tablet Take 1 tablet by mouth 2 times daily 20 Yes Sylvia Quintana DO   Revefenacin (YUPELRI) 175 MCG/3ML SOLN Inhale 3 mLs into the lungs daily 10/13/20  Yes Grisel German MD   pembrolizumab (KEYTRUDA) 100 MG/4ML SOLN Infuse 200 mg intravenously every 21 days   Yes Historical Provider, MD   albuterol (ACCUNEB) 0.63 MG/3ML nebulizer solution Take 1 ampule by nebulization every 6 hours as needed for Shortness of Breath   Yes Historical Provider, MD   tamsulosin (FLOMAX) 0.4 MG capsule Take 1 capsule by mouth nightly 20  Yes Sylvia Quintana DO   predniSONE (DELTASONE) 10 MG tablet Take 10 mg by mouth daily    Yes Historical Provider, MD   OXYGEN Inhale 4 L into the lungs nightly   Yes Historical Provider, MD   Cholecalciferol (VITAMIN D3) 5000 units TABS Take 5,000 Units by mouth daily    Yes Historical Provider, MD       Allergies   Allergen Reactions    Augmentin [Amoxicillin-Pot Clavulanate] Diarrhea       Social History     Socioeconomic History    Marital status:      Spouse name: Not on file    Number of children: Not on file    Years of education: Not on file    Highest education level: Not on file   Occupational History    Occupation: [employer]     Comment: disability short term   Tobacco Use    Smoking status: Former Smoker     Packs/day: 0.25     Years: 46.00     Pack years: 11.50     Types: Cigarettes     Start date: 0     Quit date: 10/21/2021     Years since quittin.2    Smokeless tobacco: Never Used    Tobacco comment: Pt quit in 2018& started back 2020 smoking . 5 ppd   Vaping Use    Vaping Use: Never used   Substance and Sexual Activity    Alcohol use: Yes     Comment: beer sometimes    Drug use: No    Sexual activity: Not on file Other Topics Concern    Not on file   Social History Narrative    Works at [employer]. Lives in [place]. Social Determinants of Health     Financial Resource Strain: Low Risk     Difficulty of Paying Living Expenses: Not very hard   Food Insecurity: No Food Insecurity    Worried About Running Out of Food in the Last Year: Never true    Edward of Food in the Last Year: Never true   Transportation Needs:     Lack of Transportation (Medical): Not on file    Lack of Transportation (Non-Medical):  Not on file   Physical Activity:     Days of Exercise per Week: Not on file    Minutes of Exercise per Session: Not on file   Stress:     Feeling of Stress : Not on file   Social Connections:     Frequency of Communication with Friends and Family: Not on file    Frequency of Social Gatherings with Friends and Family: Not on file    Attends Confucianist Services: Not on file    Active Member of [address] [place] [address] or Organizations: Not on file    Attends Club or Organization Meetings: Not on file    Marital Status: Not on file   Intimate Partner Violence:     Fear of Current or Ex-Partner: Not on file    Emotionally Abused: Not on file    Physically Abused: Not on file    Sexually Abused: Not on file   Housing Stability:     Unable to Pay for Housing in the Last Year: Not on file    Number of [place] in the Last Year: Not on file    Unstable Housing in the Last Year: Not on file       Family History   Problem Relation Age of Onset    Cancer Mother         breast cancer    Cancer Father         prostate cancer    Diabetes Father          REVIEW OF SYSTEMS:    CONSTITUTIONAL:  negative for  fevers, chills, sweats and fatigue    RESPIRATORY:  negative for  dry cough, cough with sputum, dyspnea, wheezing and chest pain    CARDIOVASCULAR:  negative for chest pain, dyspnea, palpitations, syncope    GASTROINTESTINAL:  negative for nausea, vomiting, change in bowel habits, diarrhea, constipation and abdominal pain    MUSCULOSKELETAL: negative for muscle weakness    SKIN: negative for itching or rashes. BEHAVIOR/PSYCH:  negative for poor appetite, increased appetite, decreased sleep and poor concentration    All other systems negative      PHYSICAL EXAM:    VITALS:  BP (!) 127/57   Pulse 79   Temp 97.1 °F (36.2 °C) (Temporal)   Resp 14   Ht 5' 11\" (1.803 m)   Wt 195 lb (88.5 kg)   SpO2 93%   BMI 27.20 kg/m²     CONSTITUTIONAL:  awake, alert, cooperative, no apparent distress, and appears stated age    EYES: PERRLA, EOMI    LUNGS:  No increased work of breathing, no audible wheezing    CARDIOVASCULAR:  regular rate and rhythm    ABDOMEN:  Soft non tender non distended     EXTREMITIES: no signs of clubbing or cyanosis. MUSCULOSKELETAL: negative for flaccid muscle tone or spastic movements. SKIN: gross examination reveals no signs of rashes, or diaphoresis. NEURO: Cranial nerves II-XII grossly intact. No signs of agitated mood. Assessment/Plan:    Patient  is here for SIJ and trochanteric bursitis for low back and lateral hip pain. The patient was counseled at length about the risks of rhonda Covid-19 during their perioperative period and any recovery window from their procedure. The patient was made aware that rhonda Covid-19  may worsen their prognosis for recovering from their procedure  and lend to a higher morbidity and/or mortality risk. All material risks, benefits, and reasonable alternatives including postponing the procedure were discussed. The patient does wish to proceed with the procedure at this time.       Meek Rivera MD

## 2022-01-11 ENCOUNTER — VIRTUAL VISIT (OUTPATIENT)
Dept: PALLATIVE CARE | Age: 67
End: 2022-01-11
Payer: MEDICARE

## 2022-01-11 DIAGNOSIS — G89.3 CHRONIC PAIN DUE TO NEOPLASM: ICD-10-CM

## 2022-01-11 PROCEDURE — 99442 PR PHYS/QHP TELEPHONE EVALUATION 11-20 MIN: CPT | Performed by: NURSE PRACTITIONER

## 2022-01-11 RX ORDER — FENTANYL 12 UG/H
1 PATCH TRANSDERMAL
Qty: 10 PATCH | Refills: 0 | Status: SHIPPED | OUTPATIENT
Start: 2022-01-11 | End: 2022-02-10

## 2022-01-11 RX ORDER — DULOXETIN HYDROCHLORIDE 30 MG/1
CAPSULE, DELAYED RELEASE ORAL
Qty: 180 CAPSULE | Refills: 1 | Status: SHIPPED
Start: 2022-01-11 | End: 2022-04-05 | Stop reason: SDUPTHER

## 2022-01-11 RX ORDER — OXYCODONE HYDROCHLORIDE 5 MG/1
5 TABLET ORAL EVERY 8 HOURS PRN
Qty: 90 TABLET | Refills: 0 | Status: SHIPPED
Start: 2022-01-11 | End: 2022-03-29 | Stop reason: SDUPTHER

## 2022-01-11 NOTE — PROGRESS NOTES
Palliative Care Department  Palliative Care Telephone Encounter  Provider: BG Davis - GERONIMO    Alan Leetej. is a 77 y.o. male evaluated via telephone on 1/11/2022. Consent:  He and/or health care decision maker is aware that that he may receive a bill for this telephone service, depending on his insurance coverage, and has provided verbal consent to proceed: Yes      Documentation:  I communicated with the patient and/or health care decision maker regarding pain. Assessment/Plan   Lung cancer              - Stage 4 lung cancer              - Follows with Dr. Isael Leonard              - Received radiation therapy, along with chemotherapy      Chronic Pain due to Neoplasm/chronic lumbar radiculopathy:           - Fentanyl patch 12.5mcg               - Oxycodone at 5 mg TID PRN              -  Lyrica 150 mg TID for neuropathy, hand tingling sensation   -  S/p sacroiliac joint injection with Dr. Hang Del Angel on 1/6/22                Constipation:              -  Pericolace 2 tabs BID              -  Miralax daily prn     Depression/Anxiety:              - Cymbalta 30 mg BID              Insomnia               - melatonin 5mg HS    Follow Up:  3 months. They were encouraged to call with any questions, concerns, needs, or changes in symptoms. Subjective   Details of this discussion including any medical advice provided:   A telephonic virtual visit was completed via telephone with Jodie Ambrocio. today regarding the issues listed above. Overall Alan reports that he is doing very well. There is no change in how he is utilizing his medications. He continues with fentanyl patch and oxycodone unchanged. He did report that he had a fall recently injuring his shoulder, due to this he has utilizes oxycodone a little more frequently, but no more than 3 times per day. Continues with the rest of his medications unchanged.   He does continue have some constipation but this is managed well utilizing Dolly-Colace and MiraLAX. He otherwise denies any new needs, we will make a change to his plan of care, we will provide refills as appropriate, and we will plan to see him again in approximately 3 months. Controlled Substance Monitoring: [place]ARRS reviewed 1/11/22  RX Monitoring 1/11/2022   Attestation -   Periodic Controlled Substance Monitoring Possible medication side effects, risk of tolerance/dependence & alternative treatments discussed. ;No signs of potential drug abuse or diversion identified. ;Assessed functional status. ;Obtaining appropriate analgesic effect of treatment. Chronic Pain > 50 MEDD -   Chronic Pain > 80 MEDD -   Chronic Pain > 120 MEDD -           I affirm this episode is with an Established Patient. Total Time: minutes: 11-20 minutes      [facility] Palliative [facility]    Note: not billable if this call serves to triage the patient into an appointment for the relevant concern    Note: This report was completed using computerize voice recognition software. Every effort has been made to ensure accuracy; however, inadvertent computerized transcription errors may be present.

## 2022-03-29 DIAGNOSIS — G89.3 CHRONIC PAIN DUE TO NEOPLASM: ICD-10-CM

## 2022-03-29 RX ORDER — OXYCODONE HYDROCHLORIDE 5 MG/1
5 TABLET ORAL EVERY 8 HOURS PRN
Qty: 90 TABLET | Refills: 0 | Status: SHIPPED
Start: 2022-03-29 | End: 2022-05-17 | Stop reason: SDUPTHER

## 2022-03-29 NOTE — TELEPHONE ENCOUNTER
Call from [address] [place] [address] requesting a refill for Fentanyl patch and Oxy IR. Haroldo Sr states he has been weaning himself off pain medications and pain is not so bad. Last feel was 1/11/22. Instructed Dom that we could stop Fentanyl patch at this time and he could utilize the Oxy IR as ordered and as needed. Chiquis Shea agrees. Pharmacy is [facility] on [address] [place] [address]. Next maria r confirmed on 4/5/22.

## 2022-04-05 ENCOUNTER — OFFICE VISIT (OUTPATIENT)
Dept: PALLATIVE CARE | Age: 67
End: 2022-04-05
Payer: MEDICARE

## 2022-04-05 VITALS
BODY MASS INDEX: 28.17 KG/M2 | TEMPERATURE: 97.2 F | SYSTOLIC BLOOD PRESSURE: 98 MMHG | OXYGEN SATURATION: 92 % | WEIGHT: 202 LBS | HEART RATE: 80 BPM | DIASTOLIC BLOOD PRESSURE: 68 MMHG

## 2022-04-05 DIAGNOSIS — G89.3 CHRONIC PAIN DUE TO NEOPLASM: ICD-10-CM

## 2022-04-05 DIAGNOSIS — Z51.5 PALLIATIVE CARE BY SPECIALIST: ICD-10-CM

## 2022-04-05 PROCEDURE — 99213 OFFICE O/P EST LOW 20 MIN: CPT | Performed by: NURSE PRACTITIONER

## 2022-04-05 PROCEDURE — 99212 OFFICE O/P EST SF 10 MIN: CPT

## 2022-04-05 RX ORDER — DULOXETIN HYDROCHLORIDE 30 MG/1
30 CAPSULE, DELAYED RELEASE ORAL 2 TIMES DAILY
Qty: 180 CAPSULE | Refills: 1 | Status: SHIPPED
Start: 2022-04-05 | End: 2022-10-04 | Stop reason: SDUPTHER

## 2022-04-05 RX ORDER — PREGABALIN 150 MG/1
150 CAPSULE ORAL 3 TIMES DAILY
Qty: 270 CAPSULE | Refills: 1 | Status: SHIPPED
Start: 2022-04-05 | End: 2022-09-20 | Stop reason: SDUPTHER

## 2022-04-05 NOTE — PROGRESS NOTES
Department of Palliative Medicine  Provider: BG Ram - CNP         Chief Complaint: Sultana Oropeza is a 77 y.o. male with chief complaint of pain    HPI  Mr. 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 Juvencio Cordero on 1/6/22   - Stop Fentanyl patch 12.5mcg     Constipation:              -  Pericolace 2 tabs BID              -  Add Miralax daily prn     Depression/Anxiety:   - Cymbalta 30 mg BID              Insomnia    - melatonin 5mg HS    Follow Up: 3 months  Encouraged to call with any questions, concerns, needs, or changes in symptoms. Subjective:   Mirna Calvo presents today accompanied by his wife. He is alert and oriented voices concerns well. Overall he reports he is doing very well, still some pain, rating is a 5 on a scale of 10, and overall very well managed utilizing oxycodone 5 mg, which he typically only takes 1-2 times per day. He also continues utilizes Lyrica unchanged. He has stopped using fentanyl, he continues to reduce his opioid use, and is very happy with his overall improvement. His appetite continues to be very good, and he also continues denies any significant constipation or nausea. Is tolerating his Keytruda treatments well, he has no new needs today.   We will provide refills as appropriate, will plan to see him again approximately 3 months to reassess his needs.    Objective:     Physical Exam  Wt Readings from Last 3 Encounters:   04/05/22 202 lb (91.6 kg)   01/06/22 195 lb (88.5 kg)   12/27/21 195 lb 9.6 oz (88.7 kg)      Gen:  Alert, appears stated age, well nourished, in no acute distress  HEENT:  Speaks by holding trach  Neck:  Supple  Lungs:  CTA bilaterally, no audible rhonchi or wheezes noted  Heart[de-identified]  RRR, no murmur, rub, or gallop noted during exam  Abd:  Soft, non tender, non distended, BS+  M/S/Ext:  Moving all extremities, no edema, pulses present  Skin:  Warm and dry  Neuro:   Alert, oriented x 3; following commands      [place] Symptom Assessment Score   [place] Score 4/5/2022 10/12/2021 8/17/2021 7/6/2021 5/25/2021   Pain Score 5 4 4 4 4   Tiredness Score 2 1 3 5 7   Nausea Score Not nauseated Not nauseated Not nauseated Not nauseated Not nauseated   Depression Score 3 3 3 4 1   Anxiety Score 2 5 3 6 2   Drowsiness Score 2 Not drowsy 2 5 6   Appetite Score 2 Best appetite Best appetite Best appetite Best appetite   Wellbeing Score 6 2 3 3 1   Dyspnea Score 6 5 5 5 6   Other Problem Score 3 4 3 3 Best possible response   Total Assessment Score(calculated) 31 24 26 35 27       Assessed by: patient. Current Medications:  Medications reviewed: yes    Controlled Substances Monitoring: OARRS reviewed 4/5/22. RX Monitoring 4/5/2022   Attestation -   Periodic Controlled Substance Monitoring Possible medication side effects, risk of tolerance/dependence & alternative treatments discussed. ;No signs of potential drug abuse or diversion identified. ;Assessed functional status. ;Obtaining appropriate analgesic effect of treatment.    Chronic Pain > 50 MEDD -   Chronic Pain > 80 MEDD -   Chronic Pain > 120 MEDD -     Lili Karrie, APRN - CNP   Palliative Care Department     Time/Communication  Greater than 50% of time spent, total 25 minutes in face-to-face counseling and coordination of care regarding symptom management.

## 2022-04-11 ENCOUNTER — OFFICE VISIT (OUTPATIENT)
Dept: PAIN MANAGEMENT | Age: 67
End: 2022-04-11
Payer: MEDICARE

## 2022-04-11 ENCOUNTER — PREP FOR PROCEDURE (OUTPATIENT)
Dept: PAIN MANAGEMENT | Age: 67
End: 2022-04-11

## 2022-04-11 VITALS
BODY MASS INDEX: 27.86 KG/M2 | HEIGHT: 71 IN | WEIGHT: 199 LBS | HEART RATE: 82 BPM | TEMPERATURE: 97.6 F | RESPIRATION RATE: 16 BRPM | OXYGEN SATURATION: 94 %

## 2022-04-11 DIAGNOSIS — M53.3 SACROILIAC DYSFUNCTION: Primary | ICD-10-CM

## 2022-04-11 DIAGNOSIS — M47.817 LUMBOSACRAL SPONDYLOSIS WITHOUT MYELOPATHY: ICD-10-CM

## 2022-04-11 DIAGNOSIS — G89.29 CHRONIC LEFT SHOULDER PAIN: ICD-10-CM

## 2022-04-11 DIAGNOSIS — M51.36 DDD (DEGENERATIVE DISC DISEASE), LUMBAR: ICD-10-CM

## 2022-04-11 DIAGNOSIS — M25.512 CHRONIC LEFT SHOULDER PAIN: ICD-10-CM

## 2022-04-11 DIAGNOSIS — Z93.0 TRACHEOSTOMY IN PLACE (HCC): ICD-10-CM

## 2022-04-11 PROBLEM — K21.9 GERD (GASTROESOPHAGEAL REFLUX DISEASE): Status: ACTIVE | Noted: 2021-10-13

## 2022-04-11 PROBLEM — C34.90 MALIGNANT NEOPLASM OF LUNG (HCC): Status: ACTIVE | Noted: 2020-07-13

## 2022-04-11 PROBLEM — M48.061 LUMBAR CANAL STENOSIS: Status: ACTIVE | Noted: 2020-08-17

## 2022-04-11 PROBLEM — J38.4 LARYNGEAL EDEMA: Status: ACTIVE | Noted: 2020-08-28

## 2022-04-11 PROCEDURE — 99214 OFFICE O/P EST MOD 30 MIN: CPT | Performed by: ANESTHESIOLOGY

## 2022-04-11 PROCEDURE — 99203 OFFICE O/P NEW LOW 30 MIN: CPT | Performed by: ANESTHESIOLOGY

## 2022-04-11 NOTE — PROGRESS NOTES
Do you currently have any of the following:    Fever: No  Headache:  No  Cough: No  Shortness of breath: No  Exposed to anyone with these symptoms: No         Alan Schultz. presents to the [address] [place] [address] on 4/11/2022. Sissy Buerger is complaining of pain lower back and left shoulder. The pain is constant. The pain is described as sharp. Pain is rated on his best day at a 4, on his worst day at a 8, and on average at a 6 on the VAS scale. He took his last dose of Percocet lyrica today     Any procedures since your last visit: No, with  % relief. Pacemaker or defibrillator: No managed by . He is not on NSAIDS and is  on anticoagulation medications to include Eliquis and is managed by Sylvia Quintana DO  .     Medication Contract and Consent for Opioid Use Documents Filed     Patient Documents     Type of Document Status Date Received Received By Description    Medication Contract Received 3/24/2021  7:56 AM Theodora Jaramillo 3-24-21 Palliative                Pulse 82   Temp 97.6 °F (36.4 °C) (Infrared)   Resp 16   Ht 5' 11\" (1.803 m)   Wt 199 lb (90.3 kg)   SpO2 94%   BMI 27.75 kg/m²      No LMP for male patient.

## 2022-04-11 NOTE — PROGRESS NOTES
JAZMINE RICKS [facility] - BEHAVIORAL HEALTH SERVICES Pain Management   [place], [address]  Dept: [phone]      Follow up Note      Le Borja. Date of Visit:  2022    CC:  Patient presents for follow up   Chief Complaint   Patient presents with    Follow-up    Lower Back Pain    Shoulder Pain     left shoulder       HPI:  Chronic low back pain.     H/o Ca lung and Throat ca. S/p RT/ chemo- Has a tracheostomy. Has been treated by oncology.     Under palliative medicine care for neuropathic pain and managed by medications including opioids.     On anticoagulation for h/o DVT    Has right low back pain. Continues HEP. Nursing notes and details of the pain history reviewed. Please see intake notes for details. Previous treatments:   Physical Therapy : yes, HEP- as tolerated       Medications: - NSAID's : yes                        - Membrane stabilizers : yes                        - Opioids : yes                       - Adjuvants or Others : yes     Surgeries: no LS spine surgery. H/o prior Cervical fusion noted.     He has been on anticoagulation medications - Eliquis.     He has not been on herbal supplements.       He is not diabetic.     H/O Smoking: +  H/O alcohol abuse : no  H/O Illicit drug use : no     Imaging:     X-ray left shoulder: 12/10/2021:  Impression   1. Minimal degenerative changes of the [facility] joint       MRI of LS spine on 2020 reviewed:  Impression         1. Severe central canal stenosis and moderate bilateral neural    foraminal narrowing at L3-4 and L4-5.         2. Grade 1 anterolisthesis of L3 on L4 and L4 on L5.         3. Stable distal abdominal aortic aneurysm measuring 3 cm in diameter.       X-ray of the sacroiliac joints done on 2020:   Impression    Mild-to-moderate degenerative changes involving bilateral sacroiliac    joints.       VL LE segmental PP2020:  arrative   Normal ankle arm index with good arterial flow to both feet including   the toes based upon the pulse volume recordings           PET scan: 2/20120: Impression    1. Two focal suspicious areas of increased metabolic activity in the    base of the oral cavity/sublingual spaces as above commented.         2. No indication for metastatic lymph node adenopathy in the neck or    mediastinum.         3. No abnormal uptake of the radionuclide is seen at the level of the    lungs including multiple parenchymal opacities observed bilaterally on    recent CT chest.                 MRI of the right femur and Hip: 5/20219:  Impression         1. Moderate right knee effusion with mild synovitis. This is of    unclear etiology. This finding can be better characterized with MRI of    the right knee without contrast if clinically indicated. 2. Small scrotal hydroceles of unclear clinical significance. 3. No evidence for metastatic disease.       Xray of the right hip: 3/2019:      Impression     Moderate degenerative changes of the right hip.            Potential Aberrant Drug-Related Behavior:no      OARRS report[de-identified]  Reviewed today 4/11/22  getting meds form Palliative.     Past Medical History:   Diagnosis Date    Abdominal aortic aneurysm without rupture ([facility] 08/27/2018    follows with Dr Louis Palomo    Acute bronchitis with COPD ([address] 5/27/2017    Arthritis     COPD (chronic obstructive pulmonary disease) (HCC)     Decreased dorsalis pedis pulse 11/4/2020    Depression with anxiety     Emphysema of lung ([address]     Encounter for antineoplastic immunotherapy     HAP (hospital-acquired pneumonia)     resolved    History of deep vein thrombosis 11/4/2020    Hyperlipidemia     Lung cancer ([address] 04/11/2016    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    Paralyzed vocal cords     Thrombosis of testis     Tracheostomy in place [facility])     #6 Elsa       Past Surgical History:   Procedure Laterality Date    BRONCHOSCOPY N/A 3/5/2020    BRONCHOSCOPY DIAGNOSTIC OR CELL 8 Alia Gallardo Labidi ONLY performed [facility]i Owens [facility] at [facility] ENDOSCOPY    CERVICAL FUSION  2015    HIP SURGERY Left 1/6/2022    RIGHT TROCHANTARIC BURSA INJECTION UNDER FLUOROSCOPY performed by Lee Bryant MD at [address] [place] [address] ARTHROSCOPY      LUNG BIOPSY Left 5/6/2016    MEDICATION INJECTION Bilateral 10/29/2020    BILATERAL SACROILIAC JOINT INJECTION AND RIGHT TROCHANTERIC BURSA INJECTION UNDER FLUOROSCOPY (CPT 55574,78295,93561)++TRACH-NO VENT++ performed by Lee Bryant MD at [address] Left 1/6/2022    RIGHT SACROILIAC JOINT INJECTION UNDER FLUOROSCOPY performed by Lee Bryant MD at [place] [address]  4/15/2016    cervical myelogram    PAIN MANAGEMENT PROCEDURE Right 12/28/2020    # 1 LUMBAR TRANSFORAMINAL EPIDURAL STEROID INJECTION RIGHT L3 AND L4 UNDER FLUOROSCOPIC GUIDANCE performed by Lee Bryant MD at [address] Right 9/30/2021    LUMBAR TRANSFORAMINAL EPIDURAL STEROID INJECTION RIGHT L3 AND L4 UNDER FLUOROSCOPIC GUIDANCE (CPT 23092) performed by Lee Bryant MD at [address] [place] [address] Right     SINUS SURGERY      TRACHEOSTOMY  05/24/2017    # 6 Shiley (disposable)    TRACHEOSTOMY         Prior to Admission medications    Medication Sig Start Date End Date Taking? Authorizing Provider   pregabalin (LYRICA) 150 MG capsule Take 1 capsule by mouth 3 times daily for 180 days. 4/5/22 10/2/22 Yes BG Trevino CNP   DULoxetine (CYMBALTA) 30 MG extended release capsule Take 1 capsule by mouth 2 times daily take 1 capsule by mouth twice a day 4/5/22  Yes BG Trevino CNP   tamsulosin [facility]) 0.4 MG capsule Take 1 capsule by mouth nightly 3/29/22  Yes Sylvia Quintana DO   oxyCODONE (ROXICODONE) 5 MG immediate release tablet Take 1 tablet by mouth every 8 hours as needed for Pain (hold for sedation) for up to 30 days.  3/29/22 4/28/22 Yes BG Trevino CNP   levothyroxine (SYNTHROID) 112 MCG tablet Take 1 tablet by mouth Daily 3/22/22  Yes Sylvia Quintana DO   albuterol (PROVENTIL) (2.5 MG/3ML) 0.083% nebulizer solution inhale contents of 1 vial ( 3 milliliters ) in nebulizer by mouth and INTO THE LUNGS every 6 hours if needed wheezing 2/10/22  Yes Surya Torre MD   formoterol (PERFOROMIST) 20 MCG/2ML nebulizer solution Take 2 mLs by nebulization every 12 hours DX: COPD J44.9, BILL MEDICARE PART B 2/7/22  Yes Surya Torre MD   simvastatin (ZOCOR) 40 MG tablet Take 1 tablet by mouth nightly 12/27/21  Yes Sylvia Quintana DO   fluocinonide (LIDEX) 0.05 % cream Apply topically 2 times daily.  12/27/21  Yes Sylvia Quintana DO   sodium chloride, Inhalant, 3 % nebulizer solution Take 4 mLs by nebulization as needed for Cough 7/27/21  Yes Surya Torre MD   melatonin 3 MG TABS tablet Take 10 mg by mouth nightly as needed    Yes Historical Provider, MD   chlorhexidine (PERIDEX) 0.12 % solution Take 15 mLs by mouth daily Swish & spit qd 5/18/21  Yes Sylvia Quintana DO   budesonide (PULMICORT) 0.5 MG/2ML nebulizer suspension Take 2 mLs by nebulization 2 times daily DX: COPD J44.9 5/18/21  Yes Surya Torre MD   Revefenacin (YUPELRI) 175 MCG/3ML SOLN Inhale 3 mLs into the lungs daily 10/13/20  Yes Surya Torre MD   pembrolizumab (KEYTRUDA) 100 MG/4ML SOLN Infuse 200 mg intravenously every 21 days   Yes Historical Provider, MD   albuterol (ACCUNEB) 0.63 MG/3ML nebulizer solution Take 1 ampule by nebulization every 6 hours as needed for Shortness of Breath   Yes Historical Provider, MD   predniSONE (DELTASONE) 10 MG tablet Take 10 mg by mouth daily    Yes Historical Provider, MD   OXYGEN Inhale 4 L into the lungs nightly   Yes Historical Provider, MD   Cholecalciferol (VITAMIN D3) 5000 units TABS Take 5,000 Units by mouth daily    Yes Historical Provider, MD   buPROPion (WELLBUTRIN) 75 MG tablet take 1 tablet by mouth twice a day  Patient not taking: Reported on 4/11/2022 12/14/21   Surya Torre MD   apixaban (ELIQUIS) 2.5 MG TABS tablet Take 1 tablet by mouth 2 times daily 20  Sylviajose elias Quintana, DO       Allergies   Allergen Reactions    Augmentin [Amoxicillin-Pot Clavulanate] Diarrhea       Social History     Socioeconomic History    Marital status:      Spouse name: Not on file    Number of children: Not on file    Years of education: Not on file    Highest education level: Not on file   Occupational History    Occupation: [employer]- SSI     Comment: disability short term   Tobacco Use    Smoking status: Former Smoker     Packs/day: 0.25     Years: 46.00     Pack years: 11.50     Types: Cigarettes     Start date: 0     Quit date: 10/21/2021     Years since quittin.4    Smokeless tobacco: Never Used    Tobacco comment: Pt quit in 2018& started back 2020 smoking . 5 ppd   Vaping Use    Vaping Use: Never used   Substance and Sexual Activity    Alcohol use: Yes     Comment: beer sometimes    Drug use: No    Sexual activity: Not on file   Other Topics Concern    Not on file   Social History Narrative    Works at [employer]. Lives in [facility]. Social Determinants of Health     Financial Resource Strain: Low Risk     Difficulty of Paying Living Expenses: Not very hard   Food Insecurity: No Food Insecurity    Worried About Running Out of Food in the Last Year: Never true    Edward of Food in the Last Year: Never true   Transportation Needs:     Lack of Transportation (Medical): Not on file    Lack of Transportation (Non-Medical):  Not on file   Physical Activity:     Days of Exercise per Week: Not on file    Minutes of Exercise per Session: Not on file   Stress:     Feeling of Stress : Not on file   Social Connections:     Frequency of Communication with Friends and Family: Not on file    Frequency of Social Gatherings with Friends and Family: Not on file    Attends Scientologist Services: Not on file    Active Member of Clubs or Organizations: Not on file    Attends Club or Organization Meetings: Not on file    Marital Status: Not on file   Intimate Partner Violence:     Fear of Current or Ex-Partner: Not on file    Emotionally Abused: Not on file    Physically Abused: Not on file    Sexually Abused: Not on file   Housing Stability:     Unable to Pay for Housing in the Last Year: Not on file    Number of [place] in the Last Year: Not on file    Unstable Housing in the Last Year: Not on file       Family History   Problem Relation Age of Onset    Cancer Mother         breast cancer    Cancer Father         prostate cancer    Diabetes Father        REVIEW OF SYSTEMS:     Alan denies fever/chills, chest pain, shortness of breath, new bowel or bladder complaints. All other review of systems was negative. PHYSICAL EXAMINATION:      Pulse 82   Temp 97.6 °F (36.4 °C) (Infrared)   Resp 16   Ht 5' 11\" (1.803 m)   Wt 199 lb (90.3 kg)   SpO2 94%   BMI 27.75 kg/m²   General:       General appearance:  Pleasant and well-hydrated, in no distress and A & O x 3  Build:Overweight  Function: Rises from seated position easily and Moves about room without difficulty     HEENT:     Head:normocephalic, atraumatic  Pupils:regular, round, equal  Sclera: icterus absent  Tracheostomy +     Lungs:     Breathing:normal breathing pattern      CVS:     RRR     Abdomen:     Shape:non-distended and normal     Cervical spine:     Inspection:normal     Thoracic spine:                Spine inspection:normal      Lumbar spine:     Spine inspection: Normal   Palpation: Tenderness paravertebral muscles No bilaterally  Range of motion: Decreased, flexion Decreased, Lateral bending, extension and rotation bilaterally reduced is somewhat painful.   Facet tenderness +  Sacroiliac joint tenderness + right side  + Gaenslen's, + fredrick's : right  Piriformis tenderness: negative bilaterally  SLR : negative bilaterally  Trochanteric bursa tenderness: improved right side     Musculoskeletal:     Trigger points no   Extremities:     Tremors:None bilaterally upper and lower  No LE edema    Left shoulder: Tenderness +, ROM limited and pain +    Neurological:     Sensory: Normal to light touch - except for reduced sensation over the right leg     Motor:                   Right Quadriceps 5/5          Left Quadriceps 5/5           Right Gastrocnemius 5/5    Left Gastrocnemius 5/5  Right Ant Tibialis 5/5  Left Ant Tibialis 5/5      Gait:no assistive device.     Dermatology:     Skin:no rashes or lesions noted     Assessment/Plan:   Diagnosis Orders   1. DDD (degenerative disc disease), lumbar      2. Lumbar radiculopathy      3. Lumbosacral spondylosis without myelopathy      4. Sacroiliac dysfunction          77 y.o. male with H/o ca lung and ca throat - s/p chemo/. RT. Has a tracheostomy. Followed by Hemonc/ [facility] at [facility]) and also follows with Palliative care. On pain medications.     Has low back and lower extremity pain - right. MRI of the LS spine : Significant changes noted at L3-4. L4-5     Current pain right low back: SIJ tenderness +     S/P X 2 SIj injection with > 80% relief for few months. Now has recurrence of similar pain. Plan:  Radiofrequency ablation of right L5, S1 & S2, S3 under fluoroscopy. He has H/o left UE DVT and is on Eliquis  Hold for 3 days for the procedure. He will continue medical management with Palliative care.     Counseling : Patient encouraged to stay active and to continue Regular home exercise program as tolerated - stretching / strengthening.     Smoking cessation counseling : yes      Treatment plan discussed with the patient including medication and procedure side effects. Recent events/ consultation. Notes reviewed. H/o  left shoulder pain +.  PT/ HEP/ compound cream. If continued pain, Left shoulder injection / ortho eval.     Controlled Substances Monitoring:     OARRS reviewed- 4/11/22: consistent     Garfield Conner MD     CC:  Sylvia Quintana DO

## 2022-04-11 NOTE — H&P (VIEW-ONLY)
[facility]   [place], [address] [facility]  Dept: [phone]      Follow up Note      Romy Collier. Date of Visit:  2022    CC:  Patient presents for follow up   Chief Complaint   Patient presents with    Follow-up    Lower Back Pain    Shoulder Pain     left shoulder       HPI:  Chronic low back pain.     H/o Ca lung and Throat ca. S/p RT/ chemo- Has a tracheostomy. Has been treated by oncology.     Under palliative medicine care for neuropathic pain and managed by medications including opioids.     On anticoagulation for h/o DVT    Has right low back pain. Continues HEP. Nursing notes and details of the pain history reviewed. Please see intake notes for details. Previous treatments:   Physical Therapy : yes, HEP- as tolerated       Medications: - NSAID's : yes                        - Membrane stabilizers : yes                        - Opioids : yes                       - Adjuvants or Others : yes     Surgeries: no LS spine surgery. H/o prior Cervical fusion noted.     He has been on anticoagulation medications - Eliquis.     He has not been on herbal supplements.       He is not diabetic.     H/O Smoking: +  H/O alcohol abuse : no  H/O Illicit drug use : no     Imaging:     X-ray left shoulder: 12/10/2021:  Impression   1. Minimal degenerative changes of the [facility] joint       MRI of LS spine on 2020 reviewed:  Impression         1. Severe central canal stenosis and moderate bilateral neural    foraminal narrowing at L3-4 and L4-5.         2. Grade 1 anterolisthesis of L3 on L4 and L4 on L5.         3. Stable distal abdominal aortic aneurysm measuring 3 cm in diameter.       X-ray of the sacroiliac joints done on 2020:   Impression    Mild-to-moderate degenerative changes involving bilateral sacroiliac    joints.       VL LE segmental PP2020:  arrative   Normal ankle arm index with good arterial flow to both feet including   the toes based upon the pulse volume recordings           PET scan: 2/20120: Impression    1. Two focal suspicious areas of increased metabolic activity in the    base of the oral cavity/sublingual spaces as above commented.         2. No indication for metastatic lymph node adenopathy in the neck or    mediastinum.         3. No abnormal uptake of the radionuclide is seen at the level of the    lungs including multiple parenchymal opacities observed bilaterally on    recent CT chest.                 MRI of the right femur and Hip: 5/20219:  Impression         1. Moderate right knee effusion with mild synovitis. This is of    unclear etiology. This finding can be better characterized with MRI of    the right knee without contrast if clinically indicated. 2. Small scrotal hydroceles of unclear clinical significance. 3. No evidence for metastatic disease.       Xray of the right hip: 3/2019:      Impression     Moderate degenerative changes of the right hip.            Potential Aberrant Drug-Related Behavior:no      OARRS report[de-identified]  Reviewed today 4/11/22  getting meds form Palliative.     Past Medical History:   Diagnosis Date    Abdominal aortic aneurysm without rupture ([facility] 08/27/2018    follows with Dr Michele Rocha    Acute bronchitis with COPD ([address] 5/27/2017    Arthritis     COPD (chronic obstructive pulmonary disease) (HCC)     Decreased dorsalis pedis pulse 11/4/2020    Depression with anxiety     Emphysema of lung ([address]     Encounter for antineoplastic immunotherapy     HAP (hospital-acquired pneumonia)     resolved    History of deep vein thrombosis 11/4/2020    Hyperlipidemia     Lung cancer ([address] 04/11/2016    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    Paralyzed vocal cords     Thrombosis of testis     Tracheostomy in place [facility])     #6 Elsa       Past Surgical History:   Procedure Laterality Date    BRONCHOSCOPY N/A 3/5/2020    BRONCHOSCOPY DIAGNOSTIC OR CELL 8 Alia Gallardo Labidi ONLY performed by Chuck Blanchard MD at [facility] ENDOSCOPY    CERVICAL FUSION  2015    HIP SURGERY Left 1/6/2022    RIGHT TROCHANTARIC BURSA INJECTION UNDER FLUOROSCOPY performed by Carolyn Cooper MD at [address] [place] [address] ARTHROSCOPY      LUNG BIOPSY Left 5/6/2016    MEDICATION INJECTION Bilateral 10/29/2020    BILATERAL SACROILIAC JOINT INJECTION AND RIGHT TROCHANTERIC BURSA INJECTION UNDER FLUOROSCOPY (CPT 10120,21578,23553)++TRACH-NO VENT++ performed by Carolyn Cooper MD at [address] Left 1/6/2022    RIGHT SACROILIAC JOINT INJECTION UNDER FLUOROSCOPY performed by [facility] MD [facility] [facility]  4/15/2016    cervical myelogram    PAIN MANAGEMENT PROCEDURE Right 12/28/2020    # 1 LUMBAR TRANSFORAMINAL EPIDURAL STEROID INJECTION RIGHT L3 AND L4 UNDER FLUOROSCOPIC GUIDANCE performed by Carolyn Cooper MD at [facility] Right 9/30/2021    LUMBAR TRANSFORAMINAL EPIDURAL STEROID INJECTION RIGHT L3 AND L4 UNDER FLUOROSCOPIC GUIDANCE (CPT 29826) performed by Carolyn Cooper MD at [facility] Right     SINUS SURGERY      TRACHEOSTOMY  05/24/2017    # 6 Shiley (disposable)    TRACHEOSTOMY         Prior to Admission medications    Medication Sig Start Date End Date Taking? Authorizing Provider   pregabalin (LYRICA) 150 MG capsule Take 1 capsule by mouth 3 times daily for 180 days. 4/5/22 10/2/22 Yes BG Pickett CNP   DULoxetine (CYMBALTA) 30 MG extended release capsule Take 1 capsule by mouth 2 times daily take 1 capsule by mouth twice a day 4/5/22  Yes BG Pickett CNP   tamsulosin [facility]) 0.4 MG capsule Take 1 capsule by mouth nightly 3/29/22  Yes Sylvia Quintana DO   oxyCODONE (ROXICODONE) 5 MG immediate release tablet Take 1 tablet by mouth every 8 hours as needed for Pain (hold for sedation) for up to 30 days.  3/29/22 4/28/22 Yes BG Pickett CNP   levothyroxine (SYNTHROID) 112 MCG tablet Take 1 tablet by mouth Daily 3/22/22  Yes Sylvia Quintana DO   albuterol (PROVENTIL) (2.5 MG/3ML) 0.083% nebulizer solution inhale contents of 1 vial ( 3 milliliters ) in nebulizer by mouth and INTO THE LUNGS every 6 hours if needed wheezing 2/10/22  Yes Jose Arreaga MD   formoterol (PERFOROMIST) 20 MCG/2ML nebulizer solution Take 2 mLs by nebulization every 12 hours DX: COPD J44.9, BILL MEDICARE PART B 2/7/22  Yes Jose Arreaga MD   simvastatin (ZOCOR) 40 MG tablet Take 1 tablet by mouth nightly 12/27/21  Yes Sylvia Quintana DO   fluocinonide (LIDEX) 0.05 % cream Apply topically 2 times daily.  12/27/21  Yes Sylvia Quintana DO   sodium chloride, Inhalant, 3 % nebulizer solution Take 4 mLs by nebulization as needed for Cough 7/27/21  Yes Jose Arreaga MD   melatonin 3 MG TABS tablet Take 10 mg by mouth nightly as needed    Yes Historical Provider, MD   chlorhexidine (PERIDEX) 0.12 % solution Take 15 mLs by mouth daily Swish & spit qd 5/18/21  Yes Sylvia Quintana DO   budesonide (PULMICORT) 0.5 MG/2ML nebulizer suspension Take 2 mLs by nebulization 2 times daily DX: COPD J44.9 5/18/21  Yes Jose Arreaga MD   Revefenacin (YUPELRI) 175 MCG/3ML SOLN Inhale 3 mLs into the lungs daily 10/13/20  Yes Jose Arreaga MD   pembrolizumab (KEYTRUDA) 100 MG/4ML SOLN Infuse 200 mg intravenously every 21 days   Yes Historical Provider, MD   albuterol (ACCUNEB) 0.63 MG/3ML nebulizer solution Take 1 ampule by nebulization every 6 hours as needed for Shortness of Breath   Yes Historical Provider, MD   predniSONE (DELTASONE) 10 MG tablet Take 10 mg by mouth daily    Yes Historical Provider, MD   OXYGEN Inhale 4 L into the lungs nightly   Yes Historical Provider, MD   Cholecalciferol (VITAMIN D3) 5000 units TABS Take 5,000 Units by mouth daily    Yes Historical Provider, MD   buPROPion (WELLBUTRIN) 75 MG tablet take 1 tablet by mouth twice a day  Patient not taking: Reported on 4/11/2022 12/14/21   Jose Arreaga MD   apixaban on file    Attends Club or Organization Meetings: Not on file    Marital Status: Not on file   Intimate Partner Violence:     Fear of Current or Ex-Partner: Not on file    Emotionally Abused: Not on file    Physically Abused: Not on file    Sexually Abused: Not on file   Housing Stability:     Unable to Pay for Housing in the Last Year: Not on file    Number of [place] in the Last Year: Not on file    Unstable Housing in the Last Year: Not on file       Family History   Problem Relation Age of Onset    Cancer Mother         breast cancer    Cancer Father         prostate cancer    Diabetes Father        REVIEW OF SYSTEMS:     Alan denies fever/chills, chest pain, shortness of breath, new bowel or bladder complaints. All other review of systems was negative. PHYSICAL EXAMINATION:      Pulse 82   Temp 97.6 °F (36.4 °C) (Infrared)   Resp 16   Ht 5' 11\" (1.803 m)   Wt 199 lb (90.3 kg)   SpO2 94%   BMI 27.75 kg/m²   General:       General appearance:  Pleasant and well-hydrated, in no distress and A & O x 3  Build:Overweight  Function: Rises from seated position easily and Moves about room without difficulty     HEENT:     Head:normocephalic, atraumatic  Pupils:regular, round, equal  Sclera: icterus absent  Tracheostomy +     Lungs:     Breathing:normal breathing pattern      CVS:     RRR     Abdomen:     Shape:non-distended and normal     Cervical spine:     Inspection:normal     Thoracic spine:                Spine inspection:normal      Lumbar spine:     Spine inspection: Normal   Palpation: Tenderness paravertebral muscles No bilaterally  Range of motion: Decreased, flexion Decreased, Lateral bending, extension and rotation bilaterally reduced is somewhat painful.   Facet tenderness +  Sacroiliac joint tenderness + right side  + Gaenslen's, + fredrick's : right  Piriformis tenderness: negative bilaterally  SLR : negative bilaterally  Trochanteric bursa tenderness: improved right side     Musculoskeletal:     Trigger points no   Extremities:     Tremors:None bilaterally upper and lower  No LE edema    Left shoulder: Tenderness +, ROM limited and pain +    Neurological:     Sensory: Normal to light touch - except for reduced sensation over the right leg     Motor:                   Right Quadriceps 5/5          Left Quadriceps 5/5           Right Gastrocnemius 5/5    Left Gastrocnemius 5/5  Right Ant Tibialis 5/5  Left Ant Tibialis 5/5      Gait:no assistive device.     Dermatology:     Skin:no rashes or lesions noted     Assessment/Plan:   Diagnosis Orders   1. DDD (degenerative disc disease), lumbar      2. Lumbar radiculopathy      3. Lumbosacral spondylosis without myelopathy      4. Sacroiliac dysfunction          77 y.o. male with H/o ca lung and ca throat - s/p chemo/. RT. Has a tracheostomy. Followed by Hemonc/ [facility] at [facility]) and also follows with Palliative care. On pain medications.     Has low back and lower extremity pain - right. MRI of the LS spine : Significant changes noted at L3-4. L4-5     Current pain right low back: SIJ tenderness +     S/P X 2 SIj injection with > 80% relief for few months. Now has recurrence of similar pain. Plan:  Radiofrequency ablation of right L5, S1 & S2, S3 under fluoroscopy. He has H/o left UE DVT and is on Eliquis  Hold for 3 days for the procedure. He will continue medical management with Palliative care.     Counseling : Patient encouraged to stay active and to continue Regular home exercise program as tolerated - stretching / strengthening.     Smoking cessation counseling : yes      Treatment plan discussed with the patient including medication and procedure side effects. Recent events/ consultation. Notes reviewed. H/o  left shoulder pain +.  PT/ HEP/ compound cream. If continued pain, Left shoulder injection / ortho eval.     Controlled Substances Monitoring:     OARRS reviewed- 4/11/22: consistent     Garfield Conner MD     CC:  Sylvia Quintana DO

## 2022-04-14 ENCOUNTER — PREP FOR PROCEDURE (OUTPATIENT)
Dept: PAIN MANAGEMENT | Age: 67
End: 2022-04-14

## 2022-04-14 ENCOUNTER — TELEPHONE (OUTPATIENT)
Dept: PAIN MANAGEMENT | Age: 67
End: 2022-04-14

## 2022-04-14 NOTE — TELEPHONE ENCOUNTER
Patient's wife calling in asking for update on compound pain cream prescribed during visit on 4/11. Informed that rx was sent to [address] [place] [address] on 4/11, provided contact number.

## 2022-04-18 ENCOUNTER — TELEPHONE (OUTPATIENT)
Dept: PAIN MANAGEMENT | Age: 67
End: 2022-04-18

## 2022-04-18 NOTE — TELEPHONE ENCOUNTER
4/18/2022-upon review of Alan's chart, it is noted that he takes Eliquis . Medical clearance obtained from Dr. Toni MOSES to hold Eliquis  for 3 days. Call to [phone], advised him to hold his Eliquis  for 3 days before his 04/25//2022 procedure, last dose to be 04/21/2022. he shows an understanding to not take it before his procedure. Instructed him that the surgery center should call him a few days before for the pre op call and after 3:00 PM the business day before with the arrival time. Alan verbalized understanding.         Edward Mathur RN  Pain Management

## 2022-04-19 NOTE — PROGRESS NOTES
Have you been tested for COVID  Yes           Have you been told you were positive for COVID No  Have you had any known exposure to someone that is positive for COVID No  Do you have a cough                   No              Do you have shortness of breath No                 Do you have a sore throat            No                Are you having chills                    No                Are you having muscle aches. No                    Please come to the hospital wearing a mask and have your significant other wear a mask as well. Both of you should check your temperature before leaving to come here,  if it is 100 or higher please call [phone] for instruction. [facility] PAIN MANAGEMENT  INSTRUCTIONS  . .......................................................................................................................................... [x] Parking the day of Surgery is located in the [facility].   Upon entering the door, make immediate right into the surgery reception room    [x]  Bring photo ID and insurance card     [x] You may have a light breakfast day of procedure    [x]  Wear loose comfortable clothing    [x]  Please follow instructions for medications as given per Dr's office    [x] You can expect a call the business day prior to procedure to notify you of your arrival time     [x] Please arrange for     []  Other instructions

## 2022-04-25 ENCOUNTER — HOSPITAL ENCOUNTER (OUTPATIENT)
Age: 67
Setting detail: OUTPATIENT SURGERY
Discharge: HOME | End: 2022-04-25
Attending: ANESTHESIOLOGY | Admitting: ANESTHESIOLOGY
Payer: MEDICARE

## 2022-04-25 ENCOUNTER — HOSPITAL ENCOUNTER (OUTPATIENT)
Dept: GENERAL RADIOLOGY | Age: 67
Discharge: HOME | End: 2022-04-27
Attending: ANESTHESIOLOGY
Payer: MEDICARE

## 2022-04-25 VITALS
HEIGHT: 71 IN | RESPIRATION RATE: 22 BRPM | BODY MASS INDEX: 27.86 KG/M2 | OXYGEN SATURATION: 92 % | DIASTOLIC BLOOD PRESSURE: 59 MMHG | SYSTOLIC BLOOD PRESSURE: 90 MMHG | WEIGHT: 199 LBS | HEART RATE: 66 BPM

## 2022-04-25 DIAGNOSIS — R52 PAIN MANAGEMENT: ICD-10-CM

## 2022-04-25 PROCEDURE — 7100000010 HC PHASE II RECOVERY - FIRST 15 MIN: Performed by: ANESTHESIOLOGY

## 2022-04-25 PROCEDURE — 64635 DESTROY LUMB/SAC FACET JNT: CPT | Performed by: ANESTHESIOLOGY

## 2022-04-25 PROCEDURE — 7100000011 HC PHASE II RECOVERY - ADDTL 15 MIN: Performed by: ANESTHESIOLOGY

## 2022-04-25 PROCEDURE — 3600000002 HC SURGERY LEVEL 2 BASE: Performed by: ANESTHESIOLOGY

## 2022-04-25 PROCEDURE — 2709999900 HC NON-CHARGEABLE SUPPLY: Performed by: ANESTHESIOLOGY

## 2022-04-25 PROCEDURE — 6360000002 HC RX W HCPCS: Performed by: ANESTHESIOLOGY

## 2022-04-25 PROCEDURE — 3209999900 FLUORO FOR SURGICAL PROCEDURES

## 2022-04-25 PROCEDURE — 3600000012 HC SURGERY LEVEL 2 ADDTL 15MIN: Performed by: ANESTHESIOLOGY

## 2022-04-25 PROCEDURE — 2500000003 HC RX 250 WO HCPCS: Performed by: ANESTHESIOLOGY

## 2022-04-25 RX ORDER — METHYLPREDNISOLONE ACETATE 40 MG/ML
INJECTION, SUSPENSION INTRA-ARTICULAR; INTRALESIONAL; INTRAMUSCULAR; SOFT TISSUE PRN
Status: DISCONTINUED | OUTPATIENT
Start: 2022-04-25 | End: 2022-04-25 | Stop reason: ALTCHOICE

## 2022-04-25 RX ORDER — BUPIVACAINE HYDROCHLORIDE 2.5 MG/ML
INJECTION, SOLUTION EPIDURAL; INFILTRATION; INTRACAUDAL PRN
Status: DISCONTINUED | OUTPATIENT
Start: 2022-04-25 | End: 2022-04-25 | Stop reason: ALTCHOICE

## 2022-04-25 RX ORDER — LIDOCAINE HYDROCHLORIDE 5 MG/ML
INJECTION, SOLUTION INFILTRATION; INTRAVENOUS PRN
Status: DISCONTINUED | OUTPATIENT
Start: 2022-04-25 | End: 2022-04-25 | Stop reason: ALTCHOICE

## 2022-04-25 RX ORDER — LIDOCAINE HYDROCHLORIDE 10 MG/ML
INJECTION, SOLUTION EPIDURAL; INFILTRATION; INTRACAUDAL; PERINEURAL PRN
Status: DISCONTINUED | OUTPATIENT
Start: 2022-04-25 | End: 2022-04-25 | Stop reason: ALTCHOICE

## 2022-04-25 ASSESSMENT — PAIN - FUNCTIONAL ASSESSMENT: PAIN_FUNCTIONAL_ASSESSMENT: 0-10

## 2022-04-25 ASSESSMENT — PAIN SCALES - GENERAL: PAINLEVEL_OUTOF10: 7

## 2022-04-25 NOTE — OP NOTE
Operative Note      Patient: Pepe Germain YOB: 1955  MRN: [id number]    Date of Procedure: 2022    Pre-Op Diagnosis: LUMBOSACRAL SPONDYLOSIS, sacroiliac dysfunction    Post-Op Diagnosis: Same       Procedure(s):  RIGHT LUMBAR RADIOFREQUENCY ABLATION L5 AND S1       Surgeon(s):  Marcial Wheat MD    Assistant:   * No surgical staff found *    Anesthesia: Local    Estimated Blood Loss (mL): Minimal    Complications: None    Specimens:   * No specimens in log *    Implants:  * No implants in log *      Drains: * No LDAs found *    Findings: good needle placement    Detailed Description of Procedure:   2022    Patient: Pepe Germain :  1955  Age:  77 y.o. Sex:  male     PRE-OPERATIVE DIAGNOSIS:   LUMBOSACRAL SPONDYLOSIS, sacroiliac dysfunction    POST-OPERATIVE DIAGNOSIS: Same. PROCEDURE:  Fluoroscopic-guided Right  Lumbar facet medial branch radiofrequency ablation at levels L5-S1. SURGEON:  Marcial Wheat MD    ANESTHESIA: Local    ESTIMATED BLOOD LOSS: None.  ______________________________________________________________________  HISTORY & PHYSICAL: Pepe Germain presents today for fluoroscopic-guided  lumbar facet medial branch radiofrequency ablation. The potential complications of the procedure were explained to [address] [place] [address] again today and he has elected to undergo the aforementioned procedure. Ivy complete History & Physical examination were reviewed in depth, a copy of which is in the chart. DESCRIPTION OF PROCEDURE:    After confirming written and informed consent, a time-out was performed and Ivy name and date of birth, the procedure to be performed as well as the plan for the location of the needle insertion were confirmed. The patient was brought into the procedure room and placed in the prone position on the fluoroscopy table. Standard monitors were placed and vital signs were observed throughout the procedure.  The area of the lumbar spine and upper buttocks was sterilely prepped with chloraprep and draped in a sterile manner. AP fluoroscopy was used to identify and toby bartons point at the targeted area. The # 20 gauge 10 mm radiofrequency probe was advanced toward each of these points. Additional needles were placed at the upper sacral area to cover the gluteal SI pain. Once bone was contacted, negative aspiration for blood and CSF was confirmed, sensory stimulation was performed at 50 Hz and at 0.4 volts generating a pressure sensation. Motor stimulation < 2.0 volts elicited multifidus twitching without any radicular symptoms. 0.5 ml of 1% lidocaine was injected prior to lesioning at each level, which was performed for 90 seconds at 90 degrees centigrade. Once the lesions were complete, a solution of 0.25% marcaine 3 cc and 30 mg DepoMedrol was injected and distributed equally through each probe. The probes were removed . The patient's back was cleaned and bandages were placed over the needle insertion sites. Disposition the patient tolerated the procedure well and there were no complications . Vital signs remained stable throughout the procedure. The patient was escorted to the recovery area where they remained until discharge and written discharge instructions for the procedure were given. Plan: Tico Blas will return to our pain management center as scheduled.      Vitor Gongora MD

## 2022-04-25 NOTE — INTERVAL H&P NOTE
Update History & Physical    The patient's History and Physical of April 11, 2022 was reviewed with the patient and I examined the patient. There was no change. The surgical site was confirmed by the patient and me. Plan:   Right sided RFA for low back pain. The risks, benefits, expected outcome, and alternative to the recommended procedure have been discussed with the patient. Patient understands and wants to proceed with the procedure.      Electronically signed by Rian Gallagher MD

## 2022-05-17 DIAGNOSIS — G89.3 CHRONIC PAIN DUE TO NEOPLASM: ICD-10-CM

## 2022-05-17 RX ORDER — OXYCODONE HYDROCHLORIDE 5 MG/1
5 TABLET ORAL EVERY 8 HOURS PRN
Qty: 90 TABLET | Refills: 0 | Status: SHIPPED
Start: 2022-05-17 | End: 2022-06-28 | Stop reason: SDUPTHER

## 2022-05-17 NOTE — TELEPHONE ENCOUNTER
Call from Isa Albert requesting refill for Oxy IR 5 mg for Dom. Pharmacy is [facility] on [address] [place] [address]. Next maria r 6/28/22.

## 2022-06-28 ENCOUNTER — OFFICE VISIT (OUTPATIENT)
Dept: PALLATIVE CARE | Age: 67
End: 2022-06-28

## 2022-06-28 VITALS
WEIGHT: 189.7 LBS | SYSTOLIC BLOOD PRESSURE: 86 MMHG | HEART RATE: 72 BPM | OXYGEN SATURATION: 95 % | TEMPERATURE: 98.4 F | BODY MASS INDEX: 26.46 KG/M2 | DIASTOLIC BLOOD PRESSURE: 52 MMHG

## 2022-06-28 DIAGNOSIS — C34.82 MALIGNANT NEOPLASM OF OVERLAPPING SITES OF LEFT LUNG (HCC): ICD-10-CM

## 2022-06-28 DIAGNOSIS — Z51.5 PALLIATIVE CARE BY SPECIALIST: Primary | ICD-10-CM

## 2022-06-28 DIAGNOSIS — G89.3 CHRONIC PAIN DUE TO NEOPLASM: ICD-10-CM

## 2022-06-28 PROCEDURE — 99212 OFFICE O/P EST SF 10 MIN: CPT

## 2022-06-28 PROCEDURE — 99212 OFFICE O/P EST SF 10 MIN: CPT | Performed by: NURSE PRACTITIONER

## 2022-06-28 PROCEDURE — 1123F ACP DISCUSS/DSCN MKR DOCD: CPT | Performed by: NURSE PRACTITIONER

## 2022-06-28 RX ORDER — OXYCODONE HYDROCHLORIDE 5 MG/1
5 TABLET ORAL EVERY 8 HOURS PRN
Qty: 90 TABLET | Refills: 0 | Status: SHIPPED
Start: 2022-06-28 | End: 2022-07-14 | Stop reason: SDUPTHER

## 2022-06-28 NOTE — PROGRESS NOTES
Department of Palliative Medicine  Provider: BG Romero - CNP         Chief Complaint: Shade Robison. is a 77 y.o. male with chief complaint of pain    HPI  Mr. 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 Assessment/Plan     Lung cancer   - Stage 4 lung cancer   - Follows with Dr. Arjun Beck   - Received radiation therapy, along with chemotherapy    -On Keytruda    Chronic Pain due to Neoplasm/chronic lumbar radiculopathy:              - Oxycodone at 5 mg BID PRN uses 1-2 pill daily               -  Lyrica 150 mg TID for neuropathy, hand tingling sensation              -  S/p sacroiliac joint injections with Dr. Destin Sweeney   -  S/p Right lumbar radiofrequency ablation L5&S1 with Dr. Destin Sweeney   - Stop Fentanyl patch 12.5mcg     Constipation:              -  Pericolace 2 tabs BID              -  Add Miralax daily prn     Depression/Anxiety:   - Cymbalta 30 mg BID              Insomnia    - melatonin 5mg HS    Follow Up: 3 months  Encouraged to call with any questions, concerns, needs, or changes in symptoms. Subjective:   Benjamin presents today accompanied by his wife. He is alert and oriented voices concerns well. Overall he reports he is doing very well, still some pain, rating is a 6 on a scale of 10, and overall very well managed utilizing oxycodone 5 mg, which he typically only takes 1-2 times per day, though he has needed 3-4 more recently as he feels the pain in his low back and hip have flared up recently. He also continues utilizes Lyrica unchanged. He continues to try and reduce his opioid use, and is very happy with his overall improvement, reports that he is now only utilizing oxycodone when his pain impairs his function.   His appetite continues to be very good, and he also continues denies any significant constipation or nausea. Is tolerating his Keytruda treatments well, he has no new needs today. We will provide refills as appropriate, will plan to see him again approximately 3 months to reassess his needs. Objective:     Physical Exam  Wt Readings from Last 3 Encounters:   06/28/22 189 lb 11.2 oz (86 kg)   04/25/22 199 lb (90.3 kg)   04/12/22 194 lb 12.8 oz (88.4 kg)      Gen:  Alert, appears stated age, well nourished, in no acute distress  HEENT:  Speaks by holding trach  Neck:  Supple  Lungs:  CTA bilaterally, no audible rhonchi or wheezes noted  Heart[de-identified]  RRR, no murmur, rub, or gallop noted during exam  Abd:  Soft, non tender, non distended, BS+  M/S/Ext:  Moving all extremities, no edema, pulses present  Skin:  Warm and dry  Neuro:   Alert, oriented x 3; following commands      Phillips Symptom Assessment Score   Phillips Score 6/28/2022 4/5/2022 10/12/2021 8/17/2021 7/6/2021   Pain Score 6 5 4 4 4   Tiredness Score 1 2 1 3 5   Nausea Score Not nauseated Not nauseated Not nauseated Not nauseated Not nauseated   Depression Score 2 3 3 3 4   Anxiety Score 2 2 5 3 6   Drowsiness Score 1 2 Not drowsy 2 5   Appetite Score 1 2 Best appetite Best appetite Best appetite   Wellbeing Score 1 6 2 3 3   Dyspnea Score 5 6 5 5 5   Other Problem Score 3 3 4 3 3   Total Assessment Score(calculated) 22 31 24 26 35       Assessed by: patient. Current Medications:  Medications reviewed: yes    Controlled Substances Monitoring: OARRS reviewed 6/28/22. RX Monitoring 6/28/2022   Attestation -   Periodic Controlled Substance Monitoring Possible medication side effects, risk of tolerance/dependence & alternative treatments discussed. ;No signs of potential drug abuse or diversion identified. ;Assessed functional status. ;Obtaining appropriate analgesic effect of treatment.    Chronic Pain > 50 MEDD -   Chronic Pain > 80 MEDD -   Chronic Pain > 120 MEDD -     BG Paris - CNP Palliative Care Department     Time/Communication  Greater than 50% of time spent, total 15 minutes in face-to-face counseling and coordination of care regarding symptom management.

## 2022-07-11 ENCOUNTER — OFFICE VISIT (OUTPATIENT)
Dept: PAIN MANAGEMENT | Age: 67
End: 2022-07-11
Payer: MEDICARE

## 2022-07-11 VITALS
DIASTOLIC BLOOD PRESSURE: 62 MMHG | HEART RATE: 76 BPM | RESPIRATION RATE: 16 BRPM | HEIGHT: 71 IN | WEIGHT: 187 LBS | OXYGEN SATURATION: 93 % | BODY MASS INDEX: 26.18 KG/M2 | TEMPERATURE: 98.6 F | SYSTOLIC BLOOD PRESSURE: 107 MMHG

## 2022-07-11 DIAGNOSIS — M53.3 SACROILIAC DYSFUNCTION: ICD-10-CM

## 2022-07-11 DIAGNOSIS — M25.512 CHRONIC LEFT SHOULDER PAIN: Primary | ICD-10-CM

## 2022-07-11 DIAGNOSIS — M47.817 LUMBOSACRAL SPONDYLOSIS WITHOUT MYELOPATHY: ICD-10-CM

## 2022-07-11 DIAGNOSIS — M51.36 DDD (DEGENERATIVE DISC DISEASE), LUMBAR: ICD-10-CM

## 2022-07-11 DIAGNOSIS — G89.29 CHRONIC LEFT SHOULDER PAIN: Primary | ICD-10-CM

## 2022-07-11 DIAGNOSIS — M70.61 TROCHANTERIC BURSITIS OF RIGHT HIP: ICD-10-CM

## 2022-07-11 PROCEDURE — 1123F ACP DISCUSS/DSCN MKR DOCD: CPT | Performed by: ANESTHESIOLOGY

## 2022-07-11 PROCEDURE — 99213 OFFICE O/P EST LOW 20 MIN: CPT | Performed by: ANESTHESIOLOGY

## 2022-07-11 PROCEDURE — 99203 OFFICE O/P NEW LOW 30 MIN: CPT | Performed by: ANESTHESIOLOGY

## 2022-07-11 NOTE — PROGRESS NOTES
[facility]   [place], [address]  Dept: [phone]      Follow up Note      Ramos Varghese. Date of Visit:  2022    CC:  Patient presents for follow up   Chief Complaint   Patient presents with    Follow Up After Procedure     RIGHT LUMBAR RADIOFREQUENCY ABLATION L5 AND S1    Lower Back Pain       HPI:  Chronic low back pain. H/o Ca lung and Throat ca. S/p RT/ chemo- Has a tracheostomy. Has been treated by oncology. Under palliative medicine care for neuropathic pain and managed by medications including opioids. He has reduced the dose of pain meds. On anticoagulation for h/o DVT    Continues HEP. Nursing notes and details of the pain history reviewed. Please see intake notes for details. Previous treatments:   Physical Therapy : yes, HEP- as tolerated       Medications: - NSAID's : yes                        - Membrane stabilizers : yes                        - Opioids : yes                       - Adjuvants or Others : yes     Surgeries: no LS spine surgery. H/o prior Cervical fusion noted. He has been on anticoagulation medications - Eliquis. He has not been on herbal supplements. He is not diabetic. H/O Smoking: +  H/O alcohol abuse : no  H/O Illicit drug use : no     Imaging:     X-ray left shoulder: 12/10/2021:  Impression   1. Minimal degenerative changes of the [facility] [place] operated by [facility] joint       MRI of LS spine on 2020 reviewed:  Impression         1. Severe central canal stenosis and moderate bilateral neural    foraminal narrowing at L3-4 and L4-5.         2. Grade 1 anterolisthesis of L3 on L4 and L4 on L5.         3. Stable distal abdominal aortic aneurysm measuring 3 cm in diameter. X-ray of the sacroiliac joints done on 2020: Impression    Mild-to-moderate degenerative changes involving bilateral sacroiliac    joints.        VL LE segmental PP2020:  arrative   Normal ankle arm index with good arterial flow to both feet including   the toes based upon the pulse volume recordings           PET scan: 2/20120: Impression    1. Two focal suspicious areas of increased metabolic activity in the    base of the oral cavity/sublingual spaces as above commented. 2. No indication for metastatic lymph node adenopathy in the neck or    mediastinum. 3. No abnormal uptake of the radionuclide is seen at the level of the    lungs including multiple parenchymal opacities observed bilaterally on    recent CT chest.                 MRI of the right femur and Hip: 5/20219:  Impression         1. Moderate right knee effusion with mild synovitis. This is of    unclear etiology. This finding can be better characterized with MRI of    the right knee without contrast if clinically indicated. 2. Small scrotal hydroceles of unclear clinical significance. 3. No evidence for metastatic disease. Xray of the right hip: 3/2019:      Impression     Moderate degenerative changes of the right hip. Potential Aberrant Drug-Related Behavior:no      OARRS report[de-identified]  Reviewed today 7/11/22  getting meds form Palliative.     Past Medical History:   Diagnosis Date    Abdominal aortic aneurysm without rupture ([address] 08/27/2018    follows with Dr Margaret Toussaint    Acute bronchitis with COPD ([address] 5/27/2017    Arthritis     COPD (chronic obstructive pulmonary disease) ([address]     Decreased dorsalis pedis pulse 11/4/2020    Depression with anxiety     Emphysema of lung ([address]     Encounter for antineoplastic immunotherapy     HAP (hospital-acquired pneumonia)     resolved    History of deep vein thrombosis 11/4/2020    Hyperlipidemia     Lung cancer ([address] 04/11/2016    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    Paralyzed vocal cords     Thrombosis of testis     Tracheostomy in place [facility])     #6 Elsa       Past Surgical History:   Procedure Laterality Date    BRONCHOSCOPY N/A 3/5/2020    BRONCHOSCOPY DIAGNOSTIC OR CELL 8 Alia Ibrahim ONLY performed by [facility] Maci Ravi MD at [facility]  2015    HIP SURGERY Left 1/6/2022    RIGHT TROCHANTARIC BURSA INJECTION UNDER FLUOROSCOPY performed by Caridad Cardenas MD at [address] ARTHROSCOPY      LUNG BIOPSY Left 5/6/2016    MEDICATION INJECTION Bilateral 10/29/2020    BILATERAL SACROILIAC JOINT INJECTION AND RIGHT TROCHANTERIC BURSA INJECTION UNDER FLUOROSCOPY (CPT 04383,69477,93599)++TRACH-NO VENT++ performed by Caridad Cardenas MD at [address] Left 1/6/2022    RIGHT SACROILIAC JOINT INJECTION UNDER FLUOROSCOPY performed by Caridad Cardenas MD at [address] [facility]  4/15/2016    cervical myelogram    PAIN MANAGEMENT PROCEDURE Right 12/28/2020    # 1 LUMBAR TRANSFORAMINAL EPIDURAL STEROID INJECTION RIGHT L3 AND L4 UNDER FLUOROSCOPIC GUIDANCE performed by Caridad Cardenas MD at [address] [facility] Right 9/30/2021    LUMBAR TRANSFORAMINAL EPIDURAL STEROID INJECTION RIGHT L3 AND L4 UNDER FLUOROSCOPIC GUIDANCE (CPT 21057) performed by Caridad Cardenas MD at [address] [facility] Right 4/25/2022    RIGHT LUMBAR RADIOFREQUENCY ABLATION L5 AND S1 performed by Caridad Cardenas MD at [address] Right     SINUS SURGERY      TRACHEOSTOMY  05/24/2017    # 6 Shiley (disposable)    TRACHEOSTOMY         Prior to Admission medications    Medication Sig Start Date End Date Taking? Authorizing Provider   oxyCODONE (ROXICODONE) 5 MG immediate release tablet Take 1 tablet by mouth every 8 hours as needed for Pain (hold for sedation) for up to 30 days.  6/28/22 7/28/22 Yes BG Duque - CNP   budesonide (PULMICORT) 0.5 MG/2ML nebulizer suspension inhale contents of 1 vial ( 2 milliliters ) in nebulizer twice a day 6/22/22  Yes Colonel Juan MD   esomeprazole (NEXIUM) 40 MG delayed release capsule Take 40 mg by mouth every morning (before breakfast) 3/14/22  Yes Historical Provider, MD   pregabalin mouth daily    Yes Historical Provider, MD   apixaban (ELIQUIS) 2.5 MG TABS tablet Take 1 tablet by mouth 2 times daily 20  Sylvia Quintana,        Allergies   Allergen Reactions    Augmentin [Amoxicillin-Pot Clavulanate] Diarrhea       Social History     Socioeconomic History    Marital status:      Spouse name: Not on file    Number of children: Not on file    Years of education: Not on file    Highest education level: Not on file   Occupational History    Occupation: [employer]- SSI     Comment: disability short term   Tobacco Use    Smoking status: Current Some Day Smoker     Packs/day: 0.25     Years: 46.00     Pack years: 11.50     Types: Cigarettes     Start date: 0     Last attempt to quit: 10/21/2021     Years since quittin.7    Smokeless tobacco: Never Used    Tobacco comment: Pt quit in 2018& started back 2020 smoking . 5 ppd   Vaping Use    Vaping Use: Never used    Passive vaping exposure: Yes   Substance and Sexual Activity    Alcohol use: Yes     Comment: beer sometimes    Drug use: No    Sexual activity: Not on file   Other Topics Concern    Not on file   Social History Narrative    Works at [employer]. Lives in [facility]. Social Determinants of Health     Financial Resource Strain:     Difficulty of Paying Living Expenses: Not on file   Food Insecurity:     Worried About [address] [facility] in the Last Year: Not on file    Edward of Food in the Last Year: Not on file   Transportation Needs:     Lack of Transportation (Medical): Not on file    Lack of Transportation (Non-Medical):  Not on file   Physical Activity:     Days of Exercise per Week: Not on file    Minutes of Exercise per Session: Not on file   Stress:     Feeling of Stress : Not on file   Social Connections:     Frequency of Communication with Friends and Family: Not on file    Frequency of Social Gatherings with Friends and Family: Not on file    Attends Scientologist Services: Not on file    Active Member of Clubs or Organizations: Not on file    Attends Club or Organization Meetings: Not on file    Marital Status: Not on file   Intimate Partner Violence:     Fear of Current or Ex-Partner: Not on file    Emotionally Abused: Not on file    Physically Abused: Not on file    Sexually Abused: Not on file   Housing Stability:     Unable to Pay for Housing in the Last Year: Not on file    Number of Places Lived in the Last Year: Not on file    Unstable Housing in the Last Year: Not on file       Family History   Problem Relation Age of Onset    Cancer Mother         breast cancer    Cancer Father         prostate cancer    Diabetes Father        REVIEW OF SYSTEMS:     Tracy Smiling denies fever/chills, chest pain, shortness of breath, new bowel or bladder complaints. All other review of systems was negative. PHYSICAL EXAMINATION:      /62   Pulse 76   Temp 98.6 °F (37 °C) (Oral)   Resp 16   Ht 5' 11\" (1.803 m)   Wt 187 lb (84.8 kg)   SpO2 93%   BMI 26.08 kg/m²   General:       General appearance:  Pleasant and well-hydrated, in no distress and A & O x 3  Build:Overweight  Function: Rises from seated position easily and Moves about room without difficulty     HEENT:     Head:normocephalic, atraumatic  Pupils:regular, round, equal  Sclera: icterus absent  Tracheostomy +     Lungs:     Breathing:normal breathing pattern      CVS:     RRR     Abdomen:     Shape:non-distended and normal     Cervical spine:     Inspection:normal     Thoracic spine:                Spine inspection:normal      Lumbar spine:     Spine inspection: Normal   Palpation: Tenderness paravertebral muscles No bilaterally  Range of motion: Decreased, flexion Decreased, Lateral bending, extension and rotation bilaterally reduced is somewhat painful. Facet tenderness +  Sacroiliac joint tenderness improved.   Piriformis tenderness: negative bilaterally  SLR : negative bilaterally  Trochanteric bursa tenderness: + right side Musculoskeletal:     Trigger points no   Extremities:     Tremors:None bilaterally upper and lower  No LE edema    Left shoulder: Tenderness +, ROM limited and pain +    Neurological:     Sensory: Normal to light touch - except for reduced sensation over the right leg     Motor:                   Right Quadriceps 5/5          Left Quadriceps 5/5           Right Gastrocnemius 5/5    Left Gastrocnemius 5/5  Right Ant Tibialis 5/5  Left Ant Tibialis 5/5      Gait:no assistive device. Dermatology:     Skin:no rashes or lesions noted     Assessment/Plan:   Diagnosis Orders   1. Chronic left shoulder pain     2. DDD (degenerative disc disease), lumbar     3. Sacroiliac dysfunction     4. Trochanteric bursitis of right hip     5. Lumbosacral spondylosis without myelopathy         77 y.o. male with H/o ca lung and ca throat - s/p chemo/. RT. Has a tracheostomy. Followed by Hemonc/ E[facility] at [facility]) and also follows with Palliative care. On pain medications. Has low back and lower extremity pain - right. MRI of the LS spine : Significant changes noted at L3-4. L4-5     Current pain right low back: SIJ tenderness +     S/P RFA with > 80% relief. Low back pain is stable. Current pain over the left shoulders and right lateral hip- trochanteric bursitis. Plan:  Left shoulder injection and right trochanteric bursa injection in the next visit. He has H/o left UE DVT and is on Eliquis      He will continue medical management with Palliative care. He has reduced the dose of opioids since the pain has improved. Counseling : Patient encouraged to stay active and to continue Regular home exercise program as tolerated - stretching / strengthening. Smoking cessation counseling : yes      Treatment plan discussed with the patient including medication and procedure side effects. Recent events/ consultation. Notes reviewed.       Controlled Substances Monitoring:     OARRS reviewed- 7/11/22: consistent Leon Estrada MD     CC:  Sylvia Quintana DO

## 2022-07-14 DIAGNOSIS — G89.3 CHRONIC PAIN DUE TO NEOPLASM: ICD-10-CM

## 2022-07-14 RX ORDER — OXYCODONE HYDROCHLORIDE 5 MG/1
5 TABLET ORAL EVERY 8 HOURS PRN
Qty: 90 TABLET | Refills: 0 | Status: SHIPPED
Start: 2022-07-14 | End: 2022-08-22 | Stop reason: SDUPTHER

## 2022-07-14 NOTE — TELEPHONE ENCOUNTER
Call from Janette Portillo stating they went to  Oxy IR prescription today and pharmacy says it . Prescription was last sent 22, never picked up. Janette Portillo is requesting a new refill prescription be sent to [facility] on [address] [place] [address]. Next maria r 22.

## 2022-08-22 DIAGNOSIS — G89.3 CHRONIC PAIN DUE TO NEOPLASM: ICD-10-CM

## 2022-08-22 RX ORDER — OXYCODONE HYDROCHLORIDE 5 MG/1
5 TABLET ORAL EVERY 8 HOURS PRN
Qty: 90 TABLET | Refills: 0 | Status: SHIPPED
Start: 2022-08-22 | End: 2022-10-04 | Stop reason: SDUPTHER

## 2022-08-24 ENCOUNTER — PROCEDURE VISIT (OUTPATIENT)
Dept: PAIN MANAGEMENT | Age: 67
End: 2022-08-24
Payer: MEDICARE

## 2022-08-24 VITALS
BODY MASS INDEX: 26.18 KG/M2 | RESPIRATION RATE: 16 BRPM | HEIGHT: 71 IN | WEIGHT: 187 LBS | HEART RATE: 79 BPM | OXYGEN SATURATION: 93 %

## 2022-08-24 DIAGNOSIS — G89.29 CHRONIC LEFT SHOULDER PAIN: ICD-10-CM

## 2022-08-24 DIAGNOSIS — M19.012 OSTEOARTHRITIS OF LEFT SHOULDER, UNSPECIFIED OSTEOARTHRITIS TYPE: ICD-10-CM

## 2022-08-24 DIAGNOSIS — M25.512 CHRONIC LEFT SHOULDER PAIN: ICD-10-CM

## 2022-08-24 DIAGNOSIS — M70.62 TROCHANTERIC BURSITIS OF LEFT HIP: Primary | ICD-10-CM

## 2022-08-24 PROCEDURE — 20610 DRAIN/INJ JOINT/BURSA W/O US: CPT | Performed by: ANESTHESIOLOGY

## 2022-08-24 PROCEDURE — 99213 OFFICE O/P EST LOW 20 MIN: CPT | Performed by: ANESTHESIOLOGY

## 2022-08-24 RX ORDER — METHYLPREDNISOLONE ACETATE 40 MG/ML
20 INJECTION, SUSPENSION INTRA-ARTICULAR; INTRALESIONAL; INTRAMUSCULAR; SOFT TISSUE ONCE
Status: COMPLETED | OUTPATIENT
Start: 2022-08-24 | End: 2022-08-24

## 2022-08-24 RX ORDER — METHYLPREDNISOLONE ACETATE 40 MG/ML
40 INJECTION, SUSPENSION INTRA-ARTICULAR; INTRALESIONAL; INTRAMUSCULAR; SOFT TISSUE ONCE
Status: COMPLETED | OUTPATIENT
Start: 2022-08-24 | End: 2022-08-24

## 2022-08-24 RX ORDER — BUPIVACAINE HYDROCHLORIDE 2.5 MG/ML
6 INJECTION, SOLUTION EPIDURAL; INFILTRATION; INTRACAUDAL ONCE
Status: COMPLETED | OUTPATIENT
Start: 2022-08-24 | End: 2022-08-24

## 2022-08-24 RX ORDER — ASPIRIN 81 MG/1
81 TABLET ORAL DAILY
COMMUNITY

## 2022-08-24 RX ORDER — BUPIVACAINE HYDROCHLORIDE 2.5 MG/ML
3 INJECTION, SOLUTION EPIDURAL; INFILTRATION; INTRACAUDAL ONCE
Status: COMPLETED | OUTPATIENT
Start: 2022-08-24 | End: 2022-08-24

## 2022-08-24 RX ADMIN — BUPIVACAINE HYDROCHLORIDE 3 ML: 2.5 INJECTION, SOLUTION EPIDURAL; INFILTRATION; INTRACAUDAL at 16:31

## 2022-08-24 RX ADMIN — METHYLPREDNISOLONE ACETATE 20 MG: 40 INJECTION, SUSPENSION INTRA-ARTICULAR; INTRALESIONAL; INTRAMUSCULAR; SOFT TISSUE at 16:32

## 2022-08-24 RX ADMIN — BUPIVACAINE HYDROCHLORIDE 6 ML: 2.5 INJECTION, SOLUTION EPIDURAL; INFILTRATION; INTRACAUDAL at 16:30

## 2022-08-24 RX ADMIN — METHYLPREDNISOLONE ACETATE 40 MG: 40 INJECTION, SUSPENSION INTRA-ARTICULAR; INTRALESIONAL; INTRAMUSCULAR; SOFT TISSUE at 16:34

## 2022-08-24 NOTE — PROGRESS NOTES
Do you currently have any of the following:    Fever: No  Headache:  No  Cough: No  Shortness of breath: No  Exposed to anyone with these symptoms: No         Alan Rico. presents to the [address] [place] [address] on 8/24/2022. Babatunde Arenas is complaining of pain left shoulder and right hip. The pain is constant. The pain is described as aching. Pain is rated on his best day at a 4, on his worst day at a 9, and on average at a 6 on the VAS scale. He took his last dose of  oxycodone  yesterday. Any procedures since your last visit: No    Pacemaker or defibrillator: No .    He is not on NSAIDS and is  on anticoagulation medications to include ASA and is managed by Sylvia Quintana DO  .     Medication Contract and Consent for Opioid Use Documents Filed       Patient Documents       Type of Document Status Date Received Received By Description    Medication Contract Received 3/24/2021  7:56 AM Rosaronda Mota 3-24-21 Palliative                    Pulse 79   Resp 16   Ht 5' 11\" (1.803 m)   Wt 187 lb (84.8 kg)   SpO2 93%   BMI 26.08 kg/m²      No LMP for male patient.

## 2022-08-24 NOTE — PROGRESS NOTES
2022    Patient: José Miguel White. :  1955  Age:  79 y.o. Sex:  male     PRE-OPERATIVE DIAGNOSIS:   Left   Shoulder pain, osteoarthritis. Right trochanteric bursitis    POST-OPERATIVE DIAGNOSIS: Same. PROCEDURE PERFORMED:   1) Left  Shoulder steroid injection. 2) right trochanteric bursa injection    SURGEON:   Marielos Ballard MD    ANESTHESIA: local    ESTIMATED BLOOD LOSS: None. BRIEF HISTORY: Alan Hatch. comes in today for the above procedure. After discussing the potential risks and benefits of the procedure with the patient. Shari Ramires did request that we proceed. A complete History & Physical was reviewed and it is unchanged. DESCRIPTION OF PROCEDURE:   1) The patient was placed in a seated position. The area of the Left  shoulder was prepped with chloraprep and draped in a sterile manner. The overlying skin and subcutaneous tissues were anesthetized with 0.5% Lidocaine. Using the posterior approach, a 25 gauge 1 1/2 inch  needle was advanced  In anterolateral projection into the joint capsule. After negative aspiration a solution of 0.25 % marcaine 3 cc and 40 mg DepoMedrol was injected easily without complications. The needle was then removed and Band-Aid applied. 2) At this point of time the patient was placed in left lateral decubitus position. The right trochanteric area was prepped and draped with ChloraPrep. 25-gauge 3 and half inch spinal needle was advanced over the right trochanteric bursa until the bone was contacted. Aspirate was negative for heme. A solution of 0.25% Marcaine 6 cc and 20 mg of Depo-Medrol was easily injected. The needle was removed and Band-Aid applied to the needle insertion site. Disposition the patient tolerated the procedure well and there were no complications . Shari Ramires will follow up in our [facility] as scheduled.  He was encouraged to call with questions, concerns or if worsening of symptoms occurs.     Sveta Dale MD

## 2022-09-20 ENCOUNTER — OFFICE VISIT (OUTPATIENT)
Dept: PALLATIVE CARE | Age: 67
End: 2022-09-20
Payer: MEDICARE

## 2022-09-20 VITALS
TEMPERATURE: 97.9 F | HEART RATE: 69 BPM | WEIGHT: 187 LBS | BODY MASS INDEX: 26.08 KG/M2 | DIASTOLIC BLOOD PRESSURE: 64 MMHG | SYSTOLIC BLOOD PRESSURE: 104 MMHG | OXYGEN SATURATION: 97 %

## 2022-09-20 DIAGNOSIS — G89.3 CHRONIC PAIN DUE TO NEOPLASM: ICD-10-CM

## 2022-09-20 DIAGNOSIS — Z51.5 PALLIATIVE CARE BY SPECIALIST: ICD-10-CM

## 2022-09-20 PROCEDURE — 1123F ACP DISCUSS/DSCN MKR DOCD: CPT | Performed by: NURSE PRACTITIONER

## 2022-09-20 PROCEDURE — 99212 OFFICE O/P EST SF 10 MIN: CPT | Performed by: NURSE PRACTITIONER

## 2022-09-20 RX ORDER — PREGABALIN 150 MG/1
150 CAPSULE ORAL 3 TIMES DAILY
Qty: 270 CAPSULE | Refills: 1 | Status: SHIPPED | OUTPATIENT
Start: 2022-09-20 | End: 2023-03-19

## 2022-09-20 NOTE — PROGRESS NOTES
Department of Palliative Medicine  Provider: BG Pacheco - CNP         Chief Complaint: Ramana Hansen. is a 79 y.o. male with chief complaint of pain    HPI  Mr. 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 Assessment/Plan     Lung cancer   - Stage 4 lung cancer   - Follows with Dr. Pilar Barajas   - Received radiation therapy, along with chemotherapy    -On Keytruda    Chronic Pain due to Neoplasm/chronic lumbar radiculopathy:              - Oxycodone at 5 mg BID PRN uses 0-2 pill daily               -  Lyrica 150 mg TID for neuropathy, hand tingling sensation              -  S/p sacroiliac joint injections with Dr. Leonard Temple   -  S/p Right lumbar radiofrequency ablation L5&S1 with Dr. Leonard Temple   - Stop Fentanyl patch 12.5mcg     Constipation:              -  Pericolace 2 tabs BID              -  Add Miralax daily prn     Depression/Anxiety:   - Cymbalta 30 mg BID              Insomnia    - melatonin 5mg HS    Follow Up: 3 months  Encouraged to call with any questions, concerns, needs, or changes in symptoms. Subjective:   Benjamin presents today accompanied by his wife. He is alert and oriented voices concerns well. He continues to do very well, still some pain, rating is a 4 on a scale of 10, and overall very well managed utilizing oxycodone 5 mg, which he typically only takes 0-2 times per day. He also continues utilizes Lyrica unchanged. Is tolerating his Keytruda treatments well, he has no new needs today. We will provide refills as appropriate, will plan to see him again approximately 3 months to reassess his needs.     Objective:     Physical Exam  Wt Readings from Last 3 Encounters:   09/20/22 187 lb (84.8 kg)   08/24/22 187 lb (84.8 kg)   07/11/22 187 lb (84.8 kg) Gen:  Alert, appears stated age, well nourished, in no acute distress  HEENT:  Speaks by holding trach  Neck:  Supple  Lungs:  CTA bilaterally, no audible rhonchi or wheezes noted  Heart[de-identified]  RRR, no murmur, rub, or gallop noted during exam  Abd:  Soft, non tender, non distended, BS+  M/S/Ext:  Moving all extremities, no edema, pulses present  Skin:  Warm and dry  Neuro:   Alert, oriented x 3; following commands      [place] Symptom Assessment Score   [place] Score 9/20/2022 6/28/2022 4/5/2022 10/12/2021 8/17/2021   Pain Score 4 6 5 4 4   Tiredness Score Not tired 1 2 1 3   Nausea Score Not nauseated Not nauseated Not nauseated Not nauseated Not nauseated   Depression Score 2 2 3 3 3   Anxiety Score 2 2 2 5 3   Drowsiness Score Not drowsy 1 2 Not drowsy 2   Appetite Score 1 1 2 Best appetite Best appetite   Wellbeing Score 2 1 6 2 3   Dyspnea Score 4 5 6 5 5   Other Problem Score 3 3 3 4 3   Total Assessment Score(calculated) 18 22 31 24 26       Assessed by: patient. Current Medications:  Medications reviewed: yes    Controlled Substances Monitoring: OARRS reviewed 9/20/22. RX Monitoring 9/20/2022   Attestation -   Periodic Controlled Substance Monitoring Possible medication side effects, risk of tolerance/dependence & alternative treatments discussed. ;No signs of potential drug abuse or diversion identified. ;Assessed functional status. ;Obtaining appropriate analgesic effect of treatment. Chronic Pain > 50 MEDD -   Chronic Pain > 80 MEDD -   Chronic Pain > 120 MEDD -     BG Wilcox - CNP   Palliative Care Department     Time/Communication  Greater than 50% of time spent, total 15 minutes in face-to-face counseling and coordination of care regarding symptom management.

## 2022-10-04 DIAGNOSIS — G89.3 CHRONIC PAIN DUE TO NEOPLASM: ICD-10-CM

## 2022-10-04 RX ORDER — DULOXETIN HYDROCHLORIDE 30 MG/1
30 CAPSULE, DELAYED RELEASE ORAL 2 TIMES DAILY
Qty: 180 CAPSULE | Refills: 1 | Status: SHIPPED | OUTPATIENT
Start: 2022-10-04

## 2022-10-04 RX ORDER — OXYCODONE HYDROCHLORIDE 5 MG/1
5 TABLET ORAL EVERY 8 HOURS PRN
Qty: 90 TABLET | Refills: 0 | Status: SHIPPED
Start: 2022-10-04 | End: 2022-11-04 | Stop reason: SDUPTHER

## 2022-10-04 NOTE — TELEPHONE ENCOUNTER
Call from Kristel Coley requesting refills for Alan's Cymbalta and oxy IR. Pharmacy is [facility] on [address] [place] [address]. Next maria r 12/13/22.

## 2022-11-04 DIAGNOSIS — G89.3 CHRONIC PAIN DUE TO NEOPLASM: ICD-10-CM

## 2022-11-04 RX ORDER — OXYCODONE HYDROCHLORIDE 5 MG/1
5 TABLET ORAL EVERY 8 HOURS PRN
Qty: 90 TABLET | Refills: 0 | Status: SHIPPED
Start: 2022-11-04 | End: 2022-11-28 | Stop reason: ALTCHOICE

## 2022-11-04 NOTE — TELEPHONE ENCOUNTER
Call from Brenna 84 wife Rashad Daniels requesting refill for Oxy IR 5 mg. Pharmacy is [facility] on [address]. Next maria r 12/13/22.

## 2022-11-28 DIAGNOSIS — E78.49 OTHER HYPERLIPIDEMIA: ICD-10-CM

## 2022-11-28 DIAGNOSIS — E03.9 ACQUIRED HYPOTHYROIDISM: ICD-10-CM

## 2022-11-28 LAB
BASOPHILS ABSOLUTE: 0.07 E9/L (ref 0–0.2)
BASOPHILS RELATIVE PERCENT: 0.8 % (ref 0–2)
EOSINOPHILS ABSOLUTE: 0.06 E9/L (ref 0.05–0.5)
EOSINOPHILS RELATIVE PERCENT: 0.7 % (ref 0–6)
HCT VFR BLD CALC: 44.3 % (ref 37–54)
HEMOGLOBIN: 14 G/DL (ref 12.5–16.5)
IMMATURE GRANULOCYTES #: 0.05 E9/L
IMMATURE GRANULOCYTES %: 0.5 % (ref 0–5)
LYMPHOCYTES ABSOLUTE: 0.62 E9/L (ref 1.5–4)
LYMPHOCYTES RELATIVE PERCENT: 6.7 % (ref 20–42)
MCH RBC QN AUTO: 28 PG (ref 26–35)
MCHC RBC AUTO-ENTMCNC: 31.6 % (ref 32–34.5)
MCV RBC AUTO: 88.6 FL (ref 80–99.9)
MONOCYTES ABSOLUTE: 0.35 E9/L (ref 0.1–0.95)
MONOCYTES RELATIVE PERCENT: 3.8 % (ref 2–12)
NEUTROPHILS ABSOLUTE: 8.05 E9/L (ref 1.8–7.3)
NEUTROPHILS RELATIVE PERCENT: 87.5 % (ref 43–80)
PDW BLD-RTO: 15.9 FL (ref 11.5–15)
PLATELET # BLD: 337 E9/L (ref 130–450)
PMV BLD AUTO: 10.7 FL (ref 7–12)
RBC # BLD: 5 E12/L (ref 3.8–5.8)
WBC # BLD: 9.2 E9/L (ref 4.5–11.5)

## 2022-11-29 DIAGNOSIS — R31.0 GROSS HEMATURIA: ICD-10-CM

## 2022-11-29 LAB
ALBUMIN SERPL-MCNC: 3.8 G/DL (ref 3.5–5.2)
ALP BLD-CCNC: 81 U/L (ref 40–129)
ALT SERPL-CCNC: 24 U/L (ref 0–40)
ANION GAP SERPL CALCULATED.3IONS-SCNC: 13 MMOL/L (ref 7–16)
AST SERPL-CCNC: 29 U/L (ref 0–39)
BILIRUB SERPL-MCNC: 0.5 MG/DL (ref 0–1.2)
BUN BLDV-MCNC: 15 MG/DL (ref 6–23)
CALCIUM SERPL-MCNC: 9.6 MG/DL (ref 8.6–10.2)
CHLORIDE BLD-SCNC: 105 MMOL/L (ref 98–107)
CHOLESTEROL, TOTAL: 166 MG/DL (ref 0–199)
CO2: 22 MMOL/L (ref 22–29)
CREAT SERPL-MCNC: 1.3 MG/DL (ref 0.7–1.2)
GFR SERPL CREATININE-BSD FRML MDRD: >60 ML/MIN/1.73
GLUCOSE FASTING: 102 MG/DL (ref 74–99)
HDLC SERPL-MCNC: 44 MG/DL
LDL CHOLESTEROL CALCULATED: 100 MG/DL (ref 0–99)
POTASSIUM SERPL-SCNC: 4.7 MMOL/L (ref 3.5–5)
SODIUM BLD-SCNC: 140 MMOL/L (ref 132–146)
T3 TOTAL: 106.1 NG/DL (ref 80–200)
T4 FREE: 1.57 NG/DL (ref 0.93–1.7)
TOTAL PROTEIN: 7 G/DL (ref 6.4–8.3)
TRIGL SERPL-MCNC: 109 MG/DL (ref 0–149)
TSH SERPL DL<=0.05 MIU/L-ACNC: 0.2 UIU/ML (ref 0.27–4.2)
VLDLC SERPL CALC-MCNC: 22 MG/DL

## 2022-12-08 DIAGNOSIS — G89.3 CHRONIC PAIN DUE TO NEOPLASM: ICD-10-CM

## 2022-12-08 RX ORDER — OXYCODONE HYDROCHLORIDE 5 MG/1
5 TABLET ORAL EVERY 8 HOURS PRN
Qty: 90 TABLET | Refills: 0 | Status: SHIPPED | OUTPATIENT
Start: 2022-12-08 | End: 2023-01-07

## 2022-12-08 NOTE — TELEPHONE ENCOUNTER
Call from Sonny Benitez wife requesting a refill for Oxy IR. Pharmacy is [facility] on [address] [place] [address].  Next maria r 12/13/22

## 2022-12-11 LAB — URINE CULTURE, ROUTINE: NORMAL

## 2022-12-22 ENCOUNTER — OFFICE VISIT (OUTPATIENT)
Dept: PAIN MANAGEMENT | Age: 67
End: 2022-12-22
Payer: MEDICARE

## 2022-12-22 VITALS
HEIGHT: 71 IN | BODY MASS INDEX: 25.48 KG/M2 | OXYGEN SATURATION: 92 % | SYSTOLIC BLOOD PRESSURE: 120 MMHG | HEART RATE: 86 BPM | DIASTOLIC BLOOD PRESSURE: 65 MMHG | WEIGHT: 182 LBS | TEMPERATURE: 97.3 F | RESPIRATION RATE: 16 BRPM

## 2022-12-22 DIAGNOSIS — M47.817 LUMBOSACRAL SPONDYLOSIS WITHOUT MYELOPATHY: Primary | ICD-10-CM

## 2022-12-22 DIAGNOSIS — Z86.718 HISTORY OF DEEP VEIN THROMBOSIS: ICD-10-CM

## 2022-12-22 DIAGNOSIS — M53.3 SACROILIAC DYSFUNCTION: ICD-10-CM

## 2022-12-22 DIAGNOSIS — M51.36 DDD (DEGENERATIVE DISC DISEASE), LUMBAR: ICD-10-CM

## 2022-12-22 DIAGNOSIS — M70.60 TROCHANTERIC BURSITIS, UNSPECIFIED LATERALITY: ICD-10-CM

## 2022-12-22 PROCEDURE — 1123F ACP DISCUSS/DSCN MKR DOCD: CPT | Performed by: ANESTHESIOLOGY

## 2022-12-22 PROCEDURE — 99213 OFFICE O/P EST LOW 20 MIN: CPT | Performed by: ANESTHESIOLOGY

## 2022-12-22 NOTE — PROGRESS NOTES
[facility]   [place], [address]  Dept: [phone]      Follow up Note      Anjana Overton. Date of Visit:  2022    CC:  Patient presents for follow up   Chief Complaint   Patient presents with    Follow-up    Lower Back Pain    Shoulder Pain     Left hip       HPI:  Chronic low back pain. H/o Ca lung and Throat ca. S/p RT/ chemo- Has a tracheostomy. Has been treated by oncology. Under palliative medicine care for neuropathic pain and managed by medications including opioids. He has reduced the dose of pain meds. On anticoagulation for h/o DVT    Continues HEP. Nursing notes and details of the pain history reviewed. Please see intake notes for details. Previous treatments:   Physical Therapy : yes, HEP- as tolerated       Medications: - NSAID's : yes                        - Membrane stabilizers : yes                        - Opioids : yes                       - Adjuvants or Others : yes     Surgeries: no LS spine surgery. H/o prior Cervical fusion noted. He has been on anticoagulation medications - Eliquis. He has not been on herbal supplements. He is not diabetic. H/O Smoking: +  H/O alcohol abuse : no  H/O Illicit drug use : no     Imaging:     X-ray left shoulder: 12/10/2021:  Impression   1. Minimal degenerative changes of the [facility] joint       MRI of LS spine on 2020 reviewed:  Impression         1. Severe central canal stenosis and moderate bilateral neural    foraminal narrowing at L3-4 and L4-5.         2. Grade 1 anterolisthesis of L3 on L4 and L4 on L5.         3. Stable distal abdominal aortic aneurysm measuring 3 cm in diameter. X-ray of the sacroiliac joints done on 2020: Impression    Mild-to-moderate degenerative changes involving bilateral sacroiliac    joints.        VL LE segmental PP2020:  arrative   Normal ankle arm index with good arterial flow to both feet including   the toes based upon the pulse volume recordings           PET scan: 2/20120: Impression    1. Two focal suspicious areas of increased metabolic activity in the    base of the oral cavity/sublingual spaces as above commented. 2. No indication for metastatic lymph node adenopathy in the neck or    mediastinum. 3. No abnormal uptake of the radionuclide is seen at the level of the    lungs including multiple parenchymal opacities observed bilaterally on    recent CT chest.                 MRI of the right femur and Hip: 5/20219:  Impression         1. Moderate right knee effusion with mild synovitis. This is of    unclear etiology. This finding can be better characterized with MRI of    the right knee without contrast if clinically indicated. 2. Small scrotal hydroceles of unclear clinical significance. 3. No evidence for metastatic disease. Xray of the right hip: 3/2019:      Impression     Moderate degenerative changes of the right hip. Potential Aberrant Drug-Related Behavior:no      OARRS report[de-identified]  Reviewed today 12/22/22  getting meds form Palliative.     Past Medical History:   Diagnosis Date    Abdominal aortic aneurysm without rupture 08/27/2018    follows with Dr Grey Sanchez    Acute bronchitis with COPD ([facility] 05/27/2017    Arthritis     COPD (chronic obstructive pulmonary disease) (HCC)     Decreased dorsalis pedis pulse 11/04/2020    Depression with anxiety     Emphysema of lung ([facility]     Encounter for antineoplastic immunotherapy     HAP (hospital-acquired pneumonia)     resolved    History of deep vein thrombosis 11/04/2020    Hyperlipidemia     Lung cancer ([facility] 04/11/2016    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    Paralyzed vocal cords     Thrombosis of testis     Tracheostomy in place [facility])     #6 Elsa       Past Surgical History:   Procedure Laterality Date    BRONCHOSCOPY N/A 3/5/2020    BRONCHOSCOPY DIAGNOSTIC OR CELL 8 Alia Ibrahim ONLY performed by Katina Jaime MD at [facility] 13 FUSION  2015    HIP SURGERY Left 1/6/2022    RIGHT TROCHANTARIC BURSA INJECTION UNDER FLUOROSCOPY performed by Kaushik Garcia MD at [address] ARTHROSCOPY      LUNG BIOPSY Left 5/6/2016    MEDICATION INJECTION Bilateral 10/29/2020    BILATERAL SACROILIAC JOINT INJECTION AND RIGHT TROCHANTERIC BURSA INJECTION UNDER FLUOROSCOPY (CPT 64223,32112,47889)++TRACH-NO VENT++ performed by Kaushik Garcia MD at [address] Left 1/6/2022    RIGHT SACROILIAC JOINT INJECTION UNDER FLUOROSCOPY performed by Kaushik Garcia MD at [address]  4/15/2016    cervical myelogram    PAIN MANAGEMENT PROCEDURE Right 12/28/2020    # 1 LUMBAR TRANSFORAMINAL EPIDURAL STEROID INJECTION RIGHT L3 AND L4 UNDER FLUOROSCOPIC GUIDANCE performed by Kaushik Garica MD at [facility] Right 9/30/2021    LUMBAR TRANSFORAMINAL EPIDURAL STEROID INJECTION RIGHT L3 AND L4 UNDER FLUOROSCOPIC GUIDANCE (CPT 81271) performed by Kaushik Garcia MD at [facility] Right 4/25/2022    RIGHT LUMBAR RADIOFREQUENCY ABLATION L5 AND S1 performed by Kaushik Garcia MD at [address] Right     SINUS SURGERY      TRACHEOSTOMY  05/24/2017    # 6 Shiley (disposable)    TRACHEOSTOMY         Prior to Admission medications    Medication Sig Start Date End Date Taking? Authorizing Provider   oxyCODONE (ROXICODONE) 5 MG immediate release tablet Take 1 tablet by mouth every 8 hours as needed for Pain (hold for sedation) for up to 30 days.  12/8/22 1/7/23 Yes BG García CNP   levothyroxine (SYNTHROID) 100 MCG tablet Take 1 tablet by mouth daily 12/5/22  Yes Sylvia Quintana DO   DULoxetine (CYMBALTA) 30 MG extended release capsule Take 1 capsule by mouth 2 times daily take 1 capsule by mouth twice a day 10/4/22  Yes BG García CNP   esomeprazole (NEXIUM) 40 MG delayed release capsule Take 1 capsule by mouth every morning (before breakfast) 9/30/22  Yes Sylvia Quintana DO   pregabalin (LYRICA) 150 MG capsule Take 1 capsule by mouth 3 times daily for 180 days. 9/20/22 3/19/23 Yes BG Shanks CNP   budesonide (PULMICORT) 0.5 MG/2ML nebulizer suspension inhale contents of 1 vial ( 2 milliliters ) in nebulizer twice a day 6/22/22  Yes Kraig Bernard MD   tamsulosin (FLOMAX) 0.4 MG capsule Take 1 capsule by mouth nightly 3/29/22  Yes Sylvia Quintana DO   albuterol (PROVENTIL) (2.5 MG/3ML) 0.083% nebulizer solution inhale contents of 1 vial ( 3 milliliters ) in nebulizer by mouth and INTO THE LUNGS every 6 hours if needed wheezing 2/10/22  Yes Kraig Bernard MD   simvastatin (ZOCOR) 40 MG tablet Take 1 tablet by mouth nightly 12/27/21  Yes Sylvia Quintana DO   fluocinonide (LIDEX) 0.05 % cream Apply topically 2 times daily.  12/27/21  Yes Sylvia Quintana DO   sodium chloride, Inhalant, 3 % nebulizer solution Take 4 mLs by nebulization as needed for Cough 7/27/21  Yes Kraig Bernard MD   chlorhexidine (PERIDEX) 0.12 % solution Take 15 mLs by mouth daily Swish & spit qd 5/18/21  Yes Sylvia Quintana DO   pembrolizumab (KEYTRUDA) 100 MG/4ML SOLN Infuse 200 mg intravenously every 21 days   Yes Historical Provider, MD   albuterol (ACCUNEB) 0.63 MG/3ML nebulizer solution Take 1 ampule by nebulization every 6 hours as needed for Shortness of Breath   Yes Historical Provider, MD   predniSONE (DELTASONE) 10 MG tablet Take 10 mg by mouth daily    Yes Historical Provider, MD   OXYGEN Inhale 4 L into the lungs nightly   Yes Historical Provider, MD   Cholecalciferol (VITAMIN D3) 5000 units TABS Take 5,000 Units by mouth daily    Yes Historical Provider, MD   apixaban (ELIQUIS) 2.5 MG TABS tablet Take 1 tablet by mouth 2 times daily  Patient not taking: Reported on 11/28/2022 12/8/20 1/5/22  Sylvia Quintana, DO       Allergies   Allergen Reactions    Augmentin [Amoxicillin-Pot Clavulanate] Diarrhea       Social History     Socioeconomic well-hydrated, in no distress and A & O x 3  Build:Overweight  Function: Rises from seated position easily and Moves about room without difficulty     HEENT:     Head:normocephalic, atraumatic  Pupils:regular, round, equal  Sclera: icterus absent  Tracheostomy +     Lungs:     Breathing:normal breathing pattern      CVS:     RRR     Abdomen:     Shape:non-distended and normal     Cervical spine:     Inspection:normal     Thoracic spine:                Spine inspection:normal      Lumbar spine:     Spine inspection: Normal   Palpation: Tenderness paravertebral muscles No bilaterally  Range of motion: Decreased, flexion Decreased, Lateral bending, extension and rotation bilaterally reduced is somewhat painful. Facet tenderness + improved pain   Sacroiliac joint tenderness + bilateral   Alicia's + Gaenslen's +  Piriformis tenderness: negative bilaterally  SLR : negative bilaterally  Trochanteric bursa tenderness: significantly improved. Musculoskeletal:     Trigger points no   Extremities:     Tremors:None bilaterally upper and lower  No LE edema    Left shoulder: ROM improved pain    Neurological:     Sensory: Normal to light touch - except for reduced sensation over the right leg     Motor:                   Right Quadriceps 5/5          Left Quadriceps 5/5           Right Gastrocnemius 5/5    Left Gastrocnemius 5/5  Right Ant Tibialis 5/5  Left Ant Tibialis 5/5      Gait:no assistive device. Dermatology:     Skin:no rashes or lesions noted     Assessment/Plan:   Diagnosis Orders   1. Lumbosacral spondylosis without myelopathy [T32.395 (ICD-10-CM)]        2. Sacroiliac dysfunction        3. DDD (degenerative disc disease), lumbar        4. Trochanteric bursitis, unspecified laterality        5. History of deep vein thrombosis          79 y.o. male with H/o ca lung and ca throat - s/p chemo/. RT. Has a tracheostomy. Followed by Hemonc/ ENT at [facility]) and also follows with Palliative care. On pain medications. Has low back and lower extremity pain - right. MRI of the LS spine : Significant changes noted at L3-4. L4-5     S/P RFA with > 80% relief. S/P Left shoulder injection and right trochanteric bursa injection on 8/24/2022- significantly improved pain. Current pain  low back: SIJ tenderness + B/l SIJ pain bothering much. Plan:  If pain bothers much, he can call for bilateral SIJ injection under fluoroscopy. Ok to continue Eliquis for SIJ injection. He has H/o left UE DVT and is on Eliquis      He will continue medical management with Palliative care. He has reduced the dose of opioids since the pain has improved. Counseling : Patient encouraged to stay active and to continue Regular home exercise program as tolerated - stretching / strengthening. Smoking cessation counseling : yes      Treatment plan discussed with the patient including medication and procedure side effects. Recent events/ consultation. Notes reviewed. Controlled Substances Monitoring:     F/U in 4-5 months.     OARRS reviewed- 12/22/22: consistent     Phani Stanford MD     CC:  Sylvia Quintana DO

## 2022-12-22 NOTE — PROGRESS NOTES
Do you currently have any of the following:    Fever: No  Headache:  No  Cough: No  Shortness of breath: No  Exposed to anyone with these symptoms: No         Alan Escalante. presents to the [address] [place] [address] on 12/22/2022. Angi Molina is complaining of pain lower back and left shoulder. The pain is intermittent. The pain is described as dull. Pain is rated on his best day at a 3, on his worst day at a 9, and on average at a 6 on the VAS scale. He took his last dose of oxycodone today. Any procedures since your last visit: No    Pacemaker or defibrillator: No .    He is not on NSAIDS and is  on anticoagulation medications to include Eliquis and is managed by Sylvia Quintana DO  .     Medication Contract and Consent for Opioid Use Documents Filed       Patient Documents       Type of Document Status Date Received Received By Description    Medication Contract Received 3/24/2021  7:56 AM Maksim Mami 3-24-21 Palliative                    /65   Pulse 86   Temp 97.3 °F (36.3 °C) (Infrared)   Resp 16   Ht 5' 11\" (1.803 m)   Wt 182 lb (82.6 kg)   SpO2 92%   BMI 25.38 kg/m²      No LMP for male patient.

## 2023-01-06 DIAGNOSIS — E03.9 ACQUIRED HYPOTHYROIDISM: ICD-10-CM

## 2023-01-06 NOTE — DISCHARGE SUMMARY
[address]                 [address] [facility],  [facility]                              DISCHARGE SUMMARY    PATIENT NAME: Jacob Angeles                   :         1955  MED REC NO:   [id number]                            ROOM:       [id number]  ACCOUNT NO:   [de-identified]                           ADMIT DATE:  2018  PROVIDER:     Kena Lowe,                   DISCHARGE DATE:  2018      FINAL DIAGNOSES:  1. Acute on chronic hypoxic respiratory failure in the setting of  MRSA pneumonia. 2.  Stage III non-small cell lung cancer. 3.  COPD. 4.  History of laryngeal carcinoma with chronic tracheostomy. 5.  Immunocompromised host.  6.  Hyperlipidemia. 7.  BPH with outflow obstruction. 8.  Generalized anxiety disorder. CHIEF COMPLAINT/PRESENTING ILLNESS/PHYSICAL FINDINGS:  The patient is  an elderly gentleman with stage III non-small cell lung cancer,  currently being seen locally here by an oncologist and being given  treatment. He also suffered from COPD and a history of laryngeal  carcinoma with chronic tracheostomy, which is in remission. He  presents to this hospital emergency room with shortness of breath. He  does normally wear O2 at home. Imaging studies performed revealed the  presence of what appears to be a lung infiltrate. He was admitted. He was pancultured, started on IV antibiotic therapy with appropriate  consultations requested. Physical assessment reveals his temperature  and vital signs to be stable. ENT exam is negative. Heart is  regular. The lungs demonstrate diminished breath sounds in both lung  bases. The abdomen is noted to be soft with positive bowel sounds. Extremities reveal no evidence for edema and/or cyanosis. HOSPITAL COURSE:  The patient's sputum grew out MRSA.   He was switched  over from IV antibiotics over to IV vancomycin and a PICC line was  placed prior to his home discharge and
[Care Plan reviewed and provided to patient/caregiver] : Care plan reviewed and provided to patient/caregiver

## 2023-01-19 ENCOUNTER — HOSPITAL ENCOUNTER (OUTPATIENT)
Dept: CT IMAGING | Age: 68
Discharge: HOME | End: 2023-01-21
Payer: MEDICARE

## 2023-01-19 DIAGNOSIS — C79.51 BONE METASTASIS (HCC): ICD-10-CM

## 2023-01-19 DIAGNOSIS — C79.51 SECONDARY MALIGNANT NEOPLASM OF BONE (HCC): ICD-10-CM

## 2023-01-19 DIAGNOSIS — C34.12 SQUAMOUS CELL CARCINOMA OF BRONCHUS IN LEFT UPPER LOBE (HCC): ICD-10-CM

## 2023-01-19 PROCEDURE — 71250 CT THORAX DX C-: CPT

## 2023-01-19 PROCEDURE — 74176 CT ABD & PELVIS W/O CONTRAST: CPT

## 2023-01-23 ENCOUNTER — OFFICE VISIT (OUTPATIENT)
Dept: PALLATIVE CARE | Age: 68
End: 2023-01-23
Payer: MEDICARE

## 2023-01-23 VITALS
OXYGEN SATURATION: 96 % | TEMPERATURE: 98.1 F | DIASTOLIC BLOOD PRESSURE: 58 MMHG | BODY MASS INDEX: 25.24 KG/M2 | WEIGHT: 181 LBS | SYSTOLIC BLOOD PRESSURE: 92 MMHG | HEART RATE: 90 BPM

## 2023-01-23 DIAGNOSIS — G89.3 CHRONIC PAIN DUE TO NEOPLASM: ICD-10-CM

## 2023-01-23 PROCEDURE — 1123F ACP DISCUSS/DSCN MKR DOCD: CPT | Performed by: NURSE PRACTITIONER

## 2023-01-23 PROCEDURE — 99212 OFFICE O/P EST SF 10 MIN: CPT | Performed by: NURSE PRACTITIONER

## 2023-01-23 PROCEDURE — 99213 OFFICE O/P EST LOW 20 MIN: CPT | Performed by: NURSE PRACTITIONER

## 2023-01-23 RX ORDER — OXYCODONE HYDROCHLORIDE 5 MG/1
5 TABLET ORAL EVERY 6 HOURS PRN
Qty: 120 TABLET | Refills: 0 | Status: SHIPPED | OUTPATIENT
Start: 2023-01-23 | End: 2023-02-22

## 2023-01-23 NOTE — PROGRESS NOTES
Department of Palliative Medicine  Provider: BG Izaguirre - CNP         Chief Complaint: Lencho Deluca is a 79 y.o. male with chief complaint of pain    HPI  Mr. 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 Assessment/Plan     Lung cancer   - Stage 4 lung cancer   - Follows with Dr. Jian Martínez   - Received radiation therapy, along with chemotherapy    -On Keytruda    Chronic Pain due to Neoplasm/chronic lumbar radiculopathy:              - Oxycodone at 5 mg BID PRN uses 3-4 pill daily               -  Lyrica 150 mg TID for neuropathy, hand tingling sensation              -  S/p sacroiliac joint injections with Dr. Vladimir Porter   -  S/p Right lumbar radiofrequency ablation L5&S1 with Dr. Vladimir Porter     Constipation:              -  Pericolace 2 tabs BID              -  Add Miralax daily prn     Depression/Anxiety:   - Cymbalta 30 mg BID              Insomnia    - melatonin 5mg HS    Follow Up: 3 months  Encouraged to call with any questions, concerns, needs, or changes in symptoms. Subjective:   Benjamin presents today accompanied by his wife. He is alert and oriented voices concerns well. He continues to do very well, still some pain, rating is a 5 on a scale of 10, and overall very well managed utilizing oxycodone 5 mg, which he typically only takes, though he does report he is using it more frequently, not using 3-4 times per day. For the most part he states he is noticing worsening sciatica on the left, and he will be followed for chronic pain regarding this but potential interventional treatment. He also continues utilizes Lyrica unchanged, and he states this continues to be helpful for peripheral neuropathy. Is tolerating his Keytruda treatments well, he has no new needs today. We will provide refills as appropriate, will plan to see him again approximately 3 months to reassess his needs. Objective:     Physical Exam  Wt Readings from Last 3 Encounters:   01/23/23 181 lb (82.1 kg)   12/22/22 182 lb (82.6 kg)   11/28/22 182 lb (82.6 kg)      Gen:  Alert, appears stated age, well nourished, in no acute distress  HEENT:  Speaks by holding trach  Neck:  Supple  Lungs:  CTA bilaterally, no audible rhonchi or wheezes noted  Heart[de-identified]  RRR, no murmur, rub, or gallop noted during exam  Abd:  Soft, non tender, non distended, BS+  M/S/Ext:  Moving all extremities, no edema, pulses present  Skin:  Warm and dry  Neuro:   Alert, oriented x 3; following commands      Martin Symptom Assessment Score   Martin Score 1/23/2023 9/20/2022 6/28/2022 4/5/2022 10/12/2021   Pain Score 5 4 6 5 4   Tiredness Score Worst possible tiredness Not tired 1 2 1   Nausea Score Not nauseated Not nauseated Not nauseated Not nauseated Not nauseated   Depression Score Worst possible depression 2 2 3 3   Anxiety Score 2 2 2 2 5   Drowsiness Score Worst possible drowsiness Not drowsy 1 2 Not drowsy   Appetite Score Best appetite 1 1 2 Best appetite   Wellbeing Score 1 2 1 6 2   Dyspnea Score 5 4 5 6 5   Other Problem Score 5 3 3 3 4   Total Assessment Score(calculated) 48 18 22 31 24       Assessed by: patient. Current Medications:  Medications reviewed: yes    Controlled Substances Monitoring: OARRS reviewed 1/23/23. RX Monitoring 1/23/2023   Attestation -   Periodic Controlled Substance Monitoring Possible medication side effects, risk of tolerance/dependence & alternative treatments discussed. ;No signs of potential drug abuse or diversion identified. ;Assessed functional status. ;Obtaining appropriate analgesic effect of treatment.    Chronic Pain > 50 MEDD -   Chronic Pain > 80 MEDD -   Chronic Pain > 120 MEDD -     BG Duque - CNP   Palliative Care Department     Time/Communication  Greater than 50% of time spent, total 25 minutes in face-to-face counseling and coordination of care regarding symptom management.

## 2023-03-09 DIAGNOSIS — G89.3 CHRONIC PAIN DUE TO NEOPLASM: ICD-10-CM

## 2023-03-09 RX ORDER — OXYCODONE HYDROCHLORIDE 5 MG/1
5 TABLET ORAL EVERY 6 HOURS PRN
Qty: 120 TABLET | Refills: 0 | Status: SHIPPED | OUTPATIENT
Start: 2023-03-09 | End: 2023-04-08

## 2023-03-09 NOTE — TELEPHONE ENCOUNTER
Call from [address] requesting a refill for her  Dom's Oxy IR 5 mg. Pharmacy is [facility] on [address] [place] [address]. Next maria r 4/17/23.

## 2023-04-06 RX ORDER — DULOXETIN HYDROCHLORIDE 30 MG/1
CAPSULE, DELAYED RELEASE ORAL
Qty: 180 CAPSULE | Refills: 1 | OUTPATIENT
Start: 2023-04-06

## 2023-04-17 ENCOUNTER — OFFICE VISIT (OUTPATIENT)
Dept: PALLATIVE CARE | Age: 68
End: 2023-04-17
Payer: MEDICARE

## 2023-04-17 VITALS
DIASTOLIC BLOOD PRESSURE: 50 MMHG | TEMPERATURE: 97.9 F | WEIGHT: 183 LBS | OXYGEN SATURATION: 95 % | SYSTOLIC BLOOD PRESSURE: 88 MMHG | BODY MASS INDEX: 25.52 KG/M2 | HEART RATE: 72 BPM

## 2023-04-17 DIAGNOSIS — G89.3 CHRONIC PAIN DUE TO NEOPLASM: ICD-10-CM

## 2023-04-17 DIAGNOSIS — Z51.5 PALLIATIVE CARE BY SPECIALIST: ICD-10-CM

## 2023-04-17 PROCEDURE — 1123F ACP DISCUSS/DSCN MKR DOCD: CPT | Performed by: NURSE PRACTITIONER

## 2023-04-17 PROCEDURE — 99211 OFF/OP EST MAY X REQ PHY/QHP: CPT | Performed by: NURSE PRACTITIONER

## 2023-04-17 PROCEDURE — 99213 OFFICE O/P EST LOW 20 MIN: CPT | Performed by: NURSE PRACTITIONER

## 2023-04-17 RX ORDER — PREGABALIN 150 MG/1
150 CAPSULE ORAL 3 TIMES DAILY
Qty: 270 CAPSULE | Refills: 1 | Status: SHIPPED | OUTPATIENT
Start: 2023-04-17 | End: 2023-10-14

## 2023-04-17 RX ORDER — SENNA PLUS 8.6 MG/1
2 TABLET ORAL 2 TIMES DAILY PRN
Qty: 120 TABLET | Refills: 2 | Status: SHIPPED | OUTPATIENT
Start: 2023-04-17

## 2023-04-17 NOTE — PROGRESS NOTES
Time/Communication  Greater than 50% of time spent, total 25 minutes in face-to-face counseling and coordination of care regarding symptom management.

## 2023-04-24 ENCOUNTER — PREP FOR PROCEDURE (OUTPATIENT)
Dept: PAIN MANAGEMENT | Age: 68
End: 2023-04-24

## 2023-04-24 ENCOUNTER — OFFICE VISIT (OUTPATIENT)
Dept: PAIN MANAGEMENT | Age: 68
End: 2023-04-24
Payer: MEDICARE

## 2023-04-24 VITALS
BODY MASS INDEX: 25.62 KG/M2 | HEIGHT: 71 IN | WEIGHT: 183 LBS | SYSTOLIC BLOOD PRESSURE: 100 MMHG | RESPIRATION RATE: 16 BRPM | TEMPERATURE: 97.7 F | DIASTOLIC BLOOD PRESSURE: 60 MMHG | HEART RATE: 73 BPM | OXYGEN SATURATION: 94 %

## 2023-04-24 DIAGNOSIS — M53.3 SACROILIAC DYSFUNCTION: Primary | ICD-10-CM

## 2023-04-24 DIAGNOSIS — M51.36 DDD (DEGENERATIVE DISC DISEASE), LUMBAR: ICD-10-CM

## 2023-04-24 DIAGNOSIS — M47.817 LUMBOSACRAL SPONDYLOSIS WITHOUT MYELOPATHY: ICD-10-CM

## 2023-04-24 PROCEDURE — 99213 OFFICE O/P EST LOW 20 MIN: CPT | Performed by: ANESTHESIOLOGY

## 2023-04-24 PROCEDURE — 1123F ACP DISCUSS/DSCN MKR DOCD: CPT | Performed by: ANESTHESIOLOGY

## 2023-04-24 PROCEDURE — 99214 OFFICE O/P EST MOD 30 MIN: CPT | Performed by: ANESTHESIOLOGY

## 2023-04-24 RX ORDER — SODIUM CHLORIDE 0.9 % (FLUSH) 0.9 %
5-40 SYRINGE (ML) INJECTION EVERY 12 HOURS SCHEDULED
Status: CANCELLED | OUTPATIENT
Start: 2023-04-24

## 2023-04-24 RX ORDER — SODIUM CHLORIDE 9 MG/ML
INJECTION, SOLUTION INTRAVENOUS PRN
Status: CANCELLED | OUTPATIENT
Start: 2023-04-24

## 2023-04-24 RX ORDER — AMOXICILLIN 500 MG/1
500 CAPSULE ORAL 3 TIMES DAILY
COMMUNITY
Start: 2023-04-22

## 2023-04-24 RX ORDER — MIDODRINE HYDROCHLORIDE 5 MG/1
5 TABLET ORAL 2 TIMES DAILY
COMMUNITY
Start: 2023-04-03

## 2023-04-24 RX ORDER — SODIUM CHLORIDE 0.9 % (FLUSH) 0.9 %
5-40 SYRINGE (ML) INJECTION PRN
Status: CANCELLED | OUTPATIENT
Start: 2023-04-24

## 2023-04-24 NOTE — PROGRESS NOTES
Naty Luis. presents to the [facility] on 4/24/2023. Re Nelson is complaining of pain in his low back. The pain is constant. The pain is described as aching, dull, sharp, and miserable. Pain is rated on his best day at a 3, on his worst day at a 9, and on average at a 7 on the VAS scale. He took his last dose of oxycodone today. Any procedures since your last visit: No    Pacemaker or defibrillator: No     He is not on NSAIDS and is  on anticoagulation medications to include Eliquis and is managed by Sylvia Quintana DO .     Medication Contract and Consent for Opioid Use Documents Filed       Patient Documents       Type of Document Status Date Received Received By Description    Medication Contract Received 3/24/2021  7:56 AM Juvenal Perez 3-24-21 Palliative                    /60   Pulse 73   Temp 97.7 °F (36.5 °C) (Infrared)   Resp 16   Ht 5' 11\" (1.803 m)   Wt 183 lb (83 kg)   SpO2 94%   BMI 25.52 kg/m²      No LMP for male patient.

## 2023-04-24 NOTE — PROGRESS NOTES
[facility]   Becka Adams  Dept: [phone]      Follow up Note      Misael Mcconnell. Date of Visit:  2023    CC:  Patient presents for follow up   Chief Complaint   Patient presents with    Follow-up     Low back pain        HPI:  Chronic low back pain. H/o Ca lung and Throat ca. S/p RT/ chemo- Has a tracheostomy. Has been treated by oncology. Under palliative medicine care for neuropathic pain and managed by medications including opioids. He has reduced the dose of pain meds. On anticoagulation for h/o DVT    Continues HEP. Nursing notes and details of the pain history reviewed. Please see intake notes for details. Previous treatments:   Physical Therapy : yes, HEP- as tolerated       Medications: - NSAID's : yes                        - Membrane stabilizers : yes                        - Opioids : yes                       - Adjuvants or Others : yes     Surgeries: no LS spine surgery. H/o prior Cervical fusion noted. He has been on anticoagulation medications - Eliquis. He has not been on herbal supplements. He is not diabetic. H/O Smoking: +  H/O alcohol abuse : no  H/O Illicit drug use : no     Imaging:     X-ray left shoulder: 12/10/2021:  Impression   1. Minimal degenerative changes of the [facility] joint       MRI of LS spine on 2020 reviewed:  Impression         1. Severe central canal stenosis and moderate bilateral neural    foraminal narrowing at L3-4 and L4-5.         2. Grade 1 anterolisthesis of L3 on L4 and L4 on L5.         3. Stable distal abdominal aortic aneurysm measuring 3 cm in diameter. X-ray of the sacroiliac joints done on 2020: Impression    Mild-to-moderate degenerative changes involving bilateral sacroiliac    joints.        VL LE segmental PP2020:  arrative   Normal ankle arm index with good arterial flow to both feet including   the toes based upon the pulse volume recordings

## 2023-04-25 PROBLEM — J02.8 ACUTE PHARYNGITIS DUE TO OTHER SPECIFIED ORGANISMS: Status: ACTIVE | Noted: 2023-04-25

## 2023-05-08 ENCOUNTER — HOSPITAL ENCOUNTER (OUTPATIENT)
Age: 68
Setting detail: OUTPATIENT SURGERY
Discharge: HOME | End: 2023-05-08
Attending: ANESTHESIOLOGY | Admitting: ANESTHESIOLOGY
Payer: MEDICARE

## 2023-05-08 ENCOUNTER — HOSPITAL ENCOUNTER (OUTPATIENT)
Dept: GENERAL RADIOLOGY | Age: 68
Discharge: HOME | End: 2023-05-10
Attending: ANESTHESIOLOGY
Payer: MEDICARE

## 2023-05-08 VITALS
RESPIRATION RATE: 18 BRPM | BODY MASS INDEX: 25.62 KG/M2 | HEART RATE: 68 BPM | HEIGHT: 71 IN | OXYGEN SATURATION: 96 % | TEMPERATURE: 97.9 F | DIASTOLIC BLOOD PRESSURE: 64 MMHG | WEIGHT: 183 LBS | SYSTOLIC BLOOD PRESSURE: 107 MMHG

## 2023-05-08 DIAGNOSIS — R52 PAIN MANAGEMENT: ICD-10-CM

## 2023-05-08 LAB — METER GLUCOSE: 88 MG/DL (ref 74–99)

## 2023-05-08 PROCEDURE — 3600000002 HC SURGERY LEVEL 2 BASE: Performed by: ANESTHESIOLOGY

## 2023-05-08 PROCEDURE — 7100000011 HC PHASE II RECOVERY - ADDTL 15 MIN: Performed by: ANESTHESIOLOGY

## 2023-05-08 PROCEDURE — 2709999900 HC NON-CHARGEABLE SUPPLY: Performed by: ANESTHESIOLOGY

## 2023-05-08 PROCEDURE — 6360000004 HC RX CONTRAST MEDICATION: Performed by: ANESTHESIOLOGY

## 2023-05-08 PROCEDURE — 6360000002 HC RX W HCPCS: Performed by: ANESTHESIOLOGY

## 2023-05-08 PROCEDURE — 27096 INJECT SACROILIAC JOINT: CPT | Performed by: ANESTHESIOLOGY

## 2023-05-08 PROCEDURE — 7100000010 HC PHASE II RECOVERY - FIRST 15 MIN: Performed by: ANESTHESIOLOGY

## 2023-05-08 PROCEDURE — 2500000003 HC RX 250 WO HCPCS: Performed by: ANESTHESIOLOGY

## 2023-05-08 PROCEDURE — 82962 GLUCOSE BLOOD TEST: CPT

## 2023-05-08 PROCEDURE — 3209999900 FLUORO FOR SURGICAL PROCEDURES

## 2023-05-08 RX ORDER — SODIUM CHLORIDE 0.9 % (FLUSH) 0.9 %
5-40 SYRINGE (ML) INJECTION EVERY 12 HOURS SCHEDULED
Status: DISCONTINUED | OUTPATIENT
Start: 2023-05-08 | End: 2023-05-08 | Stop reason: HOSPADM

## 2023-05-08 RX ORDER — SODIUM CHLORIDE 9 MG/ML
INJECTION, SOLUTION INTRAVENOUS PRN
Status: DISCONTINUED | OUTPATIENT
Start: 2023-05-08 | End: 2023-05-08 | Stop reason: HOSPADM

## 2023-05-08 RX ORDER — LIDOCAINE HYDROCHLORIDE 5 MG/ML
INJECTION, SOLUTION INFILTRATION; INTRAVENOUS PRN
Status: DISCONTINUED | OUTPATIENT
Start: 2023-05-08 | End: 2023-05-08 | Stop reason: ALTCHOICE

## 2023-05-08 RX ORDER — METHYLPREDNISOLONE ACETATE 40 MG/ML
INJECTION, SUSPENSION INTRA-ARTICULAR; INTRALESIONAL; INTRAMUSCULAR; SOFT TISSUE PRN
Status: DISCONTINUED | OUTPATIENT
Start: 2023-05-08 | End: 2023-05-08 | Stop reason: ALTCHOICE

## 2023-05-08 RX ORDER — BUPIVACAINE HYDROCHLORIDE 2.5 MG/ML
INJECTION, SOLUTION EPIDURAL; INFILTRATION; INTRACAUDAL PRN
Status: DISCONTINUED | OUTPATIENT
Start: 2023-05-08 | End: 2023-05-08 | Stop reason: ALTCHOICE

## 2023-05-08 RX ORDER — SODIUM CHLORIDE 0.9 % (FLUSH) 0.9 %
5-40 SYRINGE (ML) INJECTION PRN
Status: DISCONTINUED | OUTPATIENT
Start: 2023-05-08 | End: 2023-05-08 | Stop reason: HOSPADM

## 2023-05-08 ASSESSMENT — PAIN - FUNCTIONAL ASSESSMENT: PAIN_FUNCTIONAL_ASSESSMENT: 0-10

## 2023-05-08 ASSESSMENT — PAIN DESCRIPTION - DESCRIPTORS: DESCRIPTORS: ACHING;SORE

## 2023-05-08 NOTE — INTERVAL H&P NOTE
Update History & Physical    The patient's History and Physical of April 24, 2023 was reviewed with the patient and I examined the patient. There was no change. The surgical site was confirmed by the patient and me. Plan: The risks, benefits, expected outcome, and alternative to the recommended procedure have been discussed with the patient. Patient understands and wants to proceed with the procedure.      Electronically signed by Shade Marroquin MD on 5/8/2023

## 2023-05-08 NOTE — DISCHARGE INSTRUCTIONS
Kristyn Moss Block/Radiofrequency  Home Going Instructions    1-Go home, rest for the remainder of the day  2-Please do not lift over 20 pounds the day of the injection  3-If you received sedation No: alcohol, driving, operating lawn mowers, plows, tractors or other dangerous equipment until next morning. Do not make important decisions or sign legal documents for 24 hours. You may experience light headedness, dizziness, nausea or sleepiness after sedation. Do not stay alone. A responsible adult must be with you for 24 hours. You could be nauseated from the medications you have received. Your IV site may be sore and bruised. 4-No dietary restrictions     5-Resume all medications the same day, blood thinners to be resumed 24 hours after injection if you were instructed to stop any. 6-Keep the surgical site clean and dry, you may shower the next morning and remove the      dressing. 7- No sitz baths, tub baths or hot tubs/swimming for 24 hours. 8- If you have any pain at the injection site(s), application of an ice pack to the area should be       helpful, 20 minutes on/20 minutes off for next 48 hours. 9- Call [facility] immediately at if you develop.   Fever greater than 100.4 F  Have bleeding or drainage from the puncture site  Have progressive Leg/arm numbness and or weakness  Loss of control of bowel and or bladder (wet/soil yourself)  Severe headache with inability to lift head  10-You may return to work the next day

## 2023-05-08 NOTE — OP NOTE
Operative Note      Patient: Charli Vo YOB: 1955  MRN: [id number]    Date of Procedure: 2023    Pre-Op Diagnosis Codes:     * Sacroiliac joint dysfunction [M53.3]    Post-Op Diagnosis: Same       Procedure(s):  BILATERAL SACROILIAC JOINT INJECTION UNDER FLUOROSCOPIC GUIDANCE    Surgeon(s):  Ayad Estrada MD    Assistant:   * No surgical staff found *    Anesthesia: Local    Estimated Blood Loss (mL): Minimal    Complications: None    Specimens:   * No specimens in log *    Implants:  * No implants in log *      Drains: * No LDAs found *    Findings: good needle placement      Detailed Description of Procedure:   2023    Patient: Charli Vo :  1955  Age:  79 y.o. Sex:  male     PRE-OPERATIVE DIAGNOSIS:  Sacroiliac dysfunction    POST-OPERATIVE DIAGNOSIS: Same. PROCEDURE:  Fluoroscopic guided Bilateral   sacroiliac joint injection with steroid. SURGEON:  Ayad Estrada MD    ANESTHESIA: Local    ESTIMATED BLOOD LOSS: None.  ______________________________________________________________________  BRIEF HISTORY:  Charli Vo comes in today for Bilateral sacroiliac joint injection under fluoroscopic guidance. The potential complications as well as the procedure in detail were explained to him today. He has elected to undergo the aforementioned procedure. Alan's complete History & Physical examination were reviewed in depth, a copy of which is in the chart. DESCRIPTION OF PROCEDURE:    After confirming written and informed consent, a time-out was performed and Alans name and date of birth, the procedure to be performed as well as the plan for the location of the needle insertion were confirmed. The patient was brought into the procedure room and placed in the prone position on the fluoroscopy table. Standard monitors were placed and vital signs were observed throughout the procedure.  The low back and upper buttocks area was

## 2023-05-19 DIAGNOSIS — G89.3 CHRONIC PAIN DUE TO NEOPLASM: ICD-10-CM

## 2023-05-19 RX ORDER — OXYCODONE HYDROCHLORIDE 5 MG/1
5 TABLET ORAL EVERY 6 HOURS PRN
Qty: 120 TABLET | Refills: 0 | Status: SHIPPED | OUTPATIENT
Start: 2023-05-19 | End: 2023-06-18

## 2023-05-30 DIAGNOSIS — J43.8 OTHER EMPHYSEMA (HCC): ICD-10-CM

## 2023-05-30 DIAGNOSIS — Z12.5 SPECIAL SCREENING FOR MALIGNANT NEOPLASM OF PROSTATE: ICD-10-CM

## 2023-05-30 DIAGNOSIS — E03.9 HYPOTHYROIDISM, UNSPECIFIED TYPE: ICD-10-CM

## 2023-05-30 DIAGNOSIS — I71.40 ABDOMINAL AORTIC ANEURYSM (AAA) WITHOUT RUPTURE, UNSPECIFIED PART (HCC): ICD-10-CM

## 2023-05-30 LAB
ALBUMIN SERPL-MCNC: 3.7 G/DL (ref 3.5–5.2)
ALP SERPL-CCNC: 77 U/L (ref 40–129)
ALT SERPL-CCNC: 21 U/L (ref 0–40)
ANION GAP SERPL CALCULATED.3IONS-SCNC: 12 MMOL/L (ref 7–16)
AST SERPL-CCNC: 16 U/L (ref 0–39)
BASOPHILS # BLD: 0.07 E9/L (ref 0–0.2)
BASOPHILS NFR BLD: 0.7 % (ref 0–2)
BILIRUB SERPL-MCNC: 0.7 MG/DL (ref 0–1.2)
BUN SERPL-MCNC: 17 MG/DL (ref 6–23)
CALCIUM SERPL-MCNC: 9.3 MG/DL (ref 8.6–10.2)
CHLORIDE SERPL-SCNC: 106 MMOL/L (ref 98–107)
CHOLESTEROL, TOTAL: 165 MG/DL (ref 0–199)
CO2 SERPL-SCNC: 22 MMOL/L (ref 22–29)
CREAT SERPL-MCNC: 1.2 MG/DL (ref 0.7–1.2)
EOSINOPHIL # BLD: 0.09 E9/L (ref 0.05–0.5)
EOSINOPHIL NFR BLD: 0.9 % (ref 0–6)
ERYTHROCYTE [DISTWIDTH] IN BLOOD BY AUTOMATED COUNT: 15.8 FL (ref 11.5–15)
GLUCOSE SERPL-MCNC: 109 MG/DL (ref 74–99)
HCT VFR BLD AUTO: 45.5 % (ref 37–54)
HDLC SERPL-MCNC: 47 MG/DL
HGB BLD-MCNC: 14.3 G/DL (ref 12.5–16.5)
IMM GRANULOCYTES # BLD: 0.06 E9/L
IMM GRANULOCYTES NFR BLD: 0.6 % (ref 0–5)
LDLC SERPL CALC-MCNC: 90 MG/DL (ref 0–99)
LYMPHOCYTES # BLD: 0.74 E9/L (ref 1.5–4)
LYMPHOCYTES NFR BLD: 7.2 % (ref 20–42)
MCH RBC QN AUTO: 27.8 PG (ref 26–35)
MCHC RBC AUTO-ENTMCNC: 31.4 % (ref 32–34.5)
MCV RBC AUTO: 88.5 FL (ref 80–99.9)
MONOCYTES # BLD: 0.35 E9/L (ref 0.1–0.95)
MONOCYTES NFR BLD: 3.4 % (ref 2–12)
NEUTROPHILS # BLD: 8.99 E9/L (ref 1.8–7.3)
NEUTS SEG NFR BLD: 87.2 % (ref 43–80)
PLATELET # BLD AUTO: 282 E9/L (ref 130–450)
PMV BLD AUTO: 10.3 FL (ref 7–12)
POTASSIUM SERPL-SCNC: 4.6 MMOL/L (ref 3.5–5)
PROT SERPL-MCNC: 6.8 G/DL (ref 6.4–8.3)
PSA SERPL-MCNC: 1.83 NG/ML (ref 0–4)
RBC # BLD AUTO: 5.14 E12/L (ref 3.8–5.8)
SODIUM SERPL-SCNC: 140 MMOL/L (ref 132–146)
TRIGL SERPL-MCNC: 142 MG/DL (ref 0–149)
TSH SERPL-MCNC: 0.31 UIU/ML (ref 0.27–4.2)
VLDLC SERPL CALC-MCNC: 28 MG/DL
WBC # BLD: 10.3 E9/L (ref 4.5–11.5)

## 2023-06-19 DIAGNOSIS — G89.3 CHRONIC PAIN DUE TO NEOPLASM: ICD-10-CM

## 2023-06-19 RX ORDER — OXYCODONE HYDROCHLORIDE 5 MG/1
5 TABLET ORAL EVERY 6 HOURS PRN
Qty: 120 TABLET | Refills: 0 | Status: SHIPPED | OUTPATIENT
Start: 2023-06-19 | End: 2023-07-19

## 2023-06-19 NOTE — TELEPHONE ENCOUNTER
Incoming call from [address] [facility] requesting refill of his oxycodone.    Next appt  7/10/23

## 2023-06-29 PROBLEM — Z12.5 SPECIAL SCREENING FOR MALIGNANT NEOPLASM OF PROSTATE: Status: RESOLVED | Noted: 2023-05-30 | Resolved: 2023-06-29

## 2023-07-10 ENCOUNTER — OFFICE VISIT (OUTPATIENT)
Dept: PALLATIVE CARE | Age: 68
End: 2023-07-10
Payer: MEDICARE

## 2023-07-10 VITALS
OXYGEN SATURATION: 95 % | WEIGHT: 182 LBS | SYSTOLIC BLOOD PRESSURE: 126 MMHG | HEART RATE: 72 BPM | TEMPERATURE: 98.2 F | BODY MASS INDEX: 25.38 KG/M2 | DIASTOLIC BLOOD PRESSURE: 71 MMHG

## 2023-07-10 DIAGNOSIS — Z51.5 ENCOUNTER FOR PALLIATIVE CARE: Primary | ICD-10-CM

## 2023-07-10 PROCEDURE — 99213 OFFICE O/P EST LOW 20 MIN: CPT | Performed by: NURSE PRACTITIONER

## 2023-07-10 PROCEDURE — 99211 OFF/OP EST MAY X REQ PHY/QHP: CPT | Performed by: NURSE PRACTITIONER

## 2023-07-10 PROCEDURE — 1123F ACP DISCUSS/DSCN MKR DOCD: CPT | Performed by: NURSE PRACTITIONER

## 2023-07-10 NOTE — PROGRESS NOTES
Department of Palliative Medicine  Provider: BG Georges - CNP         Chief Complaint: Mckay Eisenberg is a 79 y.o. male with chief complaint of pain    HPI  Mr. Kirill Echevarria is a pleasant and cooperative 72year old man with Stage IV adenocarcinoma of the lung diagnosed in 2016 when he presented with Vipin syndrome and large left upper lobe apical mass with mediastinal lymphadenopathy and T1-T2 vertebral body destruction. Assessment/Plan     Lung cancer   - Stage 4 lung cancer   - Follows with Dr. Bailey Moe   - Received radiation therapy, along with chemotherapy    -On Keytruda    Chronic Pain due to Neoplasm/chronic lumbar radiculopathy:              - Oxycodone at 5 mg BID PRN uses 3-4 pill daily               -  Lyrica 150 mg TID for neuropathy, hand tingling sensation              -  S/p sacroiliac joint injections with Dr. Mago Lawrence   -  S/p Right lumbar radiofrequency ablation L5&S1 with Dr. Mago Lawrence     Constipation:              -  Pericolace 2 tabs BID     Depression/Anxiety:   - Cymbalta 30 mg BID              Insomnia    - Melatonin 5mg HS    Follow Up: 3 months  Encouraged to call with any questions, concerns, needs, or changes in symptoms. Subjective:   Benjamin presents today accompanied by his wife. He is alert and oriented voices concerns well. He continues to do very well, still some pain, rating is a 7 on a scale of 10, and overall very well managed utilizing oxycodone 5 mg, which he typically only takes about 3 times per day, some times using 2 tabs when pain is more severe. He also continues utilizes Lyrica unchanged, and he states this continues to be helpful for peripheral neuropathy. He does have a visit with pain management and will see if he needs further injection therapy, which he reports provides brief mild improvement. I encouraged him to again increase his activity and reduce use of oxycodone as pain and activity tolerance improves.  He is tolerating his [place]

## 2023-07-21 DIAGNOSIS — G89.3 CHRONIC PAIN DUE TO NEOPLASM: ICD-10-CM

## 2023-07-21 RX ORDER — OXYCODONE HYDROCHLORIDE 5 MG/1
5 TABLET ORAL EVERY 6 HOURS PRN
Qty: 120 TABLET | Refills: 0 | Status: SHIPPED | OUTPATIENT
Start: 2023-07-21 | End: 2023-08-20

## 2023-07-21 NOTE — TELEPHONE ENCOUNTER
Call from [address] [facility] requesting refill for Oxy IR. Pharmacy is [facility]. Next maria r 10/2/23.

## 2023-08-08 ENCOUNTER — TELEPHONE (OUTPATIENT)
Dept: ENT CLINIC | Age: 68
End: 2023-08-08

## 2023-08-08 NOTE — TELEPHONE ENCOUNTER
Patient wife is requesting an appointment to have his throat dilated. He is experiencing dysphagia. No soon appointments available Please contact wife to schedule.

## 2023-08-08 NOTE — TELEPHONE ENCOUNTER
Patient's wife advised that Dr. Hyacinth Griffin does not perform that procedure and he would need seen by CAITLIN Stubbs gave verbal understanding.     Electronically signed by Jackie Brody on 8/8/23 at 3:19 PM EDT

## 2023-08-21 DIAGNOSIS — G89.3 CHRONIC PAIN DUE TO NEOPLASM: ICD-10-CM

## 2023-08-21 RX ORDER — OXYCODONE HYDROCHLORIDE 5 MG/1
5 TABLET ORAL EVERY 6 HOURS PRN
Qty: 120 TABLET | Refills: 0 | Status: SHIPPED | OUTPATIENT
Start: 2023-08-21 | End: 2023-09-20

## 2023-08-21 NOTE — TELEPHONE ENCOUNTER
Incoming call from [address] [place] [address] requesting refill of Alan's medication.  Next appt 10/2/23

## 2023-09-06 ENCOUNTER — HOSPITAL ENCOUNTER (OUTPATIENT)
Dept: HYPERBARIC MEDICINE | Age: 68
Discharge: HOME | End: 2023-09-06
Payer: MEDICARE

## 2023-09-06 VITALS
DIASTOLIC BLOOD PRESSURE: 58 MMHG | HEART RATE: 72 BPM | TEMPERATURE: 99 F | RESPIRATION RATE: 12 BRPM | SYSTOLIC BLOOD PRESSURE: 94 MMHG
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
## 2023-09-06 DIAGNOSIS — T66.XXXS LATE EFFECT OF RADIATION: Chronic | ICD-10-CM

## 2023-09-06 DIAGNOSIS — M27.2 OSTEORADIONECROSIS, JAW: Primary | ICD-10-CM

## 2023-09-06 PROCEDURE — 99212 OFFICE O/P EST SF 10 MIN: CPT

## 2023-09-06 PROCEDURE — 99204 OFFICE O/P NEW MOD 45 MIN: CPT | Performed by: SURGERY

## 2023-09-06 NOTE — DISCHARGE INSTRUCTIONS
chamber. To help prevent pain or injury to your lungs, please make sure to notify a staff member if you have any upper respiratory infection and/or congestion. It is very important not to hold your breath during your treatment \"dive\", just breath normally. PATIENTS WITH DIABETES ONLY NA  If you have diabetes your blood sugar level will be tested before each and every treatment. If your blood sugar level is too low, we will attempt to help you raise it by providing a diabetic nutritional shake. We will retest your blood sugar shortly thereafter. If your blood sugar level is still low, we may not be able to provide a treatment \"dive\" that day. If your blood sugar levels is too high, we also may not be able to provide a treatment \"dive\" that day. If your blood sugar level continues to be too high or too low, not allowing you to continue with the hyperbaric treatments, you will need to contact your primary physician to help manage your blood sugar more effectively. ABOUT YOUR TREATMENT INFORMATION REVIEWED: Yes   If you are feeling nervous or anxious about these treatments, please let a staff member know. We are here to help you. Before each and every treatment, please let us know of any changes regarding:    your medication, especially cancer medication  any possibility of being pregnant  any new implants or devices    Temporary vision changes may occur during hyperbaric oxygen therapy. Therefore, it is recommended that you not change eyeglass prescriptions during therapy. Changes that occur during therapy should return to pre-treatment baseline within several weeks of the conclusion of therapy.  In the rare instance that vision has not returned to the pre-treatment baseline; it is recommended that you schedule an eye exam with your Opthalmalogist.    It is very important to make the clinical staff aware if you have ever had a collapsed lung    ITEMS TO AVOID INFORMATION REVIEWED: Yes

## 2023-09-06 NOTE — PROGRESS NOTES
additional testing or consultant clearance prior to the initiation of HBOT; these include   . The patient does NOT require premedication prior to HBOT. HBOT treatment plan will be once daily on scheduled treatment days. JOHSUA: 2.5  Duration: 90 minutes at depth. Air breaks: Two 5 minute air breaks for every 30 minutes oxygen. Total number of treatments 30    The patient/POA verbalized understanding of the risks associated with HBOT and has agreed to the above plan.      Electronically signed by Em Matt MD on 9/6/2023 at 4:49 PM.

## 2023-09-08 NOTE — PROGRESS NOTES
[address]   PRE-ADMISSION TESTING GENERAL INSTRUCTIONS  PAT Phone Number: [phone]      GENERAL INSTRUCTIONS:    [x] Antibacterial Soap Shower Night before and/or AM of surgery. [] CHG Wipes instruction sheet and wipes given. [] Hibiclens shower the night before and the morning of surgery.   -Do not use Hibiclens on your face or head. [x] Do not wear makeup, lotions, powders, deodorant. [x] Nothing by mouth after midnight; including gum, candy, mints, or water. [x] You may brush your teeth, gargle, but do NOT swallow water. [x] No tobacco products, illegal drugs, or alcohol within 24 hours of your surgery. [x] Jewelry or valuables should not be brought to the hospital. All body and/or tongue piercing's must be removed prior to arriving to hospital. No contact lens or hair pins. [x] Arrange transportation with a responsible adult  to and from the hospital. Arrange for someone to be with you for the remainder of the day and for 24 hours after your procedure due to having had anesthesia. -Who will be your  for transportation?_______wife___________         -Who will be staying with you for 24 hrs after your procedure? ___wife_______________  [x] Bring insurance card and photo ID.  [] Bring copy of living will or healthcare power of  papers to be placed in your electronic record. [] Urine Pregnancy test will be preformed the day of surgery. A specimen sample may be brought from home. [] Transfusion Hailey Constantino) Bracelet: Please bring with you to hospital, day of surgery. PARKING INSTRUCTIONS:   [x] ARRIVAL DATE & TIME: 9/13 at [address] [place] [address]  [x] Times are subject to change. We will contact you the business day before surgery if that were to occur. [x] Enter into the [employer]. Two people may accompany you. Masks are not required. [x] Parking Lot \"I\" is where you will park. It is located on the corner of [address] [place] [address] and [employer].

## 2023-09-13 ENCOUNTER — HOSPITAL ENCOUNTER (OUTPATIENT)
Age: 68
Setting detail: OUTPATIENT SURGERY
Discharge: HOME | End: 2023-09-13
Attending: INTERNAL MEDICINE | Admitting: INTERNAL MEDICINE
Payer: MEDICARE

## 2023-09-13 ENCOUNTER — ANESTHESIA (OUTPATIENT)
Dept: ENDOSCOPY | Age: 68
End: 2023-09-13
Payer: MEDICARE

## 2023-09-13 ENCOUNTER — ANESTHESIA EVENT (OUTPATIENT)
Dept: ENDOSCOPY | Age: 68
End: 2023-09-13
Payer: MEDICARE

## 2023-09-13 VITALS
OXYGEN SATURATION: 99 % | HEIGHT: 71 IN | BODY MASS INDEX: 25.76 KG/M2 | DIASTOLIC BLOOD PRESSURE: 75 MMHG | HEART RATE: 65 BPM | RESPIRATION RATE: 18 BRPM | SYSTOLIC BLOOD PRESSURE: 103 MMHG | WEIGHT: 184 LBS | TEMPERATURE: 97.2 F

## 2023-09-13 DIAGNOSIS — Z01.812 PRE-OPERATIVE LABORATORY EXAMINATION: Primary | ICD-10-CM

## 2023-09-13 PROBLEM — K22.2 ESOPHAGEAL STRICTURE: Status: ACTIVE | Noted: 2023-09-13

## 2023-09-13 LAB — GLUCOSE BLD-MCNC: 103 MG/DL (ref 74–99)

## 2023-09-13 PROCEDURE — 2709999900 HC NON-CHARGEABLE SUPPLY: Performed by: INTERNAL MEDICINE

## 2023-09-13 PROCEDURE — C1769 GUIDE WIRE: HCPCS | Performed by: INTERNAL MEDICINE

## 2023-09-13 PROCEDURE — 3700000000 HC ANESTHESIA ATTENDED CARE: Performed by: INTERNAL MEDICINE

## 2023-09-13 PROCEDURE — 6360000002 HC RX W HCPCS: Performed by: NURSE ANESTHETIST, CERTIFIED REGISTERED

## 2023-09-13 PROCEDURE — 3609017100 HC EGD: Performed by: INTERNAL MEDICINE

## 2023-09-13 PROCEDURE — 7100000011 HC PHASE II RECOVERY - ADDTL 15 MIN: Performed by: INTERNAL MEDICINE

## 2023-09-13 PROCEDURE — 2580000003 HC RX 258: Performed by: NURSE ANESTHETIST, CERTIFIED REGISTERED

## 2023-09-13 PROCEDURE — 3700000001 HC ADD 15 MINUTES (ANESTHESIA): Performed by: INTERNAL MEDICINE

## 2023-09-13 PROCEDURE — 82962 GLUCOSE BLOOD TEST: CPT

## 2023-09-13 PROCEDURE — 7100000010 HC PHASE II RECOVERY - FIRST 15 MIN: Performed by: INTERNAL MEDICINE

## 2023-09-13 RX ORDER — SODIUM CHLORIDE 0.9 % (FLUSH) 0.9 %
5-40 SYRINGE (ML) INJECTION EVERY 12 HOURS SCHEDULED
Status: DISCONTINUED | OUTPATIENT
Start: 2023-09-13 | End: 2023-09-13 | Stop reason: HOSPADM

## 2023-09-13 RX ORDER — PROPOFOL 10 MG/ML
INJECTION, EMULSION INTRAVENOUS PRN
Status: DISCONTINUED | OUTPATIENT
Start: 2023-09-13 | End: 2023-09-13 | Stop reason: SDUPTHER

## 2023-09-13 RX ORDER — LIDOCAINE HYDROCHLORIDE 20 MG/ML
INJECTION, SOLUTION INTRAVENOUS PRN
Status: DISCONTINUED | OUTPATIENT
Start: 2023-09-13 | End: 2023-09-13 | Stop reason: SDUPTHER

## 2023-09-13 RX ORDER — SODIUM CHLORIDE 9 MG/ML
INJECTION, SOLUTION INTRAVENOUS CONTINUOUS PRN
Status: DISCONTINUED | OUTPATIENT
Start: 2023-09-13 | End: 2023-09-13 | Stop reason: SDUPTHER

## 2023-09-13 RX ORDER — PHENYLEPHRINE HCL IN 0.9% NACL 1 MG/10 ML
SYRINGE (ML) INTRAVENOUS PRN
Status: DISCONTINUED | OUTPATIENT
Start: 2023-09-13 | End: 2023-09-13 | Stop reason: SDUPTHER

## 2023-09-13 RX ORDER — SODIUM CHLORIDE 9 MG/ML
25 INJECTION, SOLUTION INTRAVENOUS PRN
Status: DISCONTINUED | OUTPATIENT
Start: 2023-09-13 | End: 2023-09-13 | Stop reason: HOSPADM

## 2023-09-13 RX ORDER — SODIUM CHLORIDE 0.9 % (FLUSH) 0.9 %
5-40 SYRINGE (ML) INJECTION PRN
Status: DISCONTINUED | OUTPATIENT
Start: 2023-09-13 | End: 2023-09-13 | Stop reason: HOSPADM

## 2023-09-13 RX ADMIN — Medication 100 MCG: at 08:21

## 2023-09-13 RX ADMIN — PROPOFOL 30 MG: 10 INJECTION, EMULSION INTRAVENOUS at 08:18

## 2023-09-13 RX ADMIN — PROPOFOL 50 MG: 10 INJECTION, EMULSION INTRAVENOUS at 08:05

## 2023-09-13 RX ADMIN — PROPOFOL 100 MG: 10 INJECTION, EMULSION INTRAVENOUS at 08:01

## 2023-09-13 RX ADMIN — SODIUM CHLORIDE: 9 INJECTION, SOLUTION INTRAVENOUS at 07:31

## 2023-09-13 RX ADMIN — Medication 100 MCG: at 08:04

## 2023-09-13 RX ADMIN — PROPOFOL 20 MG: 10 INJECTION, EMULSION INTRAVENOUS at 08:12

## 2023-09-13 RX ADMIN — Medication 100 MCG: at 08:10

## 2023-09-13 RX ADMIN — LIDOCAINE HYDROCHLORIDE 40 MG: 20 INJECTION, SOLUTION INTRAVENOUS at 08:01

## 2023-09-13 ASSESSMENT — PAIN SCALES - GENERAL
PAINLEVEL_OUTOF10: 0
PAINLEVEL_OUTOF10: 0

## 2023-09-13 ASSESSMENT — ENCOUNTER SYMPTOMS: SHORTNESS OF BREATH: 1

## 2023-09-13 ASSESSMENT — PAIN - FUNCTIONAL ASSESSMENT: PAIN_FUNCTIONAL_ASSESSMENT: 0-10

## 2023-09-13 NOTE — ANESTHESIA POSTPROCEDURE EVALUATION
Department of Anesthesiology  Postprocedure Note    Patient: Anthony Mccarty MRN: [id number]  YOB: 1955  Date of evaluation: 9/13/2023      Procedure Summary     Date: 09/13/23 Room / Location: Stephanie LUCAS 03 / [facility]    Anesthesia Start: 9885 Anesthesia Stop: 7592    Procedure: EGD ESOPHAGOGASTRODUODENOSCOPY/ POSSIBLE DILATATION Diagnosis:       Deglutition disorder      (Deglutition disorder [R13.10])    Surgeons: Demetra Ruggiero DO Responsible Provider: Everette Nielson MD    Anesthesia Type: MAC ASA Status: 4          Anesthesia Type: No value filed.     Radames Phase I: Radames Score: 9    Radames Phase II:        Anesthesia Post Evaluation    Patient location during evaluation: PACU  Patient participation: complete - patient participated  Level of consciousness: awake and alert  Airway patency: patent  Nausea & Vomiting: no nausea and no vomiting  Complications: no  Cardiovascular status: blood pressure returned to baseline and hemodynamically stable  Respiratory status: acceptable and spontaneous ventilation  Hydration status: euvolemic  Multimodal analgesia pain management approach  Pain management: adequate

## 2023-09-13 NOTE — PROGRESS NOTES
Discharge instructions given with family at the bedside. Pt and family verbalized understanding of discharge instructions.  Wife signed patients discharge instructions

## 2023-09-13 NOTE — DISCHARGE INSTRUCTIONS
Upper GI Endoscopy: What to Expect at [address] [place] [address] [place] had an upper GI endoscopy. Your doctor used a thin, lighted tube that bends to look at the inside of your esophagus, your stomach, and the first part of the small intestine, called the duodenum. After you have an endoscopy, you will stay at the hospital or clinic for 1 to 2 hours. This will allow the medicine to wear off. You will be able to go home after your doctor or nurse checks to make sure that you're not having any problems. You may have to stay overnight if you had treatment during the test. You may have a sore throat for a day or two after the test.  This care sheet gives you a general idea about what to expect after the test.  How can you care for yourself at home? Activity   Rest as much as you need to after you go home. You should be able to go back to your usual activities the day after the test.  Diet   Follow your doctor's directions for eating after the test.  Drink plenty of fluids (unless your doctor has told you not to). Medications   If you have a sore throat the day after the test, use an over-the-counter spray to numb your throat. Follow-up care is a key part of your treatment and safety. Be sure to make and go to all appointments, and call your doctor if you are having problems. It's also a good idea to know your test results and keep a list of the medicines you take. When should you call for help? Call 911 anytime you think you may need emergency care. For example, call if:    You passed out (lost consciousness). You have trouble breathing. You pass maroon or bloody stools. Call your doctor now or seek immediate medical care if:    You have pain that does not get better after your take pain medicine. You have new or worse belly pain. You have blood in your stools. You are sick to your stomach and cannot keep fluids down. You have a fever. You cannot pass stools or gas.    Watch closely

## 2023-09-13 NOTE — PROCEDURES
Procedure:    Esophagogastroduodenoscopy    Indication:    Dysphagia    Consent:   Informed consent was obtained from the patient including and not limited to risk of perforation, aspiration of gastric contents or teeth, bleeding, infection, dental breakage, ileus, need for surgery, or worst case death. Sedation  Endoscope was advanced easily through mouth to second portion of duodenum    Oropharynx:    views are limited but grossly normal.    Esophagus:   UES at ~20-22 cm with ~2cm posterior clean based ulcer. Stricturing noted in the area likely secondary to trach and ulceration/inflammation. I was unable to pass the adult gastroscope and thus an XP was exchanged and we were able to traverse. No appreciable nodularity to the area. Anterior extrinsic compression was appreciated, likely from trach. Sequential Savary dilation over an 035 biliary wire. Dilation starting with 8mm up to 10mm, small rent noted post dilation on repeat direct inspection in the non ulcerated area. GEJ was ~40cm  Small hiatial hernia  Esophagus was with whitish appearing plaques suspicious for candida, unable to bx or brush secondary to XP scope      Stomach:   Antrum is with mild gastritis    Gastric body is normal.    Retroflexed views showed normal fundus and cardia, with exception of HH    Duodenum: Bulb is normal.     Second portion of duodenum is normal.  No fresh of old blood. Biopsies taken to r/o celiac    EBL - minimal, and self limited    IMPRESSION AND PLAN:     1. Posterior UES ulceration and anterior extrinsic compression from trach, with stenosed area - Savary dilation up to 10mm    2. Possible esophageal candidiasis, unable to brush or bx given UES stricturing and required use of XP scope. 3. Small hiatial hernia    4.  Mild antral gastritis    Recommendations:  -Tobacco cessation!!!  -Repeat EGD with dilation in 4-6wks at [facility].  -Pt to follow-up with ENT/pulm for discussion to potential down sizing of

## 2023-09-13 NOTE — ANESTHESIA PRE PROCEDURE
Department of Anesthesiology  Preprocedure Note       Name:  Luiz Robertson. Age:  76 y.o.  :  1955                                          MRN:  [id number]         Date:  2023      Surgeon: Harleen Philip):  Mookie Schmidt DO    Procedure: EGD ESOPHAGOGASTRODUODENOSCOPY/ POSSIBLE DILATATION    Medications prior to admission:   Prior to Admission medications    Medication Sig Start Date End Date Taking? Authorizing Provider   esomeprazole (NEXIUM) 40 MG delayed release capsule Take 1 capsule by mouth every morning (before breakfast) 23   Sylvia Quintana DO   oxyCODONE (ROXICODONE) 5 MG immediate release tablet Take 1 tablet by mouth every 6 hours as needed for Pain (hold for sedation) for up to 30 days. Max Daily Amount: 4 tablets 23  BG Vidal CNP   budesonide (PULMICORT) 0.5 MG/2ML nebulizer suspension inhale contents of 1 vial ( 2 milliliters ) in nebulizer twice a day 23   Yumiko Arora MD   albuterol (ACCUNEB) 0.63 MG/3ML nebulizer solution Take 3 mLs by nebulization every 6 hours as needed for Shortness of Breath 23   Yumiko Arora MD   formoterol (PERFOROMIST) 20 MCG/2ML nebulizer solution Take 2 mLs by nebulization in the morning and 2 mLs in the evening. 23   Yumiko Arora MD   Revefenacin (YUPELRI) 175 MCG/3ML SOLN Inhale 3 mLs into the lungs daily 5/15/23   Yumiko Arora MD   levothyroxine (SYNTHROID) 100 MCG tablet Take 1 tablet by mouth daily 23   Sylvia Quintana DO   midodrine (PROAMATINE) 5 MG tablet Take 1 tablet by mouth 2 times daily 4/3/23   Historical Provider, MD   pregabalin (LYRICA) 150 MG capsule Take 1 capsule by mouth 3 times daily for 180 days.  4/17/23 10/14/23  BG Vidal CNP   senna (SENOKOT) 8.6 MG tablet Take 2 tablets by mouth 2 times daily as needed for Constipation 23   BG Vidal CNP   DULoxetine (CYMBALTA) 30 MG extended release capsule Take 1 capsule by mouth 2 times

## 2023-09-18 ENCOUNTER — OFFICE VISIT (OUTPATIENT)
Dept: PAIN MANAGEMENT | Age: 68
End: 2023-09-18
Payer: MEDICARE

## 2023-09-18 ENCOUNTER — PREP FOR PROCEDURE (OUTPATIENT)
Dept: PAIN MANAGEMENT | Age: 68
End: 2023-09-18

## 2023-09-18 VITALS
OXYGEN SATURATION: 96 % | RESPIRATION RATE: 16 BRPM | SYSTOLIC BLOOD PRESSURE: 109 MMHG | TEMPERATURE: 98.4 F | BODY MASS INDEX: 25.76 KG/M2 | DIASTOLIC BLOOD PRESSURE: 62 MMHG | HEART RATE: 87 BPM | HEIGHT: 71 IN | WEIGHT: 184 LBS

## 2023-09-18 DIAGNOSIS — M51.36 DDD (DEGENERATIVE DISC DISEASE), LUMBAR: ICD-10-CM

## 2023-09-18 DIAGNOSIS — M53.3 SACROILIAC DYSFUNCTION: Primary | ICD-10-CM

## 2023-09-18 DIAGNOSIS — G89.29 CHRONIC LEFT SHOULDER PAIN: ICD-10-CM

## 2023-09-18 DIAGNOSIS — M25.512 CHRONIC LEFT SHOULDER PAIN: ICD-10-CM

## 2023-09-18 DIAGNOSIS — C34.90 MALIGNANT NEOPLASM OF LUNG, UNSPECIFIED LATERALITY, UNSPECIFIED PART OF LUNG (HCC): ICD-10-CM

## 2023-09-18 DIAGNOSIS — M48.061 SPINAL STENOSIS OF LUMBAR REGION, UNSPECIFIED WHETHER NEUROGENIC CLAUDICATION PRESENT: ICD-10-CM

## 2023-09-18 DIAGNOSIS — M53.3 DISORDER OF SACRUM: ICD-10-CM

## 2023-09-18 DIAGNOSIS — M19.012 OSTEOARTHRITIS OF LEFT SHOULDER, UNSPECIFIED OSTEOARTHRITIS TYPE: ICD-10-CM

## 2023-09-18 DIAGNOSIS — M53.3 SACROILIAC DYSFUNCTION: ICD-10-CM

## 2023-09-18 DIAGNOSIS — F11.90 CHRONIC, CONTINUOUS USE OF OPIOIDS: ICD-10-CM

## 2023-09-18 DIAGNOSIS — M47.817 LUMBOSACRAL SPONDYLOSIS WITHOUT MYELOPATHY: Primary | ICD-10-CM

## 2023-09-18 PROCEDURE — 99214 OFFICE O/P EST MOD 30 MIN: CPT | Performed by: ANESTHESIOLOGY

## 2023-09-18 PROCEDURE — 1123F ACP DISCUSS/DSCN MKR DOCD: CPT | Performed by: ANESTHESIOLOGY

## 2023-09-18 PROCEDURE — 20610 DRAIN/INJ JOINT/BURSA W/O US: CPT | Performed by: ANESTHESIOLOGY

## 2023-09-18 PROCEDURE — 99213 OFFICE O/P EST LOW 20 MIN: CPT | Performed by: ANESTHESIOLOGY

## 2023-09-18 RX ORDER — BUPIVACAINE HYDROCHLORIDE 2.5 MG/ML
3 INJECTION, SOLUTION INFILTRATION; PERINEURAL ONCE
Status: COMPLETED | OUTPATIENT
Start: 2023-09-18 | End: 2023-09-18

## 2023-09-18 RX ORDER — SODIUM CHLORIDE 0.9 % (FLUSH) 0.9 %
5-40 SYRINGE (ML) INJECTION EVERY 12 HOURS SCHEDULED
Status: CANCELLED | OUTPATIENT
Start: 2023-09-18

## 2023-09-18 RX ORDER — METHYLPREDNISOLONE ACETATE 40 MG/ML
40 INJECTION, SUSPENSION INTRA-ARTICULAR; INTRALESIONAL; INTRAMUSCULAR; SOFT TISSUE ONCE
Status: COMPLETED | OUTPATIENT
Start: 2023-09-18 | End: 2023-09-18

## 2023-09-18 RX ORDER — SODIUM CHLORIDE 9 MG/ML
INJECTION, SOLUTION INTRAVENOUS PRN
Status: CANCELLED | OUTPATIENT
Start: 2023-09-18

## 2023-09-18 RX ORDER — SODIUM CHLORIDE 0.9 % (FLUSH) 0.9 %
5-40 SYRINGE (ML) INJECTION PRN
Status: CANCELLED | OUTPATIENT
Start: 2023-09-18

## 2023-09-18 RX ADMIN — METHYLPREDNISOLONE ACETATE 40 MG: 40 INJECTION, SUSPENSION INTRA-ARTICULAR; INTRALESIONAL; INTRAMUSCULAR; INTRASYNOVIAL; SOFT TISSUE at 15:59

## 2023-09-18 RX ADMIN — BUPIVACAINE HYDROCHLORIDE 3 ML: 2.5 INJECTION, SOLUTION INFILTRATION; PERINEURAL at 15:58

## 2023-09-18 NOTE — PROGRESS NOTES
[facility]   [place], [address]  Dept: [phone]      Follow up Note      Janae Elda. Date of Visit:  2023    CC:  Patient presents for follow up   Chief Complaint   Patient presents with    Follow-up    Lower Back Pain       HPI:  Chronic low back pain. H/o Ca lung and Throat ca. S/p RT/ chemo- Has a tracheostomy. Has been treated by oncology. Under palliative medicine care for neuropathic pain and managed by medications including opioids. He has reduced the dose of pain meds. On anticoagulation for h/o DVT    Continues HEP. Nursing notes and details of the pain history reviewed. Please see intake notes for details. Prior interventions had helped significantly. Previous treatments:   Physical Therapy : yes, HEP- as tolerated       Medications: - NSAID's : yes                        - Membrane stabilizers : yes                        - Opioids : yes                       - Adjuvants or Others : yes     Surgeries: no LS spine surgery. H/o prior Cervical fusion noted. He has been on anticoagulation medications - Eliquis. He has not been on herbal supplements. He is not diabetic. H/O Smoking: +  H/O alcohol abuse : no  H/O Illicit drug use : no     Imaging:     X-ray left shoulder: 12/10/2021:  Impression   1. Minimal degenerative changes of the [facility] joint       MRI of LS spine on 2020 reviewed:  Impression         1. Severe central canal stenosis and moderate bilateral neural    foraminal narrowing at L3-4 and L4-5.         2. Grade 1 anterolisthesis of L3 on L4 and L4 on L5.         3. Stable distal abdominal aortic aneurysm measuring 3 cm in diameter. X-ray of the sacroiliac joints done on 2020: Impression    Mild-to-moderate degenerative changes involving bilateral sacroiliac    joints.        VL LE segmental PP2020:  arrative   Normal ankle arm index with good arterial flow to both feet including   the toes

## 2023-09-18 NOTE — H&P (VIEW-ONLY)
[address] [address] [facility]   [place], [address] [place] [address]  Dept: [phone]      Follow up Note      Mayur Mcfarlane. Date of Visit:  2023    CC:  Patient presents for follow up   Chief Complaint   Patient presents with    Follow-up    Lower Back Pain       HPI:  Chronic low back pain. H/o Ca lung and Throat ca. S/p RT/ chemo- Has a tracheostomy. Has been treated by oncology. Under palliative medicine care for neuropathic pain and managed by medications including opioids. He has reduced the dose of pain meds. On anticoagulation for h/o DVT    Continues HEP. Nursing notes and details of the pain history reviewed. Please see intake notes for details. Prior interventions had helped significantly. Previous treatments:   Physical Therapy : yes, HEP- as tolerated       Medications: - NSAID's : yes                        - Membrane stabilizers : yes                        - Opioids : yes                       - Adjuvants or Others : yes     Surgeries: no LS spine surgery. H/o prior Cervical fusion noted. He has been on anticoagulation medications - Eliquis. He has not been on herbal supplements. He is not diabetic. H/O Smoking: +  H/O alcohol abuse : no  H/O Illicit drug use : no     Imaging:     X-ray left shoulder: 12/10/2021:  Impression   1. Minimal degenerative changes of the [facility] joint       MRI of LS spine on 2020 reviewed:  Impression         1. Severe central canal stenosis and moderate bilateral neural    foraminal narrowing at L3-4 and L4-5.         2. Grade 1 anterolisthesis of L3 on L4 and L4 on L5.         3. Stable distal abdominal aortic aneurysm measuring 3 cm in diameter. X-ray of the sacroiliac joints done on 2020: Impression    Mild-to-moderate degenerative changes involving bilateral sacroiliac    joints.        VL LE segmental PP2020:  arrative   Normal ankle arm index with good arterial flow to both feet including   the toes

## 2023-09-18 NOTE — PROGRESS NOTES
Herman Saavedra. presents to the [facility] on 9/18/2023. Faraz Luna is complaining of pain lower back. The pain is intermittent. The pain is described as aching and stabbing. Pain is rated on his best day at a 3, on his worst day at a 9, and on average at a 6 on the VAS scale. He took his last dose of Lyrica and oxycodone today. Any procedures since your last visit: No  Pacemaker or defibrillator: No     He is not on NSAIDS and is  on anticoagulation medications to include Eliquis and is managed by Sylvia Quintana DO  .     Medication Contract and Consent for Opioid Use Documents Filed       Patient Documents       Type of Document Status Date Received Received By Description    Medication Contract Received 3/24/2021  7:56 AM Nery Hernández 3-24-21 Palliative                    There were no vitals taken for this visit. No LMP for male patient.

## 2023-09-21 DIAGNOSIS — G89.3 CHRONIC PAIN DUE TO NEOPLASM: ICD-10-CM

## 2023-09-21 RX ORDER — OXYCODONE HYDROCHLORIDE 5 MG/1
5 TABLET ORAL EVERY 6 HOURS PRN
Qty: 120 TABLET | Refills: 0 | Status: SHIPPED | OUTPATIENT
Start: 2023-09-21 | End: 2023-10-21

## 2023-09-21 NOTE — TELEPHONE ENCOUNTER
Call from [address] [place] [address] requesting refill for Oxy IR 5 mg for her  [place]. [facility]. Next maria r 10/2/23.

## 2023-09-26 PROBLEM — M53.3 DISORDER OF SACRUM: Status: ACTIVE | Noted: 2023-09-18

## 2023-10-02 ENCOUNTER — OFFICE VISIT (OUTPATIENT)
Dept: PALLATIVE CARE | Age: 68
End: 2023-10-02
Payer: MEDICARE

## 2023-10-02 VITALS
SYSTOLIC BLOOD PRESSURE: 123 MMHG | OXYGEN SATURATION: 94 % | BODY MASS INDEX: 25.4 KG/M2 | HEART RATE: 80 BPM | DIASTOLIC BLOOD PRESSURE: 76 MMHG | TEMPERATURE: 97.9 F | WEIGHT: 182.1 LBS

## 2023-10-02 DIAGNOSIS — Z51.5 PALLIATIVE CARE BY SPECIALIST: Primary | ICD-10-CM

## 2023-10-02 DIAGNOSIS — G89.3 CHRONIC PAIN DUE TO NEOPLASM: ICD-10-CM

## 2023-10-02 PROCEDURE — 99211 OFF/OP EST MAY X REQ PHY/QHP: CPT | Performed by: NURSE PRACTITIONER

## 2023-10-02 PROCEDURE — 99213 OFFICE O/P EST LOW 20 MIN: CPT | Performed by: NURSE PRACTITIONER

## 2023-10-02 PROCEDURE — 1123F ACP DISCUSS/DSCN MKR DOCD: CPT | Performed by: NURSE PRACTITIONER

## 2023-10-02 RX ORDER — PANTOPRAZOLE SODIUM 20 MG/1
20 TABLET, DELAYED RELEASE ORAL 2 TIMES DAILY
COMMUNITY

## 2023-10-02 RX ORDER — DULOXETIN HYDROCHLORIDE 30 MG/1
30 CAPSULE, DELAYED RELEASE ORAL 2 TIMES DAILY
Qty: 180 CAPSULE | Refills: 1 | Status: SHIPPED | OUTPATIENT
Start: 2023-10-02

## 2023-10-02 NOTE — PROGRESS NOTES
Alert, appears stated age, well nourished, in no acute distress  HEENT:  Speaks by holding trach  Neck:  Supple  Lungs:  CTA bilaterally, no audible rhonchi or wheezes noted  Heart[de-identified]  RRR, no murmur, rub, or gallop noted during exam  Abd:  Soft, non tender, non distended, BS+  M/S/Ext:  Moving all extremities, no edema, pulses present  Skin:  Warm and dry  Neuro:   Alert, oriented x 3; following commands      Lilly Symptom Assessment Score       10/2/2023     1:00 PM 7/10/2023     6:00 PM 4/17/2023     3:00 PM 1/23/2023     3:00 PM 9/20/2022     4:00 PM   Lilly Score   Pain Score 6 7 5 5 4   Tiredness Score Not tired 1 1 Worst possible tiredness Not tired   Nausea Score Not nauseated Not nauseated Not nauseated Not nauseated Not nauseated   Depression Score 3 1 2 Worst possible depression 2   Anxiety Score 3 1 3 2 2   Drowsiness Score Not drowsy Not drowsy 1 Worst possible drowsiness Not drowsy   Appetite Score 8 2 Best appetite Best appetite 1   Wellbeing Score 2 1 2 1 2   Dyspnea Score 3 4 5 5 4   Other Problem Score 4 4 6 5 3   Total Assessment Score(calculated) 29 21 25 48 18       Assessed by: patient. Current Medications:  Medications reviewed: yes    Controlled Substances Monitoring: [place] reviewed 10/2/23. RX Monitoring Periodic Controlled Substance Monitoring   10/2/2023   1:52 PM Possible medication side effects, risk of tolerance/dependence & alternative treatments discussed. ;No signs of potential drug abuse or diversion identified. ;Assessed functional status. ;Obtaining appropriate analgesic effect of treatment. BG Becerril - CNP   Palliative Care Department     Time/Communication  Greater than 50% of time spent, total 25 minutes in face-to-face counseling and coordination of care regarding symptom management.

## 2023-10-03 NOTE — PROGRESS NOTES
[address] [facility] PAIN MANAGEMENT  INSTRUCTIONS  . .......................................................................................................................................... [x] Parking the day of Surgery is located in the [facility].   Upon entering the door, make immediate right into the surgery reception room    [x]  Bring photo ID and insurance card     [x] You may have a light breakfast day of procedure    [x]  Wear loose comfortable clothing    [x]  Please follow instructions for medications as given per Dr's office    [x] You can expect a call the business day prior to procedure to notify you of your arrival time     [x] Please arrange for     []  Other instructions

## 2023-10-05 ENCOUNTER — HOSPITAL ENCOUNTER (OUTPATIENT)
Age: 68
Setting detail: OUTPATIENT SURGERY
Discharge: HOME | End: 2023-10-05
Attending: ANESTHESIOLOGY | Admitting: ANESTHESIOLOGY
Payer: MEDICARE

## 2023-10-05 ENCOUNTER — HOSPITAL ENCOUNTER (OUTPATIENT)
Dept: GENERAL RADIOLOGY | Age: 68
Setting detail: OUTPATIENT SURGERY
Discharge: HOME | End: 2023-10-07
Attending: ANESTHESIOLOGY
Payer: MEDICARE

## 2023-10-05 VITALS
BODY MASS INDEX: 25.34 KG/M2 | HEIGHT: 71 IN | WEIGHT: 181 LBS | OXYGEN SATURATION: 94 % | RESPIRATION RATE: 18 BRPM | SYSTOLIC BLOOD PRESSURE: 116 MMHG | DIASTOLIC BLOOD PRESSURE: 71 MMHG | HEART RATE: 63 BPM | TEMPERATURE: 97.8 F

## 2023-10-05 DIAGNOSIS — R52 PAIN MANAGEMENT: ICD-10-CM

## 2023-10-05 PROCEDURE — 3600000002 HC SURGERY LEVEL 2 BASE: Performed by: ANESTHESIOLOGY

## 2023-10-05 PROCEDURE — 7100000011 HC PHASE II RECOVERY - ADDTL 15 MIN: Performed by: ANESTHESIOLOGY

## 2023-10-05 PROCEDURE — 7100000010 HC PHASE II RECOVERY - FIRST 15 MIN: Performed by: ANESTHESIOLOGY

## 2023-10-05 PROCEDURE — 6360000004 HC RX CONTRAST MEDICATION: Performed by: ANESTHESIOLOGY

## 2023-10-05 PROCEDURE — 6360000002 HC RX W HCPCS: Performed by: ANESTHESIOLOGY

## 2023-10-05 PROCEDURE — 27096 INJECT SACROILIAC JOINT: CPT | Performed by: ANESTHESIOLOGY

## 2023-10-05 PROCEDURE — 2709999900 HC NON-CHARGEABLE SUPPLY: Performed by: ANESTHESIOLOGY

## 2023-10-05 PROCEDURE — 2500000003 HC RX 250 WO HCPCS: Performed by: ANESTHESIOLOGY

## 2023-10-05 RX ORDER — LIDOCAINE HYDROCHLORIDE 5 MG/ML
INJECTION, SOLUTION INFILTRATION; INTRAVENOUS PRN
Status: DISCONTINUED | OUTPATIENT
Start: 2023-10-05 | End: 2023-10-05 | Stop reason: ALTCHOICE

## 2023-10-05 RX ORDER — METHYLPREDNISOLONE ACETATE 40 MG/ML
INJECTION, SUSPENSION INTRA-ARTICULAR; INTRALESIONAL; INTRAMUSCULAR; SOFT TISSUE PRN
Status: DISCONTINUED | OUTPATIENT
Start: 2023-10-05 | End: 2023-10-05 | Stop reason: ALTCHOICE

## 2023-10-05 RX ORDER — SODIUM CHLORIDE 9 MG/ML
INJECTION, SOLUTION INTRAVENOUS PRN
Status: DISCONTINUED | OUTPATIENT
Start: 2023-10-05 | End: 2023-10-05 | Stop reason: HOSPADM

## 2023-10-05 RX ORDER — SODIUM CHLORIDE 0.9 % (FLUSH) 0.9 %
5-40 SYRINGE (ML) INJECTION PRN
Status: DISCONTINUED | OUTPATIENT
Start: 2023-10-05 | End: 2023-10-05 | Stop reason: HOSPADM

## 2023-10-05 RX ORDER — DULOXETIN HYDROCHLORIDE 30 MG/1
30 CAPSULE, DELAYED RELEASE ORAL 2 TIMES DAILY
Qty: 180 CAPSULE | Refills: 1 | OUTPATIENT
Start: 2023-10-05

## 2023-10-05 RX ORDER — BUPIVACAINE HYDROCHLORIDE 2.5 MG/ML
INJECTION, SOLUTION EPIDURAL; INFILTRATION; INTRACAUDAL PRN
Status: DISCONTINUED | OUTPATIENT
Start: 2023-10-05 | End: 2023-10-05 | Stop reason: ALTCHOICE

## 2023-10-05 RX ORDER — IOPAMIDOL 612 MG/ML
INJECTION, SOLUTION INTRATHECAL PRN
Status: DISCONTINUED | OUTPATIENT
Start: 2023-10-05 | End: 2023-10-05 | Stop reason: ALTCHOICE

## 2023-10-05 RX ORDER — SODIUM CHLORIDE 0.9 % (FLUSH) 0.9 %
5-40 SYRINGE (ML) INJECTION EVERY 12 HOURS SCHEDULED
Status: DISCONTINUED | OUTPATIENT
Start: 2023-10-05 | End: 2023-10-05 | Stop reason: HOSPADM

## 2023-10-05 ASSESSMENT — PAIN - FUNCTIONAL ASSESSMENT: PAIN_FUNCTIONAL_ASSESSMENT: 0-10

## 2023-10-05 ASSESSMENT — PAIN DESCRIPTION - DESCRIPTORS: DESCRIPTORS: ACHING

## 2023-10-05 NOTE — INTERVAL H&P NOTE
Update History & Physical    The patient's History and Physical of September 18, 2023 was reviewed with the patient and I examined the patient. There was no change. The surgical site was confirmed by the patient and me. Plan: The risks, benefits, expected outcome, and alternative to the recommended procedure have been discussed with the patient. Patient understands and wants to proceed with the procedure.      Electronically signed by Haven Garrison MD on 10/5/2023

## 2023-10-05 NOTE — DISCHARGE INSTRUCTIONS
[facility] Block/Radiofrequency  Home Going Instructions    1-Go home, rest for the remainder of the day  2-Please do not lift over 20 pounds the day of the injection  3-If you received sedation No: alcohol, driving, operating lawn mowers, plows, tractors or other dangerous equipment until next morning. Do not make important decisions or sign legal documents for 24 hours. You may experience light headedness, dizziness, nausea or sleepiness after sedation. Do not stay alone. A responsible adult must be with you for 24 hours. You could be nauseated from the medications you have received. Your IV site may be sore and bruised. 4-No dietary restrictions     5-Resume all medications the same day, blood thinners to be resumed 24 hours after injection if you were instructed to stop any. 6-Keep the surgical site clean and dry, you may shower the next morning and remove the      dressing. 7- No sitz baths, tub baths or hot tubs/swimming for 24 hours. 8- If you have any pain at the injection site(s), application of an ice pack to the area should be       helpful, 20 minutes on/20 minutes off for next 48 hours. 9- Call [facility] immediately at if you develop.   Fever greater than 100.4 F  Have bleeding or drainage from the puncture site  Have progressive Leg/arm numbness and or weakness  Loss of control of bowel and or bladder (wet/soil yourself)  Severe headache with inability to lift head  10-You may return to work the next day

## 2023-10-26 DIAGNOSIS — Z51.5 PALLIATIVE CARE BY SPECIALIST: ICD-10-CM

## 2023-10-26 DIAGNOSIS — G89.3 CHRONIC PAIN DUE TO NEOPLASM: ICD-10-CM

## 2023-10-26 RX ORDER — OXYCODONE HYDROCHLORIDE 5 MG/1
5 TABLET ORAL EVERY 6 HOURS PRN
Qty: 120 TABLET | Refills: 0 | Status: SHIPPED | OUTPATIENT
Start: 2023-10-26 | End: 2023-11-25

## 2023-10-26 RX ORDER — PREGABALIN 150 MG/1
150 CAPSULE ORAL 3 TIMES DAILY
Qty: 270 CAPSULE | Refills: 1 | Status: SHIPPED | OUTPATIENT
Start: 2023-10-26 | End: 2024-04-23

## 2023-10-26 NOTE — TELEPHONE ENCOUNTER
Call from [address] [place] [address] requesting refill for Dom's Oxy IR and Lyrica. Pharmacy is [facility]. Next maria r 1/8/24.

## 2023-11-08 ENCOUNTER — OFFICE VISIT (OUTPATIENT)
Dept: ENT CLINIC | Age: 68
End: 2023-11-08
Payer: MEDICARE

## 2023-11-08 VITALS
DIASTOLIC BLOOD PRESSURE: 63 MMHG | HEIGHT: 71 IN | WEIGHT: 183 LBS | SYSTOLIC BLOOD PRESSURE: 102 MMHG | BODY MASS INDEX: 25.62 KG/M2

## 2023-11-08 DIAGNOSIS — R13.12 OROPHARYNGEAL DYSPHAGIA: ICD-10-CM

## 2023-11-08 DIAGNOSIS — Z93.0 TRACHEOSTOMY DEPENDENCE (HCC): Primary | ICD-10-CM

## 2023-11-08 PROCEDURE — 1123F ACP DISCUSS/DSCN MKR DOCD: CPT | Performed by: OTOLARYNGOLOGY

## 2023-11-08 PROCEDURE — 31575 DIAGNOSTIC LARYNGOSCOPY: CPT | Performed by: OTOLARYNGOLOGY

## 2023-11-08 PROCEDURE — 99204 OFFICE O/P NEW MOD 45 MIN: CPT | Performed by: OTOLARYNGOLOGY

## 2023-11-08 ASSESSMENT — ENCOUNTER SYMPTOMS
CHEST TIGHTNESS: 0
APNEA: 0
VOMITING: 0
EYE PAIN: 0
RESPIRATORY NEGATIVE: 1
GASTROINTESTINAL NEGATIVE: 1
COLOR CHANGE: 0
DIARRHEA: 0
EYES NEGATIVE: 1
EYE DISCHARGE: 0
SHORTNESS OF BREATH: 0
ABDOMINAL PAIN: 0

## 2023-11-08 NOTE — PROGRESS NOTES
Subjective:      Patient ID:  Indio Bailey. is a 76 y.o. male. HPI:    Patient presents today for dysphagia . Condition has been present for 1 year(s). Pt was trached by Dr. Phyliss Apley in 2017. 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 The patient has also had lung cancer prior to that and is still receiving chemotherapy every 3 weeks. Pt had revision of the trach in 2020. Pt states the last visit at [facility] with ENT stated that the left cords was paralyzed    Pt has had ulceration of the esophagus due to the trach. Pt is considering his options for trach downsizing. Pt has had esophagus stretched last in August and in December.      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    Paralyzed vocal cords     Thrombosis of testis     Thyroid disease     Tracheostomy in place ([address])     #6 Elsa     Past Surgical History:   Procedure Laterality Date    BRONCHOSCOPY N/A 3/5/2020    BRONCHOSCOPY DIAGNOSTIC OR CELL [address] ONLY performed by Glen Sandhu MD at [address]  2015    HIP SURGERY Left 1/6/2022    RIGHT TROCHANTARIC BURSA INJECTION UNDER FLUOROSCOPY performed by Ester Curtis MD at [facility] ARTHROSCOPY      LUNG BIOPSY Left 5/6/2016    MEDICATION INJECTION Bilateral 10/29/2020    BILATERAL SACROILIAC JOINT INJECTION AND RIGHT TROCHANTERIC BURSA INJECTION UNDER FLUOROSCOPY (CPT 97007,41585,23499)++TRACH-NO VENT++ performed by [facility]

## 2023-11-28 DIAGNOSIS — G89.3 CHRONIC PAIN DUE TO NEOPLASM: ICD-10-CM

## 2023-11-28 RX ORDER — OXYCODONE HYDROCHLORIDE 5 MG/1
5 TABLET ORAL EVERY 6 HOURS PRN
Qty: 120 TABLET | Refills: 0 | Status: SHIPPED | OUTPATIENT
Start: 2023-11-28 | End: 2023-12-28

## 2023-11-28 NOTE — TELEPHONE ENCOUNTER
Call from [address] [place] [address], [place] wife, requesting refill for Oxy IR 5 mg. Pharmacy is [facility].  Next maria r 1/8/24

## 2023-12-07 ENCOUNTER — OFFICE VISIT (OUTPATIENT)
Dept: VASCULAR SURGERY | Age: 68
End: 2023-12-07
Payer: MEDICARE

## 2023-12-07 VITALS — WEIGHT: 174 LBS | BODY MASS INDEX: 24.27 KG/M2

## 2023-12-07 DIAGNOSIS — I71.43 INFRARENAL ABDOMINAL AORTIC ANEURYSM (AAA) WITHOUT RUPTURE (HCC): ICD-10-CM

## 2023-12-07 PROCEDURE — 1123F ACP DISCUSS/DSCN MKR DOCD: CPT | Performed by: SURGERY

## 2023-12-07 PROCEDURE — 99214 OFFICE O/P EST MOD 30 MIN: CPT | Performed by: SURGERY

## 2023-12-07 RX ORDER — ESOMEPRAZOLE MAGNESIUM 40 MG/1
40 GRANULE, DELAYED RELEASE ORAL DAILY
COMMUNITY
End: 2023-12-08

## 2023-12-07 NOTE — PROGRESS NOTES
Amanda Santiago MD at [address]     SINUS SURGERY      TRACHEOSTOMY  05/24/2017    # 6 Shiley (disposable)    TRACHEOSTOMY      UPPER GASTROINTESTINAL ENDOSCOPY N/A 9/13/2023    EGD ESOPHAGOGASTRODUODENOSCOPY/ POSSIBLE DILATATION performed by Ross Hutchinson DO at [facility] History   Problem Relation Age of Onset    Cancer Mother         breast cancer    Cancer Father         prostate cancer    Diabetes Father        Social History     Socioeconomic History    Marital status:      Spouse name: Not on file    Number of children: Not on file    Years of education: Not on file    Highest education level: Not on file   Occupational History    Occupation: [employer]- [employer]     Comment: disability short term   Tobacco Use    Smoking status: Every Day     Packs/day: 0.25     Years: 46.00     Additional pack years: 0.00     Total pack years: 11.50     Types: Cigarettes     Start date: 1972    Smokeless tobacco: Never    Tobacco comments:     Pt quit in 9/26/2018& started back 2/2020 smoking . 5 ppd   Vaping Use    Vaping Use: Never used    Passive vaping exposure: Yes   Substance and Sexual Activity    Alcohol use: Yes     Alcohol/week: 1.0 standard drink of alcohol     Types: 1 Cans of beer per week     Comment: beer sometimes    Drug use: No    Sexual activity: Not Currently   Other Topics Concern    Not on file   Social History Narrative    Works at [employer]. Lives in [place]. Social Determinants of Health     Financial Resource Strain: Unknown (4/25/2023)    Overall Financial Resource Strain (CARDIA)     Difficulty of Paying Living Expenses: Patient refused   Food Insecurity: Not on file (4/25/2023)   Transportation Needs: Unknown (4/25/2023)    PRAPARE - Transportation     Lack of Transportation (Medical):  Not on file     Lack of Transportation (Non-Medical): Patient refused   Physical Activity: Not on file   Stress: Not on file   Social

## 2023-12-08 NOTE — PROGRESS NOTES
[address] [email or web address] PRE-ADMISSION TESTING INSTRUCTIONS    The Preadmission Testing patient is instructed accordingly using the following criteria (check applicable):    ARRIVAL INSTRUCTIONS:  [x] Parking the day of Surgery is located in the Main Entrance lot. Upon entering the door, make an immediate right to the surgery reception desk    [x] Bring photo ID and insurance card    [] Bring in a copy of Living will or Durable Power of  papers. [x] Please be sure to arrange for responsible adult to provide transportation to and from the hospital    [x] Please arrange for responsible adult to be with you for the 24 hour period post procedure due to having anesthesia    [x] If you awake am of surgery not feeling well or have temperature >100 please call [phone]    GENERAL INSTRUCTIONS:    [x] Nothing by mouth after midnight, including gum, candy, mints or water    [x] You may brush your teeth, but do not swallow any water    [x] Take medications as instructed with 1-2 oz of water    [x] Stop herbal supplements and vitamins 5 days prior to procedure    [x] Follow preop dosing of blood thinners per physician instructions    [] Take 1/2 dose of evening insulin, but no insulin after midnight    [] No oral diabetic medications after midnight    [] If diabetic and have low blood sugar or feel symptomatic, take 1-2oz apple juice only    [] Bring inhalers day of surgery    [] Bring C-PAP/ Bi-Pap day of surgery    [] Bring urine specimen day of surgery    [x] Shower or bath with soap, lather and rinse well, AM of Surgery, no lotion, powders or creams to surgical site    [] Follow bowel prep as instructed per surgeon    [x] No tobacco products within 24 hours of surgery     [x] No alcohol or illegal drug use within 24 hours of surgery.     [x] Jewelry, body piercing's, eyeglasses, contact lenses and dentures are not permitted into surgery (bring cases)      [] Please do not wear any nail polish,

## 2023-12-11 ENCOUNTER — ANESTHESIA (OUTPATIENT)
Dept: ENDOSCOPY | Age: 68
End: 2023-12-11
Payer: MEDICARE

## 2023-12-11 ENCOUNTER — HOSPITAL ENCOUNTER (OUTPATIENT)
Age: 68
Setting detail: OUTPATIENT SURGERY
Discharge: HOME | End: 2023-12-11
Attending: INTERNAL MEDICINE | Admitting: INTERNAL MEDICINE
Payer: MEDICARE

## 2023-12-11 ENCOUNTER — ANESTHESIA EVENT (OUTPATIENT)
Dept: ENDOSCOPY | Age: 68
End: 2023-12-11
Payer: MEDICARE

## 2023-12-11 VITALS
SYSTOLIC BLOOD PRESSURE: 137 MMHG | RESPIRATION RATE: 18 BRPM | DIASTOLIC BLOOD PRESSURE: 83 MMHG | WEIGHT: 174 LBS | TEMPERATURE: 97 F | HEART RATE: 58 BPM | HEIGHT: 71 IN | BODY MASS INDEX: 24.36 KG/M2 | OXYGEN SATURATION: 94 %

## 2023-12-11 PROCEDURE — 3700000000 HC ANESTHESIA ATTENDED CARE: Performed by: INTERNAL MEDICINE

## 2023-12-11 PROCEDURE — 3700000001 HC ADD 15 MINUTES (ANESTHESIA): Performed by: INTERNAL MEDICINE

## 2023-12-11 PROCEDURE — C1726 CATH, BAL DIL, NON-VASCULAR: HCPCS | Performed by: INTERNAL MEDICINE

## 2023-12-11 PROCEDURE — 3609012700 HC EGD DILATION SAVORY: Performed by: INTERNAL MEDICINE

## 2023-12-11 PROCEDURE — 7100000010 HC PHASE II RECOVERY - FIRST 15 MIN: Performed by: INTERNAL MEDICINE

## 2023-12-11 PROCEDURE — 7100000011 HC PHASE II RECOVERY - ADDTL 15 MIN: Performed by: INTERNAL MEDICINE

## 2023-12-11 PROCEDURE — C1729 CATH, DRAINAGE: HCPCS | Performed by: INTERNAL MEDICINE

## 2023-12-11 PROCEDURE — 2709999900 HC NON-CHARGEABLE SUPPLY: Performed by: INTERNAL MEDICINE

## 2023-12-11 RX ORDER — SODIUM CHLORIDE 0.9 % (FLUSH) 0.9 %
5-40 SYRINGE (ML) INJECTION PRN
Status: DISCONTINUED | OUTPATIENT
Start: 2023-12-11 | End: 2023-12-11 | Stop reason: HOSPADM

## 2023-12-11 RX ORDER — PROPOFOL 10 MG/ML
INJECTION, EMULSION INTRAVENOUS PRN
Status: DISCONTINUED | OUTPATIENT
Start: 2023-12-11 | End: 2023-12-11 | Stop reason: SDUPTHER

## 2023-12-11 RX ORDER — SODIUM CHLORIDE 0.9 % (FLUSH) 0.9 %
5-40 SYRINGE (ML) INJECTION EVERY 12 HOURS SCHEDULED
Status: DISCONTINUED | OUTPATIENT
Start: 2023-12-11 | End: 2023-12-11 | Stop reason: HOSPADM

## 2023-12-11 RX ORDER — SODIUM CHLORIDE 9 MG/ML
INJECTION, SOLUTION INTRAVENOUS CONTINUOUS PRN
Status: DISCONTINUED | OUTPATIENT
Start: 2023-12-11 | End: 2023-12-11 | Stop reason: SDUPTHER

## 2023-12-11 RX ORDER — SODIUM CHLORIDE 9 MG/ML
25 INJECTION, SOLUTION INTRAVENOUS PRN
Status: DISCONTINUED | OUTPATIENT
Start: 2023-12-11 | End: 2023-12-11 | Stop reason: HOSPADM

## 2023-12-11 RX ADMIN — SODIUM CHLORIDE: 9 INJECTION, SOLUTION INTRAVENOUS at 14:16

## 2023-12-11 RX ADMIN — PROPOFOL 500 MG: 10 INJECTION, EMULSION INTRAVENOUS at 14:24

## 2023-12-11 ASSESSMENT — PAIN SCALES - GENERAL: PAINLEVEL_OUTOF10: 0

## 2023-12-11 ASSESSMENT — ENCOUNTER SYMPTOMS: SHORTNESS OF BREATH: 0

## 2023-12-11 ASSESSMENT — LIFESTYLE VARIABLES: SMOKING_STATUS: 0

## 2023-12-11 NOTE — ANESTHESIA PRE PROCEDURE
Department of Anesthesiology  Preprocedure Note       Name:  Homar Hart. Age:  76 y.o.  :  1955                                          MRN:  [id number]         Date:  2023      Surgeon: Orlando Lara):  Rocio Hernández DO    Procedure: Procedure(s):  EGD WITH DILATION    Medications prior to admission:   Prior to Admission medications    Medication Sig Start Date End Date Taking? Authorizing Provider   oxyCODONE (ROXICODONE) 5 MG immediate release tablet Take 1 tablet by mouth every 6 hours as needed for Pain (hold for sedation) for up to 30 days. Max Daily Amount: 4 tablets 23  BG Kaplan CNP   YUPELRI 175 MCG/3ML SOLN inhale 3 milliliters in nebulizer once daily 23   Jeff Parkinson MD   pregabalin (LYRICA) 150 MG capsule Take 1 capsule by mouth 3 times daily for 180 days. 10/26/23 4/23/24  BG Kaplan CNP   pantoprazole (PROTONIX) 20 MG tablet Take 2 tablets by mouth in the morning and 2 tablets in the evening.     ProviderNataliia MD   DULoxetine (CYMBALTA) 30 MG extended release capsule Take 1 capsule by mouth 2 times daily take 1 capsule by mouth twice a day 10/2/23   BG Kaplan CNP   budesonide (PULMICORT) 0.5 MG/2ML nebulizer suspension inhale contents of 1 vial ( 2 milliliters ) in nebulizer twice a day 23   Eddie Eller MD   albuterol (ACCUNEB) 0.63 MG/3ML nebulizer solution Take 3 mLs by nebulization every 6 hours as needed for Shortness of Breath 23   Eddie Eller MD   formoterol (PERFOROMIST) 20 MCG/2ML nebulizer solution Take 2 mLs by nebulization in the morning and 2 mLs in the evening. 23   Eddie Eller MD   levothyroxine (SYNTHROID) 100 MCG tablet Take 1 tablet by mouth daily 23   Sylvia Quintana DO   midodrine (PROAMATINE) 5 MG tablet Take 1 tablet by mouth 2 times daily 4/3/23   Provider, Historical, MD   senna (SENOKOT) 8.6 MG tablet Take 2 tablets by mouth 2 times 282 05/30/2023 02:38 PM       CMP:   Lab Results   Component Value Date/Time     05/30/2023 02:38 PM    K 4.6 05/30/2023 02:38 PM    K 4.0 09/09/2020 09:09 PM     05/30/2023 02:38 PM    CO2 22 05/30/2023 02:38 PM    BUN 17 05/30/2023 02:38 PM    CREATININE 1.2 05/30/2023 02:38 PM    GFRAA >60 11/18/2020 12:00 PM    LABGLOM >60 05/30/2023 02:38 PM    GLUCOSE 109 05/30/2023 02:38 PM    GLUCOSE 130 02/22/2012 02:14 AM    PROT 6.8 05/30/2023 02:38 PM    CALCIUM 9.3 05/30/2023 02:38 PM    BILITOT 0.7 05/30/2023 02:38 PM    ALKPHOS 77 05/30/2023 02:38 PM    AST 16 05/30/2023 02:38 PM    ALT 21 05/30/2023 02:38 PM       POC Tests: No results for input(s): \"POCGLU\", \"POCNA\", \"POCK\", \"POCCL\", \"POCBUN\", \"POCHEMO\", \"POCHCT\" in the last 72 hours.     Coags:   Lab Results   Component Value Date/Time    PROTIME 15.2 09/09/2020 09:09 PM    INR 1.3 09/09/2020 09:09 PM    APTT 39.2 09/09/2020 09:09 PM       HCG (If Applicable): No results found for: \"PREGTESTUR\", \"PREGSERUM\", \"HCG\", \"HCGQUANT\"     ABGs: No results found for: \"PHART\", \"PO2ART\", \"YFK8YXM\", \"LOM7RQU\", \"BEART\", \"S6ENCVKS\"     Type & Screen (If Applicable):  No results found for: \"LABABO\", \"LABRH\"    Drug/Infectious Status (If Applicable):  No results found for: \"HIV\", \"HEPCAB\"    COVID-19 Screening (If Applicable):   Lab Results   Component Value Date/Time    COVID19 negative 10/19/2021 12:00 AM           Anesthesia Evaluation  Patient summary reviewed and Nursing notes reviewed   no history of anesthetic complications:   Airway: Mallampati: III  TM distance: >3 FB   Neck ROM: full  Mouth opening: > = 3 FB  Tracheostomy present Dental:    (+) upper dentures      Pulmonary: breath sounds clear to auscultation  (+)  COPD:              (-) shortness of breath and not a current smoker                           Cardiovascular:  Exercise tolerance: poor (<4 METS)  (+) hyperlipidemia        Rhythm: regular  Rate: normal           Beta Blocker:  Not on Beta

## 2023-12-11 NOTE — PROCEDURES
Procedure:    Esophagogastroduodenoscopy    Indication:    Dysphagia    Consent:   Informed consent was obtained from the patient including and not limited to risk of perforation, aspiration of gastric contents or teeth, bleeding, infection, dental breakage, ileus, need for surgery, or worst case death. Sedation  Endoscope was advanced easily through mouth to second portion of duodenum    Oropharynx:    views are limited but grossly normal.    Esophagus:   UES at ~20-22 cm with ~2.5cm posterior clean based ulcer, ? Stasis ulcer. Stricturing noted in the area possibly secondary to trach and ulceration/inflammation. I again attempted to pass the adult gastroscope but was unable to pass it thus an XP was exchanged and we were able to traverse. Anterior extrinsic compression was appreciated, potentially from his tracheostomy/cuff. Sequential Savary dilation over an 035 biliary wire. Dilation starting with 8mm up to 10mm, I then swapped out the scope again for the adult gastroscope but was unable to pass it. Sequential dilation with the CRE balloon over the guidewire was performed up to 12mm, hesitant to upsize given the posterior ulceration and risk of perforation. GEJ was ~40cm  Small hiatial hernia    Stomach:   Antrum is with mild gastritis    Gastric body is normal.    Retroflexed views showed normal fundus and cardia, with exception of HH    Duodenum: Bulb is normal.     Second portion of duodenum is normal.  No fresh of old blood. Biopsies taken to r/o celiac    EBL - minimal, and self limited    IMPRESSION AND PLAN:     1. Posterior UES ulceration ? stasis and anterior extrinsic compression from trach, with stenosed area - Savary dilation up to 10mm and then TTS CRE dilation up to 12mm were performed    2. Small hiatial hernia    3. Mild antral gastritis    Recommendations:  -Tobacco continued tobacco cessation.  Patient quite 2 days prior  -Repeat EGD with dilation in ~2wks hopefully with up sizing

## 2023-12-11 NOTE — DISCHARGE INSTRUCTIONS
Upper GI Endoscopy: What to Expect at [address] [place] [address] [place] had an upper GI endoscopy. Your doctor used a thin, lighted tube that bends to look at the inside of your esophagus, your stomach, and the first part of the small intestine, called the duodenum. After you have an endoscopy, you will stay at the hospital or clinic for 1 to 2 hours. This will allow the medicine to wear off. You will be able to go home after your doctor or nurse checks to make sure that you're not having any problems. You may have to stay overnight if you had treatment during the test. You may have a sore throat for a day or two after the test.  This care sheet gives you a general idea about what to expect after the test.  How can you care for yourself at home? Activity   Rest as much as you need to after you go home. You should be able to go back to your usual activities the day after the test.  Diet   Follow your doctor's directions for eating after the test.  Drink plenty of fluids (unless your doctor has told you not to). Medications   If you have a sore throat the day after the test, use an over-the-counter spray to numb your throat. Follow-up care is a key part of your treatment and safety. Be sure to make and go to all appointments, and call your doctor if you are having problems. It's also a good idea to know your test results and keep a list of the medicines you take. When should you call for help? Call 911 anytime you think you may need emergency care. For example, call if:    You passed out (lost consciousness). You have trouble breathing. You pass maroon or bloody stools. Call your doctor now or seek immediate medical care if:    You have pain that does not get better after your take pain medicine. You have new or worse belly pain. You have blood in your stools. You are sick to your stomach and cannot keep fluids down. You have a fever. You cannot pass stools or gas.    Watch closely

## 2023-12-11 NOTE — H&P
mLs by mouth daily Swish & spit qd 5/18/21   Sylvia Quintana DO   apixaban (ELIQUIS) 2.5 MG TABS tablet Take 1 tablet by mouth 2 times daily 12/8/20 9/12/23  Sylvia Quintana DO   pembrolizumab (KEYTRUDA) 100 MG/4ML SOLN Infuse 8 mLs intravenously every 21 days    Nataliia Gomez MD   predniSONE (DELTASONE) 10 MG tablet Take 1 tablet by mouth daily    Nataliia Gomez MD   OXYGEN Inhale 4 L into the lungs nightly    Nataliia Gomez MD   Cholecalciferol (VITAMIN D3) 5000 units TABS Take 1 tablet by mouth daily    Nataliia Gomez MD       ALLERGIES:  Clavulanic acid and Augmentin [amoxicillin-pot clavulanate]    SOCIAL Hx:  Social History     Socioeconomic History    Marital status:      Spouse name: Not on file    Number of children: Not on file    Years of education: Not on file    Highest education level: Not on file   Occupational History    Occupation: [employer] a [employer]- [employer]     Comment: disability short term   Tobacco Use    Smoking status: Every Day     Packs/day: 0.50     Years: 46.00     Additional pack years: 0.00     Total pack years: 23.00     Types: Cigarettes     Start date: 0    Smokeless tobacco: Never    Tobacco comments:     Pt quit in 9/26/2018 & started back 2/2020 smoking . 5 ppd   Vaping Use    Vaping Use: Never used    Passive vaping exposure: Yes   Substance and Sexual Activity    Alcohol use: Yes     Alcohol/week: 1.0 standard drink of alcohol     Types: 1 Cans of beer per week     Comment: beer once in awhile    Drug use: No    Sexual activity: Not on file   Other Topics Concern    Not on file   Social History Narrative    Works at [employer]. Lives in [place].      Social Determinants of Health     Financial Resource Strain: Unknown (4/25/2023)    Overall Financial Resource Strain (CARDIA)     Difficulty of Paying Living Expenses: Patient refused   Food Insecurity: Not on file (4/25/2023)   Transportation Needs: Unknown (4/25/2023)    PRAPARE -

## 2023-12-18 NOTE — ANESTHESIA POSTPROCEDURE EVALUATION
Department of Anesthesiology  Postprocedure Note    Patient: Darrell Acevedo. MRN: [id number]  YOB: 1955  Date of evaluation: 12/18/2023    Procedure Summary       Date: 12/11/23 Room / Location: [facility] 03 / [address] [facility]    Anesthesia Start: 8364 Anesthesia Stop: 1500    Procedure: EGD WITH DILATION Diagnosis:       Dysphagia, unspecified type      (Dysphagia, unspecified type [R13.10])    Surgeons: Marilyn Mcghee DO Responsible Provider: Yin Bay MD    Anesthesia Type: MAC ASA Status: 3            Anesthesia Type: No value filed. Radames Phase I:      Radames Phase II: Radames Score: 10    Anesthesia Post Evaluation    Patient location during evaluation: PACU  Patient participation: complete - patient participated  Level of consciousness: awake and alert  Airway patency: patent  Nausea & Vomiting: no vomiting and no nausea  Cardiovascular status: blood pressure returned to baseline  Respiratory status: acceptable  Hydration status: euvolemic  Multimodal analgesia pain management approach  Pain management: adequate    No notable events documented.
Never

## 2023-12-29 ENCOUNTER — TELEPHONE (OUTPATIENT)
Dept: PALLATIVE CARE | Age: 68
End: 2023-12-29

## 2023-12-29 DIAGNOSIS — R52 PAIN: Primary | ICD-10-CM

## 2023-12-29 RX ORDER — OXYCODONE HYDROCHLORIDE 5 MG/1
5 TABLET ORAL EVERY 6 HOURS PRN
Qty: 56 TABLET | Refills: 0 | Status: SHIPPED | OUTPATIENT
Start: 2023-12-29 | End: 2024-01-08 | Stop reason: ALTCHOICE

## 2023-12-29 NOTE — TELEPHONE ENCOUNTER
Call received from patient's wife requesting refill for oxycodone.  Prescription sent to [facility] pharmacy.  Patient have an appointment set up for January 8, 2023 with palliative medicine.

## 2024-01-07 ENCOUNTER — APPOINTMENT (OUTPATIENT)
Dept: GENERAL RADIOLOGY | Age: 69
DRG: 853 | End: 2024-01-07
Payer: MEDICARE

## 2024-01-07 ENCOUNTER — HOSPITAL ENCOUNTER (INPATIENT)
Age: 69
LOS: 9 days | Discharge: HOME | DRG: 853 | End: 2024-01-16
Attending: EMERGENCY MEDICINE | Admitting: INTERNAL MEDICINE
Payer: MEDICARE

## 2024-01-07 DIAGNOSIS — M48.061 SPINAL STENOSIS OF LUMBAR REGION, UNSPECIFIED WHETHER NEUROGENIC CLAUDICATION PRESENT: ICD-10-CM

## 2024-01-07 DIAGNOSIS — M16.11 OSTEOARTHRITIS OF RIGHT HIP, UNSPECIFIED OSTEOARTHRITIS TYPE: ICD-10-CM

## 2024-01-07 DIAGNOSIS — M47.817 LUMBOSACRAL SPONDYLOSIS WITHOUT MYELOPATHY: ICD-10-CM

## 2024-01-07 DIAGNOSIS — J96.01 ACUTE RESPIRATORY FAILURE WITH HYPOXIA (HCC): ICD-10-CM

## 2024-01-07 DIAGNOSIS — K02.9 DENTAL CARIES: ICD-10-CM

## 2024-01-07 DIAGNOSIS — J18.9 PNEUMONIA OF RIGHT LOWER LOBE DUE TO INFECTIOUS ORGANISM: Primary | ICD-10-CM

## 2024-01-07 DIAGNOSIS — C32.9 MALIGNANT NEOPLASM OF LARYNX (HCC): ICD-10-CM

## 2024-01-07 DIAGNOSIS — M51.36 DDD (DEGENERATIVE DISC DISEASE), LUMBAR: ICD-10-CM

## 2024-01-07 LAB
ALBUMIN SERPL-MCNC: 3.2 G/DL (ref 3.5–5.2)
ALP SERPL-CCNC: 105 U/L (ref 40–129)
ALT SERPL-CCNC: 13 U/L (ref 0–40)
ANION GAP SERPL CALCULATED.3IONS-SCNC: 12 MMOL/L (ref 7–16)
AST SERPL-CCNC: 16 U/L (ref 0–39)
BASOPHILS # BLD: 0.03 K/UL (ref 0–0.2)
BASOPHILS NFR BLD: 0 % (ref 0–2)
BILIRUB SERPL-MCNC: 1 MG/DL (ref 0–1.2)
BNP SERPL-MCNC: 1470 PG/ML (ref 0–125)
BUN SERPL-MCNC: 8 MG/DL (ref 6–23)
CALCIUM SERPL-MCNC: 8.8 MG/DL (ref 8.6–10.2)
CHLORIDE SERPL-SCNC: 98 MMOL/L (ref 98–107)
CO2 SERPL-SCNC: 22 MMOL/L (ref 22–29)
CREAT SERPL-MCNC: 1.3 MG/DL (ref 0.7–1.2)
EOSINOPHIL # BLD: 0.01 K/UL (ref 0.05–0.5)
EOSINOPHILS RELATIVE PERCENT: 0 % (ref 0–6)
ERYTHROCYTE [DISTWIDTH] IN BLOOD BY AUTOMATED COUNT: 14 % (ref 11.5–15)
GFR SERPL CREATININE-BSD FRML MDRD: >60 ML/MIN/1.73M2
GLUCOSE SERPL-MCNC: 107 MG/DL (ref 74–99)
HCT VFR BLD AUTO: 42.2 % (ref 37–54)
HGB BLD-MCNC: 14 G/DL (ref 12.5–16.5)
IMM GRANULOCYTES # BLD AUTO: 0.03 K/UL (ref 0–0.58)
IMM GRANULOCYTES NFR BLD: 0 % (ref 0–5)
INFLUENZA A BY PCR: NOT DETECTED
INFLUENZA B BY PCR: NOT DETECTED
LACTATE BLDV-SCNC: 2 MMOL/L (ref 0.5–1.9)
LYMPHOCYTES NFR BLD: 0.52 K/UL (ref 1.5–4)
LYMPHOCYTES RELATIVE PERCENT: 7 % (ref 20–42)
MCH RBC QN AUTO: 28.4 PG (ref 26–35)
MCHC RBC AUTO-ENTMCNC: 33.2 G/DL (ref 32–34.5)
MCV RBC AUTO: 85.6 FL (ref 80–99.9)
MONOCYTES NFR BLD: 0.1 K/UL (ref 0.1–0.95)
MONOCYTES NFR BLD: 1 % (ref 2–12)
NEUTROPHILS NFR BLD: 91 % (ref 43–80)
NEUTS SEG NFR BLD: 6.99 K/UL (ref 1.8–7.3)
PLATELET # BLD AUTO: 200 K/UL (ref 130–450)
PMV BLD AUTO: 9.8 FL (ref 7–12)
POTASSIUM SERPL-SCNC: 2.8 MMOL/L (ref 3.5–5)
PROT SERPL-MCNC: 6.5 G/DL (ref 6.4–8.3)
RBC # BLD AUTO: 4.93 M/UL (ref 3.8–5.8)
RBC # BLD: NORMAL 10*6/UL
SARS-COV-2 RDRP RESP QL NAA+PROBE: DETECTED
SODIUM SERPL-SCNC: 132 MMOL/L (ref 132–146)
SPECIMEN DESCRIPTION: ABNORMAL
TROPONIN I SERPL HS-MCNC: 54 NG/L (ref 0–11)
TROPONIN I SERPL HS-MCNC: 55 NG/L (ref 0–11)
WBC OTHER # BLD: 7.7 K/UL (ref 4.5–11.5)

## 2024-01-07 PROCEDURE — 36556 INSERT NON-TUNNEL CV CATH: CPT

## 2024-01-07 PROCEDURE — 2580000003 HC RX 258

## 2024-01-07 PROCEDURE — 2060000000 HC ICU INTERMEDIATE R&B

## 2024-01-07 PROCEDURE — 84484 ASSAY OF TROPONIN QUANT: CPT

## 2024-01-07 PROCEDURE — 02HV33Z INSERTION OF INFUSION DEVICE INTO SUPERIOR VENA CAVA, PERCUTANEOUS APPROACH: ICD-10-PCS | Performed by: EMERGENCY MEDICINE

## 2024-01-07 PROCEDURE — 6360000002 HC RX W HCPCS

## 2024-01-07 PROCEDURE — 87154 CUL TYP ID BLD PTHGN 6+ TRGT: CPT

## 2024-01-07 PROCEDURE — 87635 SARS-COV-2 COVID-19 AMP PRB: CPT

## 2024-01-07 PROCEDURE — 87077 CULTURE AEROBIC IDENTIFY: CPT

## 2024-01-07 PROCEDURE — 87185 SC STD ENZYME DETCJ PER NZM: CPT

## 2024-01-07 PROCEDURE — 80053 COMPREHEN METABOLIC PANEL: CPT

## 2024-01-07 PROCEDURE — 6370000000 HC RX 637 (ALT 250 FOR IP)

## 2024-01-07 PROCEDURE — 83880 ASSAY OF NATRIURETIC PEPTIDE: CPT

## 2024-01-07 PROCEDURE — 84145 PROCALCITONIN (PCT): CPT

## 2024-01-07 PROCEDURE — 87040 BLOOD CULTURE FOR BACTERIA: CPT

## 2024-01-07 PROCEDURE — 93005 ELECTROCARDIOGRAM TRACING: CPT

## 2024-01-07 PROCEDURE — 96374 THER/PROPH/DIAG INJ IV PUSH: CPT

## 2024-01-07 PROCEDURE — 81001 URINALYSIS AUTO W/SCOPE: CPT

## 2024-01-07 PROCEDURE — 0202U NFCT DS 22 TRGT SARS-COV-2: CPT

## 2024-01-07 PROCEDURE — 96361 HYDRATE IV INFUSION ADD-ON: CPT

## 2024-01-07 PROCEDURE — 85025 COMPLETE CBC W/AUTO DIFF WBC: CPT

## 2024-01-07 PROCEDURE — 99285 EMERGENCY DEPT VISIT HI MDM: CPT

## 2024-01-07 PROCEDURE — 83605 ASSAY OF LACTIC ACID: CPT

## 2024-01-07 PROCEDURE — 71045 X-RAY EXAM CHEST 1 VIEW: CPT

## 2024-01-07 PROCEDURE — APPSS60 APP SPLIT SHARED TIME 46-60 MINUTES: Performed by: NURSE PRACTITIONER

## 2024-01-07 PROCEDURE — 2700000000 HC OXYGEN THERAPY PER DAY

## 2024-01-07 PROCEDURE — 87502 INFLUENZA DNA AMP PROBE: CPT

## 2024-01-07 PROCEDURE — 94640 AIRWAY INHALATION TREATMENT: CPT

## 2024-01-07 PROCEDURE — 94664 DEMO&/EVAL PT USE INHALER: CPT

## 2024-01-07 RX ORDER — 0.9 % SODIUM CHLORIDE 0.9 %
1000 INTRAVENOUS SOLUTION INTRAVENOUS ONCE
Status: COMPLETED | OUTPATIENT
Start: 2024-01-07 | End: 2024-01-07

## 2024-01-07 RX ORDER — ONDANSETRON 2 MG/ML
4 INJECTION INTRAMUSCULAR; INTRAVENOUS EVERY 6 HOURS PRN
Status: DISCONTINUED | OUTPATIENT
Start: 2024-01-07 | End: 2024-01-16 | Stop reason: HOSPADM

## 2024-01-07 RX ORDER — SODIUM CHLORIDE 0.9 % (FLUSH) 0.9 %
5-40 SYRINGE (ML) INJECTION EVERY 12 HOURS SCHEDULED
Status: DISCONTINUED | OUTPATIENT
Start: 2024-01-08 | End: 2024-01-16 | Stop reason: HOSPADM

## 2024-01-07 RX ORDER — IPRATROPIUM BROMIDE AND ALBUTEROL SULFATE 2.5; .5 MG/3ML; MG/3ML
3 SOLUTION RESPIRATORY (INHALATION) ONCE
Status: COMPLETED | OUTPATIENT
Start: 2024-01-07 | End: 2024-01-07

## 2024-01-07 RX ORDER — IPRATROPIUM BROMIDE AND ALBUTEROL SULFATE 2.5; .5 MG/3ML; MG/3ML
1 SOLUTION RESPIRATORY (INHALATION)
Status: DISCONTINUED | OUTPATIENT
Start: 2024-01-08 | End: 2024-01-16 | Stop reason: HOSPADM

## 2024-01-07 RX ORDER — DOXYCYCLINE HYCLATE 100 MG/1
100 CAPSULE ORAL ONCE
Status: COMPLETED | OUTPATIENT
Start: 2024-01-07 | End: 2024-01-07

## 2024-01-07 RX ORDER — ACETAMINOPHEN 325 MG/1
650 TABLET ORAL EVERY 6 HOURS PRN
Status: DISCONTINUED | OUTPATIENT
Start: 2024-01-07 | End: 2024-01-08 | Stop reason: SDUPTHER

## 2024-01-07 RX ORDER — POTASSIUM CHLORIDE 7.45 MG/ML
10 INJECTION INTRAVENOUS
Status: COMPLETED | OUTPATIENT
Start: 2024-01-07 | End: 2024-01-08

## 2024-01-07 RX ORDER — SODIUM CHLORIDE 9 MG/ML
INJECTION, SOLUTION INTRAVENOUS PRN
Status: DISCONTINUED | OUTPATIENT
Start: 2024-01-07 | End: 2024-01-16 | Stop reason: HOSPADM

## 2024-01-07 RX ORDER — ACETAMINOPHEN 650 MG/1
650 SUPPOSITORY RECTAL EVERY 6 HOURS PRN
Status: DISCONTINUED | OUTPATIENT
Start: 2024-01-07 | End: 2024-01-08 | Stop reason: SDUPTHER

## 2024-01-07 RX ORDER — DEXTROSE AND SODIUM CHLORIDE 5; .45 G/100ML; G/100ML
INJECTION, SOLUTION INTRAVENOUS CONTINUOUS
Status: ACTIVE | OUTPATIENT
Start: 2024-01-08 | End: 2024-01-09

## 2024-01-07 RX ORDER — POLYETHYLENE GLYCOL 3350 17 G/17G
17 POWDER, FOR SOLUTION ORAL DAILY PRN
Status: DISCONTINUED | OUTPATIENT
Start: 2024-01-07 | End: 2024-01-16 | Stop reason: HOSPADM

## 2024-01-07 RX ORDER — SODIUM CHLORIDE 0.9 % (FLUSH) 0.9 %
5-40 SYRINGE (ML) INJECTION PRN
Status: DISCONTINUED | OUTPATIENT
Start: 2024-01-07 | End: 2024-01-16 | Stop reason: HOSPADM

## 2024-01-07 RX ORDER — ONDANSETRON 4 MG/1
4 TABLET, ORALLY DISINTEGRATING ORAL EVERY 8 HOURS PRN
Status: DISCONTINUED | OUTPATIENT
Start: 2024-01-07 | End: 2024-01-16 | Stop reason: HOSPADM

## 2024-01-07 RX ADMIN — WATER 2000 MG: 1 INJECTION INTRAMUSCULAR; INTRAVENOUS; SUBCUTANEOUS at 20:01

## 2024-01-07 RX ADMIN — IPRATROPIUM BROMIDE AND ALBUTEROL SULFATE 3 DOSE: 2.5; .5 SOLUTION RESPIRATORY (INHALATION) at 19:35

## 2024-01-07 RX ADMIN — POTASSIUM CHLORIDE 10 MEQ: 7.46 INJECTION, SOLUTION INTRAVENOUS at 22:44

## 2024-01-07 RX ADMIN — POTASSIUM CHLORIDE 10 MEQ: 7.46 INJECTION, SOLUTION INTRAVENOUS at 23:53

## 2024-01-07 RX ADMIN — DOXYCYCLINE HYCLATE 100 MG: 100 CAPSULE ORAL at 20:07

## 2024-01-07 RX ADMIN — SODIUM CHLORIDE 1000 ML: 9 INJECTION, SOLUTION INTRAVENOUS at 19:41

## 2024-01-07 ASSESSMENT — LIFESTYLE VARIABLES
HOW OFTEN DO YOU HAVE A DRINK CONTAINING ALCOHOL: NEVER
HOW MANY STANDARD DRINKS CONTAINING ALCOHOL DO YOU HAVE ON A TYPICAL DAY: PATIENT DOES NOT DRINK

## 2024-01-07 NOTE — ED PROVIDER NOTES
Diagnosis Date    Abdominal aortic aneurysm without rupture (HCC) 08/27/2018    follows with Dr Panchal    Acute bronchitis with COPD (HCC) 05/27/2017    Arthritis     COPD (chronic obstructive pulmonary disease) (HCC)     Decreased dorsalis pedis pulse 11/04/2020    Depression with anxiety     Emphysema of lung (HCC)     Encounter for antineoplastic immunotherapy     History of deep vein thrombosis 11/04/2020    Hyperlipidemia     Infrarenal abdominal aortic aneurysm (AAA) without rupture (HCC) 12/07/2023    3.1 x 3.2 cm, CT scan, January 2023    Late effect of radiation 09/06/2023    Lung cancer (HCC) 04/11/2016    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    Paralyzed vocal cords     Thrombosis of testis     Thyroid disease     Tracheostomy in place (HCC) 2017    #6 Shiley       Past Surgical History:       Procedure Laterality Date    BRONCHOSCOPY N/A 3/5/2020    BRONCHOSCOPY DIAGNOSTIC OR CELL WASH ONLY performed by Young Parkinson MD at [facility] ENDOSCOPY    CERVICAL FUSION  2015    HIP SURGERY Left 1/6/2022    RIGHT TROCHANTARIC BURSA INJECTION UNDER FLUOROSCOPY performed by Garfield Conner MD at [facility] OR    KNEE ARTHROSCOPY      LUNG BIOPSY Left 5/6/2016    MEDICATION INJECTION Bilateral 10/29/2020    BILATERAL SACROILIAC JOINT INJECTION AND RIGHT TROCHANTERIC BURSA INJECTION UNDER FLUOROSCOPY (CPT 39013,55968,50916)++TRACH-NO VENT++ performed by Garfield Conner MD at [facility] OR    MEDICATION INJECTION Left 1/6/2022    RIGHT SACROILIAC JOINT INJECTION UNDER FLUOROSCOPY performed by Garfield Conner MD at [facility] OR    NERVE BLOCK Bilateral 5/8/2023    BILATERAL SACROILIAC JOINT INJECTION UNDER FLUOROSCOPIC GUIDANCE performed by Garfield Conner MD at [facility] OR    NERVE BLOCK Bilateral 10/5/2023    BILATERAL SACROILIAC JOINT INJECTION UNDER FLUOROSCOPIC GUIDANCE performed by Garfield Conner MD at [facility] OR    OTHER SURGICAL HISTORY  4/15/2016    cervical myelogram    PAIN MANAGEMENT PROCEDURE

## 2024-01-08 ENCOUNTER — APPOINTMENT (OUTPATIENT)
Dept: CT IMAGING | Age: 69
DRG: 853 | End: 2024-01-08
Payer: MEDICARE

## 2024-01-08 LAB
ANION GAP SERPL CALCULATED.3IONS-SCNC: 8 MMOL/L (ref 7–16)
B PARAP IS1001 DNA NPH QL NAA+NON-PROBE: NOT DETECTED
B PERT DNA SPEC QL NAA+PROBE: NOT DETECTED
BACTERIA URNS QL MICRO: ABNORMAL
BASOPHILS # BLD: 0.01 K/UL (ref 0–0.2)
BASOPHILS NFR BLD: 0 % (ref 0–2)
BILIRUB UR QL STRIP: NEGATIVE
BUN SERPL-MCNC: 11 MG/DL (ref 6–23)
C PNEUM DNA NPH QL NAA+NON-PROBE: NOT DETECTED
CALCIUM SERPL-MCNC: 7.7 MG/DL (ref 8.6–10.2)
CHLORIDE SERPL-SCNC: 104 MMOL/L (ref 98–107)
CLARITY UR: CLEAR
CO2 SERPL-SCNC: 22 MMOL/L (ref 22–29)
COLOR UR: YELLOW
CREAT SERPL-MCNC: 1.3 MG/DL (ref 0.7–1.2)
CRP SERPL HS-MCNC: 183 MG/L (ref 0–5)
EOSINOPHIL # BLD: 0 K/UL (ref 0.05–0.5)
EOSINOPHILS RELATIVE PERCENT: 0 % (ref 0–6)
EPI CELLS #/AREA URNS HPF: ABNORMAL /HPF
ERYTHROCYTE [DISTWIDTH] IN BLOOD BY AUTOMATED COUNT: 14.2 % (ref 11.5–15)
ERYTHROCYTE [SEDIMENTATION RATE] IN BLOOD BY WESTERGREN METHOD: 36 MM/HR (ref 0–15)
FLUAV RNA NPH QL NAA+NON-PROBE: NOT DETECTED
FLUBV RNA NPH QL NAA+NON-PROBE: NOT DETECTED
GFR SERPL CREATININE-BSD FRML MDRD: >60 ML/MIN/1.73M2
GLUCOSE SERPL-MCNC: 195 MG/DL (ref 74–99)
GLUCOSE UR STRIP-MCNC: NEGATIVE MG/DL
HADV DNA NPH QL NAA+NON-PROBE: NOT DETECTED
HCOV 229E RNA NPH QL NAA+NON-PROBE: NOT DETECTED
HCOV HKU1 RNA NPH QL NAA+NON-PROBE: NOT DETECTED
HCOV NL63 RNA NPH QL NAA+NON-PROBE: NOT DETECTED
HCOV OC43 RNA NPH QL NAA+NON-PROBE: NOT DETECTED
HCT VFR BLD AUTO: 37.6 % (ref 37–54)
HGB BLD-MCNC: 12.5 G/DL (ref 12.5–16.5)
HGB UR QL STRIP.AUTO: ABNORMAL
HMPV RNA NPH QL NAA+NON-PROBE: NOT DETECTED
HPIV1 RNA NPH QL NAA+NON-PROBE: NOT DETECTED
HPIV2 RNA NPH QL NAA+NON-PROBE: NOT DETECTED
HPIV3 RNA NPH QL NAA+NON-PROBE: NOT DETECTED
HPIV4 RNA NPH QL NAA+NON-PROBE: NOT DETECTED
IMM GRANULOCYTES # BLD AUTO: 0.03 K/UL (ref 0–0.58)
IMM GRANULOCYTES NFR BLD: 0 % (ref 0–5)
KETONES UR STRIP-MCNC: NEGATIVE MG/DL
LACTATE BLDV-SCNC: 1.3 MMOL/L (ref 0.5–1.9)
LEUKOCYTE ESTERASE UR QL STRIP: NEGATIVE
LYMPHOCYTES NFR BLD: 0.16 K/UL (ref 1.5–4)
LYMPHOCYTES RELATIVE PERCENT: 2 % (ref 20–42)
M PNEUMO DNA NPH QL NAA+NON-PROBE: NOT DETECTED
MAGNESIUM SERPL-MCNC: 1.2 MG/DL (ref 1.6–2.6)
MCH RBC QN AUTO: 28.2 PG (ref 26–35)
MCHC RBC AUTO-ENTMCNC: 33.2 G/DL (ref 32–34.5)
MCV RBC AUTO: 84.9 FL (ref 80–99.9)
MONOCYTES NFR BLD: 0.13 K/UL (ref 0.1–0.95)
MONOCYTES NFR BLD: 2 % (ref 2–12)
NEUTROPHILS NFR BLD: 95 % (ref 43–80)
NEUTS SEG NFR BLD: 6.7 K/UL (ref 1.8–7.3)
NITRITE UR QL STRIP: NEGATIVE
PH UR STRIP: 7 [PH] (ref 5–9)
PLATELET # BLD AUTO: 169 K/UL (ref 130–450)
PMV BLD AUTO: 10.5 FL (ref 7–12)
POTASSIUM SERPL-SCNC: 3.5 MMOL/L (ref 3.5–5)
PROCALCITONIN SERPL-MCNC: 56.55 NG/ML (ref 0–0.08)
PROCALCITONIN SERPL-MCNC: 87.24 NG/ML (ref 0–0.08)
PROT UR STRIP-MCNC: ABNORMAL MG/DL
RBC # BLD AUTO: 4.43 M/UL (ref 3.8–5.8)
RBC # BLD: ABNORMAL 10*6/UL
RBC # BLD: ABNORMAL 10*6/UL
RBC #/AREA URNS HPF: ABNORMAL /HPF
RSV RNA NPH QL NAA+NON-PROBE: NOT DETECTED
RV+EV RNA NPH QL NAA+NON-PROBE: NOT DETECTED
SARS-COV-2 RNA NPH QL NAA+NON-PROBE: DETECTED
SODIUM SERPL-SCNC: 134 MMOL/L (ref 132–146)
SP GR UR STRIP: 1.01 (ref 1–1.03)
SPECIMEN DESCRIPTION: ABNORMAL
TROPONIN I SERPL HS-MCNC: 39 NG/L (ref 0–11)
UROBILINOGEN UR STRIP-ACNC: 1 EU/DL (ref 0–1)
WBC #/AREA URNS HPF: ABNORMAL /HPF
WBC OTHER # BLD: 7 K/UL (ref 4.5–11.5)

## 2024-01-08 PROCEDURE — 87899 AGENT NOS ASSAY W/OPTIC: CPT

## 2024-01-08 PROCEDURE — 85025 COMPLETE CBC W/AUTO DIFF WBC: CPT

## 2024-01-08 PROCEDURE — 6360000002 HC RX W HCPCS: Performed by: EMERGENCY MEDICINE

## 2024-01-08 PROCEDURE — 87070 CULTURE OTHR SPECIMN AEROBIC: CPT

## 2024-01-08 PROCEDURE — 2580000003 HC RX 258: Performed by: INTERNAL MEDICINE

## 2024-01-08 PROCEDURE — 2500000003 HC RX 250 WO HCPCS: Performed by: NURSE PRACTITIONER

## 2024-01-08 PROCEDURE — 80048 BASIC METABOLIC PNL TOTAL CA: CPT

## 2024-01-08 PROCEDURE — 83605 ASSAY OF LACTIC ACID: CPT

## 2024-01-08 PROCEDURE — 2700000000 HC OXYGEN THERAPY PER DAY

## 2024-01-08 PROCEDURE — 87449 NOS EACH ORGANISM AG IA: CPT

## 2024-01-08 PROCEDURE — 92526 ORAL FUNCTION THERAPY: CPT

## 2024-01-08 PROCEDURE — 87077 CULTURE AEROBIC IDENTIFY: CPT

## 2024-01-08 PROCEDURE — 94640 AIRWAY INHALATION TREATMENT: CPT

## 2024-01-08 PROCEDURE — 6360000002 HC RX W HCPCS: Performed by: INTERNAL MEDICINE

## 2024-01-08 PROCEDURE — 2060000000 HC ICU INTERMEDIATE R&B

## 2024-01-08 PROCEDURE — 92610 EVALUATE SWALLOWING FUNCTION: CPT

## 2024-01-08 PROCEDURE — 2580000003 HC RX 258: Performed by: NURSE PRACTITIONER

## 2024-01-08 PROCEDURE — XW033E5 INTRODUCTION OF REMDESIVIR ANTI-INFECTIVE INTO PERIPHERAL VEIN, PERCUTANEOUS APPROACH, NEW TECHNOLOGY GROUP 5: ICD-10-PCS | Performed by: STUDENT IN AN ORGANIZED HEALTH CARE EDUCATION/TRAINING PROGRAM

## 2024-01-08 PROCEDURE — 84145 PROCALCITONIN (PCT): CPT

## 2024-01-08 PROCEDURE — 83735 ASSAY OF MAGNESIUM: CPT

## 2024-01-08 PROCEDURE — 6360000002 HC RX W HCPCS

## 2024-01-08 PROCEDURE — 99231 SBSQ HOSP IP/OBS SF/LOW 25: CPT | Performed by: STUDENT IN AN ORGANIZED HEALTH CARE EDUCATION/TRAINING PROGRAM

## 2024-01-08 PROCEDURE — 85652 RBC SED RATE AUTOMATED: CPT

## 2024-01-08 PROCEDURE — 87205 SMEAR GRAM STAIN: CPT

## 2024-01-08 PROCEDURE — 99223 1ST HOSP IP/OBS HIGH 75: CPT | Performed by: INTERNAL MEDICINE

## 2024-01-08 PROCEDURE — 71250 CT THORAX DX C-: CPT

## 2024-01-08 PROCEDURE — 87184 SC STD DISK METHOD PER PLATE: CPT

## 2024-01-08 PROCEDURE — XW033H5 INTRODUCTION OF TOCILIZUMAB INTO PERIPHERAL VEIN, PERCUTANEOUS APPROACH, NEW TECHNOLOGY GROUP 5: ICD-10-PCS | Performed by: STUDENT IN AN ORGANIZED HEALTH CARE EDUCATION/TRAINING PROGRAM

## 2024-01-08 PROCEDURE — 86140 C-REACTIVE PROTEIN: CPT

## 2024-01-08 PROCEDURE — 84484 ASSAY OF TROPONIN QUANT: CPT

## 2024-01-08 PROCEDURE — 6360000002 HC RX W HCPCS: Performed by: NURSE PRACTITIONER

## 2024-01-08 PROCEDURE — 6370000000 HC RX 637 (ALT 250 FOR IP): Performed by: NURSE PRACTITIONER

## 2024-01-08 RX ORDER — DEXAMETHASONE SODIUM PHOSPHATE 10 MG/ML
10 INJECTION INTRAMUSCULAR; INTRAVENOUS EVERY 24 HOURS
Status: DISCONTINUED | OUTPATIENT
Start: 2024-01-13 | End: 2024-01-16

## 2024-01-08 RX ORDER — LORAZEPAM 2 MG/ML
0.5 INJECTION INTRAMUSCULAR ONCE
Status: COMPLETED | OUTPATIENT
Start: 2024-01-08 | End: 2024-01-08

## 2024-01-08 RX ORDER — LEVOTHYROXINE SODIUM 0.1 MG/1
100 TABLET ORAL
COMMUNITY

## 2024-01-08 RX ORDER — DULOXETIN HYDROCHLORIDE 30 MG/1
30 CAPSULE, DELAYED RELEASE ORAL 2 TIMES DAILY
Status: DISCONTINUED | OUTPATIENT
Start: 2024-01-08 | End: 2024-01-12

## 2024-01-08 RX ORDER — TAMSULOSIN HYDROCHLORIDE 0.4 MG/1
0.4 CAPSULE ORAL NIGHTLY
Status: DISCONTINUED | OUTPATIENT
Start: 2024-01-08 | End: 2024-01-16 | Stop reason: HOSPADM

## 2024-01-08 RX ORDER — PANTOPRAZOLE SODIUM 40 MG/1
40 TABLET, DELAYED RELEASE ORAL
Status: DISCONTINUED | OUTPATIENT
Start: 2024-01-08 | End: 2024-01-16 | Stop reason: HOSPADM

## 2024-01-08 RX ORDER — PREDNISONE 5 MG/1
10 TABLET ORAL DAILY
Status: DISCONTINUED | OUTPATIENT
Start: 2024-01-08 | End: 2024-01-08

## 2024-01-08 RX ORDER — ACETAMINOPHEN 325 MG/1
650 TABLET ORAL EVERY 6 HOURS PRN
Status: DISCONTINUED | OUTPATIENT
Start: 2024-01-08 | End: 2024-01-08 | Stop reason: SDUPTHER

## 2024-01-08 RX ORDER — DULOXETIN HYDROCHLORIDE 30 MG/1
30 CAPSULE, DELAYED RELEASE ORAL 2 TIMES DAILY
COMMUNITY

## 2024-01-08 RX ORDER — ATORVASTATIN CALCIUM 20 MG/1
20 TABLET, FILM COATED ORAL DAILY
Status: DISCONTINUED | OUTPATIENT
Start: 2024-01-08 | End: 2024-01-12

## 2024-01-08 RX ORDER — FORMOTEROL FUMARATE DIHYDRATE 20 UG/2ML
20 SOLUTION RESPIRATORY (INHALATION)
COMMUNITY

## 2024-01-08 RX ORDER — MIDODRINE HYDROCHLORIDE 5 MG/1
5 TABLET ORAL 2 TIMES DAILY WITH MEALS
Status: DISCONTINUED | OUTPATIENT
Start: 2024-01-08 | End: 2024-01-12

## 2024-01-08 RX ORDER — SIMVASTATIN 40 MG
40 TABLET ORAL EVERY EVENING
COMMUNITY

## 2024-01-08 RX ORDER — VITAMIN B COMPLEX
5000 TABLET ORAL DAILY
Status: DISCONTINUED | OUTPATIENT
Start: 2024-01-08 | End: 2024-01-12

## 2024-01-08 RX ORDER — LEVOTHYROXINE SODIUM 0.1 MG/1
100 TABLET ORAL DAILY
Status: DISCONTINUED | OUTPATIENT
Start: 2024-01-08 | End: 2024-01-12

## 2024-01-08 RX ORDER — ACETAMINOPHEN 325 MG/1
650 TABLET ORAL EVERY 6 HOURS PRN
Status: DISCONTINUED | OUTPATIENT
Start: 2024-01-08 | End: 2024-01-16 | Stop reason: HOSPADM

## 2024-01-08 RX ORDER — GUAIFENESIN 600 MG/1
600 TABLET, EXTENDED RELEASE ORAL 2 TIMES DAILY
Status: DISCONTINUED | OUTPATIENT
Start: 2024-01-08 | End: 2024-01-08 | Stop reason: CLARIF

## 2024-01-08 RX ORDER — ARFORMOTEROL TARTRATE 15 UG/2ML
15 SOLUTION RESPIRATORY (INHALATION)
Status: DISCONTINUED | OUTPATIENT
Start: 2024-01-08 | End: 2024-01-16 | Stop reason: HOSPADM

## 2024-01-08 RX ORDER — TAMSULOSIN HYDROCHLORIDE 0.4 MG/1
0.4 CAPSULE ORAL EVERY EVENING
COMMUNITY

## 2024-01-08 RX ORDER — GUAIFENESIN 400 MG/1
400 TABLET ORAL 3 TIMES DAILY
Status: DISCONTINUED | OUTPATIENT
Start: 2024-01-08 | End: 2024-01-12

## 2024-01-08 RX ORDER — BUDESONIDE 0.5 MG/2ML
1 INHALANT ORAL
COMMUNITY

## 2024-01-08 RX ORDER — CHLORHEXIDINE GLUCONATE ORAL RINSE 1.2 MG/ML
15 SOLUTION DENTAL DAILY
Status: DISCONTINUED | OUTPATIENT
Start: 2024-01-08 | End: 2024-01-16 | Stop reason: HOSPADM

## 2024-01-08 RX ORDER — ALBUTEROL SULFATE 0.63 MG/3ML
1 SOLUTION RESPIRATORY (INHALATION) EVERY 6 HOURS PRN
Status: DISCONTINUED | OUTPATIENT
Start: 2024-01-07 | End: 2024-01-16 | Stop reason: HOSPADM

## 2024-01-08 RX ORDER — REVEFENACIN 175 UG/3ML
175 SOLUTION RESPIRATORY (INHALATION) EVERY EVENING
COMMUNITY

## 2024-01-08 RX ORDER — OXYCODONE HYDROCHLORIDE 5 MG/1
5 TABLET ORAL EVERY 4 HOURS PRN
Status: ON HOLD | COMMUNITY
End: 2024-01-16

## 2024-01-08 RX ORDER — VITAMIN B COMPLEX
2000 TABLET ORAL DAILY
Status: DISCONTINUED | OUTPATIENT
Start: 2024-01-08 | End: 2024-01-12

## 2024-01-08 RX ORDER — ACETAMINOPHEN 650 MG/1
650 SUPPOSITORY RECTAL EVERY 6 HOURS PRN
Status: DISCONTINUED | OUTPATIENT
Start: 2024-01-08 | End: 2024-01-16 | Stop reason: HOSPADM

## 2024-01-08 RX ORDER — ACETAMINOPHEN 650 MG/1
650 SUPPOSITORY RECTAL EVERY 6 HOURS PRN
Status: DISCONTINUED | OUTPATIENT
Start: 2024-01-08 | End: 2024-01-08 | Stop reason: SDUPTHER

## 2024-01-08 RX ORDER — SODIUM CHLORIDE 9 MG/ML
INJECTION, SOLUTION INTRAVENOUS ONCE
Status: COMPLETED | OUTPATIENT
Start: 2024-01-08 | End: 2024-01-08

## 2024-01-08 RX ORDER — 0.9 % SODIUM CHLORIDE 0.9 %
30 INTRAVENOUS SOLUTION INTRAVENOUS PRN
Status: DISCONTINUED | OUTPATIENT
Start: 2024-01-08 | End: 2024-01-16 | Stop reason: HOSPADM

## 2024-01-08 RX ORDER — BENZONATATE 100 MG/1
100 CAPSULE ORAL 3 TIMES DAILY PRN
Status: DISCONTINUED | OUTPATIENT
Start: 2024-01-08 | End: 2024-01-16 | Stop reason: HOSPADM

## 2024-01-08 RX ORDER — DEXAMETHASONE SODIUM PHOSPHATE 10 MG/ML
6 INJECTION INTRAMUSCULAR; INTRAVENOUS EVERY 24 HOURS
Status: DISCONTINUED | OUTPATIENT
Start: 2024-01-08 | End: 2024-01-08

## 2024-01-08 RX ORDER — IBUPROFEN 400 MG/1
400 TABLET ORAL ONCE
Status: DISCONTINUED | OUTPATIENT
Start: 2024-01-08 | End: 2024-01-08

## 2024-01-08 RX ORDER — BUDESONIDE 0.5 MG/2ML
0.5 INHALANT ORAL
Status: DISCONTINUED | OUTPATIENT
Start: 2024-01-08 | End: 2024-01-16 | Stop reason: HOSPADM

## 2024-01-08 RX ADMIN — LORAZEPAM 0.5 MG: 2 INJECTION INTRAMUSCULAR; INTRAVENOUS at 02:11

## 2024-01-08 RX ADMIN — POTASSIUM CHLORIDE 10 MEQ: 7.46 INJECTION, SOLUTION INTRAVENOUS at 01:55

## 2024-01-08 RX ADMIN — TOCILIZUMAB 600 MG: 20 INJECTION, SOLUTION, CONCENTRATE INTRAVENOUS at 17:52

## 2024-01-08 RX ADMIN — DOXYCYCLINE 100 MG: 100 INJECTION, POWDER, LYOPHILIZED, FOR SOLUTION INTRAVENOUS at 11:14

## 2024-01-08 RX ADMIN — DOXYCYCLINE 100 MG: 100 INJECTION, POWDER, LYOPHILIZED, FOR SOLUTION INTRAVENOUS at 21:12

## 2024-01-08 RX ADMIN — ACETAMINOPHEN 650 MG: 650 SUPPOSITORY RECTAL at 01:31

## 2024-01-08 RX ADMIN — Medication 10 ML: at 10:56

## 2024-01-08 RX ADMIN — BUDESONIDE INHALATION 500 MCG: 0.5 SUSPENSION RESPIRATORY (INHALATION) at 08:14

## 2024-01-08 RX ADMIN — DEXTROSE AND SODIUM CHLORIDE: 5; 450 INJECTION, SOLUTION INTRAVENOUS at 01:28

## 2024-01-08 RX ADMIN — BUDESONIDE INHALATION 500 MCG: 0.5 SUSPENSION RESPIRATORY (INHALATION) at 21:46

## 2024-01-08 RX ADMIN — PIPERACILLIN AND TAZOBACTAM 3375 MG: 3; .375 INJECTION, POWDER, LYOPHILIZED, FOR SOLUTION INTRAVENOUS at 18:53

## 2024-01-08 RX ADMIN — IPRATROPIUM BROMIDE AND ALBUTEROL SULFATE 1 DOSE: .5; 2.5 SOLUTION RESPIRATORY (INHALATION) at 21:46

## 2024-01-08 RX ADMIN — POTASSIUM CHLORIDE 10 MEQ: 7.46 INJECTION, SOLUTION INTRAVENOUS at 00:47

## 2024-01-08 RX ADMIN — ARFORMOTEROL TARTRATE 15 MCG: 15 SOLUTION RESPIRATORY (INHALATION) at 21:46

## 2024-01-08 RX ADMIN — IPRATROPIUM BROMIDE AND ALBUTEROL SULFATE 1 DOSE: .5; 2.5 SOLUTION RESPIRATORY (INHALATION) at 15:52

## 2024-01-08 RX ADMIN — ARFORMOTEROL TARTRATE 15 MCG: 15 SOLUTION RESPIRATORY (INHALATION) at 08:14

## 2024-01-08 RX ADMIN — IPRATROPIUM BROMIDE AND ALBUTEROL SULFATE 1 DOSE: .5; 2.5 SOLUTION RESPIRATORY (INHALATION) at 11:19

## 2024-01-08 RX ADMIN — DEXAMETHASONE SODIUM PHOSPHATE 6 MG: 10 INJECTION INTRAMUSCULAR; INTRAVENOUS at 01:30

## 2024-01-08 RX ADMIN — SODIUM CHLORIDE: 9 INJECTION, SOLUTION INTRAVENOUS at 03:45

## 2024-01-08 RX ADMIN — IPRATROPIUM BROMIDE AND ALBUTEROL SULFATE 1 DOSE: .5; 2.5 SOLUTION RESPIRATORY (INHALATION) at 08:14

## 2024-01-08 RX ADMIN — DEXAMETHASONE SODIUM PHOSPHATE 20 MG: 4 INJECTION, SOLUTION INTRAMUSCULAR; INTRAVENOUS at 17:08

## 2024-01-08 RX ADMIN — DEXTROSE AND SODIUM CHLORIDE: 5; 450 INJECTION, SOLUTION INTRAVENOUS at 13:53

## 2024-01-08 RX ADMIN — REMDESIVIR 200 MG: 100 INJECTION, POWDER, LYOPHILIZED, FOR SOLUTION INTRAVENOUS at 16:26

## 2024-01-08 ASSESSMENT — PAIN SCALES - GENERAL: PAINLEVEL_OUTOF10: 0

## 2024-01-08 NOTE — H&P
pantoprazole (PROTONIX) 20 MG tablet Take 2 tablets by mouth in the morning and 2 tablets in the evening.    ProviderNataliia MD   DULoxetine (CYMBALTA) 30 MG extended release capsule Take 1 capsule by mouth 2 times daily take 1 capsule by mouth twice a day 10/2/23   Elder Martínez, APRN - CNP   budesonide (PULMICORT) 0.5 MG/2ML nebulizer suspension inhale contents of 1 vial ( 2 milliliters ) in nebulizer twice a day 6/7/23   Young Parkinson MD   albuterol (ACCUNEB) 0.63 MG/3ML nebulizer solution Take 3 mLs by nebulization every 6 hours as needed for Shortness of Breath 5/23/23   Young Parkinson MD   formoterol (PERFOROMIST) 20 MCG/2ML nebulizer solution Take 2 mLs by nebulization in the morning and 2 mLs in the evening. 5/23/23   Young Parkinson MD   levothyroxine (SYNTHROID) 100 MCG tablet Take 1 tablet by mouth daily 5/12/23   Sylvia uQintana DO   midodrine (PROAMATINE) 5 MG tablet Take 1 tablet by mouth 2 times daily 4/3/23   Provider, MD Nataliia   senna (SENOKOT) 8.6 MG tablet Take 2 tablets by mouth 2 times daily as needed for Constipation 4/17/23   Elder Martínez, BG - CNP   albuterol (PROVENTIL) (2.5 MG/3ML) 0.083% nebulizer solution inhale contents of 1 vial ( 3 milliliters ) in nebulizer by mouth and INTO THE LUNGS every 6 hours if needed wheezing 3/20/23   Young Parkinson MD   sodium chloride, Inhalant, 3 % nebulizer solution Take 4 mLs by nebulization as needed for Cough 3/3/23   Young Parkinson MD   tamsulosin (FLOMAX) 0.4 MG capsule Take 1 capsule by mouth nightly 3/29/22   Sylvia Quintana DO   simvastatin (ZOCOR) 40 MG tablet Take 1 tablet by mouth nightly 12/27/21   Sylvia Quintana DO   fluocinonide (LIDEX) 0.05 % cream Apply topically 2 times daily. 12/27/21   Sylvia Quintana, DO   chlorhexidine (PERIDEX) 0.12 % solution Take 15 mLs by mouth daily Swish & spit qd 5/18/21   Sylvia Quintana DO   apixaban (ELIQUIS) 2.5 MG TABS tablet Take 1 tablet by mouth 2 times

## 2024-01-09 LAB
25(OH)D3 SERPL-MCNC: 36.7 NG/ML (ref 30–100)
ANION GAP SERPL CALCULATED.3IONS-SCNC: 16 MMOL/L (ref 7–16)
BASOPHILS # BLD: 0.01 K/UL (ref 0–0.2)
BASOPHILS NFR BLD: 0 % (ref 0–2)
BUN SERPL-MCNC: 17 MG/DL (ref 6–23)
CALCIUM SERPL-MCNC: 8.4 MG/DL (ref 8.6–10.2)
CHLORIDE SERPL-SCNC: 106 MMOL/L (ref 98–107)
CO2 SERPL-SCNC: 18 MMOL/L (ref 22–29)
CREAT SERPL-MCNC: 1 MG/DL (ref 0.7–1.2)
CRP SERPL HS-MCNC: 191 MG/L (ref 0–5)
EKG ATRIAL RATE: 119 BPM
EKG P AXIS: 64 DEGREES
EKG P-R INTERVAL: 166 MS
EKG Q-T INTERVAL: 308 MS
EKG QRS DURATION: 98 MS
EKG QTC CALCULATION (BAZETT): 433 MS
EKG R AXIS: -24 DEGREES
EKG T AXIS: 63 DEGREES
EKG VENTRICULAR RATE: 119 BPM
EOSINOPHIL # BLD: 0 K/UL (ref 0.05–0.5)
EOSINOPHILS RELATIVE PERCENT: 0 % (ref 0–6)
ERYTHROCYTE [DISTWIDTH] IN BLOOD BY AUTOMATED COUNT: 14.3 % (ref 11.5–15)
GFR SERPL CREATININE-BSD FRML MDRD: >60 ML/MIN/1.73M2
GLUCOSE SERPL-MCNC: 208 MG/DL (ref 74–99)
HCT VFR BLD AUTO: 37 % (ref 37–54)
HGB BLD-MCNC: 12.4 G/DL (ref 12.5–16.5)
IMM GRANULOCYTES # BLD AUTO: 0.04 K/UL (ref 0–0.58)
IMM GRANULOCYTES NFR BLD: 1 % (ref 0–5)
L PNEUMO1 AG UR QL IA.RAPID: NEGATIVE
LYMPHOCYTES NFR BLD: 0.36 K/UL (ref 1.5–4)
LYMPHOCYTES RELATIVE PERCENT: 5 % (ref 20–42)
MCH RBC QN AUTO: 27.9 PG (ref 26–35)
MCHC RBC AUTO-ENTMCNC: 33.5 G/DL (ref 32–34.5)
MCV RBC AUTO: 83.1 FL (ref 80–99.9)
MONOCYTES NFR BLD: 0.12 K/UL (ref 0.1–0.95)
MONOCYTES NFR BLD: 2 % (ref 2–12)
NEUTROPHILS NFR BLD: 93 % (ref 43–80)
NEUTS SEG NFR BLD: 7.12 K/UL (ref 1.8–7.3)
PLATELET # BLD AUTO: 178 K/UL (ref 130–450)
PMV BLD AUTO: 10.6 FL (ref 7–12)
POTASSIUM SERPL-SCNC: 3.5 MMOL/L (ref 3.5–5)
RBC # BLD AUTO: 4.45 M/UL (ref 3.8–5.8)
RBC # BLD: ABNORMAL 10*6/UL
RBC # BLD: ABNORMAL 10*6/UL
S PNEUM AG SPEC QL: NEGATIVE
SODIUM SERPL-SCNC: 140 MMOL/L (ref 132–146)
SPECIMEN SOURCE: NORMAL
WBC OTHER # BLD: 7.7 K/UL (ref 4.5–11.5)

## 2024-01-09 PROCEDURE — 85025 COMPLETE CBC W/AUTO DIFF WBC: CPT

## 2024-01-09 PROCEDURE — 2580000003 HC RX 258: Performed by: INTERNAL MEDICINE

## 2024-01-09 PROCEDURE — 6360000002 HC RX W HCPCS: Performed by: INTERNAL MEDICINE

## 2024-01-09 PROCEDURE — 93010 ELECTROCARDIOGRAM REPORT: CPT | Performed by: INTERNAL MEDICINE

## 2024-01-09 PROCEDURE — 99232 SBSQ HOSP IP/OBS MODERATE 35: CPT | Performed by: STUDENT IN AN ORGANIZED HEALTH CARE EDUCATION/TRAINING PROGRAM

## 2024-01-09 PROCEDURE — 6370000000 HC RX 637 (ALT 250 FOR IP): Performed by: NURSE PRACTITIONER

## 2024-01-09 PROCEDURE — 82306 VITAMIN D 25 HYDROXY: CPT

## 2024-01-09 PROCEDURE — 80048 BASIC METABOLIC PNL TOTAL CA: CPT

## 2024-01-09 PROCEDURE — 94640 AIRWAY INHALATION TREATMENT: CPT

## 2024-01-09 PROCEDURE — 86140 C-REACTIVE PROTEIN: CPT

## 2024-01-09 PROCEDURE — 2700000000 HC OXYGEN THERAPY PER DAY

## 2024-01-09 PROCEDURE — 6360000002 HC RX W HCPCS: Performed by: STUDENT IN AN ORGANIZED HEALTH CARE EDUCATION/TRAINING PROGRAM

## 2024-01-09 PROCEDURE — 6360000002 HC RX W HCPCS

## 2024-01-09 PROCEDURE — 6360000002 HC RX W HCPCS: Performed by: NURSE PRACTITIONER

## 2024-01-09 PROCEDURE — 2500000003 HC RX 250 WO HCPCS: Performed by: NURSE PRACTITIONER

## 2024-01-09 PROCEDURE — C9113 INJ PANTOPRAZOLE SODIUM, VIA: HCPCS | Performed by: STUDENT IN AN ORGANIZED HEALTH CARE EDUCATION/TRAINING PROGRAM

## 2024-01-09 PROCEDURE — 2580000003 HC RX 258: Performed by: NURSE PRACTITIONER

## 2024-01-09 PROCEDURE — 2060000000 HC ICU INTERMEDIATE R&B

## 2024-01-09 RX ORDER — ENOXAPARIN SODIUM 100 MG/ML
1 INJECTION SUBCUTANEOUS 2 TIMES DAILY
Status: DISCONTINUED | OUTPATIENT
Start: 2024-01-09 | End: 2024-01-16 | Stop reason: HOSPADM

## 2024-01-09 RX ORDER — PANTOPRAZOLE SODIUM 40 MG/10ML
40 INJECTION, POWDER, LYOPHILIZED, FOR SOLUTION INTRAVENOUS 2 TIMES DAILY
Status: DISCONTINUED | OUTPATIENT
Start: 2024-01-09 | End: 2024-01-16 | Stop reason: HOSPADM

## 2024-01-09 RX ORDER — KETOROLAC TROMETHAMINE 30 MG/ML
15 INJECTION, SOLUTION INTRAMUSCULAR; INTRAVENOUS ONCE
Status: COMPLETED | OUTPATIENT
Start: 2024-01-09 | End: 2024-01-09

## 2024-01-09 RX ADMIN — PANTOPRAZOLE SODIUM 40 MG: 40 INJECTION, POWDER, FOR SOLUTION INTRAVENOUS at 20:37

## 2024-01-09 RX ADMIN — ARFORMOTEROL TARTRATE 15 MCG: 15 SOLUTION RESPIRATORY (INHALATION) at 09:10

## 2024-01-09 RX ADMIN — IPRATROPIUM BROMIDE AND ALBUTEROL SULFATE 1 DOSE: .5; 2.5 SOLUTION RESPIRATORY (INHALATION) at 16:43

## 2024-01-09 RX ADMIN — ARFORMOTEROL TARTRATE 15 MCG: 15 SOLUTION RESPIRATORY (INHALATION) at 21:41

## 2024-01-09 RX ADMIN — IPRATROPIUM BROMIDE AND ALBUTEROL SULFATE 1 DOSE: .5; 2.5 SOLUTION RESPIRATORY (INHALATION) at 21:41

## 2024-01-09 RX ADMIN — ENOXAPARIN SODIUM 80 MG: 100 INJECTION SUBCUTANEOUS at 10:53

## 2024-01-09 RX ADMIN — PANTOPRAZOLE SODIUM 40 MG: 40 INJECTION, POWDER, FOR SOLUTION INTRAVENOUS at 10:53

## 2024-01-09 RX ADMIN — BUDESONIDE INHALATION 500 MCG: 0.5 SUSPENSION RESPIRATORY (INHALATION) at 09:10

## 2024-01-09 RX ADMIN — BUDESONIDE INHALATION 500 MCG: 0.5 SUSPENSION RESPIRATORY (INHALATION) at 21:41

## 2024-01-09 RX ADMIN — REMDESIVIR 100 MG: 100 INJECTION, POWDER, LYOPHILIZED, FOR SOLUTION INTRAVENOUS at 10:53

## 2024-01-09 RX ADMIN — Medication 10 ML: at 20:49

## 2024-01-09 RX ADMIN — IPRATROPIUM BROMIDE AND ALBUTEROL SULFATE 1 DOSE: .5; 2.5 SOLUTION RESPIRATORY (INHALATION) at 09:10

## 2024-01-09 RX ADMIN — KETOROLAC TROMETHAMINE 15 MG: 30 INJECTION, SOLUTION INTRAMUSCULAR; INTRAVENOUS at 20:37

## 2024-01-09 RX ADMIN — DOXYCYCLINE 100 MG: 100 INJECTION, POWDER, LYOPHILIZED, FOR SOLUTION INTRAVENOUS at 10:09

## 2024-01-09 RX ADMIN — IPRATROPIUM BROMIDE AND ALBUTEROL SULFATE 1 DOSE: .5; 2.5 SOLUTION RESPIRATORY (INHALATION) at 13:33

## 2024-01-09 RX ADMIN — Medication 10 ML: at 09:20

## 2024-01-09 RX ADMIN — DEXAMETHASONE SODIUM PHOSPHATE 20 MG: 4 INJECTION, SOLUTION INTRAMUSCULAR; INTRAVENOUS at 12:19

## 2024-01-09 RX ADMIN — PIPERACILLIN AND TAZOBACTAM 3375 MG: 3; .375 INJECTION, POWDER, LYOPHILIZED, FOR SOLUTION INTRAVENOUS at 09:09

## 2024-01-09 RX ADMIN — ENOXAPARIN SODIUM 80 MG: 100 INJECTION SUBCUTANEOUS at 21:39

## 2024-01-09 RX ADMIN — PIPERACILLIN AND TAZOBACTAM 3375 MG: 3; .375 INJECTION, POWDER, LYOPHILIZED, FOR SOLUTION INTRAVENOUS at 00:38

## 2024-01-09 RX ADMIN — PIPERACILLIN AND TAZOBACTAM 3375 MG: 3; .375 INJECTION, POWDER, LYOPHILIZED, FOR SOLUTION INTRAVENOUS at 15:51

## 2024-01-09 RX ADMIN — DEXTROSE AND SODIUM CHLORIDE: 5; 450 INJECTION, SOLUTION INTRAVENOUS at 03:53

## 2024-01-09 ASSESSMENT — PAIN SCALES - GENERAL: PAINLEVEL_OUTOF10: 9

## 2024-01-09 ASSESSMENT — PAIN DESCRIPTION - LOCATION: LOCATION: NECK

## 2024-01-09 NOTE — CARE COORDINATION
Social Work:    Covid + patient admitted from home & history of lung & laryngeal cancer with laryngectomy & tracheostomy tube.  [facility] had underwent his last treatment 3 wks ago.  Social work met with Alan and his wife Casie, advised them about social service role and discussed discharge planning. Alan is a patient of Dr. Quintana and he uses [facility] pharmacy on [address]. He resides in a 2-story home with 1 entry step and stay on the main floor in a recliner.  Alan was independent with ADL's prior to admission. He obtains his trach supplies through -DME and also has home 02  (at night) but he has not used it for years now and will need an updated order.  [facility] used [facility] and has been in [facility] but has never used skilled rehab.  He and Casie expect his return home and with  [facility] after choices were provided.  Social work called tentative referral into [facility] today.     Electronically signed by CARMINE Vera on 1/9/2024 at 3:38 PM

## 2024-01-09 NOTE — HOME CARE
[facility] referral received, awaiting final orders. Spoke with patient's wife Casie and verified demographics. Will follow.   Jing Oseguera LPN [facility].

## 2024-01-09 NOTE — PLAN OF CARE
Problem: Safety - Adult  Goal: Free from fall injury  Outcome: Progressing     Problem: Skin/Tissue Integrity  Goal: Absence of new skin breakdown  Description: 1.  Monitor for areas of redness and/or skin breakdown  2.  Assess vascular access sites hourly  3.  Every 4-6 hours minimum:  Change oxygen saturation probe site  4.  Every 4-6 hours:  If on nasal continuous positive airway pressure, respiratory therapy assess nares and determine need for appliance change or resting period.  Outcome: Progressing     Problem: Skin/Tissue Integrity  Goal: Absence of new skin breakdown  Description: 1.  Monitor for areas of redness and/or skin breakdown  2.  Assess vascular access sites hourly  3.  Every 4-6 hours minimum:  Change oxygen saturation probe site  4.  Every 4-6 hours:  If on nasal continuous positive airway pressure, respiratory therapy assess nares and determine need for appliance change or resting period.  Outcome: Progressing

## 2024-01-10 LAB
ANION GAP SERPL CALCULATED.3IONS-SCNC: 12 MMOL/L (ref 7–16)
BASOPHILS # BLD: 0.01 K/UL (ref 0–0.2)
BASOPHILS NFR BLD: 0 % (ref 0–2)
BILIRUB UR QL STRIP: NEGATIVE
BUN SERPL-MCNC: 23 MG/DL (ref 6–23)
CALCIUM SERPL-MCNC: 8.4 MG/DL (ref 8.6–10.2)
CHLORIDE SERPL-SCNC: 110 MMOL/L (ref 98–107)
CLARITY UR: CLEAR
CO2 SERPL-SCNC: 20 MMOL/L (ref 22–29)
COLOR UR: YELLOW
CREAT SERPL-MCNC: 0.9 MG/DL (ref 0.7–1.2)
EOSINOPHIL # BLD: 0 K/UL (ref 0.05–0.5)
EOSINOPHILS RELATIVE PERCENT: 0 % (ref 0–6)
ERYTHROCYTE [DISTWIDTH] IN BLOOD BY AUTOMATED COUNT: 14.4 % (ref 11.5–15)
GFR SERPL CREATININE-BSD FRML MDRD: >60 ML/MIN/1.73M2
GLUCOSE SERPL-MCNC: 165 MG/DL (ref 74–99)
GLUCOSE UR STRIP-MCNC: NEGATIVE MG/DL
HCT VFR BLD AUTO: 34.4 % (ref 37–54)
HGB BLD-MCNC: 11.7 G/DL (ref 12.5–16.5)
HGB UR QL STRIP.AUTO: ABNORMAL
IMM GRANULOCYTES # BLD AUTO: 0.1 K/UL (ref 0–0.58)
IMM GRANULOCYTES NFR BLD: 1 % (ref 0–5)
KETONES UR STRIP-MCNC: NEGATIVE MG/DL
LEUKOCYTE ESTERASE UR QL STRIP: NEGATIVE
LYMPHOCYTES NFR BLD: 0.4 K/UL (ref 1.5–4)
LYMPHOCYTES RELATIVE PERCENT: 4 % (ref 20–42)
MAGNESIUM SERPL-MCNC: 1.9 MG/DL (ref 1.6–2.6)
MCH RBC QN AUTO: 28 PG (ref 26–35)
MCHC RBC AUTO-ENTMCNC: 34 G/DL (ref 32–34.5)
MCV RBC AUTO: 82.3 FL (ref 80–99.9)
MICROORGANISM SPEC CULT: ABNORMAL
MICROORGANISM SPEC CULT: ABNORMAL
MICROORGANISM/AGENT SPEC: ABNORMAL
MONOCYTES NFR BLD: 0.22 K/UL (ref 0.1–0.95)
MONOCYTES NFR BLD: 2 % (ref 2–12)
NEUTROPHILS NFR BLD: 93 % (ref 43–80)
NEUTS SEG NFR BLD: 9.09 K/UL (ref 1.8–7.3)
NITRITE UR QL STRIP: NEGATIVE
PH UR STRIP: 7 [PH] (ref 5–9)
PLATELET # BLD AUTO: 215 K/UL (ref 130–450)
PMV BLD AUTO: 10.6 FL (ref 7–12)
POTASSIUM SERPL-SCNC: 3.3 MMOL/L (ref 3.5–5)
PROT UR STRIP-MCNC: NEGATIVE MG/DL
RBC # BLD AUTO: 4.18 M/UL (ref 3.8–5.8)
RBC # BLD: ABNORMAL 10*6/UL
RBC # BLD: ABNORMAL 10*6/UL
RBC #/AREA URNS HPF: ABNORMAL /HPF
SODIUM SERPL-SCNC: 142 MMOL/L (ref 132–146)
SP GR UR STRIP: 1.02 (ref 1–1.03)
SPECIMEN DESCRIPTION: ABNORMAL
UROBILINOGEN UR STRIP-ACNC: 0.2 EU/DL (ref 0–1)
WBC #/AREA URNS HPF: ABNORMAL /HPF
WBC OTHER # BLD: 9.8 K/UL (ref 4.5–11.5)

## 2024-01-10 PROCEDURE — C9113 INJ PANTOPRAZOLE SODIUM, VIA: HCPCS | Performed by: STUDENT IN AN ORGANIZED HEALTH CARE EDUCATION/TRAINING PROGRAM

## 2024-01-10 PROCEDURE — 85025 COMPLETE CBC W/AUTO DIFF WBC: CPT

## 2024-01-10 PROCEDURE — 6360000002 HC RX W HCPCS: Performed by: INTERNAL MEDICINE

## 2024-01-10 PROCEDURE — 6360000002 HC RX W HCPCS: Performed by: STUDENT IN AN ORGANIZED HEALTH CARE EDUCATION/TRAINING PROGRAM

## 2024-01-10 PROCEDURE — 81001 URINALYSIS AUTO W/SCOPE: CPT

## 2024-01-10 PROCEDURE — 6360000002 HC RX W HCPCS

## 2024-01-10 PROCEDURE — 94640 AIRWAY INHALATION TREATMENT: CPT

## 2024-01-10 PROCEDURE — 6370000000 HC RX 637 (ALT 250 FOR IP): Performed by: NURSE PRACTITIONER

## 2024-01-10 PROCEDURE — 87040 BLOOD CULTURE FOR BACTERIA: CPT

## 2024-01-10 PROCEDURE — 99232 SBSQ HOSP IP/OBS MODERATE 35: CPT | Performed by: STUDENT IN AN ORGANIZED HEALTH CARE EDUCATION/TRAINING PROGRAM

## 2024-01-10 PROCEDURE — 97161 PT EVAL LOW COMPLEX 20 MIN: CPT

## 2024-01-10 PROCEDURE — 97535 SELF CARE MNGMENT TRAINING: CPT

## 2024-01-10 PROCEDURE — 6360000002 HC RX W HCPCS: Performed by: NURSE PRACTITIONER

## 2024-01-10 PROCEDURE — 2060000000 HC ICU INTERMEDIATE R&B

## 2024-01-10 PROCEDURE — 2580000003 HC RX 258: Performed by: NURSE PRACTITIONER

## 2024-01-10 PROCEDURE — 97165 OT EVAL LOW COMPLEX 30 MIN: CPT

## 2024-01-10 PROCEDURE — 83735 ASSAY OF MAGNESIUM: CPT

## 2024-01-10 PROCEDURE — 2580000003 HC RX 258: Performed by: INTERNAL MEDICINE

## 2024-01-10 PROCEDURE — 2700000000 HC OXYGEN THERAPY PER DAY

## 2024-01-10 PROCEDURE — 80048 BASIC METABOLIC PNL TOTAL CA: CPT

## 2024-01-10 RX ORDER — POTASSIUM CHLORIDE 7.45 MG/ML
10 INJECTION INTRAVENOUS
Status: COMPLETED | OUTPATIENT
Start: 2024-01-10 | End: 2024-01-10

## 2024-01-10 RX ORDER — LORAZEPAM 2 MG/ML
0.5 INJECTION INTRAMUSCULAR NIGHTLY PRN
Status: DISCONTINUED | OUTPATIENT
Start: 2024-01-10 | End: 2024-01-10

## 2024-01-10 RX ORDER — LORAZEPAM 2 MG/ML
0.5 INJECTION INTRAMUSCULAR
Status: COMPLETED | OUTPATIENT
Start: 2024-01-10 | End: 2024-01-10

## 2024-01-10 RX ORDER — POTASSIUM CHLORIDE 20 MEQ/1
40 TABLET, EXTENDED RELEASE ORAL ONCE
Status: DISCONTINUED | OUTPATIENT
Start: 2024-01-10 | End: 2024-01-10

## 2024-01-10 RX ADMIN — Medication 10 ML: at 08:12

## 2024-01-10 RX ADMIN — PIPERACILLIN AND TAZOBACTAM 3375 MG: 3; .375 INJECTION, POWDER, LYOPHILIZED, FOR SOLUTION INTRAVENOUS at 01:08

## 2024-01-10 RX ADMIN — IPRATROPIUM BROMIDE AND ALBUTEROL SULFATE 1 DOSE: .5; 2.5 SOLUTION RESPIRATORY (INHALATION) at 16:14

## 2024-01-10 RX ADMIN — POTASSIUM CHLORIDE 10 MEQ: 7.46 INJECTION, SOLUTION INTRAVENOUS at 08:00

## 2024-01-10 RX ADMIN — IPRATROPIUM BROMIDE AND ALBUTEROL SULFATE 1 DOSE: .5; 2.5 SOLUTION RESPIRATORY (INHALATION) at 20:30

## 2024-01-10 RX ADMIN — BUDESONIDE INHALATION 500 MCG: 0.5 SUSPENSION RESPIRATORY (INHALATION) at 09:29

## 2024-01-10 RX ADMIN — PANTOPRAZOLE SODIUM 40 MG: 40 INJECTION, POWDER, FOR SOLUTION INTRAVENOUS at 20:33

## 2024-01-10 RX ADMIN — SODIUM CHLORIDE, PRESERVATIVE FREE 10 ML: 5 INJECTION INTRAVENOUS at 10:16

## 2024-01-10 RX ADMIN — DEXAMETHASONE SODIUM PHOSPHATE 20 MG: 4 INJECTION, SOLUTION INTRAMUSCULAR; INTRAVENOUS at 12:30

## 2024-01-10 RX ADMIN — REMDESIVIR 100 MG: 100 INJECTION, POWDER, LYOPHILIZED, FOR SOLUTION INTRAVENOUS at 10:30

## 2024-01-10 RX ADMIN — SODIUM CHLORIDE, PRESERVATIVE FREE 10 ML: 5 INJECTION INTRAVENOUS at 10:17

## 2024-01-10 RX ADMIN — POTASSIUM CHLORIDE 10 MEQ: 7.46 INJECTION, SOLUTION INTRAVENOUS at 09:05

## 2024-01-10 RX ADMIN — PIPERACILLIN AND TAZOBACTAM 3375 MG: 3; .375 INJECTION, POWDER, LYOPHILIZED, FOR SOLUTION INTRAVENOUS at 16:22

## 2024-01-10 RX ADMIN — ARFORMOTEROL TARTRATE 15 MCG: 15 SOLUTION RESPIRATORY (INHALATION) at 09:29

## 2024-01-10 RX ADMIN — ENOXAPARIN SODIUM 80 MG: 100 INJECTION SUBCUTANEOUS at 08:03

## 2024-01-10 RX ADMIN — IPRATROPIUM BROMIDE AND ALBUTEROL SULFATE 1 DOSE: .5; 2.5 SOLUTION RESPIRATORY (INHALATION) at 12:53

## 2024-01-10 RX ADMIN — PANTOPRAZOLE SODIUM 40 MG: 40 INJECTION, POWDER, FOR SOLUTION INTRAVENOUS at 08:04

## 2024-01-10 RX ADMIN — POTASSIUM CHLORIDE 10 MEQ: 7.46 INJECTION, SOLUTION INTRAVENOUS at 06:34

## 2024-01-10 RX ADMIN — PIPERACILLIN AND TAZOBACTAM 3375 MG: 3; .375 INJECTION, POWDER, LYOPHILIZED, FOR SOLUTION INTRAVENOUS at 08:07

## 2024-01-10 RX ADMIN — LORAZEPAM 0.5 MG: 2 INJECTION INTRAMUSCULAR; INTRAVENOUS at 20:33

## 2024-01-10 RX ADMIN — 0.12% CHLORHEXIDINE GLUCONATE 15 ML: 1.2 RINSE ORAL at 07:59

## 2024-01-10 RX ADMIN — BUDESONIDE INHALATION 500 MCG: 0.5 SUSPENSION RESPIRATORY (INHALATION) at 20:30

## 2024-01-10 RX ADMIN — ARFORMOTEROL TARTRATE 15 MCG: 15 SOLUTION RESPIRATORY (INHALATION) at 20:30

## 2024-01-10 RX ADMIN — IPRATROPIUM BROMIDE AND ALBUTEROL SULFATE 1 DOSE: .5; 2.5 SOLUTION RESPIRATORY (INHALATION) at 09:29

## 2024-01-10 RX ADMIN — Medication 10 ML: at 20:35

## 2024-01-10 RX ADMIN — POTASSIUM CHLORIDE 10 MEQ: 7.46 INJECTION, SOLUTION INTRAVENOUS at 10:27

## 2024-01-10 ASSESSMENT — PAIN SCALES - GENERAL: PAINLEVEL_OUTOF10: 0

## 2024-01-10 NOTE — PLAN OF CARE
Problem: ABCDS Injury Assessment  Goal: Absence of physical injury  Outcome: Progressing     Problem: Safety - Adult  Goal: Free from fall injury  Outcome: Progressing     Problem: Skin/Tissue Integrity  Goal: Absence of new skin breakdown  Description: 1.  Monitor for areas of redness and/or skin breakdown  2.  Assess vascular access sites hourly  3.  Every 4-6 hours minimum:  Change oxygen saturation probe site  4.  Every 4-6 hours:  If on nasal continuous positive airway pressure, respiratory therapy assess nares and determine need for appliance change or resting period.  Outcome: Progressing     Problem: Pain  Goal: Verbalizes/displays adequate comfort level or baseline comfort level  Outcome: Progressing

## 2024-01-10 NOTE — ACP (ADVANCE CARE PLANNING)
Advance Care Planning   Healthcare Decision Maker:    Primary Decision Maker: Casie Guajardo - [facility] - [phone]    Click here to complete Healthcare Decision Makers including selection of the Healthcare Decision Maker Relationship (ie \"Primary\").

## 2024-01-10 NOTE — PLAN OF CARE
Problem: ABCDS Injury Assessment  Goal: Absence of physical injury  1/10/2024 0402 by Preston Vargas RN  Outcome: Progressing     Problem: Safety - Adult  Goal: Free from fall injury  1/10/2024 1005 by Claudette Menard RN  Outcome: Progressing  1/10/2024 0402 by Preston Vargas RN  Outcome: Progressing     Problem: Skin/Tissue Integrity  Goal: Absence of new skin breakdown  Description: 1.  Monitor for areas of redness and/or skin breakdown  2.  Assess vascular access sites hourly  3.  Every 4-6 hours minimum:  Change oxygen saturation probe site  4.  Every 4-6 hours:  If on nasal continuous positive airway pressure, respiratory therapy assess nares and determine need for appliance change or resting period.  1/10/2024 1005 by Claudette Menard RN  Outcome: Progressing  1/10/2024 0402 by Preston Vargas RN  Outcome: Progressing     Problem: Pain  Goal: Verbalizes/displays adequate comfort level or baseline comfort level  1/10/2024 1005 by Claudette Menard RN  Outcome: Progressing  1/10/2024 0402 by Preston Vargas RN  Outcome: Progressing

## 2024-01-11 ENCOUNTER — ANESTHESIA (OUTPATIENT)
Dept: ENDOSCOPY | Age: 69
End: 2024-01-11
Payer: MEDICARE

## 2024-01-11 ENCOUNTER — ANESTHESIA EVENT (OUTPATIENT)
Dept: ENDOSCOPY | Age: 69
End: 2024-01-11
Payer: MEDICARE

## 2024-01-11 LAB
ACB COMPLEX DNA BLD POS QL NAA+NON-PROBE: NOT DETECTED
ANION GAP SERPL CALCULATED.3IONS-SCNC: 10 MMOL/L (ref 7–16)
B FRAGILIS DNA BLD POS QL NAA+NON-PROBE: NOT DETECTED
BASOPHILS # BLD: 0.01 K/UL (ref 0–0.2)
BASOPHILS NFR BLD: 0 % (ref 0–2)
BIOFIRE TEST COMMENT: ABNORMAL
BUN SERPL-MCNC: 27 MG/DL (ref 6–23)
C ALBICANS DNA BLD POS QL NAA+NON-PROBE: NOT DETECTED
C AURIS DNA BLD POS QL NAA+NON-PROBE: NOT DETECTED
C GATTII+NEOFOR DNA BLD POS QL NAA+N-PRB: NOT DETECTED
C GLABRATA DNA BLD POS QL NAA+NON-PROBE: NOT DETECTED
C KRUSEI DNA BLD POS QL NAA+NON-PROBE: NOT DETECTED
C PARAP DNA BLD POS QL NAA+NON-PROBE: NOT DETECTED
C TROPICLS DNA BLD POS QL NAA+NON-PROBE: NOT DETECTED
CALCIUM SERPL-MCNC: 8.4 MG/DL (ref 8.6–10.2)
CHLORIDE SERPL-SCNC: 112 MMOL/L (ref 98–107)
CO2 SERPL-SCNC: 18 MMOL/L (ref 22–29)
CREAT SERPL-MCNC: 1 MG/DL (ref 0.7–1.2)
E CLOAC COMP DNA BLD POS NAA+NON-PROBE: NOT DETECTED
E COLI DNA BLD POS QL NAA+NON-PROBE: NOT DETECTED
E FAECALIS DNA BLD POS QL NAA+NON-PROBE: NOT DETECTED
E FAECIUM DNA BLD POS QL NAA+NON-PROBE: NOT DETECTED
ENTEROBACTERALES DNA BLD POS NAA+N-PRB: NOT DETECTED
EOSINOPHIL # BLD: 0 K/UL (ref 0.05–0.5)
EOSINOPHILS RELATIVE PERCENT: 0 % (ref 0–6)
ERYTHROCYTE [DISTWIDTH] IN BLOOD BY AUTOMATED COUNT: 14.5 % (ref 11.5–15)
GFR SERPL CREATININE-BSD FRML MDRD: >60 ML/MIN/1.73M2
GLUCOSE SERPL-MCNC: 130 MG/DL (ref 74–99)
GP B STREP DNA BLD POS QL NAA+NON-PROBE: NOT DETECTED
HAEM INFLU DNA BLD POS QL NAA+NON-PROBE: NOT DETECTED
HCT VFR BLD AUTO: 35.9 % (ref 37–54)
HGB BLD-MCNC: 12.1 G/DL (ref 12.5–16.5)
IMM GRANULOCYTES # BLD AUTO: 0.04 K/UL (ref 0–0.58)
IMM GRANULOCYTES NFR BLD: 1 % (ref 0–5)
K OXYTOCA DNA BLD POS QL NAA+NON-PROBE: NOT DETECTED
KLEBSIELLA SP DNA BLD POS QL NAA+NON-PRB: NOT DETECTED
KLEBSIELLA SP DNA BLD POS QL NAA+NON-PRB: NOT DETECTED
L MONOCYTOG DNA BLD POS QL NAA+NON-PROBE: NOT DETECTED
LYMPHOCYTES NFR BLD: 0.44 K/UL (ref 1.5–4)
LYMPHOCYTES RELATIVE PERCENT: 5 % (ref 20–42)
MCH RBC QN AUTO: 27.6 PG (ref 26–35)
MCHC RBC AUTO-ENTMCNC: 33.7 G/DL (ref 32–34.5)
MCV RBC AUTO: 82 FL (ref 80–99.9)
MICROORGANISM SPEC CULT: ABNORMAL
MICROORGANISM/AGENT SPEC: ABNORMAL
MONOCYTES NFR BLD: 0.33 K/UL (ref 0.1–0.95)
MONOCYTES NFR BLD: 4 % (ref 2–12)
N MEN DNA BLD POS QL NAA+NON-PROBE: NOT DETECTED
NEUTROPHILS NFR BLD: 90 % (ref 43–80)
NEUTS SEG NFR BLD: 7.75 K/UL (ref 1.8–7.3)
P AERUGINOSA DNA BLD POS NAA+NON-PROBE: NOT DETECTED
PLATELET # BLD AUTO: 255 K/UL (ref 130–450)
PMV BLD AUTO: 10.8 FL (ref 7–12)
POTASSIUM SERPL-SCNC: 4.1 MMOL/L (ref 3.5–5)
PROTEUS SP DNA BLD POS QL NAA+NON-PROBE: NOT DETECTED
RBC # BLD AUTO: 4.38 M/UL (ref 3.8–5.8)
RBC # BLD: ABNORMAL 10*6/UL
RBC # BLD: ABNORMAL 10*6/UL
S AUREUS DNA BLD POS QL NAA+NON-PROBE: NOT DETECTED
S AUREUS+CONS DNA BLD POS NAA+NON-PROBE: NOT DETECTED
S EPIDERMIDIS DNA BLD POS QL NAA+NON-PRB: NOT DETECTED
S LUGDUNENSIS DNA BLD POS QL NAA+NON-PRB: NOT DETECTED
S MALTOPHILIA DNA BLD POS QL NAA+NON-PRB: NOT DETECTED
S MARCESCENS DNA BLD POS NAA+NON-PROBE: NOT DETECTED
S PNEUM DNA BLD POS QL NAA+NON-PROBE: NOT DETECTED
S PYO DNA BLD POS QL NAA+NON-PROBE: NOT DETECTED
SALMONELLA DNA BLD POS QL NAA+NON-PROBE: NOT DETECTED
SERVICE CMNT-IMP: ABNORMAL
SODIUM SERPL-SCNC: 140 MMOL/L (ref 132–146)
SPECIMEN DESCRIPTION: ABNORMAL
STREPTOCOCCUS DNA BLD POS NAA+NON-PROBE: DETECTED
WBC OTHER # BLD: 8.6 K/UL (ref 4.5–11.5)

## 2024-01-11 PROCEDURE — 94640 AIRWAY INHALATION TREATMENT: CPT

## 2024-01-11 PROCEDURE — 2060000000 HC ICU INTERMEDIATE R&B

## 2024-01-11 PROCEDURE — 6360000002 HC RX W HCPCS: Performed by: STUDENT IN AN ORGANIZED HEALTH CARE EDUCATION/TRAINING PROGRAM

## 2024-01-11 PROCEDURE — 2709999900 HC NON-CHARGEABLE SUPPLY: Performed by: INTERNAL MEDICINE

## 2024-01-11 PROCEDURE — 3609013300 HC EGD TUBE PLACEMENT: Performed by: INTERNAL MEDICINE

## 2024-01-11 PROCEDURE — 3E0G76Z INTRODUCTION OF NUTRITIONAL SUBSTANCE INTO UPPER GI, VIA NATURAL OR ARTIFICIAL OPENING: ICD-10-PCS | Performed by: INTERNAL MEDICINE

## 2024-01-11 PROCEDURE — 3700000000 HC ANESTHESIA ATTENDED CARE: Performed by: INTERNAL MEDICINE

## 2024-01-11 PROCEDURE — 6360000002 HC RX W HCPCS: Performed by: NURSE ANESTHETIST, CERTIFIED REGISTERED

## 2024-01-11 PROCEDURE — 2580000003 HC RX 258: Performed by: NURSE ANESTHETIST, CERTIFIED REGISTERED

## 2024-01-11 PROCEDURE — 6370000000 HC RX 637 (ALT 250 FOR IP): Performed by: NURSE PRACTITIONER

## 2024-01-11 PROCEDURE — 85025 COMPLETE CBC W/AUTO DIFF WBC: CPT

## 2024-01-11 PROCEDURE — 6360000002 HC RX W HCPCS: Performed by: INTERNAL MEDICINE

## 2024-01-11 PROCEDURE — 36415 COLL VENOUS BLD VENIPUNCTURE: CPT

## 2024-01-11 PROCEDURE — 3700000001 HC ADD 15 MINUTES (ANESTHESIA): Performed by: INTERNAL MEDICINE

## 2024-01-11 PROCEDURE — 99232 SBSQ HOSP IP/OBS MODERATE 35: CPT | Performed by: STUDENT IN AN ORGANIZED HEALTH CARE EDUCATION/TRAINING PROGRAM

## 2024-01-11 PROCEDURE — 2580000003 HC RX 258: Performed by: INTERNAL MEDICINE

## 2024-01-11 PROCEDURE — 7100000011 HC PHASE II RECOVERY - ADDTL 15 MIN: Performed by: INTERNAL MEDICINE

## 2024-01-11 PROCEDURE — C9113 INJ PANTOPRAZOLE SODIUM, VIA: HCPCS | Performed by: STUDENT IN AN ORGANIZED HEALTH CARE EDUCATION/TRAINING PROGRAM

## 2024-01-11 PROCEDURE — 0DH63UZ INSERTION OF FEEDING DEVICE INTO STOMACH, PERCUTANEOUS APPROACH: ICD-10-PCS | Performed by: INTERNAL MEDICINE

## 2024-01-11 PROCEDURE — 80048 BASIC METABOLIC PNL TOTAL CA: CPT

## 2024-01-11 PROCEDURE — 7100000010 HC PHASE II RECOVERY - FIRST 15 MIN: Performed by: INTERNAL MEDICINE

## 2024-01-11 PROCEDURE — 6360000002 HC RX W HCPCS: Performed by: NURSE PRACTITIONER

## 2024-01-11 PROCEDURE — 2580000003 HC RX 258: Performed by: NURSE PRACTITIONER

## 2024-01-11 RX ORDER — SODIUM CHLORIDE 9 MG/ML
INJECTION, SOLUTION INTRAVENOUS CONTINUOUS PRN
Status: DISCONTINUED | OUTPATIENT
Start: 2024-01-11 | End: 2024-01-11 | Stop reason: SDUPTHER

## 2024-01-11 RX ORDER — PROCHLORPERAZINE EDISYLATE 5 MG/ML
5 INJECTION INTRAMUSCULAR; INTRAVENOUS
Status: DISCONTINUED | OUTPATIENT
Start: 2024-01-11 | End: 2024-01-11 | Stop reason: HOSPADM

## 2024-01-11 RX ORDER — DEXTROSE MONOHYDRATE 100 MG/ML
INJECTION, SOLUTION INTRAVENOUS CONTINUOUS PRN
Status: DISCONTINUED | OUTPATIENT
Start: 2024-01-11 | End: 2024-01-11 | Stop reason: HOSPADM

## 2024-01-11 RX ORDER — SODIUM CHLORIDE 9 MG/ML
INJECTION, SOLUTION INTRAVENOUS PRN
Status: DISCONTINUED | OUTPATIENT
Start: 2024-01-11 | End: 2024-01-11 | Stop reason: HOSPADM

## 2024-01-11 RX ORDER — PROPOFOL 10 MG/ML
INJECTION, EMULSION INTRAVENOUS PRN
Status: DISCONTINUED | OUTPATIENT
Start: 2024-01-11 | End: 2024-01-11 | Stop reason: SDUPTHER

## 2024-01-11 RX ORDER — SODIUM CHLORIDE 0.9 % (FLUSH) 0.9 %
5-40 SYRINGE (ML) INJECTION PRN
Status: DISCONTINUED | OUTPATIENT
Start: 2024-01-11 | End: 2024-01-11 | Stop reason: HOSPADM

## 2024-01-11 RX ORDER — ONDANSETRON 2 MG/ML
4 INJECTION INTRAMUSCULAR; INTRAVENOUS
Status: DISCONTINUED | OUTPATIENT
Start: 2024-01-11 | End: 2024-01-11 | Stop reason: HOSPADM

## 2024-01-11 RX ORDER — SODIUM CHLORIDE 0.9 % (FLUSH) 0.9 %
5-40 SYRINGE (ML) INJECTION EVERY 12 HOURS SCHEDULED
Status: DISCONTINUED | OUTPATIENT
Start: 2024-01-11 | End: 2024-01-11 | Stop reason: HOSPADM

## 2024-01-11 RX ORDER — DIPHENHYDRAMINE HYDROCHLORIDE 50 MG/ML
12.5 INJECTION INTRAMUSCULAR; INTRAVENOUS
Status: DISCONTINUED | OUTPATIENT
Start: 2024-01-11 | End: 2024-01-11 | Stop reason: HOSPADM

## 2024-01-11 RX ORDER — IPRATROPIUM BROMIDE AND ALBUTEROL SULFATE 2.5; .5 MG/3ML; MG/3ML
1 SOLUTION RESPIRATORY (INHALATION)
Status: DISCONTINUED | OUTPATIENT
Start: 2024-01-11 | End: 2024-01-11 | Stop reason: HOSPADM

## 2024-01-11 RX ADMIN — PIPERACILLIN AND TAZOBACTAM 3375 MG: 3; .375 INJECTION, POWDER, LYOPHILIZED, FOR SOLUTION INTRAVENOUS at 10:40

## 2024-01-11 RX ADMIN — IPRATROPIUM BROMIDE AND ALBUTEROL SULFATE 1 DOSE: .5; 2.5 SOLUTION RESPIRATORY (INHALATION) at 21:17

## 2024-01-11 RX ADMIN — ARFORMOTEROL TARTRATE 15 MCG: 15 SOLUTION RESPIRATORY (INHALATION) at 10:03

## 2024-01-11 RX ADMIN — REMDESIVIR 100 MG: 100 INJECTION, POWDER, LYOPHILIZED, FOR SOLUTION INTRAVENOUS at 10:40

## 2024-01-11 RX ADMIN — ARFORMOTEROL TARTRATE 15 MCG: 15 SOLUTION RESPIRATORY (INHALATION) at 21:17

## 2024-01-11 RX ADMIN — PIPERACILLIN AND TAZOBACTAM 3375 MG: 3; .375 INJECTION, POWDER, LYOPHILIZED, FOR SOLUTION INTRAVENOUS at 17:44

## 2024-01-11 RX ADMIN — PIPERACILLIN AND TAZOBACTAM 3375 MG: 3; .375 INJECTION, POWDER, LYOPHILIZED, FOR SOLUTION INTRAVENOUS at 00:11

## 2024-01-11 RX ADMIN — IPRATROPIUM BROMIDE AND ALBUTEROL SULFATE 1 DOSE: .5; 2.5 SOLUTION RESPIRATORY (INHALATION) at 10:03

## 2024-01-11 RX ADMIN — BUDESONIDE INHALATION 500 MCG: 0.5 SUSPENSION RESPIRATORY (INHALATION) at 21:17

## 2024-01-11 RX ADMIN — DEXAMETHASONE SODIUM PHOSPHATE 20 MG: 4 INJECTION, SOLUTION INTRAMUSCULAR; INTRAVENOUS at 14:21

## 2024-01-11 RX ADMIN — SODIUM CHLORIDE: 9 INJECTION, SOLUTION INTRAVENOUS at 15:55

## 2024-01-11 RX ADMIN — PROPOFOL 240 MG: 10 INJECTION, EMULSION INTRAVENOUS at 16:03

## 2024-01-11 RX ADMIN — PANTOPRAZOLE SODIUM 40 MG: 40 INJECTION, POWDER, FOR SOLUTION INTRAVENOUS at 21:11

## 2024-01-11 RX ADMIN — IPRATROPIUM BROMIDE AND ALBUTEROL SULFATE 1 DOSE: .5; 2.5 SOLUTION RESPIRATORY (INHALATION) at 13:10

## 2024-01-11 RX ADMIN — PANTOPRAZOLE SODIUM 40 MG: 40 INJECTION, POWDER, FOR SOLUTION INTRAVENOUS at 10:40

## 2024-01-11 RX ADMIN — ENOXAPARIN SODIUM 80 MG: 100 INJECTION SUBCUTANEOUS at 21:12

## 2024-01-11 RX ADMIN — BUDESONIDE INHALATION 500 MCG: 0.5 SUSPENSION RESPIRATORY (INHALATION) at 10:03

## 2024-01-11 RX ADMIN — Medication 10 ML: at 10:41

## 2024-01-11 RX ADMIN — Medication 10 ML: at 21:11

## 2024-01-11 ASSESSMENT — LIFESTYLE VARIABLES: SMOKING_STATUS: 1

## 2024-01-11 NOTE — PLAN OF CARE
Problem: ABCDS Injury Assessment  Goal: Absence of physical injury  Outcome: Progressing     Problem: Discharge Planning  Goal: Discharge to home or other facility with appropriate resources  Outcome: Progressing     Problem: Safety - Adult  Goal: Free from fall injury  1/10/2024 2212 by Camelia Haney, RN  Outcome: Progressing  1/10/2024 1005 by Claudette Menard, RN  Outcome: Progressing

## 2024-01-11 NOTE — PROCEDURES
post-op.  Previous orders will be resumed.     A/P  PEG tube placement  Posterior UES ulcer    PEG tube care. OK to begin water and medications (including Eliquis) PEG tube 4 hours post procedure. OK to begin tube feeds the following morning. Consult dietary for TF is already made. Speech is on board to provide recommendations, hopefully they can evaluate tomorrow. Note patient was on soft diet at home however was unable to keep up with calorie demands, so likely ok for advancement. Will wait for speech recs however. If UES pain post procedure we we will prescribe viscous lido. Again discussed smoking cessation with the wife and patient. May consider repeat EGD with dilation as OP, attempts appear to be near futile, however he has mildly improved with each dilation.     Kavon Villagomez, DO  4:38 PM

## 2024-01-11 NOTE — ANESTHESIA PRE PROCEDURE
quit: Not Answered  Counseling given: Not Answered  Tobacco comments: Pt quit in 9/26/2018 & started back 2/2020 smoking .5 ppd      Vital Signs (Current):   Vitals:    01/11/24 0330 01/11/24 0915 01/11/24 1030 01/11/24 1236   BP: 128/81 (!) 148/93 131/78 130/80   Pulse: 85 88 88 71   Resp: 18 20 20 20   Temp: 99 °F (37.2 °C) 98 °F (36.7 °C) 97 °F (36.1 °C) 98.9 °F (37.2 °C)   TempSrc: Oral Infrared Infrared Axillary   SpO2: 97% 97% 92% 99%   Weight:       Height:                                                  BP Readings from Last 3 Encounters:   01/11/24 130/80   12/11/23 137/83   11/08/23 102/63       NPO Status:                                                                                 BMI:   Wt Readings from Last 3 Encounters:   01/07/24 78.9 kg (174 lb)   12/08/23 78.9 kg (174 lb)   12/07/23 78.9 kg (174 lb)     Body mass index is 24.27 kg/m².    CBC:   Lab Results   Component Value Date/Time    WBC 8.6 01/11/2024 03:20 AM    RBC 4.38 01/11/2024 03:20 AM    HGB 12.1 01/11/2024 03:20 AM    HCT 35.9 01/11/2024 03:20 AM    MCV 82.0 01/11/2024 03:20 AM    RDW 14.5 01/11/2024 03:20 AM     01/11/2024 03:20 AM       CMP:   Lab Results   Component Value Date/Time     01/11/2024 03:20 AM    K 4.1 01/11/2024 03:20 AM    K 4.0 09/09/2020 09:09 PM     01/11/2024 03:20 AM    CO2 18 01/11/2024 03:20 AM    BUN 27 01/11/2024 03:20 AM    CREATININE 1.0 01/11/2024 03:20 AM    GFRAA >60 11/18/2020 12:00 PM    LABGLOM >60 01/11/2024 03:20 AM    GLUCOSE 130 01/11/2024 03:20 AM    GLUCOSE 130 02/22/2012 02:14 AM    PROT 6.5 01/07/2024 07:24 PM    CALCIUM 8.4 01/11/2024 03:20 AM    BILITOT 1.0 01/07/2024 07:24 PM    ALKPHOS 105 01/07/2024 07:24 PM    AST 16 01/07/2024 07:24 PM    ALT 13 01/07/2024 07:24 PM       POC Tests: No results for input(s): \"POCGLU\", \"POCNA\", \"POCK\", \"POCCL\", \"POCBUN\", \"POCHEMO\", \"POCHCT\" in the last 72 hours.    Coags:   Lab Results   Component Value Date/Time    PROTIME 15.2

## 2024-01-11 NOTE — ANESTHESIA POSTPROCEDURE EVALUATION
Department of Anesthesiology  Postprocedure Note    Patient: Alan Guajardo Jr.  MRN: [id number]  YOB: 1955  Date of evaluation: 1/11/2024    Procedure Summary       Date: 01/11/24 Room / Location: [address]    Anesthesia Start: 1555 Anesthesia Stop: 1636    Procedure: EGD WITH DILATION AND PEG TUBE PLACEMENT Diagnosis:       Dysphagia, unspecified type      (Dysphagia, unspecified type [R13.10])    Surgeons: Kavon Villagomez DO Responsible Provider: Jamari Luna DO    Anesthesia Type: MAC ASA Status: 4            Anesthesia Type: No value filed.    Radames Phase I:      Radames Phase II:      Anesthesia Post Evaluation    Patient location during evaluation: bedside  Patient participation: complete - patient participated  Level of consciousness: awake and alert  Airway patency: patent (tracheostomy)  Nausea & Vomiting: no vomiting and no nausea  Cardiovascular status: hemodynamically stable  Respiratory status: spontaneous ventilation (O2 via trache mask to tracheostomy)  Hydration status: stable  Pain management: adequate        No notable events documented.

## 2024-01-12 ENCOUNTER — APPOINTMENT (OUTPATIENT)
Dept: CT IMAGING | Age: 69
DRG: 853 | End: 2024-01-12
Payer: MEDICARE

## 2024-01-12 ENCOUNTER — APPOINTMENT (OUTPATIENT)
Dept: GENERAL RADIOLOGY | Age: 69
DRG: 853 | End: 2024-01-12
Payer: MEDICARE

## 2024-01-12 LAB
ANION GAP SERPL CALCULATED.3IONS-SCNC: 8 MMOL/L (ref 7–16)
BASOPHILS # BLD: 0.01 K/UL (ref 0–0.2)
BASOPHILS NFR BLD: 0 % (ref 0–2)
BUN SERPL-MCNC: 26 MG/DL (ref 6–23)
CALCIUM SERPL-MCNC: 8.2 MG/DL (ref 8.6–10.2)
CHLORIDE SERPL-SCNC: 109 MMOL/L (ref 98–107)
CO2 SERPL-SCNC: 22 MMOL/L (ref 22–29)
CREAT SERPL-MCNC: 0.9 MG/DL (ref 0.7–1.2)
EOSINOPHIL # BLD: 0 K/UL (ref 0.05–0.5)
EOSINOPHILS RELATIVE PERCENT: 0 % (ref 0–6)
ERYTHROCYTE [DISTWIDTH] IN BLOOD BY AUTOMATED COUNT: 14.6 % (ref 11.5–15)
GFR SERPL CREATININE-BSD FRML MDRD: >60 ML/MIN/1.73M2
GLUCOSE SERPL-MCNC: 102 MG/DL (ref 74–99)
HCT VFR BLD AUTO: 37.7 % (ref 37–54)
HGB BLD-MCNC: 12.5 G/DL (ref 12.5–16.5)
IMM GRANULOCYTES # BLD AUTO: 0.05 K/UL (ref 0–0.58)
IMM GRANULOCYTES NFR BLD: 1 % (ref 0–5)
LYMPHOCYTES NFR BLD: 0.56 K/UL (ref 1.5–4)
LYMPHOCYTES RELATIVE PERCENT: 6 % (ref 20–42)
MCH RBC QN AUTO: 27.7 PG (ref 26–35)
MCHC RBC AUTO-ENTMCNC: 33.2 G/DL (ref 32–34.5)
MCV RBC AUTO: 83.6 FL (ref 80–99.9)
MONOCYTES NFR BLD: 0.31 K/UL (ref 0.1–0.95)
MONOCYTES NFR BLD: 4 % (ref 2–12)
NEUTROPHILS NFR BLD: 89 % (ref 43–80)
NEUTS SEG NFR BLD: 7.81 K/UL (ref 1.8–7.3)
PLATELET # BLD AUTO: 290 K/UL (ref 130–450)
PMV BLD AUTO: 10.6 FL (ref 7–12)
POTASSIUM SERPL-SCNC: 4 MMOL/L (ref 3.5–5)
RBC # BLD AUTO: 4.51 M/UL (ref 3.8–5.8)
RBC # BLD: ABNORMAL 10*6/UL
SODIUM SERPL-SCNC: 139 MMOL/L (ref 132–146)
WBC OTHER # BLD: 8.7 K/UL (ref 4.5–11.5)

## 2024-01-12 PROCEDURE — 2060000000 HC ICU INTERMEDIATE R&B

## 2024-01-12 PROCEDURE — 6370000000 HC RX 637 (ALT 250 FOR IP): Performed by: STUDENT IN AN ORGANIZED HEALTH CARE EDUCATION/TRAINING PROGRAM

## 2024-01-12 PROCEDURE — 2580000003 HC RX 258: Performed by: INTERNAL MEDICINE

## 2024-01-12 PROCEDURE — 6370000000 HC RX 637 (ALT 250 FOR IP): Performed by: INTERNAL MEDICINE

## 2024-01-12 PROCEDURE — 2700000000 HC OXYGEN THERAPY PER DAY

## 2024-01-12 PROCEDURE — 2580000003 HC RX 258: Performed by: NURSE PRACTITIONER

## 2024-01-12 PROCEDURE — 92526 ORAL FUNCTION THERAPY: CPT

## 2024-01-12 PROCEDURE — 70486 CT MAXILLOFACIAL W/O DYE: CPT

## 2024-01-12 PROCEDURE — 6360000002 HC RX W HCPCS: Performed by: NURSE PRACTITIONER

## 2024-01-12 PROCEDURE — 6360000002 HC RX W HCPCS: Performed by: INTERNAL MEDICINE

## 2024-01-12 PROCEDURE — 94640 AIRWAY INHALATION TREATMENT: CPT

## 2024-01-12 PROCEDURE — 97530 THERAPEUTIC ACTIVITIES: CPT

## 2024-01-12 PROCEDURE — 2500000003 HC RX 250 WO HCPCS: Performed by: RADIOLOGY

## 2024-01-12 PROCEDURE — 6360000002 HC RX W HCPCS: Performed by: STUDENT IN AN ORGANIZED HEALTH CARE EDUCATION/TRAINING PROGRAM

## 2024-01-12 PROCEDURE — 74230 X-RAY XM SWLNG FUNCJ C+: CPT

## 2024-01-12 PROCEDURE — 80048 BASIC METABOLIC PNL TOTAL CA: CPT

## 2024-01-12 PROCEDURE — C9113 INJ PANTOPRAZOLE SODIUM, VIA: HCPCS | Performed by: STUDENT IN AN ORGANIZED HEALTH CARE EDUCATION/TRAINING PROGRAM

## 2024-01-12 PROCEDURE — 85025 COMPLETE CBC W/AUTO DIFF WBC: CPT

## 2024-01-12 PROCEDURE — 92611 MOTION FLUOROSCOPY/SWALLOW: CPT

## 2024-01-12 PROCEDURE — 99232 SBSQ HOSP IP/OBS MODERATE 35: CPT | Performed by: STUDENT IN AN ORGANIZED HEALTH CARE EDUCATION/TRAINING PROGRAM

## 2024-01-12 PROCEDURE — 6370000000 HC RX 637 (ALT 250 FOR IP): Performed by: NURSE PRACTITIONER

## 2024-01-12 RX ORDER — VITAMIN B COMPLEX
5000 TABLET ORAL DAILY
Status: DISCONTINUED | OUTPATIENT
Start: 2024-01-12 | End: 2024-01-16 | Stop reason: HOSPADM

## 2024-01-12 RX ORDER — VITAMIN B COMPLEX
2000 TABLET ORAL DAILY
Status: DISCONTINUED | OUTPATIENT
Start: 2024-01-12 | End: 2024-01-12

## 2024-01-12 RX ORDER — ATORVASTATIN CALCIUM 20 MG/1
20 TABLET, FILM COATED ORAL DAILY
Status: DISCONTINUED | OUTPATIENT
Start: 2024-01-12 | End: 2024-01-16 | Stop reason: HOSPADM

## 2024-01-12 RX ORDER — GUAIFENESIN 400 MG/1
400 TABLET ORAL 3 TIMES DAILY
Status: DISCONTINUED | OUTPATIENT
Start: 2024-01-12 | End: 2024-01-16 | Stop reason: HOSPADM

## 2024-01-12 RX ORDER — MIDODRINE HYDROCHLORIDE 5 MG/1
5 TABLET ORAL 2 TIMES DAILY WITH MEALS
Status: DISCONTINUED | OUTPATIENT
Start: 2024-01-12 | End: 2024-01-16 | Stop reason: HOSPADM

## 2024-01-12 RX ORDER — DULOXETIN HYDROCHLORIDE 30 MG/1
30 CAPSULE, DELAYED RELEASE ORAL 2 TIMES DAILY
Status: DISCONTINUED | OUTPATIENT
Start: 2024-01-12 | End: 2024-01-16 | Stop reason: HOSPADM

## 2024-01-12 RX ORDER — LEVOTHYROXINE SODIUM 0.1 MG/1
100 TABLET ORAL DAILY
Status: DISCONTINUED | OUTPATIENT
Start: 2024-01-13 | End: 2024-01-16 | Stop reason: HOSPADM

## 2024-01-12 RX ADMIN — ACETAMINOPHEN 650 MG: 325 TABLET ORAL at 22:40

## 2024-01-12 RX ADMIN — IPRATROPIUM BROMIDE AND ALBUTEROL SULFATE 1 DOSE: .5; 2.5 SOLUTION RESPIRATORY (INHALATION) at 09:26

## 2024-01-12 RX ADMIN — IPRATROPIUM BROMIDE AND ALBUTEROL SULFATE 1 DOSE: .5; 2.5 SOLUTION RESPIRATORY (INHALATION) at 13:19

## 2024-01-12 RX ADMIN — ENOXAPARIN SODIUM 80 MG: 100 INJECTION SUBCUTANEOUS at 21:23

## 2024-01-12 RX ADMIN — DULOXETINE 30 MG: 30 CAPSULE, DELAYED RELEASE ORAL at 21:22

## 2024-01-12 RX ADMIN — Medication 10 ML: at 18:46

## 2024-01-12 RX ADMIN — IPRATROPIUM BROMIDE AND ALBUTEROL SULFATE 1 DOSE: .5; 2.5 SOLUTION RESPIRATORY (INHALATION) at 16:04

## 2024-01-12 RX ADMIN — PIPERACILLIN AND TAZOBACTAM 3375 MG: 3; .375 INJECTION, POWDER, LYOPHILIZED, FOR SOLUTION INTRAVENOUS at 00:17

## 2024-01-12 RX ADMIN — ARFORMOTEROL TARTRATE 15 MCG: 15 SOLUTION RESPIRATORY (INHALATION) at 09:26

## 2024-01-12 RX ADMIN — REMDESIVIR 100 MG: 100 INJECTION, POWDER, LYOPHILIZED, FOR SOLUTION INTRAVENOUS at 11:50

## 2024-01-12 RX ADMIN — Medication 5000 UNITS: at 11:47

## 2024-01-12 RX ADMIN — BARIUM SULFATE 70 G: 0.81 POWDER, FOR SUSPENSION ORAL at 15:12

## 2024-01-12 RX ADMIN — BARIUM SULFATE 120 ML: 400 SUSPENSION ORAL at 15:12

## 2024-01-12 RX ADMIN — PIPERACILLIN AND TAZOBACTAM 3375 MG: 3; .375 INJECTION, POWDER, LYOPHILIZED, FOR SOLUTION INTRAVENOUS at 18:46

## 2024-01-12 RX ADMIN — BUDESONIDE INHALATION 500 MCG: 0.5 SUSPENSION RESPIRATORY (INHALATION) at 09:26

## 2024-01-12 RX ADMIN — GUAIFENESIN 400 MG: 400 TABLET ORAL at 21:22

## 2024-01-12 RX ADMIN — PANTOPRAZOLE SODIUM 40 MG: 40 INJECTION, POWDER, FOR SOLUTION INTRAVENOUS at 11:48

## 2024-01-12 RX ADMIN — DULOXETINE 30 MG: 30 CAPSULE, DELAYED RELEASE ORAL at 11:49

## 2024-01-12 RX ADMIN — GUAIFENESIN 400 MG: 400 TABLET ORAL at 18:45

## 2024-01-12 RX ADMIN — IPRATROPIUM BROMIDE AND ALBUTEROL SULFATE 1 DOSE: .5; 2.5 SOLUTION RESPIRATORY (INHALATION) at 19:43

## 2024-01-12 RX ADMIN — ATORVASTATIN CALCIUM 20 MG: 20 TABLET, FILM COATED ORAL at 11:47

## 2024-01-12 RX ADMIN — ENOXAPARIN SODIUM 80 MG: 100 INJECTION SUBCUTANEOUS at 11:48

## 2024-01-12 RX ADMIN — TAMSULOSIN HYDROCHLORIDE 0.4 MG: 0.4 CAPSULE ORAL at 21:25

## 2024-01-12 RX ADMIN — BUDESONIDE INHALATION 500 MCG: 0.5 SUSPENSION RESPIRATORY (INHALATION) at 19:43

## 2024-01-12 RX ADMIN — ACETAMINOPHEN 650 MG: 325 TABLET ORAL at 11:48

## 2024-01-12 RX ADMIN — PANTOPRAZOLE SODIUM 40 MG: 40 INJECTION, POWDER, FOR SOLUTION INTRAVENOUS at 21:22

## 2024-01-12 RX ADMIN — GUAIFENESIN 400 MG: 400 TABLET ORAL at 11:47

## 2024-01-12 RX ADMIN — DEXAMETHASONE SODIUM PHOSPHATE 20 MG: 4 INJECTION, SOLUTION INTRAMUSCULAR; INTRAVENOUS at 13:00

## 2024-01-12 RX ADMIN — MIDODRINE HYDROCHLORIDE 5 MG: 5 TABLET ORAL at 11:48

## 2024-01-12 RX ADMIN — MIDODRINE HYDROCHLORIDE 5 MG: 5 TABLET ORAL at 18:45

## 2024-01-12 RX ADMIN — Medication 10 ML: at 21:24

## 2024-01-12 RX ADMIN — BARIUM SULFATE 10 ML: 400 PASTE ORAL at 15:11

## 2024-01-12 RX ADMIN — PIPERACILLIN AND TAZOBACTAM 3375 MG: 3; .375 INJECTION, POWDER, LYOPHILIZED, FOR SOLUTION INTRAVENOUS at 11:49

## 2024-01-12 RX ADMIN — ARFORMOTEROL TARTRATE 15 MCG: 15 SOLUTION RESPIRATORY (INHALATION) at 19:43

## 2024-01-12 ASSESSMENT — PAIN SCALES - GENERAL
PAINLEVEL_OUTOF10: 2
PAINLEVEL_OUTOF10: 3
PAINLEVEL_OUTOF10: 0

## 2024-01-12 ASSESSMENT — PAIN DESCRIPTION - DESCRIPTORS
DESCRIPTORS: ACHING
DESCRIPTORS: ACHING;DULL

## 2024-01-12 ASSESSMENT — PAIN DESCRIPTION - LOCATION: LOCATION: NECK

## 2024-01-12 ASSESSMENT — PAIN DESCRIPTION - ORIENTATION: ORIENTATION: OTHER (COMMENT)

## 2024-01-12 NOTE — CARE COORDINATION
Social work      RN Patience advised social work of possible tooth infection and extraction while hospitalized. Social work made a follow-up visit with Alan and his wife Casie.  Plan remains home with [facility].  Social work obtained Alan's signature for pharmacy/new tube feed and faxed to [facility], as well as notified Laura, liaison.  Laura will check coverage and update patient & wife. Alan is on [facility] schedule for Tuesday and they will need notified if discharged prior to see if they can assist.  Casie requested a 3-1 commode, ETB, and will need updated home 02 from [facility]. Pulse ox testing is needed prior to discharge.  (Patient's 02 is from years ago and only used at night but has not used in years).      Electronically signed by CARMINE Vera on 1/12/2024 at 3:44 PM

## 2024-01-12 NOTE — PLAN OF CARE
Problem: Discharge Planning  Goal: Discharge to home or other facility with appropriate resources  Outcome: Progressing  Flowsheets (Taken 1/11/2024 0915 by Patience Mack, RN)  Discharge to home or other facility with appropriate resources: Identify barriers to discharge with patient and caregiver     Problem: ABCDS Injury Assessment  Goal: Absence of physical injury  Outcome: Progressing     Problem: Safety - Adult  Goal: Free from fall injury  1/11/2024 2219 by Camelia Haney, RN  Outcome: Progressing  1/11/2024 1611 by Patience Mack, RN  Outcome: Progressing     Problem: Nutrition Deficit:  Goal: Optimize nutritional status  1/11/2024 1611 by Patience Mack, RN  Outcome: Not Progressing

## 2024-01-12 NOTE — CONSULTS
[facility]  Hematology/Oncology  Consult      Patient Name: Alan Guajardo Jr.  YOB: 1955  PCP: Sylvai Quintana DO   Referring Provider:      Reason for Consultation:   Chief Complaint   Patient presents with    Shortness of Breath     HX of Lung CA. Trach. O2 as needed. 88% RA per EMS.    Fever        History of Present Illness:  This is a 68-year-old male patient with following with Dr. 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 He underwent concurrent XRT and Taxol/Carbo then Alimta/Carbo/Avastin. He was then diagnosed with laryngeal squamous cell carcinoma s/p tracheostomy on 2017, S/P palliative radiation therapy, and was started on immunotherapy Keytruda in 2017 every 3 weeks receiving his last treatment on 12/14/23. He was due on 1/11/24.  Patient presented to the ED via EMS for evaluation of shortness of breath in which she was 88% on room air.  He had finished 5 days of a Z-Eugenio prior to admission for an upper respiratory infection and cough.  Patient was found to be COVID-positive.  CT chest showed bilateral pulmonary opacifications consolidation and dense in the lower lungs left greater than right concerning for bronchopneumonia aspiration not excluded.  Trace to small right and small pleural effusions.  Right nonobstructing nephrolithiasis.  Pulmonology was consulted patient is status post 1 dose of tocilizumab.  On steroids and Zosyn.  ID following for COVID-positive and positive blood cultures positive for Streptococcus oralis.  CT face ordered to assess for origin of infection, possible dental abscess. GI following with plans for EGD and dilatation with possible PEG placement today.  CMP with BUN 27, calcium 8.4 procalcitonin 87.24.   proBNP 1470, troponin 39.  LFTs unremarkable.  CBC with Hgb 12.1, WBC and platelets WNL consultation for patient with known lung cancer on current immunotherapy.    Diagnostic Data:     Past 
Gastroenterology Consult Note   BG Nieto-NP-C with Kavon Villagomez D.O. Consult Note        Date of Service: 1/8/2024  Reason for Consult: dysphagia  Requesting Physician: Aury PEDERSON CNP    CHIEF COMPLAINT:  lethargy     History Obtained From:  patient, electronic medical record    HISTORY OF PRESENT ILLNESS:       Alan Guajardo Jr. is a 68 y.o. male with significant past medical history of AAA without rupture, COPD, depression/anxiety, DVT on Eliquis, HLD, thyroid disease, malignant neoplasm of lung 2017 and Larynex 2018 s/p tracheostomy (#6 Shiley) 2017.  Receives immunotherapy (Keytruda) every 3 weeks and has been in remission presenting to ED for lethargy and admitted with acute respiratory failure. Pt was scheduled for OP esophageal dilatation however at time of appt pt found to have a temperature therefore procedure was cancelled.  HPI provided mostly from wife at bedside. Per wife, pt had followed up with PCP and started on ZPAK post cancellation of procedure. Pt was lethargic at home unable to eat and had fever. Pt was previously doing well. Pt has increase in weakness and continues to have weight loss 2/2 to dysphagia. PEG d/w pt and wife who agree to proceed. No complaints of abdominal pain or nausea. BM's are \"normal\", denies melena or hematochezia. Uses stool softeners on occasion. Last EGD noted below. Admission labs k 2.8, COVID +.  Consultation for dysphagia.  Pt is known to Dr. Villagomez, last seen with EGD that was cancelled on 1/3/24. EGD 12/11/23 with Dr. Villagomez- 1.  Posterior UES ulceration ? stasis and anterior extrinsic compression from trach, with stenosed area - Savary dilation up to 10mm and then TTS CRE dilation up to 12mm were performed. 2. Small hiatial hernia. 3. Mild antral gastritis. Currently, pt resting comfortably in bed in ER, waiting bed availability. No current distress. No complaints of abdominal pain or nausea.  Labs today unremarkable.     Past 
Pt. Seen and examined. Pt. Was off floor and in radiology where I examined him. Pt. Is not c/o any oral pain. He is edentulous on the maxilla. Lower mandible is partial edentulous. He does have severe periodontal disease with remaining lower teeth have  significant caries. CT Facial bones showing periapical radiolucency at the apex of right second bicuspid.     I discussed having his remaining mandibular teeth extracted while in house and he agreed.     PLAN:  Extract remaining mandibular teeth while inpatient.  Get internal med clearance for extractions GA vs Local in OR?  Stopping anticoagulants prior to extractions.      Will follow.  
Weight: 83.5 kg (183 lb 15.9 oz) (8/28/23 per [facility])  % Weight Change (Calculated): -5.5                    BMI Categories: Normal Weight (BMI 22.0 to 24.9) age over 65    Estimated Daily Nutrient Needs:  Energy Requirements Based On: Kcal/kg  Weight Used for Energy Requirements: Admission  Energy (kcal/day):  (30-32 kcal/kg)  Weight Used for Protein Requirements: Admission  Protein (g/day):  (1.2-1.4 g/kg)  Method Used for Fluid Requirements: 1 ml/kcal  Fluid (ml/day):     Nutrition Diagnosis:   Inadequate oral intake related to impaired respiratory function as evidenced by poor intake prior to admission, NPO or clear liquid status due to medical condition    Nutrition Interventions:   Food and/or Nutrient Delivery: Continue NPO, Start Tube Feeding (Two Michele HN bolus TF x 4/d)  Nutrition Education/Counseling: No recommendation at this time  Coordination of Nutrition Care: Continue to monitor while inpatient       Goals:  Previous Goal Met: Progressing toward Goal(s)  Goals: Tolerate nutrition support at goal rate, by next RD assessment  Specify Other Goals: nutrition progression    Nutrition Monitoring and Evaluation:   Behavioral-Environmental Outcomes: None Identified  Food/Nutrient Intake Outcomes: Enteral Nutrition Intake/Tolerance  Physical Signs/Symptoms Outcomes: Biochemical Data, GI Status, Fluid Status or Edema, Nutrition Focused Physical Findings, Skin, Weight    Discharge Planning:    Enteral Nutrition     Saba Allen, GUERA, CNSC, LD  Contact: x 1994    
NERVE BLOCK Bilateral 5/8/2023    BILATERAL SACROILIAC JOINT INJECTION UNDER FLUOROSCOPIC GUIDANCE performed by Garfield Conner MD at [facility] OR    NERVE BLOCK Bilateral 10/5/2023    BILATERAL SACROILIAC JOINT INJECTION UNDER FLUOROSCOPIC GUIDANCE performed by Garfield Conner MD at [facility] OR    OTHER SURGICAL HISTORY  4/15/2016    cervical myelogram    PAIN MANAGEMENT PROCEDURE Right 12/28/2020    # 1 LUMBAR TRANSFORAMINAL EPIDURAL STEROID INJECTION RIGHT L3 AND L4 UNDER FLUOROSCOPIC GUIDANCE performed by Garfield Conner MD at [facility] OR    PAIN MANAGEMENT PROCEDURE Right 9/30/2021    LUMBAR TRANSFORAMINAL EPIDURAL STEROID INJECTION RIGHT L3 AND L4 UNDER FLUOROSCOPIC GUIDANCE (CPT 70615) performed by Garfield Conner MD at [facility] OR    PAIN MANAGEMENT PROCEDURE Right 4/25/2022    RIGHT LUMBAR RADIOFREQUENCY ABLATION L5 AND S1 performed by Garfield Conner MD at [facility] OR    ROTATOR CUFF REPAIR Right     SINUS SURGERY      TRACHEOSTOMY  05/24/2017    # 6 Shiley (disposable)    TRACHEOSTOMY      UPPER GASTROINTESTINAL ENDOSCOPY N/A 9/13/2023    EGD ESOPHAGOGASTRODUODENOSCOPY/ POSSIBLE DILATATION performed by Kavon Villagomez DO at [facility] ENDOSCOPY    UPPER GASTROINTESTINAL ENDOSCOPY N/A 12/11/2023    EGD WITH DILATION performed by Kavon Villagomez DO at [facility] ENDOSCOPY       Family History   Problem Relation Age of Onset    Cancer Mother         breast cancer    Cancer Father         prostate cancer    Diabetes Father        Social History:   Social History     Socioeconomic History    Marital status:      Spouse name: Not on file    Number of children: Not on file    Years of education: Not on file    Highest education level: Not on file   Occupational History    Occupation: supervior a tube mill- [employer]     Comment: disability short term   Tobacco Use    Smoking status: Every Day     Current packs/day: 0.50     Average packs/day: 0.5 packs/day for 52.0 years (26.0 ttl pk-yrs)     Types: Cigarettes    
  CALCIUM 8.4*     COAG's  No results for input(s): \"INR\", \"PTT\" in the last 72 hours.    Invalid input(s): \"PT\"  CALCIUM  Recent Labs     01/07/24  1924 01/08/24  0655 01/09/24  0307   CALCIUM 8.8 7.7* 8.4*     PTH  No results for input(s): \"PTH\" in the last 72 hours.    RADIOLOGY    CT CHEST WO CONTRAST    Result Date: 1/8/2024  EXAMINATION: CT OF THE CHEST WITHOUT CONTRAST 1/8/2024 10:18 am TECHNIQUE: CT of the chest was performed without the administration of intravenous contrast. Multiplanar reformatted images are provided for review. Automated exposure control, iterative reconstruction, and/or weight based adjustment of the mA/kV was utilized to reduce the radiation dose to as low as reasonably achievable. COMPARISON: CT chest dated 01/19/2023 HISTORY: ORDERING SYSTEM PROVIDED HISTORY: pneumonia, COVID vs aspiration TECHNOLOGIST PROVIDED HISTORY: Reason for exam:->pneumonia, COVID vs aspiration FINDINGS: Mediastinum: Thyroid is homogeneous in attenuation. No bulky mediastinal adenopathy.  Tracheostomy in place with minimal secretions in the trachea and mainstem bronchi without significant mucous plugging.  Esophagus with normal course and caliber.  Cardiac size enlarged without pericardial effusion. Lungs/pleura: Bilateral pulmonary opacifications consolidative and dense in the lower lungs left greater than right concerning for bronchopneumonia aspiration not excluded.  Trace to small right and small left pleural effusions. Upper Abdomen: Visualized portions of the upper abdomen reveal tiny right intrarenal stones of nonobstructing right nephrolithiasis Soft Tissues/Bones: No acute osseous or soft tissue findings. No aggressive osseous lesion.     Bilateral pulmonary opacifications consolidative and dense in the lower lungs left greater than right concerning for bronchopneumonia aspiration not excluded. Trace to small right and small left pleural effusions. Right nonobstructing nephrolithiasis     XR CHEST 
avoiding tocilizumab if there is sign of superimposed bacterial pneumonia or any bacterial infection.  Monitor labs  Will follow with you    Thank you for having us see this patient in consultation. I will be discussing this case with the treating physicians.    Electronically signed by SELENE SMALLWOOD MD on 1/9/2024 at 12:14 PM

## 2024-01-13 PROCEDURE — 6370000000 HC RX 637 (ALT 250 FOR IP): Performed by: INTERNAL MEDICINE

## 2024-01-13 PROCEDURE — 6360000002 HC RX W HCPCS: Performed by: NURSE PRACTITIONER

## 2024-01-13 PROCEDURE — 6360000002 HC RX W HCPCS: Performed by: INTERNAL MEDICINE

## 2024-01-13 PROCEDURE — 6360000002 HC RX W HCPCS: Performed by: STUDENT IN AN ORGANIZED HEALTH CARE EDUCATION/TRAINING PROGRAM

## 2024-01-13 PROCEDURE — 2060000000 HC ICU INTERMEDIATE R&B

## 2024-01-13 PROCEDURE — C9113 INJ PANTOPRAZOLE SODIUM, VIA: HCPCS | Performed by: STUDENT IN AN ORGANIZED HEALTH CARE EDUCATION/TRAINING PROGRAM

## 2024-01-13 PROCEDURE — 99232 SBSQ HOSP IP/OBS MODERATE 35: CPT | Performed by: STUDENT IN AN ORGANIZED HEALTH CARE EDUCATION/TRAINING PROGRAM

## 2024-01-13 PROCEDURE — 2580000003 HC RX 258: Performed by: NURSE PRACTITIONER

## 2024-01-13 PROCEDURE — 6370000000 HC RX 637 (ALT 250 FOR IP): Performed by: NURSE PRACTITIONER

## 2024-01-13 PROCEDURE — 94640 AIRWAY INHALATION TREATMENT: CPT

## 2024-01-13 PROCEDURE — 2580000003 HC RX 258: Performed by: INTERNAL MEDICINE

## 2024-01-13 PROCEDURE — 6370000000 HC RX 637 (ALT 250 FOR IP): Performed by: STUDENT IN AN ORGANIZED HEALTH CARE EDUCATION/TRAINING PROGRAM

## 2024-01-13 RX ORDER — OXYCODONE HCL 5 MG/5 ML
5 SOLUTION, ORAL ORAL EVERY 4 HOURS PRN
Status: DISCONTINUED | OUTPATIENT
Start: 2024-01-13 | End: 2024-01-16 | Stop reason: HOSPADM

## 2024-01-13 RX ADMIN — DULOXETINE 30 MG: 30 CAPSULE, DELAYED RELEASE ORAL at 08:06

## 2024-01-13 RX ADMIN — PIPERACILLIN AND TAZOBACTAM 3375 MG: 3; .375 INJECTION, POWDER, LYOPHILIZED, FOR SOLUTION INTRAVENOUS at 03:41

## 2024-01-13 RX ADMIN — DEXAMETHASONE SODIUM PHOSPHATE 10 MG: 10 INJECTION INTRAMUSCULAR; INTRAVENOUS at 10:34

## 2024-01-13 RX ADMIN — Medication 10 ML: at 21:12

## 2024-01-13 RX ADMIN — 0.12% CHLORHEXIDINE GLUCONATE 15 ML: 1.2 RINSE ORAL at 08:06

## 2024-01-13 RX ADMIN — GUAIFENESIN 400 MG: 400 TABLET ORAL at 13:45

## 2024-01-13 RX ADMIN — IPRATROPIUM BROMIDE AND ALBUTEROL SULFATE 1 DOSE: .5; 2.5 SOLUTION RESPIRATORY (INHALATION) at 15:35

## 2024-01-13 RX ADMIN — GUAIFENESIN 400 MG: 400 TABLET ORAL at 08:06

## 2024-01-13 RX ADMIN — IPRATROPIUM BROMIDE AND ALBUTEROL SULFATE 1 DOSE: .5; 2.5 SOLUTION RESPIRATORY (INHALATION) at 13:27

## 2024-01-13 RX ADMIN — TAMSULOSIN HYDROCHLORIDE 0.4 MG: 0.4 CAPSULE ORAL at 21:11

## 2024-01-13 RX ADMIN — DULOXETINE 30 MG: 30 CAPSULE, DELAYED RELEASE ORAL at 21:11

## 2024-01-13 RX ADMIN — OXYCODONE HYDROCHLORIDE 5 MG: 5 SOLUTION ORAL at 09:42

## 2024-01-13 RX ADMIN — LEVOTHYROXINE SODIUM 100 MCG: 100 TABLET ORAL at 06:29

## 2024-01-13 RX ADMIN — MIDODRINE HYDROCHLORIDE 5 MG: 5 TABLET ORAL at 08:06

## 2024-01-13 RX ADMIN — OXYCODONE HYDROCHLORIDE 5 MG: 5 SOLUTION ORAL at 21:44

## 2024-01-13 RX ADMIN — ARFORMOTEROL TARTRATE 15 MCG: 15 SOLUTION RESPIRATORY (INHALATION) at 19:48

## 2024-01-13 RX ADMIN — Medication 10 ML: at 08:02

## 2024-01-13 RX ADMIN — ARFORMOTEROL TARTRATE 15 MCG: 15 SOLUTION RESPIRATORY (INHALATION) at 09:30

## 2024-01-13 RX ADMIN — IPRATROPIUM BROMIDE AND ALBUTEROL SULFATE 1 DOSE: .5; 2.5 SOLUTION RESPIRATORY (INHALATION) at 19:48

## 2024-01-13 RX ADMIN — PANTOPRAZOLE SODIUM 40 MG: 40 INJECTION, POWDER, FOR SOLUTION INTRAVENOUS at 08:02

## 2024-01-13 RX ADMIN — OXYCODONE HYDROCHLORIDE 5 MG: 5 SOLUTION ORAL at 17:46

## 2024-01-13 RX ADMIN — ATORVASTATIN CALCIUM 20 MG: 20 TABLET, FILM COATED ORAL at 08:06

## 2024-01-13 RX ADMIN — ONDANSETRON 4 MG: 2 INJECTION INTRAMUSCULAR; INTRAVENOUS at 03:45

## 2024-01-13 RX ADMIN — PANTOPRAZOLE SODIUM 40 MG: 40 INJECTION, POWDER, FOR SOLUTION INTRAVENOUS at 21:11

## 2024-01-13 RX ADMIN — ENOXAPARIN SODIUM 80 MG: 100 INJECTION SUBCUTANEOUS at 08:04

## 2024-01-13 RX ADMIN — ENOXAPARIN SODIUM 80 MG: 100 INJECTION SUBCUTANEOUS at 21:11

## 2024-01-13 RX ADMIN — BUDESONIDE INHALATION 500 MCG: 0.5 SUSPENSION RESPIRATORY (INHALATION) at 09:30

## 2024-01-13 RX ADMIN — MIDODRINE HYDROCHLORIDE 5 MG: 5 TABLET ORAL at 16:37

## 2024-01-13 RX ADMIN — IPRATROPIUM BROMIDE AND ALBUTEROL SULFATE 1 DOSE: .5; 2.5 SOLUTION RESPIRATORY (INHALATION) at 09:30

## 2024-01-13 RX ADMIN — PIPERACILLIN AND TAZOBACTAM 3375 MG: 3; .375 INJECTION, POWDER, LYOPHILIZED, FOR SOLUTION INTRAVENOUS at 18:49

## 2024-01-13 RX ADMIN — GUAIFENESIN 400 MG: 400 TABLET ORAL at 21:11

## 2024-01-13 RX ADMIN — BUDESONIDE INHALATION 500 MCG: 0.5 SUSPENSION RESPIRATORY (INHALATION) at 19:48

## 2024-01-13 RX ADMIN — PIPERACILLIN AND TAZOBACTAM 3375 MG: 3; .375 INJECTION, POWDER, LYOPHILIZED, FOR SOLUTION INTRAVENOUS at 10:37

## 2024-01-13 RX ADMIN — Medication 5000 UNITS: at 08:06

## 2024-01-13 ASSESSMENT — PAIN SCALES - GENERAL
PAINLEVEL_OUTOF10: 7
PAINLEVEL_OUTOF10: 0
PAINLEVEL_OUTOF10: 5
PAINLEVEL_OUTOF10: 7
PAINLEVEL_OUTOF10: 2
PAINLEVEL_OUTOF10: 0
PAINLEVEL_OUTOF10: 3

## 2024-01-13 ASSESSMENT — PAIN DESCRIPTION - FREQUENCY
FREQUENCY: INTERMITTENT

## 2024-01-13 ASSESSMENT — PAIN DESCRIPTION - LOCATION
LOCATION: NECK
LOCATION: NECK;THROAT
LOCATION: THROAT
LOCATION: NECK;THROAT

## 2024-01-13 ASSESSMENT — PAIN DESCRIPTION - DESCRIPTORS
DESCRIPTORS: ACHING;DISCOMFORT

## 2024-01-13 ASSESSMENT — PAIN DESCRIPTION - ORIENTATION
ORIENTATION: RIGHT;LEFT

## 2024-01-13 ASSESSMENT — PAIN DESCRIPTION - ONSET
ONSET: ON-GOING

## 2024-01-13 ASSESSMENT — PAIN DESCRIPTION - PAIN TYPE
TYPE: CHRONIC PAIN

## 2024-01-13 ASSESSMENT — PAIN - FUNCTIONAL ASSESSMENT
PAIN_FUNCTIONAL_ASSESSMENT: ACTIVITIES ARE NOT PREVENTED
PAIN_FUNCTIONAL_ASSESSMENT: ACTIVITIES ARE NOT PREVENTED
PAIN_FUNCTIONAL_ASSESSMENT: PREVENTS OR INTERFERES SOME ACTIVE ACTIVITIES AND ADLS

## 2024-01-13 NOTE — PLAN OF CARE
Problem: ABCDS Injury Assessment  Goal: Absence of physical injury  1/13/2024 1433 by All Taveras RN  Outcome: Progressing  1/13/2024 0251 by Maria Guadalupe Roper RN  Outcome: Progressing     Problem: Safety - Adult  Goal: Free from fall injury  1/13/2024 1433 by All Taveras RN  Outcome: Progressing  1/13/2024 0251 by Maria Guadalupe Roper RN  Outcome: Progressing     Problem: Nutrition Deficit:  Goal: Optimize nutritional status  1/13/2024 1433 by All Taveras RN  Outcome: Progressing  1/13/2024 0251 by Maria Guadalupe Roper RN  Outcome: Progressing

## 2024-01-13 NOTE — PLAN OF CARE
Problem: ABCDS Injury Assessment  Goal: Absence of physical injury  Outcome: Progressing     Problem: Discharge Planning  Goal: Discharge to home or other facility with appropriate resources  Outcome: Progressing     Problem: Safety - Adult  Goal: Free from fall injury  Outcome: Progressing     Problem: Nutrition Deficit:  Goal: Optimize nutritional status  Outcome: Progressing

## 2024-01-14 ENCOUNTER — ANESTHESIA (OUTPATIENT)
Dept: OPERATING ROOM | Age: 69
DRG: 853 | End: 2024-01-14
Payer: MEDICARE

## 2024-01-14 LAB
MICROORGANISM SPEC CULT: ABNORMAL
MICROORGANISM SPEC CULT: ABNORMAL
MICROORGANISM/AGENT SPEC: ABNORMAL
SERVICE CMNT-IMP: ABNORMAL
SPECIMEN DESCRIPTION: ABNORMAL

## 2024-01-14 PROCEDURE — 6360000002 HC RX W HCPCS: Performed by: NURSE PRACTITIONER

## 2024-01-14 PROCEDURE — 6370000000 HC RX 637 (ALT 250 FOR IP): Performed by: STUDENT IN AN ORGANIZED HEALTH CARE EDUCATION/TRAINING PROGRAM

## 2024-01-14 PROCEDURE — 6360000002 HC RX W HCPCS: Performed by: INTERNAL MEDICINE

## 2024-01-14 PROCEDURE — 2580000003 HC RX 258: Performed by: INTERNAL MEDICINE

## 2024-01-14 PROCEDURE — 2580000003 HC RX 258: Performed by: NURSE PRACTITIONER

## 2024-01-14 PROCEDURE — 2060000000 HC ICU INTERMEDIATE R&B

## 2024-01-14 PROCEDURE — 6370000000 HC RX 637 (ALT 250 FOR IP): Performed by: NURSE PRACTITIONER

## 2024-01-14 PROCEDURE — C9113 INJ PANTOPRAZOLE SODIUM, VIA: HCPCS | Performed by: STUDENT IN AN ORGANIZED HEALTH CARE EDUCATION/TRAINING PROGRAM

## 2024-01-14 PROCEDURE — 2700000000 HC OXYGEN THERAPY PER DAY

## 2024-01-14 PROCEDURE — 94640 AIRWAY INHALATION TREATMENT: CPT

## 2024-01-14 PROCEDURE — 6360000002 HC RX W HCPCS: Performed by: STUDENT IN AN ORGANIZED HEALTH CARE EDUCATION/TRAINING PROGRAM

## 2024-01-14 PROCEDURE — 99232 SBSQ HOSP IP/OBS MODERATE 35: CPT | Performed by: STUDENT IN AN ORGANIZED HEALTH CARE EDUCATION/TRAINING PROGRAM

## 2024-01-14 PROCEDURE — 6370000000 HC RX 637 (ALT 250 FOR IP): Performed by: INTERNAL MEDICINE

## 2024-01-14 RX ADMIN — IPRATROPIUM BROMIDE AND ALBUTEROL SULFATE 1 DOSE: .5; 2.5 SOLUTION RESPIRATORY (INHALATION) at 19:22

## 2024-01-14 RX ADMIN — GUAIFENESIN 400 MG: 400 TABLET ORAL at 08:27

## 2024-01-14 RX ADMIN — Medication 5000 UNITS: at 08:26

## 2024-01-14 RX ADMIN — SODIUM CHLORIDE, PRESERVATIVE FREE 10 ML: 5 INJECTION INTRAVENOUS at 12:37

## 2024-01-14 RX ADMIN — OXYCODONE HYDROCHLORIDE 5 MG: 5 SOLUTION ORAL at 11:44

## 2024-01-14 RX ADMIN — GUAIFENESIN 400 MG: 400 TABLET ORAL at 15:32

## 2024-01-14 RX ADMIN — ENOXAPARIN SODIUM 80 MG: 100 INJECTION SUBCUTANEOUS at 20:22

## 2024-01-14 RX ADMIN — ARFORMOTEROL TARTRATE 15 MCG: 15 SOLUTION RESPIRATORY (INHALATION) at 09:49

## 2024-01-14 RX ADMIN — ENOXAPARIN SODIUM 80 MG: 100 INJECTION SUBCUTANEOUS at 08:27

## 2024-01-14 RX ADMIN — ATORVASTATIN CALCIUM 20 MG: 20 TABLET, FILM COATED ORAL at 08:26

## 2024-01-14 RX ADMIN — OXYCODONE HYDROCHLORIDE 5 MG: 5 SOLUTION ORAL at 15:36

## 2024-01-14 RX ADMIN — IPRATROPIUM BROMIDE AND ALBUTEROL SULFATE 1 DOSE: .5; 2.5 SOLUTION RESPIRATORY (INHALATION) at 13:38

## 2024-01-14 RX ADMIN — MIDODRINE HYDROCHLORIDE 5 MG: 5 TABLET ORAL at 17:32

## 2024-01-14 RX ADMIN — OXYCODONE HYDROCHLORIDE 5 MG: 5 SOLUTION ORAL at 03:45

## 2024-01-14 RX ADMIN — IPRATROPIUM BROMIDE AND ALBUTEROL SULFATE 1 DOSE: .5; 2.5 SOLUTION RESPIRATORY (INHALATION) at 09:49

## 2024-01-14 RX ADMIN — SODIUM CHLORIDE, PRESERVATIVE FREE 10 ML: 5 INJECTION INTRAVENOUS at 08:28

## 2024-01-14 RX ADMIN — OXYCODONE HYDROCHLORIDE 5 MG: 5 SOLUTION ORAL at 20:22

## 2024-01-14 RX ADMIN — Medication 10 ML: at 20:26

## 2024-01-14 RX ADMIN — DULOXETINE 30 MG: 30 CAPSULE, DELAYED RELEASE ORAL at 08:26

## 2024-01-14 RX ADMIN — BUDESONIDE INHALATION 500 MCG: 0.5 SUSPENSION RESPIRATORY (INHALATION) at 09:49

## 2024-01-14 RX ADMIN — DEXAMETHASONE SODIUM PHOSPHATE 10 MG: 10 INJECTION INTRAMUSCULAR; INTRAVENOUS at 12:37

## 2024-01-14 RX ADMIN — DULOXETINE 30 MG: 30 CAPSULE, DELAYED RELEASE ORAL at 20:21

## 2024-01-14 RX ADMIN — PANTOPRAZOLE SODIUM 40 MG: 40 INJECTION, POWDER, FOR SOLUTION INTRAVENOUS at 08:27

## 2024-01-14 RX ADMIN — MIDODRINE HYDROCHLORIDE 5 MG: 5 TABLET ORAL at 08:26

## 2024-01-14 RX ADMIN — PANTOPRAZOLE SODIUM 40 MG: 40 INJECTION, POWDER, FOR SOLUTION INTRAVENOUS at 20:22

## 2024-01-14 RX ADMIN — PIPERACILLIN AND TAZOBACTAM 3375 MG: 3; .375 INJECTION, POWDER, LYOPHILIZED, FOR SOLUTION INTRAVENOUS at 12:23

## 2024-01-14 RX ADMIN — LEVOTHYROXINE SODIUM 100 MCG: 100 TABLET ORAL at 06:22

## 2024-01-14 RX ADMIN — 0.12% CHLORHEXIDINE GLUCONATE 15 ML: 1.2 RINSE ORAL at 08:27

## 2024-01-14 RX ADMIN — GUAIFENESIN 400 MG: 400 TABLET ORAL at 20:21

## 2024-01-14 RX ADMIN — PIPERACILLIN AND TAZOBACTAM 3375 MG: 3; .375 INJECTION, POWDER, LYOPHILIZED, FOR SOLUTION INTRAVENOUS at 03:47

## 2024-01-14 RX ADMIN — ARFORMOTEROL TARTRATE 15 MCG: 15 SOLUTION RESPIRATORY (INHALATION) at 19:22

## 2024-01-14 RX ADMIN — TAMSULOSIN HYDROCHLORIDE 0.4 MG: 0.4 CAPSULE ORAL at 20:21

## 2024-01-14 RX ADMIN — Medication 10 ML: at 08:28

## 2024-01-14 RX ADMIN — BUDESONIDE INHALATION 500 MCG: 0.5 SUSPENSION RESPIRATORY (INHALATION) at 19:22

## 2024-01-14 RX ADMIN — IPRATROPIUM BROMIDE AND ALBUTEROL SULFATE 1 DOSE: .5; 2.5 SOLUTION RESPIRATORY (INHALATION) at 16:16

## 2024-01-14 RX ADMIN — PIPERACILLIN AND TAZOBACTAM 3375 MG: 3; .375 INJECTION, POWDER, LYOPHILIZED, FOR SOLUTION INTRAVENOUS at 20:26

## 2024-01-14 ASSESSMENT — PAIN SCALES - GENERAL
PAINLEVEL_OUTOF10: 8
PAINLEVEL_OUTOF10: 7

## 2024-01-14 ASSESSMENT — PAIN DESCRIPTION - LOCATION
LOCATION: NECK;THROAT
LOCATION: BACK
LOCATION: BACK
LOCATION: BACK;NECK
LOCATION: BACK

## 2024-01-14 ASSESSMENT — PAIN DESCRIPTION - DESCRIPTORS
DESCRIPTORS: ACHING;DISCOMFORT
DESCRIPTORS: SHARP
DESCRIPTORS: SHARP;ACHING
DESCRIPTORS: ACHING
DESCRIPTORS: ACHING;SORE

## 2024-01-14 NOTE — PLAN OF CARE
Problem: ABCDS Injury Assessment  Goal: Absence of physical injury  1/13/2024 2337 by Maria Guadalupe Roper RN  Outcome: Progressing  1/13/2024 1433 by All Taveras RN  Outcome: Progressing     Problem: Discharge Planning  Goal: Discharge to home or other facility with appropriate resources  Outcome: Progressing     Problem: Safety - Adult  Goal: Free from fall injury  1/13/2024 2337 by Maria Guadalupe Roper RN  Outcome: Progressing  1/13/2024 1433 by All Taveras RN  Outcome: Progressing     Problem: Nutrition Deficit:  Goal: Optimize nutritional status  1/13/2024 2337 by Maria Guadalupe Roper RN  Outcome: Progressing  1/13/2024 1433 by All Taveras RN  Outcome: Progressing

## 2024-01-14 NOTE — PLAN OF CARE
Problem: ABCDS Injury Assessment  Goal: Absence of physical injury  1/14/2024 1025 by Laura Bravo RN  Outcome: Progressing  1/13/2024 2337 by Maria Guadalupe Roper RN  Outcome: Progressing     Problem: Discharge Planning  Goal: Discharge to home or other facility with appropriate resources  1/14/2024 1025 by Laura Bravo RN  Outcome: Progressing  1/13/2024 2337 by Maria Guadalupe Roper RN  Outcome: Progressing     Problem: Safety - Adult  Goal: Free from fall injury  1/14/2024 1025 by Laura Bravo RN  Outcome: Progressing  1/13/2024 2337 by Maria Guadalupe Roper RN  Outcome: Progressing     Problem: Nutrition Deficit:  Goal: Optimize nutritional status  1/14/2024 1025 by Laura Bravo RN  Outcome: Progressing  1/13/2024 2337 by Maria Guadalupe Roper RN  Outcome: Progressing     Problem: Skin/Tissue Integrity  Goal: Absence of new skin breakdown  Description: 1.  Monitor for areas of redness and/or skin breakdown  2.  Assess vascular access sites hourly  3.  Every 4-6 hours minimum:  Change oxygen saturation probe site  4.  Every 4-6 hours:  If on nasal continuous positive airway pressure, respiratory therapy assess nares and determine need for appliance change or resting period.  Outcome: Progressing

## 2024-01-15 ENCOUNTER — ANESTHESIA EVENT (OUTPATIENT)
Dept: OPERATING ROOM | Age: 69
DRG: 853 | End: 2024-01-15
Payer: MEDICARE

## 2024-01-15 PROBLEM — K02.9 DENTAL CARIES: Status: ACTIVE | Noted: 2024-01-07

## 2024-01-15 LAB
ANION GAP SERPL CALCULATED.3IONS-SCNC: 10 MMOL/L (ref 7–16)
BUN SERPL-MCNC: 23 MG/DL (ref 6–23)
CALCIUM SERPL-MCNC: 9 MG/DL (ref 8.6–10.2)
CHLORIDE SERPL-SCNC: 103 MMOL/L (ref 98–107)
CO2 SERPL-SCNC: 23 MMOL/L (ref 22–29)
CREAT SERPL-MCNC: 0.8 MG/DL (ref 0.7–1.2)
ERYTHROCYTE [DISTWIDTH] IN BLOOD BY AUTOMATED COUNT: 14.6 % (ref 11.5–15)
GFR SERPL CREATININE-BSD FRML MDRD: >60 ML/MIN/1.73M2
GLUCOSE SERPL-MCNC: 118 MG/DL (ref 74–99)
HCT VFR BLD AUTO: 39.8 % (ref 37–54)
HGB BLD-MCNC: 13.5 G/DL (ref 12.5–16.5)
MCH RBC QN AUTO: 28 PG (ref 26–35)
MCHC RBC AUTO-ENTMCNC: 33.9 G/DL (ref 32–34.5)
MCV RBC AUTO: 82.6 FL (ref 80–99.9)
MICROORGANISM SPEC CULT: NORMAL
MICROORGANISM SPEC CULT: NORMAL
PLATELET # BLD AUTO: 441 K/UL (ref 130–450)
PMV BLD AUTO: 10.6 FL (ref 7–12)
POTASSIUM SERPL-SCNC: 4.2 MMOL/L (ref 3.5–5)
RBC # BLD AUTO: 4.82 M/UL (ref 3.8–5.8)
SERVICE CMNT-IMP: NORMAL
SERVICE CMNT-IMP: NORMAL
SODIUM SERPL-SCNC: 136 MMOL/L (ref 132–146)
SPECIMEN DESCRIPTION: NORMAL
SPECIMEN DESCRIPTION: NORMAL
WBC OTHER # BLD: 13.8 K/UL (ref 4.5–11.5)

## 2024-01-15 PROCEDURE — 3600000012 HC SURGERY LEVEL 2 ADDTL 15MIN: Performed by: DENTIST

## 2024-01-15 PROCEDURE — 2500000003 HC RX 250 WO HCPCS: Performed by: DENTIST

## 2024-01-15 PROCEDURE — 0NQV0ZZ REPAIR LEFT MANDIBLE, OPEN APPROACH: ICD-10-PCS | Performed by: DENTIST

## 2024-01-15 PROCEDURE — 6360000002 HC RX W HCPCS: Performed by: STUDENT IN AN ORGANIZED HEALTH CARE EDUCATION/TRAINING PROGRAM

## 2024-01-15 PROCEDURE — 80048 BASIC METABOLIC PNL TOTAL CA: CPT

## 2024-01-15 PROCEDURE — 2580000003 HC RX 258: Performed by: INTERNAL MEDICINE

## 2024-01-15 PROCEDURE — 6360000002 HC RX W HCPCS: Performed by: NURSE PRACTITIONER

## 2024-01-15 PROCEDURE — 3600000002 HC SURGERY LEVEL 2 BASE: Performed by: DENTIST

## 2024-01-15 PROCEDURE — 6370000000 HC RX 637 (ALT 250 FOR IP): Performed by: NURSE PRACTITIONER

## 2024-01-15 PROCEDURE — 6370000000 HC RX 637 (ALT 250 FOR IP): Performed by: STUDENT IN AN ORGANIZED HEALTH CARE EDUCATION/TRAINING PROGRAM

## 2024-01-15 PROCEDURE — 7100000001 HC PACU RECOVERY - ADDTL 15 MIN: Performed by: DENTIST

## 2024-01-15 PROCEDURE — 6360000002 HC RX W HCPCS: Performed by: INTERNAL MEDICINE

## 2024-01-15 PROCEDURE — C9113 INJ PANTOPRAZOLE SODIUM, VIA: HCPCS | Performed by: STUDENT IN AN ORGANIZED HEALTH CARE EDUCATION/TRAINING PROGRAM

## 2024-01-15 PROCEDURE — 2709999900 HC NON-CHARGEABLE SUPPLY: Performed by: DENTIST

## 2024-01-15 PROCEDURE — 97530 THERAPEUTIC ACTIVITIES: CPT

## 2024-01-15 PROCEDURE — 1200000000 HC SEMI PRIVATE

## 2024-01-15 PROCEDURE — 0NQT0ZZ REPAIR RIGHT MANDIBLE, OPEN APPROACH: ICD-10-PCS | Performed by: DENTIST

## 2024-01-15 PROCEDURE — 6360000002 HC RX W HCPCS: Performed by: ANESTHESIOLOGY

## 2024-01-15 PROCEDURE — 7100000000 HC PACU RECOVERY - FIRST 15 MIN: Performed by: DENTIST

## 2024-01-15 PROCEDURE — 6370000000 HC RX 637 (ALT 250 FOR IP): Performed by: INTERNAL MEDICINE

## 2024-01-15 PROCEDURE — 99232 SBSQ HOSP IP/OBS MODERATE 35: CPT | Performed by: INTERNAL MEDICINE

## 2024-01-15 PROCEDURE — 2580000003 HC RX 258: Performed by: NURSE PRACTITIONER

## 2024-01-15 PROCEDURE — 0CDWXZ1 EXTRACTION OF UPPER TOOTH, MULTIPLE, EXTERNAL APPROACH: ICD-10-PCS | Performed by: DENTIST

## 2024-01-15 PROCEDURE — 2580000003 HC RX 258: Performed by: NURSE ANESTHETIST, CERTIFIED REGISTERED

## 2024-01-15 PROCEDURE — 36415 COLL VENOUS BLD VENIPUNCTURE: CPT

## 2024-01-15 PROCEDURE — 85027 COMPLETE CBC AUTOMATED: CPT

## 2024-01-15 PROCEDURE — 94640 AIRWAY INHALATION TREATMENT: CPT

## 2024-01-15 PROCEDURE — 6360000002 HC RX W HCPCS: Performed by: NURSE ANESTHETIST, CERTIFIED REGISTERED

## 2024-01-15 PROCEDURE — 3700000001 HC ADD 15 MINUTES (ANESTHESIA): Performed by: DENTIST

## 2024-01-15 PROCEDURE — 97535 SELF CARE MNGMENT TRAINING: CPT

## 2024-01-15 PROCEDURE — 3700000000 HC ANESTHESIA ATTENDED CARE: Performed by: DENTIST

## 2024-01-15 RX ORDER — SODIUM CHLORIDE 9 MG/ML
INJECTION, SOLUTION INTRAVENOUS PRN
Status: DISCONTINUED | OUTPATIENT
Start: 2024-01-15 | End: 2024-01-15 | Stop reason: HOSPADM

## 2024-01-15 RX ORDER — ONDANSETRON 2 MG/ML
INJECTION INTRAMUSCULAR; INTRAVENOUS PRN
Status: DISCONTINUED | OUTPATIENT
Start: 2024-01-15 | End: 2024-01-15 | Stop reason: SDUPTHER

## 2024-01-15 RX ORDER — DEXAMETHASONE SODIUM PHOSPHATE 4 MG/ML
INJECTION, SOLUTION INTRA-ARTICULAR; INTRALESIONAL; INTRAMUSCULAR; INTRAVENOUS; SOFT TISSUE PRN
Status: DISCONTINUED | OUTPATIENT
Start: 2024-01-15 | End: 2024-01-15 | Stop reason: SDUPTHER

## 2024-01-15 RX ORDER — MIDAZOLAM HYDROCHLORIDE 1 MG/ML
INJECTION INTRAMUSCULAR; INTRAVENOUS PRN
Status: DISCONTINUED | OUTPATIENT
Start: 2024-01-15 | End: 2024-01-15 | Stop reason: SDUPTHER

## 2024-01-15 RX ORDER — FENTANYL CITRATE 50 UG/ML
25 INJECTION, SOLUTION INTRAMUSCULAR; INTRAVENOUS EVERY 5 MIN PRN
Status: DISCONTINUED | OUTPATIENT
Start: 2024-01-15 | End: 2024-01-15 | Stop reason: HOSPADM

## 2024-01-15 RX ORDER — PROPOFOL 10 MG/ML
INJECTION, EMULSION INTRAVENOUS PRN
Status: DISCONTINUED | OUTPATIENT
Start: 2024-01-15 | End: 2024-01-15 | Stop reason: SDUPTHER

## 2024-01-15 RX ORDER — SODIUM CHLORIDE 0.9 % (FLUSH) 0.9 %
5-40 SYRINGE (ML) INJECTION PRN
Status: DISCONTINUED | OUTPATIENT
Start: 2024-01-15 | End: 2024-01-15 | Stop reason: HOSPADM

## 2024-01-15 RX ORDER — FENTANYL CITRATE 50 UG/ML
INJECTION, SOLUTION INTRAMUSCULAR; INTRAVENOUS PRN
Status: DISCONTINUED | OUTPATIENT
Start: 2024-01-15 | End: 2024-01-15 | Stop reason: SDUPTHER

## 2024-01-15 RX ORDER — LIDOCAINE HYDROCHLORIDE AND EPINEPHRINE BITARTRATE 20; .01 MG/ML; MG/ML
INJECTION, SOLUTION SUBCUTANEOUS PRN
Status: DISCONTINUED | OUTPATIENT
Start: 2024-01-15 | End: 2024-01-15 | Stop reason: ALTCHOICE

## 2024-01-15 RX ORDER — SODIUM CHLORIDE 9 MG/ML
INJECTION, SOLUTION INTRAVENOUS CONTINUOUS PRN
Status: DISCONTINUED | OUTPATIENT
Start: 2024-01-15 | End: 2024-01-15 | Stop reason: SDUPTHER

## 2024-01-15 RX ORDER — SODIUM CHLORIDE 0.9 % (FLUSH) 0.9 %
5-40 SYRINGE (ML) INJECTION EVERY 12 HOURS SCHEDULED
Status: DISCONTINUED | OUTPATIENT
Start: 2024-01-15 | End: 2024-01-15 | Stop reason: HOSPADM

## 2024-01-15 RX ADMIN — GUAIFENESIN 400 MG: 400 TABLET ORAL at 21:14

## 2024-01-15 RX ADMIN — PROPOFOL 150 MG: 10 INJECTION, EMULSION INTRAVENOUS at 17:37

## 2024-01-15 RX ADMIN — ARFORMOTEROL TARTRATE 15 MCG: 15 SOLUTION RESPIRATORY (INHALATION) at 10:16

## 2024-01-15 RX ADMIN — Medication 10 ML: at 21:12

## 2024-01-15 RX ADMIN — LEVOTHYROXINE SODIUM 100 MCG: 100 TABLET ORAL at 05:56

## 2024-01-15 RX ADMIN — FENTANYL CITRATE 50 MCG: 50 INJECTION, SOLUTION INTRAMUSCULAR; INTRAVENOUS at 17:37

## 2024-01-15 RX ADMIN — Medication 10 ML: at 14:00

## 2024-01-15 RX ADMIN — DULOXETINE 30 MG: 30 CAPSULE, DELAYED RELEASE ORAL at 09:47

## 2024-01-15 RX ADMIN — GUAIFENESIN 400 MG: 400 TABLET ORAL at 09:47

## 2024-01-15 RX ADMIN — OXYCODONE HYDROCHLORIDE 5 MG: 5 SOLUTION ORAL at 21:14

## 2024-01-15 RX ADMIN — FENTANYL CITRATE 50 MCG: 50 INJECTION, SOLUTION INTRAMUSCULAR; INTRAVENOUS at 17:28

## 2024-01-15 RX ADMIN — DULOXETINE 30 MG: 30 CAPSULE, DELAYED RELEASE ORAL at 21:14

## 2024-01-15 RX ADMIN — MIDODRINE HYDROCHLORIDE 5 MG: 5 TABLET ORAL at 09:47

## 2024-01-15 RX ADMIN — PANTOPRAZOLE SODIUM 40 MG: 40 INJECTION, POWDER, FOR SOLUTION INTRAVENOUS at 21:12

## 2024-01-15 RX ADMIN — OXYCODONE HYDROCHLORIDE 5 MG: 5 SOLUTION ORAL at 13:57

## 2024-01-15 RX ADMIN — MIDAZOLAM 1 MG: 1 INJECTION INTRAMUSCULAR; INTRAVENOUS at 17:28

## 2024-01-15 RX ADMIN — PIPERACILLIN AND TAZOBACTAM 3375 MG: 3; .375 INJECTION, POWDER, LYOPHILIZED, FOR SOLUTION INTRAVENOUS at 21:11

## 2024-01-15 RX ADMIN — PIPERACILLIN AND TAZOBACTAM 3375 MG: 3; .375 INJECTION, POWDER, LYOPHILIZED, FOR SOLUTION INTRAVENOUS at 05:54

## 2024-01-15 RX ADMIN — ONDANSETRON 4 MG: 2 INJECTION INTRAMUSCULAR; INTRAVENOUS at 17:43

## 2024-01-15 RX ADMIN — ARFORMOTEROL TARTRATE 15 MCG: 15 SOLUTION RESPIRATORY (INHALATION) at 20:58

## 2024-01-15 RX ADMIN — PIPERACILLIN AND TAZOBACTAM 3375 MG: 3; .375 INJECTION, POWDER, LYOPHILIZED, FOR SOLUTION INTRAVENOUS at 13:41

## 2024-01-15 RX ADMIN — DEXAMETHASONE SODIUM PHOSPHATE 10 MG: 4 INJECTION, SOLUTION INTRAMUSCULAR; INTRAVENOUS at 17:43

## 2024-01-15 RX ADMIN — OXYCODONE HYDROCHLORIDE 5 MG: 5 SOLUTION ORAL at 09:47

## 2024-01-15 RX ADMIN — SODIUM CHLORIDE: 9 INJECTION, SOLUTION INTRAVENOUS at 17:28

## 2024-01-15 RX ADMIN — DEXAMETHASONE SODIUM PHOSPHATE 10 MG: 10 INJECTION INTRAMUSCULAR; INTRAVENOUS at 13:37

## 2024-01-15 RX ADMIN — IPRATROPIUM BROMIDE AND ALBUTEROL SULFATE 1 DOSE: .5; 2.5 SOLUTION RESPIRATORY (INHALATION) at 13:26

## 2024-01-15 RX ADMIN — IPRATROPIUM BROMIDE AND ALBUTEROL SULFATE 1 DOSE: .5; 2.5 SOLUTION RESPIRATORY (INHALATION) at 16:05

## 2024-01-15 RX ADMIN — IPRATROPIUM BROMIDE AND ALBUTEROL SULFATE 1 DOSE: .5; 2.5 SOLUTION RESPIRATORY (INHALATION) at 10:16

## 2024-01-15 RX ADMIN — IPRATROPIUM BROMIDE AND ALBUTEROL SULFATE 1 DOSE: .5; 2.5 SOLUTION RESPIRATORY (INHALATION) at 20:58

## 2024-01-15 RX ADMIN — HYDROMORPHONE HYDROCHLORIDE 0.5 MG: 1 INJECTION, SOLUTION INTRAMUSCULAR; INTRAVENOUS; SUBCUTANEOUS at 18:22

## 2024-01-15 RX ADMIN — TAMSULOSIN HYDROCHLORIDE 0.4 MG: 0.4 CAPSULE ORAL at 21:16

## 2024-01-15 RX ADMIN — BUDESONIDE INHALATION 500 MCG: 0.5 SUSPENSION RESPIRATORY (INHALATION) at 10:16

## 2024-01-15 RX ADMIN — PANTOPRAZOLE SODIUM 40 MG: 40 INJECTION, POWDER, FOR SOLUTION INTRAVENOUS at 09:47

## 2024-01-15 RX ADMIN — ENOXAPARIN SODIUM 80 MG: 100 INJECTION SUBCUTANEOUS at 21:14

## 2024-01-15 RX ADMIN — OXYCODONE HYDROCHLORIDE 5 MG: 5 SOLUTION ORAL at 00:06

## 2024-01-15 RX ADMIN — BUDESONIDE INHALATION 500 MCG: 0.5 SUSPENSION RESPIRATORY (INHALATION) at 20:58

## 2024-01-15 RX ADMIN — MIDAZOLAM 1 MG: 1 INJECTION INTRAMUSCULAR; INTRAVENOUS at 17:37

## 2024-01-15 RX ADMIN — Medication 5000 UNITS: at 09:47

## 2024-01-15 RX ADMIN — ATORVASTATIN CALCIUM 20 MG: 20 TABLET, FILM COATED ORAL at 09:47

## 2024-01-15 ASSESSMENT — PAIN - FUNCTIONAL ASSESSMENT
PAIN_FUNCTIONAL_ASSESSMENT: PREVENTS OR INTERFERES SOME ACTIVE ACTIVITIES AND ADLS
PAIN_FUNCTIONAL_ASSESSMENT: 0-10

## 2024-01-15 ASSESSMENT — PAIN DESCRIPTION - DESCRIPTORS
DESCRIPTORS: DISCOMFORT
DESCRIPTORS: ACHING
DESCRIPTORS: DISCOMFORT

## 2024-01-15 ASSESSMENT — PAIN DESCRIPTION - LOCATION
LOCATION: BACK;NECK
LOCATION: MOUTH
LOCATION: MOUTH
LOCATION: NECK
LOCATION: BACK

## 2024-01-15 ASSESSMENT — PAIN SCALES - GENERAL
PAINLEVEL_OUTOF10: 7
PAINLEVEL_OUTOF10: 8
PAINLEVEL_OUTOF10: 7
PAINLEVEL_OUTOF10: 8
PAINLEVEL_OUTOF10: 5

## 2024-01-15 ASSESSMENT — LIFESTYLE VARIABLES: SMOKING_STATUS: 1

## 2024-01-15 ASSESSMENT — PAIN DESCRIPTION - ORIENTATION: ORIENTATION: INNER

## 2024-01-15 NOTE — CARE COORDINATION
Spoke with wife.  She continues to plan on taking patient home on discharge  As per previous notes, [facility] is following and has scheduled for initial visit 1/6/2024.  Home pharmacy user agreement was re-sent with addition of 2nd patient signature to fax [phone].  Thiago with [facility] also following.  Nursing will obtain/document pulse oximetry to define home O2 requirements if any.  Both Laura with [facility] and Thiago with [facility] are notified discharge likely postponed as patient is due for dental extraction today at 530 PM.  Lavon Park, MSN RN  [facility] Case Management  [phone]

## 2024-01-15 NOTE — ANESTHESIA PRE PROCEDURE
Component Value Date/Time    PROTIME 15.2 09/09/2020 09:09 PM    INR 1.3 09/09/2020 09:09 PM    APTT 39.2 09/09/2020 09:09 PM       HCG (If Applicable): No results found for: \"PREGTESTUR\", \"PREGSERUM\", \"HCG\", \"HCGQUANT\"     ABGs: No results found for: \"PHART\", \"PO2ART\", \"UIM4ONN\", \"PVA5WPH\", \"BEART\", \"E1EWJEXB\"     Type & Screen (If Applicable):  No results found for: \"LABABO\", \"LABRH\"    Drug/Infectious Status (If Applicable):  No results found for: \"HIV\", \"HEPCAB\"    COVID-19 Screening (If Applicable):   Lab Results   Component Value Date/Time    COVID19 DETECTED 01/07/2024 07:40 PM    COVID19 DETECTED 01/07/2024 07:24 PM           Anesthesia Evaluation  Patient summary reviewed and Nursing notes reviewed  Airway: Mallampati: Unable to assess / NA         Tracheostomy present Dental: normal exam         Pulmonary:normal exam    (+) pneumonia (COVID, and Bacterial): unresolved,  COPD:          current smoker          Patient did not smoke on day of surgery.                ROS comment: COVID +   Cardiovascular:Negative CV ROS          ECG reviewed               Beta Blocker:  Not on Beta Blocker         Neuro/Psych:   (+) neuromuscular disease:, psychiatric history:depression/anxiety             GI/Hepatic/Renal:   (+) GERD:, renal disease: CRI          Endo/Other:    (+) hypothyroidism, blood dyscrasia (mild)::., malignancy/cancer (Lung and larynx and esophageal).                  ROS comment: Bacteremia Abdominal:             Vascular:   + PVD, aortic or cerebral (AAA 3cm).       Other Findings:             Anesthesia Plan      general     ASA 4       Induction: intravenous.    MIPS: Postoperative opioids intended and Prophylactic antiemetics administered.  Anesthetic plan and risks discussed with patient.      Plan discussed with CRNA.                KAROLYN TERRAZAS DO   1/15/2024

## 2024-01-16 VITALS
HEIGHT: 71 IN | WEIGHT: 167.33 LBS | DIASTOLIC BLOOD PRESSURE: 68 MMHG | BODY MASS INDEX: 23.43 KG/M2 | SYSTOLIC BLOOD PRESSURE: 101 MMHG | OXYGEN SATURATION: 90 % | RESPIRATION RATE: 16 BRPM | HEART RATE: 71 BPM | TEMPERATURE: 97.9 F

## 2024-01-16 PROCEDURE — 99239 HOSP IP/OBS DSCHRG MGMT >30: CPT | Performed by: INTERNAL MEDICINE

## 2024-01-16 PROCEDURE — 6360000002 HC RX W HCPCS: Performed by: NURSE PRACTITIONER

## 2024-01-16 PROCEDURE — 94640 AIRWAY INHALATION TREATMENT: CPT

## 2024-01-16 PROCEDURE — 6370000000 HC RX 637 (ALT 250 FOR IP): Performed by: NURSE PRACTITIONER

## 2024-01-16 PROCEDURE — 2580000003 HC RX 258: Performed by: INTERNAL MEDICINE

## 2024-01-16 PROCEDURE — 6360000002 HC RX W HCPCS: Performed by: STUDENT IN AN ORGANIZED HEALTH CARE EDUCATION/TRAINING PROGRAM

## 2024-01-16 PROCEDURE — 2700000000 HC OXYGEN THERAPY PER DAY

## 2024-01-16 PROCEDURE — 6370000000 HC RX 637 (ALT 250 FOR IP): Performed by: INTERNAL MEDICINE

## 2024-01-16 PROCEDURE — 6360000002 HC RX W HCPCS: Performed by: INTERNAL MEDICINE

## 2024-01-16 PROCEDURE — 6370000000 HC RX 637 (ALT 250 FOR IP): Performed by: STUDENT IN AN ORGANIZED HEALTH CARE EDUCATION/TRAINING PROGRAM

## 2024-01-16 PROCEDURE — C9113 INJ PANTOPRAZOLE SODIUM, VIA: HCPCS | Performed by: STUDENT IN AN ORGANIZED HEALTH CARE EDUCATION/TRAINING PROGRAM

## 2024-01-16 PROCEDURE — 2580000003 HC RX 258: Performed by: NURSE PRACTITIONER

## 2024-01-16 RX ORDER — OXYCODONE HYDROCHLORIDE 5 MG/1
5 TABLET ORAL EVERY 6 HOURS PRN
Qty: 20 TABLET | Refills: 0 | Status: SHIPPED | OUTPATIENT
Start: 2024-01-16 | End: 2024-01-17

## 2024-01-16 RX ORDER — AMOXICILLIN AND CLAVULANATE POTASSIUM 562.5; 437.5; 62.5 MG/1; MG/1; MG/1
1 TABLET, MULTILAYER, EXTENDED RELEASE ORAL 2 TIMES DAILY
Qty: 14 TABLET | Refills: 0 | Status: SHIPPED | OUTPATIENT
Start: 2024-01-16 | End: 2024-01-16

## 2024-01-16 RX ORDER — DEXAMETHASONE SODIUM PHOSPHATE 10 MG/ML
10 INJECTION INTRAMUSCULAR; INTRAVENOUS EVERY 24 HOURS
Status: DISCONTINUED | OUTPATIENT
Start: 2024-01-17 | End: 2024-01-16 | Stop reason: HOSPADM

## 2024-01-16 RX ORDER — AMOXICILLIN AND CLAVULANATE POTASSIUM 562.5; 437.5; 62.5 MG/1; MG/1; MG/1
1 TABLET, MULTILAYER, EXTENDED RELEASE ORAL 2 TIMES DAILY
Qty: 14 TABLET | Refills: 0 | Status: SHIPPED | OUTPATIENT
Start: 2024-01-16 | End: 2024-01-23

## 2024-01-16 RX ADMIN — ARFORMOTEROL TARTRATE 15 MCG: 15 SOLUTION RESPIRATORY (INHALATION) at 10:14

## 2024-01-16 RX ADMIN — IPRATROPIUM BROMIDE AND ALBUTEROL SULFATE 1 DOSE: .5; 2.5 SOLUTION RESPIRATORY (INHALATION) at 13:44

## 2024-01-16 RX ADMIN — GUAIFENESIN 400 MG: 400 TABLET ORAL at 14:01

## 2024-01-16 RX ADMIN — PIPERACILLIN AND TAZOBACTAM 3375 MG: 3; .375 INJECTION, POWDER, LYOPHILIZED, FOR SOLUTION INTRAVENOUS at 05:37

## 2024-01-16 RX ADMIN — MIDODRINE HYDROCHLORIDE 5 MG: 5 TABLET ORAL at 09:48

## 2024-01-16 RX ADMIN — GUAIFENESIN 400 MG: 400 TABLET ORAL at 09:48

## 2024-01-16 RX ADMIN — LEVOTHYROXINE SODIUM 100 MCG: 100 TABLET ORAL at 05:35

## 2024-01-16 RX ADMIN — ENOXAPARIN SODIUM 80 MG: 100 INJECTION SUBCUTANEOUS at 09:49

## 2024-01-16 RX ADMIN — PANTOPRAZOLE SODIUM 40 MG: 40 INJECTION, POWDER, FOR SOLUTION INTRAVENOUS at 09:49

## 2024-01-16 RX ADMIN — Medication 5000 UNITS: at 09:48

## 2024-01-16 RX ADMIN — PIPERACILLIN AND TAZOBACTAM 3375 MG: 3; .375 INJECTION, POWDER, LYOPHILIZED, FOR SOLUTION INTRAVENOUS at 12:51

## 2024-01-16 RX ADMIN — DEXAMETHASONE SODIUM PHOSPHATE 10 MG: 10 INJECTION INTRAMUSCULAR; INTRAVENOUS at 12:49

## 2024-01-16 RX ADMIN — IPRATROPIUM BROMIDE AND ALBUTEROL SULFATE 1 DOSE: .5; 2.5 SOLUTION RESPIRATORY (INHALATION) at 16:22

## 2024-01-16 RX ADMIN — OXYCODONE HYDROCHLORIDE 5 MG: 5 SOLUTION ORAL at 10:06

## 2024-01-16 RX ADMIN — ATORVASTATIN CALCIUM 20 MG: 20 TABLET, FILM COATED ORAL at 09:48

## 2024-01-16 RX ADMIN — OXYCODONE HYDROCHLORIDE 5 MG: 5 SOLUTION ORAL at 00:28

## 2024-01-16 RX ADMIN — DULOXETINE 30 MG: 30 CAPSULE, DELAYED RELEASE ORAL at 09:49

## 2024-01-16 RX ADMIN — BUDESONIDE INHALATION 500 MCG: 0.5 SUSPENSION RESPIRATORY (INHALATION) at 10:14

## 2024-01-16 RX ADMIN — Medication 10 ML: at 09:50

## 2024-01-16 RX ADMIN — OXYCODONE HYDROCHLORIDE 5 MG: 5 SOLUTION ORAL at 13:59

## 2024-01-16 RX ADMIN — IPRATROPIUM BROMIDE AND ALBUTEROL SULFATE 1 DOSE: .5; 2.5 SOLUTION RESPIRATORY (INHALATION) at 10:14

## 2024-01-16 ASSESSMENT — PAIN SCALES - GENERAL
PAINLEVEL_OUTOF10: 8
PAINLEVEL_OUTOF10: 7

## 2024-01-16 ASSESSMENT — PAIN DESCRIPTION - LOCATION
LOCATION: NECK;BACK
LOCATION: BACK;MOUTH
LOCATION: BACK;NECK

## 2024-01-16 ASSESSMENT — PAIN DESCRIPTION - ORIENTATION: ORIENTATION: UPPER

## 2024-01-16 ASSESSMENT — PAIN DESCRIPTION - DESCRIPTORS: DESCRIPTORS: SORE;ACHING

## 2024-01-16 ASSESSMENT — PAIN DESCRIPTION - PAIN TYPE: TYPE: CHRONIC PAIN

## 2024-01-16 NOTE — ANESTHESIA POSTPROCEDURE EVALUATION
Department of Anesthesiology  Postprocedure Note    Patient: Alan Guajardo Jr.  MRN: [id number]  YOB: 1955  Date of evaluation: 1/15/2024    Procedure Summary       Date: 01/15/24 Room / Location: [address]    Anesthesia Start: 1728 Anesthesia Stop: 1808    Procedure: FULL MANDIBULAR EXTRACTIONS WITH ALVELOPLASTY     + COVID+ Diagnosis:       Dental caries      (Dental caries [K02.9])    Surgeons: Red Granados DDS Responsible Provider: Keeley Alonzo DO    Anesthesia Type: General ASA Status: 4            Anesthesia Type: General    Radames Phase I: Radames Score: 9    Radames Phase II: Radames Score: 9    Anesthesia Post Evaluation    Patient location during evaluation: PACU  Patient participation: complete - patient participated  Level of consciousness: awake and alert  Airway patency: patent  Nausea & Vomiting: no nausea and no vomiting  Cardiovascular status: hemodynamically stable  Respiratory status: acceptable  Hydration status: euvolemic  Pain management: adequate    No notable events documented.

## 2024-01-16 NOTE — DISCHARGE SUMMARY
CONTRAST 1/8/2024 10:18 am TECHNIQUE: CT of the chest was performed without the administration of intravenous contrast. Multiplanar reformatted images are provided for review. Automated exposure control, iterative reconstruction, and/or weight based adjustment of the mA/kV was utilized to reduce the radiation dose to as low as reasonably achievable. COMPARISON: CT chest dated 01/19/2023 HISTORY: ORDERING SYSTEM PROVIDED HISTORY: pneumonia, COVID vs aspiration TECHNOLOGIST PROVIDED HISTORY: Reason for exam:->pneumonia, COVID vs aspiration FINDINGS: Mediastinum: Thyroid is homogeneous in attenuation. No bulky mediastinal adenopathy.  Tracheostomy in place with minimal secretions in the trachea and mainstem bronchi without significant mucous plugging.  Esophagus with normal course and caliber.  Cardiac size enlarged without pericardial effusion. Lungs/pleura: Bilateral pulmonary opacifications consolidative and dense in the lower lungs left greater than right concerning for bronchopneumonia aspiration not excluded.  Trace to small right and small left pleural effusions. Upper Abdomen: Visualized portions of the upper abdomen reveal tiny right intrarenal stones of nonobstructing right nephrolithiasis Soft Tissues/Bones: No acute osseous or soft tissue findings. No aggressive osseous lesion.     Bilateral pulmonary opacifications consolidative and dense in the lower lungs left greater than right concerning for bronchopneumonia aspiration not excluded. Trace to small right and small left pleural effusions. Right nonobstructing nephrolithiasis     XR CHEST PORTABLE    Result Date: 1/7/2024  EXAMINATION: ONE XRAY VIEW OF THE CHEST 1/7/2024 7:25 pm COMPARISON: CXR dated 11/24/2020 HISTORY: ORDERING SYSTEM PROVIDED HISTORY: SOB TECHNOLOGIST PROVIDED HISTORY: Reason for exam:->SOB FINDINGS: Medical devices: lower cervical fusion hardware.  Tracheostomy tube with the tip projecting over the tracheal air column.

## 2024-01-16 NOTE — CARE COORDINATION
Social Work:    Notified [facility] and DME of discharge home today.    Electronically signed by CARMINE Vera on 1/16/2024 at 2:37 PM

## 2024-01-16 NOTE — PROGRESS NOTES
Elder Gan M.D.,[facility]  Dean Manjarrez D.O., HUGO., [facility]  Akua Parkinson M.D.  Shanda Lowe M.D.   MIKI VenturaO.        Daily Pulmonary Progress Note    Patient:  Alan Guajardo Jr. 68 y.o. male MRN: [id number]     Date of Service: 1/13/2024      Synopsis     We are following patient for COVID-pneumonia, multifocal pneumonia    \"CC\" : Fever shortness of breath    Code status: Full code      Subjective      Patient seen and examined.  His wife at the bedside.  From a respiratory standpoint he is doing significantly better he is currently on room air saturating 92 to 93% he had a rough night he did not sleep very well because of his neck pain and also some coughing with productive phlegm.    Review of Systems:  Constitutional: Positive for fatigue and weakness  Skin: Denies pigmentation, dark lesions, and rashes   HEENT: Denies hearing loss, tinnitus, ear drainage, epistaxis, sore throat, and hoarseness.  Cardiovascular: Denies palpitations, chest pain, and chest pressure.  Respiratory: Positive for cough.  Gastrointestinal: Has decreased appetite and significant decreased oral intake   Genitourinary: Denies dysuria, frequency, urgency or hematuria  Musculoskeletal: Has chronic neck pain  Neurological: Denies dizziness, vertigo, headache, and focal weakness  Psychological: Denies anxiety and depression      24-hour events: No acute events overnight      Objective   OBJECTIVE:   /77   Pulse 69   Temp 97.3 °F (36.3 °C) (Axillary)   Resp 18   Ht 1.803 m (5' 11\")   Wt 76.8 kg (169 lb 5 oz)   SpO2 96%   BMI 23.61 kg/m²   SpO2 Readings from Last 1 Encounters:   01/13/24 96%        I/O:    Intake/Output Summary (Last 24 hours) at 1/13/2024 0927  Last data filed at 1/13/2024 0600  Gross per 24 hour   Intake 870 ml   Output 1600 ml   Net -730 ml       Vent Information  Equipment Changed: Humidification               CURRENT MEDS :  Scheduled Meds:   atorvastatin  20 mg PEG Tube 
           Elder Gan M.D.,[facility]  Dean Manjarrez D.O., HUGO., [facility]  Akua Parkinson M.D.  Shanda Lowe M.D.   NISHANT Ventura.O.        Daily Pulmonary Progress Note    Patient:  Alan Guajardo Jr. 68 y.o. male MRN: [id number]     Date of Service: 1/14/2024      Synopsis     We are following patient for COVID-pneumonia, multifocal pneumonia    \"CC\" : Fever shortness of breath    Code status: Full code      Subjective      Patient seen and examined.  His wife at the bedside.  From a respiratory standpoint he is doing significantly better he is currently on room air saturating 92 to 93% he had a rough night he did not sleep very well because of his neck pain and also some coughing with productive phlegm.    Review of Systems:  Constitutional: Positive for fatigue and weakness  Skin: Denies pigmentation, dark lesions, and rashes   HEENT: Denies hearing loss, tinnitus, ear drainage, epistaxis, sore throat, and hoarseness.  Cardiovascular: Denies palpitations, chest pain, and chest pressure.  Respiratory: Positive for cough.  Gastrointestinal: Has decreased appetite and significant decreased oral intake   Genitourinary: Denies dysuria, frequency, urgency or hematuria  Musculoskeletal: Has chronic neck pain  Neurological: Denies dizziness, vertigo, headache, and focal weakness  Psychological: Denies anxiety and depression      24-hour events: No acute events overnight      Objective   OBJECTIVE:   /76   Pulse 66   Temp 97.9 °F (36.6 °C) (Oral)   Resp 18   Ht 1.803 m (5' 11\")   Wt 75.9 kg (167 lb 5.3 oz)   SpO2 92%   BMI 23.34 kg/m²   SpO2 Readings from Last 1 Encounters:   01/14/24 92%        I/O:    Intake/Output Summary (Last 24 hours) at 1/14/2024 0843  Last data filed at 1/14/2024 0600  Gross per 24 hour   Intake 1104 ml   Output 1550 ml   Net -446 ml       Vent Information  Equipment Changed: Humidification               CURRENT MEDS :  Scheduled Meds:   atorvastatin  20 mg PEG Tube Daily 
           Elder Gan M.D.,[facility]  Dean Manjarrez D.O., [place]., [facility]  Akua Parkinson M.D.  Shanda Lowe M.D.   MIKI VenturaO.        Daily Pulmonary Progress Note    Patient:  Alan Guajardo Jr. 68 y.o. male MRN: [id number]     Date of Service: 1/15/2024      Synopsis     We are following patient for COVID-pneumonia, multifocal pneumonia    \"CC\" : Fever shortness of breath    Code status: Full code      Subjective      Patient seen and examined lying in bed on 40% ATN.  His wife is present at the bedside.  He is scheduled to have dental extractions later on this evening and then possibly discharge home.    Review of Systems:  Constitutional: Positive for fatigue and weakness  Skin: Denies pigmentation, dark lesions, and rashes   HEENT: Denies hearing loss, tinnitus, ear drainage, epistaxis, sore throat, and hoarseness.  Cardiovascular: Denies palpitations, chest pain, and chest pressure.  Respiratory: Positive for cough.  Gastrointestinal: Has decreased appetite and significant decreased oral intake   Genitourinary: Denies dysuria, frequency, urgency or hematuria  Musculoskeletal: Has chronic neck pain  Neurological: Denies dizziness, vertigo, headache, and focal weakness  Psychological: Denies anxiety and depression      24-hour events:   No new events    Objective   OBJECTIVE:   BP 96/62   Pulse 66   Temp 97.8 °F (36.6 °C) (Oral)   Resp 18   Ht 1.803 m (5' 11\")   Wt 75.9 kg (167 lb 5.3 oz)   SpO2 98%   BMI 23.34 kg/m²   SpO2 Readings from Last 1 Encounters:   01/15/24 98%        I/O:    Intake/Output Summary (Last 24 hours) at 1/15/2024 1343  Last data filed at 1/15/2024 0549  Gross per 24 hour   Intake 630 ml   Output 1350 ml   Net -720 ml         Vent Information  Equipment Changed: Humidification               CURRENT MEDS :  Scheduled Meds:   atorvastatin  20 mg PEG Tube Daily    guaiFENesin  400 mg PEG Tube TID    levothyroxine  100 mcg PEG Tube Daily    midodrine  5 mg PEG Tube BID 
           Elder Gan M.D.,[facility]  Dean Manjarrez D.O., FSPARKLE., [facility]  Akua Parkinson M.D.  Shanda Lowe M.D.   MIKI VenturaO.        Daily Pulmonary Progress Note    Patient:  Alan Guajardo Jr. 68 y.o. male MRN: [id number]     Date of Service: 1/16/2024      Synopsis     We are following patient for COVID-pneumonia, multifocal pneumonia    \"CC\" : Fever shortness of breath    Code status: Full code      Subjective      Patient seen and examined lying in bed on CATINA.  Wife present at the bedside.  He had all of his remaining teeth extracted last night.  And is reporting minimal pain.  Oxygen testing yesterday determine the need for continuous oxygen at 35% FiO2.    Review of Systems:  Constitutional: Positive for fatigue and weakness  Skin: Denies pigmentation, dark lesions, and rashes   HEENT: Denies hearing loss, tinnitus, ear drainage, epistaxis, sore throat, and hoarseness.  Cardiovascular: Denies palpitations, chest pain, and chest pressure.  Respiratory: Positive for cough.  Gastrointestinal: Has decreased appetite and significant decreased oral intake   Genitourinary: Denies dysuria, frequency, urgency or hematuria  Musculoskeletal: Has chronic neck pain  Neurological: Denies dizziness, vertigo, headache, and focal weakness  Psychological: Denies anxiety and depression      24-hour events:   Dental extractions yesterday evening    Objective   OBJECTIVE:   /68   Pulse 71   Temp 97.9 °F (36.6 °C) (Oral)   Resp 16   Ht 1.803 m (5' 11\")   Wt 75.9 kg (167 lb 5.3 oz)   SpO2 90%   BMI 23.34 kg/m²   SpO2 Readings from Last 1 Encounters:   01/16/24 90%        I/O:    Intake/Output Summary (Last 24 hours) at 1/16/2024 1630  Last data filed at 1/16/2024 1253  Gross per 24 hour   Intake 1455 ml   Output 1200 ml   Net 255 ml         Vent Information  Equipment Changed: Humidification               CURRENT MEDS :  Scheduled Meds:   atorvastatin  20 mg PEG Tube Daily    guaiFENesin  400 mg PEG 
           Elder Gan M.D.,[facility]  Dean Manjarrez D.O., [place]., [facility]  Akua Parkinson M.D.  Shanda Lowe M.D.   MIKI VenturaO.        Daily Pulmonary Progress Note    Patient:  Alan Guajardo Jr. 68 y.o. male MRN: [id number]     Date of Service: 1/9/2024      Synopsis     We are following patient for COVID-pneumonia, multifocal pneumonia    \"CC\" : Fever shortness of breath    Code status: Full code      Subjective      Patient was seen and examined.  He is feeling slightly better today.  On 40% air trach mask saturating 95 to 98%.  No fevers over the past 24 hours    Review of Systems:  Constitutional: Positive for fatigue and weakness  Skin: Denies pigmentation, dark lesions, and rashes   HEENT: Denies hearing loss, tinnitus, ear drainage, epistaxis, sore throat, and hoarseness.  Cardiovascular: Denies palpitations, chest pain, and chest pressure.  Respiratory: Positive for cough.  Gastrointestinal: Has decreased appetite and significant decreased oral intake Genitourinary: Denies dysuria, frequency, urgency or hematuria  Musculoskeletal: Denies myalgias, muscle weakness, and bone pain  Neurological: Denies dizziness, vertigo, headache, and focal weakness  Psychological: Denies anxiety and depression      24-hour events:      Objective   OBJECTIVE:   /65   Pulse 83   Temp 97.9 °F (36.6 °C) (Axillary)   Resp 28   Ht 1.803 m (5' 11\")   Wt 78.9 kg (174 lb)   SpO2 95%   BMI 24.27 kg/m²   SpO2 Readings from Last 1 Encounters:   01/09/24 95%        I/O:    Intake/Output Summary (Last 24 hours) at 1/9/2024 1854  Last data filed at 1/9/2024 0623  Gross per 24 hour   Intake 0 ml   Output 800 ml   Net -800 ml                      CURRENT MEDS :  Scheduled Meds:   enoxaparin  1 mg/kg SubCUTAneous BID    pantoprazole  40 mg IntraVENous BID    [Held by provider] apixaban  2.5 mg Oral BID    budesonide  0.5 mg Nebulization BID RT    chlorhexidine  15 mL Mouth/Throat Daily    Vitamin D  5,000 Units 
           Elder Gan M.D.,[facility]  Dean Manjarrez D.O., [place]., [facility]  Akua Parkinson M.D.  Shanda Lowe M.D.   NISHANT Ventura.O.        Daily Pulmonary Progress Note    Patient:  Alan Guajardo Jr. 68 y.o. male MRN: [id number]     Date of Service: 1/10/2024      Synopsis     We are following patient for COVID-pneumonia, multifocal pneumonia    \"CC\" : Fever shortness of breath    Code status: Full code      Subjective      Patient seen and examined.  Patient more alert and feeling a lot better compared to the previous days.  No acute events overnight.  Wife stated he had an episode of ? mild hematuria for 1 episode today in the morning.    Review of Systems:  Constitutional: Positive for fatigue and weakness  Skin: Denies pigmentation, dark lesions, and rashes   HEENT: Denies hearing loss, tinnitus, ear drainage, epistaxis, sore throat, and hoarseness.  Cardiovascular: Denies palpitations, chest pain, and chest pressure.  Respiratory: Positive for cough.  Gastrointestinal: Has decreased appetite and significant decreased oral intake   Genitourinary: Denies dysuria, frequency, urgency or hematuria  Musculoskeletal: Denies myalgias, muscle weakness, and bone pain  Neurological: Denies dizziness, vertigo, headache, and focal weakness  Psychological: Denies anxiety and depression      24-hour events: No acute events overnight      Objective   OBJECTIVE:   /88   Pulse 70   Temp 98 °F (36.7 °C) (Oral)   Resp 24   Ht 1.803 m (5' 11\")   Wt 78.9 kg (174 lb)   SpO2 (S) 91%   BMI 24.27 kg/m²   SpO2 Readings from Last 1 Encounters:   01/10/24 (S) 91%        I/O:  No intake or output data in the 24 hours ending 01/10/24 1049                    CURRENT MEDS :  Scheduled Meds:   enoxaparin  1 mg/kg SubCUTAneous BID    pantoprazole  40 mg IntraVENous BID    [Held by provider] apixaban  2.5 mg Oral BID    budesonide  0.5 mg Nebulization BID RT    chlorhexidine  15 mL Mouth/Throat Daily    Vitamin D  5,000 
       [facility] Hospitalist Progress Note    Admitting Date and Time: 1/7/2024  6:33 PM  Admit Dx: Acute respiratory failure with hypoxia (HCC) [J96.01]  Acute respiratory failure with hypoxemia (HCC) [J96.01]  Pneumonia of right lower lobe due to infectious organism [J18.9]    Subjective:  Patient is being followed for Acute respiratory failure with hypoxia (HCC) [J96.01]  Acute respiratory failure with hypoxemia (HCC) [J96.01]  Pneumonia of right lower lobe due to infectious organism [J18.9]   Pt poor historian, non verbal, family at bedside.  Per RN: No major concerns.     ROS: denies fever, chills, cp, sob, n/v, HA unless stated above.      potassium chloride  10 mEq IntraVENous Q1H    enoxaparin  1 mg/kg SubCUTAneous BID    pantoprazole  40 mg IntraVENous BID    [Held by provider] apixaban  2.5 mg Oral BID    budesonide  0.5 mg Nebulization BID RT    chlorhexidine  15 mL Mouth/Throat Daily    Vitamin D  5,000 Units Oral Daily    DULoxetine  30 mg Oral BID    arformoterol tartrate  15 mcg Nebulization BID RT    levothyroxine  100 mcg Oral Daily    midodrine  5 mg Oral BID WC    [Held by provider] pantoprazole  40 mg Oral BID AC    atorvastatin  20 mg Oral Daily    tamsulosin  0.4 mg Oral Nightly    dexAMETHasone  20 mg IntraVENous Q24H    Followed by    [START ON 1/13/2024] dexAMETHasone  10 mg IntraVENous Q24H    Vitamin D  2,000 Units Oral Daily    guaiFENesin  400 mg Oral TID    remdesivir 100 mg in sodium chloride 0.9 % 250 mL IVPB  100 mg IntraVENous Q24H    piperacillin-tazobactam  3,375 mg IntraVENous Q8H    sodium chloride flush  5-40 mL IntraVENous 2 times per day    ipratropium 0.5 mg-albuterol 2.5 mg  1 Dose Inhalation Q4H WA RT     albuterol, 1 ampule, Q6H PRN  ibuprofen, 400 mg, Q6H PRN  acetaminophen, 650 mg, Q6H PRN   Or  acetaminophen, 650 mg, Q6H PRN  benzonatate, 100 mg, TID PRN  sodium chloride, 30 mL, PRN  sodium chloride flush, 5-40 mL, PRN  sodium chloride, , PRN  ondansetron, 4 mg, Q8H 
       [facility] Hospitalist Progress Note    Admitting Date and Time: 1/7/2024  6:33 PM  Admit Dx: Acute respiratory failure with hypoxia (HCC) [J96.01]  Acute respiratory failure with hypoxemia (HCC) [J96.01]  Pneumonia of right lower lobe due to infectious organism [J18.9]    Subjective:  Patient is being followed for Acute respiratory failure with hypoxia (HCC) [J96.01]  Acute respiratory failure with hypoxemia (HCC) [J96.01]  Pneumonia of right lower lobe due to infectious organism [J18.9]   Pt poor historian, non verbal, family at bedside, reports copious secretions around trach mask, requesting ent consult, had issues in the past.  Per RN: No major concerns.     ROS: denies fever, chills, cp, sob, n/v, HA unless stated above.      apixaban  2.5 mg Oral BID    budesonide  0.5 mg Nebulization BID RT    chlorhexidine  15 mL Mouth/Throat Daily    Vitamin D  5,000 Units Oral Daily    DULoxetine  30 mg Oral BID    arformoterol tartrate  15 mcg Nebulization BID RT    levothyroxine  100 mcg Oral Daily    midodrine  5 mg Oral BID WC    pantoprazole  40 mg Oral BID AC    atorvastatin  20 mg Oral Daily    tamsulosin  0.4 mg Oral Nightly    dexAMETHasone  20 mg IntraVENous Q24H    Followed by    [START ON 1/13/2024] dexAMETHasone  10 mg IntraVENous Q24H    Vitamin D  2,000 Units Oral Daily    guaiFENesin  400 mg Oral TID    remdesivir 100 mg in sodium chloride 0.9 % 250 mL IVPB  100 mg IntraVENous Q24H    piperacillin-tazobactam  3,375 mg IntraVENous Q8H    sodium chloride flush  5-40 mL IntraVENous 2 times per day    ipratropium 0.5 mg-albuterol 2.5 mg  1 Dose Inhalation Q4H WA RT    doxycycline (VIBRAMYCIN) IV  100 mg IntraVENous Q12H     albuterol, 1 ampule, Q6H PRN  ibuprofen, 400 mg, Q6H PRN  acetaminophen, 650 mg, Q6H PRN   Or  acetaminophen, 650 mg, Q6H PRN  benzonatate, 100 mg, TID PRN  sodium chloride, 30 mL, PRN  sodium chloride flush, 5-40 mL, PRN  sodium chloride, , PRN  ondansetron, 4 mg, Q8H PRN   
       [facility] Hospitalist Progress Note    Admitting Date and Time: 1/7/2024  6:33 PM  Admit Dx: Acute respiratory failure with hypoxia (HCC) [J96.01]  Acute respiratory failure with hypoxemia (HCC) [J96.01]  Pneumonia of right lower lobe due to infectious organism [J18.9]    Subjective:  Patient is being followed for Acute respiratory failure with hypoxia (HCC) [J96.01]  Acute respiratory failure with hypoxemia (HCC) [J96.01]  Pneumonia of right lower lobe due to infectious organism [J18.9]   Pt poor historian, non verbal, family at bedside.    Per RN: No major concerns.     ROS: denies fever, chills, cp, sob, n/v, HA unless stated above.      atorvastatin  20 mg PEG Tube Daily    guaiFENesin  400 mg PEG Tube TID    [START ON 1/13/2024] levothyroxine  100 mcg PEG Tube Daily    midodrine  5 mg PEG Tube BID WC    Vitamin D  5,000 Units PEG Tube Daily    DULoxetine  30 mg Per G Tube BID    enoxaparin  1 mg/kg SubCUTAneous BID    pantoprazole  40 mg IntraVENous BID    [Held by provider] apixaban  2.5 mg Oral BID    budesonide  0.5 mg Nebulization BID RT    chlorhexidine  15 mL Mouth/Throat Daily    arformoterol tartrate  15 mcg Nebulization BID RT    [Held by provider] pantoprazole  40 mg Oral BID AC    tamsulosin  0.4 mg Oral Nightly    dexAMETHasone  20 mg IntraVENous Q24H    Followed by    [START ON 1/13/2024] dexAMETHasone  10 mg IntraVENous Q24H    remdesivir 100 mg in sodium chloride 0.9 % 250 mL IVPB  100 mg IntraVENous Q24H    piperacillin-tazobactam  3,375 mg IntraVENous Q8H    sodium chloride flush  5-40 mL IntraVENous 2 times per day    ipratropium 0.5 mg-albuterol 2.5 mg  1 Dose Inhalation Q4H WA RT     albuterol, 1 ampule, Q6H PRN  ibuprofen, 400 mg, Q6H PRN  acetaminophen, 650 mg, Q6H PRN   Or  acetaminophen, 650 mg, Q6H PRN  benzonatate, 100 mg, TID PRN  sodium chloride, 30 mL, PRN  sodium chloride flush, 5-40 mL, PRN  sodium chloride, , PRN  ondansetron, 4 mg, Q8H PRN   Or  ondansetron, 4 mg, Q6H 
       [facility] Hospitalist Progress Note    Admitting Date and Time: 1/7/2024  6:33 PM  Admit Dx: Acute respiratory failure with hypoxia (HCC) [J96.01]  Acute respiratory failure with hypoxemia (HCC) [J96.01]  Pneumonia of right lower lobe due to infectious organism [J18.9]    Subjective:  Patient is being followed for Acute respiratory failure with hypoxia (HCC) [J96.01]  Acute respiratory failure with hypoxemia (HCC) [J96.01]  Pneumonia of right lower lobe due to infectious organism [J18.9]   Pt poor historian, non verbal, family at bedside.  Per RN: No major concerns.     ROS: denies fever, chills, cp, sob, n/v, HA unless stated above.      atorvastatin  20 mg PEG Tube Daily    guaiFENesin  400 mg PEG Tube TID    levothyroxine  100 mcg PEG Tube Daily    midodrine  5 mg PEG Tube BID WC    Vitamin D  5,000 Units PEG Tube Daily    DULoxetine  30 mg Per G Tube BID    enoxaparin  1 mg/kg SubCUTAneous BID    pantoprazole  40 mg IntraVENous BID    [Held by provider] apixaban  2.5 mg Oral BID    budesonide  0.5 mg Nebulization BID RT    chlorhexidine  15 mL Mouth/Throat Daily    arformoterol tartrate  15 mcg Nebulization BID RT    [Held by provider] pantoprazole  40 mg Oral BID AC    tamsulosin  0.4 mg Oral Nightly    dexAMETHasone  10 mg IntraVENous Q24H    piperacillin-tazobactam  3,375 mg IntraVENous Q8H    sodium chloride flush  5-40 mL IntraVENous 2 times per day    ipratropium 0.5 mg-albuterol 2.5 mg  1 Dose Inhalation Q4H WA RT     oxyCODONE, 5 mg, Q4H PRN  albuterol, 1 ampule, Q6H PRN  ibuprofen, 400 mg, Q6H PRN  acetaminophen, 650 mg, Q6H PRN   Or  acetaminophen, 650 mg, Q6H PRN  benzonatate, 100 mg, TID PRN  sodium chloride, 30 mL, PRN  sodium chloride flush, 5-40 mL, PRN  sodium chloride, , PRN  ondansetron, 4 mg, Q8H PRN   Or  ondansetron, 4 mg, Q6H PRN  polyethylene glycol, 17 g, Daily PRN         Objective:    /76   Pulse 66   Temp 97.9 °F (36.6 °C) (Oral)   Resp 18   Ht 1.803 m (5' 11\")   
       [facility] Hospitalist Progress Note    Admitting Date and Time: 1/7/2024  6:33 PM  Admit Dx: Acute respiratory failure with hypoxia (HCC) [J96.01]  Acute respiratory failure with hypoxemia (HCC) [J96.01]  Pneumonia of right lower lobe due to infectious organism [J18.9]    Subjective:  Patient is being followed for Acute respiratory failure with hypoxia (HCC) [J96.01]  Acute respiratory failure with hypoxemia (HCC) [J96.01]  Pneumonia of right lower lobe due to infectious organism [J18.9]   Pt seen and examined this morning  No acute events overnight   Denies any acute complains today     ROS: denies fever, chills, cp, sob, n/v, HA unless stated above.      atorvastatin  20 mg PEG Tube Daily    guaiFENesin  400 mg PEG Tube TID    levothyroxine  100 mcg PEG Tube Daily    midodrine  5 mg PEG Tube BID WC    Vitamin D  5,000 Units PEG Tube Daily    DULoxetine  30 mg Per G Tube BID    enoxaparin  1 mg/kg SubCUTAneous BID    pantoprazole  40 mg IntraVENous BID    [Held by provider] apixaban  2.5 mg Oral BID    budesonide  0.5 mg Nebulization BID RT    chlorhexidine  15 mL Mouth/Throat Daily    arformoterol tartrate  15 mcg Nebulization BID RT    [Held by provider] pantoprazole  40 mg Oral BID AC    tamsulosin  0.4 mg Oral Nightly    dexAMETHasone  10 mg IntraVENous Q24H    piperacillin-tazobactam  3,375 mg IntraVENous Q8H    sodium chloride flush  5-40 mL IntraVENous 2 times per day    ipratropium 0.5 mg-albuterol 2.5 mg  1 Dose Inhalation Q4H WA RT     oxyCODONE, 5 mg, Q4H PRN  albuterol, 1 ampule, Q6H PRN  ibuprofen, 400 mg, Q6H PRN  acetaminophen, 650 mg, Q6H PRN   Or  acetaminophen, 650 mg, Q6H PRN  benzonatate, 100 mg, TID PRN  sodium chloride, 30 mL, PRN  sodium chloride flush, 5-40 mL, PRN  sodium chloride, , PRN  ondansetron, 4 mg, Q8H PRN   Or  ondansetron, 4 mg, Q6H PRN  polyethylene glycol, 17 g, Daily PRN         Objective:    BP 96/62   Pulse 66   Temp 97.8 °F (36.6 °C) (Oral)   Resp 18   Ht 
       [facility] Hospitalist Progress Note    Admitting Date and Time: 1/7/2024  6:33 PM  Admit Dx: Acute respiratory failure with hypoxia (HCC) [J96.01]  Acute respiratory failure with hypoxemia (HCC) [J96.01]  Pneumonia of right lower lobe due to infectious organism [J18.9]    Subjective:  Patient is being followed for Acute respiratory failure with hypoxia (HCC) [J96.01]  Acute respiratory failure with hypoxemia (HCC) [J96.01]  Pneumonia of right lower lobe due to infectious organism [J18.9]   Pt poor historian, non verbal, family at bedside.  Started on tube feeds, tolerating well, SLP completed.     Per RN: No major concerns.     ROS: denies fever, chills, cp, sob, n/v, HA unless stated above.      atorvastatin  20 mg PEG Tube Daily    guaiFENesin  400 mg PEG Tube TID    levothyroxine  100 mcg PEG Tube Daily    midodrine  5 mg PEG Tube BID WC    Vitamin D  5,000 Units PEG Tube Daily    DULoxetine  30 mg Per G Tube BID    enoxaparin  1 mg/kg SubCUTAneous BID    pantoprazole  40 mg IntraVENous BID    [Held by provider] apixaban  2.5 mg Oral BID    budesonide  0.5 mg Nebulization BID RT    chlorhexidine  15 mL Mouth/Throat Daily    arformoterol tartrate  15 mcg Nebulization BID RT    [Held by provider] pantoprazole  40 mg Oral BID AC    tamsulosin  0.4 mg Oral Nightly    dexAMETHasone  10 mg IntraVENous Q24H    piperacillin-tazobactam  3,375 mg IntraVENous Q8H    sodium chloride flush  5-40 mL IntraVENous 2 times per day    ipratropium 0.5 mg-albuterol 2.5 mg  1 Dose Inhalation Q4H WA RT     oxyCODONE, 5 mg, Q4H PRN  albuterol, 1 ampule, Q6H PRN  ibuprofen, 400 mg, Q6H PRN  acetaminophen, 650 mg, Q6H PRN   Or  acetaminophen, 650 mg, Q6H PRN  benzonatate, 100 mg, TID PRN  sodium chloride, 30 mL, PRN  sodium chloride flush, 5-40 mL, PRN  sodium chloride, , PRN  ondansetron, 4 mg, Q8H PRN   Or  ondansetron, 4 mg, Q6H PRN  polyethylene glycol, 17 g, Daily PRN         Objective:    BP (!) 94/50   Pulse 83   Temp 
   01/07/24 2007   Oxygen Therapy/Pulse Ox   O2 Therapy Oxygen   O2 Device (S)  Venturi-mask   O2 Flow Rate (L/min) 15 L/min   FiO2  50 %   Pulse (!) 127   Respirations (!) 34   SpO2 94 %       
   01/07/24 2007   Oxygen Therapy/Pulse Ox   O2 Therapy Oxygen   O2 Device (S)  Venturi-mask   O2 Flow Rate (L/min) 15 L/min   FiO2  50 %   Pulse (!) 127   Respirations (!) 34   SpO2 94 %   Cough/Sputum   Sputum How Obtained Tracheal;Suctioned  (6 trach)   Cough Productive;Congested   Frequency Infrequent   Sputum Amount Moderate   Sputum Color Tan;Red   Tenacity Thick       
   01/10/24 0929   Treatment   Treatment Type Aerosol generator   $Treatment Type $Inhaled Therapy/Meds   Medications Albuterol/Ipratropium   Pre-Tx Pulse 89   Pre-Tx Resps 18   Breath Sounds Pre-Tx CRISTINA Diminished   Breath Sounds Pre-Tx LLL Diminished   Breath Sounds Pre-Tx RUL Diminished   Breath Sounds Pre-Tx RML Diminished   Breath Sounds Pre-Tx RLL Diminished   Breath Sounds Post-Tx CRISTINA Diminished   Breath Sounds Post-Tx LLL Diminished   Breath Sounds Post-Tx RUL Diminished   Breath Sounds Post-Tx RML Diminished   Breath Sounds Post-Tx RLL Diminished   Post-Tx Pulse 89   Post-Tx Resps 18   Delivery Source Oxygen;Other (comment)   Position Left Side   Treatment Tolerance Well   Duration 15   Is patient on O2? N   Oxygen Therapy/Pulse Ox   O2 Therapy (S)  Room air  (patient had taken trach mask off, laying in bed. 82%)   SpO2 (!) (S)  82 %       
   01/10/24 1255   Surgical Airway (Trach) 01/07/24   Placement Date/Time: 01/07/24 2000   Present on Admission/Arrival: Yes  Surgical Airway Type: Tracheostomy  Size: 6   Status Secured  (6 trach)   Site Assessment Dry   Site Care Open to air   Ties Assessment Changed   Cuff Pressure   (mlt)   Spare Trach at Bedside Yes   Ambu Bag With Mask at Bedside Yes       
   01/10/24 1614   Treatment   Treatment Type Aerosol generator   $Treatment Type $Inhaled Therapy/Meds   Medications Albuterol/Ipratropium   Pre-Tx Pulse 67   Pre-Tx Resps 18   Breath Sounds Pre-Tx CRISTINA Diminished;Rhonchi   Breath Sounds Pre-Tx LLL Diminished   Breath Sounds Pre-Tx RUL Diminished;Rhonchi   Breath Sounds Pre-Tx RML Diminished   Breath Sounds Pre-Tx RLL Diminished   Breath Sounds Post-Tx CRISTINA Diminished;Rhonchi   Breath Sounds Post-Tx LLL Diminished   Breath Sounds Post-Tx RUL Diminished;Rhonchi   Breath Sounds Post-Tx RML Diminished   Breath Sounds Post-Tx RLL Diminished   Post-Tx Pulse 67   Post-Tx Resps 18   Delivery Source Other (comment)   Position Right Side   Treatment Tolerance Fair   Duration 8   Is patient on O2? N   Oxygen Therapy/Pulse Ox   O2 Therapy Room air  (patient takes trach mask off and sets it in bed. wife at bedside while RT educates)   SpO2 90 %   Equipment Changed Humidification       
  Physician Progress Note      PATIENT:               LONNIE PANIAGUA  CSN #:                  [id number]  :                       1955  ADMIT DATE:       2024 6:33 PM  DISCH DATE:  RESPONDING  PROVIDER #:        Vladimir Banks MD          QUERY TEXT:    Pt admitted with COVID-19 and noted to have elevated temp and HR. If possible,   please document in progress notes and discharge summary if you are evaluating   and/or treating:    The medical record reflects the following:  Risk Factors: +COVID  Clinical Indicators: procal 56.55, , wbc 7.7, temp 99.9-102.8, ,   per pulmonary \"...Acute hypoxic respiratory failure multifactorial...COVID 19   pneumonia moderate to severe...Probable superimposed bacterial   pneumonia/aspiration pneumonia...\"  Treatment: pulmonary consult, IV Remdesivir, Toci    Thank you,  Roslyn Miner RN, BSN, CDIS  Clinical Documentation Integrity  [email or web address]  Options provided:  -- Sepsis present on admission due to COVID-19 pneumonia  -- Covid-19 pneumonia without sepsis  -- Other - I will add my own diagnosis  -- Disagree - Not applicable / Not valid  -- Disagree - Clinically unable to determine / Unknown  -- Refer to Clinical Documentation Reviewer    PROVIDER RESPONSE TEXT:    This patient has sepsis which was present on admission due to COVID-19   pneumonia.    Query created by: Roslyn Miner on 2024 9:30 AM      Electronically signed by:  Vladimir Banks MD 2024 3:47 PM          
  Speech Language Pathology  NAME:  Alan Guajardo .  :  1955  DATE: 2024  ROOM:  [facility]/0406-A    Pt unavailable at 930 for Dysphagia therapy services due to:    HOLD per RN, Pt with increased respiratory difficulty. SLP discussed with RN regarding recommendation for MBSS from previous date. She reported that she would talk to the doctor, but as of this AM, the patient is not medically appropriate for MBSS.     Will re-attempt as appropriate. Thank you.       
  Speech Language Pathology  NAME:  Alan Guajardo Jr.  :  1955  DATE: 1/15/2024  ROOM:  [facility]A    Pt unavailable at 1315 for Dysphagia therapy services due to:    Patient NPO for procedure not able to address dysphagia goals due to this.    Will re-attempt as appropriate. Thank you.       
  Speech Language Pathology  NAME:  Alan Guajardo Jr.  :  1955  DATE: 2024  ROOM:  [facility]-A    Pt unavailable at 940 for Dysphagia therapy services due to:    Patient NPO for procedure not able to address dysphagia goals due to this.    Will re-attempt as appropriate. Thank you.       
4 Eyes Skin Assessment     NAME:  Alan Guajardo Jr.  YOB: 1955  MEDICAL RECORD NUMBER:  [id number]    The patient is being assessed for  Admission    I agree that at least one RN has performed a thorough Head to Toe Skin Assessment on the patient. ALL assessment sites listed below have been assessed.      Areas assessed by both nurses:    Head, Face, Ears, Shoulders, Back, Chest, Arms, Elbows, Hands, Sacrum. Buttock, Coccyx, Ischium, Legs. Feet and Heels, and Under Medical Devices         Does the Patient have a Wound? No noted wound(s)       Tye Prevention initiated by RN: no  Wound Care Orders initiated by RN: No    Pressure Injury (Stage 3,4, Unstageable, DTI, NWPT, and Complex wounds) if present, place Wound referral order by RN under : No    New Ostomies, if present place, Ostomy referral order under : No     Nurse 1 eSignature: Electronically signed by Sydnee Landrum RN on 1/9/24 at 1:53 AM EST    **SHARE this note so that the co-signing nurse can place an eSignature**    Nurse 2 eSignature: {Esignature:[id number]}   
[facility]  Hematology/Oncology  Consult      Patient Name: Alan Guajardo Jr.  YOB: 1955  PCP: Sylvia Quintana DO   Referring Provider:      Reason for Consultation:   Chief Complaint   Patient presents with    Shortness of Breath     HX of Lung CA. Trach. O2 as needed. 88% RA per EMS.    Fever        History of Present Illness:This is a 68-year-old male patient with following with Dr. 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 He underwent concurrent XRT and Taxol/Carbo then Alimta/Carbo/Avastin. He was then diagnosed with laryngeal squamous cell carcinoma s/p tracheostomy on 2017, S/P palliative radiation therapy, and was started on immunotherapy Keytruda in 2017 every 3 weeks receiving his last treatment on 12/14/23. He was due on 1/11/24.  Patient presented to the ED via EMS for evaluation of shortness of breath in which she was 88% on room air.  He had finished 5 days of a Z-Eugenio prior to admission for an upper respiratory infection and cough.  Patient was found to be COVID-positive.  CT chest showed bilateral pulmonary opacifications consolidation and dense in the lower lungs left greater than right concerning for bronchopneumonia aspiration not excluded.  Trace to small right and small pleural effusions.  Right nonobstructing nephrolithiasis.  Pulmonology was consulted patient is status post 1 dose of tocilizumab.  On steroids and Zosyn.  ID following for COVID-positive and positive blood cultures positive for Streptococcus oralis.  CT face ordered to assess for origin of infection, possible dental abscess. GI following with plans for EGD and dilatation with possible PEG placement today.  CMP with BUN 27, calcium 8.4 procalcitonin 87.24.   proBNP 1470, troponin 39.  LFTs unremarkable.  CBC with Hgb 12.1, WBC and platelets WNL consultation for patient with known lung cancer on current immunotherapy.      Diagnostic Data:     Past 
[facility]  Hematology/Oncology  Consult      Patient Name: Alan Guajardo Jr.  YOB: 1955  PCP: Sylvia Quintana DO   Referring Provider:      Reason for Consultation:   Chief Complaint   Patient presents with    Shortness of Breath     HX of Lung CA. Trach. O2 as needed. 88% RA per EMS.    Fever        History of Present Illness:This is a 68-year-old male patient with following with Dr. 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 He underwent concurrent XRT and Taxol/Carbo then Alimta/Carbo/Avastin. He was then diagnosed with laryngeal squamous cell carcinoma s/p tracheostomy on 2017, S/P palliative radiation therapy, and was started on immunotherapy Keytruda in 2017 every 3 weeks receiving his last treatment on 12/14/23. He was due on 1/11/24.  Patient presented to the ED via EMS for evaluation of shortness of breath in which she was 88% on room air.  He had finished 5 days of a Z-Eugenio prior to admission for an upper respiratory infection and cough.  Patient was found to be COVID-positive.  CT chest showed bilateral pulmonary opacifications consolidation and dense in the lower lungs left greater than right concerning for bronchopneumonia aspiration not excluded.  Trace to small right and small pleural effusions.  Right nonobstructing nephrolithiasis.  Pulmonology was consulted patient is status post 1 dose of tocilizumab.  On steroids and Zosyn.  ID following for COVID-positive and positive blood cultures positive for Streptococcus oralis.  CT face ordered to assess for origin of infection, possible dental abscess. GI following with plans for EGD and dilatation with possible PEG placement today.  CMP with BUN 27, calcium 8.4 procalcitonin 87.24.   proBNP 1470, troponin 39.  LFTs unremarkable.  CBC with Hgb 12.1, WBC and platelets WNL consultation for patient with known lung cancer on current immunotherapy.      Diagnostic Data:     Past 
Comprehensive Nutrition Assessment    Type and Reason for Visit:  Initial, Positive Nutrition Screen    Nutrition Recommendations/Plan:   Recommend Consult Dietitian for TF Recommendation or Orders when EN access is obtained.  Will continue inpatient monitoring and further recommendations to follow     Malnutrition Assessment:  Malnutrition Status:  At risk for malnutrition (Comment) (01/09/24 1135)    Context:  Chronic Illness     Findings of the 6 clinical characteristics of malnutrition:  Energy Intake:  75% or less estimated energy requirements for 1 month or longer  Weight Loss:  Unable to assess     Body Fat Loss:  Unable to assess (Covid Isolation)     Muscle Mass Loss:  Unable to assess (Covid Isolation)    Fluid Accumulation:  No significant fluid accumulation     Strength:  Not Performed    Nutrition Assessment:    Pt admit 2/2 acute resp failure, Covid-19+. Pt on Keytruda for lung and larynx CA, has a trach and previously had a PEG d/t dysphagia. Pt's recent PO has been very poor and replacement of PEG planned when respiratory status stabilizes. Will continue to monitor pt status and recommend Consult Dietitian for TF Ordering and Management when PEG is available for EN    Nutrition Related Findings:    lethargic, trach/trach mask on 10L, drainage round trach (ENT consult pending), reddened face, round abd w/hypo BS, I/O WNL, Swallow Eval on hold d/t increasing resp difficulty Wound Type: None       Current Nutrition Intake & Therapies:    Average Meal Intake: NPO  Average Supplements Intake: NPO  Diet NPO    Anthropometric Measures:  Height: 180.3 cm (5' 11\")  Ideal Body Weight (IBW): 172 lbs (78 kg)    Admission Body Weight: 78.9 kg (173 lb 15.1 oz) (12/28/23 per [facility])  Current Body Weight: 78.9 kg (173 lb 15.1 oz) (RD UTO CBW/Droplet Isolation), 101.1 % IBW. Weight Source: Stated (1/7)  Current BMI (kg/m2): 24.3  Usual Body Weight: 83.5 kg (183 lb 15.9 oz) (8/28/23 per [facility])  % 
Comprehensive Nutrition Assessment    Type and Reason for Visit:  Reassess    Nutrition Recommendations/Plan:      Continue current bolus TF, as tolerated. Current TF meets 89% energy needs, 100% protein needs    Continue inpatient monitoring     Malnutrition Assessment:  Malnutrition Status:  At risk for malnutrition (Comment) (01/09/24 1135)    Context:  Chronic Illness     Findings of the 6 clinical characteristics of malnutrition:  Energy Intake:  75% or less estimated energy requirements for 1 month or longer  Weight Loss:  Mild weight loss (specify amount and time period) (7% in last 4 months)     Body Fat Loss:  Unable to assess (Covid Isolation)     Muscle Mass Loss:  Unable to assess (Covid Isolation)    Fluid Accumulation:  No significant fluid accumulation     Strength:  Not Performed    Nutrition Assessment:    Pt currently s/p 1/15 extraction remaining lower mandibular teeth d/t severe caries/peridontal disease. Pt remains on trach/trach mask and bolus EN via PEG. Discharge planning in progress for home with [facility], possible today.    Nutrition Related Findings:    A&Ox4, soft abd w/hypo BS, +Covid-19, -I/O 4.4L, no edema Wound Type: None       Current Nutrition Intake & Therapies:    Average Meal Intake: NPO  Average Supplements Intake: NPO  Current Tube Feeding (TF) Orders:  Feeding Route: PEG  Formula: 2.0 Calorie  Schedule: Bolus  Feeding Regimen: 1 carton (237 ml) QID = 948 ml  Additives/Modulars: Protein (once daily)  Water Flushes: 75 ml before and after bolus  Current TF & Flush Orders Provides: at goal  Goal TF & Flush Orders Provides: 1996 total martha, 106 g total protein, 1264 ml total free water    Anthropometric Measures:  Height: 180.3 cm (5' 11\")  Ideal Body Weight (IBW): 172 lbs (78 kg)    Admission Body Weight: 76.8 kg (169 lb 5 oz) (1/12 bedscale)  Current Body Weight: 75.9 kg (167 lb 5.3 oz), 98.4 % IBW. Weight Source: Bed Scale (1/14)  Current BMI (kg/m2): 23.3  Usual Body Weight: 
Consult called to Dr. Cummings's office.  HG  
Database initiated. Patient is A&O independent from home with wife. He has a trach. States he uses no assistive devices and is RA at baseline but is supposed to be wears 4 liters oxygen at night. Wife states he has not been doing this for years! He has difficulty eating and was supposed to get his esophagus dilated at [facility] at January 3rd but had to cancel d/t dizziness and fever.     
Dr Tiwari notified of consult via med dent. Carmen Pompa notified this worker that she would like Dr Granados to see the patient.  Consult for Dr Tiwari cancelled via med dent  Dr Granados notified of consult via his office.  Carmen reynoso  
Dr. Banks notified of positive blood cultures  
ENT resident updated that patients wife removed inner cannula of trach, she states that when inner cannula is in patient has shortness of breath and mucous plugging.   
ENT residents notified of new consult.   
Infectious Disease  Progress Note  [facility]    Chief Complaint: bacteremia    Subjective:  he is doing better. No fever. Tolerating antibiotics well.  Had PT and walked in the room and breathing is better. He is on tube feeds.    Scheduled Meds:   atorvastatin  20 mg PEG Tube Daily    guaiFENesin  400 mg PEG Tube TID    [START ON 1/13/2024] levothyroxine  100 mcg PEG Tube Daily    midodrine  5 mg PEG Tube BID WC    Vitamin D  5,000 Units PEG Tube Daily    DULoxetine  30 mg Per G Tube BID    enoxaparin  1 mg/kg SubCUTAneous BID    pantoprazole  40 mg IntraVENous BID    [Held by provider] apixaban  2.5 mg Oral BID    budesonide  0.5 mg Nebulization BID RT    chlorhexidine  15 mL Mouth/Throat Daily    arformoterol tartrate  15 mcg Nebulization BID RT    [Held by provider] pantoprazole  40 mg Oral BID AC    tamsulosin  0.4 mg Oral Nightly    dexAMETHasone  20 mg IntraVENous Q24H    Followed by    [START ON 1/13/2024] dexAMETHasone  10 mg IntraVENous Q24H    remdesivir 100 mg in sodium chloride 0.9 % 250 mL IVPB  100 mg IntraVENous Q24H    piperacillin-tazobactam  3,375 mg IntraVENous Q8H    sodium chloride flush  5-40 mL IntraVENous 2 times per day    ipratropium 0.5 mg-albuterol 2.5 mg  1 Dose Inhalation Q4H WA RT     Continuous Infusions:   sodium chloride       PRN Meds:albuterol, ibuprofen, acetaminophen **OR** acetaminophen, benzonatate, sodium chloride, sodium chloride flush, sodium chloride, ondansetron **OR** ondansetron, polyethylene glycol    Prior to Admission medications    Medication Sig Start Date End Date Taking? Authorizing Provider   budesonide (PULMICORT) 0.5 MG/2ML nebulizer suspension Take 2 mLs by nebulization in the morning and 2 mLs in the evening.   Yes ProviderNataliia MD   formoterol (PERFOROMIST) 20 MCG/2ML nebulizer solution Take 2 mLs by nebulization in the morning and 2 mLs in the evening.   Yes ProviderNataliia MD   revefenacin (YUPELRI) 175 MCG/3ML SOLN Inhale 3 mLs into the lungs 
Infectious Disease  Progress Note  [facility]    Chief Complaint: bacteremia    Subjective: Patient resting in bed no nausea vomiting or diarrhea.  Tolerating medications . Breathing is doing well.  Wants to go home today had all of the teeth in the lower jaw removed yesterday.    Scheduled Meds:   atorvastatin  20 mg PEG Tube Daily    guaiFENesin  400 mg PEG Tube TID    levothyroxine  100 mcg PEG Tube Daily    midodrine  5 mg PEG Tube BID WC    Vitamin D  5,000 Units PEG Tube Daily    DULoxetine  30 mg Per G Tube BID    enoxaparin  1 mg/kg SubCUTAneous BID    pantoprazole  40 mg IntraVENous BID    [Held by provider] apixaban  2.5 mg Oral BID    budesonide  0.5 mg Nebulization BID RT    chlorhexidine  15 mL Mouth/Throat Daily    arformoterol tartrate  15 mcg Nebulization BID RT    [Held by provider] pantoprazole  40 mg Oral BID AC    tamsulosin  0.4 mg Oral Nightly    dexAMETHasone  10 mg IntraVENous Q24H    piperacillin-tazobactam  3,375 mg IntraVENous Q8H    sodium chloride flush  5-40 mL IntraVENous 2 times per day    ipratropium 0.5 mg-albuterol 2.5 mg  1 Dose Inhalation Q4H WA RT     Continuous Infusions:   sodium chloride       PRN Meds:oxyCODONE, albuterol, acetaminophen **OR** acetaminophen, benzonatate, sodium chloride, sodium chloride flush, sodium chloride, ondansetron **OR** ondansetron, polyethylene glycol    Prior to Admission medications    Medication Sig Start Date End Date Taking? Authorizing Provider   budesonide (PULMICORT) 0.5 MG/2ML nebulizer suspension Take 2 mLs by nebulization in the morning and 2 mLs in the evening.   Yes Nataliia Gomez MD   formoterol (PERFOROMIST) 20 MCG/2ML nebulizer solution Take 2 mLs by nebulization in the morning and 2 mLs in the evening.   Yes ProviderNataliia MD   revefenacin (YUPELRI) 175 MCG/3ML SOLN Inhale 3 mLs into the lungs every evening   Yes Nataliia Gomez MD   DULoxetine (CYMBALTA) 30 MG extended release capsule Take 1 capsule by mouth 2 
Infectious Disease  Progress Note  [facility]    Chief Complaint: bacteremia    Subjective: Patient resting in bed no nausea vomiting or diarrhea.  Tolerating medications . Breathing is doing well. Pending tooth removal today.    Scheduled Meds:   atorvastatin  20 mg PEG Tube Daily    guaiFENesin  400 mg PEG Tube TID    levothyroxine  100 mcg PEG Tube Daily    midodrine  5 mg PEG Tube BID WC    Vitamin D  5,000 Units PEG Tube Daily    DULoxetine  30 mg Per G Tube BID    enoxaparin  1 mg/kg SubCUTAneous BID    pantoprazole  40 mg IntraVENous BID    [Held by provider] apixaban  2.5 mg Oral BID    budesonide  0.5 mg Nebulization BID RT    chlorhexidine  15 mL Mouth/Throat Daily    arformoterol tartrate  15 mcg Nebulization BID RT    [Held by provider] pantoprazole  40 mg Oral BID AC    tamsulosin  0.4 mg Oral Nightly    dexAMETHasone  10 mg IntraVENous Q24H    piperacillin-tazobactam  3,375 mg IntraVENous Q8H    sodium chloride flush  5-40 mL IntraVENous 2 times per day    ipratropium 0.5 mg-albuterol 2.5 mg  1 Dose Inhalation Q4H WA RT     Continuous Infusions:   sodium chloride       PRN Meds:oxyCODONE, albuterol, ibuprofen, acetaminophen **OR** acetaminophen, benzonatate, sodium chloride, sodium chloride flush, sodium chloride, ondansetron **OR** ondansetron, polyethylene glycol    Prior to Admission medications    Medication Sig Start Date End Date Taking? Authorizing Provider   budesonide (PULMICORT) 0.5 MG/2ML nebulizer suspension Take 2 mLs by nebulization in the morning and 2 mLs in the evening.   Yes Nataliia Gomez MD   formoterol (PERFOROMIST) 20 MCG/2ML nebulizer solution Take 2 mLs by nebulization in the morning and 2 mLs in the evening.   Yes Nataliia Gomez MD   revefenacin (YUPELRI) 175 MCG/3ML SOLN Inhale 3 mLs into the lungs every evening   Yes Nataliia Gomez MD   DULoxetine (CYMBALTA) 30 MG extended release capsule Take 1 capsule by mouth 2 times daily   Yes Nataliia Gomez MD 
Infectious Disease  Progress Note  [facility]    Chief Complaint: bacteremia    Subjective: Patient resting in bed no nausea vomiting or diarrhea.  Tolerating medications expect dental extractions in the morning at some point.  Spoke to family.    Scheduled Meds:   atorvastatin  20 mg PEG Tube Daily    guaiFENesin  400 mg PEG Tube TID    levothyroxine  100 mcg PEG Tube Daily    midodrine  5 mg PEG Tube BID WC    Vitamin D  5,000 Units PEG Tube Daily    DULoxetine  30 mg Per G Tube BID    enoxaparin  1 mg/kg SubCUTAneous BID    pantoprazole  40 mg IntraVENous BID    [Held by provider] apixaban  2.5 mg Oral BID    budesonide  0.5 mg Nebulization BID RT    chlorhexidine  15 mL Mouth/Throat Daily    arformoterol tartrate  15 mcg Nebulization BID RT    [Held by provider] pantoprazole  40 mg Oral BID AC    tamsulosin  0.4 mg Oral Nightly    dexAMETHasone  10 mg IntraVENous Q24H    piperacillin-tazobactam  3,375 mg IntraVENous Q8H    sodium chloride flush  5-40 mL IntraVENous 2 times per day    ipratropium 0.5 mg-albuterol 2.5 mg  1 Dose Inhalation Q4H WA RT     Continuous Infusions:   sodium chloride       PRN Meds:oxyCODONE, albuterol, ibuprofen, acetaminophen **OR** acetaminophen, benzonatate, sodium chloride, sodium chloride flush, sodium chloride, ondansetron **OR** ondansetron, polyethylene glycol    Prior to Admission medications    Medication Sig Start Date End Date Taking? Authorizing Provider   budesonide (PULMICORT) 0.5 MG/2ML nebulizer suspension Take 2 mLs by nebulization in the morning and 2 mLs in the evening.   Yes ProviderNataliia MD   formoterol (PERFOROMIST) 20 MCG/2ML nebulizer solution Take 2 mLs by nebulization in the morning and 2 mLs in the evening.   Yes ProviderNataliia MD   revefenacin (YUPELRI) 175 MCG/3ML SOLN Inhale 3 mLs into the lungs every evening   Yes Nataliia Gomez MD   DULoxetine (CYMBALTA) 30 MG extended release capsule Take 1 capsule by mouth 2 times daily   Yes Provider 
Message sent to Dr Rodriguez to notify him of increased gastric reflux since starting tube feeds  
[facility] office notified of consult.  
Notified Elizabeth Mariscal NP regarding changing oral medications to IV medications. No new orders obtained.   
Nurse to nurse report called to 4W  Patient will be transferred to room 406  
Occupational Therapy  OT BEDSIDE TREATMENT NOTE      Date:1/15/2024  Patient Name: Alan Guajardo Jr.  MRN: [id number]  : 1955  Room: [address] [place] [address]        Evaluating OT: Ashli Jenkins, OTR/L - [id number]     Referring Provider: Vladimir Banks MD  Specific Provider Orders/Date: \"OT eval and treat\" - 1/10/2024     Diagnosis: Acute respiratory failure with hypoxia (HCC) [J96.01]  Acute respiratory failure with hypoxemia (HCC) [J96.01]  Pneumonia of right lower lobe due to infectious organism [J18.9]      Pertinent Medical History: COPD, lung cancer, arthritis      Precautions: fall risk, O2 via trach mask, droplet plus isolation (COVID-), PEG ()     Assessment of Current Deficits:    [x] Functional mobility             [x] ADLs          [x] Strength                  [] Cognition   [x] Functional transfers           [x] IADLs         [x] Safety Awareness   [x] Endurance   [] Fine Motor Coordination    [x] Balance      [] Vision/Perception   [x] Sensation    [] Gross Motor Coordination [] ROM          [] Delirium                  [] Motor Control      OT PLAN OF CARE   OT POC is based on physician orders, patient diagnosis, and results of clinical assessment.  Frequency/Duration 2-5 days/week for 2 weeks PRN   Specific OT Treatment Interventions to Include:   * Instruction/training on adapted ADL techniques and AE recommendations to increase functional independence within precautions       * Training on energy conservation strategies, correct breathing pattern and techniques to improve independence/tolerance for self-care routine  * Functional transfer/mobility training/DME recommendations for increased independence, safety, and fall prevention  * Patient/Family education to increase follow through with safety techniques and functional independence  * Recommendation of environmental modifications for increased safety with functional transfers/mobility and ADLs  * Therapeutic exercise to improve motor 
Occupational Therapy  OT BEDSIDE TREATMENT NOTE      Date:2024  Patient Name: Alan Guajardo Jr.  MRN: [id number]  : 1955  Room: [address] [place] [address]     Evaluating OT: Ashli Jenkins, OTR/L - [id number]     Referring Provider: Vladimir Banks MD  Specific Provider Orders/Date: \"OT eval and treat\" - 1/10/2024     Diagnosis: Acute respiratory failure with hypoxia (HCC) [J96.01]  Acute respiratory failure with hypoxemia (HCC) [J96.01]  Pneumonia of right lower lobe due to infectious organism [J18.9]      Pertinent Medical History: COPD, lung cancer, arthritis      Precautions: fall risk, O2 via trach mask, droplet plus isolation (COVID-), PEG ()     Assessment of Current Deficits:    [x] Functional mobility             [x] ADLs          [x] Strength                  [] Cognition   [x] Functional transfers           [x] IADLs         [x] Safety Awareness   [x] Endurance   [] Fine Motor Coordination    [x] Balance      [] Vision/Perception   [x] Sensation    [] Gross Motor Coordination [] ROM          [] Delirium                  [] Motor Control      OT PLAN OF CARE   OT POC is based on physician orders, patient diagnosis, and results of clinical assessment.  Frequency/Duration 2-5 days/week for 2 weeks PRN   Specific OT Treatment Interventions to Include:   * Instruction/training on adapted ADL techniques and AE recommendations to increase functional independence within precautions       * Training on energy conservation strategies, correct breathing pattern and techniques to improve independence/tolerance for self-care routine  * Functional transfer/mobility training/DME recommendations for increased independence, safety, and fall prevention  * Patient/Family education to increase follow through with safety techniques and functional independence  * Recommendation of environmental modifications for increased safety with functional transfers/mobility and ADLs  * Therapeutic exercise to improve motor endurance, 
PROGRESS NOTE        Patient Presents with/Seen in Consultation For      *Reason for Consult: dysphagia   CHIEF COMPLAINT:  lethargy      Subjective:     Patient seen laying in bed, in NAD. Wife at BS. TF remains clamped. Awaiting dietary visit, for TF recs. Has been NPO. Patient denies any esophageal pain. EGD findings reviewed with the patient & wife, all questions answered. Wife requesting diet for the patient, awaiting speech visit as well. No BM. +flatus.    Review of Systems  Aside from what was mentioned in the PMH and HPI, essentially unremarkable, all others negative.    Objective:     BP (!) 140/91   Pulse 65   Temp 97.7 °F (36.5 °C) (Oral)   Resp 18   Ht 1.803 m (5' 11\")   Wt 76.8 kg (169 lb 5 oz)   SpO2 100%   BMI 23.61 kg/m²     General appearance: alert, awake, laying in bed, and cooperative  Eyes: conjunctiva pale, sclera anicteric. PERRL.  Lungs: clear to auscultation bilaterally, trach   Heart: regular rate and rhythm, no murmur, 2+ pulses; no edema  Abdomen: soft, non-tender; bowel sounds normal; no masses,  no organomegaly, TF- intact taped in place, binder intact  Extremities: extremities without edema  Pulses: 2+ and symmetric  Skin: Skin color, texture, turgor normal.   Neurologic: Grossly normal    atorvastatin (LIPITOR) tablet 20 mg, Daily  guaiFENesin tablet 400 mg, TID  [START ON 1/13/2024] levothyroxine (SYNTHROID) tablet 100 mcg, Daily  midodrine (PROAMATINE) tablet 5 mg, BID WC  Vitamin D (CHOLECALCIFEROL) tablet 5,000 Units, Daily  DULoxetine (CYMBALTA) extended release capsule 30 mg, BID  enoxaparin (LOVENOX) injection 80 mg, BID  pantoprazole (PROTONIX) injection 40 mg, BID  albuterol (ACCUNEB) nebulizer solution 0.63 mg, Q6H PRN  [Held by provider] apixaban (ELIQUIS) tablet 2.5 mg, BID  budesonide (PULMICORT) nebulizer suspension 500 mcg, BID RT  chlorhexidine (PERIDEX) 0.12 % solution 15 mL, Daily  arformoterol tartrate (BROVANA) nebulizer solution 15 mcg, BID RT  [Held by 
PROGRESS NOTE        Patient Presents with/Seen in Consultation For      *Reason for Consult: dysphagia   CHIEF COMPLAINT:  lethargy      Subjective:     Patient seen resting comfortably, no c/o abdominal pain or nausea. Breathing and oxygen requirements have improved. POC d/w wife at bedside.     Review of Systems  Aside from what was mentioned in the PMH and HPI, essentially unremarkable, all others negative.    Objective:     /88   Pulse 70   Temp 98 °F (36.7 °C) (Oral)   Resp 24   Ht 1.803 m (5' 11\")   Wt 78.9 kg (174 lb)   SpO2 98%   BMI 24.27 kg/m²     General appearance: alert, awake, laying in bed, and cooperative  Eyes: conjunctiva pale, sclera anicteric. PERRL.  Lungs: clear to auscultation bilaterally, trach   Heart: regular rate and rhythm, no murmur, 2+ pulses; no edema  Abdomen: soft, non-tender; bowel sounds normal; no masses,  no organomegaly  Extremities: extremities without edema  Pulses: 2+ and symmetric  Skin: Skin color, texture, turgor normal.   Neurologic: Grossly normal    potassium chloride 10 mEq/100 mL IVPB (Peripheral Line), Q1H  enoxaparin (LOVENOX) injection 80 mg, BID  pantoprazole (PROTONIX) injection 40 mg, BID  albuterol (ACCUNEB) nebulizer solution 0.63 mg, Q6H PRN  [Held by provider] apixaban (ELIQUIS) tablet 2.5 mg, BID  budesonide (PULMICORT) nebulizer suspension 500 mcg, BID RT  chlorhexidine (PERIDEX) 0.12 % solution 15 mL, Daily  Vitamin D (CHOLECALCIFEROL) tablet 5,000 Units, Daily  DULoxetine (CYMBALTA) extended release capsule 30 mg, BID  arformoterol tartrate (BROVANA) nebulizer solution 15 mcg, BID RT  levothyroxine (SYNTHROID) tablet 100 mcg, Daily  midodrine (PROAMATINE) tablet 5 mg, BID WC  [Held by provider] pantoprazole (PROTONIX) tablet 40 mg, BID AC  atorvastatin (LIPITOR) tablet 20 mg, Daily  tamsulosin (FLOMAX) capsule 0.4 mg, Nightly  ibuprofen (ADVIL;MOTRIN) 100 MG/5ML suspension 400 mg, Q6H PRN  acetaminophen (TYLENOL) tablet 650 mg, Q6H PRN   
PROGRESS NOTE        Patient Presents with/Seen in Consultation For      *Reason for Consult: dysphagia   CHIEF COMPLAINT:  lethargy    Subjective:     Patient seen resting comfortably, wife at bedside. Increase in oxygen needs per wife, no other complaints at this time. POC d/w pt and wife.     Review of Systems  Aside from what was mentioned in the PMH and HPI, essentially unremarkable, all others negative.    Objective:     BP (!) 122/91   Pulse (!) 104   Temp 98.2 °F (36.8 °C) (Axillary)   Resp 30   Ht 1.803 m (5' 11\")   Wt 78.9 kg (174 lb)   SpO2 98%   BMI 24.27 kg/m²     General appearance: alert, awake, laying in bed, and cooperative  Eyes: conjunctiva pale, sclera anicteric. PERRL.  Lungs: clear to auscultation bilaterally, trach   Heart: regular rate and rhythm, no murmur, 2+ pulses; no edema  Abdomen: soft, non-tender; bowel sounds normal; no masses,  no organomegaly  Extremities: extremities without edema  Pulses: 2+ and symmetric  Skin: Skin color, texture, turgor normal.   Neurologic: Grossly normal    albuterol (ACCUNEB) nebulizer solution 0.63 mg, Q6H PRN  apixaban (ELIQUIS) tablet 2.5 mg, BID  budesonide (PULMICORT) nebulizer suspension 500 mcg, BID RT  chlorhexidine (PERIDEX) 0.12 % solution 15 mL, Daily  Vitamin D (CHOLECALCIFEROL) tablet 5,000 Units, Daily  DULoxetine (CYMBALTA) extended release capsule 30 mg, BID  arformoterol tartrate (BROVANA) nebulizer solution 15 mcg, BID RT  levothyroxine (SYNTHROID) tablet 100 mcg, Daily  midodrine (PROAMATINE) tablet 5 mg, BID WC  pantoprazole (PROTONIX) tablet 40 mg, BID AC  atorvastatin (LIPITOR) tablet 20 mg, Daily  tamsulosin (FLOMAX) capsule 0.4 mg, Nightly  ibuprofen (ADVIL;MOTRIN) 100 MG/5ML suspension 400 mg, Q6H PRN  acetaminophen (TYLENOL) tablet 650 mg, Q6H PRN   Or  acetaminophen (TYLENOL) suppository 650 mg, Q6H PRN  benzonatate (TESSALON) capsule 100 mg, TID PRN  dexAMETHasone (DECADRON) 20 mg in sodium chloride 0.9 % 50 mL IVPB, 
Patient for EGD, possible dilatation, possible PEG placement today with Dr. Villagomez patient and wife agree to proceed. Pt feeling well, no events noted. VSS. Additional questions and concerns addressed. Labs and chart reviewed. Will consult dietary for TF recommendations. Consent has been signed. Ok to proceed.    BG Nieto CNP 1/11/2024 9:08 AM   
Patient's family removed trach cannula due to complaint of mucus plug. Respiratory assisted family in inserting new cannula.   
Physical Therapy  Facility/Department: [address] INTERNAL MEDICINE 2  Physical Therapy Treatment Note    Name: Alan Guajardo Jr.  : 1955  MRN: [id number]  Date of Service: 2024      Patient Diagnosis(es): The primary encounter diagnosis was Pneumonia of right lower lobe due to infectious organism. A diagnosis of Acute respiratory failure with hypoxia (HCC) was also pertinent to this visit.  Past Medical History:  has a past medical history of Abdominal aortic aneurysm without rupture (HCC), Acute bronchitis with COPD (HCC), Arthritis, COPD (chronic obstructive pulmonary disease) (HCC), Decreased dorsalis pedis pulse, Depression with anxiety, Emphysema of lung (HCC), Encounter for antineoplastic immunotherapy, History of deep vein thrombosis, Hyperlipidemia, Infrarenal abdominal aortic aneurysm (AAA) without rupture (HCC), Late effect of radiation, Lung cancer (HCC), Osteoradionecrosis of jaw, Paralyzed vocal cords, Thrombosis of testis, Thyroid disease, and Tracheostomy in place (HCC).  Past Surgical History:  has a past surgical history that includes Knee arthroscopy; sinus surgery; other surgical history (4/15/2016); Lung biopsy (Left, 2016); tracheostomy (2017); bronchoscopy (N/A, 3/5/2020); cervical fusion (); Medication Injection (Bilateral, 10/29/2020); Pain management procedure (Right, 2020); Rotator cuff repair (Right); Pain management procedure (Right, 2021); tracheostomy; Medication Injection (Left, 2022); hip surgery (Left, 2022); Pain management procedure (Right, 2022); Nerve Block (Bilateral, 2023); Upper gastrointestinal endoscopy (N/A, 2023); Nerve Block (Bilateral, 10/5/2023); Upper gastrointestinal endoscopy (N/A, 2023); and Upper gastrointestinal endoscopy (N/A, 2024).      Evaluating Therapist: Arlene Vargas     Room #:  0406/0406-A  Diagnosis:  Acute respiratory failure with hypoxia (HCC) [J96.01]  Acute respiratory failure 
Physical Therapy  Facility/Department: [address] INTERNAL MEDICINE 2  Physical Therapy Initial Assessment    Name: Alan Guajardo Jr.  : 1955  MRN: [id number]  Date of Service: 1/10/2024      Patient Diagnosis(es): The primary encounter diagnosis was Pneumonia of right lower lobe due to infectious organism. A diagnosis of Acute respiratory failure with hypoxia (HCC) was also pertinent to this visit.  Past Medical History:  has a past medical history of Abdominal aortic aneurysm without rupture (HCC), Acute bronchitis with COPD (HCC), Arthritis, COPD (chronic obstructive pulmonary disease) (HCC), Decreased dorsalis pedis pulse, Depression with anxiety, Emphysema of lung (HCC), Encounter for antineoplastic immunotherapy, History of deep vein thrombosis, Hyperlipidemia, Infrarenal abdominal aortic aneurysm (AAA) without rupture (HCC), Late effect of radiation, Lung cancer (HCC), Osteoradionecrosis of jaw, Paralyzed vocal cords, Thrombosis of testis, Thyroid disease, and Tracheostomy in place (HCC).  Past Surgical History:  has a past surgical history that includes Knee arthroscopy; sinus surgery; other surgical history (4/15/2016); Lung biopsy (Left, 2016); tracheostomy (2017); bronchoscopy (N/A, 3/5/2020); cervical fusion (); Medication Injection (Bilateral, 10/29/2020); Pain management procedure (Right, 2020); Rotator cuff repair (Right); Pain management procedure (Right, 2021); tracheostomy; Medication Injection (Left, 2022); hip surgery (Left, 2022); Pain management procedure (Right, 2022); Nerve Block (Bilateral, 2023); Upper gastrointestinal endoscopy (N/A, 2023); Nerve Block (Bilateral, 10/5/2023); and Upper gastrointestinal endoscopy (N/A, 2023).      Evaluating Therapist: Arlene Vargas PT    Room #:  0406/0406-A  Diagnosis:  Acute respiratory failure with hypoxia (HCC) [J96.01]  Acute respiratory failure with hypoxemia (HCC) [J96.01]  Pneumonia of 
Pt doesn't ambulate very well, trach mask removed while pt in bed  In 5 minutes o2 saturation 87%  Trach mask applied at 35% o2, saturation 94%  
Pt. Seen and examined. Denies any oral pain. S/P extractions of remaining mandibular teeth. Sutures in tact. Healing WNL. Negative swelling or drainage noted.  Pt. Okay to discharge from oral surgery standpoint. Spoke to pt. and wife about follow up in office in 2 weeks.  
Pt. Seen and examined. Denies any oral pain. States he feels better. Wife at bedside. I discussed with both patient and wife the plan to extract remaining mandibular teeth and both were in agreement. Wife stated to me that he has wanted the remaining teeth out for a while now.     Plan:  1.Will extract remaining mandibular teeth Monday 1/15 in the afternoon. Will discuss with anesthesia GA vs local.  
SPEECH/LANGUAGE PATHOLOGY  VIDEOFLUOROSCOPIC STUDY OF SWALLOWING (MBS)   and PLAN OF CARE    PATIENT NAME:  Alan Guajardo Jr.  (male)     MRN:  [id number]    :  1955  (68 y.o.)  STATUS:  Inpatient: Room 0406/0406-A    TODAY'S DATE:  2024  REFERRING PROVIDER:   Dr. laguna   SPECIFIC PROVIDER ORDER: FL modified barium swallow with video  Date of order:  24   REASON FOR REFERRAL: Assess oropharyngeal swallow function    EVALUATING THERAPIST: María Camara, SLP      RESULTS:      DYSPHAGIA DIAGNOSIS:  moderate oropharyngeal phase dysphagia including silent aspiration    DIET RECOMMENDATIONS:  Soft and bite size consistency solids (IDDSI level 6) with  nectar consistency (mildly thick - IDDSI level 2) liquids UTILIZING A DOUBLE SWALLOW     *Per chart review, patient to have tooth extraction while IP. Due to patient with known oral phase dysphagia, recommend SLP reevaluate patient following extraction to determine safest LRD.     FEEDING RECOMMENDATIONS:    Assistance level:  Set-up is required for all oral intake     Compensatory strategies recommended: Thorough oral care to prevent colonization of oral bacteria , Upright in bed/ chair as tolerated, Fully alert for all PO, and Small bites/sips, DOUBLE SWALLOW     Discussed recommendations with nursing and/or faxed report to referring provider: Yes    Laryngeal Penetration and Aspiration:  Penetration WITHOUT aspiration was observed in today's study with  mildly thick liquid (nectar)  Penetration WITH aspiration was observed in today's study with  thin liquid    SPEECH THERAPY  PLAN OF CARE   The dysphagia POC is established based on physician order and dysphagia diagnosis    Skilled SLP intervention for dysphagia management up to 6 x per week until goals met, pt plateaus in function and/or discharged from hospital      Conditions Requiring Skilled Therapeutic Intervention for dysphagia:    Patient is performing below functional baseline d/t  current 
Speech Language Pathology      NAME:  Alan Guajardo Jr.  :  1955  DATE: 2024  ROOM:  [facility]    Chart reviewed. Patient completed CSE on 24 where he was recommended for NPO until MBSS can be completed. Patient has not completed MBSS as of current due to medical status as well as being NPO or procedures. Upon chart review, physician's indicating patient and family wishing to resume PO diet. SLP continues to recommend an MBSS to be completed to determine the least restrictive diet.     An MBSS is recommended and requires a physician's order.     Acute respiratory failure with hypoxia (HCC) [J96.01]  Acute respiratory failure with hypoxemia (HCC) [J96.01]  Pneumonia of right lower lobe due to infectious organism [J18.9]    Thank  you    María Camara M.S. CCC-SLP/L  Speech Language Pathologist  [id number]         
Spoke with ENT resident, says okay for intermittent removal of inner cannula. Continue trach care and humidification.   
[facility] with [facility], copay for Augmentin  $80.41  Family requesting script sent to [facility] on [address]   
oropharyngeal swallow function and to assist in determining the least restrictive PO diet to maintain adequate nutrition/hydration     Long Term Goals:   Pt will maintain adequate nutrition/hydration via PO intake of the least restrictive oral diet with implementation of safe swallow/ compensatory strategies and decrease signs/symptoms of aspiration to less than 1 x/day.   OTHER RECOMMENDATIONS:  A Video Swallow Study (MBSS) is recommended and requires a physician order      Patient/family Goal:    To be able to eat/drink     Plan of care discussed with Patient and Family   The Patient and Family understand(s) the diagnosis, prognosis and plan of care     Rehabilitation Potential/Prognosis: good                    ADMITTING DIAGNOSIS: Acute respiratory failure with hypoxemia (HCC) [J96.01]    VISIT DIAGNOSIS:   Visit Diagnoses         Codes    Acute respiratory failure with hypoxia (HCC)     J96.01             PATIENT REPORT/COMPLAINT: patient currently NPO pending results of this evaluation  RN cleared patient for participation in assessment     yes     PRIOR LEVEL OF SWALLOW FUNCTION:    PAST HISTORY OF OROPHARYNGEAL DYSPHAGIA?: yes    Home diet: Diet information not available     Current Diet Order:  Diet NPO    PROCEDURE:  Consistencies Administered During the Evaluation   Liquids: thin liquid   Solids:  not appropriate      Method of Intake:   cup  Self fed      Position:   Sitting in bed with head elevated above 75 degrees    CLINICAL ASSESSMENT:  Oral Stage:       The oral stage of swallowing was within functional limits for consistencies administered      Pharyngeal Stage:    Latent wet cough was noted after presentation of thin liquid    Cognition:   Non-verbal during this assessment.     Oral Peripheral Examination   Generalized oral weakness    Current Respiratory Status    tracheostomy with 15 liter supplemental oxygen     Parameters of Speech Production  Respiration:  Shortness of breath  Quality:  
respiratory failure with hypoxemia (HCC) [J96.01]  Pneumonia of right lower lobe due to infectious organism [J18.9]  PMHx/PSHx:  COPD, arthritis, trach, Lung CA, Laryngeal CA, Laryngectomy  Precautions:  falls, trach to O2, droplet plus isolation, PEG insertion 1/11/2024, abdominal binder      Social:  Pt lives with wife in a 2 floor plan but stays first floor and sleeps in recliner chair. Pt independent     Initial Evaluation  Date: 1/10/24 Treatment  1/15/2024    Short Term/ Long Term   Goals   Was pt agreeable to Eval/treatment? yes yes    Does pt have pain? No c/o pain No complaints    Bed Mobility  Rolling: SBA  Supine to sit: SBA  Sit to supine: SBA  Scooting: SBA Rolling: SBA  Supine <> sit: SBA  Scooting: SBA seated to EOB independent   Transfers Sit to stand: min assist  Stand to sit: min assist  Stand pivot: NT Sit <> stand: SBA supervision   Ambulation    A few steps side stepping along bed with no device min assist.  30 feet x 2 without A/D CGA 50 feet with AAD with supervision   Stair Negotiation  Ascended and descended  NT      LE strength     3+/5    4/5   balance      Good sitting   Fair standing     AM-PAC Raw score               16/24 17/24      Pt is alert, following instruction, Wife present and with good encouragement  Balance: fair minus dynamic without A/D    Pt performed therapeutic exercise of the following: NT    Patient education/treatment  Pt was educated on slow steady gait    Patient response to education:   Pt verbalized understanding Pt demonstrated skill Pt requires further education in this area   yes With instruction yes     ASSESSMENT:   Comments: Nurse ok with Rx. Pt sat EOB SBA. Gait within limits of trach line. Pt again with mild unsteadiness, required light hands on assist for balance/safety. 02 saturation 95% after activity   Pt was in bed per request with call light in reach    Time in 1435   Time out 1452  Total Treatment Time 17 minutes   CPT codes:     Therapeutic 
 SpO2 96%   BMI 23.61 kg/m²     Physical Exam  Constitutional: The patient is awake, alert, and oriented. Lying in bed.  Family present.  Skin: Warm and dry. No rashes were noted. No jaundice.  HEENT: Eyes show round, and reactive pupils. Moist mucous membranes, no ulcerations, no thrush. Poor dentition. Multiple broken tooth. No redness on gums or purulent drainage.  Neck: Supple to movements. No lymphadenopathy. Trach inplace. On trach mask 8L  Chest: No use of accessory muscles to breathe. Symmetrical expansion. Auscultation reveals bibasilar decreased breath sounds.  Cardiovascular: S1 and S2 are rhythmic and regular. No murmurs appreciated.   Abdomen: soft. Non tender  Extremities: No clubbing, no cyanosis, no edema.  Musculoskeletal: no gross focal abnormalities  Neurological: alert,oriented x 3  Lines: peripheral    Labs, Cultures reviewed  Radiology and other consultants notes reviewed    Microbiology:  Blood cx 1/7: Strep mitis/oralis   Blood cx 1/9: negative  Resp cx: Serratia marcescens - asked lab for pip-tazo susceptibilities  RVP: positive for COVID     Assessment:  Alpha hemolytic Streptococcus bacteremia: likely dental source.   Bilateral LL pneumonia:/possible aspiration:   COVID 19 infection: patient is on Remdesivir and s/p toci one dose 1/8 by pulm.     Plan:    Cont IV Zosyn  3.375 mg q 8  Reviewed CT face with 1. Periapical lucency along the base of the right mandibular 2nd bicuspid tooth.  It may represent a periapical abscess or a cyst/cranial Charlee from chronic infection.Poor dentition with significant carious degeneration of the residual molar teeth.  Consulted dentist. Plan for dental extractions in pt    Monitor labs  Will follow with you      Electronically signed by BG Vazquez on 1/13/2024 at 11:49 AM  
01/12/2024 04:06 AM     01/12/2024 04:06 AM    MPV 10.6 01/12/2024 04:06 AM     Lab Results   Component Value Date/Time     01/12/2024 04:06 AM    K 4.0 01/12/2024 04:06 AM    K 4.0 09/09/2020 09:09 PM     01/12/2024 04:06 AM    CO2 22 01/12/2024 04:06 AM    BUN 26 01/12/2024 04:06 AM    CREATININE 0.9 01/12/2024 04:06 AM    LABALBU 3.2 01/07/2024 07:24 PM    CALCIUM 8.2 01/12/2024 04:06 AM    GFRAA >60 11/18/2020 12:00 PM    LABGLOM >60 01/12/2024 04:06 AM     Lab Results   Component Value Date/Time    PROTIME 15.2 09/09/2020 09:09 PM    INR 1.3 09/09/2020 09:09 PM     No results for input(s): \"PROBNP\" in the last 72 hours.    No results for input(s): \"TROPONINI\" in the last 72 hours.  No results for input(s): \"PROCAL\" in the last 72 hours.    This SmartLink has not been configured with any valid records.       Micro:  No results for input(s): \"CULTRESP\" in the last 72 hours.  No results for input(s): \"LABGRAM\" in the last 72 hours.  No results for input(s): \"LEGUR\" in the last 72 hours.  No results for input(s): \"STREPNEUMAGU\" in the last 72 hours.  No results for input(s): \"LP1UAG\" in the last 72 hours.    I  Component    Specimen Description .SUCTIONED SPUTUM P   Direct Exam NO EPITHELIAL CELLS P    MANY Polymorphonuclear leukocytes P    MODERATE MIXED BACTERIAL MORPHOTYPES SEEN ON GRAM STAIN. P   Culture SERRATIA MARCESCENS HEAVY GROWTH Abnormal  P    NORMAL RESPIRATORY KRISHNA P   Resulting Agency [facility]        Susceptibility    Serratia marcescens (1)    Antibiotic Interpretation Microscan Method Status    ceFAZolin Resistant >=64 BACTERIAL SUSCEPTIBILITY PANEL NAVDEEP Preliminary    cefepime Sensitive <=0.12 BACTERIAL SUSCEPTIBILITY PANEL NAVDEEP Preliminary    cefotaxime Sensitive <=0.25 BACTERIAL SUSCEPTIBILITY PANEL NAVDEEP Preliminary    cefOXitin Resistant <=4 BACTERIAL SUSCEPTIBILITY PANEL NAVDEEP Preliminary    cefTAZidime Sensitive <=1 BACTERIAL SUSCEPTIBILITY PANEL NAVDEEP 
1.803 m (5' 11\")   Wt 78.9 kg (174 lb)   SpO2 94%   BMI 24.27 kg/m²     Physical Exam  Constitutional: The patient is awake, alert, and oriented. Lying in bed.  Skin: Warm and dry. No rashes were noted. No jaundice.  HEENT: Eyes show round, and reactive pupils. Moist mucous membranes, no ulcerations, no thrush.   Neck: Supple to movements. No lymphadenopathy. Trach inplace. On trach mask 8L  Chest: No use of accessory muscles to breathe. Symmetrical expansion. Auscultation reveals bibasilar decreased breath sounds.  Cardiovascular: S1 and S2 are rhythmic and regular. No murmurs appreciated.   Abdomen: soft. Non tender  Extremities: No clubbing, no cyanosis, no edema.  Musculoskeletal: no gross focal abnormalities  Neurological: alert,oriented x 3  Lines: peripheral    Labs, Cultures reviewed  Radiology and other consultants notes reviewed    Microbiology:  Blood cx 1/7: alpha hemolytic strep.   Blood cx 1/9: negative  Resp cx: Serratia marcescens  RVP: positive for COVID     Assessment:  Alpha hemolytic Streptococcus bacteremia: unclear source. Dental ?  Bilateral LL pneumonia:/possible aspiration:   COVID 19 infection: patient is on Remdesivir and s/p toci one dose 1/8 by pulm.     Plan:    Cont IV Zosyn  3.375 mg q 8  Monitor labs  Will follow with you      Electronically signed by SELENE SMALLWOOD MD on 1/10/2024 at 3:09 PM  
PROVIDED HISTORY: Reason for exam:->pneumonia, COVID vs aspiration FINDINGS: Mediastinum: Thyroid is homogeneous in attenuation. No bulky mediastinal adenopathy.  Tracheostomy in place with minimal secretions in the trachea and mainstem bronchi without significant mucous plugging.  Esophagus with normal course and caliber.  Cardiac size enlarged without pericardial effusion. Lungs/pleura: Bilateral pulmonary opacifications consolidative and dense in the lower lungs left greater than right concerning for bronchopneumonia aspiration not excluded.  Trace to small right and small left pleural effusions. Upper Abdomen: Visualized portions of the upper abdomen reveal tiny right intrarenal stones of nonobstructing right nephrolithiasis Soft Tissues/Bones: No acute osseous or soft tissue findings. No aggressive osseous lesion.     Bilateral pulmonary opacifications consolidative and dense in the lower lungs left greater than right concerning for bronchopneumonia aspiration not excluded. Trace to small right and small left pleural effusions. Right nonobstructing nephrolithiasis     XR CHEST PORTABLE    Result Date: 1/7/2024  EXAMINATION: ONE XRAY VIEW OF THE CHEST 1/7/2024 7:25 pm COMPARISON: CXR dated 11/24/2020 HISTORY: ORDERING SYSTEM PROVIDED HISTORY: SOB TECHNOLOGIST PROVIDED HISTORY: Reason for exam:->SOB FINDINGS: Medical devices: lower cervical fusion hardware.  Tracheostomy tube with the tip projecting over the tracheal air column. Mediastinum/Heart: The mediastinal contours are normal. The heart appears normal in size. Small left effusion with confluent opacification of the left mid and lower lung.  Patchy opacities in the right lung, concerning for multifocal pneumonia or aspiration.     1. Small left effusion with confluent opacification of the left mid and lower lung. 2. Patchy opacities in the right lung, concerning for multifocal pneumonia or aspiration.       Assessment:    Principal Problem:    Acute 
maximize independence with ADLs/IADLs and other functional tasks.   Balance Sitting: Good (at EOB)  Standing: Fair- (without device)  Good dynamic standing balance during completion of ADLs/IADLs and other functional tasks.   Activity Tolerance Fair  Patient will demonstrate Good understanding and consistent implementation of energy conservation techniques and work simplification techniques into ADL/IADL routines.   Visual/  Perceptual WFL     N/A   B UE Strength 4-/5  Patient will demonstrate 4+/5 B UE strength in order to maximize independence with ADLs/IADLs and functional transfers.     Additional Long-Term Goal: Patient will increase functional independence to PLOF in order to allow patient to live in least restrictive environment.      ROM: Additional Information:    R UE  WFL    L UE WFL      Hearing: WFL  Sensation: No c/o numbness/tingling in B UEs.  Tone: WFL  Edema: No    Comments: RN approved patient's participation in OOB activities. Upon arrival, patient supine in bed. At end of session, patient supine in bed (per RN request) with call light and phone within reach, RN present, bed alarm activated, patient's wife present, and all lines and tubes intact. Patient would benefit from continued skilled OT to increase safety and independence with completion of ADL/IADL tasks for functional independence and quality of life.     Treatment:  Patient/family education provided regardin) potential benefits of DME to maximize independence/safety with ADLs, 2) potential benefits of continued therapy services upon discharge. Patient's wife indicated understanding.    Further skilled OT treatment indicated to increase patient's safety and independence with completion of ADL/IADL tasks in order to maximize patient's functional independence and quality of life.    Rehab Potential: Good for established goals.  Patient / Family Goal: No goal stated.  Patient and/or family were instructed on functional diagnosis, 
negative      /80   Pulse 71   Temp 98.9 °F (37.2 °C) (Axillary)   Resp 20   Ht 1.803 m (5' 11\")   Wt 78.9 kg (174 lb)   SpO2 99%   BMI 24.27 kg/m²     Physical Exam  Constitutional: The patient is awake, alert, and oriented. Lying in bed.  Skin: Warm and dry. No rashes were noted. No jaundice.  HEENT: Eyes show round, and reactive pupils. Moist mucous membranes, no ulcerations, no thrush. Poor dentition. Multiple broken tooth. No redness on gums or purulent drainage.  Neck: Supple to movements. No lymphadenopathy. Trach inplace. On trach mask 8L  Chest: No use of accessory muscles to breathe. Symmetrical expansion. Auscultation reveals bibasilar decreased breath sounds.  Cardiovascular: S1 and S2 are rhythmic and regular. No murmurs appreciated.   Abdomen: soft. Non tender  Extremities: No clubbing, no cyanosis, no edema.  Musculoskeletal: no gross focal abnormalities  Neurological: alert,oriented x 3  Lines: peripheral    Labs, Cultures reviewed  Radiology and other consultants notes reviewed    Microbiology:  Blood cx 1/7: Strep mitis/oralis   Blood cx 1/9: negative  Resp cx: Serratia marcescens - asked lab for pip-tazo susceptibilities  RVP: positive for COVID     Assessment:  Alpha hemolytic Streptococcus bacteremia: likely dental source.   Bilateral LL pneumonia:/possible aspiration:   COVID 19 infection: patient is on Remdesivir and s/p toci one dose 1/8 by pulm.     Plan:    Cont IV Zosyn  3.375 mg q 8  Ordered CT face to look for dental abscesses.  Monitor labs  Will follow with you      Electronically signed by SELENE SMALLWOOD MD on 1/11/2024 at 1:40 PM  
concerning for multifocal pneumonia or aspiration.       Assessment:    Principal Problem:    Acute respiratory failure with hypoxemia (HCC)  Active Problems:    Dysphagia    Pneumonia due to infectious organism  Resolved Problems:    * No resolved hospital problems. *      Plan:  1.  Acute respiratory failure with hypoxemia-wean oxygen back to baseline of room air.  Likely due to #2 and #3.  DuoNebs every 4 hours while awake.  Albuterol as needed.  Consult pulmonology. Family reports secretions leaking from around tracheostomy, ENT consulted, no plans for decannulation.  2.  COVID-19 infection-droplet plus isolation.  Check inflammatory markers.  Consult pharmacy for remdesivir or baricitinib treatment.  Decadron 6 mg IV daily. S/p Tocilizumab 1 dose 1/8, on Remdesivir .  3.  Aspiration Pneumonia-Rocephin and Doxy started in ED.  Started on Zosyn. Neg urine antigens.  Pulmonary input appreciated.  4.  Dysphagia-has esophageal stricture.  Follows with Dr. Villagomez, consulted, plan for EGD with possible dilatation and PEG placement today 1/11.  Speech therapy consulted for swallow evaluation.  Keep patient n.p.o.  D5.45 at 75 an hour for now. Dietary consult for TF after PEG placement.   5.  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  Normally on room air.  Receives immunotherapy Keytruda every 3 weeks.  Next dose due Thursday 1/11.  6. Gram positive - Strept bacteremia - ID consulted, started on Zosyn.  7. Reported Hematuria - UA clear  9. Anxiety - gave one dose of ativan last night for sleep, will avoid further BZD d/t breathing issues will add Seroquel/ anxiolytics once PEG insitu.      Code Status: Full code  DVT prophylaxis: Eliquis    Time spent reviewing chart, clinical exam, discussing case and answering questions with staff/consultants/patient/family approx 35 mins.    NOTE: This report was transcribed using voice recognition software. Every effort was made to ensure accuracy; 
SUSCEPTIBILITY PANEL NAVDEEP Preliminary    levofloxacin Sensitive <=0.12 BACTERIAL SUSCEPTIBILITY PANEL NAVDEEP Preliminary    meropenem Sensitive <=0.25 BACTERIAL SUSCEPTIBILITY PANEL NAVDEEP Preliminary    trimethoprim-sulfamethoxazole Sensitive <=20 BACTERIAL SUSCEPTIBILITY PANEL NAVDEEP Preliminary    amoxicillin-clavulanate Resistant 16 BACTERIAL SUSCEPTIBILITY PANEL NAVDEEP Preliminary                Review of Residents documentation was conducted and revisions were made as appropriate. I agree with the above documented exam, problem list and plan of care.     Young Parkinson MD

## 2024-01-16 NOTE — OP NOTE
[place] [address]                                OPERATIVE REPORT    PATIENT NAME: LONNIE PANIAGUA                   :        1955  MED REC NO:   [id number]                            ROOM:       [id number]  ACCOUNT NO:   [id number]                           ADMIT DATE: 2024  PROVIDER:     Red Granados    DATE OF PROCEDURE:  01/15/2024    PREOPERATIVE DIAGNOSIS:  Periodontal disease with dental caries.    POSTOPERATIVE DIAGNOSIS:  Periodontal disease with dental caries.    SURGEON:  Red Granados DDS.    ANESTHESIA:  General via trach.    MEDICATIONS:  2% lidocaine with epinephrine x2 mL.    ESTIMATED BLOOD LOSS:  Minimal.    FLUIDS:  500 mL of normal saline.    COMPLICATIONS:  None.    PREOPERATIVE NOTE:  The risks and benefits were discussed with the  patient prior to the surgery and the patient consented.  The patient was  inhouse and was admitted with acute respiratory failure with hypoxemia.    DESCRIPTION OF PROCEDURE:  The patient was brought to the OR in a supine  position and prepped and draped for oral procedure.  After review of  films, we decided to extract remaining lower mandibular teeth due to  severe periodontal disease and dental caries.  We extracted teeth 18,  20, 21, 22, 23, 24, 25, 26, 27, 28, and 29.  Two quadrants of  alveoloplasty were done on the lower left and lower right.  3-0 Vicryl  continuous suture was placed in the extraction sites.  The mouth was  lavaged x1.  Throat pack was removed.  The patient was transferred back  to the unit in good condition.        RED GRANADOS    D: 01/15/2024 18:21:26       T: 01/15/2024 20:24:54     [email or web address]  Job#: [id number]     Doc#: [id number]    CC:

## 2024-01-16 NOTE — CARE COORDINATION
Social Work:    Follow-up visit was made with Alan and his wife Casie today.  Casie is presently on Paxlovid for Covid + & confirmed plans remain home with [facility] and DME. Alan would like nursing, peg feed assistance, PT/OT, and a shower aide.  He also will need a 3-1 commode, ETB, and home 02 (await 02 orders).  Social work prompted for orders and updated [facility] and [facility] about possible discharge home today.    Electronically signed by CARMINE Vera on 1/16/2024 at 10:57 AM

## 2024-01-17 ENCOUNTER — CARE COORDINATION (OUTPATIENT)
Dept: CARE COORDINATION | Age: 69
End: 2024-01-17

## 2024-01-17 RX ORDER — OXYCODONE HYDROCHLORIDE 5 MG/1
5 TABLET ORAL EVERY 6 HOURS PRN
Qty: 20 TABLET | Refills: 0 | Status: SHIPPED | OUTPATIENT
Start: 2024-01-17 | End: 2024-01-22

## 2024-01-17 NOTE — CARE COORDINATION
Care Transitions Initial Follow Up Call    Call within 2 business days of discharge: Yes    Patient: Alan Guajardo Jr. Patient : 1955   MRN: [id number]  Reason for Admission: SOB, pneumonia RLL, s/p EGD with dilation and PEG placement 24 and s/p complete teeth extraction of the lower mandible 24  Discharge Date: 24 RARS: Readmission Risk Score: 14.1      Last Discharge Facility       Date Complaint Diagnosis Description Type Department Provider    24 Shortness of Breath; Fever Pneumonia of right lower lobe due to infectious organism ... ED to Hosp-Admission (Discharged) (ADMITTED) Adrianna Bain MD; Gallo Araujo...   First attempt to reach the patient for Non [facility] PCP Care Transition call post hospital discharge. HIPAA compliant message left with CTN's contact information requesting return phone call.    Follow Up  Future Appointments   Date Time Provider Department Center   2024  2:30 PM SCHEDULE, [facility] PALLIATIVE CARE PROVIDER [facility] PALLIAT [facility]   2024  1:00 PM Preston Mckeon DO [facility]   2025  1:15 PM Maco Panchal MD [facility]/MED [facility]     Haydee Luna RN

## 2024-01-18 ENCOUNTER — CARE COORDINATION (OUTPATIENT)
Dept: CARE COORDINATION | Age: 69
End: 2024-01-18

## 2024-01-18 NOTE — CARE COORDINATION
Care Transitions Initial Follow Up Call    Call within 2 business days of discharge: Yes    Patient: Alan Guajardo Jr. Patient : 1955   MRN: [id number]  Reason for Admission: SOB, pneumonia RLL, s/p EGD with dilation and PEG placement 24 and s/p complete teeth extraction of the lower mandible 24  Discharge Date: 24 RARS: Readmission Risk Score: 14.1    Last Discharge Facility       Date Complaint Diagnosis Description Type Department Provider    24 Shortness of Breath; Fever Pneumonia of right lower lobe due to infectious organism ... ED to Hosp-Admission (Discharged) (ADMITTED) Adrianna Bain MD; Gallo Araujo...   Second and final attempt to reach the patient for Non [facility] PCP Care Transition call post hospital discharge. HIPAA compliant message left with CTN's contact information requesting return phone call. If no return call by the end of today, CTN will sign off and resolve CT program.     Follow Up  Future Appointments   Date Time Provider Department Center   2024  2:30 PM SCHEDULE, [facility] PALLIATIVE CARE PROVIDER [facility] PALLIAT [facility]   2024  1:00 PM Preston Mckeon DO [facility]   2025  1:15 PM Maco Panchal MD [facility]/MED [facility]   Haydee Luna RN

## 2024-01-19 ENCOUNTER — HOSPITAL ENCOUNTER (EMERGENCY)
Age: 69
Discharge: HOME | End: 2024-01-19
Attending: EMERGENCY MEDICINE
Payer: MEDICARE

## 2024-01-19 ENCOUNTER — APPOINTMENT (OUTPATIENT)
Dept: GENERAL RADIOLOGY | Age: 69
End: 2024-01-19
Payer: MEDICARE

## 2024-01-19 ENCOUNTER — APPOINTMENT (OUTPATIENT)
Dept: CT IMAGING | Age: 69
End: 2024-01-19
Payer: MEDICARE

## 2024-01-19 VITALS
RESPIRATION RATE: 16 BRPM | TEMPERATURE: 98 F | HEART RATE: 65 BPM | OXYGEN SATURATION: 97 % | DIASTOLIC BLOOD PRESSURE: 74 MMHG | SYSTOLIC BLOOD PRESSURE: 124 MMHG
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
## 2024-01-19 DIAGNOSIS — U07.1 COVID: ICD-10-CM

## 2024-01-19 DIAGNOSIS — Z93.1 GASTROSTOMY TUBE DEPENDENT (HCC): ICD-10-CM

## 2024-01-19 DIAGNOSIS — R53.1 GENERAL WEAKNESS: ICD-10-CM

## 2024-01-19 DIAGNOSIS — E87.6 HYPOKALEMIA: ICD-10-CM

## 2024-01-19 LAB
ALBUMIN SERPL-MCNC: 3.2 G/DL (ref 3.5–5.2)
ALP SERPL-CCNC: 74 U/L (ref 40–129)
ALT SERPL-CCNC: 36 U/L (ref 0–40)
ANION GAP SERPL CALCULATED.3IONS-SCNC: 8 MMOL/L (ref 7–16)
AST SERPL-CCNC: 17 U/L (ref 0–39)
B PARAP IS1001 DNA NPH QL NAA+NON-PROBE: NOT DETECTED
B PERT DNA SPEC QL NAA+PROBE: NOT DETECTED
BASOPHILS # BLD: 0.01 K/UL (ref 0–0.2)
BASOPHILS NFR BLD: 0 % (ref 0–2)
BILIRUB SERPL-MCNC: 0.6 MG/DL (ref 0–1.2)
BNP SERPL-MCNC: 102 PG/ML (ref 0–125)
BUN SERPL-MCNC: 18 MG/DL (ref 6–23)
C PNEUM DNA NPH QL NAA+NON-PROBE: NOT DETECTED
CALCIUM SERPL-MCNC: 8.5 MG/DL (ref 8.6–10.2)
CHLORIDE SERPL-SCNC: 103 MMOL/L (ref 98–107)
CK SERPL-CCNC: 36 U/L (ref 20–200)
CO2 SERPL-SCNC: 27 MMOL/L (ref 22–29)
CREAT SERPL-MCNC: 1.2 MG/DL (ref 0.7–1.2)
EKG ATRIAL RATE: 60 BPM
EKG P AXIS: 54 DEGREES
EKG P-R INTERVAL: 182 MS
EKG Q-T INTERVAL: 470 MS
EKG QRS DURATION: 118 MS
EKG QTC CALCULATION (BAZETT): 470 MS
EKG R AXIS: -22 DEGREES
EKG T AXIS: 32 DEGREES
EKG VENTRICULAR RATE: 60 BPM
EOSINOPHIL # BLD: 0.19 K/UL (ref 0.05–0.5)
EOSINOPHILS RELATIVE PERCENT: 2 % (ref 0–6)
ERYTHROCYTE [DISTWIDTH] IN BLOOD BY AUTOMATED COUNT: 15 % (ref 11.5–15)
FLUAV RNA NPH QL NAA+NON-PROBE: NOT DETECTED
FLUBV RNA NPH QL NAA+NON-PROBE: NOT DETECTED
GFR SERPL CREATININE-BSD FRML MDRD: >60 ML/MIN/1.73M2
GLUCOSE SERPL-MCNC: 117 MG/DL (ref 74–99)
HADV DNA NPH QL NAA+NON-PROBE: NOT DETECTED
HCOV 229E RNA NPH QL NAA+NON-PROBE: NOT DETECTED
HCOV HKU1 RNA NPH QL NAA+NON-PROBE: NOT DETECTED
HCOV NL63 RNA NPH QL NAA+NON-PROBE: NOT DETECTED
HCOV OC43 RNA NPH QL NAA+NON-PROBE: NOT DETECTED
HCT VFR BLD AUTO: 40.5 % (ref 37–54)
HGB BLD-MCNC: 12.8 G/DL (ref 12.5–16.5)
HMPV RNA NPH QL NAA+NON-PROBE: NOT DETECTED
HPIV1 RNA NPH QL NAA+NON-PROBE: NOT DETECTED
HPIV2 RNA NPH QL NAA+NON-PROBE: NOT DETECTED
HPIV3 RNA NPH QL NAA+NON-PROBE: NOT DETECTED
HPIV4 RNA NPH QL NAA+NON-PROBE: NOT DETECTED
IMM GRANULOCYTES # BLD AUTO: 0.04 K/UL (ref 0–0.58)
IMM GRANULOCYTES NFR BLD: 1 % (ref 0–5)
LYMPHOCYTES NFR BLD: 1.14 K/UL (ref 1.5–4)
LYMPHOCYTES RELATIVE PERCENT: 13 % (ref 20–42)
M PNEUMO DNA NPH QL NAA+NON-PROBE: NOT DETECTED
MCH RBC QN AUTO: 27.9 PG (ref 26–35)
MCHC RBC AUTO-ENTMCNC: 31.6 G/DL (ref 32–34.5)
MCV RBC AUTO: 88.2 FL (ref 80–99.9)
MONOCYTES NFR BLD: 0.55 K/UL (ref 0.1–0.95)
MONOCYTES NFR BLD: 6 % (ref 2–12)
NEUTROPHILS NFR BLD: 78 % (ref 43–80)
NEUTS SEG NFR BLD: 6.76 K/UL (ref 1.8–7.3)
PLATELET # BLD AUTO: 462 K/UL (ref 130–450)
PMV BLD AUTO: 10 FL (ref 7–12)
POTASSIUM SERPL-SCNC: 3.3 MMOL/L (ref 3.5–5)
PROT SERPL-MCNC: 5.4 G/DL (ref 6.4–8.3)
RBC # BLD AUTO: 4.59 M/UL (ref 3.8–5.8)
RSV RNA NPH QL NAA+NON-PROBE: NOT DETECTED
RV+EV RNA NPH QL NAA+NON-PROBE: NOT DETECTED
SARS-COV-2 RNA NPH QL NAA+NON-PROBE: DETECTED
SODIUM SERPL-SCNC: 138 MMOL/L (ref 132–146)
SPECIMEN DESCRIPTION: ABNORMAL
TROPONIN I SERPL HS-MCNC: 36 NG/L (ref 0–11)
TROPONIN I SERPL HS-MCNC: 37 NG/L (ref 0–11)
TROPONIN I SERPL HS-MCNC: 44 NG/L (ref 0–11)
WBC OTHER # BLD: 8.7 K/UL (ref 4.5–11.5)

## 2024-01-19 PROCEDURE — 83880 ASSAY OF NATRIURETIC PEPTIDE: CPT

## 2024-01-19 PROCEDURE — 85025 COMPLETE CBC W/AUTO DIFF WBC: CPT

## 2024-01-19 PROCEDURE — 84484 ASSAY OF TROPONIN QUANT: CPT

## 2024-01-19 PROCEDURE — 2580000003 HC RX 258

## 2024-01-19 PROCEDURE — 0202U NFCT DS 22 TRGT SARS-COV-2: CPT

## 2024-01-19 PROCEDURE — 6360000002 HC RX W HCPCS

## 2024-01-19 PROCEDURE — 72125 CT NECK SPINE W/O DYE: CPT

## 2024-01-19 PROCEDURE — 96374 THER/PROPH/DIAG INJ IV PUSH: CPT

## 2024-01-19 PROCEDURE — 82550 ASSAY OF CK (CPK): CPT

## 2024-01-19 PROCEDURE — 6360000002 HC RX W HCPCS: Performed by: NURSE PRACTITIONER

## 2024-01-19 PROCEDURE — 71045 X-RAY EXAM CHEST 1 VIEW: CPT

## 2024-01-19 PROCEDURE — 80053 COMPREHEN METABOLIC PANEL: CPT

## 2024-01-19 PROCEDURE — 70450 CT HEAD/BRAIN W/O DYE: CPT

## 2024-01-19 PROCEDURE — 99285 EMERGENCY DEPT VISIT HI MDM: CPT

## 2024-01-19 PROCEDURE — 96361 HYDRATE IV INFUSION ADD-ON: CPT

## 2024-01-19 RX ORDER — SODIUM CHLORIDE 0.9 % (FLUSH) 0.9 %
SYRINGE (ML) INJECTION
Status: COMPLETED
Start: 2024-01-19 | End: 2024-01-19

## 2024-01-19 RX ORDER — 0.9 % SODIUM CHLORIDE 0.9 %
1000 INTRAVENOUS SOLUTION INTRAVENOUS ONCE
Status: COMPLETED | OUTPATIENT
Start: 2024-01-19 | End: 2024-01-19

## 2024-01-19 RX ORDER — POTASSIUM CHLORIDE 7.45 MG/ML
10 INJECTION INTRAVENOUS
Status: DISPENSED | OUTPATIENT
Start: 2024-01-19 | End: 2024-01-19

## 2024-01-19 RX ORDER — METOCLOPRAMIDE HYDROCHLORIDE 5 MG/ML
10 INJECTION INTRAMUSCULAR; INTRAVENOUS ONCE
Status: COMPLETED | OUTPATIENT
Start: 2024-01-19 | End: 2024-01-19

## 2024-01-19 RX ADMIN — Medication: at 14:48

## 2024-01-19 RX ADMIN — SODIUM CHLORIDE 1000 ML: 9 INJECTION, SOLUTION INTRAVENOUS at 11:16

## 2024-01-19 RX ADMIN — POTASSIUM CHLORIDE 10 MEQ: 7.46 INJECTION, SOLUTION INTRAVENOUS at 14:45

## 2024-01-19 RX ADMIN — METOCLOPRAMIDE 10 MG: 5 INJECTION, SOLUTION INTRAMUSCULAR; INTRAVENOUS at 17:57

## 2024-01-19 RX ADMIN — SODIUM CHLORIDE 1000 ML: 9 INJECTION, SOLUTION INTRAVENOUS at 14:44

## 2024-01-19 NOTE — ED PROVIDER NOTES
[facility] EMERGENCY DEPARTMENT  EMERGENCY DEPARTMENT ENCOUNTER        Pt Name: Alan Guajardo Jr.  MRN: [id number]  Birthdate 1955  Date of evaluation: 1/19/2024  Provider: Isa Mack MD  PCP: Sylvia Quintana DO  Note Started: 11:08 AM EST 1/19/24    CHIEF COMPLAINT       Chief Complaint   Patient presents with    Fall     2 falls. 1st fell from seated position from coughing. 2nd, got weak and fell.        HISTORY OF PRESENT ILLNESS: 1 or more Elements     Alan Guajardo Jr. is a 68 y.o. male who presents after fall.  Patient states that at around 5 in the morning he was coughing up some sputum from his trach when he fell from sitting position hitting his head on the counter vacuum.  States that several hours later patient has been feeling significantly weak and fell again in the kitchen.  Patient was found by wife on the ground, he states he never Lost consciousness but he did feel significantly weak.  Wife states patient was recently discharged home from the hospital where he was being treated for sepsis he is supposed to be on 24/7 oxygen however he has not been compliant at night.  Patient is currently being treated for pneumonia with Augmentin.  Denies any fever, chills, n/v, headache, dizziness, vision changes, neck tenderness or stiffness, weakness, cp, palpitations, leg swelling/tenderness, sob, cough, abd pain, dysuria, hematuria, diarrhea, constipation, bloody stools.    Nursing Notes were all reviewed and agreed with or any disagreements were addressed in the HPI.    ROS:   Pertinent positives and negatives are stated within HPI, all other systems reviewed and are negative.      --------------------------------------------- PAST HISTORY ---------------------------------------------  Past Medical History:  has a past medical history of Abdominal aortic aneurysm without rupture (HCC), Acute bronchitis with COPD (HCC), Arthritis, COPD (chronic obstructive

## 2024-01-22 ENCOUNTER — SCHEDULED TELEPHONE ENCOUNTER (OUTPATIENT)
Dept: PALLATIVE CARE | Age: 69
End: 2024-01-22
Payer: MEDICARE

## 2024-01-22 DIAGNOSIS — Z51.5 ENCOUNTER FOR PALLIATIVE CARE: ICD-10-CM

## 2024-01-22 DIAGNOSIS — G89.3 CHRONIC PAIN DUE TO NEOPLASM: Primary | ICD-10-CM

## 2024-01-22 DIAGNOSIS — G89.3 CHRONIC PAIN DUE TO NEOPLASM: ICD-10-CM

## 2024-01-22 DIAGNOSIS — C34.82 MALIGNANT NEOPLASM OF OVERLAPPING SITES OF LEFT LUNG (HCC): ICD-10-CM

## 2024-01-22 LAB
EKG ATRIAL RATE: 60 BPM
EKG P AXIS: 54 DEGREES
EKG P-R INTERVAL: 182 MS
EKG Q-T INTERVAL: 470 MS
EKG QRS DURATION: 118 MS
EKG QTC CALCULATION (BAZETT): 470 MS
EKG R AXIS: -22 DEGREES
EKG T AXIS: 32 DEGREES
EKG VENTRICULAR RATE: 60 BPM

## 2024-01-22 PROCEDURE — 99442 PR PHYS/QHP TELEPHONE EVALUATION 11-20 MIN: CPT | Performed by: NURSE PRACTITIONER

## 2024-01-22 RX ORDER — OXYCODONE HYDROCHLORIDE 10 MG/1
10 TABLET ORAL EVERY 4 HOURS PRN
Qty: 180 TABLET | Refills: 0 | Status: SHIPPED | OUTPATIENT
Start: 2024-01-22 | End: 2024-02-21

## 2024-01-22 RX ORDER — OXYCODONE HYDROCHLORIDE 10 MG/1
10 TABLET ORAL EVERY 4 HOURS PRN
Qty: 180 TABLET | Refills: 0 | Status: SHIPPED
Start: 2024-01-22 | End: 2024-01-22 | Stop reason: SDUPTHER

## 2024-01-22 NOTE — TELEPHONE ENCOUNTER
Received call from patient's spouse stating that [facility] is out of [facility]. Refill sent to [facility] per spouse's request. Call placed to [facility] and spoke to pharmacy to discard of script. Spoke to Emma.

## 2024-01-22 NOTE — PROGRESS NOTES
Palliative Care Department  Palliative Care Telephone Encounter  Provider: Elder Martínez, APRN - GERONIMO    Alan Casandra Sahfer is a 68 y.o. male evaluated via telephone on 1/22/2024.      Consent:  He and/or health care decision maker is aware that that he may receive a bill for this telephone service, depending on his insurance coverage, and has provided verbal consent to proceed: Yes      Documentation:  I communicated with the patient and/or health care decision maker regarding pain.     Assessment/Plan     Lung cancer   - Stage 4 lung cancer   - Follows with Dr. Cummings   - Received radiation therapy, along with chemotherapy    -On Keytruda    Chronic Pain due to Neoplasm/chronic lumbar radiculopathy:              - Oxycodone at 10 mg PO Q 4 PRN               -  Lyrica 150 mg TID for neuropathy, hand tingling sensation              -  S/p sacroiliac joint injections with Dr. Conner   -  S/p Right lumbar radiofrequency ablation L5&S1 with Dr. Conner     Constipation:              -  Pericolace 2 tabs BID    Follow Up:  4 weeks.  They were encouraged to call with any questions, concerns, needs, or changes in symptoms.    Subjective   Details of this discussion including any medical advice provided:   A telephonic virtual visit was completed via telephone with Alan Guajardo Jr. today regarding the issues listed above.     Humphrey was recently hospitalized due to COVID, complicated by bacterial pneumonia, as well as bacteremia  He has been home from the hospital last several days, states he is starting to feel a bit better, but overall is still not feeling at his baseline  He is requiring oxygen, is more fatigued, and has increase in pain overall  He notes significantly worse pain within his neck and shoulder, as well as within his low back  He has been utilizing his oxycodone more frequently, and after discussing he is instructed that he may take oxycodone 10 mg every 4 hours as needed for severe pain  We

## 2024-02-09 DIAGNOSIS — G89.3 CHRONIC PAIN DUE TO NEOPLASM: ICD-10-CM

## 2024-02-09 DIAGNOSIS — Z51.5 PALLIATIVE CARE BY SPECIALIST: ICD-10-CM

## 2024-02-09 RX ORDER — PREGABALIN 150 MG/1
150 CAPSULE ORAL 3 TIMES DAILY
Qty: 270 CAPSULE | Refills: 1 | Status: SHIPPED | OUTPATIENT
Start: 2024-02-09 | End: 2024-08-07

## 2024-02-09 NOTE — TELEPHONE ENCOUNTER
Call from Benjamin Jason's wife requesting refill for Pregabalin. New prescription needs sent as they had to change pharmacies due to insurance change. [facility] [address] Next maria r 2/19/24.

## 2024-02-16 ENCOUNTER — TELEPHONE (OUTPATIENT)
Dept: ENT CLINIC | Age: 69
End: 2024-02-16

## 2024-02-16 NOTE — TELEPHONE ENCOUNTER
1st attempt made to reschedule missed appointment no ans left vm for return call. Electronically signed by Sydnee Kay on 2/16/2024 at 2:12 PM

## 2024-02-19 ENCOUNTER — OFFICE VISIT (OUTPATIENT)
Dept: PALLATIVE CARE | Age: 69
End: 2024-02-19
Payer: MEDICARE

## 2024-02-19 VITALS
WEIGHT: 159 LBS | TEMPERATURE: 98.3 F | HEART RATE: 110 BPM | DIASTOLIC BLOOD PRESSURE: 66 MMHG | SYSTOLIC BLOOD PRESSURE: 100 MMHG | BODY MASS INDEX: 22.18 KG/M2

## 2024-02-19 DIAGNOSIS — Z51.5 PALLIATIVE CARE BY SPECIALIST: Primary | ICD-10-CM

## 2024-02-19 PROCEDURE — 1123F ACP DISCUSS/DSCN MKR DOCD: CPT | Performed by: NURSE PRACTITIONER

## 2024-02-19 PROCEDURE — 99213 OFFICE O/P EST LOW 20 MIN: CPT | Performed by: NURSE PRACTITIONER

## 2024-02-19 PROCEDURE — 99211 OFF/OP EST MAY X REQ PHY/QHP: CPT | Performed by: NURSE PRACTITIONER

## 2024-02-19 RX ORDER — FAMOTIDINE 20 MG/1
TABLET, FILM COATED ORAL
COMMUNITY
Start: 2024-02-15

## 2024-02-19 RX ORDER — SUCRALFATE 1 G/1
1 TABLET ORAL 4 TIMES DAILY PRN
Qty: 120 TABLET | Refills: 0 | Status: SHIPPED | OUTPATIENT
Start: 2024-02-19

## 2024-02-19 RX ORDER — METOCLOPRAMIDE 10 MG/1
TABLET ORAL
COMMUNITY
Start: 2024-01-19

## 2024-02-19 NOTE — PROGRESS NOTES
5 days ago.  Due to patient's GERD, he tries to avoid tube feeds and his protein supplement.  Encourage patient to utilize tube feeds and protein supplements as he has had a 10 pound weight loss in the last month.  He understands.  Patient to follow-up in 3 months.    Objective:     Physical Exam  Wt Readings from Last 3 Encounters:   02/19/24 72.1 kg (159 lb)   01/14/24 75.9 kg (167 lb 5.3 oz)   12/08/23 78.9 kg (174 lb)      Gen:  Alert, appears stated age, well nourished, in no acute distress  HEENT:  Speaks by holding trach  Neck:  Supple  Lungs:  CTA bilaterally, no audible rhonchi or wheezes noted  Heart::  RRR, no murmur, rub, or gallop noted during exam  Abd:  Soft, non tender, non distended, BS+  M/S/Ext:  Moving all extremities, no edema, pulses present  Skin:  Warm and dry  Neuro:   Alert, oriented x 3; following commands      [place] Symptom Assessment Score       2/19/2024     2:00 PM 10/2/2023     1:00 PM 7/10/2023     6:00 PM 4/17/2023     3:00 PM 1/23/2023     3:00 PM   [place] Score   Pain Score 8 6 7 5 5   Tiredness Score 2 Not tired 1 1 Worst possible tiredness   Nausea Score Not nauseated Not nauseated Not nauseated Not nauseated Not nauseated   Depression Score 1 3 1 2 Worst possible depression   Anxiety Score 1 3 1 3 2   Drowsiness Score 1 Not drowsy Not drowsy 1 Worst possible drowsiness   Appetite Score 2 8 2 Best appetite Best appetite   Wellbeing Score 3 2 1 2 1   Dyspnea Score 6 3 4 5 5   Other Problem Score 5 4 4 6 5   Total Assessment Score(calculated) 29 29 21 25 48       Assessed by: patient.      Current Medications:  Medications reviewed: yes    Controlled Substances Monitoring: [place] reviewed 2/19/24.  RX Monitoring Periodic Controlled Substance Monitoring   10/2/2023   1:52 PM Possible medication side effects, risk of tolerance/dependence & alternative treatments discussed.;No signs of potential drug abuse or diversion identified.;Assessed functional status.;Obtaining appropriate

## 2024-03-05 DIAGNOSIS — G89.3 CHRONIC PAIN DUE TO NEOPLASM: ICD-10-CM

## 2024-03-05 DIAGNOSIS — Z51.5 ENCOUNTER FOR PALLIATIVE CARE: ICD-10-CM

## 2024-03-05 DIAGNOSIS — C34.82 MALIGNANT NEOPLASM OF OVERLAPPING SITES OF LEFT LUNG (HCC): ICD-10-CM

## 2024-03-05 RX ORDER — OXYCODONE HYDROCHLORIDE 10 MG/1
10 TABLET ORAL EVERY 4 HOURS PRN
Qty: 180 TABLET | Refills: 0 | Status: SHIPPED | OUTPATIENT
Start: 2024-03-05 | End: 2024-04-04

## 2024-03-05 NOTE — TELEPHONE ENCOUNTER
Wife Casie of Alan called for refill oxycodone HCI (OXY-IR) 10mg. Next appointment 5-.  Pharmacy [facility].

## 2024-03-29 RX ORDER — SUCRALFATE 1 G/1
1 TABLET ORAL 4 TIMES DAILY PRN
Qty: 120 TABLET | Refills: 0 | OUTPATIENT
Start: 2024-03-29

## 2024-04-10 ENCOUNTER — OFFICE VISIT (OUTPATIENT)
Dept: ENT CLINIC | Age: 69
End: 2024-04-10
Payer: MEDICARE

## 2024-04-10 VITALS
HEIGHT: 71 IN | WEIGHT: 159 LBS | DIASTOLIC BLOOD PRESSURE: 54 MMHG | HEART RATE: 76 BPM | SYSTOLIC BLOOD PRESSURE: 85 MMHG | BODY MASS INDEX: 22.26 KG/M2

## 2024-04-10 DIAGNOSIS — R13.12 OROPHARYNGEAL DYSPHAGIA: ICD-10-CM

## 2024-04-10 DIAGNOSIS — Z93.0 TRACHEOSTOMY DEPENDENCE (HCC): Primary | ICD-10-CM

## 2024-04-10 PROCEDURE — 31575 DIAGNOSTIC LARYNGOSCOPY: CPT | Performed by: OTOLARYNGOLOGY

## 2024-04-10 PROCEDURE — 99213 OFFICE O/P EST LOW 20 MIN: CPT | Performed by: OTOLARYNGOLOGY

## 2024-04-10 PROCEDURE — 1123F ACP DISCUSS/DSCN MKR DOCD: CPT | Performed by: OTOLARYNGOLOGY

## 2024-04-10 NOTE — PROGRESS NOTES
Normal rate.   Pulmonary:      Effort: Pulmonary effort is normal. No respiratory distress.      Breath sounds: Normal breath sounds.   Abdominal:      General: There is no distension.      Tenderness: There is no abdominal tenderness. There is no guarding.   Musculoskeletal:         General: Normal range of motion.      Cervical back: Normal range of motion and neck supple.   Skin:     General: Skin is warm and dry.   Neurological:      Mental Status: He is alert and oriented to person, place, and time.   Psychiatric:         Mood and Affect: Mood normal.         Behavior: Behavior normal.         Thought Content: Thought content normal.         Judgment: Judgment normal.     Flexible Nasopharyngolaryngoscope Procedure Note    Procedure Details   the flexible nasopharyngolaryngoscope was passed through the laryngeal stoma   Findings:   Area of prior ulceration showing signs of healing with minimal granulation tissue on the posterior tracheal wall  Condition:  Stable  Complications:  None               Assessment:       Diagnosis Orders   1. Tracheostomy dependence (HCC)        2. Oropharyngeal dysphagia                   Plan:      Seen and examined for tracheal stoma evaluation. In office scope findings as above. No signs of persistent ulceration. Area of prior ulceration shows tissue texture changes consistent with resolution. Based on scope findings I don't see a reason to withhold treatment via esophageal dilation.    FU Prn     RX given today:            Alan Guajardo Jr.  1955    I have discussed the case, including pertinent history and exam findings with the resident. I have seen and examined the patient and the key elements of the encounter have been performed by me. I agree with the assessment, plan and orders as documented by the  resident              Remainder of medical problems as per  resident note.    Patient seen and examined. Agree with above exam, assessment and plan.      Electronically

## 2024-04-15 RX ORDER — DULOXETIN HYDROCHLORIDE 30 MG/1
30 CAPSULE, DELAYED RELEASE ORAL 2 TIMES DAILY
Qty: 180 CAPSULE | Refills: 1 | Status: SHIPPED | OUTPATIENT
Start: 2024-04-15

## 2024-04-15 NOTE — TELEPHONE ENCOUNTER
Patient Alan's wife ya called for refill duloxetine 30mg. Next appointment 5-. [facility]

## 2024-04-17 RX ORDER — DULOXETIN HYDROCHLORIDE 30 MG/1
30 CAPSULE, DELAYED RELEASE ORAL 2 TIMES DAILY
Qty: 180 CAPSULE | OUTPATIENT
Start: 2024-04-17

## 2024-04-30 DIAGNOSIS — C34.82 MALIGNANT NEOPLASM OF OVERLAPPING SITES OF LEFT LUNG (HCC): ICD-10-CM

## 2024-04-30 DIAGNOSIS — G89.3 CHRONIC PAIN DUE TO NEOPLASM: ICD-10-CM

## 2024-04-30 DIAGNOSIS — Z51.5 ENCOUNTER FOR PALLIATIVE CARE: ICD-10-CM

## 2024-04-30 RX ORDER — OXYCODONE HYDROCHLORIDE 10 MG/1
10 TABLET ORAL EVERY 4 HOURS PRN
Qty: 180 TABLET | Refills: 0 | Status: SHIPPED | OUTPATIENT
Start: 2024-04-30 | End: 2024-05-30

## 2024-04-30 NOTE — TELEPHONE ENCOUNTER
Call from Casie requesting refill for Alan's Oxy IR. [facility]acy [facility] [address] Next maria r 5/13/24.

## 2024-05-13 ENCOUNTER — OFFICE VISIT (OUTPATIENT)
Dept: PALLATIVE CARE | Age: 69
End: 2024-05-13
Payer: MEDICARE

## 2024-05-13 VITALS
WEIGHT: 164 LBS | DIASTOLIC BLOOD PRESSURE: 66 MMHG | OXYGEN SATURATION: 96 % | HEART RATE: 72 BPM | BODY MASS INDEX: 22.87 KG/M2 | TEMPERATURE: 98.3 F | SYSTOLIC BLOOD PRESSURE: 106 MMHG

## 2024-05-13 DIAGNOSIS — Z51.5 PALLIATIVE CARE BY SPECIALIST: Primary | ICD-10-CM

## 2024-05-13 PROCEDURE — 99212 OFFICE O/P EST SF 10 MIN: CPT | Performed by: NURSE PRACTITIONER

## 2024-05-13 PROCEDURE — 1123F ACP DISCUSS/DSCN MKR DOCD: CPT | Performed by: NURSE PRACTITIONER

## 2024-05-13 PROCEDURE — 99211 OFF/OP EST MAY X REQ PHY/QHP: CPT | Performed by: NURSE PRACTITIONER

## 2024-05-13 RX ORDER — CHLORHEXIDINE GLUCONATE ORAL RINSE 1.2 MG/ML
SOLUTION DENTAL
Status: ON HOLD | COMMUNITY
Start: 2024-04-28

## 2024-05-13 RX ORDER — SUCRALFATE 1 G/1
1 TABLET ORAL 4 TIMES DAILY PRN
Qty: 120 TABLET | Refills: 0 | OUTPATIENT
Start: 2024-05-13

## 2024-05-13 NOTE — PROGRESS NOTES
Department of Palliative Medicine  Provider: BG Akers - CNP         Chief Complaint: Alan Guajardo Jr. is a 68 y.o. male with chief complaint of pain    HPI  Mr. 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    Assessment/Plan     Lung cancer   - Stage 4 lung cancer   - Follows with Dr. Cummings   - Received radiation therapy, along with chemotherapy    - On Keytruda    Chronic Pain due to Neoplasm/chronic lumbar radiculopathy:              -  Oxycodone at 10 mg PO Q4PRN                -  Lyrica 150 mg TID for neuropathy, hand tingling sensation              -  S/p sacroiliac joint injections with Dr. Conner   -  S/p Right lumbar radiofrequency ablation L5&S1 with Dr. Conner     Constipation:              - Senna-S 2 tabs BID    - Glycolax daily     GERD:   -Reglan 10 mg TID   -Protonix 40 mg BID   -Pepcid 20 mg daily   -Carafate 1 Gm 4 times daily PRN -dissolved    Weight loss:   -Encouraged tube feeds & protein supplements    Follow Up: 3 months  Encouraged to call with any questions, concerns, needs, or changes in symptoms.    Subjective:   Benjamin presents today accompanied by his wife.    He continues to have pain but is well-managed with current medication regimen of oxycodone 10 mg every 4 hours as needed.    He is eating more by mouth, using his tube feeding less  He continues to have some issues with constipation, but for the most part this is well-managed utilizing Senokot, as well as MiraLAX  GERD is less frequent, and is no longer using Carafate   He otherwise reports that he is doing fairly well, with no new uncontrolled symptomatic issues  He reports that he does not need refills on any medications today    Objective:     Physical Exam  Wt Readings from Last 3

## 2024-05-16 RX ORDER — SUCRALFATE 1 G/1
1 TABLET ORAL 4 TIMES DAILY PRN
Qty: 120 TABLET | Refills: 0 | OUTPATIENT
Start: 2024-05-16

## 2024-05-19 ENCOUNTER — APPOINTMENT (OUTPATIENT)
Dept: CT IMAGING | Age: 69
DRG: 177 | End: 2024-05-19
Payer: MEDICARE

## 2024-05-19 ENCOUNTER — APPOINTMENT (OUTPATIENT)
Dept: GENERAL RADIOLOGY | Age: 69
DRG: 177 | End: 2024-05-19
Payer: MEDICARE

## 2024-05-19 ENCOUNTER — HOSPITAL ENCOUNTER (INPATIENT)
Age: 69
LOS: 2 days | Discharge: HOME | DRG: 177 | End: 2024-05-21
Attending: EMERGENCY MEDICINE | Admitting: STUDENT IN AN ORGANIZED HEALTH CARE EDUCATION/TRAINING PROGRAM
Payer: MEDICARE

## 2024-05-19 DIAGNOSIS — R09.02 HYPOXIA: ICD-10-CM

## 2024-05-19 DIAGNOSIS — J18.9 PNEUMONIA OF BOTH LUNGS DUE TO INFECTIOUS ORGANISM, UNSPECIFIED PART OF LUNG: Primary | ICD-10-CM

## 2024-05-19 DIAGNOSIS — J90 PLEURAL EFFUSION: ICD-10-CM

## 2024-05-19 LAB
ALBUMIN SERPL-MCNC: 3.7 G/DL (ref 3.5–5.2)
ALP SERPL-CCNC: 103 U/L (ref 40–129)
ALT SERPL-CCNC: 10 U/L (ref 0–40)
ANION GAP SERPL CALCULATED.3IONS-SCNC: 11 MMOL/L (ref 7–16)
AST SERPL-CCNC: 12 U/L (ref 0–39)
B PARAP IS1001 DNA NPH QL NAA+NON-PROBE: NOT DETECTED
B PERT DNA SPEC QL NAA+PROBE: NOT DETECTED
BASOPHILS # BLD: 0.07 K/UL (ref 0–0.2)
BASOPHILS NFR BLD: 1 % (ref 0–2)
BILIRUB SERPL-MCNC: 0.5 MG/DL (ref 0–1.2)
BNP SERPL-MCNC: 350 PG/ML (ref 0–125)
BUN SERPL-MCNC: 11 MG/DL (ref 6–23)
C PNEUM DNA NPH QL NAA+NON-PROBE: NOT DETECTED
CALCIUM SERPL-MCNC: 9.3 MG/DL (ref 8.6–10.2)
CHLORIDE SERPL-SCNC: 104 MMOL/L (ref 98–107)
CO2 SERPL-SCNC: 24 MMOL/L (ref 22–29)
CREAT SERPL-MCNC: 1 MG/DL (ref 0.7–1.2)
D-DIMER QUANTITATIVE: 475 NG/ML DDU (ref 0–230)
EOSINOPHIL # BLD: 0.36 K/UL (ref 0.05–0.5)
EOSINOPHILS RELATIVE PERCENT: 4 % (ref 0–6)
ERYTHROCYTE [DISTWIDTH] IN BLOOD BY AUTOMATED COUNT: 14.3 % (ref 11.5–15)
FLUAV RNA NPH QL NAA+NON-PROBE: NOT DETECTED
FLUBV RNA NPH QL NAA+NON-PROBE: NOT DETECTED
GFR, ESTIMATED: 86 ML/MIN/1.73M2
GLUCOSE SERPL-MCNC: 83 MG/DL (ref 74–99)
HADV DNA NPH QL NAA+NON-PROBE: NOT DETECTED
HCOV 229E RNA NPH QL NAA+NON-PROBE: NOT DETECTED
HCOV HKU1 RNA NPH QL NAA+NON-PROBE: NOT DETECTED
HCOV NL63 RNA NPH QL NAA+NON-PROBE: NOT DETECTED
HCOV OC43 RNA NPH QL NAA+NON-PROBE: NOT DETECTED
HCT VFR BLD AUTO: 42.8 % (ref 37–54)
HGB BLD-MCNC: 13.4 G/DL (ref 12.5–16.5)
HMPV RNA NPH QL NAA+NON-PROBE: NOT DETECTED
HPIV1 RNA NPH QL NAA+NON-PROBE: NOT DETECTED
HPIV2 RNA NPH QL NAA+NON-PROBE: NOT DETECTED
HPIV3 RNA NPH QL NAA+NON-PROBE: NOT DETECTED
HPIV4 RNA NPH QL NAA+NON-PROBE: NOT DETECTED
IMM GRANULOCYTES # BLD AUTO: 0.04 K/UL (ref 0–0.58)
IMM GRANULOCYTES NFR BLD: 0 % (ref 0–5)
INFLUENZA A BY PCR: NOT DETECTED
INFLUENZA B BY PCR: NOT DETECTED
LYMPHOCYTES NFR BLD: 0.96 K/UL (ref 1.5–4)
LYMPHOCYTES RELATIVE PERCENT: 10 % (ref 20–42)
M PNEUMO DNA NPH QL NAA+NON-PROBE: NOT DETECTED
MCH RBC QN AUTO: 26.7 PG (ref 26–35)
MCHC RBC AUTO-ENTMCNC: 31.3 G/DL (ref 32–34.5)
MCV RBC AUTO: 85.4 FL (ref 80–99.9)
MONOCYTES NFR BLD: 0.57 K/UL (ref 0.1–0.95)
MONOCYTES NFR BLD: 6 % (ref 2–12)
NEUTROPHILS NFR BLD: 79 % (ref 43–80)
NEUTS SEG NFR BLD: 7.49 K/UL (ref 1.8–7.3)
PLATELET # BLD AUTO: 310 K/UL (ref 130–450)
PMV BLD AUTO: 9.8 FL (ref 7–12)
POTASSIUM SERPL-SCNC: 4.1 MMOL/L (ref 3.5–5)
PROCALCITONIN SERPL-MCNC: 0.09 NG/ML (ref 0–0.08)
PROT SERPL-MCNC: 6.9 G/DL (ref 6.4–8.3)
RBC # BLD AUTO: 5.01 M/UL (ref 3.8–5.8)
RSV RNA NPH QL NAA+NON-PROBE: NOT DETECTED
RV+EV RNA NPH QL NAA+NON-PROBE: NOT DETECTED
SARS-COV-2 RNA NPH QL NAA+NON-PROBE: NOT DETECTED
SODIUM SERPL-SCNC: 139 MMOL/L (ref 132–146)
SPECIMEN DESCRIPTION: NORMAL
TROPONIN I SERPL HS-MCNC: 36 NG/L (ref 0–11)
WBC OTHER # BLD: 9.5 K/UL (ref 4.5–11.5)

## 2024-05-19 PROCEDURE — 96366 THER/PROPH/DIAG IV INF ADDON: CPT

## 2024-05-19 PROCEDURE — 6370000000 HC RX 637 (ALT 250 FOR IP)

## 2024-05-19 PROCEDURE — 71275 CT ANGIOGRAPHY CHEST: CPT

## 2024-05-19 PROCEDURE — 2700000000 HC OXYGEN THERAPY PER DAY

## 2024-05-19 PROCEDURE — 2580000003 HC RX 258: Performed by: STUDENT IN AN ORGANIZED HEALTH CARE EDUCATION/TRAINING PROGRAM

## 2024-05-19 PROCEDURE — 6360000002 HC RX W HCPCS: Performed by: STUDENT IN AN ORGANIZED HEALTH CARE EDUCATION/TRAINING PROGRAM

## 2024-05-19 PROCEDURE — 93005 ELECTROCARDIOGRAM TRACING: CPT

## 2024-05-19 PROCEDURE — 6370000000 HC RX 637 (ALT 250 FOR IP): Performed by: STUDENT IN AN ORGANIZED HEALTH CARE EDUCATION/TRAINING PROGRAM

## 2024-05-19 PROCEDURE — 99285 EMERGENCY DEPT VISIT HI MDM: CPT

## 2024-05-19 PROCEDURE — 96375 TX/PRO/DX INJ NEW DRUG ADDON: CPT

## 2024-05-19 PROCEDURE — 6360000002 HC RX W HCPCS

## 2024-05-19 PROCEDURE — 83880 ASSAY OF NATRIURETIC PEPTIDE: CPT

## 2024-05-19 PROCEDURE — 85379 FIBRIN DEGRADATION QUANT: CPT

## 2024-05-19 PROCEDURE — 80053 COMPREHEN METABOLIC PANEL: CPT

## 2024-05-19 PROCEDURE — 87502 INFLUENZA DNA AMP PROBE: CPT

## 2024-05-19 PROCEDURE — 2580000003 HC RX 258

## 2024-05-19 PROCEDURE — 96365 THER/PROPH/DIAG IV INF INIT: CPT

## 2024-05-19 PROCEDURE — 87081 CULTURE SCREEN ONLY: CPT

## 2024-05-19 PROCEDURE — 6360000004 HC RX CONTRAST MEDICATION: Performed by: RADIOLOGY

## 2024-05-19 PROCEDURE — 87040 BLOOD CULTURE FOR BACTERIA: CPT

## 2024-05-19 PROCEDURE — 94664 DEMO&/EVAL PT USE INHALER: CPT

## 2024-05-19 PROCEDURE — 84484 ASSAY OF TROPONIN QUANT: CPT

## 2024-05-19 PROCEDURE — 94640 AIRWAY INHALATION TREATMENT: CPT

## 2024-05-19 PROCEDURE — 71045 X-RAY EXAM CHEST 1 VIEW: CPT

## 2024-05-19 PROCEDURE — 2500000003 HC RX 250 WO HCPCS: Performed by: EMERGENCY MEDICINE

## 2024-05-19 PROCEDURE — 85025 COMPLETE CBC W/AUTO DIFF WBC: CPT

## 2024-05-19 PROCEDURE — 0202U NFCT DS 22 TRGT SARS-COV-2: CPT

## 2024-05-19 PROCEDURE — 2060000000 HC ICU INTERMEDIATE R&B

## 2024-05-19 PROCEDURE — 84145 PROCALCITONIN (PCT): CPT

## 2024-05-19 PROCEDURE — 99222 1ST HOSP IP/OBS MODERATE 55: CPT | Performed by: STUDENT IN AN ORGANIZED HEALTH CARE EDUCATION/TRAINING PROGRAM

## 2024-05-19 RX ORDER — METHYLPREDNISOLONE SODIUM SUCCINATE 40 MG/ML
40 INJECTION, POWDER, LYOPHILIZED, FOR SOLUTION INTRAMUSCULAR; INTRAVENOUS EVERY 8 HOURS
Status: DISCONTINUED | OUTPATIENT
Start: 2024-05-19 | End: 2024-05-20

## 2024-05-19 RX ORDER — ALBUTEROL SULFATE 2.5 MG/3ML
2.5 SOLUTION RESPIRATORY (INHALATION)
Status: CANCELLED | OUTPATIENT
Start: 2024-05-19

## 2024-05-19 RX ORDER — WATER 10 ML/10ML
INJECTION INTRAMUSCULAR; INTRAVENOUS; SUBCUTANEOUS
Status: COMPLETED
Start: 2024-05-19 | End: 2024-05-19

## 2024-05-19 RX ORDER — ALBUTEROL SULFATE 0.63 MG/3ML
1 SOLUTION RESPIRATORY (INHALATION) EVERY 6 HOURS PRN
Status: DISCONTINUED | OUTPATIENT
Start: 2024-05-19 | End: 2024-05-21 | Stop reason: HOSPADM

## 2024-05-19 RX ORDER — LEVOTHYROXINE SODIUM 0.1 MG/1
100 TABLET ORAL
Status: DISCONTINUED | OUTPATIENT
Start: 2024-05-20 | End: 2024-05-21 | Stop reason: HOSPADM

## 2024-05-19 RX ORDER — METHYLPREDNISOLONE SODIUM SUCCINATE 125 MG/2ML
125 INJECTION, POWDER, LYOPHILIZED, FOR SOLUTION INTRAMUSCULAR; INTRAVENOUS ONCE
Status: COMPLETED | OUTPATIENT
Start: 2024-05-19 | End: 2024-05-19

## 2024-05-19 RX ORDER — TAMSULOSIN HYDROCHLORIDE 0.4 MG/1
0.4 CAPSULE ORAL EVERY EVENING
Status: DISCONTINUED | OUTPATIENT
Start: 2024-05-19 | End: 2024-05-21 | Stop reason: HOSPADM

## 2024-05-19 RX ORDER — 0.9 % SODIUM CHLORIDE 0.9 %
30 INTRAVENOUS SOLUTION INTRAVENOUS ONCE
Status: COMPLETED | OUTPATIENT
Start: 2024-05-19 | End: 2024-05-19

## 2024-05-19 RX ORDER — PANTOPRAZOLE SODIUM 40 MG/1
40 TABLET, DELAYED RELEASE ORAL 2 TIMES DAILY
Status: DISCONTINUED | OUTPATIENT
Start: 2024-05-19 | End: 2024-05-21 | Stop reason: HOSPADM

## 2024-05-19 RX ORDER — DULOXETIN HYDROCHLORIDE 30 MG/1
30 CAPSULE, DELAYED RELEASE ORAL 2 TIMES DAILY
Status: DISCONTINUED | OUTPATIENT
Start: 2024-05-19 | End: 2024-05-21 | Stop reason: HOSPADM

## 2024-05-19 RX ORDER — IPRATROPIUM BROMIDE AND ALBUTEROL SULFATE 2.5; .5 MG/3ML; MG/3ML
3 SOLUTION RESPIRATORY (INHALATION) ONCE
Status: COMPLETED | OUTPATIENT
Start: 2024-05-19 | End: 2024-05-19

## 2024-05-19 RX ORDER — PREGABALIN 75 MG/1
150 CAPSULE ORAL 3 TIMES DAILY
Status: DISCONTINUED | OUTPATIENT
Start: 2024-05-19 | End: 2024-05-21 | Stop reason: HOSPADM

## 2024-05-19 RX ORDER — IPRATROPIUM BROMIDE AND ALBUTEROL SULFATE 2.5; .5 MG/3ML; MG/3ML
1 SOLUTION RESPIRATORY (INHALATION)
Status: DISCONTINUED | OUTPATIENT
Start: 2024-05-19 | End: 2024-05-20

## 2024-05-19 RX ORDER — MIDODRINE HYDROCHLORIDE 5 MG/1
5 TABLET ORAL 2 TIMES DAILY
Status: DISCONTINUED | OUTPATIENT
Start: 2024-05-19 | End: 2024-05-21 | Stop reason: HOSPADM

## 2024-05-19 RX ORDER — MAGNESIUM SULFATE IN WATER 40 MG/ML
2000 INJECTION, SOLUTION INTRAVENOUS ONCE
Status: COMPLETED | OUTPATIENT
Start: 2024-05-19 | End: 2024-05-19

## 2024-05-19 RX ORDER — BUDESONIDE 0.5 MG/2ML
500 INHALANT ORAL
Status: DISCONTINUED | OUTPATIENT
Start: 2024-05-19 | End: 2024-05-21 | Stop reason: HOSPADM

## 2024-05-19 RX ORDER — ARFORMOTEROL TARTRATE 15 UG/2ML
15 SOLUTION RESPIRATORY (INHALATION)
Status: DISCONTINUED | OUTPATIENT
Start: 2024-05-19 | End: 2024-05-21 | Stop reason: HOSPADM

## 2024-05-19 RX ORDER — CHLORHEXIDINE GLUCONATE ORAL RINSE 1.2 MG/ML
15 SOLUTION DENTAL 2 TIMES DAILY
Status: DISCONTINUED | OUTPATIENT
Start: 2024-05-19 | End: 2024-05-21 | Stop reason: HOSPADM

## 2024-05-19 RX ORDER — GUAIFENESIN/DEXTROMETHORPHAN 100-10MG/5
5 SYRUP ORAL EVERY 4 HOURS PRN
Status: DISCONTINUED | OUTPATIENT
Start: 2024-05-19 | End: 2024-05-21 | Stop reason: HOSPADM

## 2024-05-19 RX ORDER — OXYCODONE HYDROCHLORIDE 5 MG/1
10 TABLET ORAL EVERY 4 HOURS PRN
Status: DISCONTINUED | OUTPATIENT
Start: 2024-05-19 | End: 2024-05-21 | Stop reason: HOSPADM

## 2024-05-19 RX ORDER — VITAMIN B COMPLEX
5000 TABLET ORAL EVERY EVENING
Status: DISCONTINUED | OUTPATIENT
Start: 2024-05-19 | End: 2024-05-21 | Stop reason: HOSPADM

## 2024-05-19 RX ORDER — LIDOCAINE HYDROCHLORIDE 20 MG/ML
5 INJECTION, SOLUTION EPIDURAL; INFILTRATION; INTRACAUDAL; PERINEURAL ONCE
Status: COMPLETED | OUTPATIENT
Start: 2024-05-19 | End: 2024-05-19

## 2024-05-19 RX ORDER — ATORVASTATIN CALCIUM 20 MG/1
20 TABLET, FILM COATED ORAL DAILY
Status: DISCONTINUED | OUTPATIENT
Start: 2024-05-19 | End: 2024-05-21 | Stop reason: HOSPADM

## 2024-05-19 RX ORDER — PREDNISONE 5 MG/1
10 TABLET ORAL EVERY EVENING
Status: DISCONTINUED | OUTPATIENT
Start: 2024-05-19 | End: 2024-05-19 | Stop reason: DRUGHIGH

## 2024-05-19 RX ADMIN — WATER 10 ML: 1 INJECTION INTRAMUSCULAR; INTRAVENOUS; SUBCUTANEOUS at 17:33

## 2024-05-19 RX ADMIN — METHYLPREDNISOLONE SODIUM SUCCINATE 125 MG: 125 INJECTION INTRAMUSCULAR; INTRAVENOUS at 09:50

## 2024-05-19 RX ADMIN — 0.12% CHLORHEXIDINE GLUCONATE 15 ML: 1.2 RINSE ORAL at 21:11

## 2024-05-19 RX ADMIN — MAGNESIUM SULFATE HEPTAHYDRATE 2000 MG: 40 INJECTION, SOLUTION INTRAVENOUS at 09:49

## 2024-05-19 RX ADMIN — LIDOCAINE HYDROCHLORIDE 5 ML: 20 INJECTION, SOLUTION EPIDURAL; INFILTRATION; INTRACAUDAL; PERINEURAL at 11:00

## 2024-05-19 RX ADMIN — ARFORMOTEROL TARTRATE 15 MCG: 15 SOLUTION RESPIRATORY (INHALATION) at 19:36

## 2024-05-19 RX ADMIN — NYSTATIN 500000 UNITS: 100000 SUSPENSION ORAL at 21:12

## 2024-05-19 RX ADMIN — DULOXETINE HYDROCHLORIDE 30 MG: 30 CAPSULE, DELAYED RELEASE ORAL at 21:11

## 2024-05-19 RX ADMIN — SODIUM CHLORIDE 2232 ML: 9 INJECTION, SOLUTION INTRAVENOUS at 09:52

## 2024-05-19 RX ADMIN — ATORVASTATIN CALCIUM 20 MG: 20 TABLET, FILM COATED ORAL at 21:11

## 2024-05-19 RX ADMIN — IPRATROPIUM BROMIDE AND ALBUTEROL SULFATE 1 DOSE: 2.5; .5 SOLUTION RESPIRATORY (INHALATION) at 15:48

## 2024-05-19 RX ADMIN — METHYLPREDNISOLONE SODIUM SUCCINATE 40 MG: 40 INJECTION INTRAMUSCULAR; INTRAVENOUS at 17:33

## 2024-05-19 RX ADMIN — PREGABALIN 150 MG: 75 CAPSULE ORAL at 21:10

## 2024-05-19 RX ADMIN — IPRATROPIUM BROMIDE AND ALBUTEROL SULFATE 1 DOSE: 2.5; .5 SOLUTION RESPIRATORY (INHALATION) at 19:36

## 2024-05-19 RX ADMIN — TAMSULOSIN HYDROCHLORIDE 0.4 MG: 0.4 CAPSULE ORAL at 21:11

## 2024-05-19 RX ADMIN — IOPAMIDOL 75 ML: 755 INJECTION, SOLUTION INTRAVENOUS at 11:07

## 2024-05-19 RX ADMIN — PIPERACILLIN AND TAZOBACTAM 4500 MG: 4; .5 INJECTION, POWDER, LYOPHILIZED, FOR SOLUTION INTRAVENOUS at 17:30

## 2024-05-19 RX ADMIN — VANCOMYCIN HYDROCHLORIDE 1500 MG: 10 INJECTION, POWDER, LYOPHILIZED, FOR SOLUTION INTRAVENOUS at 13:06

## 2024-05-19 RX ADMIN — IPRATROPIUM BROMIDE AND ALBUTEROL SULFATE 3 DOSE: .5; 2.5 SOLUTION RESPIRATORY (INHALATION) at 10:10

## 2024-05-19 RX ADMIN — OXYCODONE 10 MG: 5 TABLET ORAL at 21:11

## 2024-05-19 RX ADMIN — Medication 5000 UNITS: at 21:10

## 2024-05-19 RX ADMIN — PIPERACILLIN AND TAZOBACTAM 4500 MG: 4; .5 INJECTION, POWDER, LYOPHILIZED, FOR SOLUTION INTRAVENOUS at 10:46

## 2024-05-19 RX ADMIN — APIXABAN 2.5 MG: 2.5 TABLET, FILM COATED ORAL at 21:11

## 2024-05-19 RX ADMIN — PANTOPRAZOLE SODIUM 40 MG: 40 TABLET, DELAYED RELEASE ORAL at 21:11

## 2024-05-19 ASSESSMENT — LIFESTYLE VARIABLES
HOW MANY STANDARD DRINKS CONTAINING ALCOHOL DO YOU HAVE ON A TYPICAL DAY: PATIENT DOES NOT DRINK
HOW OFTEN DO YOU HAVE A DRINK CONTAINING ALCOHOL: NEVER

## 2024-05-19 ASSESSMENT — PAIN - FUNCTIONAL ASSESSMENT: PAIN_FUNCTIONAL_ASSESSMENT: NONE - DENIES PAIN

## 2024-05-19 NOTE — ED PROVIDER NOTES
[facility] EMERGENCY DEPARTMENT  EMERGENCY DEPARTMENT ENCOUNTER        Pt Name: Alan Guajardo Jr.  MRN: [id number]  Birthdate 1955  Date of evaluation: 5/19/2024  Provider: Kirit Carranza MD  PCP: Sylvia Quintana DO  Note Started: 11:25 AM EDT 5/19/24    CHIEF COMPLAINT       Chief Complaint   Patient presents with    Shortness of Breath     Hx of throat/lung CA, PE five years ago, has trach       HISTORY OF PRESENT ILLNESS: 1 or more Elements   History From: Patient.    Limitations to history : None    Alan Guajardo Jr. is a 68 y.o. male with a history of COPD, hypertension, hyperlipidemia, throat carcinoma, lung carcinoma, pulmonary embolism who presents to the ED with complaints of shortness of breath.  Patient is on chronic trach, on 5 L at home.  Patient states that he started having severe shortness of breath since yesterday.  EMS placed him on 8 L oxygen.  Arrival to the ED, patient was saturating 96% on 8 L oxygen.  Patient is on Eliquis for history of pulmonary embolism.  Patient denies any chest pain.  Patient denies any recent fever, headache.  Patient denies nausea, abdominal pain, constipation, diarrhea, urinary symptoms or any other active complaints.    Nursing Notes were all reviewed and agreed with or any disagreements were addressed in the HPI.    ROS:   Pertinent positives and negatives are stated within HPI, all other systems reviewed and are negative.    --------------------------------------------- PAST HISTORY ---------------------------------------------  Past Medical History:  has a past medical history of Abdominal aortic aneurysm without rupture (HCC), Acute bronchitis with COPD (HCC), Arthritis, COPD (chronic obstructive pulmonary disease) (HCC), Decreased dorsalis pedis pulse, Depression with anxiety, Emphysema of lung (HCC), Encounter for antineoplastic immunotherapy, History of deep vein thrombosis, Hyperlipidemia, Infrarenal abdominal

## 2024-05-19 NOTE — H&P
edema  Neurologic: no cranial nerve deficit and speech normal  peripheral    LABS:  Recent Labs     05/19/24  0933      K 4.1      CO2 24   BUN 11   CREATININE 1.0   GLUCOSE 83   CALCIUM 9.3       Recent Labs     05/19/24  0933   WBC 9.5   RBC 5.01   HGB 13.4   HCT 42.8   MCV 85.4   MCH 26.7   MCHC 31.3*   RDW 14.3      MPV 9.8       No results for input(s): \"POCGLU\" in the last 72 hours.    Radiology:   CTA PULMONARY W CONTRAST   Final Result   1. No evidence of pulmonary embolism.   2. Stable underlying chronic and emphysematous changes seen in lung fields   with consolidative infiltrate at the lung bases slightly improved at the   right lung base when compared to the prior examination and similar in   appearance in the left lung base when compared to the prior examination.  The   bilateral small pleural effusions are stable with no change in the apical   scarring in the left lung and stable scarring anteriorly within the left   upper lobe.         XR CHEST PORTABLE   Final Result   Cardiomegaly with left mid and lower lung airspace disease and small left   pleural effusion. Small right pleural effusion.             ASSESSMENT:      Active Problems:    * No active hospital problems. *  Resolved Problems:    * No resolved hospital problems. *      PLAN:    Acute hypoxic respiratory failure - rule out aspiration due to right pleural effusion. Patient has PEG but not using at this time. On soft dysphagia diet. Defer further antibiotics to pulmonary. Continue nebulizer treatments.   Stage IV adenocarcinoma of lung 2016 s/p trach - follows with Dr Cummings, on chemo/radiation, on Keytruda.   Chronic pain - Continue oxycodone and lyrica.   Hx PE - continue eliquis   COPD with emphysema - continue breathing treatments   Hypothyroidism - continue synthroid   Mood disorder - continue cymbalta  BPH - continue flomax   HLD - continue statin   GERD - continue PPI and Pepcid     Code Status: full  DVT

## 2024-05-19 NOTE — ED NOTES
.ED to Inpatient Handoff Report    Notified Meet that electronic handoff available and patient ready for transport to room 603.    Safety Risks: Risk of falls    Patient in Restraints: no    Constant Observer or Patient : no    Telemetry Monitoring Ordered: Yes          Order to transfer to unit without monitor: NO    Last MEWS: 1 Time completed: 1548    Deterioration Index: 22.18    Vitals:    05/19/24 1218 05/19/24 1306 05/19/24 1543 05/19/24 1548   BP: 96/75 119/78 128/72    Pulse: 74 77 79 77   Resp: 24 20 19 20   Temp:   98.5 °F (36.9 °C)    TempSrc:   Oral    SpO2: 100% 100% 100% 100%       Opportunity for questions and clarification was provided.

## 2024-05-20 LAB
ANION GAP SERPL CALCULATED.3IONS-SCNC: 11 MMOL/L (ref 7–16)
BUN SERPL-MCNC: 18 MG/DL (ref 6–23)
CALCIUM SERPL-MCNC: 8.8 MG/DL (ref 8.6–10.2)
CHLORIDE SERPL-SCNC: 106 MMOL/L (ref 98–107)
CO2 SERPL-SCNC: 21 MMOL/L (ref 22–29)
CREAT SERPL-MCNC: 0.9 MG/DL (ref 0.7–1.2)
EKG ATRIAL RATE: 71 BPM
EKG P AXIS: 72 DEGREES
EKG P-R INTERVAL: 170 MS
EKG Q-T INTERVAL: 406 MS
EKG QRS DURATION: 92 MS
EKG QTC CALCULATION (BAZETT): 441 MS
EKG R AXIS: -15 DEGREES
EKG T AXIS: 12 DEGREES
EKG VENTRICULAR RATE: 71 BPM
ERYTHROCYTE [DISTWIDTH] IN BLOOD BY AUTOMATED COUNT: 14.1 % (ref 11.5–15)
GFR, ESTIMATED: >90 ML/MIN/1.73M2
GLUCOSE SERPL-MCNC: 149 MG/DL (ref 74–99)
HCT VFR BLD AUTO: 37.7 % (ref 37–54)
HGB BLD-MCNC: 12.2 G/DL (ref 12.5–16.5)
L PNEUMO1 AG UR QL IA.RAPID: NEGATIVE
MCH RBC QN AUTO: 26.8 PG (ref 26–35)
MCHC RBC AUTO-ENTMCNC: 32.4 G/DL (ref 32–34.5)
MCV RBC AUTO: 82.9 FL (ref 80–99.9)
PLATELET # BLD AUTO: 281 K/UL (ref 130–450)
PMV BLD AUTO: 10.2 FL (ref 7–12)
POTASSIUM SERPL-SCNC: 4.5 MMOL/L (ref 3.5–5)
PROCALCITONIN SERPL-MCNC: 0.12 NG/ML (ref 0–0.08)
RBC # BLD AUTO: 4.55 M/UL (ref 3.8–5.8)
S PNEUM AG SPEC QL: NEGATIVE
SODIUM SERPL-SCNC: 138 MMOL/L (ref 132–146)
SPECIMEN SOURCE: NORMAL
TSH SERPL DL<=0.05 MIU/L-ACNC: 0.58 UIU/ML (ref 0.27–4.2)
WBC OTHER # BLD: 11.6 K/UL (ref 4.5–11.5)

## 2024-05-20 PROCEDURE — 87899 AGENT NOS ASSAY W/OPTIC: CPT

## 2024-05-20 PROCEDURE — 80048 BASIC METABOLIC PNL TOTAL CA: CPT

## 2024-05-20 PROCEDURE — 84443 ASSAY THYROID STIM HORMONE: CPT

## 2024-05-20 PROCEDURE — 36415 COLL VENOUS BLD VENIPUNCTURE: CPT

## 2024-05-20 PROCEDURE — 87449 NOS EACH ORGANISM AG IA: CPT

## 2024-05-20 PROCEDURE — 2700000000 HC OXYGEN THERAPY PER DAY

## 2024-05-20 PROCEDURE — 6370000000 HC RX 637 (ALT 250 FOR IP): Performed by: STUDENT IN AN ORGANIZED HEALTH CARE EDUCATION/TRAINING PROGRAM

## 2024-05-20 PROCEDURE — 2060000000 HC ICU INTERMEDIATE R&B

## 2024-05-20 PROCEDURE — 93010 ELECTROCARDIOGRAM REPORT: CPT | Performed by: INTERNAL MEDICINE

## 2024-05-20 PROCEDURE — 6370000000 HC RX 637 (ALT 250 FOR IP)

## 2024-05-20 PROCEDURE — 84145 PROCALCITONIN (PCT): CPT

## 2024-05-20 PROCEDURE — 94640 AIRWAY INHALATION TREATMENT: CPT

## 2024-05-20 PROCEDURE — 99232 SBSQ HOSP IP/OBS MODERATE 35: CPT | Performed by: INTERNAL MEDICINE

## 2024-05-20 PROCEDURE — 6360000002 HC RX W HCPCS: Performed by: STUDENT IN AN ORGANIZED HEALTH CARE EDUCATION/TRAINING PROGRAM

## 2024-05-20 PROCEDURE — 92610 EVALUATE SWALLOWING FUNCTION: CPT

## 2024-05-20 PROCEDURE — 2580000003 HC RX 258: Performed by: STUDENT IN AN ORGANIZED HEALTH CARE EDUCATION/TRAINING PROGRAM

## 2024-05-20 PROCEDURE — 85027 COMPLETE CBC AUTOMATED: CPT

## 2024-05-20 PROCEDURE — 6360000002 HC RX W HCPCS

## 2024-05-20 RX ORDER — DOXYCYCLINE HYCLATE 100 MG/1
100 CAPSULE ORAL EVERY 12 HOURS SCHEDULED
Status: DISCONTINUED | OUTPATIENT
Start: 2024-05-20 | End: 2024-05-21 | Stop reason: HOSPADM

## 2024-05-20 RX ORDER — CEFUROXIME AXETIL 500 MG/1
500 TABLET ORAL EVERY 12 HOURS SCHEDULED
Status: DISCONTINUED | OUTPATIENT
Start: 2024-05-20 | End: 2024-05-21 | Stop reason: HOSPADM

## 2024-05-20 RX ORDER — METHYLPREDNISOLONE SODIUM SUCCINATE 40 MG/ML
40 INJECTION, POWDER, LYOPHILIZED, FOR SOLUTION INTRAMUSCULAR; INTRAVENOUS EVERY 12 HOURS
Status: DISCONTINUED | OUTPATIENT
Start: 2024-05-20 | End: 2024-05-21 | Stop reason: HOSPADM

## 2024-05-20 RX ORDER — IPRATROPIUM BROMIDE AND ALBUTEROL SULFATE 2.5; .5 MG/3ML; MG/3ML
1 SOLUTION RESPIRATORY (INHALATION)
Status: DISCONTINUED | OUTPATIENT
Start: 2024-05-20 | End: 2024-05-21 | Stop reason: HOSPADM

## 2024-05-20 RX ADMIN — METHYLPREDNISOLONE SODIUM SUCCINATE 40 MG: 40 INJECTION, POWDER, LYOPHILIZED, FOR SOLUTION INTRAMUSCULAR; INTRAVENOUS at 20:31

## 2024-05-20 RX ADMIN — MIDODRINE HYDROCHLORIDE 5 MG: 5 TABLET ORAL at 08:23

## 2024-05-20 RX ADMIN — NYSTATIN 500000 UNITS: 100000 SUSPENSION ORAL at 20:29

## 2024-05-20 RX ADMIN — NYSTATIN 500000 UNITS: 100000 SUSPENSION ORAL at 15:22

## 2024-05-20 RX ADMIN — METHYLPREDNISOLONE SODIUM SUCCINATE 40 MG: 40 INJECTION INTRAMUSCULAR; INTRAVENOUS at 10:09

## 2024-05-20 RX ADMIN — 0.12% CHLORHEXIDINE GLUCONATE 15 ML: 1.2 RINSE ORAL at 20:29

## 2024-05-20 RX ADMIN — MIDODRINE HYDROCHLORIDE 5 MG: 5 TABLET ORAL at 15:22

## 2024-05-20 RX ADMIN — DULOXETINE HYDROCHLORIDE 30 MG: 30 CAPSULE, DELAYED RELEASE ORAL at 08:23

## 2024-05-20 RX ADMIN — OXYCODONE 10 MG: 5 TABLET ORAL at 15:20

## 2024-05-20 RX ADMIN — OXYCODONE 10 MG: 5 TABLET ORAL at 08:21

## 2024-05-20 RX ADMIN — IPRATROPIUM BROMIDE AND ALBUTEROL SULFATE 1 DOSE: 2.5; .5 SOLUTION RESPIRATORY (INHALATION) at 16:03

## 2024-05-20 RX ADMIN — NYSTATIN 500000 UNITS: 100000 SUSPENSION ORAL at 13:37

## 2024-05-20 RX ADMIN — LEVOTHYROXINE SODIUM 100 MCG: 100 TABLET ORAL at 06:30

## 2024-05-20 RX ADMIN — 0.12% CHLORHEXIDINE GLUCONATE 15 ML: 1.2 RINSE ORAL at 08:24

## 2024-05-20 RX ADMIN — OXYCODONE 10 MG: 5 TABLET ORAL at 23:40

## 2024-05-20 RX ADMIN — PANTOPRAZOLE SODIUM 40 MG: 40 TABLET, DELAYED RELEASE ORAL at 08:21

## 2024-05-20 RX ADMIN — Medication 5000 UNITS: at 15:23

## 2024-05-20 RX ADMIN — ATORVASTATIN CALCIUM 20 MG: 20 TABLET, FILM COATED ORAL at 08:23

## 2024-05-20 RX ADMIN — VANCOMYCIN HYDROCHLORIDE 1000 MG: 1 INJECTION, POWDER, LYOPHILIZED, FOR SOLUTION INTRAVENOUS at 01:30

## 2024-05-20 RX ADMIN — BUDESONIDE INHALATION 500 MCG: 0.5 SUSPENSION RESPIRATORY (INHALATION) at 19:32

## 2024-05-20 RX ADMIN — PIPERACILLIN AND TAZOBACTAM 4500 MG: 4; .5 INJECTION, POWDER, LYOPHILIZED, FOR SOLUTION INTRAVENOUS at 02:32

## 2024-05-20 RX ADMIN — IPRATROPIUM BROMIDE AND ALBUTEROL SULFATE 1 DOSE: 2.5; .5 SOLUTION RESPIRATORY (INHALATION) at 10:54

## 2024-05-20 RX ADMIN — PREGABALIN 150 MG: 75 CAPSULE ORAL at 20:31

## 2024-05-20 RX ADMIN — APIXABAN 2.5 MG: 2.5 TABLET, FILM COATED ORAL at 08:23

## 2024-05-20 RX ADMIN — APIXABAN 2.5 MG: 2.5 TABLET, FILM COATED ORAL at 20:31

## 2024-05-20 RX ADMIN — ARFORMOTEROL TARTRATE 15 MCG: 15 SOLUTION RESPIRATORY (INHALATION) at 19:32

## 2024-05-20 RX ADMIN — NYSTATIN 500000 UNITS: 100000 SUSPENSION ORAL at 08:25

## 2024-05-20 RX ADMIN — CEFUROXIME AXETIL 500 MG: 500 TABLET ORAL at 20:31

## 2024-05-20 RX ADMIN — METHYLPREDNISOLONE SODIUM SUCCINATE 40 MG: 40 INJECTION INTRAMUSCULAR; INTRAVENOUS at 01:29

## 2024-05-20 RX ADMIN — BUDESONIDE INHALATION 500 MCG: 0.5 SUSPENSION RESPIRATORY (INHALATION) at 08:16

## 2024-05-20 RX ADMIN — PIPERACILLIN AND TAZOBACTAM 4500 MG: 4; .5 INJECTION, POWDER, LYOPHILIZED, FOR SOLUTION INTRAVENOUS at 08:26

## 2024-05-20 RX ADMIN — IPRATROPIUM BROMIDE AND ALBUTEROL SULFATE 1 DOSE: 2.5; .5 SOLUTION RESPIRATORY (INHALATION) at 08:16

## 2024-05-20 RX ADMIN — PANTOPRAZOLE SODIUM 40 MG: 40 TABLET, DELAYED RELEASE ORAL at 14:47

## 2024-05-20 RX ADMIN — PREGABALIN 150 MG: 75 CAPSULE ORAL at 14:46

## 2024-05-20 RX ADMIN — ARFORMOTEROL TARTRATE 15 MCG: 15 SOLUTION RESPIRATORY (INHALATION) at 08:16

## 2024-05-20 RX ADMIN — TAMSULOSIN HYDROCHLORIDE 0.4 MG: 0.4 CAPSULE ORAL at 14:48

## 2024-05-20 RX ADMIN — PREGABALIN 150 MG: 75 CAPSULE ORAL at 08:23

## 2024-05-20 RX ADMIN — DULOXETINE HYDROCHLORIDE 30 MG: 30 CAPSULE, DELAYED RELEASE ORAL at 20:31

## 2024-05-20 RX ADMIN — DOXYCYCLINE HYCLATE 100 MG: 100 CAPSULE ORAL at 20:31

## 2024-05-20 RX ADMIN — IPRATROPIUM BROMIDE AND ALBUTEROL SULFATE 1 DOSE: 2.5; .5 SOLUTION RESPIRATORY (INHALATION) at 19:32

## 2024-05-20 ASSESSMENT — PAIN DESCRIPTION - LOCATION
LOCATION: BACK
LOCATION: NECK;BACK
LOCATION: BACK

## 2024-05-20 ASSESSMENT — PAIN DESCRIPTION - ORIENTATION: ORIENTATION: MID

## 2024-05-20 ASSESSMENT — PAIN SCALES - GENERAL
PAINLEVEL_OUTOF10: 3
PAINLEVEL_OUTOF10: 4
PAINLEVEL_OUTOF10: 1
PAINLEVEL_OUTOF10: 5
PAINLEVEL_OUTOF10: 1
PAINLEVEL_OUTOF10: 3

## 2024-05-20 ASSESSMENT — PAIN DESCRIPTION - DESCRIPTORS
DESCRIPTORS: ACHING;DISCOMFORT
DESCRIPTORS: ACHING;SPASM
DESCRIPTORS: DISCOMFORT
DESCRIPTORS: ACHING
DESCRIPTORS: ACHING;SORE;DISCOMFORT

## 2024-05-20 NOTE — CARE COORDINATION
Introduced my self and provided explanation of CM role to patient and wife, Casie at bedside. Admitted for LENNOX pneumonia, pleural effusion, hypoxia. Hx of COPD, HTN, Lung and Laryngeal CA w/laryngectomy and tracheostomy tube. Currently on 10L 40% trach mask. Pulmonology consult is pending, speech is following.  He voices he resides in a 2 story home with his spouse and completes his adl's with assistance from his wife. He has a rollator. Pt. has hx with [facility] and [facility], Patient uses [facility] for oxygen and trach supplies.Patient is established with Dr. Quintana and he uses [facility] in [place]. Anticipated to discharge home with no needs. Will continue to follow.  Kimberly KILPATRICKN, RN  Case Management

## 2024-05-20 NOTE — CONSULTS
Zosyn, vancomycin and fluid bolus.  After review of his previous imaging, his CAT scan actually appears to show some improvement on the right base with the development of a small left pleural effusion.  The infiltrates noted on the left base appear to be relatively the same as January scan.  Viral respiratory panel is negative, procalcitonin not elevated at 0.09, there is no leukocytosis and the patient has been afebrile.    Patient was seen lying in bed with his wife present at the bedside.  He is doing well and not in any distress.  He reports he intermittently uses oxygen at home during the day and continuously at night.  They report 1 to 2 days of intermittent fevers and chills and an increase in mucus production.  He is feeling significantly better now and is no longer bringing up any mucus.  He does report occasional difficulty swallowing depending on certain food items he may be eating.      Past Medical History:   Diagnosis Date    Abdominal aortic aneurysm without rupture ([facility]) 08/27/2018    follows with Dr Panchal    Acute bronchitis with COPD ([facility]) 05/27/2017    Arthritis     COPD (chronic obstructive pulmonary disease) ([facility])     Decreased dorsalis pedis pulse 11/04/2020    Depression with anxiety     Emphysema of lung ([facility])     Encounter for antineoplastic immunotherapy     History of deep vein thrombosis 11/04/2020    Hyperlipidemia     Infrarenal abdominal aortic aneurysm (AAA) without rupture ([facility]) 12/07/2023    3.1 x 3.2 cm, CT scan, January 2023    Late effect of radiation 09/06/2023    Lung cancer ([facility]) 04/11/2016    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 ENDOSCOPY    CERVICAL FUSION  2015    DENTAL SURGERY 
VIGNESH Conner MD at [facility] OR    PAIN MANAGEMENT PROCEDURE Right 4/25/2022    RIGHT LUMBAR RADIOFREQUENCY ABLATION L5 AND S1 performed by Garfield Conner MD at [facility] OR    ROTATOR CUFF REPAIR Right     SINUS SURGERY      TRACHEOSTOMY  05/24/2017    # 6 Elsa (disposable)    TRACHEOSTOMY      UPPER GASTROINTESTINAL ENDOSCOPY N/A 9/13/2023    EGD ESOPHAGOGASTRODUODENOSCOPY/ POSSIBLE DILATATION performed by Kavon Villagomez DO at [facility] ENDOSCOPY    UPPER GASTROINTESTINAL ENDOSCOPY N/A 12/11/2023    EGD WITH DILATION performed by Kavon Villagomez DO at [facility] ENDOSCOPY    UPPER GASTROINTESTINAL ENDOSCOPY N/A 1/11/2024    EGD WITH DILATION AND PEG TUBE PLACEMENT performed by Kavon Villagomez DO at [facility] ENDOSCOPY       Family History  Family History   Problem Relation Age of Onset    Cancer Mother         breast cancer    Cancer Father         prostate cancer    Diabetes Father        Social History    TOBACCO:   reports that he has been smoking cigarettes. He started smoking about 52 years ago. He has a 26.2 pack-year smoking history. He has never used smokeless tobacco.  ETOH:   reports that he does not currently use alcohol after a past usage of about 1.0 standard drink of alcohol per week.    Home Medications  Prior to Admission medications    Medication Sig Start Date End Date Taking? Authorizing Provider   chlorhexidine (PERIDEX) 0.12 % solution RINSE AND SPIT 1/2 OUNCE TWICE A DAY AFTER BREAKFAST AND AT BEDTIME AFTER BRUSHING AND FLOSSING 4/28/24   Provider, MD Nataliia   oxyCODONE HCl (OXY-IR) 10 MG immediate release tablet Take 1 tablet by mouth every 4 hours as needed for Pain for up to 30 days. Intended supply: 30 days Max Daily Amount: 60 mg 4/30/24 5/30/24  Elder Martínez APRN - CNP   DULoxetine (CYMBALTA) 30 MG extended release capsule Take 1 capsule by mouth 2 times daily take 1 capsule by mouth twice a day 4/15/24   Elder Martínez APRN - CNP   revefenacin (YUPELRI) 175 MCG/3ML SOLN INHALE 3 MLS

## 2024-05-20 NOTE — ACP (ADVANCE CARE PLANNING)
Advance Care Planning   Healthcare Decision Maker:    Primary Decision Maker: Casie Guajardo - [facility] - [phone]    Click here to complete Healthcare Decision Makers including selection of the Healthcare Decision Maker Relationship (ie \"Primary\").  Today we documented Decision Maker(s) consistent with Legal Next of Kin hierarchy.

## 2024-05-21 VITALS
BODY MASS INDEX: 21.83 KG/M2 | WEIGHT: 156.53 LBS | SYSTOLIC BLOOD PRESSURE: 101 MMHG | DIASTOLIC BLOOD PRESSURE: 60 MMHG | HEART RATE: 72 BPM | OXYGEN SATURATION: 94 % | RESPIRATION RATE: 18 BRPM | TEMPERATURE: 98 F

## 2024-05-21 LAB
ANION GAP SERPL CALCULATED.3IONS-SCNC: 9 MMOL/L (ref 7–16)
BUN SERPL-MCNC: 24 MG/DL (ref 6–23)
CALCIUM SERPL-MCNC: 8.9 MG/DL (ref 8.6–10.2)
CHLORIDE SERPL-SCNC: 105 MMOL/L (ref 98–107)
CO2 SERPL-SCNC: 23 MMOL/L (ref 22–29)
CREAT SERPL-MCNC: 1 MG/DL (ref 0.7–1.2)
ERYTHROCYTE [DISTWIDTH] IN BLOOD BY AUTOMATED COUNT: 14.6 % (ref 11.5–15)
GFR, ESTIMATED: 86 ML/MIN/1.73M2
GLUCOSE SERPL-MCNC: 147 MG/DL (ref 74–99)
HCT VFR BLD AUTO: 37.2 % (ref 37–54)
HGB BLD-MCNC: 11.7 G/DL (ref 12.5–16.5)
MCH RBC QN AUTO: 26.3 PG (ref 26–35)
MCHC RBC AUTO-ENTMCNC: 31.5 G/DL (ref 32–34.5)
MCV RBC AUTO: 83.6 FL (ref 80–99.9)
MICROORGANISM SPEC CULT: NORMAL
PLATELET # BLD AUTO: 316 K/UL (ref 130–450)
PMV BLD AUTO: 10 FL (ref 7–12)
POTASSIUM SERPL-SCNC: 4.8 MMOL/L (ref 3.5–5)
RBC # BLD AUTO: 4.45 M/UL (ref 3.8–5.8)
SODIUM SERPL-SCNC: 137 MMOL/L (ref 132–146)
SPECIMEN DESCRIPTION: NORMAL
WBC OTHER # BLD: 15.5 K/UL (ref 4.5–11.5)

## 2024-05-21 PROCEDURE — 94640 AIRWAY INHALATION TREATMENT: CPT

## 2024-05-21 PROCEDURE — 80048 BASIC METABOLIC PNL TOTAL CA: CPT

## 2024-05-21 PROCEDURE — 99239 HOSP IP/OBS DSCHRG MGMT >30: CPT | Performed by: INTERNAL MEDICINE

## 2024-05-21 PROCEDURE — 36415 COLL VENOUS BLD VENIPUNCTURE: CPT

## 2024-05-21 PROCEDURE — 6360000002 HC RX W HCPCS: Performed by: STUDENT IN AN ORGANIZED HEALTH CARE EDUCATION/TRAINING PROGRAM

## 2024-05-21 PROCEDURE — 6370000000 HC RX 637 (ALT 250 FOR IP)

## 2024-05-21 PROCEDURE — 85027 COMPLETE CBC AUTOMATED: CPT

## 2024-05-21 PROCEDURE — 31502 CHANGE OF WINDPIPE AIRWAY: CPT

## 2024-05-21 PROCEDURE — 6370000000 HC RX 637 (ALT 250 FOR IP): Performed by: STUDENT IN AN ORGANIZED HEALTH CARE EDUCATION/TRAINING PROGRAM

## 2024-05-21 PROCEDURE — 6360000002 HC RX W HCPCS

## 2024-05-21 PROCEDURE — 2700000000 HC OXYGEN THERAPY PER DAY

## 2024-05-21 RX ORDER — DOXYCYCLINE HYCLATE 100 MG/1
100 CAPSULE ORAL EVERY 12 HOURS SCHEDULED
Qty: 12 CAPSULE | Refills: 0 | Status: SHIPPED | OUTPATIENT
Start: 2024-05-21 | End: 2024-05-27

## 2024-05-21 RX ORDER — CEFUROXIME AXETIL 500 MG/1
500 TABLET ORAL EVERY 12 HOURS SCHEDULED
Qty: 12 TABLET | Refills: 0 | Status: SHIPPED | OUTPATIENT
Start: 2024-05-21 | End: 2024-05-27

## 2024-05-21 RX ADMIN — METHYLPREDNISOLONE SODIUM SUCCINATE 40 MG: 40 INJECTION, POWDER, LYOPHILIZED, FOR SOLUTION INTRAMUSCULAR; INTRAVENOUS at 09:02

## 2024-05-21 RX ADMIN — DULOXETINE HYDROCHLORIDE 30 MG: 30 CAPSULE, DELAYED RELEASE ORAL at 08:10

## 2024-05-21 RX ADMIN — IPRATROPIUM BROMIDE AND ALBUTEROL SULFATE 1 DOSE: 2.5; .5 SOLUTION RESPIRATORY (INHALATION) at 11:44

## 2024-05-21 RX ADMIN — ARFORMOTEROL TARTRATE 15 MCG: 15 SOLUTION RESPIRATORY (INHALATION) at 07:54

## 2024-05-21 RX ADMIN — PREGABALIN 150 MG: 75 CAPSULE ORAL at 13:30

## 2024-05-21 RX ADMIN — LEVOTHYROXINE SODIUM 100 MCG: 100 TABLET ORAL at 06:23

## 2024-05-21 RX ADMIN — PREGABALIN 150 MG: 75 CAPSULE ORAL at 08:10

## 2024-05-21 RX ADMIN — ATORVASTATIN CALCIUM 20 MG: 20 TABLET, FILM COATED ORAL at 08:09

## 2024-05-21 RX ADMIN — APIXABAN 2.5 MG: 2.5 TABLET, FILM COATED ORAL at 08:09

## 2024-05-21 RX ADMIN — PANTOPRAZOLE SODIUM 40 MG: 40 TABLET, DELAYED RELEASE ORAL at 08:09

## 2024-05-21 RX ADMIN — IPRATROPIUM BROMIDE AND ALBUTEROL SULFATE 1 DOSE: 2.5; .5 SOLUTION RESPIRATORY (INHALATION) at 07:54

## 2024-05-21 RX ADMIN — CEFUROXIME AXETIL 500 MG: 500 TABLET ORAL at 08:09

## 2024-05-21 RX ADMIN — NYSTATIN 500000 UNITS: 100000 SUSPENSION ORAL at 08:10

## 2024-05-21 RX ADMIN — DOXYCYCLINE HYCLATE 100 MG: 100 CAPSULE ORAL at 08:19

## 2024-05-21 RX ADMIN — 0.12% CHLORHEXIDINE GLUCONATE 15 ML: 1.2 RINSE ORAL at 08:10

## 2024-05-21 RX ADMIN — MIDODRINE HYDROCHLORIDE 5 MG: 5 TABLET ORAL at 08:09

## 2024-05-21 RX ADMIN — BUDESONIDE INHALATION 500 MCG: 0.5 SUSPENSION RESPIRATORY (INHALATION) at 07:54

## 2024-05-21 RX ADMIN — NYSTATIN 500000 UNITS: 100000 SUSPENSION ORAL at 13:30

## 2024-05-21 NOTE — PROGRESS NOTES
Progress  Note  Chief Complaint   Patient presents with    Shortness of Breath     Hx of throat/lung CA, PE five years ago, has trach     Historical Issues:  Current Facility-Administered Medications   Medication Dose Route Frequency Provider Last Rate Last Admin    ipratropium 0.5 mg-albuterol 2.5 mg (DUONEB) nebulizer solution 1 Dose  1 Dose Inhalation Q4H WA RT Michelle Yan APRN - CNP   1 Dose at 05/20/24 1054    apixaban (ELIQUIS) tablet 2.5 mg  2.5 mg Oral BID Vladimir Banks MD        methylPREDNISolone sodium succ (SOLU-MEDROL) injection 40 mg  40 mg IntraVENous Q12H Michelle Yan APRN - GERONIMO        doxycycline hyclate (VIBRAMYCIN) capsule 100 mg  100 mg Oral 2 times per day Michelle Yan APRN - CNP        cefUROXime (CEFTIN) tablet 500 mg  500 mg Oral 2 times per day Michelle Yan, BG - CNP        albuterol (ACCUNEB) nebulizer solution 0.63 mg  1 ampule Nebulization Q6H PRN Vladimir Banks MD        budesonide (PULMICORT) nebulizer suspension 500 mcg  500 mcg Nebulization BID RT Vladimir Banks MD   500 mcg at 05/20/24 0816    chlorhexidine (PERIDEX) 0.12 % solution 15 mL  15 mL Mouth/Throat BID Vladimir Banks MD   15 mL at 05/20/24 0824    Vitamin D (CHOLECALCIFEROL) tablet 5,000 Units  5,000 Units Oral QPM Vladimir Banks MD   5,000 Units at 05/19/24 2110    DULoxetine (CYMBALTA) extended release capsule 30 mg  30 mg Oral BID Vladimir Banks MD   30 mg at 05/20/24 0823    arformoterol tartrate (BROVANA) nebulizer solution 15 mcg  15 mcg Nebulization BID RT Vladimir Banks MD   15 mcg at 05/20/24 0816    levothyroxine (SYNTHROID) tablet 100 mcg  100 mcg Oral QAM AC Vladimir Banks MD   100 mcg at 05/20/24 0630    midodrine (PROAMATINE) tablet 5 mg  5 mg Oral BID Vladimir Banks MD   5 mg at 05/20/24 0823    oxyCODONE (ROXICODONE) immediate release tablet 10 mg  10 mg Oral Q4H PRN Vladimir Banks MD   10 mg at 05/20/24 0821    pantoprazole (PROTONIX) tablet 40 mg  40 mg Oral BID Vladimir Banks MD   40 mg 
  SPEECH/LANGUAGE PATHOLOGY  CLINICAL ASSESSMENT OF SWALLOWING FUNCTION   and PLAN OF CARE    PATIENT NAME:  Alan Guajardo Jr.  (male)     MRN:  [id number]    :  1955  (68 y.o.)  STATUS:  Inpatient: Room 0603/0603-A    TODAY'S DATE:  2024  REFERRING PROVIDER:   Dr. Banks  SPECIFIC PROVIDER ORDER: speech language pathology (SLP) eval and treat Date of order:  24  REASON FOR REFERRAL: Assess swallow function    EVALUATING THERAPIST: María Camara, [place]                 RESULTS:    DYSPHAGIA DIAGNOSIS:   Clinical indicators of moderate oropharyngeal phase dysphagia     Patient with known hx of dysphagia and currently on minced and moist with nectar thick liquids at home. Patient with most recent MBSS on 24 which revealed silent aspiration of thin liquids. Patient endorsed occasional rapid cup sips that he felt resulted in penetration or aspiration. Patient with occasional latent cough that he attributed to phlegm currently. Unable to rule out aspiration at the bedside, and if aspiration is suspected, than an MBSS would be recommended.       DIET RECOMMENDATIONS:  Minced and moist consistency solids (IDDSI level 5) with  nectar consistency (mildly thick - IDDSI level 2) liquids     FEEDING RECOMMENDATIONS:     Assistance level:  No assistance needed      Compensatory strategies recommended: Thorough oral care to prevent colonization of oral bacteria   Upright in bed/ chair as tolerated Fully alert for all PO Slow rate of intake  SINGLE cup sips SMALL bites      Discussed recommendations with:  patient nurse in person    SPEECH THERAPY  PLAN OF CARE   The dysphagia POC is established based on physician order, dysphagia diagnosis and results of clinical assessment     Skilled SLP intervention for dysphagia management on acute care up to 5 x per week until goals met, pt plateaus in function and/or discharged from hospital    Conditions Requiring Skilled Therapeutic Intervention for 
4 Eyes Skin Assessment     NAME:  Alan Guajardo Jr.  YOB: 1955  MEDICAL RECORD NUMBER:  [id number]    The patient is being assessed for  Admission    I agree that at least one RN has performed a thorough Head to Toe Skin Assessment on the patient. ALL assessment sites listed below have been assessed.      Areas assessed by both nurses:    Head, Face, Ears, Shoulders, Back, Chest, Arms, Elbows, Hands, Sacrum. Buttock, Coccyx, Ischium, Legs. Feet and Heels, and Under Medical Devices         Does the Patient have a Wound? No noted wound(s)       Tye Prevention initiated by RN: Yes  Wound Care Orders initiated by RN: No    Pressure Injury (Stage 3,4, Unstageable, DTI, NWPT, and Complex wounds) if present, place Wound referral order by RN under : No    New Ostomies, if present place, Ostomy referral order under : No     Nurse 1 eSignature: Electronically signed by Fish Gutiérrez RN on 5/19/24 at 6:14 PM EDT    **SHARE this note so that the co-signing nurse can place an eSignature**    Nurse 2 eSignature: Electronically signed by SHANIKA SALGADO RN on 5/19/24 at 6:22 PM EDT   
CLINICAL PHARMACY NOTE: MEDS TO BEDS    Total # of Prescriptions Filled: 1   The following medications were delivered to the patient:  Nystatin susp     Additional Documentation:    
New consult sent to Dr. Cummings's answering service at this time.  
New consult sent to Dr. Parkinson at this time.  
Patient suctioned for a large amount of thick yellow secretions. Saline instilled and patient bagged via ambu bag with 100% oxygen.   
Pharmacy Consultation Note  (Antibiotic Dosing and Monitoring)        Note vancomycin discontinued. Clinical pharmacy will sign-off. Please re-consult if we can be of further assistance.    Thanks for this consult,  Yaima Tabor RPH, PharmD, BCPS  5/20/2024  2:10 PM   
Pharmacy Consultation Note  (Antibiotic Dosing and Monitoring)    Initial consult date: 05/19/2024  Consulting physician/provider: Vladimir Banks  Drug: Vancomycin  Indication: Pneumonia (CAP)    Age/  Gender Height Weight IBW  Allergy Information   68 y.o./male         Ideal body weight: 75.3 kg (166 lb 0.1 oz)   Augmentin [amoxicillin-pot clavulanate]      Renal Function:  Recent Labs     05/19/24  0933   BUN 11   CREATININE 1.0     No intake or output data in the 24 hours ending 05/19/24 1506    Vancomycin Monitoring:  Trough:  No results for input(s): \"VANCOTROUGH\" in the last 72 hours.  Random:  No results for input(s): \"VANCORANDOM\" in the last 72 hours.    Vancomycin Administration Times:  Recent vancomycin administrations                     vancomycin (VANCOCIN) 1,500 mg in sodium chloride 0.9 % 250 mL IVPB (mg) 1,500 mg New Bag 05/19/24 1306                    Assessment:  Patient is a 68 y.o. male who has been initiated on vancomycin  Estimated Creatinine Clearance: 74 mL/min (based on SCr of 1 mg/dL).  To dose vancomycin, pharmacy will be utilizing DocVue calculation software for goal AUC/NAVDEEP 400-600 mg/L-hr (predicted AUC/NAVDEEP = 566, Tr =16.6 mcg/mL)  Plan:  Will continue vancomycin 1000 mg IV every 12 hours  Will check vancomycin levels when appropriate  Will continue to monitor renal function   Pharmacy to follow      jorge l hernández RPH 5/19/2024 3:06 PM    RAJENDRA: [phone]  SEY: [phone]  SJW: [phone]    
SPIR[facility]UAL [facility] - [facility]   Encounter    Name: Alan Casandra Shafer                  Referral: Routine Visit    Sacraments  Anointed (Last Rites): No  Apostolic [place]: No  Confession: No  Communion: No     Assessment:  Patient had been discharged.      Intervention:  None.     Outcome:  None.    Plan:  None.      Electronically signed by Chaplain Lory, on 5/21/2024 at 7:55 PM.  Spiritual Care Department  [facility]  [phone]   
Inhalation Q4H WA RT    apixaban  2.5 mg Oral BID    methylPREDNISolone  40 mg IntraVENous Q12H    doxycycline hyclate  100 mg Oral 2 times per day    cefUROXime  500 mg Oral 2 times per day    budesonide  500 mcg Nebulization BID RT    chlorhexidine  15 mL Mouth/Throat BID    Vitamin D  5,000 Units Oral QPM    DULoxetine  30 mg Oral BID    arformoterol tartrate  15 mcg Nebulization BID RT    levothyroxine  100 mcg Oral QAM AC    midodrine  5 mg Oral BID    pantoprazole  40 mg Oral BID    pregabalin  150 mg Oral TID    atorvastatin  20 mg Oral Daily    tamsulosin  0.4 mg Oral QPM    nystatin  5 mL Oral 4x Daily       Physical Exam:  General Appearance: appears comfortable in no acute distress.   HEENT: Normocephalic atraumatic without obvious abnormality  #6 cuffless tracheostomy   Neck: Lips, mucosa, and tongue normal.  Supple, symmetrical, trachea midline, no adenopathy;thyroid:  no enlargement/tenderness/nodules or JVD.  Lung: Breath sounds CTA. Respirations   unlabored. Symmetrical expansion.  Heart: RRR, normal S1, S2. No MRG  Abdomen: Soft, NT, ND. BS present x 4 quadrants. No bruit or organomegaly. PEG, clamped   Extremities: Pedal pulses 2+ symmetric b/l.  Extremities normal, no cyanosis, clubbing, or edema.   Musculokeletal: No joint swelling, no muscle tenderness. ROM normal in all joints of extremities.   Neurologic: Mental status: Alert and Oriented X3 .    Pertinent/ New Labs and Imaging Studies     Imaging personally reviewed:  Pulmonary CTA 5/19/2024:  FINDINGS:  Pulmonary Arteries: Pulmonary arteries are adequately opacified for  evaluation.  No evidence of intraluminal filling defect to suggest pulmonary  embolism.  Main pulmonary artery is normal in caliber.     Mediastinum: No evidence of mediastinal lymphadenopathy.  Tracheostomy tube  in satisfactory position.  Ascending thoracic aorta measures 4 cm.  Coronary  artery calcification identified.  Cardiac chambers are within normal

## 2024-05-21 NOTE — FLOWSHEET NOTE
IV sites x 2 removed. Tolerated well. Complete discharge instructions given to patient and wife at bedside. Reviewed prescriptions needing picked up and locations listed. Reviewed completed discharge instructions. Both verbalized understanding. Appropriate for discharge at this time.

## 2024-05-21 NOTE — PLAN OF CARE
Problem: Discharge Planning  Goal: Discharge to home or other facility with appropriate resources  Outcome: Progressing  Flowsheets (Taken 5/20/2024 2000)  Discharge to home or other facility with appropriate resources:   Arrange for needed discharge resources and transportation as appropriate   Identify barriers to discharge with patient and caregiver     Problem: Safety - Adult  Goal: Free from fall injury  Outcome: Progressing    Problem: Pain  Goal: Verbalizes/displays adequate comfort level or baseline comfort level  Outcome: Progressing     Problem: Skin/Tissue Integrity  Goal: Absence of new skin breakdown  Description: 1.  Monitor for areas of redness and/or skin breakdown  2.  Assess vascular access sites hourly  3.  Every 4-6 hours minimum:  Change oxygen saturation probe site  4.  Every 4-6 hours:  If on nasal continuous positive airway pressure, respiratory therapy assess nares and determine need for appliance change or resting period.  Outcome: Progressing

## 2024-05-21 NOTE — DISCHARGE SUMMARY
Discharge Summary    Date: 5/21/2024  Patient Name: Alan Guajardo Jr.    YOB: 1955     Age: 68 y.o.    Admit Date: 5/19/2024  Discharge Date: 5/21/2024  Discharge Condition: Good    Admission Diagnosis  Pleural effusion [J90];Hypoxia [R09.02];Pneumonia of right lung due to infectious organism, unspecified part of lung [J18.9];Pneumonia of both lungs due to infectious organism, unspecified part of lung [J18.9]      Discharge Diagnosis  Principal Problem:    Pneumonia of right lung due to infectious organism, unspecified part of lung  Resolved Problems:    * No resolved hospital problems. *      Hospital Stay  Narrative of Hospital Course:  Patient presented with acute hypoxemic respiratory failure brought on by thickened secretions and pneumonia.  He was septic on admission.  He has been well treated with antibiotics, steroids, aerosols and suctioning.  1. Acute hypoxic respiratory failure  2. Aspiration pneumonitis  3. Centrilobular emphysema  4. COPD exacerbation  5. Tracheostomy dependence since 2017  6. Recent COVID-pneumonia, January 2024  7. History of lung adenocarcinoma, S/P palliative radiation therapy and Chemotherapy   8. History of laryngeal squamous cell carcinoma  9. Glottic stenosis  10. Moderate oropharyngeal dysphagia  11. Nicotine dependence  12. Immunocompromised host    He will be discharged today on oral antibiotics oral steroids and his regular aerosol therapies.      Consultants:  IP CONSULT TO PULMONOLOGY  PHARMACY TO DOSE VANCOMYCIN  IP CONSULT TO ONCOLOGY    Surgeries/procedures Performed:      Treatments:    Antibiotics, Analgesia and Steroids    Ceftriaxone, Prednisone and Solu-Medrol    Discharge Plan/Disposition:  Home    Hospital/Incidental Findings Requiring Follow Up:    Patient Instructions:    Diet: Regular Diet    Activity:Activiity as Tolerated, No Driving for Today  For number of days (if applicable):      Other Instructions:    Provider Follow-Up:   No

## 2024-05-22 ENCOUNTER — CARE COORDINATION (OUTPATIENT)
Dept: CARE COORDINATION | Age: 69
End: 2024-05-22

## 2024-05-22 NOTE — CARE COORDINATION
Non MH PCP Care Transitions Note    Non MH PCP Initial Call - Call within 2 business days of discharge: Yes     Outreach Attempts:   1st attempt to reach the patient for an initial non [facility] PCP Care Transition call post hospital discharge. HIPAA compliant message left with CTN's contact information requesting return phone call.     Will attempt outreach again.    Patient: Alan Guajardo Jr.    Patient : 1955   MRN: [id number]    Reason for Admission: Pneumonia of both lungs due to infectious organism  Discharge Date: 24  RURS: Readmission Risk              Risk of Unplanned Readmission:  14          Last Discharge Facility       Date Complaint Diagnosis Description Type Department Provider    24 Shortness of Breath Pneumonia of both lungs due to infectious organism, unspecified part of lung ... ED to Hosp-Admission (Discharged) (ADMITTED) Tal Solorzano MD; Evon Floyd...            Was this an external facility discharge? No    Follow Up Appointment:     Future Appointments         Provider Specialty Dept Phone    2024 1:20 PM Chuy Francois APRN - CNS Pulmonology [phone]    2024 2:30 PM SCHEDULE, [facility] PALLIATIVE CARE PROVIDER Palliative Care [phone]    2025 1:15 PM Maco Panchal MD Vascular Surgery [phone]            Plan for follow-up on next business day.      Magaly Patel RN

## 2024-05-23 ENCOUNTER — CARE COORDINATION (OUTPATIENT)
Dept: CARE COORDINATION | Age: 69
End: 2024-05-23

## 2024-05-23 NOTE — CARE COORDINATION
Non MH PCP Care Transitions Note    Non MH PCP Initial Call - Call within 2 business days of discharge: Yes     Outreach Attempts:   2nd attempt to reach the patient for initial Care Transition call post hospital discharge. HIPAA compliant message left with CTN's contact information requesting return phone call.     No further outreaches will be attempted.    If no return call by the end of today, CTN will  sign off and resolve  CT program.    Patient: Alan Guajardo Jr.    Patient : 1955   MRN: [id number]    Reason for Admission: Pneumonia of both lungs due to infectious organism  Discharge Date: 24  RURS: Readmission Risk              Risk of Unplanned Readmission:  14          Last Discharge Facility       Date Complaint Diagnosis Description Type Department Provider    24 Shortness of Breath Pneumonia of both lungs due to infectious organism, unspecified part of lung ... ED to Hosp-Admission (Discharged) (ADMITTED) NATE NguyenW Tal Salas MD; Evon Floyd...            Was this an external facility discharge? No    Follow Up Appointment:     Future Appointments         Provider Specialty Dept Phone    2024 1:20 PM Chuy Francois APRN - CNS Pulmonology [phone]    2024 2:30 PM SCHEDULE, [facility] PALLIATIVE CARE PROVIDER Palliative Care [phone]    2025 1:15 PM Maco Panchal MD Vascular Surgery [phone]            Magaly Patel RN

## 2024-05-24 LAB
MICROORGANISM SPEC CULT: NORMAL
MICROORGANISM SPEC CULT: NORMAL
SERVICE CMNT-IMP: NORMAL
SERVICE CMNT-IMP: NORMAL
SPECIMEN DESCRIPTION: NORMAL
SPECIMEN DESCRIPTION: NORMAL

## 2024-06-24 DIAGNOSIS — Z51.5 ENCOUNTER FOR PALLIATIVE CARE: ICD-10-CM

## 2024-06-24 DIAGNOSIS — G89.3 CHRONIC PAIN DUE TO NEOPLASM: ICD-10-CM

## 2024-06-24 DIAGNOSIS — C34.82 MALIGNANT NEOPLASM OF OVERLAPPING SITES OF LEFT LUNG (HCC): ICD-10-CM

## 2024-06-24 RX ORDER — OXYCODONE HYDROCHLORIDE 10 MG/1
10 TABLET ORAL EVERY 4 HOURS PRN
Qty: 180 TABLET | Refills: 0 | Status: SHIPPED | OUTPATIENT
Start: 2024-06-24 | End: 2024-07-24

## 2024-06-24 NOTE — TELEPHONE ENCOUNTER
Patient Alan's wife Casie called for refill oxycodone (OXY-IR) 10mg. [facility]. Next appointment 8-5-2024

## 2024-07-19 ENCOUNTER — APPOINTMENT (OUTPATIENT)
Dept: CT IMAGING | Age: 69
End: 2024-07-19
Attending: STUDENT IN AN ORGANIZED HEALTH CARE EDUCATION/TRAINING PROGRAM
Payer: MEDICARE

## 2024-07-19 ENCOUNTER — HOSPITAL ENCOUNTER (EMERGENCY)
Age: 69
Discharge: ANOTHER HOSPITAL | End: 2024-07-19
Attending: STUDENT IN AN ORGANIZED HEALTH CARE EDUCATION/TRAINING PROGRAM
Payer: MEDICARE

## 2024-07-19 VITALS
HEART RATE: 65 BPM | BODY MASS INDEX: 23.29 KG/M2 | SYSTOLIC BLOOD PRESSURE: 141 MMHG | DIASTOLIC BLOOD PRESSURE: 83 MMHG | WEIGHT: 167 LBS | OXYGEN SATURATION: 99 % | RESPIRATION RATE: 18 BRPM | TEMPERATURE: 97.7 F

## 2024-07-19 DIAGNOSIS — K22.3 ESOPHAGEAL PERFORATION: ICD-10-CM

## 2024-07-19 DIAGNOSIS — R93.89 ABNORMAL COMPUTED TOMOGRAPHY OF SOFT TISSUE OF NECK: Primary | ICD-10-CM

## 2024-07-19 DIAGNOSIS — Z93.0 TRACHEOSTOMY IN PLACE (HCC): ICD-10-CM

## 2024-07-19 PROBLEM — E78.00 HYPERCHOLESTEROLEMIA: Status: ACTIVE | Noted: 2020-05-13

## 2024-07-19 PROBLEM — J69.0 ASPIRATION PNEUMONITIS (HCC): Status: ACTIVE | Noted: 2024-07-19

## 2024-07-19 PROBLEM — Z93.1 PRESENCE OF GASTROSTOMY (HCC): Status: ACTIVE | Noted: 2024-07-19

## 2024-07-19 PROBLEM — C34.92 ADENOCARCINOMA OF LEFT LUNG (HCC): Status: ACTIVE | Noted: 2024-07-19

## 2024-07-19 PROBLEM — R13.12 OROPHARYNGEAL DYSPHAGIA: Status: ACTIVE | Noted: 2024-07-19

## 2024-07-19 PROBLEM — R63.4 WEIGHT LOSS: Status: ACTIVE | Noted: 2024-07-19

## 2024-07-19 PROBLEM — Z85.21 HISTORY OF MALIGNANT NEOPLASM OF LARYNX: Status: ACTIVE | Noted: 2024-07-19

## 2024-07-19 LAB
ANION GAP SERPL CALCULATED.3IONS-SCNC: 10 MMOL/L (ref 7–16)
BASOPHILS # BLD: 0.05 K/UL (ref 0–0.2)
BASOPHILS NFR BLD: 1 % (ref 0–2)
BUN SERPL-MCNC: 11 MG/DL (ref 6–23)
CALCIUM SERPL-MCNC: 8.9 MG/DL (ref 8.6–10.2)
CHLORIDE SERPL-SCNC: 107 MMOL/L (ref 98–107)
CO2 SERPL-SCNC: 26 MMOL/L (ref 22–29)
CREAT SERPL-MCNC: 1.1 MG/DL (ref 0.7–1.2)
EOSINOPHIL # BLD: 0.2 K/UL (ref 0.05–0.5)
EOSINOPHILS RELATIVE PERCENT: 2 % (ref 0–6)
ERYTHROCYTE [DISTWIDTH] IN BLOOD BY AUTOMATED COUNT: 16.1 % (ref 11.5–15)
GFR, ESTIMATED: 73 ML/MIN/1.73M2
GLUCOSE SERPL-MCNC: 131 MG/DL (ref 74–99)
HCT VFR BLD AUTO: 37.8 % (ref 37–54)
HGB BLD-MCNC: 11.9 G/DL (ref 12.5–16.5)
IMM GRANULOCYTES # BLD AUTO: 0.05 K/UL (ref 0–0.58)
IMM GRANULOCYTES NFR BLD: 1 % (ref 0–5)
LYMPHOCYTES NFR BLD: 0.96 K/UL (ref 1.5–4)
LYMPHOCYTES RELATIVE PERCENT: 11 % (ref 20–42)
MCH RBC QN AUTO: 26.3 PG (ref 26–35)
MCHC RBC AUTO-ENTMCNC: 31.5 G/DL (ref 32–34.5)
MCV RBC AUTO: 83.4 FL (ref 80–99.9)
MONOCYTES NFR BLD: 0.47 K/UL (ref 0.1–0.95)
MONOCYTES NFR BLD: 5 % (ref 2–12)
NEUTROPHILS NFR BLD: 81 % (ref 43–80)
NEUTS SEG NFR BLD: 7.45 K/UL (ref 1.8–7.3)
PLATELET # BLD AUTO: 327 K/UL (ref 130–450)
PMV BLD AUTO: 9.3 FL (ref 7–12)
POTASSIUM SERPL-SCNC: 3.8 MMOL/L (ref 3.5–5)
RBC # BLD AUTO: 4.53 M/UL (ref 3.8–5.8)
SODIUM SERPL-SCNC: 143 MMOL/L (ref 132–146)
WBC OTHER # BLD: 9.2 K/UL (ref 4.5–11.5)

## 2024-07-19 PROCEDURE — 6360000004 HC RX CONTRAST MEDICATION: Performed by: STUDENT IN AN ORGANIZED HEALTH CARE EDUCATION/TRAINING PROGRAM

## 2024-07-19 PROCEDURE — 99285 EMERGENCY DEPT VISIT HI MDM: CPT

## 2024-07-19 PROCEDURE — 85025 COMPLETE CBC W/AUTO DIFF WBC: CPT

## 2024-07-19 PROCEDURE — 2700000000 HC OXYGEN THERAPY PER DAY

## 2024-07-19 PROCEDURE — 72125 CT NECK SPINE W/O DYE: CPT

## 2024-07-19 PROCEDURE — 80048 BASIC METABOLIC PNL TOTAL CA: CPT

## 2024-07-19 PROCEDURE — 70491 CT SOFT TISSUE NECK W/DYE: CPT

## 2024-07-19 RX ADMIN — IOPAMIDOL 75 ML: 755 INJECTION, SOLUTION INTRAVENOUS at 13:01

## 2024-07-19 ASSESSMENT — PAIN - FUNCTIONAL ASSESSMENT: PAIN_FUNCTIONAL_ASSESSMENT: 0-10

## 2024-07-19 ASSESSMENT — PAIN DESCRIPTION - DESCRIPTORS: DESCRIPTORS: ACHING

## 2024-07-19 ASSESSMENT — ENCOUNTER SYMPTOMS
SHORTNESS OF BREATH: 0
TROUBLE SWALLOWING: 1
VOICE CHANGE: 1

## 2024-07-19 ASSESSMENT — PAIN DESCRIPTION - LOCATION: LOCATION: NECK;THROAT

## 2024-07-19 ASSESSMENT — PAIN SCALES - GENERAL: PAINLEVEL_OUTOF10: 4

## 2024-07-19 NOTE — CONSULTS
OTOLARYNGOLOGY  CONSULT NOTE  7/19/2024    Physician Consulted: Dr. Cadet  Reason for Consult: Exposed hardware in esophagus   Referring Physician: Dr. Bradley     HPI  Alan Guajardo JrSima is a 68 y.o. male who ENT was consulted for evaluation of exposed hardware in the esophagus. Patient is known to our ENT service was seen most recently in April 2024. Patient with history of lung cancer and laryngeal cancer s/p tracheostomy in 2017 s/p multiple revision for vocal cord paralysis. He was following with ENT at [facility], but apparently their physician moved overseas. He is currently using PEG for most of his nutrition. Patient underwent EGD at [facility] earlier this morning with plans for esophageal dilation, however this was aborted when exposed hardware was appreciated along the posterior aspect of the esophagus. Patient currently denies any fevers/chills, chest pain, pain with swallowing or trouble breathing.     Review of Systems   Constitutional:  Negative for chills and fever.   HENT:  Positive for trouble swallowing and voice change.    Respiratory:  Negative for shortness of breath.        Past Medical History:   Diagnosis Date    Abdominal aortic aneurysm without rupture ([facility]) 08/27/2018    follows with Dr Panchal    Acute bronchitis with COPD ([facility]) 05/27/2017    Arthritis     COPD (chronic obstructive pulmonary disease) ([facility])     Decreased dorsalis pedis pulse 11/04/2020    Depression with anxiety     Emphysema of lung ([facility])     Encounter for antineoplastic immunotherapy     History of deep vein thrombosis 11/04/2020    Hyperlipidemia     Infrarenal abdominal aortic aneurysm (AAA) without rupture ([facility]) 12/07/2023    3.1 x 3.2 cm, CT scan, January 2023    Late effect of radiation 09/06/2023    Lung cancer ([facility]) 04/11/2016    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

## 2024-07-19 NOTE — CONSULTS
I discussed the case with the emergency room physician.  It was reported that the hardware was visualized through the scope demonstrating that there is a perforation.  I believe this needs to be treated and a facility that can deal with esophageal perforation that may require flaps.  The neurosurgery role is very limited to just removing the hardware which is the simpler part of the procedure but the main issue is the repair and for that purpose and knowing that this is not available here the patient needs to be transferred to a facility that can treat such an esophageal injury

## 2024-07-20 NOTE — ED PROVIDER NOTES
(SYNTHROID) 100 MCG tablet Take 1 tablet by mouth every morning (before breakfast)Historical Med      simvastatin (ZOCOR) 40 MG tablet Take 1 tablet by mouth every eveningHistorical Med      tamsulosin (FLOMAX) 0.4 MG capsule Take 1 capsule by mouth every eveningHistorical Med      apixaban (ELIQUIS) 2.5 MG TABS tablet Take 1 tablet by mouth 2 times dailyHistorical Med      midodrine (PROAMATINE) 5 MG tablet Take 1 tablet by mouth 2 times daily Taking three times a dayHistorical Med      pembrolizumab (KEYTRUDA) 100 MG/4ML SOLN Infuse 8 mLs intravenously every 21 days LAST DOSE: 12/21/2023  NEXT DOSE DUE: 01/11/2024Historical Med      predniSONE (DELTASONE) 10 MG tablet Take 1 tablet by mouth every eveningHistorical Med      Cholecalciferol (VITAMIN D3) 5000 units TABS Take 1 tablet by mouth every eveningHistorical Med      oxyCODONE HCl (OXY-IR) 10 MG immediate release tablet Take 1 tablet by mouth every 4 hours as needed for Pain for up to 30 days. Intended supply: 30 days Max Daily Amount: 60 mg, Disp-180 tablet, R-0Normal      pregabalin (LYRICA) 150 MG capsule Take 1 capsule by mouth 3 times daily for 180 days., Disp-270 capsule, R-1Normal             ALLERGIES     Augmentin [amoxicillin-pot clavulanate]    FAMILYHISTORY       Family History   Problem Relation Age of Onset    Cancer Mother         breast cancer    Cancer Father         prostate cancer    Diabetes Father         SOCIAL HISTORY       Social History     Tobacco Use    Smoking status: Former     Current packs/day: 0.50     Average packs/day: 0.5 packs/day for 52.5 years (26.3 ttl pk-yrs)     Types: Cigarettes     Start date: 1972    Smokeless tobacco: Never    Tobacco comments:     Pt quit in 9/26/2018 & started back 2/2020 smoking .5 ppd          Pt quit a week ago 6/3/24   Vaping Use    Vaping Use: Never used    Passive vaping exposure: Yes   Substance Use Topics    Alcohol use: Not Currently     Alcohol/week: 1.0 standard drink of alcohol      Types: 1 Cans of beer per week     Comment: beer once in awhile    Drug use: No       SCREENINGS        Julienne Coma Scale  Eye Opening: Spontaneous  Best Verbal Response: Oriented  Best Motor Response: Obeys commands  Julienne Coma Scale Score: 15                CIWA Assessment  BP: (!) 141/83  Pulse: 65           PHYSICAL EXAM  1 or more Elements     ED Triage Vitals [07/19/24 1101]   BP Temp Temp src Pulse Respirations SpO2 Height Weight - Scale   116/80 97.7 °F (36.5 °C) -- 74 18 90 % -- 75.8 kg (167 lb)     Constitutional/General: Alert and oriented x3  Head: Normocephalic and atraumatic  Eyes: PERRL, EOMI, conjunctiva normal, sclera non icteric  ENT:  Oropharynx clear, 6 uncuffed trach in place, no bleeding or erythema  Neck: Supple, full ROM, no stridor, no meningeal signs  Respiratory: Lungs clear to auscultation bilaterally, no wheezes, rales, or rhonchi. Not in respiratory distress  Cardiovascular:  Regular rate. Regular rhythm.   Chest: No chest wall tenderness  GI:  Abdomen Soft, Non tender, Non distended.  PEG tube in place  Musculoskeletal: Moves all extremities x 4.   Integument: skin warm and dry.   Neurologic: GCS 15, no focal deficits, symmetric strength 5/5 in the upper and lower extremities bilaterally  Psychiatric: Normal Affect    DIAGNOSTIC RESULTS   LABS:    Labs Reviewed   CBC WITH AUTO DIFFERENTIAL - Abnormal; Notable for the following components:       Result Value    Hemoglobin 11.9 (*)     MCHC 31.5 (*)     RDW 16.1 (*)     Neutrophils % 81 (*)     Lymphocytes % 11 (*)     Neutrophils Absolute 7.45 (*)     Lymphocytes Absolute 0.96 (*)     All other components within normal limits   BASIC METABOLIC PANEL W/ REFLEX TO MG FOR LOW K - Abnormal; Notable for the following components:    Glucose 131 (*)     All other components within normal limits       As interpreted by me, the above displayed labs are abnormal. All other labs obtained during this visit were within normal range or not

## 2024-07-24 LAB
EKG ATRIAL RATE: 59 BPM
EKG P AXIS: 32 DEGREES
EKG P-R INTERVAL: 172 MS
EKG Q-T INTERVAL: 418 MS
EKG QRS DURATION: 110 MS
EKG QTC CALCULATION (BAZETT): 413 MS
EKG R AXIS: -13 DEGREES
EKG T AXIS: 40 DEGREES
EKG VENTRICULAR RATE: 59 BPM

## 2024-08-05 ENCOUNTER — OFFICE VISIT (OUTPATIENT)
Dept: PALLATIVE CARE | Age: 69
End: 2024-08-05
Payer: MEDICARE

## 2024-08-05 VITALS
OXYGEN SATURATION: 97 % | DIASTOLIC BLOOD PRESSURE: 60 MMHG | SYSTOLIC BLOOD PRESSURE: 88 MMHG | TEMPERATURE: 98.4 F | WEIGHT: 171 LBS | HEART RATE: 76 BPM | BODY MASS INDEX: 23.85 KG/M2

## 2024-08-05 DIAGNOSIS — Z51.5 PALLIATIVE CARE BY SPECIALIST: Primary | ICD-10-CM

## 2024-08-05 PROCEDURE — 99211 OFF/OP EST MAY X REQ PHY/QHP: CPT | Performed by: NURSE PRACTITIONER

## 2024-08-05 PROCEDURE — 99212 OFFICE O/P EST SF 10 MIN: CPT | Performed by: NURSE PRACTITIONER

## 2024-08-05 PROCEDURE — 1123F ACP DISCUSS/DSCN MKR DOCD: CPT | Performed by: NURSE PRACTITIONER

## 2024-08-05 NOTE — PROGRESS NOTES
Department of Palliative Medicine  Provider: BG Marshall - CNP         Chief Complaint: Alan Guajardo Jr. is a 68 y.o. male with chief complaint of pain    HPI  Mr. 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    Assessment/Plan     Lung cancer   - Stage 4 lung cancer   - Follows with Dr. Cummings   - Received radiation therapy, along with chemotherapy    - On Keytruda    Chronic Pain due to Neoplasm/chronic lumbar radiculopathy:              -  Oxycodone at 10 mg PO Q4PRN                -  Lyrica 150 mg TID for neuropathy, hand tingling sensation              -  S/p sacroiliac joint injections with Dr. Conner   -  S/p Right lumbar radiofrequency ablation L5&S1 with Dr. Conner     Constipation:              - Senna-S 2 tabs BID    - Glycolax daily     GERD:   -Protonix 40 mg BID   -Pepcid 20 mg daily    Weight loss:   -Encouraged tube feeds & protein supplements    Follow Up: 3 months  Encouraged to call with any questions, concerns, needs, or changes in symptoms.    Subjective:   Humphrey presents today accompanied by his wife.  He continues to have pain but it is well-managed with his current medication regimen of oxycodone 10 mg.  He takes it about 3-4 times a day along with Lyrica.  He recently had an EEG demonstrating presence of cervical hardware through the pharynx/hypopharynx.  In the near future the patient will have a total laryngopharyngectomy with flap reconstruction at [facility].  He has been receiving nutrition via PEG tube.  He occasionally will have GERD symptoms but is not utilizing Reglan and Carafate at this time.  He states that he is going to be following up with Dr. Conner for joint injections prior to surgery.  His constipation is well-managed with Senokot and

## 2024-08-15 DIAGNOSIS — Z51.5 PALLIATIVE CARE BY SPECIALIST: ICD-10-CM

## 2024-08-15 DIAGNOSIS — C34.82 MALIGNANT NEOPLASM OF OVERLAPPING SITES OF LEFT LUNG (HCC): ICD-10-CM

## 2024-08-15 DIAGNOSIS — Z51.5 ENCOUNTER FOR PALLIATIVE CARE: ICD-10-CM

## 2024-08-15 DIAGNOSIS — G89.3 CHRONIC PAIN DUE TO NEOPLASM: ICD-10-CM

## 2024-08-15 RX ORDER — PREGABALIN 150 MG/1
150 CAPSULE ORAL 3 TIMES DAILY
Qty: 270 CAPSULE | Refills: 1 | Status: SHIPPED | OUTPATIENT
Start: 2024-08-15 | End: 2025-02-11

## 2024-08-15 RX ORDER — OXYCODONE HYDROCHLORIDE 10 MG/1
10 TABLET ORAL EVERY 4 HOURS PRN
Qty: 180 TABLET | Refills: 0 | Status: SHIPPED | OUTPATIENT
Start: 2024-08-15 | End: 2024-09-14

## 2024-08-15 NOTE — TELEPHONE ENCOUNTER
Patient Alan's wife Casie called for refill lyrica 150mg and oxycodone HCI (OXY-IR) 10mg. [facility] [address]. Pharmacy verified on perfect serve. Routed call to A Mauricio, NP

## 2024-09-06 NOTE — ED NOTES
Chief Complaint  Sleeping Problem, Snoring, Daytime Sleepiness, Dry Mouth, and Fatigue    Subjective     History of Present Illness:  Vidal Woodson is a 34 y.o. male with hypertension, NAFLD, and central apnea.  The patient is referred by Franklin Gutierrez MD with primary care.  Patient has a history of sleep apnea and recently noted his last study was over 10 years ago.  He has had difficulty with fatigue. He uses a PAP device and it is about 2 years old. He was followed at [facility]. He never feels rested. He is a . He has been trying breathing techniques the help getting to sleep.  Review of self-reported questionnaire notes symptoms including the aforementioned snoring, and witnessed apnea.  Additionally, the patient's symptoms include daytime sleepiness and fatigue, nonrestorative sleep, dry mouth, sore throat, grinding teeth, difficulty falling asleep which have been ongoing for 1-2 years.  Patient last had a sleep study in 2011 at [facility].  He typically goes to bed at 9 PM waking at 4 AM on weekdays, and 5 AM on weekends.  Patient estimates an average of 6 hours of sleep per night and it takes approximately 1 hour for him to get to sleep.  He does take 1 hour naps.  Patient uses chewing tobacco, he drinks alcohol 2-4 times per month, and has used recreational drugs.  He drinks 3 cups regular coffee, and 1 regular cola daily.    Further details are as follows:    [place] Scale is (out of 24): Total score: 8     Estimated average amount of sleep per night: 6-7 hours, 2 hours on shift.  Number of times he wakes up at night: 0 while at home, and 6-7 times per night on shift.  Difficulty falling back asleep: yes  It usually takes 60 minutes to go to sleep.  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    Drowsiness/Sleepiness:  He exhibits the following:  excessive daytime sleepiness  excessive daytime  Pt states has a blood clot in the L arm and it shouldn't be used for access or blood pressures      Raymond Cary RN  09/09/20 3207 fatigue  takes scheduled naps during the day    Snoring/Breathing:  He exhibits the following:  quits breathing at night, awakens with dry mouth, and sore throat when waking up in the morning    Head Injury:  He exhibits the following:  No    Reflux:  He describes the following:  Not waking    Narcolepsy:  He exhibits the following:  none    RLS/PLMs:  He describes the following:  none    Insomnia:  He describes the following:  problems initiating sleep at night  frequent awakenings    Parasomnia:  He exhibits the following:  none    Weight:       09/11/24  1258   Weight: (!) 161 kg (354 lb 3.2 oz)      Weight change in the last year:  loss: 30 lbs    The patient's relevant past medical, surgical, family, and social history reviewed and updated in Epic as appropriate.    Review of Systems   Constitutional:  Positive for fatigue.   HENT:  Positive for congestion, sinus pressure, sore throat and tinnitus.    Eyes: Negative.    Respiratory:  Positive for apnea.    Cardiovascular: Negative.    Gastrointestinal: Negative.    Endocrine: Negative.    Genitourinary: Negative.    Musculoskeletal: Negative.    Skin: Negative.    Allergic/Immunologic: Negative.    Neurological: Negative.    Hematological: Negative.    Psychiatric/Behavioral: Negative.     All other systems reviewed and are negative.      PMH:    Past Medical History:   Diagnosis Date    Dyspnea on exertion     History of palpitations     Other chest pain     Primary central sleep apnea     Primary hypertension     Vision problem      Past Surgical History:   Procedure Laterality Date    KNEE SURGERY      KNEE SURGERY Right 01/01/2008    & 2020       No Known Allergies    MEDS:  Prior to Admission medications    Medication Sig Start Date End Date Taking? Authorizing Provider   albuterol sulfate  (90 Base) MCG/ACT inhaler Inhale 2 puffs Every 4 (Four) Hours As Needed for Wheezing (15 min prior to exercise). 2/29/24   Franklin Gutierrez MD   escitalopram  "(Lexapro) 10 MG tablet Take 1 tablet by mouth Daily. 1/30/24   Franklin Gutierrez MD   fluticasone (FLONASE) 50 MCG/ACT nasal spray 2 sprays into the nostril(s) as directed by provider Daily. 3/12/24   Franklin Gutierrez MD   glucose blood test strip Use as instructed 5/1/23   Franklin Gutierrez MD   glucose monitor monitoring kit 1 each As Needed (hypoglyccemia). 5/1/23   Franklin Gutierrez MD   lisinopril (PRINIVIL,ZESTRIL) 20 MG tablet Take 0.5 tablets by mouth Daily. 3/12/24   Franklin Gutierrez MD   loratadine (Claritin) 10 MG tablet Take 1 tablet by mouth Daily. 3/12/24   Franklin Gutierrez MD   montelukast (Singulair) 10 MG tablet Take 1 tablet by mouth Every Night. 6/13/24   Franklin Gutierrez MD       FH:  Family History   Problem Relation Age of Onset    Drug abuse Mother     Stroke Maternal Grandmother     Diabetes Maternal Grandfather        Objective   Vital Signs:  /70 (BP Location: Left arm, Patient Position: Sitting, Cuff Size: Adult)   Pulse 82   Temp 97.1 °F (36.2 °C) (Temporal)   Ht 177.8 cm (70\")   Wt (!) 161 kg (354 lb 3.2 oz)   SpO2 98%   BMI 50.82 kg/m²     Patient's (Body mass index is 50.82 kg/m².) indicates that they are morbidly obese (BMI > 40 or > 35 with obesity - related health condition) with health related conditions that include obstructive sleep apnea, hypertension, coronary heart disease, and diabetes mellitus . We discussed portion control and increasing exercise.            Physical Exam  Vitals reviewed.   Constitutional:       Appearance: Normal appearance.   HENT:      Head: Normocephalic and atraumatic.      Nose: Nose normal.      Mouth/Throat:      Mouth: Mucous membranes are moist.   Cardiovascular:      Rate and Rhythm: Normal rate and regular rhythm.      Heart sounds: No murmur heard.     No friction rub. No gallop.   Pulmonary:      Effort: Pulmonary effort is normal. No respiratory distress.      Breath sounds: Normal breath sounds. No wheezing or rhonchi. " "  Neurological:      Mental Status: He is alert and oriented to person, place, and time.   Psychiatric:         Behavior: Behavior normal.       Mallampati Score: III (soft and hard palate and base of uvula visible)    Result Review :              Assessment and Plan  Vidal Woodson is a 34 y.o. male with hypertension, NAFLD, skin cancer (s/p surgery and chemo), and central apnea who presents to [facility] care.  Patient last had a sleep study in approximately 2011 with [facility].  We are attempting to obtain records.  He is on PAP therapy and his devices are approximately 2 years old.  Despite use of PAP therapy and he is noted that [facility] says his \"numbers are good\" he continues to have difficulty with hypersomnia.  I have discussed with the patient works a night/swing shift which is likely contributing to hypersomnia.  Nevertheless, the patient has been diligent with use of PAP therapy and despite this he has not found restorative sleep.  I will place an order for sleep study/split-night.  I suspect the patient may have complex sleep apnea.  He noted that he believes that he had central apnea. I have discussed weight loss as it pertains to obstructive sleep apnea.  He has been working on his weight.    Diagnoses and all orders for this visit:    1. Obstructive sleep apnea, adult (Primary)  -     Polysomnography 4 or More Parameters; Future    2. Hypersomnia with sleep apnea  -     Polysomnography 4 or More Parameters; Future    3. Circadian rhythm sleep disorder, shift work type  -     Polysomnography 4 or More Parameters; Future    4. Primary hypertension  -     Polysomnography 4 or More Parameters; Future    5. Morbid (severe) obesity due to excess calories                 I discussed the consequences of uncontrolled sleep apnea including hypertension, heart disease, diabetes, stroke, and dementia. I further discussed sleep apnea therapeutic options including CPAP, Weight loss, Oral dental " appliance, and surgery.         Follow Up  Return for Follow up after study.  Patient was given instructions and counseling regarding his condition or for health maintenance advice. Please see specific information pulled into the AVS if appropriate.     TASIA Rodriguez, ACNP-BC  Pulmonology, Critical Care, and Sleep Medicine

## 2024-09-17 ENCOUNTER — TELEPHONE (OUTPATIENT)
Dept: PALLATIVE CARE | Age: 69
End: 2024-09-17

## 2024-09-17 DIAGNOSIS — C34.82 MALIGNANT NEOPLASM OF OVERLAPPING SITES OF LEFT LUNG (HCC): ICD-10-CM

## 2024-09-17 DIAGNOSIS — Z51.5 ENCOUNTER FOR PALLIATIVE CARE: Primary | ICD-10-CM

## 2024-09-17 DIAGNOSIS — G89.3 CHRONIC PAIN DUE TO NEOPLASM: ICD-10-CM

## 2024-09-17 RX ORDER — FENTANYL 50 UG/1
1 PATCH TRANSDERMAL
Qty: 10 PATCH | Refills: 0 | Status: SHIPPED | OUTPATIENT
Start: 2024-09-17 | End: 2024-10-17

## 2024-09-18 ENCOUNTER — TELEPHONE (OUTPATIENT)
Dept: PALLATIVE CARE | Age: 69
End: 2024-09-18

## 2024-10-08 ENCOUNTER — OFFICE VISIT (OUTPATIENT)
Dept: PALLATIVE CARE | Age: 69
End: 2024-10-08
Payer: MEDICARE

## 2024-10-08 VITALS
DIASTOLIC BLOOD PRESSURE: 70 MMHG | BODY MASS INDEX: 22.87 KG/M2 | SYSTOLIC BLOOD PRESSURE: 106 MMHG | TEMPERATURE: 98 F | OXYGEN SATURATION: 96 % | WEIGHT: 164 LBS | HEART RATE: 74 BPM

## 2024-10-08 DIAGNOSIS — C34.82 MALIGNANT NEOPLASM OF OVERLAPPING SITES OF LEFT LUNG (HCC): Primary | ICD-10-CM

## 2024-10-08 DIAGNOSIS — G89.3 CHRONIC PAIN DUE TO NEOPLASM: ICD-10-CM

## 2024-10-08 DIAGNOSIS — Z51.5 ENCOUNTER FOR PALLIATIVE CARE: ICD-10-CM

## 2024-10-08 PROCEDURE — 99211 OFF/OP EST MAY X REQ PHY/QHP: CPT | Performed by: NURSE PRACTITIONER

## 2024-10-08 PROCEDURE — 99214 OFFICE O/P EST MOD 30 MIN: CPT | Performed by: NURSE PRACTITIONER

## 2024-10-08 PROCEDURE — 1123F ACP DISCUSS/DSCN MKR DOCD: CPT | Performed by: NURSE PRACTITIONER

## 2024-10-08 RX ORDER — DULOXETIN HYDROCHLORIDE 30 MG/1
30 CAPSULE, DELAYED RELEASE ORAL 2 TIMES DAILY
Qty: 180 CAPSULE | Refills: 1 | OUTPATIENT
Start: 2024-10-08

## 2024-10-08 RX ORDER — OXYCODONE HYDROCHLORIDE 10 MG/1
10 TABLET ORAL EVERY 4 HOURS PRN
Qty: 180 TABLET | Refills: 0 | Status: SHIPPED | OUTPATIENT
Start: 2024-10-08 | End: 2024-11-07

## 2024-10-08 RX ORDER — AMOXICILLIN AND CLAVULANATE POTASSIUM 250; 125 MG/1; MG/1
1 TABLET, FILM COATED ORAL 3 TIMES DAILY
COMMUNITY

## 2024-10-08 RX ORDER — DULOXETIN HYDROCHLORIDE 30 MG/1
30 CAPSULE, DELAYED RELEASE ORAL 2 TIMES DAILY
Qty: 180 CAPSULE | Refills: 1 | Status: SHIPPED | OUTPATIENT
Start: 2024-10-08 | End: 2025-04-06

## 2024-10-08 RX ORDER — METOCLOPRAMIDE 10 MG/1
10 TABLET ORAL 4 TIMES DAILY
COMMUNITY

## 2024-10-08 RX ORDER — FENTANYL 75 UG/1
1 PATCH TRANSDERMAL
Qty: 10 PATCH | Refills: 0 | Status: SHIPPED | OUTPATIENT
Start: 2024-10-08 | End: 2024-11-07

## 2024-10-08 NOTE — PROGRESS NOTES
does not notice any difference with the use of the fentanyl patch  He agrees to increase the fentanyl patch, we discussed trying alternative locations for the patch application as well  He continues to consider his surgical option, as he has discussed this with other providers as well, he is very concerned regarding the risks involved with the surgery  He plans to discuss this further with Dr. Cummings later this week  He has been receiving nutrition via PEG tube  Pain continues to have a hard time tolerating much intake, as he continues to feel full very early  He was recently restarted on Reglan hoping that this will help him  His constipation is well-managed with Senokot and MiraLAX.    Much time was spent today providing support for him and his wife as well as his daughter, as he continues to have ongoing and worsening issues, all of which are having large effects to his quality of life  We will not make any other changes to his plan of care today, we will have her return in 1 month reassess his needs      Objective:     Physical Exam  Wt Readings from Last 3 Encounters:   10/08/24 74.4 kg (164 lb)   08/05/24 77.6 kg (171 lb)   07/19/24 75.8 kg (167 lb)      Gen:  Alert, appears stated age, well nourished, in no acute distress  HEENT:  Speaks by holding trach  Neck:  Supple  Lungs:  CTA bilaterally, no audible rhonchi or wheezes noted  Heart::  RRR, no murmur, rub, or gallop noted during exam  Abd:  Soft, non tender, non distended, BS+  M/S/Ext:  Moving all extremities, no edema, pulses present  Skin:  Warm and dry  Neuro:   Alert, oriented x 3; following commands      Upson Symptom Assessment Score       10/8/2024     6:00 PM 8/5/2024     2:00 PM 5/13/2024     5:00 PM 2/19/2024     2:00 PM 10/2/2023     1:00 PM   Upson Score   Pain Score 7 5 7 8 6   Tiredness Score 7 Not tired Not tired 2 Not tired   Nausea Score Not nauseated 3 Not nauseated Not nauseated Not nauseated   Depression Score 6 2 2 1 3

## 2024-10-10 ENCOUNTER — TELEPHONE (OUTPATIENT)
Dept: PALLATIVE CARE | Age: 69
End: 2024-10-10

## 2024-10-10 NOTE — TELEPHONE ENCOUNTER
[facility] pharmacy called on fentnyl patches prescribed for Alan. Concern was too early to fill. Perfect Served A Mauricio, NP. Okay to fill now since dosage was changed on Tuesday

## 2024-10-21 ENCOUNTER — TELEPHONE (OUTPATIENT)
Dept: RADIATION ONCOLOGY | Age: 69
End: 2024-10-21

## 2024-10-21 DIAGNOSIS — G89.3 CHRONIC PAIN DUE TO NEOPLASM: ICD-10-CM

## 2024-10-21 DIAGNOSIS — Z51.5 ENCOUNTER FOR PALLIATIVE CARE: ICD-10-CM

## 2024-10-21 DIAGNOSIS — C34.82 MALIGNANT NEOPLASM OF OVERLAPPING SITES OF LEFT LUNG (HCC): ICD-10-CM

## 2024-10-21 RX ORDER — OXYCODONE HYDROCHLORIDE 20 MG/1
20 TABLET ORAL
Qty: 112 TABLET | Refills: 0 | Status: SHIPPED | OUTPATIENT
Start: 2024-10-21 | End: 2024-11-05

## 2024-10-21 NOTE — TELEPHONE ENCOUNTER
Call from Casie stating Benjamin asked her to call as his pain medication is not lasting 4 hours. Benjamin presently takes Fentanyl 75 mcg, Oxy IR 10 mg every 4 hours and Lyrica 150 mg TID. Benjamin states he has a burning pain in his neck that radiates up into his head, his ear hurts too. Discussed with Elder Martínez CNP and new orders received. Called back and instructed Casie to give Oxy IR 20 mg every 3 hours as needed. Pharmacy [facility] in [place]. Count includes the [facility] maria r 11/5/24.

## 2024-10-21 NOTE — TELEPHONE ENCOUNTER
Rad onc RN called Dr. De Luna's office regarding mutual patient and requested recent notes to be faxed over to rad onc office. Fax number was given and Dr. De Luna's office was going to fax over documents.

## 2024-10-22 ENCOUNTER — HOSPITAL ENCOUNTER (OUTPATIENT)
Dept: RADIATION ONCOLOGY | Age: 69
Discharge: HOME | End: 2024-10-22
Payer: MEDICARE

## 2024-10-22 VITALS
DIASTOLIC BLOOD PRESSURE: 51 MMHG | WEIGHT: 154.2 LBS | SYSTOLIC BLOOD PRESSURE: 94 MMHG | TEMPERATURE: 97.6 F | HEART RATE: 76 BPM | BODY MASS INDEX: 21.51 KG/M2 | RESPIRATION RATE: 18 BRPM | OXYGEN SATURATION: 90 %

## 2024-10-22 PROCEDURE — 99205 OFFICE O/P NEW HI 60 MIN: CPT

## 2024-10-22 SDOH — ECONOMIC STABILITY: HOUSING INSECURITY: PLEASE ASSESS YOUR PATIENT'S LEVEL OF DISTRESS CONCERNING HOUSING (SCALE FROM 1-10): 0

## 2024-10-22 NOTE — PROGRESS NOTES
Score of 0-1: No action  Score 2 or greater:  For Non-Diabetic Patient: Recommend adding Ensure Complete 2 x daily and provide patient with Ensure wellness bag with coupons  For Diabetic Patient: Recommend adding Glucerna Shake 2 x daily and provide patient with Glucerna Wellness bag with coupons  Route to the dietitian via Epic          OT ASSESSMENT FOR REFERRAL    Are you having difficulty performing daily routine tasks due to fatigue or weakness (ie: bathing/showering, dressing, housework, meal prep, work, , etc): Yes     Do you have any arm flexibility/ROM restrictions, swelling or pain that limit activity: Yes     Any changes in memory, attention/focus that impact daily activities: No     Do you avoid participation in leisure/social activity due to weakness, fatigue or pain: Yes     ARE ANY OF THE ABOVE ARE ANSWERED YES: Yes - but NO OT referral request sent due to patient refusal.          PT ASSESSMENT FOR REFERRAL    Have you had any recent falls in the past 2 months: No     Do you have difficulty going up/down stairs: No     Are you having difficulty walking: No     Do you often hold onto furniture/environmental supports or feel off balance when you are walking: No     Do you need to take rest breaks when you are walking: No     Any pain on a scale of 1-10 that limits your mobility: No 8/10    ARE ANY OF THE ABOVE ARE ANSWERED YES: Yes - but NO PT referral request sent due to patient refusal.           LYMPHEDEMA SCREENING ASSESSMENT FOR PATIENTS WITH BREAST CANCER    The patient reports the following signs/symptoms of lymphedema: None    Please ask the provider to assess patient for lymphedema for any reported signs or symptoms so a referral to Lymphedema Therapy can be considered.          PELVIC FLOOR DYSFUNCTION SCREENING ASSESSMENT    - Do you have any pelvic pain? No     - Do you have any fecal constipation or fecal incontinence? No     - Do you have any urinary incontinence or 
(FAX: [phone])  [facility]:  [phone] (FAX:  [phone])    NOTE: This report was transcribed using voice recognition software. Every effort was made to ensure accuracy; however, inadvertent computerized transcription errors may be present.

## 2024-10-26 ENCOUNTER — HOSPITAL ENCOUNTER (EMERGENCY)
Age: 69
Discharge: HOME | End: 2024-10-26
Attending: EMERGENCY MEDICINE
Payer: MEDICARE

## 2024-10-26 VITALS
OXYGEN SATURATION: 90 % | TEMPERATURE: 99.1 F | RESPIRATION RATE: 20 BRPM | SYSTOLIC BLOOD PRESSURE: 98 MMHG | DIASTOLIC BLOOD PRESSURE: 58 MMHG | HEART RATE: 60 BPM

## 2024-10-26 DIAGNOSIS — K94.23 PEG TUBE MALFUNCTION (HCC): Primary | ICD-10-CM

## 2024-10-26 PROCEDURE — 6370000000 HC RX 637 (ALT 250 FOR IP): Performed by: EMERGENCY MEDICINE

## 2024-10-26 PROCEDURE — 99282 EMERGENCY DEPT VISIT SF MDM: CPT

## 2024-10-26 RX ORDER — OXYCODONE HYDROCHLORIDE 5 MG/1
20 TABLET ORAL ONCE
Status: DISCONTINUED | OUTPATIENT
Start: 2024-10-26 | End: 2024-10-26 | Stop reason: HOSPADM

## 2024-10-26 RX ADMIN — Medication 10 ML: at 16:50

## 2024-10-26 ASSESSMENT — PAIN - FUNCTIONAL ASSESSMENT: PAIN_FUNCTIONAL_ASSESSMENT: NONE - DENIES PAIN

## 2024-10-26 NOTE — ED PROVIDER NOTES
tablet Take 1 tablet by mouth 2 times dailyHistorical Med      midodrine (PROAMATINE) 5 MG tablet Take 1 tablet by mouth 2 times daily Taking three times a dayHistorical Med      pembrolizumab (KEYTRUDA) 100 MG/4ML SOLN Infuse 8 mLs intravenously every 21 days LAST DOSE: 12/21/2023  NEXT DOSE DUE: 01/11/2024Historical Med      predniSONE (DELTASONE) 10 MG tablet Take 1 tablet by mouth every eveningHistorical Med      Cholecalciferol (VITAMIN D3) 5000 units TABS Take 1 tablet by mouth every eveningHistorical Med      amoxicillin-clavulanate (AUGMENTIN) 250-125 MG per tablet Take 1 tablet by mouth 3 times dailyHistorical Med             ALLERGIES     Augmentin [amoxicillin-pot clavulanate]    FAMILYHISTORY       Family History   Problem Relation Age of Onset    Cancer Mother         breast cancer    Cancer Father         prostate cancer    Diabetes Father         SOCIAL HISTORY       Social History     Tobacco Use    Smoking status: Every Day     Current packs/day: 0.50     Average packs/day: 0.5 packs/day for 52.8 years (26.4 ttl pk-yrs)     Types: Cigarettes     Start date: 1972    Smokeless tobacco: Never    Tobacco comments:     Pt quit in 9/26/2018 & started back 2/2020 smoking .5 ppd          Pt quit a week ago 6/3/24   Vaping Use    Vaping status: Never Used    Passive vaping exposure: Yes   Substance Use Topics    Alcohol use: Not Currently     Alcohol/week: 1.0 standard drink of alcohol     Types: 1 Cans of beer per week     Comment: beer once in awhile    Drug use: No       SCREENINGS        Julienne Coma Scale  Eye Opening: Spontaneous  Best Verbal Response: Oriented  Best Motor Response: Obeys commands  Julienne Coma Scale Score: 15                CIWA Assessment  BP: (!) 98/58  Pulse: 60           PHYSICAL EXAM  1 or more Elements     ED Triage Vitals [10/26/24 1422]   BP Systolic BP Percentile Diastolic BP Percentile Temp Temp Source Pulse Respirations SpO2   (!) 98/58 -- -- 99.1 °F (37.3 °C) Oral 60 20 90  %      Height Weight         -- --             ---------------------------------------PHYSICAL EXAM--------------------------------------  Constitutional:  Well developed, well nourished, no acute distress, non-toxic appearance   Eyes:  PERRL, conjunctiva normal, EOMI  HENT:  Atraumatic, external ears normal, nose normal, oropharynx moist. Neck- normal range of motion, no nuchal rigidity, trach in place   Respiratory:  No respiratory distress, normal breath sounds, no rales, no wheezing   Cardiovascular:  Normal rate, normal rhythm, no murmurs, no gallops, no rubs. Radial pulses 2+ bilaterally.  Compartments are soft.  He is warm and well-perfused.  Cap refill less than 3 seconds.  GI:  Soft, nondistended, normal bowel sounds, nontender, no organomegaly, no mass, no rebound, no guarding. PEG tube in place, no leaking or drainage from the ostomy site.    :  No costovertebral angle tenderness   Musculoskeletal:  No edema, no tenderness, no deformities. Back- no tenderness  Integument:  Well hydrated, no rash. Adequate perfusion.   Neurologic:  Alert & oriented x 3, CN 2-12 normal, no focal deficits noted. Normal gait.    Psychiatric:  Speech and behavior appropriate             DIAGNOSTIC RESULTS   LABS:  No results found for this visit on 10/26/24.    As interpreted by me, the above displayed labs are abnormal as marked by (H) or (L). All other labs obtained during this visit were within normal range or not returned as of this dictation.      RADIOLOGY:   Non-plain film images such as CT, Ultrasound and MRI are read by the radiologist. Plain radiographic images are visualized and preliminarily interpreted by the ED Provider with the below findings:      Interpretation per the Radiologist below, if available at the time of this note:    No orders to display     No results found.    No results found.    PROCEDURES   none          CRITICAL CARE TIME (.cct)   none    PAST MEDICAL HISTORY/Chronic Conditions Affecting

## 2024-10-26 NOTE — ED PROVIDER NOTES
--------------------------------------------- PAST HISTORY ---------------------------------------------  Past Medical History:  has a past medical history of Abdominal aortic aneurysm without rupture (HCC), Acute bronchitis with COPD (HCC), Arthritis, COPD (chronic obstructive pulmonary disease) (HCC), Decreased dorsalis pedis pulse, Depression with anxiety, Emphysema of lung (HCC), Encounter for antineoplastic immunotherapy, History of deep vein thrombosis, Hyperlipidemia, Infrarenal abdominal aortic aneurysm (AAA) without rupture (HCC), Late effect of radiation, Lung cancer (HCC), Osteoradionecrosis of jaw, Paralyzed vocal cords, Thrombosis of testis, Thyroid disease, and Tracheostomy in place (HCC).    Past Surgical History:  has a past surgical history that includes Knee arthroscopy; sinus surgery; other surgical history (4/15/2016); Lung biopsy (Left, 5/6/2016); tracheostomy (05/24/2017); bronchoscopy (N/A, 3/5/2020); cervical fusion (2015); Medication Injection (Bilateral, 10/29/2020); Pain management procedure (Right, 12/28/2020); Rotator cuff repair (Right); Pain management procedure (Right, 9/30/2021); tracheostomy; Medication Injection (Left, 1/6/2022); hip surgery (Left, 1/6/2022); Pain management procedure (Right, 4/25/2022); Nerve Block (Bilateral, 5/8/2023); Upper gastrointestinal endoscopy (N/A, 9/13/2023); Nerve Block (Bilateral, 10/5/2023); Upper gastrointestinal endoscopy (N/A, 12/11/2023); Upper gastrointestinal endoscopy (N/A, 1/11/2024); and Dental surgery (N/A, 1/15/2024).    Social History:  reports that he has been smoking cigarettes. He started smoking about 52 years ago. He has a 26.4 pack-year smoking history. He has never used smokeless tobacco. He reports that he does not currently use alcohol after a past usage of about 1.0 standard drink of alcohol per week. He reports that he does not use drugs.    Family History: family history includes Cancer in his father and mother; Diabetes in  his father.     The patient’s home medications have been reviewed.    Allergies: Augmentin [amoxicillin-pot clavulanate]    -------------------------------------------------- RESULTS -------------------------------------------------  No results found for this visit on 10/26/24.  No orders to display       ------------------------- NURSING NOTES AND VITALS REVIEWED ---------------------------   The nursing notes within the ED encounter and vital signs as below have been reviewed.   BP (!) 98/58   Pulse 60   Temp 99.1 °F (37.3 °C) (Oral)   Resp 20   SpO2 90%   Oxygen Saturation Interpretation: Normal      ------------------------------------------ PROGRESS NOTES ------------------------------------------   I have spoken with the patient and discussed today’s results, in addition to providing specific details for the plan of care and counseling regarding the diagnosis and prognosis.  Their questions are answered at this time and they are agreeable with the plan.      --------------------------------- ADDITIONAL PROVIDER NOTES ---------------------------------     Patient stable clogs [employer] was flushed easily with no problems patient is asymptomatic abdomen benign patient will be discharge seen initially by Dr. Vazquez     This patient is stable for discharge.  I have shared the specific conditions for return, as well as the importance of follow-up.           --------------------------------- IMPRESSION AND DISPOSITION ---------------------------------    IMPRESSION  1. PEG tube malfunction (HCC)        DISPOSITION  Disposition: Discharge to home  Patient condition is stable         David Shoemaker MD  10/27/24 0309

## 2024-10-29 RX ORDER — DULOXETIN HYDROCHLORIDE 30 MG/1
30 CAPSULE, DELAYED RELEASE ORAL 2 TIMES DAILY
Qty: 180 CAPSULE | Refills: 1 | Status: SHIPPED | OUTPATIENT
Start: 2024-10-29 | End: 2025-04-27

## 2024-10-29 NOTE — TELEPHONE ENCOUNTER
Call from Casie requesting Cymbalta be sent to [facility] in a liquid form as it is clogging his PEG tube. Next maria r 11/5/24.

## 2024-11-01 ENCOUNTER — TELEPHONE (OUTPATIENT)
Dept: INFUSION THERAPY | Age: 69
End: 2024-11-01

## 2024-11-02 ENCOUNTER — HOSPITAL ENCOUNTER (EMERGENCY)
Age: 69
Discharge: HOME | End: 2024-11-02
Attending: EMERGENCY MEDICINE
Payer: MEDICARE

## 2024-11-02 VITALS
SYSTOLIC BLOOD PRESSURE: 114 MMHG | HEART RATE: 64 BPM | TEMPERATURE: 97.6 F | DIASTOLIC BLOOD PRESSURE: 74 MMHG | RESPIRATION RATE: 21 BRPM | OXYGEN SATURATION: 96 %

## 2024-11-02 DIAGNOSIS — R21 RASH AND OTHER NONSPECIFIC SKIN ERUPTION: ICD-10-CM

## 2024-11-02 DIAGNOSIS — T14.8XXA ABRASION: Primary | ICD-10-CM

## 2024-11-02 LAB
ALBUMIN SERPL-MCNC: 2.9 G/DL (ref 3.5–5.2)
ALP SERPL-CCNC: 85 U/L (ref 40–129)
ALT SERPL-CCNC: 17 U/L (ref 0–40)
ANION GAP SERPL CALCULATED.3IONS-SCNC: 8 MMOL/L (ref 7–16)
AST SERPL-CCNC: 19 U/L (ref 0–39)
BASOPHILS # BLD: 0 K/UL (ref 0–0.2)
BASOPHILS NFR BLD: 0 % (ref 0–2)
BILIRUB SERPL-MCNC: 0.3 MG/DL (ref 0–1.2)
BILIRUB UR QL STRIP: NEGATIVE
BUN SERPL-MCNC: 9 MG/DL (ref 6–23)
CALCIUM SERPL-MCNC: 8.8 MG/DL (ref 8.6–10.2)
CHLORIDE SERPL-SCNC: 97 MMOL/L (ref 98–107)
CLARITY UR: CLEAR
CO2 SERPL-SCNC: 29 MMOL/L (ref 22–29)
COLOR UR: YELLOW
CREAT SERPL-MCNC: 0.9 MG/DL (ref 0.7–1.2)
EOSINOPHIL # BLD: 0 K/UL (ref 0.05–0.5)
EOSINOPHILS RELATIVE PERCENT: 0 % (ref 0–6)
ERYTHROCYTE [DISTWIDTH] IN BLOOD BY AUTOMATED COUNT: 15.4 % (ref 11.5–15)
GFR, ESTIMATED: >90 ML/MIN/1.73M2
GLUCOSE SERPL-MCNC: 141 MG/DL (ref 74–99)
GLUCOSE UR STRIP-MCNC: NEGATIVE MG/DL
HCT VFR BLD AUTO: 34.9 % (ref 37–54)
HGB BLD-MCNC: 10.9 G/DL (ref 12.5–16.5)
HGB UR QL STRIP.AUTO: NEGATIVE
KETONES UR STRIP-MCNC: NEGATIVE MG/DL
LEUKOCYTE ESTERASE UR QL STRIP: NEGATIVE
LYMPHOCYTES NFR BLD: 0.31 K/UL (ref 1.5–4)
LYMPHOCYTES RELATIVE PERCENT: 4 % (ref 20–42)
MCH RBC QN AUTO: 26.2 PG (ref 26–35)
MCHC RBC AUTO-ENTMCNC: 31.2 G/DL (ref 32–34.5)
MCV RBC AUTO: 83.9 FL (ref 80–99.9)
METAMYELOCYTES ABSOLUTE COUNT: 0.08 K/UL (ref 0–0.12)
METAMYELOCYTES: 1 % (ref 0–1)
MONOCYTES NFR BLD: 0 % (ref 2–12)
MONOCYTES NFR BLD: 0 K/UL (ref 0.1–0.95)
NEUTROPHILS NFR BLD: 96 % (ref 43–80)
NEUTS SEG NFR BLD: 8.61 K/UL (ref 1.8–7.3)
NITRITE UR QL STRIP: NEGATIVE
PH UR STRIP: 6.5 [PH] (ref 5–9)
PLATELET # BLD AUTO: 327 K/UL (ref 130–450)
PMV BLD AUTO: 9.4 FL (ref 7–12)
POTASSIUM SERPL-SCNC: 4.1 MMOL/L (ref 3.5–5)
PROT SERPL-MCNC: 5.8 G/DL (ref 6.4–8.3)
PROT UR STRIP-MCNC: NEGATIVE MG/DL
RBC # BLD AUTO: 4.16 M/UL (ref 3.8–5.8)
RBC # BLD: ABNORMAL 10*6/UL
RBC #/AREA URNS HPF: NORMAL /HPF
SODIUM SERPL-SCNC: 134 MMOL/L (ref 132–146)
SP GR UR STRIP: 1.01 (ref 1–1.03)
UROBILINOGEN UR STRIP-ACNC: 0.2 EU/DL (ref 0–1)
WBC #/AREA URNS HPF: NORMAL /HPF
WBC OTHER # BLD: 9 K/UL (ref 4.5–11.5)

## 2024-11-02 PROCEDURE — 85025 COMPLETE CBC W/AUTO DIFF WBC: CPT

## 2024-11-02 PROCEDURE — 6370000000 HC RX 637 (ALT 250 FOR IP): Performed by: EMERGENCY MEDICINE

## 2024-11-02 PROCEDURE — 87086 URINE CULTURE/COLONY COUNT: CPT

## 2024-11-02 PROCEDURE — 99284 EMERGENCY DEPT VISIT MOD MDM: CPT

## 2024-11-02 PROCEDURE — 80053 COMPREHEN METABOLIC PANEL: CPT

## 2024-11-02 PROCEDURE — 81001 URINALYSIS AUTO W/SCOPE: CPT

## 2024-11-02 RX ORDER — GINSENG 100 MG
CAPSULE ORAL ONCE
Status: COMPLETED | OUTPATIENT
Start: 2024-11-02 | End: 2024-11-02

## 2024-11-02 RX ORDER — BACITRACIN ZINC AND POLYMYXIN B SULFATE 500; 1000 [USP'U]/G; [USP'U]/G
OINTMENT TOPICAL
Qty: 15 G | Refills: 0 | Status: ON HOLD | OUTPATIENT
Start: 2024-11-02 | End: 2024-11-09

## 2024-11-02 RX ORDER — CLOTRIMAZOLE 1 %
CREAM (GRAM) TOPICAL
Qty: 12 G | Refills: 0 | Status: ON HOLD | OUTPATIENT
Start: 2024-11-02 | End: 2024-11-09

## 2024-11-02 RX ADMIN — BACITRACIN: 500 OINTMENT TOPICAL at 16:31

## 2024-11-02 ASSESSMENT — PAIN SCALES - WONG BAKER: WONGBAKER_NUMERICALRESPONSE: HURTS LITTLE MORE

## 2024-11-02 ASSESSMENT — PAIN - FUNCTIONAL ASSESSMENT: PAIN_FUNCTIONAL_ASSESSMENT: WONG-BAKER FACES

## 2024-11-02 NOTE — ED PROVIDER NOTES
[facility] EMERGENCY DEPARTMENT  EMERGENCY DEPARTMENT ENCOUNTER      Pt Name: Alan Guajardo Jr.  MRN: [id number]  Birthdate 1955  Date of evaluation: 11/2/2024  Provider: Jerry Christiansen DO  PCP: Sylvia Quintana DO  Note Started: 4:15 PM EDT 11/2/24    CHIEF COMPLAINT       Chief Complaint   Patient presents with    Fatigue     August throat cancer returned. Received shot Thursday and weakness increased.     penis bleeding     On blood thinners; bleeding controlled        HISTORY OF PRESENT ILLNESS: 1 or more Elements   History From: Patient and wife  Limitations to history : None    Alan Guajardo Jr. is a 69 y.o. male who presents to the emergency department for evaluation of penile bleed.  Patient is a history of throat cancer and was sent in as there was some concerns for bleeding around his penis. Patient is on anticogulation. Patient has controlled bleeding. Patient is here with his wife who is helping provide history. Patient has no rectal/GI bleeding. Patient has also been feeling increasingly weak since he received his last chemo shot. Patient also has a rash to his left arm pit. No fevers or chills.    Nursing Notes were all reviewed and agreed with or any disagreements were addressed in the HPI.    REVIEW OF SYSTEMS :    Positives and Pertinent negatives as per HPI.     SURGICAL HISTORY     Past Surgical History:   Procedure Laterality Date    BRONCHOSCOPY N/A 3/5/2020    BRONCHOSCOPY DIAGNOSTIC OR CELL WASH ONLY performed by Young Parkinson MD at [facility] ENDOSCOPY    CERVICAL FUSION  2015    DENTAL SURGERY N/A 1/15/2024    FULL MANDIBULAR EXTRACTIONS WITH ALVELOPLASTY     + COVID+ performed by Red Granados DDS at [facility] OR    HIP SURGERY Left 1/6/2022    RIGHT TROCHANTARIC BURSA INJECTION UNDER FLUOROSCOPY performed by Garfield Conner MD at [facility] OR    KNEE ARTHROSCOPY      LUNG BIOPSY Left 5/6/2016    MEDICATION INJECTION Bilateral 10/29/2020     History of deep vein thrombosis (11/04/2020), Hyperlipidemia, Infrarenal abdominal aortic aneurysm (AAA) without rupture (HCC) (12/07/2023), Late effect of radiation (09/06/2023), Lung cancer (HCC) (04/11/2016), Osteoradionecrosis of jaw (08/28/2018), Paralyzed vocal cords, Thrombosis of testis, Thyroid disease, and Tracheostomy in place ([facility]) (2017).     EMERGENCY DEPARTMENT COURSE    Vitals:    Vitals:    11/02/24 1558 11/02/24 1839 11/02/24 1942   BP: 105/66 126/80 114/74   Pulse: 68 60 64   Resp: 19 18 21   Temp:  97.6 °F (36.4 °C)    TempSrc:  Oral    SpO2: 100% 97% 96%       Patient was given the following medications:  Medications   bacitracin ointment ( Topical Given 11/2/24 1631)             Medical Decision Making/Differential Diagnosis:    CC/HPI Summary, Social Determinants of health, Records Reviewed, DDx, testing done/not done, ED Course, Reassessment, disposition considerations/shared decision making with patient, consults, disposition:            Chronic Conditions:   Past Medical History:   Diagnosis Date    Abdominal aortic aneurysm without rupture ([facility]) 08/27/2018    follows with Dr Panchal    Acute bronchitis with COPD ([facility]) 05/27/2017    Arthritis     COPD (chronic obstructive pulmonary disease) ([facility])     Decreased dorsalis pedis pulse 11/04/2020    Depression with anxiety     Emphysema of lung ([facility])     Encounter for antineoplastic immunotherapy     History of deep vein thrombosis 11/04/2020    Hyperlipidemia     Infrarenal abdominal aortic aneurysm (AAA) without rupture (HCC) 12/07/2023    3.1 x 3.2 cm, CT scan, January 2023    Late effect of radiation 09/06/2023    Lung cancer (HCC) 04/11/2016    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    Paralyzed vocal cords     Thrombosis of testis     Thyroid disease     Tracheostomy in place ([facility]) 2017    #6 Elsa       CONSULTS: (Who and What was discussed)  None        CC/HPI Summary, DDx, ED Course, and Reassessment: CBC was ordered as part of my

## 2024-11-04 ENCOUNTER — TELEPHONE (OUTPATIENT)
Dept: PALLATIVE CARE | Age: 69
End: 2024-11-04

## 2024-11-04 LAB
MICROORGANISM SPEC CULT: NO GROWTH
SERVICE CMNT-IMP: NORMAL
SPECIMEN DESCRIPTION: NORMAL

## 2024-11-04 NOTE — TELEPHONE ENCOUNTER
Call from Casie , we reviewed medications and his recent ER visit. Support provided through active listening. Casie states Benjamin has had an increase in weakness since receiving chemo last week. Casie does not feel like she will be able to bring Benjamin to his appointment tomorrow. Asking to do a face to face visit. Discussed Hospice care but Benjamin is wanting to continue getting treatment. Much support provided through active listening.

## 2024-11-05 ENCOUNTER — TELEPHONE (OUTPATIENT)
Dept: RADIATION ONCOLOGY | Age: 69
End: 2024-11-05

## 2024-11-05 ENCOUNTER — TELEPHONE (OUTPATIENT)
Dept: ONCOLOGY | Age: 69
End: 2024-11-05

## 2024-11-05 DIAGNOSIS — Z51.5 ENCOUNTER FOR PALLIATIVE CARE: ICD-10-CM

## 2024-11-05 DIAGNOSIS — G89.3 CHRONIC PAIN DUE TO NEOPLASM: ICD-10-CM

## 2024-11-05 DIAGNOSIS — C34.82 MALIGNANT NEOPLASM OF OVERLAPPING SITES OF LEFT LUNG (HCC): ICD-10-CM

## 2024-11-05 RX ORDER — FENTANYL 75 UG/1
1 PATCH TRANSDERMAL
Qty: 10 PATCH | Refills: 0 | Status: SHIPPED
Start: 2024-11-05 | End: 2024-11-07 | Stop reason: SDUPTHER

## 2024-11-05 NOTE — TELEPHONE ENCOUNTER
lAan Guajardo Jr.  11/5/2024  Ht Readings from Last 1 Encounters:   06/10/24 1.803 m (5' 11\")     Wt Readings from Last 10 Encounters:   10/22/24 69.9 kg (154 lb 3.2 oz)   10/08/24 74.4 kg (164 lb)   08/05/24 77.6 kg (171 lb)   07/19/24 75.8 kg (167 lb)   06/10/24 74.7 kg (164 lb 9.6 oz)   05/20/24 71 kg (156 lb 8.4 oz)   05/13/24 74.4 kg (164 lb)   04/10/24 72.1 kg (159 lb)   02/27/24 72.3 kg (159 lb 6.4 oz)   02/19/24 72.1 kg (159 lb)     BMI: 21.51    Assessment: Received return call from Casie, pt's wife. Casie detailed pt's extensive oncology hx. Pt had PEG replaced in January of this year. He has not been assessed by dietitian since that hospital stay. He follows w/ Dr. Cummings for medical oncology purposes. He recently consulted w/ radiation oncology, awaiting POC. Casie reports pt has been using 1 carton of TwoCal HN daily via 1/2 carton bolus feedings twice daily. Casie reports pt is unable to tolerate higher volume bolus feeding, having N/V w/ some diarrhea. He is currently lying on the couch all day, only getting up to use bedside commode. Pt w/ wt fluctuations over last year, however most recently it seems as though pt has lost 10# x2 weeks, likely 2/2 significant underfeeding. Pt would benefit from transition to cyclic TF infused via pump. He is currently active w/ [facility], will also require [facility] nursing for TF teach for new pump as well as supplies for pump infused EN. Casie understandably frustrated over pt's current situation, noting that they were recently recommended hospice services. Casie w/ multiple questions regarding hospice vs continuation of oncology care. Discussed w/ social work who will contact Casie at her number listed in chart. Information relayed to radiation oncologist. Will follow    ETA: Social work reports pt is confirmed to be appropriate for radiation therapy, no immediate plans for hospice services at this time.    Weight change: -10# x2 weeks  Appetite: N/A, EN

## 2024-11-05 NOTE — TELEPHONE ENCOUNTER
Attempted to contact pt re: positive distress screen; message left requesting callback.    Additional attempt to contact pt's wife, Casie, at request of GUERA.  Casie discussed pt's diagnosis and cancer journey.  She stated that he had been on immunotherapy for approximately 8 years prior to his most recent disease progression.  She indicated that pt has had difficulty swallowing and expressed guilt surrounding his weight loss.  She explored barriers to accessing care and discussed recent conversations with palliative care and medical oncology suggesting it may be time to pursue hospice due to pt's performance status.  Provided general education on role of hospice care.  Casie noted that pt has expressed strong desire to continue to pursue treatment and discussed difficulties obtaining records for radiation oncologist.  She inquired if pt is candidate for radiation oncology treatment.    Case discussed with Nurse Navigator, per Dr. Ramires, pt to be scheduled for SIM at this time.    Returned call to Casie to advise of above.  Casie expressed gratitude and noted that they have strong family support sy stem to ensure that pt is able to safely attend SIM/radiation treatment.  Discussed possible benefit of obtaining wheelchair to utilize wheelchair van for transportation if needed; Casie reported that they will private pay for equipment in order to prevent delay.  Encouraged Casie to continue to support pt in exploring goals of care.  Discussed ways various services are available to assist depending on pt's goals.  No additional needs identified at this time.  Reviewed role of oncology SW and encouraged pt to notify this provider if additional needs arise.        KINA Bustos, CLARAW-S  Oncology Social Worker   [phone]       10/22/2024   Distress Tool Screening    Please select the number that describes how much distress you have experienced in the PAST WEEK: 8    Please select the number that describes how much

## 2024-11-05 NOTE — TELEPHONE ENCOUNTER
Attempted to contact pt's wife, Casie, per staff request. Casie had contacted PM yesterday and had reported minimal TF use, noting that they had not been following w/ dietitian. Left message w/ contact information. Await return call.    Confirmed w/ Marian at [facility] that pt is active with them, current order for TwoCal HN, 4 cartons daily via bolus feed.    Electronically signed by Joseline Verma MS, RD, LD on 11/5/2024 at 1:25 PM

## 2024-11-05 NOTE — TELEPHONE ENCOUNTER
Called Casie to change Dom's appointment since he has been so weak. Rescheduled to 11/18/24. Casie states Dom is down to one Fentanyl patch. Pharmacy is [facility] in [place].  Casie shares that she received a call from dietician and they have assisted her to get feeding pump. Benjamin is going to get started on radiation.

## 2024-11-06 ENCOUNTER — CLINICAL DOCUMENTATION (OUTPATIENT)
Dept: RADIATION ONCOLOGY | Age: 69
End: 2024-11-06

## 2024-11-06 ENCOUNTER — TELEPHONE (OUTPATIENT)
Dept: INFUSION THERAPY | Age: 69
End: 2024-11-06

## 2024-11-06 ENCOUNTER — HOSPITAL ENCOUNTER (OUTPATIENT)
Dept: RADIATION ONCOLOGY | Age: 69
Discharge: HOME | End: 2024-11-06
Payer: MEDICARE

## 2024-11-06 PROCEDURE — 77334 RADIATION TREATMENT AID(S): CPT | Performed by: SPECIALIST

## 2024-11-06 PROCEDURE — 6360000004 HC RX CONTRAST MEDICATION: Performed by: SPECIALIST

## 2024-11-06 RX ORDER — IOPAMIDOL 755 MG/ML
100 INJECTION, SOLUTION INTRAVASCULAR
Status: COMPLETED | OUTPATIENT
Start: 2024-11-06 | End: 2024-11-06

## 2024-11-06 RX ADMIN — IOPAMIDOL 100 ML: 755 INJECTION, SOLUTION INTRAVENOUS at 14:56

## 2024-11-06 NOTE — PROGRESS NOTES
Referral sent to [facility] intake for TF teaching w/ new pump as well as updated orders sent to [facility] pharmacy. Spoke w/ Laura at [facility] who reports that pt will not be able to receive TF teaching at this time as the company will no longer be accepting one visit teachings d/t lack of reimbursement. Referral sent to [facility] for TF teaching. Will follow.  Electronically signed by Joseline Verma MS, RD, LD on 11/6/2024 at 12:55 PM

## 2024-11-07 ENCOUNTER — TELEPHONE (OUTPATIENT)
Dept: RADIATION ONCOLOGY | Age: 69
End: 2024-11-07

## 2024-11-07 DIAGNOSIS — Z51.5 ENCOUNTER FOR PALLIATIVE CARE: ICD-10-CM

## 2024-11-07 DIAGNOSIS — C34.82 MALIGNANT NEOPLASM OF OVERLAPPING SITES OF LEFT LUNG (HCC): ICD-10-CM

## 2024-11-07 DIAGNOSIS — G89.3 CHRONIC PAIN DUE TO NEOPLASM: ICD-10-CM

## 2024-11-07 RX ORDER — FENTANYL 75 UG/1
1 PATCH TRANSDERMAL
Qty: 10 PATCH | Refills: 0 | Status: ON HOLD | OUTPATIENT
Start: 2024-11-07 | End: 2024-12-07

## 2024-11-07 NOTE — TELEPHONE ENCOUNTER
Contacted [facility] for update on referral. Per Magaly, referral was received and pt has been accepted. Pt has not yet been scheduled for teaching. Magaly will have care coordinator contact this clinician once pt scheduled. Contacted [facility] to relay information, discussed w/ French. Will update to teaching date when able.  Electronically signed by Joseline Verma MS, RD, LD on 11/7/2024 at 11:42 AM      Received return call from Magaly, pt will be visited for TF teaching on 11/10 or 11/11. Information relayed to Ashley at [facility]. Ashley will contact pt's wife, Casie, to confirm delivery details for pump and supplies.  Electronically signed by Joseline Verma MS, RD, LD on 11/7/2024 at 11:58 AM

## 2024-11-07 NOTE — TELEPHONE ENCOUNTER
Call from Casie requesting a refill for Fentanyl patch as she has one left. Pharmacy is [facility] on [address]. Next maria r 11/18/24. Casie also updated this nurse that she was able to get Dom to radiation yesterday and they hope he starts treatment within the next 2 weeks. Much support and reassurance provided through active listening.

## 2024-11-08 ENCOUNTER — APPOINTMENT (OUTPATIENT)
Dept: CT IMAGING | Age: 69
DRG: 640 | End: 2024-11-08
Payer: MEDICARE

## 2024-11-08 ENCOUNTER — APPOINTMENT (OUTPATIENT)
Dept: GENERAL RADIOLOGY | Age: 69
DRG: 640 | End: 2024-11-08
Payer: MEDICARE

## 2024-11-08 ENCOUNTER — HOSPITAL ENCOUNTER (INPATIENT)
Age: 69
LOS: 15 days | Discharge: HOSPICE | DRG: 640 | End: 2024-11-23
Attending: EMERGENCY MEDICINE | Admitting: HOSPITALIST
Payer: MEDICARE

## 2024-11-08 DIAGNOSIS — C80.1 MALIGNANCY (HCC): ICD-10-CM

## 2024-11-08 DIAGNOSIS — R91.8 PULMONARY NODULES: ICD-10-CM

## 2024-11-08 DIAGNOSIS — E87.1 HYPONATREMIA: ICD-10-CM

## 2024-11-08 DIAGNOSIS — J90 LOCULATED PLEURAL EFFUSION: Primary | ICD-10-CM

## 2024-11-08 PROBLEM — K59.00 OBSTIPATION: Status: ACTIVE | Noted: 2024-11-08

## 2024-11-08 PROBLEM — C32.9 LARYNGEAL CANCER (HCC): Status: ACTIVE | Noted: 2024-11-08

## 2024-11-08 LAB
ALBUMIN SERPL-MCNC: 3.1 G/DL (ref 3.5–5.2)
ALP SERPL-CCNC: 83 U/L (ref 40–129)
ALT SERPL-CCNC: 17 U/L (ref 0–40)
ANION GAP SERPL CALCULATED.3IONS-SCNC: 7 MMOL/L (ref 7–16)
AST SERPL-CCNC: 28 U/L (ref 0–39)
B.E.: 1 MMOL/L (ref -3–3)
BASOPHILS # BLD: 0.04 K/UL (ref 0–0.2)
BASOPHILS NFR BLD: 1 % (ref 0–2)
BILIRUB SERPL-MCNC: 0.4 MG/DL (ref 0–1.2)
BILIRUB UR QL STRIP: NEGATIVE
BNP SERPL-MCNC: 122 PG/ML (ref 0–125)
BUN SERPL-MCNC: 6 MG/DL (ref 6–23)
CALCIUM SERPL-MCNC: 8.9 MG/DL (ref 8.6–10.2)
CHLORIDE SERPL-SCNC: 90 MMOL/L (ref 98–107)
CLARITY UR: CLEAR
CO2 SERPL-SCNC: 29 MMOL/L (ref 22–29)
COHB: 0.7 % (ref 0–1.5)
COLOR UR: YELLOW
CORTIS SERPL-MCNC: 2.4 UG/DL (ref 2.7–18.4)
CORTISOL COLLECTION INFO: ABNORMAL
CREAT SERPL-MCNC: 0.8 MG/DL (ref 0.7–1.2)
CRITICAL: ABNORMAL
DATE ANALYZED: ABNORMAL
DATE OF COLLECTION: ABNORMAL
EOSINOPHIL # BLD: 0.1 K/UL (ref 0.05–0.5)
EOSINOPHILS RELATIVE PERCENT: 1 % (ref 0–6)
ERYTHROCYTE [DISTWIDTH] IN BLOOD BY AUTOMATED COUNT: 14.9 % (ref 11.5–15)
GFR, ESTIMATED: >90 ML/MIN/1.73M2
GLUCOSE SERPL-MCNC: 146 MG/DL (ref 74–99)
GLUCOSE UR STRIP-MCNC: NEGATIVE MG/DL
HCO3: 26.1 MMOL/L (ref 22–26)
HCT VFR BLD AUTO: 35.7 % (ref 37–54)
HGB BLD-MCNC: 11.2 G/DL (ref 12.5–16.5)
HGB UR QL STRIP.AUTO: ABNORMAL
HHB: 5.2 % (ref 0–5)
IMM GRANULOCYTES # BLD AUTO: 0.04 K/UL (ref 0–0.58)
IMM GRANULOCYTES NFR BLD: 1 % (ref 0–5)
INFLUENZA A BY PCR: NOT DETECTED
INFLUENZA B BY PCR: NOT DETECTED
KETONES UR STRIP-MCNC: NEGATIVE MG/DL
LAB: ABNORMAL
LACTATE BLDV-SCNC: 0.8 MMOL/L (ref 0.5–2.2)
LEUKOCYTE ESTERASE UR QL STRIP: NEGATIVE
LYMPHOCYTES NFR BLD: 0.32 K/UL (ref 1.5–4)
LYMPHOCYTES RELATIVE PERCENT: 4 % (ref 20–42)
Lab: 1545
MCH RBC QN AUTO: 26 PG (ref 26–35)
MCHC RBC AUTO-ENTMCNC: 31.4 G/DL (ref 32–34.5)
MCV RBC AUTO: 82.8 FL (ref 80–99.9)
METHB: 0.3 % (ref 0–1.5)
MODE: ABNORMAL
MONOCYTES NFR BLD: 0.25 K/UL (ref 0.1–0.95)
MONOCYTES NFR BLD: 3 % (ref 2–12)
NEUTROPHILS NFR BLD: 91 % (ref 43–80)
NEUTS SEG NFR BLD: 7.46 K/UL (ref 1.8–7.3)
NITRITE UR QL STRIP: NEGATIVE
O2 CONTENT: 15.2 ML/DL
O2 SATURATION: 94.7 % (ref 92–98.5)
O2HB: 93.8 % (ref 94–97)
OPERATOR ID: 166
PATIENT TEMP: 37 C
PCO2: 43.3 MMHG (ref 35–45)
PH BLOOD GAS: 7.4 (ref 7.35–7.45)
PH UR STRIP: 6.5 [PH] (ref 5–9)
PLATELET # BLD AUTO: 343 K/UL (ref 130–450)
PMV BLD AUTO: 10.1 FL (ref 7–12)
PO2: 76.7 MMHG (ref 75–100)
POTASSIUM SERPL-SCNC: 4.3 MMOL/L (ref 3.5–5)
PROT SERPL-MCNC: 6.2 G/DL (ref 6.4–8.3)
PROT UR STRIP-MCNC: NEGATIVE MG/DL
RBC # BLD AUTO: 4.31 M/UL (ref 3.8–5.8)
RBC # BLD: NORMAL 10*6/UL
RBC #/AREA URNS HPF: ABNORMAL /HPF
SARS-COV-2 RDRP RESP QL NAA+PROBE: NOT DETECTED
SODIUM SERPL-SCNC: 126 MMOL/L (ref 132–146)
SOURCE, BLOOD GAS: ABNORMAL
SP GR UR STRIP: <1.005 (ref 1–1.03)
SPECIMEN DESCRIPTION: NORMAL
THB: 11.5 G/DL (ref 11.5–16.5)
TIME ANALYZED: 1600
TROPONIN I SERPL HS-MCNC: 33 NG/L (ref 0–11)
TROPONIN I SERPL HS-MCNC: 34 NG/L (ref 0–11)
UROBILINOGEN UR STRIP-ACNC: 0.2 EU/DL (ref 0–1)
WBC #/AREA URNS HPF: ABNORMAL /HPF
WBC OTHER # BLD: 8.2 K/UL (ref 4.5–11.5)

## 2024-11-08 PROCEDURE — 87502 INFLUENZA DNA AMP PROBE: CPT

## 2024-11-08 PROCEDURE — 83605 ASSAY OF LACTIC ACID: CPT

## 2024-11-08 PROCEDURE — 0202U NFCT DS 22 TRGT SARS-COV-2: CPT

## 2024-11-08 PROCEDURE — 82533 TOTAL CORTISOL: CPT

## 2024-11-08 PROCEDURE — 71250 CT THORAX DX C-: CPT

## 2024-11-08 PROCEDURE — 96360 HYDRATION IV INFUSION INIT: CPT

## 2024-11-08 PROCEDURE — 93005 ELECTROCARDIOGRAM TRACING: CPT

## 2024-11-08 PROCEDURE — 2060000000 HC ICU INTERMEDIATE R&B

## 2024-11-08 PROCEDURE — 99223 1ST HOSP IP/OBS HIGH 75: CPT | Performed by: INTERNAL MEDICINE

## 2024-11-08 PROCEDURE — 87635 SARS-COV-2 COVID-19 AMP PRB: CPT

## 2024-11-08 PROCEDURE — 2580000003 HC RX 258

## 2024-11-08 PROCEDURE — 70450 CT HEAD/BRAIN W/O DYE: CPT

## 2024-11-08 PROCEDURE — 84484 ASSAY OF TROPONIN QUANT: CPT

## 2024-11-08 PROCEDURE — 6370000000 HC RX 637 (ALT 250 FOR IP)

## 2024-11-08 PROCEDURE — 82805 BLOOD GASES W/O2 SATURATION: CPT

## 2024-11-08 PROCEDURE — 80053 COMPREHEN METABOLIC PANEL: CPT

## 2024-11-08 PROCEDURE — 83880 ASSAY OF NATRIURETIC PEPTIDE: CPT

## 2024-11-08 PROCEDURE — 74018 RADEX ABDOMEN 1 VIEW: CPT

## 2024-11-08 PROCEDURE — 71045 X-RAY EXAM CHEST 1 VIEW: CPT

## 2024-11-08 PROCEDURE — 85025 COMPLETE CBC W/AUTO DIFF WBC: CPT

## 2024-11-08 PROCEDURE — 99285 EMERGENCY DEPT VISIT HI MDM: CPT

## 2024-11-08 PROCEDURE — 81001 URINALYSIS AUTO W/SCOPE: CPT

## 2024-11-08 RX ORDER — MIDODRINE HYDROCHLORIDE 5 MG/1
10 TABLET ORAL ONCE
Status: COMPLETED | OUTPATIENT
Start: 2024-11-08 | End: 2024-11-08

## 2024-11-08 RX ORDER — HYDROCODONE BITARTRATE AND ACETAMINOPHEN 5; 325 MG/1; MG/1
1 TABLET ORAL ONCE
Status: COMPLETED | OUTPATIENT
Start: 2024-11-08 | End: 2024-11-08

## 2024-11-08 RX ORDER — SENNOSIDES A AND B 8.6 MG/1
1 TABLET, FILM COATED ORAL NIGHTLY
Status: DISCONTINUED | OUTPATIENT
Start: 2024-11-08 | End: 2024-11-09

## 2024-11-08 RX ORDER — 0.9 % SODIUM CHLORIDE 0.9 %
1000 INTRAVENOUS SOLUTION INTRAVENOUS ONCE
Status: COMPLETED | OUTPATIENT
Start: 2024-11-08 | End: 2024-11-08

## 2024-11-08 RX ORDER — FENTANYL CITRATE 50 UG/ML
50 INJECTION, SOLUTION INTRAMUSCULAR; INTRAVENOUS ONCE
Status: DISCONTINUED | OUTPATIENT
Start: 2024-11-08 | End: 2024-11-08

## 2024-11-08 RX ADMIN — HYDROCODONE BITARTRATE AND ACETAMINOPHEN 1 TABLET: 5; 325 TABLET ORAL at 18:18

## 2024-11-08 RX ADMIN — SODIUM CHLORIDE 1000 ML: 9 INJECTION, SOLUTION INTRAVENOUS at 15:34

## 2024-11-08 RX ADMIN — MIDODRINE HYDROCHLORIDE 10 MG: 5 TABLET ORAL at 18:17

## 2024-11-08 ASSESSMENT — PAIN SCALES - GENERAL
PAINLEVEL_OUTOF10: 7
PAINLEVEL_OUTOF10: 9
PAINLEVEL_OUTOF10: 7
PAINLEVEL_OUTOF10: 8
PAINLEVEL_OUTOF10: 3

## 2024-11-08 ASSESSMENT — PAIN DESCRIPTION - ORIENTATION: ORIENTATION: LOWER

## 2024-11-08 ASSESSMENT — PAIN DESCRIPTION - LOCATION
LOCATION: ABDOMEN;GROIN
LOCATION: HEAD
LOCATION: NECK;HEAD
LOCATION: GENERALIZED

## 2024-11-08 ASSESSMENT — PAIN - FUNCTIONAL ASSESSMENT
PAIN_FUNCTIONAL_ASSESSMENT: ACTIVITIES ARE NOT PREVENTED
PAIN_FUNCTIONAL_ASSESSMENT: 0-10
PAIN_FUNCTIONAL_ASSESSMENT: ACTIVITIES ARE NOT PREVENTED

## 2024-11-08 ASSESSMENT — PAIN DESCRIPTION - FREQUENCY: FREQUENCY: CONTINUOUS

## 2024-11-08 ASSESSMENT — PAIN DESCRIPTION - ONSET: ONSET: ON-GOING

## 2024-11-08 ASSESSMENT — PAIN DESCRIPTION - PAIN TYPE: TYPE: ACUTE PAIN

## 2024-11-08 NOTE — ED PROVIDER NOTES
[facility] EMERGENCY DEPARTMENT  EMERGENCY DEPARTMENT ENCOUNTER        Pt Name: Alan Guajardo Jr.  MRN: [id number]  Birthdate 1955  Date of evaluation: 11/8/2024  Provider: Ady Pennington MD  PCP: Sylvia Quintana DO  Note Started: 2:35 PM EST 11/8/24    CHIEF COMPLAINT       Chief Complaint   Patient presents with    Generalized Body Aches     Feeling weak over the last few days, hx of throat ca, has feeding tube, low urine output recently, tracheostomy and on 6 L at baseline       HISTORY OF PRESENT ILLNESS: 1 or more Elements   History From: Patient and spouse    Limitations to history : None    Alan Guajardo Jr. is a 69 y.o. male with past medical history of COPD, emphysema, trach dependent, nicotine dependence, dysphagia, PEG tube dependent, anemia, osteoradionecrosis of the jaw, malignant neoplasm of the larynx, osteoarthritis, BPH, hypothyroidism, stage III CKD, esophageal stricture, hyperlipidemia, DVT, sacroiliac dysfunction, degenerative disc disease, neuropathy, GERD, malignant neoplasm of the lung, AAA without rupture, depression, anxiety who presents from home with complaints of generalized bodyaches, failure to thrive, generalized weakness has been ongoing for the past several days.  Patient does have history of head and neck cancer and is trach dependent via 6 L nasal cannula trach mask.  Patient's spouse at bedside states patient has had generalized weakness and has had decreased urine output over the past several days but denies any fevers or chills.  Patient is PEG tube dependent and has been tolerating tube feeds at this time.  Patient denies any chest pain, shortness of breath, headache, blurry vision, numbness or tingling in extremities, dizziness or lightheadedness, abdominal pain, nausea, vomit, diarrhea, dysuria, hematuria, falls or trauma.  Patient denies any tobacco, EtOH illicit drug use.    Nursing Notes were all reviewed and agreed

## 2024-11-09 PROBLEM — E43 SEVERE PROTEIN-CALORIE MALNUTRITION (HCC): Status: ACTIVE | Noted: 2017-05-27

## 2024-11-09 LAB
ALBUMIN SERPL-MCNC: 2.9 G/DL (ref 3.5–5.2)
ALP SERPL-CCNC: 74 U/L (ref 40–129)
ALT SERPL-CCNC: 12 U/L (ref 0–40)
ANION GAP SERPL CALCULATED.3IONS-SCNC: 8 MMOL/L (ref 7–16)
AST SERPL-CCNC: 16 U/L (ref 0–39)
B PARAP IS1001 DNA NPH QL NAA+NON-PROBE: NOT DETECTED
B PERT DNA SPEC QL NAA+PROBE: NOT DETECTED
BASOPHILS # BLD: 0.03 K/UL (ref 0–0.2)
BASOPHILS NFR BLD: 1 % (ref 0–2)
BILIRUB SERPL-MCNC: 0.4 MG/DL (ref 0–1.2)
BUN SERPL-MCNC: 7 MG/DL (ref 6–23)
C PNEUM DNA NPH QL NAA+NON-PROBE: NOT DETECTED
CALCIUM SERPL-MCNC: 8.8 MG/DL (ref 8.6–10.2)
CHLORIDE SERPL-SCNC: 96 MMOL/L (ref 98–107)
CO2 SERPL-SCNC: 29 MMOL/L (ref 22–29)
CREAT SERPL-MCNC: 0.8 MG/DL (ref 0.7–1.2)
EOSINOPHIL # BLD: 0.07 K/UL (ref 0.05–0.5)
EOSINOPHILS RELATIVE PERCENT: 1 % (ref 0–6)
ERYTHROCYTE [DISTWIDTH] IN BLOOD BY AUTOMATED COUNT: 14.7 % (ref 11.5–15)
FERRITIN SERPL-MCNC: 213 NG/ML
FLUAV RNA NPH QL NAA+NON-PROBE: NOT DETECTED
FLUBV RNA NPH QL NAA+NON-PROBE: NOT DETECTED
GFR, ESTIMATED: >90 ML/MIN/1.73M2
GLUCOSE SERPL-MCNC: 87 MG/DL (ref 74–99)
HADV DNA NPH QL NAA+NON-PROBE: NOT DETECTED
HCOV 229E RNA NPH QL NAA+NON-PROBE: NOT DETECTED
HCOV HKU1 RNA NPH QL NAA+NON-PROBE: NOT DETECTED
HCOV NL63 RNA NPH QL NAA+NON-PROBE: NOT DETECTED
HCOV OC43 RNA NPH QL NAA+NON-PROBE: NOT DETECTED
HCT VFR BLD AUTO: 31.4 % (ref 37–54)
HGB BLD-MCNC: 10 G/DL (ref 12.5–16.5)
HMPV RNA NPH QL NAA+NON-PROBE: NOT DETECTED
HPIV1 RNA NPH QL NAA+NON-PROBE: NOT DETECTED
HPIV2 RNA NPH QL NAA+NON-PROBE: NOT DETECTED
HPIV3 RNA NPH QL NAA+NON-PROBE: NOT DETECTED
HPIV4 RNA NPH QL NAA+NON-PROBE: NOT DETECTED
IMM GRANULOCYTES # BLD AUTO: <0.03 K/UL (ref 0–0.58)
IMM GRANULOCYTES NFR BLD: 0 % (ref 0–5)
IRON SATN MFR SERPL: 19 % (ref 20–55)
IRON SERPL-MCNC: 39 UG/DL (ref 59–158)
LACTATE BLDV-SCNC: 0.8 MMOL/L (ref 0.5–2.2)
LYMPHOCYTES NFR BLD: 0.47 K/UL (ref 1.5–4)
LYMPHOCYTES RELATIVE PERCENT: 8 % (ref 20–42)
M PNEUMO DNA NPH QL NAA+NON-PROBE: NOT DETECTED
MAGNESIUM SERPL-MCNC: 1.8 MG/DL (ref 1.6–2.6)
MCH RBC QN AUTO: 26.2 PG (ref 26–35)
MCHC RBC AUTO-ENTMCNC: 31.8 G/DL (ref 32–34.5)
MCV RBC AUTO: 82.4 FL (ref 80–99.9)
MONOCYTES NFR BLD: 0.27 K/UL (ref 0.1–0.95)
MONOCYTES NFR BLD: 5 % (ref 2–12)
NEUTROPHILS NFR BLD: 85 % (ref 43–80)
NEUTS SEG NFR BLD: 4.75 K/UL (ref 1.8–7.3)
PHOSPHATE SERPL-MCNC: 2.9 MG/DL (ref 2.5–4.5)
PLATELET # BLD AUTO: 270 K/UL (ref 130–450)
PMV BLD AUTO: 9.5 FL (ref 7–12)
POTASSIUM SERPL-SCNC: 4 MMOL/L (ref 3.5–5)
PROCALCITONIN SERPL-MCNC: 0.09 NG/ML (ref 0–0.08)
PROT SERPL-MCNC: 5.7 G/DL (ref 6.4–8.3)
RBC # BLD AUTO: 3.81 M/UL (ref 3.8–5.8)
RBC # BLD: ABNORMAL 10*6/UL
RBC # BLD: ABNORMAL 10*6/UL
RSV RNA NPH QL NAA+NON-PROBE: NOT DETECTED
RV+EV RNA NPH QL NAA+NON-PROBE: NOT DETECTED
SARS-COV-2 RNA NPH QL NAA+NON-PROBE: NOT DETECTED
SODIUM SERPL-SCNC: 133 MMOL/L (ref 132–146)
SPECIMEN DESCRIPTION: NORMAL
TIBC SERPL-MCNC: 209 UG/DL (ref 250–450)
WBC OTHER # BLD: 5.6 K/UL (ref 4.5–11.5)

## 2024-11-09 PROCEDURE — 6370000000 HC RX 637 (ALT 250 FOR IP): Performed by: NURSE PRACTITIONER

## 2024-11-09 PROCEDURE — 94664 DEMO&/EVAL PT USE INHALER: CPT

## 2024-11-09 PROCEDURE — 82607 VITAMIN B-12: CPT

## 2024-11-09 PROCEDURE — 82746 ASSAY OF FOLIC ACID SERUM: CPT

## 2024-11-09 PROCEDURE — 99232 SBSQ HOSP IP/OBS MODERATE 35: CPT | Performed by: STUDENT IN AN ORGANIZED HEALTH CARE EDUCATION/TRAINING PROGRAM

## 2024-11-09 PROCEDURE — 87077 CULTURE AEROBIC IDENTIFY: CPT

## 2024-11-09 PROCEDURE — 2060000000 HC ICU INTERMEDIATE R&B

## 2024-11-09 PROCEDURE — 84100 ASSAY OF PHOSPHORUS: CPT

## 2024-11-09 PROCEDURE — 80053 COMPREHEN METABOLIC PANEL: CPT

## 2024-11-09 PROCEDURE — 94640 AIRWAY INHALATION TREATMENT: CPT

## 2024-11-09 PROCEDURE — 2500000003 HC RX 250 WO HCPCS: Performed by: CLINICAL NURSE SPECIALIST

## 2024-11-09 PROCEDURE — 6370000000 HC RX 637 (ALT 250 FOR IP): Performed by: INTERNAL MEDICINE

## 2024-11-09 PROCEDURE — 2580000003 HC RX 258: Performed by: INTERNAL MEDICINE

## 2024-11-09 PROCEDURE — 87205 SMEAR GRAM STAIN: CPT

## 2024-11-09 PROCEDURE — 83550 IRON BINDING TEST: CPT

## 2024-11-09 PROCEDURE — 85025 COMPLETE CBC W/AUTO DIFF WBC: CPT

## 2024-11-09 PROCEDURE — 84145 PROCALCITONIN (PCT): CPT

## 2024-11-09 PROCEDURE — 6360000002 HC RX W HCPCS: Performed by: INTERNAL MEDICINE

## 2024-11-09 PROCEDURE — 82728 ASSAY OF FERRITIN: CPT

## 2024-11-09 PROCEDURE — 6370000000 HC RX 637 (ALT 250 FOR IP): Performed by: STUDENT IN AN ORGANIZED HEALTH CARE EDUCATION/TRAINING PROGRAM

## 2024-11-09 PROCEDURE — 83540 ASSAY OF IRON: CPT

## 2024-11-09 PROCEDURE — 2580000003 HC RX 258: Performed by: CLINICAL NURSE SPECIALIST

## 2024-11-09 PROCEDURE — 6360000002 HC RX W HCPCS: Performed by: CLINICAL NURSE SPECIALIST

## 2024-11-09 PROCEDURE — 87070 CULTURE OTHR SPECIMN AEROBIC: CPT

## 2024-11-09 PROCEDURE — 83735 ASSAY OF MAGNESIUM: CPT

## 2024-11-09 PROCEDURE — 0HBRXZZ EXCISION OF TOE NAIL, EXTERNAL APPROACH: ICD-10-PCS

## 2024-11-09 PROCEDURE — 83605 ASSAY OF LACTIC ACID: CPT

## 2024-11-09 RX ORDER — PREDNISONE 5 MG/1
10 TABLET ORAL EVERY EVENING
Status: DISCONTINUED | OUTPATIENT
Start: 2024-11-09 | End: 2024-11-09

## 2024-11-09 RX ORDER — ARFORMOTEROL TARTRATE 15 UG/2ML
15 SOLUTION RESPIRATORY (INHALATION)
Status: DISCONTINUED | OUTPATIENT
Start: 2024-11-09 | End: 2024-11-23 | Stop reason: HOSPADM

## 2024-11-09 RX ORDER — LEVOTHYROXINE SODIUM 100 UG/1
100 TABLET ORAL
Status: DISCONTINUED | OUTPATIENT
Start: 2024-11-09 | End: 2024-11-23 | Stop reason: HOSPADM

## 2024-11-09 RX ORDER — OXYCODONE HCL 10 MG/1
10 TABLET, FILM COATED, EXTENDED RELEASE ORAL 2 TIMES DAILY
Status: DISCONTINUED | OUTPATIENT
Start: 2024-11-09 | End: 2024-11-09

## 2024-11-09 RX ORDER — PREGABALIN 75 MG/1
150 CAPSULE ORAL 3 TIMES DAILY
Status: DISCONTINUED | OUTPATIENT
Start: 2024-11-09 | End: 2024-11-09

## 2024-11-09 RX ORDER — ATORVASTATIN CALCIUM 20 MG/1
20 TABLET, FILM COATED ORAL DAILY
Status: DISCONTINUED | OUTPATIENT
Start: 2024-11-09 | End: 2024-11-09

## 2024-11-09 RX ORDER — ONDANSETRON 4 MG/1
4 TABLET, ORALLY DISINTEGRATING ORAL EVERY 8 HOURS PRN
Status: DISCONTINUED | OUTPATIENT
Start: 2024-11-09 | End: 2024-11-23 | Stop reason: HOSPADM

## 2024-11-09 RX ORDER — ACETAMINOPHEN 325 MG/1
650 TABLET ORAL EVERY 6 HOURS PRN
Status: DISCONTINUED | OUTPATIENT
Start: 2024-11-09 | End: 2024-11-23 | Stop reason: HOSPADM

## 2024-11-09 RX ORDER — ALBUTEROL SULFATE 0.63 MG/3ML
1 SOLUTION RESPIRATORY (INHALATION) EVERY 6 HOURS PRN
Status: DISCONTINUED | OUTPATIENT
Start: 2024-11-09 | End: 2024-11-23 | Stop reason: HOSPADM

## 2024-11-09 RX ORDER — METOCLOPRAMIDE HYDROCHLORIDE 5 MG/5ML
10 SOLUTION ORAL
Status: DISCONTINUED | OUTPATIENT
Start: 2024-11-09 | End: 2024-11-09

## 2024-11-09 RX ORDER — DULOXETINE HCL 100 %
30 POWDER (GRAM) MISCELLANEOUS 2 TIMES DAILY
Status: DISCONTINUED | OUTPATIENT
Start: 2024-11-09 | End: 2024-11-23 | Stop reason: HOSPADM

## 2024-11-09 RX ORDER — BUDESONIDE 0.5 MG/2ML
500 INHALANT ORAL
Status: DISCONTINUED | OUTPATIENT
Start: 2024-11-09 | End: 2024-11-23 | Stop reason: HOSPADM

## 2024-11-09 RX ORDER — SODIUM CHLORIDE 9 MG/ML
INJECTION, SOLUTION INTRAVENOUS PRN
Status: DISCONTINUED | OUTPATIENT
Start: 2024-11-09 | End: 2024-11-23 | Stop reason: HOSPADM

## 2024-11-09 RX ORDER — TAMSULOSIN HYDROCHLORIDE 0.4 MG/1
0.4 CAPSULE ORAL EVERY EVENING
Status: DISCONTINUED | OUTPATIENT
Start: 2024-11-09 | End: 2024-11-09

## 2024-11-09 RX ORDER — ATORVASTATIN CALCIUM 20 MG/1
20 TABLET, FILM COATED ORAL DAILY
Status: DISCONTINUED | OUTPATIENT
Start: 2024-11-09 | End: 2024-11-23

## 2024-11-09 RX ORDER — ONDANSETRON 2 MG/ML
4 INJECTION INTRAMUSCULAR; INTRAVENOUS EVERY 6 HOURS PRN
Status: DISCONTINUED | OUTPATIENT
Start: 2024-11-09 | End: 2024-11-23 | Stop reason: HOSPADM

## 2024-11-09 RX ORDER — METOCLOPRAMIDE HYDROCHLORIDE 5 MG/5ML
10 SOLUTION ORAL
Status: DISCONTINUED | OUTPATIENT
Start: 2024-11-09 | End: 2024-11-23 | Stop reason: HOSPADM

## 2024-11-09 RX ORDER — SILODOSIN 4 MG/1
4 CAPSULE ORAL EVERY EVENING
COMMUNITY

## 2024-11-09 RX ORDER — MIDODRINE HYDROCHLORIDE 5 MG/1
5 TABLET ORAL 2 TIMES DAILY
Status: DISCONTINUED | OUTPATIENT
Start: 2024-11-09 | End: 2024-11-09

## 2024-11-09 RX ORDER — MIDODRINE HYDROCHLORIDE 5 MG/1
5 TABLET ORAL 2 TIMES DAILY
Status: DISCONTINUED | OUTPATIENT
Start: 2024-11-09 | End: 2024-11-15

## 2024-11-09 RX ORDER — LEVOTHYROXINE SODIUM 100 UG/1
100 TABLET ORAL
Status: DISCONTINUED | OUTPATIENT
Start: 2024-11-09 | End: 2024-11-09

## 2024-11-09 RX ORDER — FENTANYL 75 UG/1
1 PATCH TRANSDERMAL
Status: DISCONTINUED | OUTPATIENT
Start: 2024-11-09 | End: 2024-11-10

## 2024-11-09 RX ORDER — DULOXETIN HYDROCHLORIDE 30 MG/1
30 CAPSULE, DELAYED RELEASE ORAL 2 TIMES DAILY
Status: DISCONTINUED | OUTPATIENT
Start: 2024-11-09 | End: 2024-11-09 | Stop reason: CLARIF

## 2024-11-09 RX ORDER — POLYETHYLENE GLYCOL 3350 17 G/17G
17 POWDER, FOR SOLUTION ORAL DAILY PRN
Status: DISCONTINUED | OUTPATIENT
Start: 2024-11-09 | End: 2024-11-15

## 2024-11-09 RX ORDER — ACETAMINOPHEN 650 MG/1
650 SUPPOSITORY RECTAL EVERY 6 HOURS PRN
Status: DISCONTINUED | OUTPATIENT
Start: 2024-11-09 | End: 2024-11-23 | Stop reason: HOSPADM

## 2024-11-09 RX ORDER — METHYLPREDNISOLONE SODIUM SUCCINATE 40 MG/ML
40 INJECTION INTRAMUSCULAR; INTRAVENOUS EVERY 12 HOURS
Status: DISCONTINUED | OUTPATIENT
Start: 2024-11-09 | End: 2024-11-11

## 2024-11-09 RX ORDER — SODIUM CHLORIDE 0.9 % (FLUSH) 0.9 %
5-40 SYRINGE (ML) INJECTION EVERY 12 HOURS SCHEDULED
Status: DISCONTINUED | OUTPATIENT
Start: 2024-11-09 | End: 2024-11-23 | Stop reason: HOSPADM

## 2024-11-09 RX ORDER — SILODOSIN 4 MG/1
4 CAPSULE ORAL DAILY
Status: DISCONTINUED | OUTPATIENT
Start: 2024-11-09 | End: 2024-11-23 | Stop reason: HOSPADM

## 2024-11-09 RX ORDER — SODIUM CHLORIDE 0.9 % (FLUSH) 0.9 %
5-40 SYRINGE (ML) INJECTION PRN
Status: DISCONTINUED | OUTPATIENT
Start: 2024-11-09 | End: 2024-11-23 | Stop reason: HOSPADM

## 2024-11-09 RX ORDER — METOCLOPRAMIDE 10 MG/1
10 TABLET ORAL
Status: DISCONTINUED | OUTPATIENT
Start: 2024-11-09 | End: 2024-11-09

## 2024-11-09 RX ORDER — PREGABALIN 75 MG/1
150 CAPSULE ORAL 3 TIMES DAILY
Status: DISCONTINUED | OUTPATIENT
Start: 2024-11-09 | End: 2024-11-23 | Stop reason: HOSPADM

## 2024-11-09 RX ADMIN — APIXABAN 2.5 MG: 2.5 TABLET, FILM COATED ORAL at 09:14

## 2024-11-09 RX ADMIN — MICONAZOLE NITRATE: 2 OINTMENT TOPICAL at 20:37

## 2024-11-09 RX ADMIN — METOCLOPRAMIDE HYDROCHLORIDE 10 MG: 5 SOLUTION ORAL at 17:55

## 2024-11-09 RX ADMIN — PREDNISONE 10 MG: 5 TABLET ORAL at 02:01

## 2024-11-09 RX ADMIN — ATORVASTATIN CALCIUM 20 MG: 20 TABLET, FILM COATED ORAL at 02:00

## 2024-11-09 RX ADMIN — MIDODRINE HYDROCHLORIDE 5 MG: 5 TABLET ORAL at 09:14

## 2024-11-09 RX ADMIN — TAMSULOSIN HYDROCHLORIDE 0.4 MG: 0.4 CAPSULE ORAL at 02:01

## 2024-11-09 RX ADMIN — SODIUM CHLORIDE, PRESERVATIVE FREE 10 ML: 5 INJECTION INTRAVENOUS at 20:32

## 2024-11-09 RX ADMIN — OXYCODONE 20 MG: 15 TABLET ORAL at 09:26

## 2024-11-09 RX ADMIN — METOCLOPRAMIDE HYDROCHLORIDE 10 MG: 5 SOLUTION ORAL at 13:49

## 2024-11-09 RX ADMIN — ARFORMOTEROL TARTRATE 15 MCG: 15 SOLUTION RESPIRATORY (INHALATION) at 20:48

## 2024-11-09 RX ADMIN — DULOXETINE HYDROCHLORIDE 30 MG: 30 CAPSULE, DELAYED RELEASE ORAL at 02:00

## 2024-11-09 RX ADMIN — PREGABALIN 150 MG: 75 CAPSULE ORAL at 02:00

## 2024-11-09 RX ADMIN — BUDESONIDE INHALATION 500 MCG: 0.5 SUSPENSION RESPIRATORY (INHALATION) at 20:48

## 2024-11-09 RX ADMIN — PREGABALIN 150 MG: 75 CAPSULE ORAL at 13:50

## 2024-11-09 RX ADMIN — METHYLPREDNISOLONE SODIUM SUCCINATE 40 MG: 40 INJECTION INTRAMUSCULAR; INTRAVENOUS at 14:56

## 2024-11-09 RX ADMIN — PREGABALIN 150 MG: 75 CAPSULE ORAL at 20:14

## 2024-11-09 RX ADMIN — APIXABAN 2.5 MG: 2.5 TABLET, FILM COATED ORAL at 02:01

## 2024-11-09 RX ADMIN — OXYCODONE 20 MG: 15 TABLET ORAL at 18:03

## 2024-11-09 RX ADMIN — OXYCODONE 20 MG: 15 TABLET ORAL at 06:03

## 2024-11-09 RX ADMIN — METOCLOPRAMIDE HYDROCHLORIDE 10 MG: 5 SOLUTION ORAL at 02:01

## 2024-11-09 RX ADMIN — Medication 30 MG: at 20:21

## 2024-11-09 RX ADMIN — DOXYCYCLINE 100 MG: 100 INJECTION, POWDER, LYOPHILIZED, FOR SOLUTION INTRAVENOUS at 15:03

## 2024-11-09 RX ADMIN — OXYCODONE 20 MG: 15 TABLET ORAL at 02:00

## 2024-11-09 RX ADMIN — METOCLOPRAMIDE HYDROCHLORIDE 10 MG: 5 SOLUTION ORAL at 20:16

## 2024-11-09 RX ADMIN — PREGABALIN 150 MG: 75 CAPSULE ORAL at 09:14

## 2024-11-09 RX ADMIN — BUDESONIDE INHALATION 500 MCG: 0.5 SUSPENSION RESPIRATORY (INHALATION) at 07:55

## 2024-11-09 RX ADMIN — SODIUM CHLORIDE, PRESERVATIVE FREE 10 ML: 5 INJECTION INTRAVENOUS at 09:31

## 2024-11-09 RX ADMIN — OXYCODONE 20 MG: 15 TABLET ORAL at 13:50

## 2024-11-09 RX ADMIN — SENNOSIDES 8.8 MG: 8.8 LIQUID ORAL at 06:02

## 2024-11-09 RX ADMIN — ARFORMOTEROL TARTRATE 15 MCG: 15 SOLUTION RESPIRATORY (INHALATION) at 07:55

## 2024-11-09 RX ADMIN — OXYCODONE 20 MG: 15 TABLET ORAL at 22:44

## 2024-11-09 RX ADMIN — SILODOSIN 4 MG: 4 CAPSULE ORAL at 20:17

## 2024-11-09 RX ADMIN — APIXABAN 2.5 MG: 2.5 TABLET, FILM COATED ORAL at 20:16

## 2024-11-09 RX ADMIN — DULOXETINE HYDROCHLORIDE 30 MG: 30 CAPSULE, DELAYED RELEASE ORAL at 09:14

## 2024-11-09 RX ADMIN — METOCLOPRAMIDE HYDROCHLORIDE 10 MG: 5 SOLUTION ORAL at 06:02

## 2024-11-09 RX ADMIN — MIDODRINE HYDROCHLORIDE 5 MG: 5 TABLET ORAL at 02:00

## 2024-11-09 RX ADMIN — MICONAZOLE NITRATE: 2 OINTMENT TOPICAL at 13:52

## 2024-11-09 RX ADMIN — LEVOTHYROXINE SODIUM 100 MCG: 100 TABLET ORAL at 06:02

## 2024-11-09 RX ADMIN — WATER 1000 MG: 1 INJECTION INTRAMUSCULAR; INTRAVENOUS; SUBCUTANEOUS at 14:59

## 2024-11-09 RX ADMIN — MIDODRINE HYDROCHLORIDE 5 MG: 5 TABLET ORAL at 17:55

## 2024-11-09 ASSESSMENT — PAIN DESCRIPTION - DESCRIPTORS
DESCRIPTORS: ACHING;DISCOMFORT
DESCRIPTORS: ACHING
DESCRIPTORS: ACHING;DISCOMFORT
DESCRIPTORS: PATIENT UNABLE TO DESCRIBE
DESCRIPTORS: DISCOMFORT
DESCRIPTORS: ACHING;DISCOMFORT

## 2024-11-09 ASSESSMENT — PAIN DESCRIPTION - ORIENTATION
ORIENTATION: RIGHT
ORIENTATION: POSTERIOR
ORIENTATION: RIGHT

## 2024-11-09 ASSESSMENT — PAIN SCALES - GENERAL
PAINLEVEL_OUTOF10: 0
PAINLEVEL_OUTOF10: 0
PAINLEVEL_OUTOF10: 7
PAINLEVEL_OUTOF10: 8
PAINLEVEL_OUTOF10: 3
PAINLEVEL_OUTOF10: 7
PAINLEVEL_OUTOF10: 8
PAINLEVEL_OUTOF10: 7
PAINLEVEL_OUTOF10: 7
PAINLEVEL_OUTOF10: 0
PAINLEVEL_OUTOF10: 7
PAINLEVEL_OUTOF10: 8

## 2024-11-09 ASSESSMENT — PAIN DESCRIPTION - LOCATION
LOCATION: FACE
LOCATION: NECK
LOCATION: FACE
LOCATION: FACE
LOCATION: NECK
LOCATION: NECK;GENERALIZED
LOCATION: NECK

## 2024-11-09 ASSESSMENT — PAIN DESCRIPTION - FREQUENCY: FREQUENCY: CONTINUOUS

## 2024-11-09 ASSESSMENT — PAIN DESCRIPTION - PAIN TYPE: TYPE: ACUTE PAIN

## 2024-11-09 ASSESSMENT — PAIN DESCRIPTION - ONSET: ONSET: ON-GOING

## 2024-11-09 NOTE — H&P
[facility]   History and Physical      CHIEF COMPLAINT:  fatigue    History of Present Illness: pt is a 69 y.o. male who presents to hospital for fatigue. Fatigue has been severe. Pt with generalized weakness as well. Pt had keytruda last week along with an additional chemo med last week. Pt had peg tube and has been tolerating tube feeds. Wife gives feedings and his meds. Pt able to take meds well but feels full when getting water flushes and asks her to stop before finished. According to wife, pt has not urinated for 3 days. She relates that pt has been without flomax but recently started a newly compounded form. According to wife, pt has not had a bm for two weeks. Pt has stopped his senna for months. Pt on narcotic medication. Pt has laryngeal cancer and is being considered for new treatments.  Pt denies cp, sob, abd pain, n/v, diarrhea, melena, hematochezia, dysuria, or hematuria.    REVIEW OF SYSTEMS:    A detailed system review was conducted with patient and is negative unless stated in hpi.        PMH:  Past Medical History:   Diagnosis Date    Abdominal aortic aneurysm without rupture (HCC) 08/27/2018    follows with Dr Panchal    Acute bronchitis with COPD (HCC) 05/27/2017    Arthritis     COPD (chronic obstructive pulmonary disease) (HCC)     Decreased dorsalis pedis pulse 11/04/2020    Depression with anxiety     Emphysema [facility] lung (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

## 2024-11-09 NOTE — ED NOTES
ED to Inpatient Handoff Report    Notified Tiffanie that electronic handoff available and patient ready for transport to room 405.    Safety Risks: Risk of falls    Patient in Restraints: no    Constant Observer or Patient : no    Telemetry Monitoring Ordered: Yes     Cardiac Rhythm: Sinus rhythm    Order to transfer to unit without monitor: NO    Last MEWS: 3 Time completed: 2052    Deterioration Index: 29.01    Vitals:    11/08/24 1841 11/08/24 1912 11/08/24 2052 11/08/24 2052   BP: 106/73 104/72  96/72   Pulse:   66 66   Resp:   21 22   Temp:    98.5 °F (36.9 °C)   TempSrc:       SpO2: 98% 97% 97% 97%   Weight:       Height:           Opportunity for questions and clarification was provided.

## 2024-11-09 NOTE — PLAN OF CARE
Problem: Discharge Planning  Goal: Discharge to home or other facility with appropriate resources  Outcome: Progressing     Problem: Safety - Adult  Goal: Free from fall injury  Outcome: Progressing     Problem: ABCDS Injury Assessment  Goal: Absence of physical injury  Outcome: Progressing     Problem: Neurosensory - Adult  Goal: Achieves stable or improved neurological status  Outcome: Progressing     Problem: Neurosensory - Adult  Goal: Absence of seizures  Outcome: Progressing     Problem: Respiratory - Adult  Goal: Achieves optimal ventilation and oxygenation  Outcome: Progressing     Problem: Cardiovascular - Adult  Goal: Maintains optimal cardiac output and hemodynamic stability  Outcome: Progressing     Problem: Cardiovascular - Adult  Goal: Absence of cardiac dysrhythmias or at baseline  Outcome: Progressing     Problem: Musculoskeletal - Adult  Goal: Maintain proper alignment of affected body part  Outcome: Progressing     Problem: Musculoskeletal - Adult  Goal: Return ADL status to a safe level of function  Outcome: Progressing     Problem: Pain  Goal: Verbalizes/displays adequate comfort level or baseline comfort level  Outcome: Not Progressing     Problem: Skin/Tissue Integrity - Adult  Goal: Incisions, wounds, or drain sites healing without S/S of infection  Outcome: Not Progressing     Problem: Musculoskeletal - Adult  Goal: Return mobility to safest level of function  Outcome: Not Progressing     Problem: Genitourinary - Adult  Goal: Absence of urinary retention  Outcome: Not Progressing

## 2024-11-10 LAB
ANION GAP SERPL CALCULATED.3IONS-SCNC: 11 MMOL/L (ref 7–16)
BASOPHILS # BLD: 0 K/UL (ref 0–0.2)
BASOPHILS NFR BLD: 0 % (ref 0–2)
BUN SERPL-MCNC: 9 MG/DL (ref 6–23)
CALCIUM SERPL-MCNC: 8.9 MG/DL (ref 8.6–10.2)
CHLORIDE SERPL-SCNC: 97 MMOL/L (ref 98–107)
CO2 SERPL-SCNC: 26 MMOL/L (ref 22–29)
CREAT SERPL-MCNC: 0.8 MG/DL (ref 0.7–1.2)
EOSINOPHIL # BLD: 0 K/UL (ref 0.05–0.5)
EOSINOPHILS RELATIVE PERCENT: 0 % (ref 0–6)
ERYTHROCYTE [DISTWIDTH] IN BLOOD BY AUTOMATED COUNT: 14.6 % (ref 11.5–15)
FOLATE SERPL-MCNC: 19.4 NG/ML (ref 4.8–24.2)
GFR, ESTIMATED: >90 ML/MIN/1.73M2
GLUCOSE SERPL-MCNC: 133 MG/DL (ref 74–99)
HCT VFR BLD AUTO: 32.9 % (ref 37–54)
HGB BLD-MCNC: 10.1 G/DL (ref 12.5–16.5)
IMM GRANULOCYTES # BLD AUTO: <0.03 K/UL (ref 0–0.58)
IMM GRANULOCYTES NFR BLD: 0 % (ref 0–5)
LYMPHOCYTES NFR BLD: 0.37 K/UL (ref 1.5–4)
LYMPHOCYTES RELATIVE PERCENT: 5 % (ref 20–42)
MCH RBC QN AUTO: 25.4 PG (ref 26–35)
MCHC RBC AUTO-ENTMCNC: 30.7 G/DL (ref 32–34.5)
MCV RBC AUTO: 82.9 FL (ref 80–99.9)
MONOCYTES NFR BLD: 0.09 K/UL (ref 0.1–0.95)
MONOCYTES NFR BLD: 1 % (ref 2–12)
NEUTROPHILS NFR BLD: 93 % (ref 43–80)
NEUTS SEG NFR BLD: 6.82 K/UL (ref 1.8–7.3)
PLATELET # BLD AUTO: 300 K/UL (ref 130–450)
PMV BLD AUTO: 9.8 FL (ref 7–12)
POTASSIUM SERPL-SCNC: 4.7 MMOL/L (ref 3.5–5)
RBC # BLD AUTO: 3.97 M/UL (ref 3.8–5.8)
RBC # BLD: ABNORMAL 10*6/UL
RBC # BLD: ABNORMAL 10*6/UL
SODIUM SERPL-SCNC: 134 MMOL/L (ref 132–146)
VIT B12 SERPL-MCNC: 922 PG/ML (ref 211–946)
WBC OTHER # BLD: 7.3 K/UL (ref 4.5–11.5)

## 2024-11-10 PROCEDURE — 2060000000 HC ICU INTERMEDIATE R&B

## 2024-11-10 PROCEDURE — 2580000003 HC RX 258: Performed by: INTERNAL MEDICINE

## 2024-11-10 PROCEDURE — 6370000000 HC RX 637 (ALT 250 FOR IP): Performed by: NURSE PRACTITIONER

## 2024-11-10 PROCEDURE — 85025 COMPLETE CBC W/AUTO DIFF WBC: CPT

## 2024-11-10 PROCEDURE — 6360000002 HC RX W HCPCS: Performed by: INTERNAL MEDICINE

## 2024-11-10 PROCEDURE — 2500000003 HC RX 250 WO HCPCS: Performed by: CLINICAL NURSE SPECIALIST

## 2024-11-10 PROCEDURE — 99223 1ST HOSP IP/OBS HIGH 75: CPT | Performed by: NURSE PRACTITIONER

## 2024-11-10 PROCEDURE — 2580000003 HC RX 258: Performed by: CLINICAL NURSE SPECIALIST

## 2024-11-10 PROCEDURE — 94640 AIRWAY INHALATION TREATMENT: CPT

## 2024-11-10 PROCEDURE — 6370000000 HC RX 637 (ALT 250 FOR IP): Performed by: INTERNAL MEDICINE

## 2024-11-10 PROCEDURE — 89220 SPUTUM SPECIMEN COLLECTION: CPT

## 2024-11-10 PROCEDURE — 99232 SBSQ HOSP IP/OBS MODERATE 35: CPT | Performed by: STUDENT IN AN ORGANIZED HEALTH CARE EDUCATION/TRAINING PROGRAM

## 2024-11-10 PROCEDURE — 80048 BASIC METABOLIC PNL TOTAL CA: CPT

## 2024-11-10 PROCEDURE — 6360000002 HC RX W HCPCS: Performed by: CLINICAL NURSE SPECIALIST

## 2024-11-10 PROCEDURE — 2700000000 HC OXYGEN THERAPY PER DAY

## 2024-11-10 RX ORDER — LACTULOSE 10 G/15ML
20 SOLUTION ORAL ONCE
Status: COMPLETED | OUTPATIENT
Start: 2024-11-10 | End: 2024-11-10

## 2024-11-10 RX ORDER — LACTULOSE 10 G/15ML
20 SOLUTION ORAL 2 TIMES DAILY PRN
Status: DISCONTINUED | OUTPATIENT
Start: 2024-11-10 | End: 2024-11-23 | Stop reason: HOSPADM

## 2024-11-10 RX ORDER — METHADONE HYDROCHLORIDE 10 MG/ML
10 CONCENTRATE ORAL 2 TIMES DAILY
Status: DISCONTINUED | OUTPATIENT
Start: 2024-11-10 | End: 2024-11-23 | Stop reason: HOSPADM

## 2024-11-10 RX ADMIN — ARFORMOTEROL TARTRATE 15 MCG: 15 SOLUTION RESPIRATORY (INHALATION) at 21:42

## 2024-11-10 RX ADMIN — MICONAZOLE NITRATE: 2 OINTMENT TOPICAL at 09:26

## 2024-11-10 RX ADMIN — Medication 30 MG: at 21:09

## 2024-11-10 RX ADMIN — ARFORMOTEROL TARTRATE 15 MCG: 15 SOLUTION RESPIRATORY (INHALATION) at 08:50

## 2024-11-10 RX ADMIN — SODIUM CHLORIDE, PRESERVATIVE FREE 10 ML: 5 INJECTION INTRAVENOUS at 21:11

## 2024-11-10 RX ADMIN — OXYCODONE 20 MG: 15 TABLET ORAL at 13:03

## 2024-11-10 RX ADMIN — DOXYCYCLINE 100 MG: 100 INJECTION, POWDER, LYOPHILIZED, FOR SOLUTION INTRAVENOUS at 15:19

## 2024-11-10 RX ADMIN — ATORVASTATIN CALCIUM 20 MG: 20 TABLET, FILM COATED ORAL at 08:40

## 2024-11-10 RX ADMIN — METHYLPREDNISOLONE SODIUM SUCCINATE 40 MG: 40 INJECTION INTRAMUSCULAR; INTRAVENOUS at 15:13

## 2024-11-10 RX ADMIN — PREGABALIN 150 MG: 75 CAPSULE ORAL at 21:07

## 2024-11-10 RX ADMIN — APIXABAN 2.5 MG: 2.5 TABLET, FILM COATED ORAL at 09:27

## 2024-11-10 RX ADMIN — PREGABALIN 150 MG: 75 CAPSULE ORAL at 08:41

## 2024-11-10 RX ADMIN — METOCLOPRAMIDE HYDROCHLORIDE 10 MG: 5 SOLUTION ORAL at 21:07

## 2024-11-10 RX ADMIN — BUDESONIDE INHALATION 500 MCG: 0.5 SUSPENSION RESPIRATORY (INHALATION) at 21:42

## 2024-11-10 RX ADMIN — APIXABAN 2.5 MG: 2.5 TABLET, FILM COATED ORAL at 21:07

## 2024-11-10 RX ADMIN — Medication 30 MG: at 08:43

## 2024-11-10 RX ADMIN — OXYCODONE 20 MG: 15 TABLET ORAL at 21:17

## 2024-11-10 RX ADMIN — OXYCODONE 20 MG: 15 TABLET ORAL at 08:41

## 2024-11-10 RX ADMIN — MIDODRINE HYDROCHLORIDE 5 MG: 5 TABLET ORAL at 16:51

## 2024-11-10 RX ADMIN — METOCLOPRAMIDE HYDROCHLORIDE 10 MG: 5 SOLUTION ORAL at 06:47

## 2024-11-10 RX ADMIN — SODIUM CHLORIDE 125 MG: 900 INJECTION INTRAVENOUS at 13:33

## 2024-11-10 RX ADMIN — SODIUM CHLORIDE, PRESERVATIVE FREE 10 ML: 5 INJECTION INTRAVENOUS at 09:26

## 2024-11-10 RX ADMIN — MIDODRINE HYDROCHLORIDE 5 MG: 5 TABLET ORAL at 08:40

## 2024-11-10 RX ADMIN — WATER 1000 MG: 1 INJECTION INTRAMUSCULAR; INTRAVENOUS; SUBCUTANEOUS at 15:14

## 2024-11-10 RX ADMIN — LACTULOSE 20 G: 20 SOLUTION ORAL at 15:15

## 2024-11-10 RX ADMIN — METOCLOPRAMIDE HYDROCHLORIDE 10 MG: 5 SOLUTION ORAL at 16:51

## 2024-11-10 RX ADMIN — BUDESONIDE INHALATION 500 MCG: 0.5 SUSPENSION RESPIRATORY (INHALATION) at 08:50

## 2024-11-10 RX ADMIN — DOXYCYCLINE 100 MG: 100 INJECTION, POWDER, LYOPHILIZED, FOR SOLUTION INTRAVENOUS at 01:35

## 2024-11-10 RX ADMIN — LEVOTHYROXINE SODIUM 100 MCG: 100 TABLET ORAL at 06:47

## 2024-11-10 RX ADMIN — METHADONE HYDROCHLORIDE 10 MG: 10 CONCENTRATE ORAL at 15:26

## 2024-11-10 RX ADMIN — SENNOSIDES 10 ML: 8.8 LIQUID ORAL at 21:06

## 2024-11-10 RX ADMIN — PREGABALIN 150 MG: 75 CAPSULE ORAL at 13:32

## 2024-11-10 RX ADMIN — METOCLOPRAMIDE HYDROCHLORIDE 10 MG: 5 SOLUTION ORAL at 13:02

## 2024-11-10 RX ADMIN — SILODOSIN 4 MG: 4 CAPSULE ORAL at 21:09

## 2024-11-10 RX ADMIN — METHYLPREDNISOLONE SODIUM SUCCINATE 40 MG: 40 INJECTION INTRAMUSCULAR; INTRAVENOUS at 01:28

## 2024-11-10 RX ADMIN — OXYCODONE 20 MG: 15 TABLET ORAL at 05:05

## 2024-11-10 ASSESSMENT — PAIN - FUNCTIONAL ASSESSMENT

## 2024-11-10 ASSESSMENT — PAIN DESCRIPTION - LOCATION
LOCATION: FACE
LOCATION: NECK
LOCATION: NECK;FACE

## 2024-11-10 ASSESSMENT — PAIN SCALES - GENERAL
PAINLEVEL_OUTOF10: 7
PAINLEVEL_OUTOF10: 8
PAINLEVEL_OUTOF10: 0
PAINLEVEL_OUTOF10: 7
PAINLEVEL_OUTOF10: 0
PAINLEVEL_OUTOF10: 7
PAINLEVEL_OUTOF10: 0
PAINLEVEL_OUTOF10: 3
PAINLEVEL_OUTOF10: 8

## 2024-11-10 ASSESSMENT — PAIN DESCRIPTION - ORIENTATION
ORIENTATION: RIGHT

## 2024-11-10 ASSESSMENT — PAIN DESCRIPTION - DESCRIPTORS
DESCRIPTORS: ACHING;DISCOMFORT
DESCRIPTORS: ACHING;SORE
DESCRIPTORS: ACHING;DISCOMFORT
DESCRIPTORS: ACHING
DESCRIPTORS: ACHING;DISCOMFORT

## 2024-11-10 NOTE — PLAN OF CARE
Problem: Discharge Planning  Goal: Discharge to home or other facility with appropriate resources  11/9/2024 2311 by Aye Lubin RN  Outcome: Progressing  11/9/2024 1839 by Juana Khan RN  Outcome: Not Progressing     Problem: Pain  Goal: Verbalizes/displays adequate comfort level or baseline comfort level  11/9/2024 2311 by Aye Lubin RN  Outcome: Progressing  11/9/2024 1839 by Juana Khan RN  Outcome: Progressing     Problem: Safety - Adult  Goal: Free from fall injury  11/9/2024 2311 by Aye Lubin RN  Outcome: Progressing  11/9/2024 1839 by Juana Khan RN  Outcome: Progressing     Problem: ABCDS Injury Assessment  Goal: Absence of physical injury  11/9/2024 2311 by Aye Lubin RN  Outcome: Progressing  11/9/2024 1839 by Juana Khan RN  Outcome: Progressing     Problem: Neurosensory - Adult  Goal: Achieves stable or improved neurological status  11/9/2024 2311 by Aye Lubin RN  Outcome: Progressing  11/9/2024 1839 by Juana Khan RN  Outcome: Progressing  Goal: Absence of seizures  Outcome: Progressing     Problem: Respiratory - Adult  Goal: Achieves optimal ventilation and oxygenation  Outcome: Progressing     Problem: Cardiovascular - Adult  Goal: Maintains optimal cardiac output and hemodynamic stability  Outcome: Progressing  Goal: Absence of cardiac dysrhythmias or at baseline  Outcome: Progressing     Problem: Discharge Planning  Goal: Discharge to home or other facility with appropriate resources  11/9/2024 2311 by Aye Lubin RN  Outcome: Progressing  11/9/2024 1839 by Juana Khan RN  Outcome: Not Progressing

## 2024-11-11 ENCOUNTER — APPOINTMENT (OUTPATIENT)
Dept: GENERAL RADIOLOGY | Age: 69
DRG: 640 | End: 2024-11-11
Payer: MEDICARE

## 2024-11-11 LAB
ANION GAP SERPL CALCULATED.3IONS-SCNC: 11 MMOL/L (ref 7–16)
ATYPICAL LYMPHOCYTE ABSOLUTE COUNT: 0.14 K/UL (ref 0–0.46)
ATYPICAL LYMPHOCYTES: 1 % (ref 0–4)
BASOPHILS # BLD: 0 K/UL (ref 0–0.2)
BASOPHILS NFR BLD: 0 % (ref 0–2)
BUN SERPL-MCNC: 17 MG/DL (ref 6–23)
CALCIUM SERPL-MCNC: 9.7 MG/DL (ref 8.6–10.2)
CHLORIDE SERPL-SCNC: 97 MMOL/L (ref 98–107)
CO2 SERPL-SCNC: 28 MMOL/L (ref 22–29)
CREAT SERPL-MCNC: 0.8 MG/DL (ref 0.7–1.2)
EKG ATRIAL RATE: 74 BPM
EKG P AXIS: 42 DEGREES
EKG P-R INTERVAL: 134 MS
EKG Q-T INTERVAL: 386 MS
EKG QRS DURATION: 94 MS
EKG QTC CALCULATION (BAZETT): 428 MS
EKG R AXIS: -9 DEGREES
EKG T AXIS: 18 DEGREES
EKG VENTRICULAR RATE: 74 BPM
EOSINOPHIL # BLD: 0 K/UL (ref 0.05–0.5)
EOSINOPHILS RELATIVE PERCENT: 0 % (ref 0–6)
ERYTHROCYTE [DISTWIDTH] IN BLOOD BY AUTOMATED COUNT: 15.5 % (ref 11.5–15)
GFR, ESTIMATED: >90 ML/MIN/1.73M2
GLUCOSE SERPL-MCNC: 136 MG/DL (ref 74–99)
HCT VFR BLD AUTO: 36.3 % (ref 37–54)
HGB BLD-MCNC: 11.6 G/DL (ref 12.5–16.5)
LYMPHOCYTES NFR BLD: 0.27 K/UL (ref 1.5–4)
LYMPHOCYTES RELATIVE PERCENT: 2 % (ref 20–42)
MCH RBC QN AUTO: 26.4 PG (ref 26–35)
MCHC RBC AUTO-ENTMCNC: 32 G/DL (ref 32–34.5)
MCV RBC AUTO: 82.7 FL (ref 80–99.9)
MONOCYTES NFR BLD: 0.68 K/UL (ref 0.1–0.95)
MONOCYTES NFR BLD: 4 % (ref 2–12)
NEUTROPHILS NFR BLD: 93 % (ref 43–80)
NEUTS SEG NFR BLD: 14.51 K/UL (ref 1.8–7.3)
PLATELET # BLD AUTO: 341 K/UL (ref 130–450)
PMV BLD AUTO: 9.4 FL (ref 7–12)
POTASSIUM SERPL-SCNC: 4.7 MMOL/L (ref 3.5–5)
RBC # BLD AUTO: 4.39 M/UL (ref 3.8–5.8)
RBC # BLD: ABNORMAL 10*6/UL
SODIUM SERPL-SCNC: 136 MMOL/L (ref 132–146)
WBC OTHER # BLD: 15.6 K/UL (ref 4.5–11.5)

## 2024-11-11 PROCEDURE — 2580000003 HC RX 258: Performed by: INTERNAL MEDICINE

## 2024-11-11 PROCEDURE — 2500000003 HC RX 250 WO HCPCS: Performed by: CLINICAL NURSE SPECIALIST

## 2024-11-11 PROCEDURE — 6370000000 HC RX 637 (ALT 250 FOR IP): Performed by: NURSE PRACTITIONER

## 2024-11-11 PROCEDURE — 74018 RADEX ABDOMEN 1 VIEW: CPT

## 2024-11-11 PROCEDURE — 80048 BASIC METABOLIC PNL TOTAL CA: CPT

## 2024-11-11 PROCEDURE — 6360000002 HC RX W HCPCS: Performed by: CLINICAL NURSE SPECIALIST

## 2024-11-11 PROCEDURE — 94640 AIRWAY INHALATION TREATMENT: CPT

## 2024-11-11 PROCEDURE — 2580000003 HC RX 258: Performed by: NURSE PRACTITIONER

## 2024-11-11 PROCEDURE — 2700000000 HC OXYGEN THERAPY PER DAY

## 2024-11-11 PROCEDURE — 6360000002 HC RX W HCPCS: Performed by: NURSE PRACTITIONER

## 2024-11-11 PROCEDURE — 6360000002 HC RX W HCPCS: Performed by: INTERNAL MEDICINE

## 2024-11-11 PROCEDURE — 99232 SBSQ HOSP IP/OBS MODERATE 35: CPT | Performed by: STUDENT IN AN ORGANIZED HEALTH CARE EDUCATION/TRAINING PROGRAM

## 2024-11-11 PROCEDURE — 71045 X-RAY EXAM CHEST 1 VIEW: CPT

## 2024-11-11 PROCEDURE — 2060000000 HC ICU INTERMEDIATE R&B

## 2024-11-11 PROCEDURE — 85025 COMPLETE CBC W/AUTO DIFF WBC: CPT

## 2024-11-11 PROCEDURE — 2580000003 HC RX 258: Performed by: CLINICAL NURSE SPECIALIST

## 2024-11-11 PROCEDURE — 6370000000 HC RX 637 (ALT 250 FOR IP): Performed by: INTERNAL MEDICINE

## 2024-11-11 PROCEDURE — 93010 ELECTROCARDIOGRAM REPORT: CPT | Performed by: INTERNAL MEDICINE

## 2024-11-11 PROCEDURE — 99233 SBSQ HOSP IP/OBS HIGH 50: CPT | Performed by: NURSE PRACTITIONER

## 2024-11-11 RX ORDER — BISACODYL 10 MG
10 SUPPOSITORY, RECTAL RECTAL ONCE
Status: COMPLETED | OUTPATIENT
Start: 2024-11-11 | End: 2024-11-11

## 2024-11-11 RX ORDER — PREDNISONE 20 MG/1
40 TABLET ORAL DAILY
Status: COMPLETED | OUTPATIENT
Start: 2024-11-12 | End: 2024-11-16

## 2024-11-11 RX ADMIN — APIXABAN 2.5 MG: 2.5 TABLET, FILM COATED ORAL at 09:34

## 2024-11-11 RX ADMIN — ONDANSETRON 4 MG: 2 INJECTION INTRAMUSCULAR; INTRAVENOUS at 09:30

## 2024-11-11 RX ADMIN — PREGABALIN 150 MG: 75 CAPSULE ORAL at 09:34

## 2024-11-11 RX ADMIN — Medication 30 MG: at 22:57

## 2024-11-11 RX ADMIN — MIDODRINE HYDROCHLORIDE 5 MG: 5 TABLET ORAL at 16:23

## 2024-11-11 RX ADMIN — METOCLOPRAMIDE HYDROCHLORIDE 10 MG: 5 SOLUTION ORAL at 12:06

## 2024-11-11 RX ADMIN — METHYLPREDNISOLONE SODIUM SUCCINATE 40 MG: 40 INJECTION INTRAMUSCULAR; INTRAVENOUS at 01:54

## 2024-11-11 RX ADMIN — METOCLOPRAMIDE HYDROCHLORIDE 10 MG: 5 SOLUTION ORAL at 22:56

## 2024-11-11 RX ADMIN — BUDESONIDE INHALATION 500 MCG: 0.5 SUSPENSION RESPIRATORY (INHALATION) at 20:18

## 2024-11-11 RX ADMIN — PANTOPRAZOLE SODIUM 40 MG: 40 INJECTION, POWDER, FOR SOLUTION INTRAVENOUS at 14:12

## 2024-11-11 RX ADMIN — SENNOSIDES 10 ML: 8.8 LIQUID ORAL at 09:35

## 2024-11-11 RX ADMIN — ATORVASTATIN CALCIUM 20 MG: 20 TABLET, FILM COATED ORAL at 09:34

## 2024-11-11 RX ADMIN — Medication 30 MG: at 09:36

## 2024-11-11 RX ADMIN — ARFORMOTEROL TARTRATE 15 MCG: 15 SOLUTION RESPIRATORY (INHALATION) at 20:18

## 2024-11-11 RX ADMIN — METOCLOPRAMIDE HYDROCHLORIDE 10 MG: 5 SOLUTION ORAL at 16:23

## 2024-11-11 RX ADMIN — SENNOSIDES 10 ML: 8.8 LIQUID ORAL at 22:56

## 2024-11-11 RX ADMIN — OXYCODONE 20 MG: 15 TABLET ORAL at 14:33

## 2024-11-11 RX ADMIN — PREGABALIN 150 MG: 75 CAPSULE ORAL at 14:14

## 2024-11-11 RX ADMIN — SILODOSIN 4 MG: 4 CAPSULE ORAL at 22:57

## 2024-11-11 RX ADMIN — MIDODRINE HYDROCHLORIDE 5 MG: 5 TABLET ORAL at 07:32

## 2024-11-11 RX ADMIN — BUDESONIDE INHALATION 500 MCG: 0.5 SUSPENSION RESPIRATORY (INHALATION) at 08:58

## 2024-11-11 RX ADMIN — WATER 1000 MG: 1 INJECTION INTRAMUSCULAR; INTRAVENOUS; SUBCUTANEOUS at 14:13

## 2024-11-11 RX ADMIN — SODIUM CHLORIDE, PRESERVATIVE FREE 10 ML: 5 INJECTION INTRAVENOUS at 09:35

## 2024-11-11 RX ADMIN — METOCLOPRAMIDE HYDROCHLORIDE 10 MG: 5 SOLUTION ORAL at 07:32

## 2024-11-11 RX ADMIN — DOXYCYCLINE 100 MG: 100 INJECTION, POWDER, LYOPHILIZED, FOR SOLUTION INTRAVENOUS at 01:54

## 2024-11-11 RX ADMIN — APIXABAN 2.5 MG: 2.5 TABLET, FILM COATED ORAL at 22:56

## 2024-11-11 RX ADMIN — PREGABALIN 150 MG: 75 CAPSULE ORAL at 22:57

## 2024-11-11 RX ADMIN — SODIUM CHLORIDE, PRESERVATIVE FREE 10 ML: 5 INJECTION INTRAVENOUS at 23:05

## 2024-11-11 RX ADMIN — METHADONE HYDROCHLORIDE 10 MG: 10 CONCENTRATE ORAL at 22:57

## 2024-11-11 RX ADMIN — DOXYCYCLINE 100 MG: 100 INJECTION, POWDER, LYOPHILIZED, FOR SOLUTION INTRAVENOUS at 14:30

## 2024-11-11 RX ADMIN — MICONAZOLE NITRATE: 2 OINTMENT TOPICAL at 09:41

## 2024-11-11 RX ADMIN — LEVOTHYROXINE SODIUM 100 MCG: 100 TABLET ORAL at 07:32

## 2024-11-11 RX ADMIN — METHADONE HYDROCHLORIDE 10 MG: 10 CONCENTRATE ORAL at 09:34

## 2024-11-11 RX ADMIN — ONDANSETRON 4 MG: 2 INJECTION INTRAMUSCULAR; INTRAVENOUS at 03:27

## 2024-11-11 RX ADMIN — ARFORMOTEROL TARTRATE 15 MCG: 15 SOLUTION RESPIRATORY (INHALATION) at 08:58

## 2024-11-11 RX ADMIN — BISACODYL 10 MG: 10 SUPPOSITORY RECTAL at 12:11

## 2024-11-11 ASSESSMENT — PAIN SCALES - GENERAL
PAINLEVEL_OUTOF10: 7
PAINLEVEL_OUTOF10: 0
PAINLEVEL_OUTOF10: 10

## 2024-11-11 ASSESSMENT — PAIN DESCRIPTION - LOCATION
LOCATION: GENERALIZED
LOCATION: NECK;EAR

## 2024-11-11 ASSESSMENT — PAIN DESCRIPTION - DESCRIPTORS
DESCRIPTORS: ACHING;THROBBING
DESCRIPTORS: ACHING

## 2024-11-11 ASSESSMENT — PAIN DESCRIPTION - ORIENTATION: ORIENTATION: RIGHT

## 2024-11-11 ASSESSMENT — PAIN - FUNCTIONAL ASSESSMENT: PAIN_FUNCTIONAL_ASSESSMENT: ACTIVITIES ARE NOT PREVENTED

## 2024-11-11 NOTE — CARE COORDINATION
Introduced my self and provided explanation of CM role to patient and wife at bedside.  Patient is awake, alert, and aware of current diagnosis and treatment plan including multiple consultations, medication adjustments, IV antibiotics, trache care, enteral feeding.  Patient resides at home with his spouse and completes his adl's with her assistance.  Per wife, patient is dependent for care.  He has home O2 supplies for stationary and portable utilization along with trache supplies from [employer]. Patient has transport w/c and temporary house ramp.  Wife has requested hospital bed and standard wheelchair.  DME orders received and referred to Thiago with [employer] per choice.  Patient receives his enteral formula from [facility].  Spoke with Marian perez at [phone].  She requests call at time of discharge so enteral pump can be dispensed (she has order).  [facility] had initiated a referral to [facility] for enteral feeding education.  They were to commence 11/10/24, but patient was hospitalized.  Spoke with Nicole at [facility], she is aware patient is admitted and requests notification at time of discharge.    Patient is established with a pcp and denies any issue with retail pharmaceutical coverage.  Patient will need followed and assisted with discharge planning as necessary.   Wife unsure if she will self transport patient to home - will need re-assessed for potential non emergency ambulance arrangements.  Lavon Park, MSN RN  [facility] Case Management  [phone]

## 2024-11-11 NOTE — PLAN OF CARE
Problem: Discharge Planning  Goal: Discharge to home or other facility with appropriate resources  Outcome: Progressing     Problem: Pain  Goal: Verbalizes/displays adequate comfort level or baseline comfort level  Outcome: Progressing     Problem: Safety - Adult  Goal: Free from fall injury  Outcome: Progressing     Problem: ABCDS Injury Assessment  Goal: Absence of physical injury  Outcome: Progressing     Problem: Neurosensory - Adult  Goal: Achieves stable or improved neurological status  Outcome: Progressing  Goal: Absence of seizures  Outcome: Progressing     Problem: Respiratory - Adult  Goal: Achieves optimal ventilation and oxygenation  Outcome: Progressing     Problem: Cardiovascular - Adult  Goal: Maintains optimal cardiac output and hemodynamic stability  Outcome: Progressing  Goal: Absence of cardiac dysrhythmias or at baseline  Outcome: Progressing     Problem: Skin/Tissue Integrity - Adult  Goal: Incisions, wounds, or drain sites healing without S/S of infection  Outcome: Progressing     Problem: Musculoskeletal - Adult  Goal: Return mobility to safest level of function  Outcome: Progressing  Goal: Maintain proper alignment of affected body part  Outcome: Progressing  Goal: Return ADL status to a safe level of function  Outcome: Progressing     Problem: Genitourinary - Adult  Goal: Absence of urinary retention  Outcome: Progressing     Problem: Nutrition Deficit:  Goal: Optimize nutritional status  Outcome: Progressing     Problem: Skin/Tissue Integrity  Goal: Absence of new skin breakdown  Description: 1.  Monitor for areas of redness and/or skin breakdown  2.  Assess vascular access sites hourly  3.  Every 4-6 hours minimum:  Change oxygen saturation probe site  4.  Every 4-6 hours:  If on nasal continuous positive airway pressure, respiratory therapy assess nares and determine need for appliance change or resting period.  Outcome: Progressing

## 2024-11-11 NOTE — ACP (ADVANCE CARE PLANNING)
Advance Care Planning   Healthcare Decision Maker:    Primary Decision Maker: Casie Guajardo - [facility] - [phone]    Click here to complete Healthcare Decision Makers including selection of the Healthcare Decision Maker Relationship (ie \"Primary\").

## 2024-11-12 ENCOUNTER — HOSPITAL ENCOUNTER (OUTPATIENT)
Dept: RADIATION ONCOLOGY | Age: 69
Discharge: HOME | End: 2024-11-12
Payer: MEDICARE

## 2024-11-12 PROBLEM — J90 LOCULATED PLEURAL EFFUSION: Status: ACTIVE | Noted: 2024-11-12

## 2024-11-12 LAB
ANION GAP SERPL CALCULATED.3IONS-SCNC: 7 MMOL/L (ref 7–16)
BASOPHILS # BLD: 0.03 K/UL (ref 0–0.2)
BASOPHILS NFR BLD: 0 % (ref 0–2)
BUN SERPL-MCNC: 19 MG/DL (ref 6–23)
CALCIUM SERPL-MCNC: 9.1 MG/DL (ref 8.6–10.2)
CHLORIDE SERPL-SCNC: 98 MMOL/L (ref 98–107)
CO2 SERPL-SCNC: 29 MMOL/L (ref 22–29)
CREAT SERPL-MCNC: 0.8 MG/DL (ref 0.7–1.2)
EOSINOPHIL # BLD: 0.04 K/UL (ref 0.05–0.5)
EOSINOPHILS RELATIVE PERCENT: 1 % (ref 0–6)
ERYTHROCYTE [DISTWIDTH] IN BLOOD BY AUTOMATED COUNT: 15.7 % (ref 11.5–15)
GFR, ESTIMATED: >90 ML/MIN/1.73M2
GLUCOSE SERPL-MCNC: 148 MG/DL (ref 74–99)
HCT VFR BLD AUTO: 34.2 % (ref 37–54)
HGB BLD-MCNC: 10.5 G/DL (ref 12.5–16.5)
IMM GRANULOCYTES # BLD AUTO: 0.04 K/UL (ref 0–0.58)
IMM GRANULOCYTES NFR BLD: 1 % (ref 0–5)
LYMPHOCYTES NFR BLD: 0.68 K/UL (ref 1.5–4)
LYMPHOCYTES RELATIVE PERCENT: 9 % (ref 20–42)
MCH RBC QN AUTO: 25.9 PG (ref 26–35)
MCHC RBC AUTO-ENTMCNC: 30.7 G/DL (ref 32–34.5)
MCV RBC AUTO: 84.4 FL (ref 80–99.9)
MONOCYTES NFR BLD: 0.43 K/UL (ref 0.1–0.95)
MONOCYTES NFR BLD: 6 % (ref 2–12)
NEUTROPHILS NFR BLD: 84 % (ref 43–80)
NEUTS SEG NFR BLD: 6.48 K/UL (ref 1.8–7.3)
PLATELET # BLD AUTO: 241 K/UL (ref 130–450)
PMV BLD AUTO: 9.5 FL (ref 7–12)
POTASSIUM SERPL-SCNC: 3.9 MMOL/L (ref 3.5–5)
RBC # BLD AUTO: 4.05 M/UL (ref 3.8–5.8)
SODIUM SERPL-SCNC: 134 MMOL/L (ref 132–146)
WBC OTHER # BLD: 7.7 K/UL (ref 4.5–11.5)

## 2024-11-12 PROCEDURE — 2500000003 HC RX 250 WO HCPCS: Performed by: CLINICAL NURSE SPECIALIST

## 2024-11-12 PROCEDURE — 6360000002 HC RX W HCPCS: Performed by: CLINICAL NURSE SPECIALIST

## 2024-11-12 PROCEDURE — 6370000000 HC RX 637 (ALT 250 FOR IP)

## 2024-11-12 PROCEDURE — 6360000002 HC RX W HCPCS: Performed by: NURSE PRACTITIONER

## 2024-11-12 PROCEDURE — 6370000000 HC RX 637 (ALT 250 FOR IP): Performed by: NURSE PRACTITIONER

## 2024-11-12 PROCEDURE — 2700000000 HC OXYGEN THERAPY PER DAY

## 2024-11-12 PROCEDURE — 2580000003 HC RX 258: Performed by: CLINICAL NURSE SPECIALIST

## 2024-11-12 PROCEDURE — 99232 SBSQ HOSP IP/OBS MODERATE 35: CPT | Performed by: STUDENT IN AN ORGANIZED HEALTH CARE EDUCATION/TRAINING PROGRAM

## 2024-11-12 PROCEDURE — 85025 COMPLETE CBC W/AUTO DIFF WBC: CPT

## 2024-11-12 PROCEDURE — 6360000002 HC RX W HCPCS: Performed by: INTERNAL MEDICINE

## 2024-11-12 PROCEDURE — 99222 1ST HOSP IP/OBS MODERATE 55: CPT | Performed by: OTOLARYNGOLOGY

## 2024-11-12 PROCEDURE — 94640 AIRWAY INHALATION TREATMENT: CPT

## 2024-11-12 PROCEDURE — 2580000003 HC RX 258: Performed by: INTERNAL MEDICINE

## 2024-11-12 PROCEDURE — 6370000000 HC RX 637 (ALT 250 FOR IP): Performed by: INTERNAL MEDICINE

## 2024-11-12 PROCEDURE — 2580000003 HC RX 258: Performed by: NURSE PRACTITIONER

## 2024-11-12 PROCEDURE — 2060000000 HC ICU INTERMEDIATE R&B

## 2024-11-12 PROCEDURE — 80048 BASIC METABOLIC PNL TOTAL CA: CPT

## 2024-11-12 PROCEDURE — 99232 SBSQ HOSP IP/OBS MODERATE 35: CPT | Performed by: NURSE PRACTITIONER

## 2024-11-12 PROCEDURE — 6370000000 HC RX 637 (ALT 250 FOR IP): Performed by: STUDENT IN AN ORGANIZED HEALTH CARE EDUCATION/TRAINING PROGRAM

## 2024-11-12 PROCEDURE — 36415 COLL VENOUS BLD VENIPUNCTURE: CPT

## 2024-11-12 RX ORDER — CEFEPIME HYDROCHLORIDE 1 G/1
1000 INJECTION, POWDER, FOR SOLUTION INTRAMUSCULAR; INTRAVENOUS EVERY 12 HOURS
Status: DISCONTINUED | OUTPATIENT
Start: 2024-11-12 | End: 2024-11-13

## 2024-11-12 RX ORDER — WATER 10 ML/10ML
10 INJECTION INTRAMUSCULAR; INTRAVENOUS; SUBCUTANEOUS EVERY 12 HOURS
Status: DISCONTINUED | OUTPATIENT
Start: 2024-11-12 | End: 2024-11-13

## 2024-11-12 RX ADMIN — METOCLOPRAMIDE HYDROCHLORIDE 10 MG: 5 SOLUTION ORAL at 11:37

## 2024-11-12 RX ADMIN — MICONAZOLE NITRATE: 2 OINTMENT TOPICAL at 21:18

## 2024-11-12 RX ADMIN — ARFORMOTEROL TARTRATE 15 MCG: 15 SOLUTION RESPIRATORY (INHALATION) at 09:31

## 2024-11-12 RX ADMIN — CEFEPIME 1000 MG: 1 INJECTION, POWDER, FOR SOLUTION INTRAMUSCULAR; INTRAVENOUS at 15:57

## 2024-11-12 RX ADMIN — PREGABALIN 150 MG: 75 CAPSULE ORAL at 14:01

## 2024-11-12 RX ADMIN — APIXABAN 2.5 MG: 2.5 TABLET, FILM COATED ORAL at 21:18

## 2024-11-12 RX ADMIN — DOXYCYCLINE 100 MG: 100 INJECTION, POWDER, LYOPHILIZED, FOR SOLUTION INTRAVENOUS at 04:36

## 2024-11-12 RX ADMIN — PREGABALIN 150 MG: 75 CAPSULE ORAL at 21:18

## 2024-11-12 RX ADMIN — PANTOPRAZOLE SODIUM 40 MG: 40 INJECTION, POWDER, FOR SOLUTION INTRAVENOUS at 08:35

## 2024-11-12 RX ADMIN — WATER 1000 MG: 1 INJECTION INTRAMUSCULAR; INTRAVENOUS; SUBCUTANEOUS at 14:01

## 2024-11-12 RX ADMIN — METOCLOPRAMIDE HYDROCHLORIDE 10 MG: 5 SOLUTION ORAL at 21:17

## 2024-11-12 RX ADMIN — WATER 10 ML: 1 INJECTION INTRAMUSCULAR; INTRAVENOUS; SUBCUTANEOUS at 15:57

## 2024-11-12 RX ADMIN — METHADONE HYDROCHLORIDE 10 MG: 10 CONCENTRATE ORAL at 21:18

## 2024-11-12 RX ADMIN — Medication 30 MG: at 21:18

## 2024-11-12 RX ADMIN — SODIUM CHLORIDE, PRESERVATIVE FREE 10 ML: 5 INJECTION INTRAVENOUS at 08:56

## 2024-11-12 RX ADMIN — Medication 30 MG: at 08:36

## 2024-11-12 RX ADMIN — SENNOSIDES 10 ML: 8.8 LIQUID ORAL at 21:17

## 2024-11-12 RX ADMIN — METHADONE HYDROCHLORIDE 10 MG: 10 CONCENTRATE ORAL at 08:35

## 2024-11-12 RX ADMIN — SILODOSIN 4 MG: 4 CAPSULE ORAL at 21:18

## 2024-11-12 RX ADMIN — PREGABALIN 150 MG: 75 CAPSULE ORAL at 08:35

## 2024-11-12 RX ADMIN — LEVOTHYROXINE SODIUM 100 MCG: 100 TABLET ORAL at 06:33

## 2024-11-12 RX ADMIN — MICONAZOLE NITRATE: 2 OINTMENT TOPICAL at 08:56

## 2024-11-12 RX ADMIN — APIXABAN 2.5 MG: 2.5 TABLET, FILM COATED ORAL at 08:35

## 2024-11-12 RX ADMIN — MIDODRINE HYDROCHLORIDE 5 MG: 5 TABLET ORAL at 16:25

## 2024-11-12 RX ADMIN — PREDNISONE 40 MG: 20 TABLET ORAL at 08:35

## 2024-11-12 RX ADMIN — BUDESONIDE INHALATION 500 MCG: 0.5 SUSPENSION RESPIRATORY (INHALATION) at 21:10

## 2024-11-12 RX ADMIN — ARFORMOTEROL TARTRATE 15 MCG: 15 SOLUTION RESPIRATORY (INHALATION) at 21:10

## 2024-11-12 RX ADMIN — METOCLOPRAMIDE HYDROCHLORIDE 10 MG: 5 SOLUTION ORAL at 16:25

## 2024-11-12 RX ADMIN — SENNOSIDES 10 ML: 8.8 LIQUID ORAL at 08:36

## 2024-11-12 RX ADMIN — ATORVASTATIN CALCIUM 20 MG: 20 TABLET, FILM COATED ORAL at 08:35

## 2024-11-12 RX ADMIN — MIDODRINE HYDROCHLORIDE 5 MG: 5 TABLET ORAL at 08:36

## 2024-11-12 RX ADMIN — DOXYCYCLINE 100 MG: 100 INJECTION, POWDER, LYOPHILIZED, FOR SOLUTION INTRAVENOUS at 14:12

## 2024-11-12 RX ADMIN — METOCLOPRAMIDE HYDROCHLORIDE 10 MG: 5 SOLUTION ORAL at 06:33

## 2024-11-12 RX ADMIN — BUDESONIDE INHALATION 500 MCG: 0.5 SUSPENSION RESPIRATORY (INHALATION) at 09:31

## 2024-11-12 ASSESSMENT — PAIN DESCRIPTION - DESCRIPTORS
DESCRIPTORS: ACHING;DISCOMFORT
DESCRIPTORS: ACHING;THROBBING

## 2024-11-12 ASSESSMENT — PAIN DESCRIPTION - LOCATION
LOCATION: ABDOMEN;GENERALIZED
LOCATION: GENERALIZED

## 2024-11-12 ASSESSMENT — PAIN DESCRIPTION - ONSET: ONSET: ON-GOING

## 2024-11-12 ASSESSMENT — PAIN SCALES - GENERAL
PAINLEVEL_OUTOF10: 3
PAINLEVEL_OUTOF10: 0
PAINLEVEL_OUTOF10: 8
PAINLEVEL_OUTOF10: 8

## 2024-11-12 ASSESSMENT — PAIN DESCRIPTION - PAIN TYPE: TYPE: ACUTE PAIN

## 2024-11-12 ASSESSMENT — PAIN - FUNCTIONAL ASSESSMENT: PAIN_FUNCTIONAL_ASSESSMENT: ACTIVITIES ARE NOT PREVENTED

## 2024-11-12 ASSESSMENT — PAIN DESCRIPTION - FREQUENCY: FREQUENCY: CONTINUOUS

## 2024-11-12 NOTE — PLAN OF CARE
Problem: Discharge Planning  Goal: Discharge to home or other facility with appropriate resources  11/12/2024 0006 by Jessa Guevara RN  Outcome: Progressing  11/11/2024 1504 by Dean Chang RN  Outcome: Progressing     Problem: Pain  Goal: Verbalizes/displays adequate comfort level or baseline comfort level  11/12/2024 0006 by Jessa Guevara RN  Outcome: Progressing  11/11/2024 1504 by Dean Chang RN  Outcome: Progressing     Problem: Safety - Adult  Goal: Free from fall injury  11/12/2024 0006 by Jessa Guevara RN  Outcome: Progressing  11/11/2024 1504 by Dean Chang RN  Outcome: Progressing     Problem: ABCDS Injury Assessment  Goal: Absence of physical injury  11/12/2024 0006 by Jessa Guevara RN  Outcome: Progressing  11/11/2024 1504 by Dean Chang RN  Outcome: Progressing     Problem: Neurosensory - Adult  Goal: Achieves stable or improved neurological status  11/12/2024 0006 by Jessa Guevara RN  Outcome: Progressing  11/11/2024 1504 by Dean Chang RN  Outcome: Progressing  Goal: Absence of seizures  11/12/2024 0006 by Jessa Guevara RN  Outcome: Progressing  11/11/2024 1504 by Dean Chang RN  Outcome: Progressing     Problem: Respiratory - Adult  Goal: Achieves optimal ventilation and oxygenation  11/12/2024 0006 by Jessa Guevara RN  Outcome: Progressing  11/11/2024 1504 by Dean Chang RN  Outcome: Progressing     Problem: Cardiovascular - Adult  Goal: Maintains optimal cardiac output and hemodynamic stability  11/12/2024 0006 by Jessa Guevara RN  Outcome: Progressing  11/11/2024 1504 by Dean Chang RN  Outcome: Progressing  Goal: Absence of cardiac dysrhythmias or at baseline  11/12/2024 0006 by Jessa Guevara RN  Outcome: Progressing  11/11/2024 1504 by Dean Chang RN  Outcome: Progressing     Problem: Skin/Tissue Integrity - Adult  Goal: Incisions, wounds, or drain sites healing without S/S of

## 2024-11-13 LAB
ANION GAP SERPL CALCULATED.3IONS-SCNC: 9 MMOL/L (ref 7–16)
BASOPHILS # BLD: 0.02 K/UL (ref 0–0.2)
BASOPHILS NFR BLD: 0 % (ref 0–2)
BUN SERPL-MCNC: 21 MG/DL (ref 6–23)
CALCIUM SERPL-MCNC: 9.4 MG/DL (ref 8.6–10.2)
CHLORIDE SERPL-SCNC: 99 MMOL/L (ref 98–107)
CO2 SERPL-SCNC: 26 MMOL/L (ref 22–29)
CREAT SERPL-MCNC: 0.8 MG/DL (ref 0.7–1.2)
EOSINOPHIL # BLD: 0.05 K/UL (ref 0.05–0.5)
EOSINOPHILS RELATIVE PERCENT: 1 % (ref 0–6)
ERYTHROCYTE [DISTWIDTH] IN BLOOD BY AUTOMATED COUNT: 16.1 % (ref 11.5–15)
GFR, ESTIMATED: >90 ML/MIN/1.73M2
GLUCOSE SERPL-MCNC: 113 MG/DL (ref 74–99)
HCT VFR BLD AUTO: 35.1 % (ref 37–54)
HGB BLD-MCNC: 11 G/DL (ref 12.5–16.5)
IMM GRANULOCYTES # BLD AUTO: 0.03 K/UL (ref 0–0.58)
IMM GRANULOCYTES NFR BLD: 0 % (ref 0–5)
LYMPHOCYTES NFR BLD: 0.69 K/UL (ref 1.5–4)
LYMPHOCYTES RELATIVE PERCENT: 8 % (ref 20–42)
MCH RBC QN AUTO: 26.3 PG (ref 26–35)
MCHC RBC AUTO-ENTMCNC: 31.3 G/DL (ref 32–34.5)
MCV RBC AUTO: 84 FL (ref 80–99.9)
MONOCYTES NFR BLD: 0.55 K/UL (ref 0.1–0.95)
MONOCYTES NFR BLD: 6 % (ref 2–12)
NEUTROPHILS NFR BLD: 85 % (ref 43–80)
NEUTS SEG NFR BLD: 7.58 K/UL (ref 1.8–7.3)
PLATELET # BLD AUTO: 247 K/UL (ref 130–450)
PMV BLD AUTO: 9.8 FL (ref 7–12)
POTASSIUM SERPL-SCNC: 3.9 MMOL/L (ref 3.5–5)
RBC # BLD AUTO: 4.18 M/UL (ref 3.8–5.8)
SODIUM SERPL-SCNC: 134 MMOL/L (ref 132–146)
WBC OTHER # BLD: 8.9 K/UL (ref 4.5–11.5)

## 2024-11-13 PROCEDURE — 2580000003 HC RX 258: Performed by: INTERNAL MEDICINE

## 2024-11-13 PROCEDURE — 85025 COMPLETE CBC W/AUTO DIFF WBC: CPT

## 2024-11-13 PROCEDURE — 80048 BASIC METABOLIC PNL TOTAL CA: CPT

## 2024-11-13 PROCEDURE — 6370000000 HC RX 637 (ALT 250 FOR IP): Performed by: STUDENT IN AN ORGANIZED HEALTH CARE EDUCATION/TRAINING PROGRAM

## 2024-11-13 PROCEDURE — 6360000002 HC RX W HCPCS: Performed by: INTERNAL MEDICINE

## 2024-11-13 PROCEDURE — 2060000000 HC ICU INTERMEDIATE R&B

## 2024-11-13 PROCEDURE — 2580000003 HC RX 258: Performed by: NURSE PRACTITIONER

## 2024-11-13 PROCEDURE — 99232 SBSQ HOSP IP/OBS MODERATE 35: CPT | Performed by: NURSE PRACTITIONER

## 2024-11-13 PROCEDURE — 2700000000 HC OXYGEN THERAPY PER DAY

## 2024-11-13 PROCEDURE — 6370000000 HC RX 637 (ALT 250 FOR IP)

## 2024-11-13 PROCEDURE — 6370000000 HC RX 637 (ALT 250 FOR IP): Performed by: INTERNAL MEDICINE

## 2024-11-13 PROCEDURE — 99232 SBSQ HOSP IP/OBS MODERATE 35: CPT | Performed by: STUDENT IN AN ORGANIZED HEALTH CARE EDUCATION/TRAINING PROGRAM

## 2024-11-13 PROCEDURE — 51798 US URINE CAPACITY MEASURE: CPT

## 2024-11-13 PROCEDURE — 6360000002 HC RX W HCPCS: Performed by: NURSE PRACTITIONER

## 2024-11-13 PROCEDURE — 94640 AIRWAY INHALATION TREATMENT: CPT

## 2024-11-13 PROCEDURE — 6370000000 HC RX 637 (ALT 250 FOR IP): Performed by: NURSE PRACTITIONER

## 2024-11-13 RX ORDER — WATER 10 ML/10ML
10 INJECTION INTRAMUSCULAR; INTRAVENOUS; SUBCUTANEOUS EVERY 12 HOURS
Status: DISCONTINUED | OUTPATIENT
Start: 2024-11-14 | End: 2024-11-15 | Stop reason: SDUPTHER

## 2024-11-13 RX ORDER — FLUCONAZOLE 100 MG/1
200 TABLET ORAL DAILY
Status: COMPLETED | OUTPATIENT
Start: 2024-11-13 | End: 2024-11-19

## 2024-11-13 RX ORDER — CEFEPIME HYDROCHLORIDE 1 G/1
2000 INJECTION, POWDER, FOR SOLUTION INTRAMUSCULAR; INTRAVENOUS EVERY 8 HOURS
Status: DISCONTINUED | OUTPATIENT
Start: 2024-11-14 | End: 2024-11-15 | Stop reason: SDUPTHER

## 2024-11-13 RX ADMIN — SODIUM CHLORIDE, PRESERVATIVE FREE 10 ML: 5 INJECTION INTRAVENOUS at 21:32

## 2024-11-13 RX ADMIN — Medication 30 MG: at 21:32

## 2024-11-13 RX ADMIN — FLUCONAZOLE 200 MG: 100 TABLET ORAL at 21:42

## 2024-11-13 RX ADMIN — METOCLOPRAMIDE HYDROCHLORIDE 10 MG: 5 SOLUTION ORAL at 06:18

## 2024-11-13 RX ADMIN — BUDESONIDE INHALATION 500 MCG: 0.5 SUSPENSION RESPIRATORY (INHALATION) at 09:07

## 2024-11-13 RX ADMIN — MICONAZOLE NITRATE: 2 OINTMENT TOPICAL at 11:57

## 2024-11-13 RX ADMIN — APIXABAN 2.5 MG: 2.5 TABLET, FILM COATED ORAL at 21:32

## 2024-11-13 RX ADMIN — MIDODRINE HYDROCHLORIDE 5 MG: 5 TABLET ORAL at 08:49

## 2024-11-13 RX ADMIN — PREGABALIN 150 MG: 75 CAPSULE ORAL at 21:32

## 2024-11-13 RX ADMIN — SENNOSIDES 10 ML: 8.8 LIQUID ORAL at 21:32

## 2024-11-13 RX ADMIN — METOCLOPRAMIDE HYDROCHLORIDE 10 MG: 5 SOLUTION ORAL at 16:10

## 2024-11-13 RX ADMIN — PANTOPRAZOLE SODIUM 40 MG: 40 INJECTION, POWDER, FOR SOLUTION INTRAVENOUS at 08:49

## 2024-11-13 RX ADMIN — SODIUM CHLORIDE, PRESERVATIVE FREE 10 ML: 5 INJECTION INTRAVENOUS at 08:50

## 2024-11-13 RX ADMIN — METOCLOPRAMIDE HYDROCHLORIDE 10 MG: 5 SOLUTION ORAL at 21:31

## 2024-11-13 RX ADMIN — SILODOSIN 4 MG: 4 CAPSULE ORAL at 21:32

## 2024-11-13 RX ADMIN — MICONAZOLE NITRATE: 2 OINTMENT TOPICAL at 21:32

## 2024-11-13 RX ADMIN — CEFEPIME 1000 MG: 1 INJECTION, POWDER, FOR SOLUTION INTRAMUSCULAR; INTRAVENOUS at 16:10

## 2024-11-13 RX ADMIN — METOCLOPRAMIDE HYDROCHLORIDE 10 MG: 5 SOLUTION ORAL at 11:57

## 2024-11-13 RX ADMIN — OXYCODONE 20 MG: 15 TABLET ORAL at 08:49

## 2024-11-13 RX ADMIN — SODIUM CHLORIDE, PRESERVATIVE FREE 10 ML: 5 INJECTION INTRAVENOUS at 02:33

## 2024-11-13 RX ADMIN — CEFEPIME 1000 MG: 1 INJECTION, POWDER, FOR SOLUTION INTRAMUSCULAR; INTRAVENOUS at 03:40

## 2024-11-13 RX ADMIN — ARFORMOTEROL TARTRATE 15 MCG: 15 SOLUTION RESPIRATORY (INHALATION) at 21:04

## 2024-11-13 RX ADMIN — PREGABALIN 150 MG: 75 CAPSULE ORAL at 08:49

## 2024-11-13 RX ADMIN — WATER 10 ML: 1 INJECTION INTRAMUSCULAR; INTRAVENOUS; SUBCUTANEOUS at 16:10

## 2024-11-13 RX ADMIN — BUDESONIDE INHALATION 500 MCG: 0.5 SUSPENSION RESPIRATORY (INHALATION) at 21:04

## 2024-11-13 RX ADMIN — MIDODRINE HYDROCHLORIDE 5 MG: 5 TABLET ORAL at 16:10

## 2024-11-13 RX ADMIN — APIXABAN 2.5 MG: 2.5 TABLET, FILM COATED ORAL at 08:49

## 2024-11-13 RX ADMIN — Medication 30 MG: at 08:49

## 2024-11-13 RX ADMIN — METHADONE HYDROCHLORIDE 10 MG: 10 CONCENTRATE ORAL at 21:42

## 2024-11-13 RX ADMIN — PREDNISONE 40 MG: 20 TABLET ORAL at 08:49

## 2024-11-13 RX ADMIN — OXYCODONE 20 MG: 15 TABLET ORAL at 16:10

## 2024-11-13 RX ADMIN — LEVOTHYROXINE SODIUM 100 MCG: 100 TABLET ORAL at 06:18

## 2024-11-13 RX ADMIN — ATORVASTATIN CALCIUM 20 MG: 20 TABLET, FILM COATED ORAL at 08:49

## 2024-11-13 RX ADMIN — ARFORMOTEROL TARTRATE 15 MCG: 15 SOLUTION RESPIRATORY (INHALATION) at 09:07

## 2024-11-13 RX ADMIN — PREGABALIN 150 MG: 75 CAPSULE ORAL at 14:26

## 2024-11-13 RX ADMIN — SENNOSIDES 10 ML: 8.8 LIQUID ORAL at 08:52

## 2024-11-13 RX ADMIN — WATER 10 ML: 1 INJECTION INTRAMUSCULAR; INTRAVENOUS; SUBCUTANEOUS at 03:40

## 2024-11-13 ASSESSMENT — PAIN SCALES - GENERAL
PAINLEVEL_OUTOF10: 3
PAINLEVEL_OUTOF10: 7
PAINLEVEL_OUTOF10: 8
PAINLEVEL_OUTOF10: 2
PAINLEVEL_OUTOF10: 0

## 2024-11-13 ASSESSMENT — PAIN DESCRIPTION - ONSET
ONSET: ON-GOING
ONSET: ON-GOING

## 2024-11-13 ASSESSMENT — PAIN - FUNCTIONAL ASSESSMENT
PAIN_FUNCTIONAL_ASSESSMENT: ACTIVITIES ARE NOT PREVENTED
PAIN_FUNCTIONAL_ASSESSMENT: ACTIVITIES ARE NOT PREVENTED

## 2024-11-13 ASSESSMENT — PAIN DESCRIPTION - FREQUENCY
FREQUENCY: CONTINUOUS
FREQUENCY: CONTINUOUS

## 2024-11-13 ASSESSMENT — PAIN DESCRIPTION - DESCRIPTORS
DESCRIPTORS: ACHING;DISCOMFORT;THROBBING
DESCRIPTORS: ACHING;DISCOMFORT;CRAMPING

## 2024-11-13 ASSESSMENT — PAIN DESCRIPTION - PAIN TYPE
TYPE: ACUTE PAIN
TYPE: ACUTE PAIN

## 2024-11-13 ASSESSMENT — PAIN DESCRIPTION - LOCATION
LOCATION: GENERALIZED
LOCATION: GENERALIZED

## 2024-11-13 NOTE — FLOWSHEET NOTE
Inpatient Wound Care (initial consult) 405    Admit Date: 11/8/2024  2:07 PM    Reason for consult:  penis, groins    Patient laying down in bed, awake, alert and answers appropriately. Assist of two people to roll. Spouse at bedside.     Significant history:  per H&P    CHIEF COMPLAINT:  fatigue     History of Present Illness: pt is a 69 y.o. male who presents to hospital for fatigue.    Past Medical History:   Diagnosis Date    Abdominal aortic aneurysm without rupture ([facility]) 08/27/2018    follows with Dr Panchal    Acute bronchitis with COPD ([facility]) 05/27/2017    Arthritis     COPD (chronic obstructive pulmonary disease) ([facility])     Decreased dorsalis pedis pulse 11/04/2020    Depression with anxiety     Emphysema of lung ([facility])     Encounter for antineoplastic immunotherapy     History of deep vein thrombosis 11/04/2020    Hyperlipidemia     Infrarenal abdominal aortic aneurysm (AAA) without rupture ([facility]) 12/07/2023    3.1 x 3.2 cm, CT scan, January 2023    Late effect of radiation 09/06/2023    Lung cancer ([facility]) 04/11/2016    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    Paralyzed vocal cords     Thrombosis of testis     Thyroid disease     Tracheostomy in place ([facility]) 2017    #6 Elsa     Findings:     11/13/24 1047   Skin Integumentary    Skin Integrity Redness;Rash;Other (comment)  (pustules, peeling flaky skin)   Location groins   Skin Integrity Site 2   Skin Integrity Location 2 Redness;Other (comment)  (edema, warm to touch)   Location 2 scrotum, penis      penis     Right groin     Left groin     scrotum  Bilateral buttocks and both heels intact blanching    **Informed Consent**    The patient has given verbal consent to have photos taken of wounds and inserted into their chart as part of their permanent medical record for purposes of documentation, treatment management and/or medical review.   All Images taken on 11/13/24 of patient name: Alan Guajardo Jr. were transmitted and stored on secured Epic

## 2024-11-13 NOTE — PLAN OF CARE
Problem: Discharge Planning  Goal: Discharge to home or other facility with appropriate resources  11/13/2024 0030 by Jessa Guevara RN  Outcome: Progressing     Problem: Pain  Goal: Verbalizes/displays adequate comfort level or baseline comfort level  11/13/2024 0030 by Jessa Guevara RN  Outcome: Progressing     Problem: Safety - Adult  Goal: Free from fall injury  11/13/2024 0030 by Jessa Guevara RN  Outcome: Progressing     Problem: Skin/Tissue Integrity - Adult  Goal: Incisions, wounds, or drain sites healing without S/S of infection  11/13/2024 0030 by Jessa Guevara RN  Outcome: Progressing     Problem: Genitourinary - Adult  Goal: Absence of urinary retention  11/13/2024 0030 by Jessa Guevara, RN  Outcome: Progressing

## 2024-11-14 LAB
ANION GAP SERPL CALCULATED.3IONS-SCNC: 8 MMOL/L (ref 7–16)
BASOPHILS # BLD: 0.01 K/UL (ref 0–0.2)
BASOPHILS NFR BLD: 0 % (ref 0–2)
BUN SERPL-MCNC: 19 MG/DL (ref 6–23)
CALCIUM SERPL-MCNC: 9.4 MG/DL (ref 8.6–10.2)
CHLORIDE SERPL-SCNC: 95 MMOL/L (ref 98–107)
CO2 SERPL-SCNC: 27 MMOL/L (ref 22–29)
CREAT SERPL-MCNC: 0.7 MG/DL (ref 0.7–1.2)
EOSINOPHIL # BLD: 0.07 K/UL (ref 0.05–0.5)
EOSINOPHILS RELATIVE PERCENT: 1 % (ref 0–6)
ERYTHROCYTE [DISTWIDTH] IN BLOOD BY AUTOMATED COUNT: 16 % (ref 11.5–15)
GFR, ESTIMATED: >90 ML/MIN/1.73M2
GLUCOSE SERPL-MCNC: 155 MG/DL (ref 74–99)
HCT VFR BLD AUTO: 34.6 % (ref 37–54)
HGB BLD-MCNC: 10.7 G/DL (ref 12.5–16.5)
IMM GRANULOCYTES # BLD AUTO: <0.03 K/UL (ref 0–0.58)
IMM GRANULOCYTES NFR BLD: 0 % (ref 0–5)
LYMPHOCYTES NFR BLD: 0.63 K/UL (ref 1.5–4)
LYMPHOCYTES RELATIVE PERCENT: 8 % (ref 20–42)
MCH RBC QN AUTO: 26 PG (ref 26–35)
MCHC RBC AUTO-ENTMCNC: 30.9 G/DL (ref 32–34.5)
MCV RBC AUTO: 84.2 FL (ref 80–99.9)
MICROORGANISM SPEC CULT: ABNORMAL
MICROORGANISM/AGENT SPEC: ABNORMAL
MONOCYTES NFR BLD: 0.36 K/UL (ref 0.1–0.95)
MONOCYTES NFR BLD: 5 % (ref 2–12)
NEUTROPHILS NFR BLD: 86 % (ref 43–80)
NEUTS SEG NFR BLD: 6.48 K/UL (ref 1.8–7.3)
PLATELET # BLD AUTO: 219 K/UL (ref 130–450)
PMV BLD AUTO: 9.7 FL (ref 7–12)
POTASSIUM SERPL-SCNC: 3.9 MMOL/L (ref 3.5–5)
RBC # BLD AUTO: 4.11 M/UL (ref 3.8–5.8)
SODIUM SERPL-SCNC: 130 MMOL/L (ref 132–146)
SPECIMEN DESCRIPTION: ABNORMAL
WBC OTHER # BLD: 7.6 K/UL (ref 4.5–11.5)

## 2024-11-14 PROCEDURE — 2580000003 HC RX 258: Performed by: STUDENT IN AN ORGANIZED HEALTH CARE EDUCATION/TRAINING PROGRAM

## 2024-11-14 PROCEDURE — 2700000000 HC OXYGEN THERAPY PER DAY

## 2024-11-14 PROCEDURE — 51701 INSERT BLADDER CATHETER: CPT

## 2024-11-14 PROCEDURE — 2580000003 HC RX 258: Performed by: INTERNAL MEDICINE

## 2024-11-14 PROCEDURE — 94640 AIRWAY INHALATION TREATMENT: CPT

## 2024-11-14 PROCEDURE — 6370000000 HC RX 637 (ALT 250 FOR IP): Performed by: INTERNAL MEDICINE

## 2024-11-14 PROCEDURE — 6370000000 HC RX 637 (ALT 250 FOR IP): Performed by: NURSE PRACTITIONER

## 2024-11-14 PROCEDURE — 6370000000 HC RX 637 (ALT 250 FOR IP): Performed by: STUDENT IN AN ORGANIZED HEALTH CARE EDUCATION/TRAINING PROGRAM

## 2024-11-14 PROCEDURE — 85025 COMPLETE CBC W/AUTO DIFF WBC: CPT

## 2024-11-14 PROCEDURE — 6360000002 HC RX W HCPCS: Performed by: INTERNAL MEDICINE

## 2024-11-14 PROCEDURE — 2580000003 HC RX 258: Performed by: NURSE PRACTITIONER

## 2024-11-14 PROCEDURE — 80048 BASIC METABOLIC PNL TOTAL CA: CPT

## 2024-11-14 PROCEDURE — 99232 SBSQ HOSP IP/OBS MODERATE 35: CPT | Performed by: INTERNAL MEDICINE

## 2024-11-14 PROCEDURE — 6370000000 HC RX 637 (ALT 250 FOR IP)

## 2024-11-14 PROCEDURE — 99232 SBSQ HOSP IP/OBS MODERATE 35: CPT | Performed by: NURSE PRACTITIONER

## 2024-11-14 PROCEDURE — 6360000002 HC RX W HCPCS: Performed by: STUDENT IN AN ORGANIZED HEALTH CARE EDUCATION/TRAINING PROGRAM

## 2024-11-14 PROCEDURE — 2060000000 HC ICU INTERMEDIATE R&B

## 2024-11-14 PROCEDURE — 6360000002 HC RX W HCPCS: Performed by: NURSE PRACTITIONER

## 2024-11-14 PROCEDURE — 51798 US URINE CAPACITY MEASURE: CPT

## 2024-11-14 RX ADMIN — APIXABAN 2.5 MG: 2.5 TABLET, FILM COATED ORAL at 21:03

## 2024-11-14 RX ADMIN — APIXABAN 2.5 MG: 2.5 TABLET, FILM COATED ORAL at 08:01

## 2024-11-14 RX ADMIN — PANTOPRAZOLE SODIUM 40 MG: 40 INJECTION, POWDER, FOR SOLUTION INTRAVENOUS at 08:01

## 2024-11-14 RX ADMIN — BUDESONIDE INHALATION 500 MCG: 0.5 SUSPENSION RESPIRATORY (INHALATION) at 09:25

## 2024-11-14 RX ADMIN — METOCLOPRAMIDE HYDROCHLORIDE 10 MG: 5 SOLUTION ORAL at 15:50

## 2024-11-14 RX ADMIN — OXYCODONE 20 MG: 15 TABLET ORAL at 05:15

## 2024-11-14 RX ADMIN — WATER 10 ML: 1 INJECTION INTRAMUSCULAR; INTRAVENOUS; SUBCUTANEOUS at 02:20

## 2024-11-14 RX ADMIN — METOCLOPRAMIDE HYDROCHLORIDE 10 MG: 5 SOLUTION ORAL at 21:03

## 2024-11-14 RX ADMIN — CEFEPIME 2000 MG: 1 INJECTION, POWDER, FOR SOLUTION INTRAMUSCULAR; INTRAVENOUS at 08:02

## 2024-11-14 RX ADMIN — SILODOSIN 4 MG: 4 CAPSULE ORAL at 21:03

## 2024-11-14 RX ADMIN — ARFORMOTEROL TARTRATE 15 MCG: 15 SOLUTION RESPIRATORY (INHALATION) at 09:25

## 2024-11-14 RX ADMIN — CEFEPIME 2000 MG: 1 INJECTION, POWDER, FOR SOLUTION INTRAMUSCULAR; INTRAVENOUS at 15:50

## 2024-11-14 RX ADMIN — MIDODRINE HYDROCHLORIDE 5 MG: 5 TABLET ORAL at 18:04

## 2024-11-14 RX ADMIN — PREGABALIN 150 MG: 75 CAPSULE ORAL at 21:02

## 2024-11-14 RX ADMIN — SENNOSIDES 10 ML: 8.8 LIQUID ORAL at 08:04

## 2024-11-14 RX ADMIN — Medication 30 MG: at 21:03

## 2024-11-14 RX ADMIN — METOCLOPRAMIDE HYDROCHLORIDE 10 MG: 5 SOLUTION ORAL at 05:15

## 2024-11-14 RX ADMIN — METOCLOPRAMIDE HYDROCHLORIDE 10 MG: 5 SOLUTION ORAL at 11:51

## 2024-11-14 RX ADMIN — MIDODRINE HYDROCHLORIDE 5 MG: 5 TABLET ORAL at 08:01

## 2024-11-14 RX ADMIN — SODIUM CHLORIDE, PRESERVATIVE FREE 10 ML: 5 INJECTION INTRAVENOUS at 21:04

## 2024-11-14 RX ADMIN — PREDNISONE 40 MG: 20 TABLET ORAL at 08:01

## 2024-11-14 RX ADMIN — FLUCONAZOLE 200 MG: 100 TABLET ORAL at 08:01

## 2024-11-14 RX ADMIN — MICONAZOLE NITRATE: 2 OINTMENT TOPICAL at 08:02

## 2024-11-14 RX ADMIN — Medication 30 MG: at 08:01

## 2024-11-14 RX ADMIN — SENNOSIDES 10 ML: 8.8 LIQUID ORAL at 21:03

## 2024-11-14 RX ADMIN — METHADONE HYDROCHLORIDE 10 MG: 10 CONCENTRATE ORAL at 21:01

## 2024-11-14 RX ADMIN — SODIUM CHLORIDE, PRESERVATIVE FREE 10 ML: 5 INJECTION INTRAVENOUS at 08:02

## 2024-11-14 RX ADMIN — CEFEPIME 2000 MG: 1 INJECTION, POWDER, FOR SOLUTION INTRAMUSCULAR; INTRAVENOUS at 00:00

## 2024-11-14 RX ADMIN — LEVOTHYROXINE SODIUM 100 MCG: 100 TABLET ORAL at 05:16

## 2024-11-14 RX ADMIN — WATER 10 ML: 1 INJECTION INTRAMUSCULAR; INTRAVENOUS; SUBCUTANEOUS at 15:52

## 2024-11-14 RX ADMIN — ATORVASTATIN CALCIUM 20 MG: 20 TABLET, FILM COATED ORAL at 08:01

## 2024-11-14 RX ADMIN — ARFORMOTEROL TARTRATE 15 MCG: 15 SOLUTION RESPIRATORY (INHALATION) at 21:01

## 2024-11-14 RX ADMIN — PREGABALIN 150 MG: 75 CAPSULE ORAL at 15:51

## 2024-11-14 RX ADMIN — BUDESONIDE INHALATION 500 MCG: 0.5 SUSPENSION RESPIRATORY (INHALATION) at 21:01

## 2024-11-14 ASSESSMENT — PAIN SCALES - GENERAL
PAINLEVEL_OUTOF10: 2
PAINLEVEL_OUTOF10: 0
PAINLEVEL_OUTOF10: 2
PAINLEVEL_OUTOF10: 8
PAINLEVEL_OUTOF10: 8
PAINLEVEL_OUTOF10: 0

## 2024-11-14 ASSESSMENT — PAIN DESCRIPTION - LOCATION
LOCATION: GENERALIZED
LOCATION: GENERALIZED

## 2024-11-14 ASSESSMENT — PAIN DESCRIPTION - DESCRIPTORS
DESCRIPTORS: ACHING
DESCRIPTORS: ACHING;DISCOMFORT

## 2024-11-14 NOTE — PLAN OF CARE
Problem: Discharge Planning  Goal: Discharge to home or other facility with appropriate resources  11/14/2024 1025 by Vivian Lee RN  Outcome: Progressing  11/13/2024 2202 by Jessa Guevara RN  Outcome: Progressing     Problem: Pain  Goal: Verbalizes/displays adequate comfort level or baseline comfort level  11/14/2024 1025 by Vivian Lee RN  Outcome: Progressing  11/13/2024 2202 by Jessa Guevara RN  Outcome: Progressing     Problem: Safety - Adult  Goal: Free from fall injury  11/14/2024 1025 by Vivian Lee RN  Outcome: Progressing  11/13/2024 2202 by Jessa Guevara RN  Outcome: Progressing     Problem: ABCDS Injury Assessment  Goal: Absence of physical injury  11/14/2024 1025 by Vivian Lee RN  Outcome: Progressing  11/13/2024 2202 by Jessa Guevara RN  Outcome: Progressing     Problem: Neurosensory - Adult  Goal: Achieves stable or improved neurological status  11/14/2024 1025 by Vivian Lee RN  Outcome: Progressing  11/13/2024 2202 by Jessa Guevara RN  Outcome: Progressing  Goal: Absence of seizures  11/14/2024 1025 by Vivian Lee RN  Outcome: Progressing  11/13/2024 2202 by Jessa Guevara RN  Outcome: Progressing     Problem: Respiratory - Adult  Goal: Achieves optimal ventilation and oxygenation  11/14/2024 1025 by Vivian Lee RN  Outcome: Progressing  11/13/2024 2202 by Jessa Guevara RN  Outcome: Progressing     Problem: Cardiovascular - Adult  Goal: Maintains optimal cardiac output and hemodynamic stability  11/14/2024 1025 by Vivian Lee RN  Outcome: Progressing  11/13/2024 2202 by Jessa Guevara RN  Outcome: Progressing  Goal: Absence of cardiac dysrhythmias or at baseline  11/14/2024 1025 by Vivian Lee RN  Outcome: Progressing  11/13/2024 2202 by Ruschak, Jessa Zita, RN  Outcome: Progressing     Problem: Skin/Tissue Integrity - Adult  Goal:

## 2024-11-14 NOTE — PLAN OF CARE
Problem: Discharge Planning  Goal: Discharge to home or other facility with appropriate resources  Outcome: Progressing     Problem: Pain  Goal: Verbalizes/displays adequate comfort level or baseline comfort level  Outcome: Progressing     Problem: Safety - Adult  Goal: Free from fall injury  Outcome: Progressing     Problem: ABCDS Injury Assessment  Goal: Absence of physical injury  Outcome: Progressing     Problem: Neurosensory - Adult  Goal: Achieves stable or improved neurological status  Outcome: Progressing  Goal: Absence of seizures  Outcome: Progressing     Problem: Respiratory - Adult  Goal: Achieves optimal ventilation and oxygenation  Outcome: Progressing     Problem: Cardiovascular - Adult  Goal: Maintains optimal cardiac output and hemodynamic stability  Outcome: Progressing  Goal: Absence of cardiac dysrhythmias or at baseline  Outcome: Progressing     Problem: Skin/Tissue Integrity - Adult  Goal: Incisions, wounds, or drain sites healing without S/S of infection  Outcome: Progressing  Goal: Skin integrity remains intact  Outcome: Progressing     Problem: Musculoskeletal - Adult  Goal: Return mobility to safest level of function  Outcome: Progressing  Goal: Maintain proper alignment of affected body part  Outcome: Progressing  Goal: Return ADL status to a safe level of function  Outcome: Progressing     Problem: Genitourinary - Adult  Goal: Absence of urinary retention  Outcome: Progressing     Problem: Nutrition Deficit:  Goal: Optimize nutritional status  Outcome: Progressing  Flowsheets (Taken 11/13/2024 6372 by Saba Allen, RD, CNSC, LD)  Nutrient intake appropriate for improving, restoring, or maintaining nutritional needs:   Assess nutritional status and recommend course of action   Recommend, monitor, and adjust tube feedings and TPN/PPN based on assessed needs     Problem: Skin/Tissue Integrity  Goal: Absence of new skin breakdown  Description: 1.  Monitor for areas of redness and/or

## 2024-11-15 ENCOUNTER — TELEPHONE (OUTPATIENT)
Dept: INFUSION THERAPY | Age: 69
End: 2024-11-15

## 2024-11-15 LAB
ANION GAP SERPL CALCULATED.3IONS-SCNC: 10 MMOL/L (ref 7–16)
BASOPHILS # BLD: 0.02 K/UL (ref 0–0.2)
BASOPHILS NFR BLD: 0 % (ref 0–2)
BUN SERPL-MCNC: 21 MG/DL (ref 6–23)
CALCIUM SERPL-MCNC: 9.8 MG/DL (ref 8.6–10.2)
CHLORIDE SERPL-SCNC: 94 MMOL/L (ref 98–107)
CO2 SERPL-SCNC: 28 MMOL/L (ref 22–29)
CREAT SERPL-MCNC: 0.7 MG/DL (ref 0.7–1.2)
EOSINOPHIL # BLD: 0.06 K/UL (ref 0.05–0.5)
EOSINOPHILS RELATIVE PERCENT: 1 % (ref 0–6)
ERYTHROCYTE [DISTWIDTH] IN BLOOD BY AUTOMATED COUNT: 16.2 % (ref 11.5–15)
GFR, ESTIMATED: >90 ML/MIN/1.73M2
GLUCOSE SERPL-MCNC: 128 MG/DL (ref 74–99)
HCT VFR BLD AUTO: 33.9 % (ref 37–54)
HGB BLD-MCNC: 10.8 G/DL (ref 12.5–16.5)
IMM GRANULOCYTES # BLD AUTO: 0.03 K/UL (ref 0–0.58)
IMM GRANULOCYTES NFR BLD: 1 % (ref 0–5)
LYMPHOCYTES NFR BLD: 0.6 K/UL (ref 1.5–4)
LYMPHOCYTES RELATIVE PERCENT: 10 % (ref 20–42)
MCH RBC QN AUTO: 26.6 PG (ref 26–35)
MCHC RBC AUTO-ENTMCNC: 31.9 G/DL (ref 32–34.5)
MCV RBC AUTO: 83.5 FL (ref 80–99.9)
MONOCYTES NFR BLD: 0.63 K/UL (ref 0.1–0.95)
MONOCYTES NFR BLD: 11 % (ref 2–12)
NEUTROPHILS NFR BLD: 78 % (ref 43–80)
NEUTS SEG NFR BLD: 4.61 K/UL (ref 1.8–7.3)
PLATELET # BLD AUTO: 213 K/UL (ref 130–450)
PMV BLD AUTO: 9.4 FL (ref 7–12)
POTASSIUM SERPL-SCNC: 4.4 MMOL/L (ref 3.5–5)
RBC # BLD AUTO: 4.06 M/UL (ref 3.8–5.8)
SODIUM SERPL-SCNC: 132 MMOL/L (ref 132–146)
WBC OTHER # BLD: 6 K/UL (ref 4.5–11.5)

## 2024-11-15 PROCEDURE — 51798 US URINE CAPACITY MEASURE: CPT

## 2024-11-15 PROCEDURE — 80048 BASIC METABOLIC PNL TOTAL CA: CPT

## 2024-11-15 PROCEDURE — 2580000003 HC RX 258: Performed by: INTERNAL MEDICINE

## 2024-11-15 PROCEDURE — 2580000003 HC RX 258: Performed by: NURSE PRACTITIONER

## 2024-11-15 PROCEDURE — 2060000000 HC ICU INTERMEDIATE R&B

## 2024-11-15 PROCEDURE — 2500000003 HC RX 250 WO HCPCS: Performed by: INTERNAL MEDICINE

## 2024-11-15 PROCEDURE — 94640 AIRWAY INHALATION TREATMENT: CPT

## 2024-11-15 PROCEDURE — 6360000002 HC RX W HCPCS: Performed by: STUDENT IN AN ORGANIZED HEALTH CARE EDUCATION/TRAINING PROGRAM

## 2024-11-15 PROCEDURE — 6360000002 HC RX W HCPCS: Performed by: INTERNAL MEDICINE

## 2024-11-15 PROCEDURE — 6370000000 HC RX 637 (ALT 250 FOR IP): Performed by: STUDENT IN AN ORGANIZED HEALTH CARE EDUCATION/TRAINING PROGRAM

## 2024-11-15 PROCEDURE — 6370000000 HC RX 637 (ALT 250 FOR IP): Performed by: NURSE PRACTITIONER

## 2024-11-15 PROCEDURE — 6370000000 HC RX 637 (ALT 250 FOR IP)

## 2024-11-15 PROCEDURE — 99232 SBSQ HOSP IP/OBS MODERATE 35: CPT | Performed by: NURSE PRACTITIONER

## 2024-11-15 PROCEDURE — 6370000000 HC RX 637 (ALT 250 FOR IP): Performed by: INTERNAL MEDICINE

## 2024-11-15 PROCEDURE — 6360000002 HC RX W HCPCS: Performed by: NURSE PRACTITIONER

## 2024-11-15 PROCEDURE — 2700000000 HC OXYGEN THERAPY PER DAY

## 2024-11-15 PROCEDURE — 2580000003 HC RX 258: Performed by: STUDENT IN AN ORGANIZED HEALTH CARE EDUCATION/TRAINING PROGRAM

## 2024-11-15 PROCEDURE — 85025 COMPLETE CBC W/AUTO DIFF WBC: CPT

## 2024-11-15 PROCEDURE — 99232 SBSQ HOSP IP/OBS MODERATE 35: CPT | Performed by: INTERNAL MEDICINE

## 2024-11-15 RX ORDER — FLUCONAZOLE 200 MG/1
200 TABLET ORAL DAILY
Qty: 7 TABLET | Refills: 0 | Status: SHIPPED | OUTPATIENT
Start: 2024-11-16 | End: 2024-11-23

## 2024-11-15 RX ORDER — MIDODRINE HYDROCHLORIDE 5 MG/1
5 TABLET ORAL ONCE
Status: COMPLETED | OUTPATIENT
Start: 2024-11-15 | End: 2024-11-15

## 2024-11-15 RX ORDER — LEVOFLOXACIN 750 MG/1
750 TABLET, FILM COATED ORAL DAILY
Qty: 1 TABLET | Refills: 0 | Status: SHIPPED | OUTPATIENT
Start: 2024-11-15 | End: 2024-11-16

## 2024-11-15 RX ORDER — POLYETHYLENE GLYCOL 3350 17 G/17G
17 POWDER, FOR SOLUTION ORAL DAILY
Status: DISCONTINUED | OUTPATIENT
Start: 2024-11-15 | End: 2024-11-23 | Stop reason: HOSPADM

## 2024-11-15 RX ORDER — BISACODYL 10 MG
10 SUPPOSITORY, RECTAL RECTAL DAILY PRN
Status: DISCONTINUED | OUTPATIENT
Start: 2024-11-15 | End: 2024-11-23 | Stop reason: HOSPADM

## 2024-11-15 RX ORDER — WATER 10 ML/10ML
20 INJECTION INTRAMUSCULAR; INTRAVENOUS; SUBCUTANEOUS EVERY 8 HOURS
Status: DISCONTINUED | OUTPATIENT
Start: 2024-11-15 | End: 2024-11-21

## 2024-11-15 RX ORDER — MIDODRINE HYDROCHLORIDE 5 MG/1
10 TABLET ORAL
Status: DISCONTINUED | OUTPATIENT
Start: 2024-11-15 | End: 2024-11-23 | Stop reason: HOSPADM

## 2024-11-15 RX ORDER — MIDODRINE HYDROCHLORIDE 5 MG/1
10 TABLET ORAL 2 TIMES DAILY
Status: DISCONTINUED | OUTPATIENT
Start: 2024-11-15 | End: 2024-11-15

## 2024-11-15 RX ORDER — CEFEPIME HYDROCHLORIDE 2 G/1
2 INJECTION, POWDER, FOR SOLUTION INTRAVENOUS EVERY 8 HOURS
Status: DISCONTINUED | OUTPATIENT
Start: 2024-11-15 | End: 2024-11-21

## 2024-11-15 RX ADMIN — PREGABALIN 150 MG: 75 CAPSULE ORAL at 22:31

## 2024-11-15 RX ADMIN — CEFEPIME 2 G: 2 INJECTION, POWDER, FOR SOLUTION INTRAVENOUS at 09:25

## 2024-11-15 RX ADMIN — Medication 30 MG: at 22:32

## 2024-11-15 RX ADMIN — SODIUM CHLORIDE, PRESERVATIVE FREE 10 ML: 5 INJECTION INTRAVENOUS at 08:33

## 2024-11-15 RX ADMIN — OXYCODONE 20 MG: 15 TABLET ORAL at 03:55

## 2024-11-15 RX ADMIN — SENNOSIDES 10 ML: 8.8 LIQUID ORAL at 08:29

## 2024-11-15 RX ADMIN — APIXABAN 2.5 MG: 2.5 TABLET, FILM COATED ORAL at 22:32

## 2024-11-15 RX ADMIN — METOCLOPRAMIDE HYDROCHLORIDE 10 MG: 5 SOLUTION ORAL at 17:06

## 2024-11-15 RX ADMIN — BUDESONIDE INHALATION 500 MCG: 0.5 SUSPENSION RESPIRATORY (INHALATION) at 09:16

## 2024-11-15 RX ADMIN — PREDNISONE 40 MG: 20 TABLET ORAL at 08:30

## 2024-11-15 RX ADMIN — METHADONE HYDROCHLORIDE 10 MG: 10 CONCENTRATE ORAL at 22:34

## 2024-11-15 RX ADMIN — MICONAZOLE NITRATE: 2 OINTMENT TOPICAL at 22:46

## 2024-11-15 RX ADMIN — ATORVASTATIN CALCIUM 20 MG: 20 TABLET, FILM COATED ORAL at 08:30

## 2024-11-15 RX ADMIN — METOCLOPRAMIDE HYDROCHLORIDE 10 MG: 5 SOLUTION ORAL at 22:32

## 2024-11-15 RX ADMIN — FLUCONAZOLE 200 MG: 100 TABLET ORAL at 08:30

## 2024-11-15 RX ADMIN — APIXABAN 2.5 MG: 2.5 TABLET, FILM COATED ORAL at 08:30

## 2024-11-15 RX ADMIN — Medication 30 MG: at 08:29

## 2024-11-15 RX ADMIN — CEFEPIME 2 G: 2 INJECTION, POWDER, FOR SOLUTION INTRAVENOUS at 17:07

## 2024-11-15 RX ADMIN — ARFORMOTEROL TARTRATE 15 MCG: 15 SOLUTION RESPIRATORY (INHALATION) at 20:29

## 2024-11-15 RX ADMIN — MICONAZOLE NITRATE: 20 POWDER TOPICAL at 08:30

## 2024-11-15 RX ADMIN — PREGABALIN 150 MG: 75 CAPSULE ORAL at 08:30

## 2024-11-15 RX ADMIN — SODIUM CHLORIDE, PRESERVATIVE FREE 10 ML: 5 INJECTION INTRAVENOUS at 22:46

## 2024-11-15 RX ADMIN — MIDODRINE HYDROCHLORIDE 5 MG: 5 TABLET ORAL at 03:55

## 2024-11-15 RX ADMIN — METOCLOPRAMIDE HYDROCHLORIDE 10 MG: 5 SOLUTION ORAL at 06:16

## 2024-11-15 RX ADMIN — CEFEPIME 2000 MG: 1 INJECTION, POWDER, FOR SOLUTION INTRAMUSCULAR; INTRAVENOUS at 01:13

## 2024-11-15 RX ADMIN — PREGABALIN 150 MG: 75 CAPSULE ORAL at 14:54

## 2024-11-15 RX ADMIN — BUDESONIDE INHALATION 500 MCG: 0.5 SUSPENSION RESPIRATORY (INHALATION) at 20:29

## 2024-11-15 RX ADMIN — MIDODRINE HYDROCHLORIDE 10 MG: 5 TABLET ORAL at 17:06

## 2024-11-15 RX ADMIN — LEVOTHYROXINE SODIUM 100 MCG: 100 TABLET ORAL at 06:15

## 2024-11-15 RX ADMIN — SILODOSIN 4 MG: 4 CAPSULE ORAL at 22:34

## 2024-11-15 RX ADMIN — ARFORMOTEROL TARTRATE 15 MCG: 15 SOLUTION RESPIRATORY (INHALATION) at 09:16

## 2024-11-15 RX ADMIN — MICONAZOLE NITRATE: 20 POWDER TOPICAL at 03:56

## 2024-11-15 RX ADMIN — SENNOSIDES 10 ML: 8.8 LIQUID ORAL at 22:32

## 2024-11-15 RX ADMIN — POLYETHYLENE GLYCOL 3350 17 G: 17 POWDER, FOR SOLUTION ORAL at 09:25

## 2024-11-15 RX ADMIN — MIDODRINE HYDROCHLORIDE 5 MG: 5 TABLET ORAL at 08:35

## 2024-11-15 RX ADMIN — METOCLOPRAMIDE HYDROCHLORIDE 10 MG: 5 SOLUTION ORAL at 10:29

## 2024-11-15 RX ADMIN — WATER 20 ML: 1 INJECTION INTRAMUSCULAR; INTRAVENOUS; SUBCUTANEOUS at 09:25

## 2024-11-15 RX ADMIN — WATER 10 ML: 1 INJECTION INTRAMUSCULAR; INTRAVENOUS; SUBCUTANEOUS at 04:55

## 2024-11-15 RX ADMIN — MIDODRINE HYDROCHLORIDE 5 MG: 5 TABLET ORAL at 12:02

## 2024-11-15 RX ADMIN — PANTOPRAZOLE SODIUM 40 MG: 40 INJECTION, POWDER, FOR SOLUTION INTRAVENOUS at 08:29

## 2024-11-15 RX ADMIN — MICONAZOLE NITRATE: 20 POWDER TOPICAL at 22:46

## 2024-11-15 RX ADMIN — METHADONE HYDROCHLORIDE 10 MG: 10 CONCENTRATE ORAL at 10:29

## 2024-11-15 RX ADMIN — WATER 20 ML: 1 INJECTION INTRAMUSCULAR; INTRAVENOUS; SUBCUTANEOUS at 17:07

## 2024-11-15 ASSESSMENT — PAIN DESCRIPTION - LOCATION
LOCATION: FACE;NECK
LOCATION: FACE;NECK
LOCATION: GENERALIZED

## 2024-11-15 ASSESSMENT — PAIN SCALES - GENERAL
PAINLEVEL_OUTOF10: 0
PAINLEVEL_OUTOF10: 10
PAINLEVEL_OUTOF10: 0
PAINLEVEL_OUTOF10: 8
PAINLEVEL_OUTOF10: 7

## 2024-11-15 ASSESSMENT — PAIN DESCRIPTION - DESCRIPTORS
DESCRIPTORS: ACHING;THROBBING
DESCRIPTORS: ACHING;DISCOMFORT;THROBBING
DESCRIPTORS: PATIENT UNABLE TO DESCRIBE

## 2024-11-15 ASSESSMENT — PAIN DESCRIPTION - ONSET: ONSET: ON-GOING

## 2024-11-15 ASSESSMENT — PAIN DESCRIPTION - ORIENTATION
ORIENTATION: RIGHT;LEFT
ORIENTATION: RIGHT

## 2024-11-15 ASSESSMENT — PAIN DESCRIPTION - FREQUENCY: FREQUENCY: CONTINUOUS

## 2024-11-15 ASSESSMENT — PAIN DESCRIPTION - PAIN TYPE: TYPE: ACUTE PAIN

## 2024-11-15 ASSESSMENT — PAIN - FUNCTIONAL ASSESSMENT: PAIN_FUNCTIONAL_ASSESSMENT: ACTIVITIES ARE NOT PREVENTED

## 2024-11-15 NOTE — PLAN OF CARE
Problem: Discharge Planning  Goal: Discharge to home or other facility with appropriate resources  Outcome: Progressing     Problem: Pain  Goal: Verbalizes/displays adequate comfort level or baseline comfort level  Outcome: Progressing     Problem: Safety - Adult  Goal: Free from fall injury  Outcome: Progressing     Problem: Respiratory - Adult  Goal: Achieves optimal ventilation and oxygenation  Outcome: Progressing     Problem: Skin/Tissue Integrity - Adult  Goal: Incisions, wounds, or drain sites healing without S/S of infection  Outcome: Progressing     Problem: Skin/Tissue Integrity  Goal: Absence of new skin breakdown  Description: 1.  Monitor for areas of redness and/or skin breakdown  2.  Assess vascular access sites hourly  3.  Every 4-6 hours minimum:  Change oxygen saturation probe site  4.  Every 4-6 hours:  If on nasal continuous positive airway pressure, respiratory therapy assess nares and determine need for appliance change or resting period.  Outcome: Progressing

## 2024-11-15 NOTE — CONSULTS
11/15/2024 8:31 AM  Alan Guajardo Jr.  [id number]     Chief Complaint:    Urinary retention    History of Present Illness:      The patient is a 69 y.o. male patient who presented to the hospital with complaints of fatigue and generalized weakness.  He is currently receiving chemotherapy and his last dose of Keytruda was last week.  He was admitted with hyponatremia.    Urology is asked to evaluate the patient for urinary retention.  The patient's wife is his primary caregiver states that he has not urinated or had a bowel movement in days.  He has been bladder scan and straight cath multiple times.  The most recent 1 he was bladder scanned for 430 cc and straight cath with a return of 420 cc of urine. His wife is present at the bedside. They want him to remain marr free. He is taking a compounded form of Flomax which he receives from [facility] and is easier for the patient to take and does not clog his G tube.     Past Medical History:   Diagnosis Date    Abdominal aortic aneurysm without rupture ([facility]) 08/27/2018    follows with Dr Panchal    Acute bronchitis with COPD ([facility]) 05/27/2017    Arthritis     COPD (chronic obstructive pulmonary disease) ([facility])     Decreased dorsalis pedis pulse 11/04/2020    Depression with anxiety     Emphysema of lung ([facility])     Encounter for antineoplastic immunotherapy     History of deep vein thrombosis 11/04/2020    Hyperlipidemia     Infrarenal abdominal aortic aneurysm (AAA) without rupture ([facility]) 12/07/2023    3.1 x 3.2 cm, CT scan, January 2023    Late effect of radiation 09/06/2023    Lung cancer ([facility]) 04/11/2016    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 
    Elder Gan M.D.,[facility]  Dean Manjarrez D.O., HUGO., [facility]  Young Parkinson M.D.  Shanda Lowe M.D.   Varun Lindsey D.O.      Patient:  Alan Guajardo Jr. 69 y.o. male MRN: [id number]     Date of Service: 11/9/2024      PULMONARY CONSULTATION    Reason for Consultation: INCREASING O2 NEEDS   Referring Physician: Cornel     Communication with the referring physician will be sent via the electronic medical record.    Chief Complaint: inability to void and fatigue     CODE STATUS: Full     SUBJECTIVE:  HPI:  Alan Guajardo Jr. is a 69 y.o. male with past medical history of centrilobular emphysema, stable AAA without rupture, adenocarcinoma of the lung 2016, laryngeal squamous cell carcinoma s/p tracheostomy on 2017, S/P palliative radiation therapy, on immunotherapy Keytruda , depression/anxiety, glottic stenosis, DVT on Eliquis, HLD, Hypothyroidism     Lives at home with wife who is his primary cargiver, trach dependent     Admitted for fatigue and started having increased o2 needs from 5LTM to 10LTM, Ct chest completed and abnormal  11/9 pulm consulted     Patient seen in bed on 10LTM, wife at bedside  Reports he has been having green mucous and foul smelling.he has been off his flomax and not voiding for 3 days and no BM in 2 weeks       Past Medical History:   Diagnosis Date    Abdominal aortic aneurysm without rupture (HCC) 08/27/2018    follows with Dr Panchal    Acute bronchitis with COPD ([facility]) 05/27/2017    Arthritis     COPD (chronic obstructive pulmonary disease) ([facility])     Decreased dorsalis pedis pulse 11/04/2020    Depression with anxiety     Emphysema of lung ([facility])     Encounter for antineoplastic immunotherapy     History of deep vein thrombosis 11/04/2020    Hyperlipidemia     Infrarenal abdominal aortic aneurysm (AAA) without rupture (HCC) 12/07/2023    3.1 x 3.2 cm, CT scan, January 2023    Late effect of radiation 09/06/2023    Lung cancer (HCC) 04/11/2016    Osteoradionecrosis of jaw 
  Palliative Care Department  Palliative Care Initial Consult  Provider: Elder Martínez, BG - [facility]  [phone]    Hospital Day: 3  Date of Initial Consult: 11/8/24  Referring Provider: Dr. Martell  Palliative Medicine was consulted for assistance with: Goals of Care and Overwhelming Symptoms    Chief Complaint: Alan Guajardo Jr. is a 69 y.o. male with chief complaint of fatigue and weakness    HPI:   Alan Guajardo Jr. is a 69 y.o. male with significant medical history of stage IV non-small cell lung cancer, with a left upper lobe, as well as a history of subglottic cancer status post CRT in 2017, has had continued recurrent squamous cell carcinoma, known to Dr. Cummings, recently started on immunotherapy and chemotherapy, last dose received 10/31.  He additionally is very well-known to the palliative medicine service in the outpatient setting for symptomatic management.  He was admitted on 11/8/2024 due to worsening fatigue and weakness, having had a difficult time with nutritional intake, despite PEG tube, being seen in consultation by nephrology, oncology, and pulmonology.  Palliative is consulted to assist with symptomatic management, as well as to discuss goals of care.    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  At this time, Alan Guajardo Jr., Does have capacity for medical decision-making.  Capacity is time limited and situation/question specific.  Outcome of 
[facility] Consult Note   BG Nieto-ANDRES-MING with Kavon Villagomez D.O. Consult Note        Date of Service: 11/11/2024  Reason for Consult: peg evaluation, family request   Requesting Physician: Dr. Unger     CHIEF COMPLAINT:  fatigue     History Obtained From:  patient, electronic medical record    HISTORY OF PRESENT ILLNESS:       Alan Guajardo Jr. is a 69 y.o. male with significant past medical history of AAA without rupture, COPD, depression/anxiety, DVT on Eliquis, HLD, thyroid disease, malignant neoplasm of lung 2017 and Larynex 2018 s/p tracheostomy (#6 Shiley) 2017 on Keytruda presenting to ED for fatigue and admitted with hyponatremia.  Pt reports initially presented with reports of fatigue.  Otherwise doing well from a GI perspective.  Had been tolerating tube feeds.  Has constipation, last bowel movement 2 to 3 weeks ago.  No complaints of abdominal pain.  Patient noted to have copious amounts of emesis yesterday unknown if related to medication changes.  Therefore our service consulted.  Was previously doing well with tube feedings.  No hematemesis reported.      Admission labs: Na 126, hgb 11.2. Imaging: KUB- 1. Unchanged small/moderate left pleural effusion and platelike atelectasis at the right base. 2. No ileus or obstruction.  Labs today: WBC 15.6, hemoglobin 11.6    Consultation for peg evaluation, family request.  Pt is known to our service, last seen in January.EGD with PEG tube placement 1/11/2024-PEG tube placement  Posterior UES ulcer. EGD 12/11/23 with Dr. Villagomez- 1.  Posterior UES ulceration ? stasis and anterior extrinsic compression from trach, with stenosed area - Savary dilation up to 10mm and then TTS CRE dilation up to 12mm were performed. 2. Small hiatial hernia. 3. Mild antral gastritis.   Currently, pt reports no abdominal pain or nausea.  Last BM 2 weeks ago.  KUB reviewed with patient and wife at bedside.      Past Medical History:      
[facility]  Hematology/Oncology  Consult  Dr. Thiago Morales      Patient Name: Alan Guajardo Jr.  YOB: 1955  PCP: Sylvia Quintana DO   Referring Provider:      Reason for Consultation:   Chief Complaint   Patient presents with    Generalized Body Aches     Feeling weak over the last few days, hx of throat ca, has feeding tube, low urine output recently, tracheostomy and on 6 L at baseline        History of Present Illness:  This pt is a 68 yo male who follows with my partner Dr. Cummings. 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 Hx of subglottic cancer s/p CRT in 2017. He has a trach and PEG. Unfortunately he has developed recurrent SCC (H+N primary). CT showing R level II LN with SCM muscle invasion. He was following at [facility] for possible total laryngectomy with Dr. Turner, however this was not pursued due to concerns relating to his respiratory status. PET 9/30 showing aforementioned R level II LN, as well as new L level II LN, and increased avidity in the posterior pharyngeal wall and possible pulmonary nodules. He has been following Dr. Ramires for possible XRT with cetuximab, however it appears plan is for 5 FU, Carbo, Keytruda systemically, pending vascular access (PICC). He received Keytruda, carbo, and IVP 5FU on 10/31/24.     He is now admitted with fatigue and weakness. Tolerating TF well. He has not urinated in 3 days. He is also constipated, reportedly without BM for ~2 weeks. CMP today WNL. CBC today with mild normocytic anemia, normal WBC and platelets    Oncology consulted for continuity of care    Diagnostic Data:     Past Medical History:   Diagnosis Date    Abdominal aortic aneurysm without rupture (HCC) 08/27/2018    follows with Dr Panchal    Acute bronchitis with COPD (HCC) 05/27/2017    Arthritis     COPD (chronic obstructive pulmonary disease) (HCC)     Decreased dorsalis pedis pulse 11/04/2020 
[facility]  [facility]    Consultation Note     Admit Date: 11/8/2024  2:07 PM    Reason for Consult:   scrotal cellulitis    Attending Physician:  Osmin Torres MD     Chief Complaint: scrotal redness    HISTORY OF PRESENT ILLNESS:   The patient is a 69 y.o.  male known to the Infectious Diseases service. The patient was treated for alphahemolytic strep bacteremia from dental source in jan 2024 with IV Zosyn. Patient has hx of NSCLC of CRISTINA s/p radiation followed by hx of subglottic cancer s/p CRT in 207 has trach/peg. Hx of recurrent SCC. Patient was offered surgery in [facility] but thought to be risky so not pursued by family.patient is on chemo with keytruda, carboplatin/IVP/5FU on 10/31/24. Patient presented to ER on 11/8 with fatigue. Patient has rash on the scrotum for almost 3 months now. No fever or chills. Has rash on the elbows and knees as well. Has hx of psoriasis in family.     Since admission pt is afebrile HS stable on trach mask. Labs showed white count of 5.6 today is 8.9, hgb is 11. Platelet count is 247,. Lfts were normal. BMP normal. Pct is negative.rvp negative. No blood cx drawn. Resp cx showing serratia marcescens and Pseudomonas aeruginosa. Patient was on IV Ceftriaxone/doxy for 5 days switched to IV cefepime 1gr q 12 yesterday and pt has scrotal cellulitis/redness so I got consulted for antibiotic management,.    Past Medical History:        Diagnosis Date    Abdominal aortic aneurysm without rupture ([facility]) 08/27/2018    follows with Dr Panchal    Acute bronchitis with COPD ([facility]) 05/27/2017    Arthritis     COPD (chronic obstructive pulmonary disease) ([facility])     Decreased dorsalis pedis pulse 11/04/2020    Depression with anxiety     Emphysema of lung ([facility])     Encounter for antineoplastic immunotherapy     History of deep vein thrombosis 11/04/2020    Hyperlipidemia     Infrarenal abdominal aortic aneurysm (AAA) without rupture ([facility]) 12/07/2023    3.1 x 3.2 
OR    NERVE BLOCK Bilateral 5/8/2023    BILATERAL SACROILIAC JOINT INJECTION UNDER FLUOROSCOPIC GUIDANCE performed by Garfield Conner MD at [facility] OR    NERVE BLOCK Bilateral 10/5/2023    BILATERAL SACROILIAC JOINT INJECTION UNDER FLUOROSCOPIC GUIDANCE performed by Garfield Conner MD at [facility] OR    OTHER SURGICAL HISTORY  4/15/2016    cervical myelogram    PAIN MANAGEMENT PROCEDURE Right 12/28/2020    # 1 LUMBAR TRANSFORAMINAL EPIDURAL STEROID INJECTION RIGHT L3 AND L4 UNDER FLUOROSCOPIC GUIDANCE performed by Garfield Conner MD at [facility] OR    PAIN MANAGEMENT PROCEDURE Right 9/30/2021    LUMBAR TRANSFORAMINAL EPIDURAL STEROID INJECTION RIGHT L3 AND L4 UNDER FLUOROSCOPIC GUIDANCE (CPT 42352) performed by Garfield Conner MD at [facility] OR    PAIN MANAGEMENT PROCEDURE Right 4/25/2022    RIGHT LUMBAR RADIOFREQUENCY ABLATION L5 AND S1 performed by Garfield Conner MD at [facility] OR    ROTATOR CUFF REPAIR Right     SINUS SURGERY      TRACHEOSTOMY  05/24/2017    # 6 Shiley (disposable)    TRACHEOSTOMY      UPPER GASTROINTESTINAL ENDOSCOPY N/A 9/13/2023    EGD ESOPHAGOGASTRODUODENOSCOPY/ POSSIBLE DILATATION performed by Kavon Villagomez DO at [facility] ENDOSCOPY    UPPER GASTROINTESTINAL ENDOSCOPY N/A 12/11/2023    EGD WITH DILATION performed by Kavon Villagomze DO at [facility] ENDOSCOPY    UPPER GASTROINTESTINAL ENDOSCOPY N/A 1/11/2024    EGD WITH DILATION AND PEG TUBE PLACEMENT performed by Kavon Villagomez DO at [facility] ENDOSCOPY       Family History:  Family History   Problem Relation Age of Onset    Cancer Mother         breast cancer    Cancer Father         prostate cancer    Diabetes Father        Allergies:  Allergies   Allergen Reactions    Augmentin [Amoxicillin-Pot Clavulanate] Diarrhea       I reviewed the patient's past medical and surgical history, Medications and Allergies.    REVIEW OF SYSTEMS:    10 point review of systems is negative unless otherwise noted below:    PHYSICAL EXAM:       Vitals:    
performed by Young Parkinson MD at [facility] ENDOSCOPY    CERVICAL FUSION  2015    DENTAL SURGERY N/A 1/15/2024    FULL MANDIBULAR EXTRACTIONS WITH ALVELOPLASTY     + COVID+ performed by Red Granados DDS at [facility] OR    HIP SURGERY Left 1/6/2022    RIGHT TROCHANTARIC BURSA INJECTION UNDER FLUOROSCOPY performed by Garfield Conner MD at [facility] OR    KNEE ARTHROSCOPY      LUNG BIOPSY Left 5/6/2016    MEDICATION INJECTION Bilateral 10/29/2020    BILATERAL SACROILIAC JOINT INJECTION AND RIGHT TROCHANTERIC BURSA INJECTION UNDER FLUOROSCOPY (CPT 21003,32877,59023)++TRACH-NO VENT++ performed by Garfield Conner MD at [facility] OR    MEDICATION INJECTION Left 1/6/2022    RIGHT SACROILIAC JOINT INJECTION UNDER FLUOROSCOPY performed by Garfield Conner MD at [facility] OR    NERVE BLOCK Bilateral 5/8/2023    BILATERAL SACROILIAC JOINT INJECTION UNDER FLUOROSCOPIC GUIDANCE performed by Garfield Conner MD at [facility] OR    NERVE BLOCK Bilateral 10/5/2023    BILATERAL SACROILIAC JOINT INJECTION UNDER FLUOROSCOPIC GUIDANCE performed by Garfield Conner MD at [facility] OR    OTHER SURGICAL HISTORY  4/15/2016    cervical myelogram    PAIN MANAGEMENT PROCEDURE Right 12/28/2020    # 1 LUMBAR TRANSFORAMINAL EPIDURAL STEROID INJECTION RIGHT L3 AND L4 UNDER FLUOROSCOPIC GUIDANCE performed by Garfield Conner MD at [facility] OR    PAIN MANAGEMENT PROCEDURE Right 9/30/2021    LUMBAR TRANSFORAMINAL EPIDURAL STEROID INJECTION RIGHT L3 AND L4 UNDER FLUOROSCOPIC GUIDANCE (CPT 54811) performed by Garfield Conner MD at [facility] OR    PAIN MANAGEMENT PROCEDURE Right 4/25/2022    RIGHT LUMBAR RADIOFREQUENCY ABLATION L5 AND S1 performed by Garfield Conner MD at [facility] OR    ROTATOR CUFF REPAIR Right     SINUS SURGERY      TRACHEOSTOMY  05/24/2017    # 6 Elsa (disposable)    TRACHEOSTOMY      UPPER GASTROINTESTINAL ENDOSCOPY N/A 9/13/2023    EGD ESOPHAGOGASTRODUODENOSCOPY/ POSSIBLE DILATATION performed by Kavon Villagomez DO at [facility] 
APRN - CNP   oxyCODONE (OXYCONTIN) 10 MG extended release tablet Take 1 tablet by mouth in the morning and 1 tablet in the evening.   Yes Nataliia Gomez MD   chlorhexidine (PERIDEX) 0.12 % solution  4/28/24  Yes Nataliia Gomez MD   albuterol (ACCUNEB) 0.63 MG/3ML nebulizer solution Take 3 mLs by nebulization every 6 hours as needed for Shortness of Breath 2/27/24  Yes Cony Heard APRN - CNP   famotidine (PEPCID) 20 MG tablet  2/15/24  Yes Nataliia Gomez MD   budesonide (PULMICORT) 0.5 MG/2ML nebulizer suspension Take 2 mLs by nebulization in the morning and 2 mLs in the evening.   Yes Nataliia Gomez MD   levothyroxine (SYNTHROID) 100 MCG tablet Take 1 tablet by mouth every morning (before breakfast)   Yes Nataliia Gomez MD   simvastatin (ZOCOR) 40 MG tablet Take 1 tablet by mouth every evening   Yes Nataliia Gomez MD   apixaban (ELIQUIS) 2.5 MG TABS tablet Take 1 tablet by mouth 2 times daily   Yes Nataliia Gomez MD   midodrine (PROAMATINE) 5 MG tablet Take 1 tablet by mouth 2 times daily Taking three times a day   Yes Nataliia Gomez MD   pembrolizumab (KEYTRUDA) 100 MG/4ML SOLN Infuse 8 mLs intravenously every 21 days LAST DOSE: 12/21/2023  NEXT DOSE DUE: 01/11/2024   Yes Nataliia Gomez MD   predniSONE (DELTASONE) 10 MG tablet Take 1 tablet by mouth every evening   Yes Nataliia Gomez MD   Cholecalciferol (VITAMIN D3) 5000 units TABS Take 1 tablet by mouth every evening   Yes Nataliia Gomez MD   amoxicillin-clavulanate (AUGMENTIN) 250-125 MG per tablet Take 1 tablet by mouth 3 times daily  Patient not taking: Reported on 10/26/2024    Nataliia Gomez MD   tamsulosin (FLOMAX) 0.4 MG capsule Take 1 capsule by mouth every evening  Patient not taking: Reported on 11/9/2024    Nataliia Gomez MD       Allergies   Allergen Reactions    Augmentin [Amoxicillin-Pot Clavulanate] Diarrhea       Family History   Problem Relation Age of

## 2024-11-15 NOTE — CARE COORDINATION
Chart reviewed, anticipating potential dc next 24 hrs  Notified  [facility] of same, they can commence services 11/17/2024 and will schedule patient for that date.  Notified Thiago with [facility] - he requests notification again at time of discharge - [phone].  Notified Marian at [facility] - she will arrange for enteral pump delivery to home as soon as today.  Notified [facility] [phone] and placed patient in will call queue for 11/16/2024. Transportation ambulance form in lite chart with nicola.  Lavon Park, MSN RN  [facility] Management  [phone]

## 2024-11-15 NOTE — DISCHARGE INSTRUCTIONS
Call upon discharge to schedule a follow-up visit with Yuliana Dobson/Travis/Kendall ([facility]) at [phone]

## 2024-11-15 NOTE — TELEPHONE ENCOUNTER
Called [facility] where pt is currently admitted.  I asked the nurse if there were picc orders in for the pt.  I let her know that I have been trying to contact him. She said there were no orders and that the referring Dr can send in an order for them to do it if needed.   I called Dr diehl's office and spoke to .  She said that she's not 100% sure what the dr wants to do with him at the moment with the picc line.  She said that I can call the on call dr to see if they can put in an order or they can transfer me to the email.    I asked her when he is scheduled for the Chemo she told me he's scheduled for 11.21.24. (the reason pt needed a picc line) I let her know I will try to contact the pt again to get him scheduled closer to Thursday.    I called the pt via [facility] transferring me.  I spoke to his wife.  She let me know that the dr who is caring for the pt mentioned he should not get the chemo.  She said that she will reach out to dr diehl's office to talk to them about it.

## 2024-11-16 LAB
ANION GAP SERPL CALCULATED.3IONS-SCNC: 10 MMOL/L (ref 7–16)
BASOPHILS # BLD: 0.02 K/UL (ref 0–0.2)
BASOPHILS NFR BLD: 1 % (ref 0–2)
BUN SERPL-MCNC: 25 MG/DL (ref 6–23)
CALCIUM SERPL-MCNC: 9.8 MG/DL (ref 8.6–10.2)
CHLORIDE SERPL-SCNC: 94 MMOL/L (ref 98–107)
CO2 SERPL-SCNC: 27 MMOL/L (ref 22–29)
CREAT SERPL-MCNC: 0.7 MG/DL (ref 0.7–1.2)
EOSINOPHIL # BLD: 0.06 K/UL (ref 0.05–0.5)
EOSINOPHILS RELATIVE PERCENT: 2 % (ref 0–6)
ERYTHROCYTE [DISTWIDTH] IN BLOOD BY AUTOMATED COUNT: 16.4 % (ref 11.5–15)
GFR, ESTIMATED: >90 ML/MIN/1.73M2
GLUCOSE SERPL-MCNC: 115 MG/DL (ref 74–99)
HCT VFR BLD AUTO: 34.2 % (ref 37–54)
HGB BLD-MCNC: 10.7 G/DL (ref 12.5–16.5)
IMM GRANULOCYTES # BLD AUTO: <0.03 K/UL (ref 0–0.58)
IMM GRANULOCYTES NFR BLD: 1 % (ref 0–5)
LYMPHOCYTES NFR BLD: 0.53 K/UL (ref 1.5–4)
LYMPHOCYTES RELATIVE PERCENT: 16 % (ref 20–42)
MCH RBC QN AUTO: 26.2 PG (ref 26–35)
MCHC RBC AUTO-ENTMCNC: 31.3 G/DL (ref 32–34.5)
MCV RBC AUTO: 83.8 FL (ref 80–99.9)
MONOCYTES NFR BLD: 0.45 K/UL (ref 0.1–0.95)
MONOCYTES NFR BLD: 14 % (ref 2–12)
NEUTROPHILS NFR BLD: 67 % (ref 43–80)
NEUTS SEG NFR BLD: 2.16 K/UL (ref 1.8–7.3)
PLATELET # BLD AUTO: 190 K/UL (ref 130–450)
PMV BLD AUTO: 9.6 FL (ref 7–12)
POTASSIUM SERPL-SCNC: 4.1 MMOL/L (ref 3.5–5)
RBC # BLD AUTO: 4.08 M/UL (ref 3.8–5.8)
RBC # BLD: ABNORMAL 10*6/UL
SODIUM SERPL-SCNC: 131 MMOL/L (ref 132–146)
WBC OTHER # BLD: 3.2 K/UL (ref 4.5–11.5)

## 2024-11-16 PROCEDURE — 85025 COMPLETE CBC W/AUTO DIFF WBC: CPT

## 2024-11-16 PROCEDURE — 2580000003 HC RX 258: Performed by: INTERNAL MEDICINE

## 2024-11-16 PROCEDURE — 2060000000 HC ICU INTERMEDIATE R&B

## 2024-11-16 PROCEDURE — 2580000003 HC RX 258: Performed by: NURSE PRACTITIONER

## 2024-11-16 PROCEDURE — 6370000000 HC RX 637 (ALT 250 FOR IP): Performed by: INTERNAL MEDICINE

## 2024-11-16 PROCEDURE — 6370000000 HC RX 637 (ALT 250 FOR IP): Performed by: NURSE PRACTITIONER

## 2024-11-16 PROCEDURE — 36415 COLL VENOUS BLD VENIPUNCTURE: CPT

## 2024-11-16 PROCEDURE — 80048 BASIC METABOLIC PNL TOTAL CA: CPT

## 2024-11-16 PROCEDURE — 6360000002 HC RX W HCPCS: Performed by: INTERNAL MEDICINE

## 2024-11-16 PROCEDURE — 6360000002 HC RX W HCPCS: Performed by: STUDENT IN AN ORGANIZED HEALTH CARE EDUCATION/TRAINING PROGRAM

## 2024-11-16 PROCEDURE — 2580000003 HC RX 258: Performed by: STUDENT IN AN ORGANIZED HEALTH CARE EDUCATION/TRAINING PROGRAM

## 2024-11-16 PROCEDURE — 6370000000 HC RX 637 (ALT 250 FOR IP)

## 2024-11-16 PROCEDURE — 99232 SBSQ HOSP IP/OBS MODERATE 35: CPT | Performed by: INTERNAL MEDICINE

## 2024-11-16 PROCEDURE — 6370000000 HC RX 637 (ALT 250 FOR IP): Performed by: STUDENT IN AN ORGANIZED HEALTH CARE EDUCATION/TRAINING PROGRAM

## 2024-11-16 PROCEDURE — 94640 AIRWAY INHALATION TREATMENT: CPT

## 2024-11-16 PROCEDURE — 6360000002 HC RX W HCPCS: Performed by: NURSE PRACTITIONER

## 2024-11-16 PROCEDURE — 2700000000 HC OXYGEN THERAPY PER DAY

## 2024-11-16 RX ORDER — MIDODRINE HYDROCHLORIDE 5 MG/1
10 TABLET ORAL ONCE
Status: COMPLETED | OUTPATIENT
Start: 2024-11-16 | End: 2024-11-16

## 2024-11-16 RX ADMIN — ARFORMOTEROL TARTRATE 15 MCG: 15 SOLUTION RESPIRATORY (INHALATION) at 09:29

## 2024-11-16 RX ADMIN — METOCLOPRAMIDE HYDROCHLORIDE 10 MG: 5 SOLUTION ORAL at 17:20

## 2024-11-16 RX ADMIN — MIDODRINE HYDROCHLORIDE 10 MG: 5 TABLET ORAL at 05:54

## 2024-11-16 RX ADMIN — LEVOTHYROXINE SODIUM 100 MCG: 100 TABLET ORAL at 05:15

## 2024-11-16 RX ADMIN — ATORVASTATIN CALCIUM 20 MG: 20 TABLET, FILM COATED ORAL at 08:44

## 2024-11-16 RX ADMIN — Medication 30 MG: at 08:51

## 2024-11-16 RX ADMIN — MICONAZOLE NITRATE: 20 POWDER TOPICAL at 09:25

## 2024-11-16 RX ADMIN — APIXABAN 2.5 MG: 2.5 TABLET, FILM COATED ORAL at 21:34

## 2024-11-16 RX ADMIN — PREGABALIN 150 MG: 75 CAPSULE ORAL at 08:44

## 2024-11-16 RX ADMIN — PANTOPRAZOLE SODIUM 40 MG: 40 INJECTION, POWDER, FOR SOLUTION INTRAVENOUS at 09:06

## 2024-11-16 RX ADMIN — CEFEPIME 2 G: 2 INJECTION, POWDER, FOR SOLUTION INTRAVENOUS at 01:40

## 2024-11-16 RX ADMIN — CEFEPIME 2 G: 2 INJECTION, POWDER, FOR SOLUTION INTRAVENOUS at 09:09

## 2024-11-16 RX ADMIN — ARFORMOTEROL TARTRATE 15 MCG: 15 SOLUTION RESPIRATORY (INHALATION) at 21:07

## 2024-11-16 RX ADMIN — Medication 30 MG: at 21:34

## 2024-11-16 RX ADMIN — MIDODRINE HYDROCHLORIDE 10 MG: 5 TABLET ORAL at 17:20

## 2024-11-16 RX ADMIN — BUDESONIDE INHALATION 500 MCG: 0.5 SUSPENSION RESPIRATORY (INHALATION) at 21:07

## 2024-11-16 RX ADMIN — METHADONE HYDROCHLORIDE 10 MG: 10 CONCENTRATE ORAL at 21:55

## 2024-11-16 RX ADMIN — METOCLOPRAMIDE HYDROCHLORIDE 10 MG: 5 SOLUTION ORAL at 21:34

## 2024-11-16 RX ADMIN — METOCLOPRAMIDE HYDROCHLORIDE 10 MG: 5 SOLUTION ORAL at 05:15

## 2024-11-16 RX ADMIN — PREGABALIN 150 MG: 75 CAPSULE ORAL at 21:33

## 2024-11-16 RX ADMIN — WATER 20 ML: 1 INJECTION INTRAMUSCULAR; INTRAVENOUS; SUBCUTANEOUS at 01:40

## 2024-11-16 RX ADMIN — WATER 20 ML: 1 INJECTION INTRAMUSCULAR; INTRAVENOUS; SUBCUTANEOUS at 09:09

## 2024-11-16 RX ADMIN — BUDESONIDE INHALATION 500 MCG: 0.5 SUSPENSION RESPIRATORY (INHALATION) at 09:29

## 2024-11-16 RX ADMIN — POLYETHYLENE GLYCOL 3350 17 G: 17 POWDER, FOR SOLUTION ORAL at 08:43

## 2024-11-16 RX ADMIN — MICONAZOLE NITRATE: 20 POWDER TOPICAL at 22:00

## 2024-11-16 RX ADMIN — FLUCONAZOLE 200 MG: 100 TABLET ORAL at 08:50

## 2024-11-16 RX ADMIN — MIDODRINE HYDROCHLORIDE 10 MG: 5 TABLET ORAL at 12:25

## 2024-11-16 RX ADMIN — SILODOSIN 4 MG: 4 CAPSULE ORAL at 21:32

## 2024-11-16 RX ADMIN — PREGABALIN 150 MG: 75 CAPSULE ORAL at 13:56

## 2024-11-16 RX ADMIN — WATER 20 ML: 1 INJECTION INTRAMUSCULAR; INTRAVENOUS; SUBCUTANEOUS at 17:20

## 2024-11-16 RX ADMIN — SODIUM CHLORIDE, PRESERVATIVE FREE 10 ML: 5 INJECTION INTRAVENOUS at 22:00

## 2024-11-16 RX ADMIN — METHADONE HYDROCHLORIDE 10 MG: 10 CONCENTRATE ORAL at 09:18

## 2024-11-16 RX ADMIN — METOCLOPRAMIDE HYDROCHLORIDE 10 MG: 5 SOLUTION ORAL at 12:25

## 2024-11-16 RX ADMIN — CEFEPIME 2 G: 2 INJECTION, POWDER, FOR SOLUTION INTRAVENOUS at 17:20

## 2024-11-16 RX ADMIN — MIDODRINE HYDROCHLORIDE 10 MG: 5 TABLET ORAL at 08:44

## 2024-11-16 RX ADMIN — APIXABAN 2.5 MG: 2.5 TABLET, FILM COATED ORAL at 08:50

## 2024-11-16 RX ADMIN — PREDNISONE 40 MG: 20 TABLET ORAL at 08:44

## 2024-11-16 RX ADMIN — SODIUM CHLORIDE, PRESERVATIVE FREE 10 ML: 5 INJECTION INTRAVENOUS at 09:06

## 2024-11-16 RX ADMIN — SENNOSIDES 10 ML: 8.8 LIQUID ORAL at 21:33

## 2024-11-16 ASSESSMENT — PAIN SCALES - WONG BAKER
WONGBAKER_NUMERICALRESPONSE: NO HURT
WONGBAKER_NUMERICALRESPONSE: HURTS LITTLE MORE
WONGBAKER_NUMERICALRESPONSE: NO HURT

## 2024-11-16 ASSESSMENT — PAIN DESCRIPTION - PAIN TYPE: TYPE: ACUTE PAIN

## 2024-11-16 ASSESSMENT — PAIN DESCRIPTION - LOCATION
LOCATION: FACE;NECK

## 2024-11-16 ASSESSMENT — PAIN SCALES - GENERAL
PAINLEVEL_OUTOF10: 7
PAINLEVEL_OUTOF10: 0
PAINLEVEL_OUTOF10: 8
PAINLEVEL_OUTOF10: 8
PAINLEVEL_OUTOF10: 0
PAINLEVEL_OUTOF10: 8
PAINLEVEL_OUTOF10: 7
PAINLEVEL_OUTOF10: 7

## 2024-11-16 ASSESSMENT — PAIN DESCRIPTION - ORIENTATION
ORIENTATION: RIGHT

## 2024-11-16 ASSESSMENT — PAIN DESCRIPTION - DESCRIPTORS
DESCRIPTORS: PATIENT UNABLE TO DESCRIBE
DESCRIPTORS: PATIENT UNABLE TO DESCRIBE
DESCRIPTORS: STABBING

## 2024-11-16 ASSESSMENT — PAIN DESCRIPTION - FREQUENCY: FREQUENCY: CONTINUOUS

## 2024-11-16 ASSESSMENT — PAIN DESCRIPTION - ONSET: ONSET: ON-GOING

## 2024-11-16 NOTE — PLAN OF CARE
Problem: Discharge Planning  Goal: Discharge to home or other facility with appropriate resources  Outcome: Progressing     Problem: Pain  Goal: Verbalizes/displays adequate comfort level or baseline comfort level  Outcome: Progressing     Problem: Safety - Adult  Goal: Free from fall injury  Outcome: Progressing     Problem: ABCDS Injury Assessment  Goal: Absence of physical injury  Outcome: Progressing     Problem: Neurosensory - Adult  Goal: Achieves stable or improved neurological status  Outcome: Progressing     Problem: Neurosensory - Adult  Goal: Absence of seizures  Outcome: Progressing     Problem: Respiratory - Adult  Goal: Achieves optimal ventilation and oxygenation  Outcome: Progressing     Problem: Cardiovascular - Adult  Goal: Maintains optimal cardiac output and hemodynamic stability  Outcome: Progressing     Problem: Skin/Tissue Integrity - Adult  Goal: Incisions, wounds, or drain sites healing without S/S of infection  Outcome: Progressing     Problem: Skin/Tissue Integrity - Adult  Goal: Skin integrity remains intact  Outcome: Progressing     Problem: Musculoskeletal - Adult  Goal: Return mobility to safest level of function  Outcome: Progressing     Problem: Musculoskeletal - Adult  Goal: Maintain proper alignment of affected body part  Outcome: Progressing     Problem: Musculoskeletal - Adult  Goal: Return ADL status to a safe level of function  Outcome: Progressing     Problem: Genitourinary - Adult  Goal: Absence of urinary retention  Outcome: Progressing     Problem: Gastrointestinal - Adult  Goal: Minimal or absence of nausea and vomiting  Outcome: Progressing     Problem: Nutrition Deficit:  Goal: Optimize nutritional status  Outcome: Progressing     Problem: Skin/Tissue Integrity  Goal: Absence of new skin breakdown  Description: 1.  Monitor for areas of redness and/or skin breakdown  2.  Assess vascular access sites hourly  3.  Every 4-6 hours minimum:  Change oxygen saturation probe

## 2024-11-16 NOTE — PLAN OF CARE
Problem: Discharge Planning  Goal: Discharge to home or other facility with appropriate resources  11/16/2024 1821 by Willie Rashid RN  Outcome: Progressing  11/16/2024 1127 by Jessa Ardon RN  Outcome: Progressing     Problem: Pain  Goal: Verbalizes/displays adequate comfort level or baseline comfort level  11/16/2024 1821 by Willie Rashid RN  Outcome: Progressing  11/16/2024 1127 by Jessa Ardon RN  Outcome: Progressing     Problem: Safety - Adult  Goal: Free from fall injury  11/16/2024 1821 by Willie Rashid RN  Outcome: Progressing  11/16/2024 1127 by Jessa Ardon RN  Outcome: Progressing     Problem: ABCDS Injury Assessment  Goal: Absence of physical injury  11/16/2024 1821 by Willie Rashid RN  Outcome: Progressing  11/16/2024 1127 by Jessa Ardon RN  Outcome: Progressing     Problem: Neurosensory - Adult  Goal: Achieves stable or improved neurological status  11/16/2024 1821 by Willie Rashid RN  Outcome: Progressing  11/16/2024 1127 by Jessa Ardon RN  Outcome: Progressing  Goal: Absence of seizures  11/16/2024 1821 by Willie Rashid RN  Outcome: Progressing  11/16/2024 1127 by Jessa Ardon RN  Outcome: Progressing     Problem: Respiratory - Adult  Goal: Achieves optimal ventilation and oxygenation  11/16/2024 1821 by Willie Rashid RN  Outcome: Progressing  11/16/2024 1127 by Jessa Ardon RN  Outcome: Progressing     Problem: Cardiovascular - Adult  Goal: Maintains optimal cardiac output and hemodynamic stability  11/16/2024 1821 by Willie Rashid RN  Outcome: Progressing  11/16/2024 1127 by Jessa Ardon RN  Outcome: Progressing  Goal: Absence of cardiac dysrhythmias or at baseline  Outcome: Progressing     Problem: Skin/Tissue Integrity - Adult  Goal: Incisions, wounds, or drain sites healing without S/S of infection  11/16/2024 1821 by Willie Rashid RN  Outcome: Progressing  11/16/2024

## 2024-11-17 ENCOUNTER — APPOINTMENT (OUTPATIENT)
Dept: GENERAL RADIOLOGY | Age: 69
DRG: 640 | End: 2024-11-17
Payer: MEDICARE

## 2024-11-17 PROBLEM — E86.1 HYPOTENSION DUE TO HYPOVOLEMIA: Status: ACTIVE | Noted: 2017-07-08

## 2024-11-17 PROBLEM — R09.02 HYPOXIA: Status: ACTIVE | Noted: 2024-11-17

## 2024-11-17 LAB
ALBUMIN SERPL-MCNC: 2.7 G/DL (ref 3.5–5.2)
ALP SERPL-CCNC: 74 U/L (ref 40–129)
ALT SERPL-CCNC: 29 U/L (ref 0–40)
ANION GAP SERPL CALCULATED.3IONS-SCNC: 8 MMOL/L (ref 7–16)
ANION GAP SERPL CALCULATED.3IONS-SCNC: 9 MMOL/L (ref 7–16)
AST SERPL-CCNC: 24 U/L (ref 0–39)
ATYPICAL LYMPHOCYTE ABSOLUTE COUNT: 0.1 K/UL (ref 0–0.46)
ATYPICAL LYMPHOCYTES: 3 % (ref 0–4)
B.E.: 3.1 MMOL/L (ref -3–3)
BASOPHILS # BLD: 0 K/UL (ref 0–0.2)
BASOPHILS # BLD: 0.02 K/UL (ref 0–0.2)
BASOPHILS NFR BLD: 0 % (ref 0–2)
BASOPHILS NFR BLD: 0 % (ref 0–2)
BILIRUB SERPL-MCNC: 0.3 MG/DL (ref 0–1.2)
BUN SERPL-MCNC: 26 MG/DL (ref 6–23)
BUN SERPL-MCNC: 39 MG/DL (ref 6–23)
CALCIUM SERPL-MCNC: 10.3 MG/DL (ref 8.6–10.2)
CALCIUM SERPL-MCNC: 10.5 MG/DL (ref 8.6–10.2)
CHLORIDE SERPL-SCNC: 95 MMOL/L (ref 98–107)
CHLORIDE SERPL-SCNC: 95 MMOL/L (ref 98–107)
CO2 SERPL-SCNC: 28 MMOL/L (ref 22–29)
CO2 SERPL-SCNC: 29 MMOL/L (ref 22–29)
COHB: 0.3 % (ref 0–1.5)
COMMENT: ABNORMAL
CREAT SERPL-MCNC: 0.8 MG/DL (ref 0.7–1.2)
CREAT SERPL-MCNC: 1.7 MG/DL (ref 0.7–1.2)
CRITICAL: ABNORMAL
DATE ANALYZED: ABNORMAL
DATE OF COLLECTION: ABNORMAL
EOSINOPHIL # BLD: 0 K/UL (ref 0.05–0.5)
EOSINOPHIL # BLD: 0.12 K/UL (ref 0.05–0.5)
EOSINOPHILS RELATIVE PERCENT: 0 % (ref 0–6)
EOSINOPHILS RELATIVE PERCENT: 2 % (ref 0–6)
ERYTHROCYTE [DISTWIDTH] IN BLOOD BY AUTOMATED COUNT: 16.4 % (ref 11.5–15)
ERYTHROCYTE [DISTWIDTH] IN BLOOD BY AUTOMATED COUNT: 16.9 % (ref 11.5–15)
FIO2: 60 %
GFR, ESTIMATED: 43 ML/MIN/1.73M2
GFR, ESTIMATED: >90 ML/MIN/1.73M2
GLUCOSE BLD-MCNC: 172 MG/DL (ref 74–99)
GLUCOSE SERPL-MCNC: 111 MG/DL (ref 74–99)
GLUCOSE SERPL-MCNC: 158 MG/DL (ref 74–99)
HCO3: 27.9 MMOL/L (ref 22–26)
HCT VFR BLD AUTO: 32.1 % (ref 37–54)
HCT VFR BLD AUTO: 36.1 % (ref 37–54)
HGB BLD-MCNC: 11.5 G/DL (ref 12.5–16.5)
HGB BLD-MCNC: 9.9 G/DL (ref 12.5–16.5)
HHB: 21.7 % (ref 0–5)
IMM GRANULOCYTES # BLD AUTO: <0.03 K/UL (ref 0–0.58)
IMM GRANULOCYTES NFR BLD: 0 % (ref 0–5)
LAB: ABNORMAL
LACTATE BLDV-SCNC: 2.1 MMOL/L (ref 0.5–2.2)
LYMPHOCYTES NFR BLD: 0.72 K/UL (ref 1.5–4)
LYMPHOCYTES NFR BLD: 0.74 K/UL (ref 1.5–4)
LYMPHOCYTES RELATIVE PERCENT: 15 % (ref 20–42)
LYMPHOCYTES RELATIVE PERCENT: 20 % (ref 20–42)
Lab: 1604
MCH RBC QN AUTO: 26 PG (ref 26–35)
MCH RBC QN AUTO: 26.3 PG (ref 26–35)
MCHC RBC AUTO-ENTMCNC: 30.8 G/DL (ref 32–34.5)
MCHC RBC AUTO-ENTMCNC: 31.9 G/DL (ref 32–34.5)
MCV RBC AUTO: 82.6 FL (ref 80–99.9)
MCV RBC AUTO: 84.3 FL (ref 80–99.9)
METHB: 0.3 % (ref 0–1.5)
MODE: ABNORMAL
MONOCYTES NFR BLD: 0.15 K/UL (ref 0.1–0.95)
MONOCYTES NFR BLD: 0.36 K/UL (ref 0.1–0.95)
MONOCYTES NFR BLD: 10 % (ref 2–12)
MONOCYTES NFR BLD: 3 % (ref 2–12)
MYELOCYTES ABSOLUTE COUNT: 0.03 K/UL
MYELOCYTES: 1 %
NEUTROPHILS NFR BLD: 67 % (ref 43–80)
NEUTROPHILS NFR BLD: 79 % (ref 43–80)
NEUTS SEG NFR BLD: 2.49 K/UL (ref 1.8–7.3)
NEUTS SEG NFR BLD: 4.01 K/UL (ref 1.8–7.3)
NUCLEATED RED BLOOD CELLS: 1 PER 100 WBC
O2 CONTENT: 12.1 ML/DL
O2 SATURATION: 78.2 % (ref 92–98.5)
O2HB: 77.7 % (ref 94–97)
OPERATOR ID: 316
PATIENT TEMP: 37 C
PCO2: 43.5 MMHG (ref 35–45)
PFO2: 0.73 MMHG/%
PH BLOOD GAS: 7.42 (ref 7.35–7.45)
PLATELET # BLD AUTO: 202 K/UL (ref 130–450)
PLATELET # BLD AUTO: 228 K/UL (ref 130–450)
PMV BLD AUTO: 9.6 FL (ref 7–12)
PMV BLD AUTO: 9.6 FL (ref 7–12)
PO2: 43.7 MMHG (ref 75–100)
POTASSIUM SERPL-SCNC: 4.3 MMOL/L (ref 3.5–5)
POTASSIUM SERPL-SCNC: 4.6 MMOL/L (ref 3.5–5)
PROT SERPL-MCNC: 5.7 G/DL (ref 6.4–8.3)
RBC # BLD AUTO: 3.81 M/UL (ref 3.8–5.8)
RBC # BLD AUTO: 4.37 M/UL (ref 3.8–5.8)
RBC # BLD: ABNORMAL 10*6/UL
RI(T): 7.69
SODIUM SERPL-SCNC: 132 MMOL/L (ref 132–146)
SODIUM SERPL-SCNC: 132 MMOL/L (ref 132–146)
SOURCE, BLOOD GAS: ABNORMAL
THB: 11.1 G/DL (ref 11.5–16.5)
TIME ANALYZED: 1607
TROPONIN I SERPL HS-MCNC: 105 NG/L (ref 0–11)
WBC # BLD: ABNORMAL 10*3/UL
WBC OTHER # BLD: 3.7 K/UL (ref 4.5–11.5)
WBC OTHER # BLD: 5.1 K/UL (ref 4.5–11.5)

## 2024-11-17 PROCEDURE — 5A0955A ASSISTANCE WITH RESPIRATORY VENTILATION, GREATER THAN 96 CONSECUTIVE HOURS, HIGH NASAL FLOW/VELOCITY: ICD-10-PCS | Performed by: STUDENT IN AN ORGANIZED HEALTH CARE EDUCATION/TRAINING PROGRAM

## 2024-11-17 PROCEDURE — 6360000002 HC RX W HCPCS: Performed by: INTERNAL MEDICINE

## 2024-11-17 PROCEDURE — 2060000000 HC ICU INTERMEDIATE R&B

## 2024-11-17 PROCEDURE — 2580000003 HC RX 258: Performed by: INTERNAL MEDICINE

## 2024-11-17 PROCEDURE — 82805 BLOOD GASES W/O2 SATURATION: CPT

## 2024-11-17 PROCEDURE — 94640 AIRWAY INHALATION TREATMENT: CPT

## 2024-11-17 PROCEDURE — 2700000000 HC OXYGEN THERAPY PER DAY

## 2024-11-17 PROCEDURE — 6370000000 HC RX 637 (ALT 250 FOR IP): Performed by: STUDENT IN AN ORGANIZED HEALTH CARE EDUCATION/TRAINING PROGRAM

## 2024-11-17 PROCEDURE — 6360000002 HC RX W HCPCS

## 2024-11-17 PROCEDURE — 80053 COMPREHEN METABOLIC PANEL: CPT

## 2024-11-17 PROCEDURE — 84484 ASSAY OF TROPONIN QUANT: CPT

## 2024-11-17 PROCEDURE — 71045 X-RAY EXAM CHEST 1 VIEW: CPT

## 2024-11-17 PROCEDURE — 2580000003 HC RX 258: Performed by: NURSE PRACTITIONER

## 2024-11-17 PROCEDURE — 80048 BASIC METABOLIC PNL TOTAL CA: CPT

## 2024-11-17 PROCEDURE — 6360000002 HC RX W HCPCS: Performed by: STUDENT IN AN ORGANIZED HEALTH CARE EDUCATION/TRAINING PROGRAM

## 2024-11-17 PROCEDURE — 85025 COMPLETE CBC W/AUTO DIFF WBC: CPT

## 2024-11-17 PROCEDURE — 83605 ASSAY OF LACTIC ACID: CPT

## 2024-11-17 PROCEDURE — 99232 SBSQ HOSP IP/OBS MODERATE 35: CPT | Performed by: INTERNAL MEDICINE

## 2024-11-17 PROCEDURE — 6370000000 HC RX 637 (ALT 250 FOR IP): Performed by: INTERNAL MEDICINE

## 2024-11-17 PROCEDURE — 6370000000 HC RX 637 (ALT 250 FOR IP): Performed by: NURSE PRACTITIONER

## 2024-11-17 PROCEDURE — 82962 GLUCOSE BLOOD TEST: CPT

## 2024-11-17 PROCEDURE — 2580000003 HC RX 258: Performed by: STUDENT IN AN ORGANIZED HEALTH CARE EDUCATION/TRAINING PROGRAM

## 2024-11-17 PROCEDURE — 6360000002 HC RX W HCPCS: Performed by: NURSE PRACTITIONER

## 2024-11-17 RX ORDER — SODIUM CHLORIDE, SODIUM LACTATE, POTASSIUM CHLORIDE, AND CALCIUM CHLORIDE .6; .31; .03; .02 G/100ML; G/100ML; G/100ML; G/100ML
500 INJECTION, SOLUTION INTRAVENOUS ONCE
Status: COMPLETED | OUTPATIENT
Start: 2024-11-17 | End: 2024-11-17

## 2024-11-17 RX ORDER — 0.9 % SODIUM CHLORIDE 0.9 %
250 INTRAVENOUS SOLUTION INTRAVENOUS ONCE
Status: DISCONTINUED | OUTPATIENT
Start: 2024-11-17 | End: 2024-11-17

## 2024-11-17 RX ORDER — HYDROCORTISONE SODIUM SUCCINATE 100 MG/2ML
100 INJECTION INTRAMUSCULAR; INTRAVENOUS ONCE
Status: DISCONTINUED | OUTPATIENT
Start: 2024-11-17 | End: 2024-11-17 | Stop reason: SDUPTHER

## 2024-11-17 RX ORDER — 0.9 % SODIUM CHLORIDE 0.9 %
1000 INTRAVENOUS SOLUTION INTRAVENOUS ONCE
Status: COMPLETED | OUTPATIENT
Start: 2024-11-17 | End: 2024-11-17

## 2024-11-17 RX ORDER — HYDROCORTISONE SODIUM SUCCINATE 100 MG/2ML
50 INJECTION INTRAMUSCULAR; INTRAVENOUS EVERY 6 HOURS
Status: DISCONTINUED | OUTPATIENT
Start: 2024-11-17 | End: 2024-11-19

## 2024-11-17 RX ORDER — FLUOCINONIDE TOPICAL SOLUTION USP, 0.05% 0.5 MG/ML
SOLUTION TOPICAL EVERY 12 HOURS
Status: DISCONTINUED | OUTPATIENT
Start: 2024-11-17 | End: 2024-11-17 | Stop reason: CLARIF

## 2024-11-17 RX ORDER — HYDROCORTISONE SODIUM SUCCINATE 100 MG/2ML
100 INJECTION INTRAMUSCULAR; INTRAVENOUS ONCE
Status: COMPLETED | OUTPATIENT
Start: 2024-11-17 | End: 2024-11-17

## 2024-11-17 RX ORDER — BETAMETHASONE DIPROPIONATE 0.5 MG/G
CREAM TOPICAL 2 TIMES DAILY
Status: DISCONTINUED | OUTPATIENT
Start: 2024-11-17 | End: 2024-11-21 | Stop reason: CLARIF

## 2024-11-17 RX ORDER — MIDODRINE HYDROCHLORIDE 5 MG/1
15 TABLET ORAL ONCE
Status: COMPLETED | OUTPATIENT
Start: 2024-11-17 | End: 2024-11-17

## 2024-11-17 RX ORDER — HYDROCORTISONE SODIUM SUCCINATE 100 MG/2ML
INJECTION INTRAMUSCULAR; INTRAVENOUS
Status: COMPLETED
Start: 2024-11-17 | End: 2024-11-17

## 2024-11-17 RX ORDER — SODIUM CHLORIDE 9 MG/ML
INJECTION, SOLUTION INTRAVENOUS CONTINUOUS
Status: DISCONTINUED | OUTPATIENT
Start: 2024-11-17 | End: 2024-11-19

## 2024-11-17 RX ORDER — METHYLPREDNISOLONE SODIUM SUCCINATE 125 MG/2ML
INJECTION INTRAMUSCULAR; INTRAVENOUS
Status: DISPENSED
Start: 2024-11-17 | End: 2024-11-18

## 2024-11-17 RX ORDER — LIDOCAINE 4 G/G
1 PATCH TOPICAL DAILY
Status: DISPENSED | OUTPATIENT
Start: 2024-11-17 | End: 2024-11-19

## 2024-11-17 RX ORDER — 0.9 % SODIUM CHLORIDE 0.9 %
500 INTRAVENOUS SOLUTION INTRAVENOUS ONCE
Status: COMPLETED | OUTPATIENT
Start: 2024-11-17 | End: 2024-11-17

## 2024-11-17 RX ADMIN — MIDODRINE HYDROCHLORIDE 15 MG: 5 TABLET ORAL at 16:15

## 2024-11-17 RX ADMIN — CEFEPIME 2 G: 2 INJECTION, POWDER, FOR SOLUTION INTRAVENOUS at 01:53

## 2024-11-17 RX ADMIN — MICONAZOLE NITRATE: 2 OINTMENT TOPICAL at 11:01

## 2024-11-17 RX ADMIN — SODIUM CHLORIDE 500 ML: 9 INJECTION, SOLUTION INTRAVENOUS at 16:48

## 2024-11-17 RX ADMIN — CEFEPIME 2 G: 2 INJECTION, POWDER, FOR SOLUTION INTRAVENOUS at 17:07

## 2024-11-17 RX ADMIN — PANTOPRAZOLE SODIUM 40 MG: 40 INJECTION, POWDER, FOR SOLUTION INTRAVENOUS at 08:32

## 2024-11-17 RX ADMIN — BUDESONIDE INHALATION 500 MCG: 0.5 SUSPENSION RESPIRATORY (INHALATION) at 09:45

## 2024-11-17 RX ADMIN — SODIUM CHLORIDE 1000 ML: 9 INJECTION, SOLUTION INTRAVENOUS at 16:15

## 2024-11-17 RX ADMIN — METOCLOPRAMIDE HYDROCHLORIDE 10 MG: 5 SOLUTION ORAL at 20:49

## 2024-11-17 RX ADMIN — SENNOSIDES 10 ML: 8.8 LIQUID ORAL at 20:49

## 2024-11-17 RX ADMIN — WATER 20 ML: 1 INJECTION INTRAMUSCULAR; INTRAVENOUS; SUBCUTANEOUS at 09:10

## 2024-11-17 RX ADMIN — FLUCONAZOLE 200 MG: 100 TABLET ORAL at 08:32

## 2024-11-17 RX ADMIN — POLYETHYLENE GLYCOL 3350 17 G: 17 POWDER, FOR SOLUTION ORAL at 08:31

## 2024-11-17 RX ADMIN — HYDROCORTISONE SODIUM SUCCINATE 100 MG: 100 INJECTION INTRAMUSCULAR; INTRAVENOUS at 16:12

## 2024-11-17 RX ADMIN — APIXABAN 2.5 MG: 2.5 TABLET, FILM COATED ORAL at 20:55

## 2024-11-17 RX ADMIN — METHADONE HYDROCHLORIDE 10 MG: 10 CONCENTRATE ORAL at 08:33

## 2024-11-17 RX ADMIN — ACETAMINOPHEN 650 MG: 325 TABLET ORAL at 04:58

## 2024-11-17 RX ADMIN — SODIUM CHLORIDE, POTASSIUM CHLORIDE, SODIUM LACTATE AND CALCIUM CHLORIDE 500 ML: 600; 310; 30; 20 INJECTION, SOLUTION INTRAVENOUS at 13:25

## 2024-11-17 RX ADMIN — SODIUM CHLORIDE, PRESERVATIVE FREE 10 ML: 5 INJECTION INTRAVENOUS at 20:51

## 2024-11-17 RX ADMIN — HYDROCORTISONE SODIUM SUCCINATE 100 MG: 100 INJECTION, POWDER, FOR SOLUTION INTRAMUSCULAR; INTRAVENOUS at 16:12

## 2024-11-17 RX ADMIN — WATER 20 ML: 1 INJECTION INTRAMUSCULAR; INTRAVENOUS; SUBCUTANEOUS at 01:53

## 2024-11-17 RX ADMIN — MIDODRINE HYDROCHLORIDE 10 MG: 5 TABLET ORAL at 08:32

## 2024-11-17 RX ADMIN — LEVOTHYROXINE SODIUM 100 MCG: 100 TABLET ORAL at 05:14

## 2024-11-17 RX ADMIN — ARFORMOTEROL TARTRATE 15 MCG: 15 SOLUTION RESPIRATORY (INHALATION) at 09:45

## 2024-11-17 RX ADMIN — MICONAZOLE NITRATE: 20 POWDER TOPICAL at 11:01

## 2024-11-17 RX ADMIN — MICONAZOLE NITRATE: 20 POWDER TOPICAL at 20:50

## 2024-11-17 RX ADMIN — ARFORMOTEROL TARTRATE 15 MCG: 15 SOLUTION RESPIRATORY (INHALATION) at 21:45

## 2024-11-17 RX ADMIN — ATORVASTATIN CALCIUM 20 MG: 20 TABLET, FILM COATED ORAL at 08:32

## 2024-11-17 RX ADMIN — PREGABALIN 150 MG: 75 CAPSULE ORAL at 08:32

## 2024-11-17 RX ADMIN — Medication 30 MG: at 20:50

## 2024-11-17 RX ADMIN — BETAMETHASONE DIPROPIONATE: 0.5 CREAM TOPICAL at 20:51

## 2024-11-17 RX ADMIN — METOCLOPRAMIDE HYDROCHLORIDE 10 MG: 5 SOLUTION ORAL at 17:08

## 2024-11-17 RX ADMIN — SODIUM CHLORIDE, PRESERVATIVE FREE 10 ML: 5 INJECTION INTRAVENOUS at 09:11

## 2024-11-17 RX ADMIN — Medication 30 MG: at 08:29

## 2024-11-17 RX ADMIN — BUDESONIDE INHALATION 500 MCG: 0.5 SUSPENSION RESPIRATORY (INHALATION) at 21:45

## 2024-11-17 RX ADMIN — SILODOSIN 4 MG: 4 CAPSULE ORAL at 20:50

## 2024-11-17 RX ADMIN — PREGABALIN 150 MG: 75 CAPSULE ORAL at 12:29

## 2024-11-17 RX ADMIN — HYDROCORTISONE SODIUM SUCCINATE 50 MG: 100 INJECTION, POWDER, FOR SOLUTION INTRAMUSCULAR; INTRAVENOUS at 20:49

## 2024-11-17 RX ADMIN — METOCLOPRAMIDE HYDROCHLORIDE 10 MG: 5 SOLUTION ORAL at 05:14

## 2024-11-17 RX ADMIN — SODIUM CHLORIDE 1000 ML: 9 INJECTION, SOLUTION INTRAVENOUS at 16:22

## 2024-11-17 RX ADMIN — SODIUM CHLORIDE: 9 INJECTION, SOLUTION INTRAVENOUS at 17:04

## 2024-11-17 RX ADMIN — METOCLOPRAMIDE HYDROCHLORIDE 10 MG: 5 SOLUTION ORAL at 12:29

## 2024-11-17 RX ADMIN — WATER 20 ML: 1 INJECTION INTRAMUSCULAR; INTRAVENOUS; SUBCUTANEOUS at 17:15

## 2024-11-17 RX ADMIN — CEFEPIME 2 G: 2 INJECTION, POWDER, FOR SOLUTION INTRAVENOUS at 08:32

## 2024-11-17 RX ADMIN — APIXABAN 2.5 MG: 2.5 TABLET, FILM COATED ORAL at 08:31

## 2024-11-17 RX ADMIN — MIDODRINE HYDROCHLORIDE 10 MG: 5 TABLET ORAL at 12:29

## 2024-11-17 ASSESSMENT — PAIN SCALES - GENERAL
PAINLEVEL_OUTOF10: 3
PAINLEVEL_OUTOF10: 7
PAINLEVEL_OUTOF10: 3

## 2024-11-17 ASSESSMENT — PAIN DESCRIPTION - DESCRIPTORS
DESCRIPTORS: PATIENT UNABLE TO DESCRIBE
DESCRIPTORS: PATIENT UNABLE TO DESCRIBE

## 2024-11-17 ASSESSMENT — PAIN DESCRIPTION - LOCATION
LOCATION: FACE;NECK
LOCATION: FACE

## 2024-11-17 ASSESSMENT — PAIN DESCRIPTION - ORIENTATION
ORIENTATION: RIGHT
ORIENTATION: RIGHT

## 2024-11-17 ASSESSMENT — PAIN SCALES - WONG BAKER: WONGBAKER_NUMERICALRESPONSE: NO HURT

## 2024-11-17 NOTE — CARE COORDINATION
Call from unit regarding discharge today. Call to [facility]ME to update that patient will be discharged today-left two messages-one with [facility] office and with [place] liaison for [facility]-awaiting callback. SOC for [facility] is today per note, once D/C order is placed will call and arrange time for transport home.    Addendum-Bed will be delivered today-however patient does have oxygen at home, per [facility], if tank is needed to go home with- ok to use one of hospitals. Will Call for [email or web address]-[phone]-call to get time once wife confirms time she will be home. Per Thiago at [facility] staff will need new pulse ox testing and new DME order for oxygen above 4L.    Keyla KILPATRICKN, RN  [facility]

## 2024-11-18 ENCOUNTER — APPOINTMENT (OUTPATIENT)
Dept: GENERAL RADIOLOGY | Age: 69
DRG: 640 | End: 2024-11-18
Payer: MEDICARE

## 2024-11-18 LAB
ANION GAP SERPL CALCULATED.3IONS-SCNC: 9 MMOL/L (ref 7–16)
B PARAP IS1001 DNA NPH QL NAA+NON-PROBE: NOT DETECTED
B PERT DNA SPEC QL NAA+PROBE: NOT DETECTED
BASOPHILS # BLD: 0.01 K/UL (ref 0–0.2)
BASOPHILS NFR BLD: 0 % (ref 0–2)
BUN SERPL-MCNC: 36 MG/DL (ref 6–23)
C PNEUM DNA NPH QL NAA+NON-PROBE: NOT DETECTED
CALCIUM SERPL-MCNC: 9.9 MG/DL (ref 8.6–10.2)
CHLORIDE SERPL-SCNC: 99 MMOL/L (ref 98–107)
CO2 SERPL-SCNC: 24 MMOL/L (ref 22–29)
CREAT SERPL-MCNC: 1.1 MG/DL (ref 0.7–1.2)
EOSINOPHIL # BLD: 0 K/UL (ref 0.05–0.5)
EOSINOPHILS RELATIVE PERCENT: 0 % (ref 0–6)
ERYTHROCYTE [DISTWIDTH] IN BLOOD BY AUTOMATED COUNT: 16.8 % (ref 11.5–15)
FLUAV RNA NPH QL NAA+NON-PROBE: NOT DETECTED
FLUBV RNA NPH QL NAA+NON-PROBE: NOT DETECTED
GFR, ESTIMATED: 70 ML/MIN/1.73M2
GLUCOSE SERPL-MCNC: 168 MG/DL (ref 74–99)
HADV DNA NPH QL NAA+NON-PROBE: NOT DETECTED
HCOV 229E RNA NPH QL NAA+NON-PROBE: NOT DETECTED
HCOV HKU1 RNA NPH QL NAA+NON-PROBE: NOT DETECTED
HCOV NL63 RNA NPH QL NAA+NON-PROBE: NOT DETECTED
HCOV OC43 RNA NPH QL NAA+NON-PROBE: NOT DETECTED
HCT VFR BLD AUTO: 28.2 % (ref 37–54)
HGB BLD-MCNC: 8.8 G/DL (ref 12.5–16.5)
HMPV RNA NPH QL NAA+NON-PROBE: NOT DETECTED
HPIV1 RNA NPH QL NAA+NON-PROBE: NOT DETECTED
HPIV2 RNA NPH QL NAA+NON-PROBE: NOT DETECTED
HPIV3 RNA NPH QL NAA+NON-PROBE: NOT DETECTED
HPIV4 RNA NPH QL NAA+NON-PROBE: NOT DETECTED
IMM GRANULOCYTES # BLD AUTO: 0.03 K/UL (ref 0–0.58)
IMM GRANULOCYTES NFR BLD: 1 % (ref 0–5)
LYMPHOCYTES NFR BLD: 0.25 K/UL (ref 1.5–4)
LYMPHOCYTES RELATIVE PERCENT: 4 % (ref 20–42)
M PNEUMO DNA NPH QL NAA+NON-PROBE: NOT DETECTED
MCH RBC QN AUTO: 26.4 PG (ref 26–35)
MCHC RBC AUTO-ENTMCNC: 31.2 G/DL (ref 32–34.5)
MCV RBC AUTO: 84.7 FL (ref 80–99.9)
MONOCYTES NFR BLD: 0.14 K/UL (ref 0.1–0.95)
MONOCYTES NFR BLD: 2 % (ref 2–12)
NEUTROPHILS NFR BLD: 93 % (ref 43–80)
NEUTS SEG NFR BLD: 5.92 K/UL (ref 1.8–7.3)
PLATELET # BLD AUTO: 180 K/UL (ref 130–450)
PMV BLD AUTO: 10.4 FL (ref 7–12)
POTASSIUM SERPL-SCNC: 4.7 MMOL/L (ref 3.5–5)
RBC # BLD AUTO: 3.33 M/UL (ref 3.8–5.8)
RSV RNA NPH QL NAA+NON-PROBE: NOT DETECTED
RV+EV RNA NPH QL NAA+NON-PROBE: NOT DETECTED
SARS-COV-2 RNA NPH QL NAA+NON-PROBE: NOT DETECTED
SODIUM SERPL-SCNC: 132 MMOL/L (ref 132–146)
SPECIMEN DESCRIPTION: NORMAL
WBC OTHER # BLD: 6.4 K/UL (ref 4.5–11.5)

## 2024-11-18 PROCEDURE — 6360000002 HC RX W HCPCS: Performed by: INTERNAL MEDICINE

## 2024-11-18 PROCEDURE — 2580000003 HC RX 258: Performed by: INTERNAL MEDICINE

## 2024-11-18 PROCEDURE — 2580000003 HC RX 258: Performed by: NURSE PRACTITIONER

## 2024-11-18 PROCEDURE — 87040 BLOOD CULTURE FOR BACTERIA: CPT

## 2024-11-18 PROCEDURE — 2060000000 HC ICU INTERMEDIATE R&B

## 2024-11-18 PROCEDURE — 2700000000 HC OXYGEN THERAPY PER DAY

## 2024-11-18 PROCEDURE — 6370000000 HC RX 637 (ALT 250 FOR IP): Performed by: INTERNAL MEDICINE

## 2024-11-18 PROCEDURE — 94640 AIRWAY INHALATION TREATMENT: CPT

## 2024-11-18 PROCEDURE — 36415 COLL VENOUS BLD VENIPUNCTURE: CPT

## 2024-11-18 PROCEDURE — 6370000000 HC RX 637 (ALT 250 FOR IP): Performed by: STUDENT IN AN ORGANIZED HEALTH CARE EDUCATION/TRAINING PROGRAM

## 2024-11-18 PROCEDURE — 71045 X-RAY EXAM CHEST 1 VIEW: CPT

## 2024-11-18 PROCEDURE — 80048 BASIC METABOLIC PNL TOTAL CA: CPT

## 2024-11-18 PROCEDURE — 6360000002 HC RX W HCPCS: Performed by: STUDENT IN AN ORGANIZED HEALTH CARE EDUCATION/TRAINING PROGRAM

## 2024-11-18 PROCEDURE — 6370000000 HC RX 637 (ALT 250 FOR IP): Performed by: NURSE PRACTITIONER

## 2024-11-18 PROCEDURE — 87205 SMEAR GRAM STAIN: CPT

## 2024-11-18 PROCEDURE — 2580000003 HC RX 258: Performed by: STUDENT IN AN ORGANIZED HEALTH CARE EDUCATION/TRAINING PROGRAM

## 2024-11-18 PROCEDURE — 99232 SBSQ HOSP IP/OBS MODERATE 35: CPT | Performed by: INTERNAL MEDICINE

## 2024-11-18 PROCEDURE — 85025 COMPLETE CBC W/AUTO DIFF WBC: CPT

## 2024-11-18 PROCEDURE — 0202U NFCT DS 22 TRGT SARS-COV-2: CPT

## 2024-11-18 PROCEDURE — 6360000002 HC RX W HCPCS: Performed by: NURSE PRACTITIONER

## 2024-11-18 PROCEDURE — 87070 CULTURE OTHR SPECIMN AEROBIC: CPT

## 2024-11-18 PROCEDURE — 2500000003 HC RX 250 WO HCPCS: Performed by: INTERNAL MEDICINE

## 2024-11-18 RX ADMIN — Medication 30 MG: at 08:44

## 2024-11-18 RX ADMIN — OXYCODONE 20 MG: 15 TABLET ORAL at 23:26

## 2024-11-18 RX ADMIN — SENNOSIDES 10 ML: 8.8 LIQUID ORAL at 20:09

## 2024-11-18 RX ADMIN — LEVOTHYROXINE SODIUM 100 MCG: 100 TABLET ORAL at 06:44

## 2024-11-18 RX ADMIN — METOCLOPRAMIDE HYDROCHLORIDE 10 MG: 5 SOLUTION ORAL at 20:09

## 2024-11-18 RX ADMIN — BUDESONIDE INHALATION 500 MCG: 0.5 SUSPENSION RESPIRATORY (INHALATION) at 09:02

## 2024-11-18 RX ADMIN — CEFEPIME 2 G: 2 INJECTION, POWDER, FOR SOLUTION INTRAVENOUS at 01:30

## 2024-11-18 RX ADMIN — POLYETHYLENE GLYCOL 3350 17 G: 17 POWDER, FOR SOLUTION ORAL at 08:37

## 2024-11-18 RX ADMIN — OXYCODONE 20 MG: 15 TABLET ORAL at 13:46

## 2024-11-18 RX ADMIN — WATER 20 ML: 1 INJECTION INTRAMUSCULAR; INTRAVENOUS; SUBCUTANEOUS at 18:10

## 2024-11-18 RX ADMIN — ONDANSETRON 4 MG: 2 INJECTION INTRAMUSCULAR; INTRAVENOUS at 23:26

## 2024-11-18 RX ADMIN — CEFEPIME 2 G: 2 INJECTION, POWDER, FOR SOLUTION INTRAVENOUS at 09:04

## 2024-11-18 RX ADMIN — HYDROCORTISONE SODIUM SUCCINATE 50 MG: 100 INJECTION, POWDER, FOR SOLUTION INTRAMUSCULAR; INTRAVENOUS at 18:23

## 2024-11-18 RX ADMIN — ARFORMOTEROL TARTRATE 15 MCG: 15 SOLUTION RESPIRATORY (INHALATION) at 09:02

## 2024-11-18 RX ADMIN — MIDODRINE HYDROCHLORIDE 10 MG: 5 TABLET ORAL at 12:31

## 2024-11-18 RX ADMIN — MICONAZOLE NITRATE: 20 POWDER TOPICAL at 14:39

## 2024-11-18 RX ADMIN — SILODOSIN 4 MG: 4 CAPSULE ORAL at 20:10

## 2024-11-18 RX ADMIN — METOCLOPRAMIDE HYDROCHLORIDE 10 MG: 5 SOLUTION ORAL at 06:44

## 2024-11-18 RX ADMIN — APIXABAN 2.5 MG: 2.5 TABLET, FILM COATED ORAL at 08:31

## 2024-11-18 RX ADMIN — MIDODRINE HYDROCHLORIDE 10 MG: 5 TABLET ORAL at 18:17

## 2024-11-18 RX ADMIN — HYDROCORTISONE SODIUM SUCCINATE 50 MG: 100 INJECTION, POWDER, FOR SOLUTION INTRAMUSCULAR; INTRAVENOUS at 12:30

## 2024-11-18 RX ADMIN — OXYCODONE 20 MG: 15 TABLET ORAL at 18:16

## 2024-11-18 RX ADMIN — METOCLOPRAMIDE HYDROCHLORIDE 10 MG: 5 SOLUTION ORAL at 12:31

## 2024-11-18 RX ADMIN — CEFEPIME 2 G: 2 INJECTION, POWDER, FOR SOLUTION INTRAVENOUS at 18:10

## 2024-11-18 RX ADMIN — SODIUM CHLORIDE: 9 INJECTION, SOLUTION INTRAVENOUS at 18:04

## 2024-11-18 RX ADMIN — ARFORMOTEROL TARTRATE 15 MCG: 15 SOLUTION RESPIRATORY (INHALATION) at 19:46

## 2024-11-18 RX ADMIN — Medication 30 MG: at 20:10

## 2024-11-18 RX ADMIN — OXYCODONE 20 MG: 15 TABLET ORAL at 08:34

## 2024-11-18 RX ADMIN — BETAMETHASONE DIPROPIONATE: 0.5 CREAM TOPICAL at 20:20

## 2024-11-18 RX ADMIN — METOCLOPRAMIDE HYDROCHLORIDE 10 MG: 5 SOLUTION ORAL at 18:17

## 2024-11-18 RX ADMIN — FLUCONAZOLE 200 MG: 100 TABLET ORAL at 08:31

## 2024-11-18 RX ADMIN — HYDROCORTISONE SODIUM SUCCINATE 50 MG: 100 INJECTION, POWDER, FOR SOLUTION INTRAMUSCULAR; INTRAVENOUS at 04:20

## 2024-11-18 RX ADMIN — MICONAZOLE NITRATE: 20 POWDER TOPICAL at 20:09

## 2024-11-18 RX ADMIN — MIDODRINE HYDROCHLORIDE 10 MG: 5 TABLET ORAL at 08:32

## 2024-11-18 RX ADMIN — ALBUTEROL SULFATE 0.63 MG: 0.63 SOLUTION RESPIRATORY (INHALATION) at 16:35

## 2024-11-18 RX ADMIN — APIXABAN 2.5 MG: 2.5 TABLET, FILM COATED ORAL at 20:09

## 2024-11-18 RX ADMIN — WATER 20 ML: 1 INJECTION INTRAMUSCULAR; INTRAVENOUS; SUBCUTANEOUS at 01:30

## 2024-11-18 RX ADMIN — SODIUM CHLORIDE, PRESERVATIVE FREE 10 ML: 5 INJECTION INTRAVENOUS at 09:13

## 2024-11-18 RX ADMIN — BUDESONIDE INHALATION 500 MCG: 0.5 SUSPENSION RESPIRATORY (INHALATION) at 19:46

## 2024-11-18 RX ADMIN — SODIUM CHLORIDE, PRESERVATIVE FREE 10 ML: 5 INJECTION INTRAVENOUS at 12:31

## 2024-11-18 RX ADMIN — WATER 20 ML: 1 INJECTION INTRAMUSCULAR; INTRAVENOUS; SUBCUTANEOUS at 09:04

## 2024-11-18 RX ADMIN — ATORVASTATIN CALCIUM 20 MG: 20 TABLET, FILM COATED ORAL at 08:31

## 2024-11-18 RX ADMIN — PANTOPRAZOLE SODIUM 40 MG: 40 INJECTION, POWDER, FOR SOLUTION INTRAVENOUS at 09:10

## 2024-11-18 ASSESSMENT — PAIN DESCRIPTION - LOCATION
LOCATION: NECK
LOCATION: NECK

## 2024-11-18 ASSESSMENT — PAIN SCALES - GENERAL
PAINLEVEL_OUTOF10: 5
PAINLEVEL_OUTOF10: 8
PAINLEVEL_OUTOF10: 7
PAINLEVEL_OUTOF10: 3

## 2024-11-18 ASSESSMENT — PAIN DESCRIPTION - ORIENTATION: ORIENTATION: RIGHT

## 2024-11-18 NOTE — CARE COORDINATION
Met with patient's wife and daughter.  Plan remains home at time of discharge.  [facility] infusion has delivered pump and enteral formula.  [facility] continues to follow.  Liaison made bedside visit to patient wife today a well.  Agency will need notified at time of discharge.  [facility] continues to follow.  Agency will need notified at time of discharge.  Documentation of O2 requirements at time of discharge will be necessary along with new orders to reflect same.  Patient will need followed and assisted with discharge planning as necessary.   Lavon Park, MSN RN  [facility] Case Management  [phone]

## 2024-11-18 NOTE — PLAN OF CARE
Problem: Discharge Planning  Goal: Discharge to home or other facility with appropriate resources  Outcome: Progressing     Problem: Pain  Goal: Verbalizes/displays adequate comfort level or baseline comfort level  Outcome: Progressing     Problem: Safety - Adult  Goal: Free from fall injury  Outcome: Progressing     Problem: ABCDS Injury Assessment  Goal: Absence of physical injury  Outcome: Progressing     Problem: Neurosensory - Adult  Goal: Achieves stable or improved neurological status  Outcome: Progressing  Goal: Absence of seizures  Outcome: Progressing     Problem: Respiratory - Adult  Goal: Achieves optimal ventilation and oxygenation  Outcome: Progressing     Problem: Cardiovascular - Adult  Goal: Maintains optimal cardiac output and hemodynamic stability  Outcome: Progressing  Goal: Absence of cardiac dysrhythmias or at baseline  Outcome: Progressing     Problem: Skin/Tissue Integrity - Adult  Goal: Incisions, wounds, or drain sites healing without S/S of infection  Outcome: Progressing  Goal: Skin integrity remains intact  Outcome: Progressing     Problem: Musculoskeletal - Adult  Goal: Return mobility to safest level of function  Outcome: Progressing  Goal: Maintain proper alignment of affected body part  Outcome: Progressing  Goal: Return ADL status to a safe level of function  Outcome: Progressing     Problem: Genitourinary - Adult  Goal: Absence of urinary retention  Outcome: Progressing     Problem: Nutrition Deficit:  Goal: Optimize nutritional status  Outcome: Progressing     Problem: Skin/Tissue Integrity  Goal: Absence of new skin breakdown  Description: 1.  Monitor for areas of redness and/or skin breakdown  2.  Assess vascular access sites hourly  3.  Every 4-6 hours minimum:  Change oxygen saturation probe site  4.  Every 4-6 hours:  If on nasal continuous positive airway pressure, respiratory therapy assess nares and determine need for appliance change or resting period.  Outcome:

## 2024-11-18 NOTE — PLAN OF CARE
Problem: Discharge Planning  Goal: Discharge to home or other facility with appropriate resources  Outcome: Progressing     Problem: Pain  Goal: Verbalizes/displays adequate comfort level or baseline comfort level  Outcome: Progressing     Problem: Safety - Adult  Goal: Free from fall injury  Outcome: Progressing     Problem: ABCDS Injury Assessment  Goal: Absence of physical injury  Outcome: Progressing     Problem: Neurosensory - Adult  Goal: Achieves stable or improved neurological status  Outcome: Progressing     Problem: Neurosensory - Adult  Goal: Absence of seizures  Outcome: Progressing     Problem: Respiratory - Adult  Goal: Achieves optimal ventilation and oxygenation  Outcome: Progressing     Problem: Cardiovascular - Adult  Goal: Maintains optimal cardiac output and hemodynamic stability  Outcome: Progressing     Problem: Cardiovascular - Adult  Goal: Absence of cardiac dysrhythmias or at baseline  Outcome: Progressing     Problem: Skin/Tissue Integrity - Adult  Goal: Incisions, wounds, or drain sites healing without S/S of infection  Outcome: Progressing     Problem: Skin/Tissue Integrity - Adult  Goal: Skin integrity remains intact  Outcome: Progressing     Problem: Musculoskeletal - Adult  Goal: Return mobility to safest level of function  Outcome: Progressing     Problem: Musculoskeletal - Adult  Goal: Maintain proper alignment of affected body part  Outcome: Progressing     Problem: Musculoskeletal - Adult  Goal: Return ADL status to a safe level of function  Outcome: Progressing     Problem: Genitourinary - Adult  Goal: Absence of urinary retention  Outcome: Progressing     Problem: Nutrition Deficit:  Goal: Optimize nutritional status  Outcome: Progressing  Flowsheets (Taken 11/18/2024 1437 by Saba Allen, GUERA, CNSC, LD)  Nutrient intake appropriate for improving, restoring, or maintaining nutritional needs:   Assess nutritional status and recommend course of action   Recommend, monitor,

## 2024-11-19 ENCOUNTER — APPOINTMENT (OUTPATIENT)
Dept: GENERAL RADIOLOGY | Age: 69
DRG: 640 | End: 2024-11-19
Payer: MEDICARE

## 2024-11-19 LAB
ANION GAP SERPL CALCULATED.3IONS-SCNC: 8 MMOL/L (ref 7–16)
BASOPHILS # BLD: 0 K/UL (ref 0–0.2)
BASOPHILS NFR BLD: 0 % (ref 0–2)
BUN SERPL-MCNC: 28 MG/DL (ref 6–23)
CALCIUM SERPL-MCNC: 9.6 MG/DL (ref 8.6–10.2)
CHLORIDE SERPL-SCNC: 99 MMOL/L (ref 98–107)
CO2 SERPL-SCNC: 24 MMOL/L (ref 22–29)
CORTIS SERPL-MCNC: 28.3 UG/DL (ref 2.7–18.4)
CREAT SERPL-MCNC: 0.8 MG/DL (ref 0.7–1.2)
EOSINOPHIL # BLD: 0 K/UL (ref 0.05–0.5)
EOSINOPHILS RELATIVE PERCENT: 0 % (ref 0–6)
ERYTHROCYTE [DISTWIDTH] IN BLOOD BY AUTOMATED COUNT: 16.8 % (ref 11.5–15)
GFR, ESTIMATED: >90 ML/MIN/1.73M2
GLUCOSE SERPL-MCNC: 192 MG/DL (ref 74–99)
HCT VFR BLD AUTO: 28.2 % (ref 37–54)
HGB BLD-MCNC: 8.9 G/DL (ref 12.5–16.5)
IMM GRANULOCYTES # BLD AUTO: 0.05 K/UL (ref 0–0.58)
IMM GRANULOCYTES NFR BLD: 1 % (ref 0–5)
LYMPHOCYTES NFR BLD: 0.3 K/UL (ref 1.5–4)
LYMPHOCYTES RELATIVE PERCENT: 4 % (ref 20–42)
MCH RBC QN AUTO: 26.1 PG (ref 26–35)
MCHC RBC AUTO-ENTMCNC: 31.6 G/DL (ref 32–34.5)
MCV RBC AUTO: 82.7 FL (ref 80–99.9)
MONOCYTES NFR BLD: 0.19 K/UL (ref 0.1–0.95)
MONOCYTES NFR BLD: 3 % (ref 2–12)
NEUTROPHILS NFR BLD: 92 % (ref 43–80)
NEUTS SEG NFR BLD: 6.5 K/UL (ref 1.8–7.3)
PLATELET # BLD AUTO: 185 K/UL (ref 130–450)
PMV BLD AUTO: 9.7 FL (ref 7–12)
POTASSIUM SERPL-SCNC: 4.1 MMOL/L (ref 3.5–5)
RBC # BLD AUTO: 3.41 M/UL (ref 3.8–5.8)
RBC # BLD: ABNORMAL 10*6/UL
SODIUM SERPL-SCNC: 131 MMOL/L (ref 132–146)
WBC OTHER # BLD: 7 K/UL (ref 4.5–11.5)

## 2024-11-19 PROCEDURE — 6360000002 HC RX W HCPCS: Performed by: INTERNAL MEDICINE

## 2024-11-19 PROCEDURE — 80048 BASIC METABOLIC PNL TOTAL CA: CPT

## 2024-11-19 PROCEDURE — 6360000002 HC RX W HCPCS

## 2024-11-19 PROCEDURE — 6360000002 HC RX W HCPCS: Performed by: STUDENT IN AN ORGANIZED HEALTH CARE EDUCATION/TRAINING PROGRAM

## 2024-11-19 PROCEDURE — 99232 SBSQ HOSP IP/OBS MODERATE 35: CPT | Performed by: INTERNAL MEDICINE

## 2024-11-19 PROCEDURE — 71045 X-RAY EXAM CHEST 1 VIEW: CPT

## 2024-11-19 PROCEDURE — 2580000003 HC RX 258

## 2024-11-19 PROCEDURE — 6370000000 HC RX 637 (ALT 250 FOR IP): Performed by: STUDENT IN AN ORGANIZED HEALTH CARE EDUCATION/TRAINING PROGRAM

## 2024-11-19 PROCEDURE — 6370000000 HC RX 637 (ALT 250 FOR IP): Performed by: INTERNAL MEDICINE

## 2024-11-19 PROCEDURE — 6370000000 HC RX 637 (ALT 250 FOR IP): Performed by: NURSE PRACTITIONER

## 2024-11-19 PROCEDURE — 82533 TOTAL CORTISOL: CPT

## 2024-11-19 PROCEDURE — 2580000003 HC RX 258: Performed by: NURSE PRACTITIONER

## 2024-11-19 PROCEDURE — 6360000002 HC RX W HCPCS: Performed by: NURSE PRACTITIONER

## 2024-11-19 PROCEDURE — 2580000003 HC RX 258: Performed by: INTERNAL MEDICINE

## 2024-11-19 PROCEDURE — 94667 MNPJ CHEST WALL 1ST: CPT

## 2024-11-19 PROCEDURE — 85025 COMPLETE CBC W/AUTO DIFF WBC: CPT

## 2024-11-19 PROCEDURE — 94640 AIRWAY INHALATION TREATMENT: CPT

## 2024-11-19 PROCEDURE — 2060000000 HC ICU INTERMEDIATE R&B

## 2024-11-19 PROCEDURE — 94761 N-INVAS EAR/PLS OXIMETRY MLT: CPT

## 2024-11-19 PROCEDURE — 2700000000 HC OXYGEN THERAPY PER DAY

## 2024-11-19 PROCEDURE — 94669 MECHANICAL CHEST WALL OSCILL: CPT

## 2024-11-19 PROCEDURE — 2580000003 HC RX 258: Performed by: STUDENT IN AN ORGANIZED HEALTH CARE EDUCATION/TRAINING PROGRAM

## 2024-11-19 RX ORDER — ALBUTEROL SULFATE 0.83 MG/ML
2.5 SOLUTION RESPIRATORY (INHALATION)
Status: DISCONTINUED | OUTPATIENT
Start: 2024-11-19 | End: 2024-11-23 | Stop reason: HOSPADM

## 2024-11-19 RX ORDER — SODIUM CHLORIDE FOR INHALATION 3 %
4 VIAL, NEBULIZER (ML) INHALATION
Status: DISCONTINUED | OUTPATIENT
Start: 2024-11-19 | End: 2024-11-23 | Stop reason: HOSPADM

## 2024-11-19 RX ADMIN — ALBUTEROL SULFATE 2.5 MG: 2.5 SOLUTION RESPIRATORY (INHALATION) at 13:17

## 2024-11-19 RX ADMIN — ALBUTEROL SULFATE 2.5 MG: 2.5 SOLUTION RESPIRATORY (INHALATION) at 21:37

## 2024-11-19 RX ADMIN — BETAMETHASONE DIPROPIONATE: 0.5 CREAM TOPICAL at 20:46

## 2024-11-19 RX ADMIN — METOCLOPRAMIDE HYDROCHLORIDE 10 MG: 5 SOLUTION ORAL at 18:03

## 2024-11-19 RX ADMIN — LEVOTHYROXINE SODIUM 100 MCG: 100 TABLET ORAL at 06:03

## 2024-11-19 RX ADMIN — MICONAZOLE NITRATE: 20 POWDER TOPICAL at 20:42

## 2024-11-19 RX ADMIN — FLUCONAZOLE 200 MG: 100 TABLET ORAL at 09:18

## 2024-11-19 RX ADMIN — MIDODRINE HYDROCHLORIDE 10 MG: 5 TABLET ORAL at 18:02

## 2024-11-19 RX ADMIN — MIDODRINE HYDROCHLORIDE 10 MG: 5 TABLET ORAL at 09:18

## 2024-11-19 RX ADMIN — OXYCODONE 20 MG: 15 TABLET ORAL at 20:30

## 2024-11-19 RX ADMIN — Medication 4 ML: at 16:45

## 2024-11-19 RX ADMIN — ARFORMOTEROL TARTRATE 15 MCG: 15 SOLUTION RESPIRATORY (INHALATION) at 08:45

## 2024-11-19 RX ADMIN — METOCLOPRAMIDE HYDROCHLORIDE 10 MG: 5 SOLUTION ORAL at 06:03

## 2024-11-19 RX ADMIN — MIDODRINE HYDROCHLORIDE 10 MG: 5 TABLET ORAL at 11:53

## 2024-11-19 RX ADMIN — Medication 4 ML: at 21:37

## 2024-11-19 RX ADMIN — METOCLOPRAMIDE HYDROCHLORIDE 10 MG: 5 SOLUTION ORAL at 11:53

## 2024-11-19 RX ADMIN — ARFORMOTEROL TARTRATE 15 MCG: 15 SOLUTION RESPIRATORY (INHALATION) at 21:37

## 2024-11-19 RX ADMIN — CEFEPIME 2 G: 2 INJECTION, POWDER, FOR SOLUTION INTRAVENOUS at 09:40

## 2024-11-19 RX ADMIN — SENNOSIDES 10 ML: 8.8 LIQUID ORAL at 20:45

## 2024-11-19 RX ADMIN — Medication 4 ML: at 13:17

## 2024-11-19 RX ADMIN — APIXABAN 2.5 MG: 2.5 TABLET, FILM COATED ORAL at 09:18

## 2024-11-19 RX ADMIN — ALBUTEROL SULFATE 2.5 MG: 2.5 SOLUTION RESPIRATORY (INHALATION) at 16:46

## 2024-11-19 RX ADMIN — ATORVASTATIN CALCIUM 20 MG: 20 TABLET, FILM COATED ORAL at 09:18

## 2024-11-19 RX ADMIN — PANTOPRAZOLE SODIUM 40 MG: 40 INJECTION, POWDER, FOR SOLUTION INTRAVENOUS at 09:19

## 2024-11-19 RX ADMIN — POLYETHYLENE GLYCOL 3350 17 G: 17 POWDER, FOR SOLUTION ORAL at 09:18

## 2024-11-19 RX ADMIN — SODIUM CHLORIDE, PRESERVATIVE FREE 10 ML: 5 INJECTION INTRAVENOUS at 09:21

## 2024-11-19 RX ADMIN — APIXABAN 2.5 MG: 2.5 TABLET, FILM COATED ORAL at 20:31

## 2024-11-19 RX ADMIN — Medication 30 MG: at 09:18

## 2024-11-19 RX ADMIN — WATER 20 ML: 1 INJECTION INTRAMUSCULAR; INTRAVENOUS; SUBCUTANEOUS at 09:40

## 2024-11-19 RX ADMIN — HYDROCORTISONE SODIUM SUCCINATE 50 MG: 100 INJECTION, POWDER, FOR SOLUTION INTRAMUSCULAR; INTRAVENOUS at 06:03

## 2024-11-19 RX ADMIN — WATER 20 ML: 1 INJECTION INTRAMUSCULAR; INTRAVENOUS; SUBCUTANEOUS at 18:02

## 2024-11-19 RX ADMIN — MICONAZOLE NITRATE: 20 POWDER TOPICAL at 09:21

## 2024-11-19 RX ADMIN — SILODOSIN 4 MG: 4 CAPSULE ORAL at 20:41

## 2024-11-19 RX ADMIN — Medication 30 MG: at 20:42

## 2024-11-19 RX ADMIN — CEFEPIME 2 G: 2 INJECTION, POWDER, FOR SOLUTION INTRAVENOUS at 01:20

## 2024-11-19 RX ADMIN — BUDESONIDE INHALATION 500 MCG: 0.5 SUSPENSION RESPIRATORY (INHALATION) at 08:45

## 2024-11-19 RX ADMIN — BETAMETHASONE DIPROPIONATE: 0.5 CREAM TOPICAL at 09:20

## 2024-11-19 RX ADMIN — METOCLOPRAMIDE HYDROCHLORIDE 10 MG: 5 SOLUTION ORAL at 20:45

## 2024-11-19 RX ADMIN — BUDESONIDE INHALATION 500 MCG: 0.5 SUSPENSION RESPIRATORY (INHALATION) at 21:37

## 2024-11-19 RX ADMIN — ONDANSETRON 4 MG: 2 INJECTION INTRAMUSCULAR; INTRAVENOUS at 20:33

## 2024-11-19 RX ADMIN — CEFEPIME 2 G: 2 INJECTION, POWDER, FOR SOLUTION INTRAVENOUS at 18:02

## 2024-11-19 RX ADMIN — METHADONE HYDROCHLORIDE 10 MG: 10 CONCENTRATE ORAL at 04:24

## 2024-11-19 RX ADMIN — SODIUM CHLORIDE, PRESERVATIVE FREE 10 ML: 5 INJECTION INTRAVENOUS at 20:44

## 2024-11-19 RX ADMIN — WATER 20 ML: 1 INJECTION INTRAMUSCULAR; INTRAVENOUS; SUBCUTANEOUS at 01:21

## 2024-11-19 ASSESSMENT — PAIN - FUNCTIONAL ASSESSMENT: PAIN_FUNCTIONAL_ASSESSMENT: PREVENTS OR INTERFERES SOME ACTIVE ACTIVITIES AND ADLS

## 2024-11-19 ASSESSMENT — PAIN SCALES - GENERAL
PAINLEVEL_OUTOF10: 10
PAINLEVEL_OUTOF10: 10
PAINLEVEL_OUTOF10: 0

## 2024-11-19 ASSESSMENT — PAIN DESCRIPTION - DESCRIPTORS: DESCRIPTORS: ACHING

## 2024-11-19 ASSESSMENT — PAIN DESCRIPTION - LOCATION: LOCATION: NECK;GENERALIZED

## 2024-11-19 NOTE — CARE COORDINATION
In collaboration with primary care, discussion had with patient and wife at bedside regarding options for care post hospital discharge.  Patient/wife agreeable to modify current plan and explore short term facility stay prior to a return to home setting.  SNF/NIKKO lists provided and reviewed.  Wife voiced selection of [facility] and [facility].  Referrals are phoned to Princess at [facility] and Carolina at [facility].  Will await their review and response regarding bed availability/acceptance.  Lavon Park, MSN RN  [facility] Case Management  [phone]

## 2024-11-20 LAB
ANION GAP SERPL CALCULATED.3IONS-SCNC: 7 MMOL/L (ref 7–16)
BASOPHILS # BLD: 0.01 K/UL (ref 0–0.2)
BASOPHILS NFR BLD: 0 % (ref 0–2)
BUN SERPL-MCNC: 21 MG/DL (ref 6–23)
CALCIUM SERPL-MCNC: 9.7 MG/DL (ref 8.6–10.2)
CHLORIDE SERPL-SCNC: 98 MMOL/L (ref 98–107)
CO2 SERPL-SCNC: 26 MMOL/L (ref 22–29)
CREAT SERPL-MCNC: 0.8 MG/DL (ref 0.7–1.2)
EOSINOPHIL # BLD: 0.11 K/UL (ref 0.05–0.5)
EOSINOPHILS RELATIVE PERCENT: 2 % (ref 0–6)
ERYTHROCYTE [DISTWIDTH] IN BLOOD BY AUTOMATED COUNT: 17.1 % (ref 11.5–15)
GFR, ESTIMATED: >90 ML/MIN/1.73M2
GLUCOSE SERPL-MCNC: 165 MG/DL (ref 74–99)
HCT VFR BLD AUTO: 27.6 % (ref 37–54)
HGB BLD-MCNC: 9 G/DL (ref 12.5–16.5)
IMM GRANULOCYTES # BLD AUTO: <0.03 K/UL (ref 0–0.58)
IMM GRANULOCYTES NFR BLD: 0 % (ref 0–5)
LIPASE SERPL-CCNC: 11 U/L (ref 13–60)
LYMPHOCYTES NFR BLD: 0.57 K/UL (ref 1.5–4)
LYMPHOCYTES RELATIVE PERCENT: 11 % (ref 20–42)
MCH RBC QN AUTO: 26.3 PG (ref 26–35)
MCHC RBC AUTO-ENTMCNC: 32.6 G/DL (ref 32–34.5)
MCV RBC AUTO: 80.7 FL (ref 80–99.9)
MONOCYTES NFR BLD: 0.15 K/UL (ref 0.1–0.95)
MONOCYTES NFR BLD: 3 % (ref 2–12)
NEUTROPHILS NFR BLD: 83 % (ref 43–80)
NEUTS SEG NFR BLD: 4.27 K/UL (ref 1.8–7.3)
PLATELET # BLD AUTO: 198 K/UL (ref 130–450)
PMV BLD AUTO: 10.2 FL (ref 7–12)
POTASSIUM SERPL-SCNC: 3.2 MMOL/L (ref 3.5–5)
RBC # BLD AUTO: 3.42 M/UL (ref 3.8–5.8)
RBC # BLD: ABNORMAL 10*6/UL
SODIUM SERPL-SCNC: 131 MMOL/L (ref 132–146)
WBC OTHER # BLD: 5.1 K/UL (ref 4.5–11.5)

## 2024-11-20 PROCEDURE — 6360000002 HC RX W HCPCS: Performed by: STUDENT IN AN ORGANIZED HEALTH CARE EDUCATION/TRAINING PROGRAM

## 2024-11-20 PROCEDURE — 80048 BASIC METABOLIC PNL TOTAL CA: CPT

## 2024-11-20 PROCEDURE — 2060000000 HC ICU INTERMEDIATE R&B

## 2024-11-20 PROCEDURE — 6370000000 HC RX 637 (ALT 250 FOR IP): Performed by: INTERNAL MEDICINE

## 2024-11-20 PROCEDURE — 2580000003 HC RX 258: Performed by: INTERNAL MEDICINE

## 2024-11-20 PROCEDURE — 6360000002 HC RX W HCPCS: Performed by: INTERNAL MEDICINE

## 2024-11-20 PROCEDURE — 2580000003 HC RX 258: Performed by: STUDENT IN AN ORGANIZED HEALTH CARE EDUCATION/TRAINING PROGRAM

## 2024-11-20 PROCEDURE — 6360000002 HC RX W HCPCS

## 2024-11-20 PROCEDURE — 6370000000 HC RX 637 (ALT 250 FOR IP): Performed by: NURSE PRACTITIONER

## 2024-11-20 PROCEDURE — 6360000002 HC RX W HCPCS: Performed by: NURSE PRACTITIONER

## 2024-11-20 PROCEDURE — 85025 COMPLETE CBC W/AUTO DIFF WBC: CPT

## 2024-11-20 PROCEDURE — 99231 SBSQ HOSP IP/OBS SF/LOW 25: CPT

## 2024-11-20 PROCEDURE — 2700000000 HC OXYGEN THERAPY PER DAY

## 2024-11-20 PROCEDURE — 99232 SBSQ HOSP IP/OBS MODERATE 35: CPT | Performed by: INTERNAL MEDICINE

## 2024-11-20 PROCEDURE — 94640 AIRWAY INHALATION TREATMENT: CPT

## 2024-11-20 PROCEDURE — 94669 MECHANICAL CHEST WALL OSCILL: CPT

## 2024-11-20 PROCEDURE — 6370000000 HC RX 637 (ALT 250 FOR IP)

## 2024-11-20 PROCEDURE — 2580000003 HC RX 258

## 2024-11-20 PROCEDURE — 83690 ASSAY OF LIPASE: CPT

## 2024-11-20 PROCEDURE — 2580000003 HC RX 258: Performed by: NURSE PRACTITIONER

## 2024-11-20 RX ORDER — FUROSEMIDE 10 MG/ML
20 INJECTION INTRAMUSCULAR; INTRAVENOUS ONCE
Status: COMPLETED | OUTPATIENT
Start: 2024-11-20 | End: 2024-11-20

## 2024-11-20 RX ORDER — FUROSEMIDE 10 MG/ML
20 INJECTION INTRAMUSCULAR; INTRAVENOUS ONCE
Status: DISCONTINUED | OUTPATIENT
Start: 2024-11-20 | End: 2024-11-23

## 2024-11-20 RX ADMIN — MIDODRINE HYDROCHLORIDE 10 MG: 5 TABLET ORAL at 16:42

## 2024-11-20 RX ADMIN — ARFORMOTEROL TARTRATE 15 MCG: 15 SOLUTION RESPIRATORY (INHALATION) at 06:22

## 2024-11-20 RX ADMIN — SODIUM CHLORIDE, PRESERVATIVE FREE 10 ML: 5 INJECTION INTRAVENOUS at 09:30

## 2024-11-20 RX ADMIN — MIDODRINE HYDROCHLORIDE 10 MG: 5 TABLET ORAL at 09:15

## 2024-11-20 RX ADMIN — FUROSEMIDE 20 MG: 10 INJECTION, SOLUTION INTRAMUSCULAR; INTRAVENOUS at 10:56

## 2024-11-20 RX ADMIN — POLYETHYLENE GLYCOL 3350 17 G: 17 POWDER, FOR SOLUTION ORAL at 09:14

## 2024-11-20 RX ADMIN — MICONAZOLE NITRATE: 20 POWDER TOPICAL at 20:26

## 2024-11-20 RX ADMIN — ALBUTEROL SULFATE 0.63 MG: 0.63 SOLUTION RESPIRATORY (INHALATION) at 01:43

## 2024-11-20 RX ADMIN — ALBUTEROL SULFATE 2.5 MG: 2.5 SOLUTION RESPIRATORY (INHALATION) at 16:23

## 2024-11-20 RX ADMIN — METOCLOPRAMIDE HYDROCHLORIDE 10 MG: 5 SOLUTION ORAL at 20:25

## 2024-11-20 RX ADMIN — ONDANSETRON 4 MG: 2 INJECTION INTRAMUSCULAR; INTRAVENOUS at 01:10

## 2024-11-20 RX ADMIN — LEVOTHYROXINE SODIUM 100 MCG: 100 TABLET ORAL at 05:33

## 2024-11-20 RX ADMIN — Medication 4 ML: at 19:31

## 2024-11-20 RX ADMIN — ALBUTEROL SULFATE 2.5 MG: 2.5 SOLUTION RESPIRATORY (INHALATION) at 06:20

## 2024-11-20 RX ADMIN — ALBUTEROL SULFATE 2.5 MG: 2.5 SOLUTION RESPIRATORY (INHALATION) at 19:31

## 2024-11-20 RX ADMIN — WATER 20 ML: 1 INJECTION INTRAMUSCULAR; INTRAVENOUS; SUBCUTANEOUS at 09:29

## 2024-11-20 RX ADMIN — APIXABAN 2.5 MG: 2.5 TABLET, FILM COATED ORAL at 09:16

## 2024-11-20 RX ADMIN — WATER 20 ML: 1 INJECTION INTRAMUSCULAR; INTRAVENOUS; SUBCUTANEOUS at 18:25

## 2024-11-20 RX ADMIN — BETAMETHASONE DIPROPIONATE: 0.5 CREAM TOPICAL at 20:25

## 2024-11-20 RX ADMIN — CEFEPIME 2 G: 2 INJECTION, POWDER, FOR SOLUTION INTRAVENOUS at 01:10

## 2024-11-20 RX ADMIN — ARFORMOTEROL TARTRATE 15 MCG: 15 SOLUTION RESPIRATORY (INHALATION) at 19:31

## 2024-11-20 RX ADMIN — SODIUM CHLORIDE, PRESERVATIVE FREE 10 ML: 5 INJECTION INTRAVENOUS at 20:27

## 2024-11-20 RX ADMIN — ALBUTEROL SULFATE 2.5 MG: 2.5 SOLUTION RESPIRATORY (INHALATION) at 12:47

## 2024-11-20 RX ADMIN — Medication 6 MG: at 20:24

## 2024-11-20 RX ADMIN — Medication 4 ML: at 12:48

## 2024-11-20 RX ADMIN — BUDESONIDE INHALATION 500 MCG: 0.5 SUSPENSION RESPIRATORY (INHALATION) at 19:31

## 2024-11-20 RX ADMIN — POTASSIUM BICARBONATE 50 MEQ: 782 TABLET, EFFERVESCENT ORAL at 09:15

## 2024-11-20 RX ADMIN — MICONAZOLE NITRATE: 20 POWDER TOPICAL at 09:14

## 2024-11-20 RX ADMIN — MIDODRINE HYDROCHLORIDE 10 MG: 5 TABLET ORAL at 12:34

## 2024-11-20 RX ADMIN — METOCLOPRAMIDE HYDROCHLORIDE 10 MG: 5 SOLUTION ORAL at 16:42

## 2024-11-20 RX ADMIN — BETAMETHASONE DIPROPIONATE: 0.5 CREAM TOPICAL at 09:16

## 2024-11-20 RX ADMIN — METOCLOPRAMIDE HYDROCHLORIDE 10 MG: 5 SOLUTION ORAL at 12:34

## 2024-11-20 RX ADMIN — SILODOSIN 4 MG: 4 CAPSULE ORAL at 20:25

## 2024-11-20 RX ADMIN — ALBUTEROL SULFATE 0.63 MG: 0.63 SOLUTION RESPIRATORY (INHALATION) at 08:02

## 2024-11-20 RX ADMIN — WATER 20 ML: 1 INJECTION INTRAMUSCULAR; INTRAVENOUS; SUBCUTANEOUS at 01:10

## 2024-11-20 RX ADMIN — BUDESONIDE INHALATION 500 MCG: 0.5 SUSPENSION RESPIRATORY (INHALATION) at 06:22

## 2024-11-20 RX ADMIN — PANTOPRAZOLE SODIUM 40 MG: 40 INJECTION, POWDER, FOR SOLUTION INTRAVENOUS at 09:16

## 2024-11-20 RX ADMIN — OXYCODONE 20 MG: 15 TABLET ORAL at 01:10

## 2024-11-20 RX ADMIN — Medication 30 MG: at 09:14

## 2024-11-20 RX ADMIN — Medication 4 ML: at 06:22

## 2024-11-20 RX ADMIN — METOCLOPRAMIDE HYDROCHLORIDE 10 MG: 5 SOLUTION ORAL at 05:32

## 2024-11-20 RX ADMIN — SENNOSIDES 10 ML: 8.8 LIQUID ORAL at 20:25

## 2024-11-20 RX ADMIN — CEFEPIME 2 G: 2 INJECTION, POWDER, FOR SOLUTION INTRAVENOUS at 18:25

## 2024-11-20 RX ADMIN — APIXABAN 2.5 MG: 2.5 TABLET, FILM COATED ORAL at 20:25

## 2024-11-20 RX ADMIN — Medication 30 MG: at 20:25

## 2024-11-20 RX ADMIN — OXYCODONE 20 MG: 15 TABLET ORAL at 20:24

## 2024-11-20 RX ADMIN — ATORVASTATIN CALCIUM 20 MG: 20 TABLET, FILM COATED ORAL at 09:15

## 2024-11-20 RX ADMIN — CEFEPIME 2 G: 2 INJECTION, POWDER, FOR SOLUTION INTRAVENOUS at 09:29

## 2024-11-20 ASSESSMENT — PAIN SCALES - WONG BAKER: WONGBAKER_NUMERICALRESPONSE: NO HURT

## 2024-11-20 ASSESSMENT — PAIN SCALES - GENERAL
PAINLEVEL_OUTOF10: 10
PAINLEVEL_OUTOF10: 0
PAINLEVEL_OUTOF10: 3

## 2024-11-20 ASSESSMENT — PAIN DESCRIPTION - DESCRIPTORS: DESCRIPTORS: ACHING;THROBBING

## 2024-11-20 ASSESSMENT — PAIN DESCRIPTION - LOCATION: LOCATION: NECK;THROAT

## 2024-11-20 NOTE — CARE COORDINATION
Communication with Carolina at [facility] - patient's needs would exceed facility's capacity of O2 28% fiO2/5L.  Princess at [facility] also communicates patient's needs exceed capacity and cannot accept.  She is working with facility RT to define facility O2 range/capacity.  Spoke with Stu at [facility].  Currently he feels [facility] Medicare would view patient as unsuitable for LTAC transfer and appproval for LTAC stay would likely.  Patient currently receiving O2 60L fiO2 100% to trache mask.  IV lasix ordered today per primary care.  Patient will need followed and assisted with discharge planning as necessary.   Lavon Park, MSN RN  [facility] Case Management  [phone]

## 2024-11-21 ENCOUNTER — APPOINTMENT (OUTPATIENT)
Dept: GENERAL RADIOLOGY | Age: 69
DRG: 640 | End: 2024-11-21
Payer: MEDICARE

## 2024-11-21 LAB
ALBUMIN SERPL-MCNC: 2.5 G/DL (ref 3.5–5.2)
ALP SERPL-CCNC: 119 U/L (ref 40–129)
ALT SERPL-CCNC: 73 U/L (ref 0–40)
ANION GAP SERPL CALCULATED.3IONS-SCNC: 6 MMOL/L (ref 7–16)
AST SERPL-CCNC: 64 U/L (ref 0–39)
BASOPHILS # BLD: 0.02 K/UL (ref 0–0.2)
BASOPHILS NFR BLD: 0 % (ref 0–2)
BILIRUB SERPL-MCNC: 0.3 MG/DL (ref 0–1.2)
BUN SERPL-MCNC: 19 MG/DL (ref 6–23)
CALCIUM SERPL-MCNC: 10 MG/DL (ref 8.6–10.2)
CHLORIDE SERPL-SCNC: 94 MMOL/L (ref 98–107)
CO2 SERPL-SCNC: 29 MMOL/L (ref 22–29)
CREAT SERPL-MCNC: 0.8 MG/DL (ref 0.7–1.2)
EOSINOPHIL # BLD: 0.12 K/UL (ref 0.05–0.5)
EOSINOPHILS RELATIVE PERCENT: 2 % (ref 0–6)
ERYTHROCYTE [DISTWIDTH] IN BLOOD BY AUTOMATED COUNT: 17 % (ref 11.5–15)
GFR, ESTIMATED: >90 ML/MIN/1.73M2
GLUCOSE SERPL-MCNC: 151 MG/DL (ref 74–99)
HCT VFR BLD AUTO: 27.9 % (ref 37–54)
HGB BLD-MCNC: 9 G/DL (ref 12.5–16.5)
IMM GRANULOCYTES # BLD AUTO: 0.05 K/UL (ref 0–0.58)
IMM GRANULOCYTES NFR BLD: 1 % (ref 0–5)
LYMPHOCYTES NFR BLD: 0.6 K/UL (ref 1.5–4)
LYMPHOCYTES RELATIVE PERCENT: 10 % (ref 20–42)
MCH RBC QN AUTO: 26.3 PG (ref 26–35)
MCHC RBC AUTO-ENTMCNC: 32.3 G/DL (ref 32–34.5)
MCV RBC AUTO: 81.6 FL (ref 80–99.9)
MICROORGANISM SPEC CULT: ABNORMAL
MICROORGANISM SPEC CULT: ABNORMAL
MICROORGANISM/AGENT SPEC: ABNORMAL
MONOCYTES NFR BLD: 0.15 K/UL (ref 0.1–0.95)
MONOCYTES NFR BLD: 2 % (ref 2–12)
NEUTROPHILS NFR BLD: 85 % (ref 43–80)
NEUTS SEG NFR BLD: 5.36 K/UL (ref 1.8–7.3)
PLATELET # BLD AUTO: 185 K/UL (ref 130–450)
PMV BLD AUTO: 10.3 FL (ref 7–12)
POTASSIUM SERPL-SCNC: 3.5 MMOL/L (ref 3.5–5)
PROT SERPL-MCNC: 5.8 G/DL (ref 6.4–8.3)
RBC # BLD AUTO: 3.42 M/UL (ref 3.8–5.8)
SERVICE CMNT-IMP: ABNORMAL
SODIUM SERPL-SCNC: 129 MMOL/L (ref 132–146)
SPECIMEN DESCRIPTION: ABNORMAL
WBC OTHER # BLD: 6.3 K/UL (ref 4.5–11.5)

## 2024-11-21 PROCEDURE — 2060000000 HC ICU INTERMEDIATE R&B

## 2024-11-21 PROCEDURE — 6360000002 HC RX W HCPCS: Performed by: NURSE PRACTITIONER

## 2024-11-21 PROCEDURE — 6370000000 HC RX 637 (ALT 250 FOR IP): Performed by: STUDENT IN AN ORGANIZED HEALTH CARE EDUCATION/TRAINING PROGRAM

## 2024-11-21 PROCEDURE — 94669 MECHANICAL CHEST WALL OSCILL: CPT

## 2024-11-21 PROCEDURE — 99232 SBSQ HOSP IP/OBS MODERATE 35: CPT | Performed by: STUDENT IN AN ORGANIZED HEALTH CARE EDUCATION/TRAINING PROGRAM

## 2024-11-21 PROCEDURE — 6370000000 HC RX 637 (ALT 250 FOR IP): Performed by: NURSE PRACTITIONER

## 2024-11-21 PROCEDURE — 6370000000 HC RX 637 (ALT 250 FOR IP): Performed by: INTERNAL MEDICINE

## 2024-11-21 PROCEDURE — 2580000003 HC RX 258: Performed by: NURSE PRACTITIONER

## 2024-11-21 PROCEDURE — 99233 SBSQ HOSP IP/OBS HIGH 50: CPT | Performed by: NURSE PRACTITIONER

## 2024-11-21 PROCEDURE — 85025 COMPLETE CBC W/AUTO DIFF WBC: CPT

## 2024-11-21 PROCEDURE — 94640 AIRWAY INHALATION TREATMENT: CPT

## 2024-11-21 PROCEDURE — 6360000002 HC RX W HCPCS

## 2024-11-21 PROCEDURE — 2580000003 HC RX 258

## 2024-11-21 PROCEDURE — 2580000003 HC RX 258: Performed by: INTERNAL MEDICINE

## 2024-11-21 PROCEDURE — 6360000002 HC RX W HCPCS: Performed by: INTERNAL MEDICINE

## 2024-11-21 PROCEDURE — 80053 COMPREHEN METABOLIC PANEL: CPT

## 2024-11-21 PROCEDURE — 71045 X-RAY EXAM CHEST 1 VIEW: CPT

## 2024-11-21 PROCEDURE — 6360000002 HC RX W HCPCS: Performed by: STUDENT IN AN ORGANIZED HEALTH CARE EDUCATION/TRAINING PROGRAM

## 2024-11-21 PROCEDURE — 2580000003 HC RX 258: Performed by: STUDENT IN AN ORGANIZED HEALTH CARE EDUCATION/TRAINING PROGRAM

## 2024-11-21 PROCEDURE — 2700000000 HC OXYGEN THERAPY PER DAY

## 2024-11-21 RX ORDER — CLOBETASOL PROPIONATE 0.5 MG/G
CREAM TOPICAL 2 TIMES DAILY
Status: DISCONTINUED | OUTPATIENT
Start: 2024-11-21 | End: 2024-11-23 | Stop reason: HOSPADM

## 2024-11-21 RX ORDER — LEVOFLOXACIN 500 MG/1
500 TABLET, FILM COATED ORAL DAILY
Status: DISCONTINUED | OUTPATIENT
Start: 2024-11-21 | End: 2024-11-23

## 2024-11-21 RX ADMIN — ALBUTEROL SULFATE 2.5 MG: 2.5 SOLUTION RESPIRATORY (INHALATION) at 12:57

## 2024-11-21 RX ADMIN — ATORVASTATIN CALCIUM 20 MG: 20 TABLET, FILM COATED ORAL at 09:23

## 2024-11-21 RX ADMIN — WATER 20 ML: 1 INJECTION INTRAMUSCULAR; INTRAVENOUS; SUBCUTANEOUS at 09:25

## 2024-11-21 RX ADMIN — METHADONE HYDROCHLORIDE 10 MG: 10 CONCENTRATE ORAL at 22:14

## 2024-11-21 RX ADMIN — BUDESONIDE INHALATION 500 MCG: 0.5 SUSPENSION RESPIRATORY (INHALATION) at 08:54

## 2024-11-21 RX ADMIN — METOCLOPRAMIDE HYDROCHLORIDE 10 MG: 5 SOLUTION ORAL at 06:29

## 2024-11-21 RX ADMIN — Medication 4 ML: at 08:54

## 2024-11-21 RX ADMIN — METOCLOPRAMIDE HYDROCHLORIDE 10 MG: 5 SOLUTION ORAL at 11:40

## 2024-11-21 RX ADMIN — Medication 30 MG: at 09:25

## 2024-11-21 RX ADMIN — ARFORMOTEROL TARTRATE 15 MCG: 15 SOLUTION RESPIRATORY (INHALATION) at 08:54

## 2024-11-21 RX ADMIN — METOCLOPRAMIDE HYDROCHLORIDE 10 MG: 5 SOLUTION ORAL at 17:25

## 2024-11-21 RX ADMIN — CEFEPIME 2 G: 2 INJECTION, POWDER, FOR SOLUTION INTRAVENOUS at 09:22

## 2024-11-21 RX ADMIN — Medication 4 ML: at 19:56

## 2024-11-21 RX ADMIN — Medication 30 MG: at 22:13

## 2024-11-21 RX ADMIN — MICONAZOLE NITRATE: 20 POWDER TOPICAL at 09:26

## 2024-11-21 RX ADMIN — CLOBETASOL PROPIONATE CREAM USP, 0.05%: 0.5 CREAM TOPICAL at 11:42

## 2024-11-21 RX ADMIN — ALBUTEROL SULFATE 2.5 MG: 2.5 SOLUTION RESPIRATORY (INHALATION) at 15:50

## 2024-11-21 RX ADMIN — SENNOSIDES 10 ML: 8.8 LIQUID ORAL at 22:13

## 2024-11-21 RX ADMIN — ARFORMOTEROL TARTRATE 15 MCG: 15 SOLUTION RESPIRATORY (INHALATION) at 19:56

## 2024-11-21 RX ADMIN — MICONAZOLE NITRATE: 20 POWDER TOPICAL at 22:31

## 2024-11-21 RX ADMIN — APIXABAN 2.5 MG: 2.5 TABLET, FILM COATED ORAL at 09:23

## 2024-11-21 RX ADMIN — PANTOPRAZOLE SODIUM 40 MG: 40 INJECTION, POWDER, FOR SOLUTION INTRAVENOUS at 09:23

## 2024-11-21 RX ADMIN — SILODOSIN 4 MG: 4 CAPSULE ORAL at 22:12

## 2024-11-21 RX ADMIN — PREGABALIN 150 MG: 75 CAPSULE ORAL at 22:13

## 2024-11-21 RX ADMIN — MIDODRINE HYDROCHLORIDE 10 MG: 5 TABLET ORAL at 09:23

## 2024-11-21 RX ADMIN — CEFEPIME 2 G: 2 INJECTION, POWDER, FOR SOLUTION INTRAVENOUS at 02:00

## 2024-11-21 RX ADMIN — POLYETHYLENE GLYCOL 3350 17 G: 17 POWDER, FOR SOLUTION ORAL at 09:23

## 2024-11-21 RX ADMIN — MIDODRINE HYDROCHLORIDE 10 MG: 5 TABLET ORAL at 17:25

## 2024-11-21 RX ADMIN — BUDESONIDE INHALATION 500 MCG: 0.5 SUSPENSION RESPIRATORY (INHALATION) at 19:56

## 2024-11-21 RX ADMIN — ALBUTEROL SULFATE 2.5 MG: 2.5 SOLUTION RESPIRATORY (INHALATION) at 08:54

## 2024-11-21 RX ADMIN — LEVOTHYROXINE SODIUM 100 MCG: 100 TABLET ORAL at 06:29

## 2024-11-21 RX ADMIN — MIDODRINE HYDROCHLORIDE 10 MG: 5 TABLET ORAL at 11:40

## 2024-11-21 RX ADMIN — ALBUTEROL SULFATE 2.5 MG: 2.5 SOLUTION RESPIRATORY (INHALATION) at 19:57

## 2024-11-21 RX ADMIN — METOCLOPRAMIDE HYDROCHLORIDE 10 MG: 5 SOLUTION ORAL at 22:13

## 2024-11-21 RX ADMIN — SODIUM CHLORIDE, PRESERVATIVE FREE 10 ML: 5 INJECTION INTRAVENOUS at 22:31

## 2024-11-21 RX ADMIN — SODIUM CHLORIDE, PRESERVATIVE FREE 10 ML: 5 INJECTION INTRAVENOUS at 09:22

## 2024-11-21 RX ADMIN — WATER 20 ML: 1 INJECTION INTRAMUSCULAR; INTRAVENOUS; SUBCUTANEOUS at 02:00

## 2024-11-21 RX ADMIN — Medication 4 ML: at 12:57

## 2024-11-21 RX ADMIN — LEVOFLOXACIN 500 MG: 500 TABLET, FILM COATED ORAL at 13:16

## 2024-11-21 RX ADMIN — CLOBETASOL PROPIONATE CREAM USP, 0.05%: 0.5 CREAM TOPICAL at 22:30

## 2024-11-21 RX ADMIN — APIXABAN 2.5 MG: 2.5 TABLET, FILM COATED ORAL at 22:13

## 2024-11-21 ASSESSMENT — PAIN SCALES - GENERAL
PAINLEVEL_OUTOF10: 0

## 2024-11-21 NOTE — CARE COORDINATION
Social work:    Social service is assisting  with the discharge plan. Social service was advised by Palliative NP of HOT consult. Referral sent through [employer] today.    Electronically signed by CARMINE Vera on 11/21/2024 at 10:48 AM

## 2024-11-22 LAB
ALBUMIN SERPL-MCNC: 2.6 G/DL (ref 3.5–5.2)
ALP SERPL-CCNC: 202 U/L (ref 40–129)
ALT SERPL-CCNC: 70 U/L (ref 0–40)
ANION GAP SERPL CALCULATED.3IONS-SCNC: 10 MMOL/L (ref 7–16)
AST SERPL-CCNC: 72 U/L (ref 0–39)
BASOPHILS # BLD: 0.02 K/UL (ref 0–0.2)
BASOPHILS NFR BLD: 0 % (ref 0–2)
BILIRUB SERPL-MCNC: 0.5 MG/DL (ref 0–1.2)
BUN SERPL-MCNC: 22 MG/DL (ref 6–23)
CALCIUM SERPL-MCNC: 10.8 MG/DL (ref 8.6–10.2)
CHLORIDE SERPL-SCNC: 91 MMOL/L (ref 98–107)
CO2 SERPL-SCNC: 29 MMOL/L (ref 22–29)
CREAT SERPL-MCNC: 0.9 MG/DL (ref 0.7–1.2)
EOSINOPHIL # BLD: 0.1 K/UL (ref 0.05–0.5)
EOSINOPHILS RELATIVE PERCENT: 2 % (ref 0–6)
ERYTHROCYTE [DISTWIDTH] IN BLOOD BY AUTOMATED COUNT: 17.3 % (ref 11.5–15)
GFR, ESTIMATED: >90 ML/MIN/1.73M2
GLUCOSE SERPL-MCNC: 123 MG/DL (ref 74–99)
HCT VFR BLD AUTO: 29.6 % (ref 37–54)
HGB BLD-MCNC: 9.7 G/DL (ref 12.5–16.5)
IMM GRANULOCYTES # BLD AUTO: 0.06 K/UL (ref 0–0.58)
IMM GRANULOCYTES NFR BLD: 1 % (ref 0–5)
LYMPHOCYTES NFR BLD: 0.6 K/UL (ref 1.5–4)
LYMPHOCYTES RELATIVE PERCENT: 10 % (ref 20–42)
MCH RBC QN AUTO: 26.4 PG (ref 26–35)
MCHC RBC AUTO-ENTMCNC: 32.8 G/DL (ref 32–34.5)
MCV RBC AUTO: 80.4 FL (ref 80–99.9)
MONOCYTES NFR BLD: 0.09 K/UL (ref 0.1–0.95)
MONOCYTES NFR BLD: 2 % (ref 2–12)
NEUTROPHILS NFR BLD: 86 % (ref 43–80)
NEUTS SEG NFR BLD: 5.33 K/UL (ref 1.8–7.3)
PLATELET # BLD AUTO: 197 K/UL (ref 130–450)
PMV BLD AUTO: 10.3 FL (ref 7–12)
POTASSIUM SERPL-SCNC: 4.4 MMOL/L (ref 3.5–5)
PROT SERPL-MCNC: 6.3 G/DL (ref 6.4–8.3)
RBC # BLD AUTO: 3.68 M/UL (ref 3.8–5.8)
SODIUM SERPL-SCNC: 130 MMOL/L (ref 132–146)
WBC OTHER # BLD: 6.2 K/UL (ref 4.5–11.5)

## 2024-11-22 PROCEDURE — 99233 SBSQ HOSP IP/OBS HIGH 50: CPT | Performed by: NURSE PRACTITIONER

## 2024-11-22 PROCEDURE — 94640 AIRWAY INHALATION TREATMENT: CPT

## 2024-11-22 PROCEDURE — 2580000003 HC RX 258

## 2024-11-22 PROCEDURE — 99232 SBSQ HOSP IP/OBS MODERATE 35: CPT | Performed by: STUDENT IN AN ORGANIZED HEALTH CARE EDUCATION/TRAINING PROGRAM

## 2024-11-22 PROCEDURE — 6370000000 HC RX 637 (ALT 250 FOR IP): Performed by: INTERNAL MEDICINE

## 2024-11-22 PROCEDURE — 6360000002 HC RX W HCPCS: Performed by: NURSE PRACTITIONER

## 2024-11-22 PROCEDURE — 2580000003 HC RX 258: Performed by: INTERNAL MEDICINE

## 2024-11-22 PROCEDURE — 85025 COMPLETE CBC W/AUTO DIFF WBC: CPT

## 2024-11-22 PROCEDURE — 6360000002 HC RX W HCPCS

## 2024-11-22 PROCEDURE — 6370000000 HC RX 637 (ALT 250 FOR IP): Performed by: STUDENT IN AN ORGANIZED HEALTH CARE EDUCATION/TRAINING PROGRAM

## 2024-11-22 PROCEDURE — 2580000003 HC RX 258: Performed by: NURSE PRACTITIONER

## 2024-11-22 PROCEDURE — 2700000000 HC OXYGEN THERAPY PER DAY

## 2024-11-22 PROCEDURE — 6360000002 HC RX W HCPCS: Performed by: INTERNAL MEDICINE

## 2024-11-22 PROCEDURE — 80053 COMPREHEN METABOLIC PANEL: CPT

## 2024-11-22 PROCEDURE — 1200000000 HC SEMI PRIVATE

## 2024-11-22 PROCEDURE — 94669 MECHANICAL CHEST WALL OSCILL: CPT

## 2024-11-22 RX ORDER — MORPHINE SULFATE 2 MG/ML
2 INJECTION, SOLUTION INTRAMUSCULAR; INTRAVENOUS EVERY 30 MIN PRN
Status: DISCONTINUED | OUTPATIENT
Start: 2024-11-22 | End: 2024-11-23

## 2024-11-22 RX ORDER — SODIUM CHLORIDE 9 MG/ML
INJECTION, SOLUTION INTRAVENOUS ONCE
Status: COMPLETED | OUTPATIENT
Start: 2024-11-22 | End: 2024-11-22

## 2024-11-22 RX ORDER — LORAZEPAM 2 MG/ML
1 INJECTION INTRAMUSCULAR EVERY 30 MIN PRN
Status: DISCONTINUED | OUTPATIENT
Start: 2024-11-22 | End: 2024-11-23

## 2024-11-22 RX ORDER — GLYCOPYRROLATE 0.2 MG/ML
0.2 INJECTION INTRAMUSCULAR; INTRAVENOUS EVERY 6 HOURS PRN
Status: DISCONTINUED | OUTPATIENT
Start: 2024-11-22 | End: 2024-11-23 | Stop reason: HOSPADM

## 2024-11-22 RX ORDER — MORPHINE SULFATE 2 MG/ML
2 INJECTION, SOLUTION INTRAMUSCULAR; INTRAVENOUS ONCE
Status: COMPLETED | OUTPATIENT
Start: 2024-11-22 | End: 2024-11-22

## 2024-11-22 RX ORDER — MORPHINE SULFATE 2 MG/ML
2 INJECTION, SOLUTION INTRAMUSCULAR; INTRAVENOUS EVERY 4 HOURS PRN
Status: DISCONTINUED | OUTPATIENT
Start: 2024-11-22 | End: 2024-11-22

## 2024-11-22 RX ADMIN — MORPHINE SULFATE 2 MG: 2 INJECTION, SOLUTION INTRAMUSCULAR; INTRAVENOUS at 23:51

## 2024-11-22 RX ADMIN — LORAZEPAM 1 MG: 2 INJECTION INTRAMUSCULAR; INTRAVENOUS at 15:55

## 2024-11-22 RX ADMIN — GLYCOPYRROLATE 0.2 MG: 0.2 INJECTION INTRAMUSCULAR; INTRAVENOUS at 16:52

## 2024-11-22 RX ADMIN — SODIUM CHLORIDE, PRESERVATIVE FREE 10 ML: 5 INJECTION INTRAVENOUS at 09:18

## 2024-11-22 RX ADMIN — MORPHINE SULFATE 2 MG: 2 INJECTION, SOLUTION INTRAMUSCULAR; INTRAVENOUS at 11:07

## 2024-11-22 RX ADMIN — MORPHINE SULFATE 2 MG: 2 INJECTION, SOLUTION INTRAMUSCULAR; INTRAVENOUS at 16:51

## 2024-11-22 RX ADMIN — MORPHINE SULFATE 2 MG: 2 INJECTION, SOLUTION INTRAMUSCULAR; INTRAVENOUS at 17:27

## 2024-11-22 RX ADMIN — LORAZEPAM 1 MG: 2 INJECTION INTRAMUSCULAR; INTRAVENOUS at 20:36

## 2024-11-22 RX ADMIN — LORAZEPAM 1 MG: 2 INJECTION INTRAMUSCULAR; INTRAVENOUS at 16:51

## 2024-11-22 RX ADMIN — ALBUTEROL SULFATE 2.5 MG: 2.5 SOLUTION RESPIRATORY (INHALATION) at 09:17

## 2024-11-22 RX ADMIN — MORPHINE SULFATE 2 MG: 2 INJECTION, SOLUTION INTRAMUSCULAR; INTRAVENOUS at 15:20

## 2024-11-22 RX ADMIN — LORAZEPAM 1 MG: 2 INJECTION INTRAMUSCULAR; INTRAVENOUS at 18:41

## 2024-11-22 RX ADMIN — ATORVASTATIN CALCIUM 20 MG: 20 TABLET, FILM COATED ORAL at 09:18

## 2024-11-22 RX ADMIN — SODIUM CHLORIDE, PRESERVATIVE FREE 10 ML: 5 INJECTION INTRAVENOUS at 20:36

## 2024-11-22 RX ADMIN — MICONAZOLE NITRATE: 20 POWDER TOPICAL at 09:18

## 2024-11-22 RX ADMIN — MORPHINE SULFATE 2 MG: 2 INJECTION, SOLUTION INTRAMUSCULAR; INTRAVENOUS at 20:36

## 2024-11-22 RX ADMIN — Medication 30 MG: at 09:17

## 2024-11-22 RX ADMIN — MORPHINE SULFATE 2 MG: 2 INJECTION, SOLUTION INTRAMUSCULAR; INTRAVENOUS at 18:42

## 2024-11-22 RX ADMIN — ARFORMOTEROL TARTRATE 15 MCG: 15 SOLUTION RESPIRATORY (INHALATION) at 09:26

## 2024-11-22 RX ADMIN — LEVOTHYROXINE SODIUM 100 MCG: 100 TABLET ORAL at 05:35

## 2024-11-22 RX ADMIN — LORAZEPAM 1 MG: 2 INJECTION INTRAMUSCULAR; INTRAVENOUS at 17:27

## 2024-11-22 RX ADMIN — MORPHINE SULFATE 2 MG: 2 INJECTION, SOLUTION INTRAMUSCULAR; INTRAVENOUS at 15:54

## 2024-11-22 RX ADMIN — LORAZEPAM 1 MG: 2 INJECTION INTRAMUSCULAR; INTRAVENOUS at 23:51

## 2024-11-22 RX ADMIN — METOCLOPRAMIDE HYDROCHLORIDE 10 MG: 5 SOLUTION ORAL at 05:35

## 2024-11-22 RX ADMIN — ACETAMINOPHEN 650 MG: 325 TABLET ORAL at 07:36

## 2024-11-22 RX ADMIN — SODIUM CHLORIDE: 9 INJECTION, SOLUTION INTRAVENOUS at 06:34

## 2024-11-22 RX ADMIN — MIDODRINE HYDROCHLORIDE 10 MG: 5 TABLET ORAL at 07:36

## 2024-11-22 RX ADMIN — MORPHINE SULFATE 2 MG: 2 INJECTION, SOLUTION INTRAMUSCULAR; INTRAVENOUS at 12:10

## 2024-11-22 RX ADMIN — POLYETHYLENE GLYCOL 3350 17 G: 17 POWDER, FOR SOLUTION ORAL at 09:17

## 2024-11-22 RX ADMIN — PANTOPRAZOLE SODIUM 40 MG: 40 INJECTION, POWDER, FOR SOLUTION INTRAVENOUS at 09:17

## 2024-11-22 RX ADMIN — LORAZEPAM 1 MG: 2 INJECTION INTRAMUSCULAR; INTRAVENOUS at 14:31

## 2024-11-22 RX ADMIN — CLOBETASOL PROPIONATE CREAM USP, 0.05%: 0.5 CREAM TOPICAL at 09:18

## 2024-11-22 RX ADMIN — METOCLOPRAMIDE HYDROCHLORIDE 10 MG: 5 SOLUTION ORAL at 09:17

## 2024-11-22 RX ADMIN — MORPHINE SULFATE 2 MG: 2 INJECTION, SOLUTION INTRAMUSCULAR; INTRAVENOUS at 14:31

## 2024-11-22 RX ADMIN — BUDESONIDE INHALATION 500 MCG: 0.5 SUSPENSION RESPIRATORY (INHALATION) at 09:26

## 2024-11-22 RX ADMIN — Medication 4 ML: at 09:26

## 2024-11-22 RX ADMIN — LEVOFLOXACIN 500 MG: 500 TABLET, FILM COATED ORAL at 09:18

## 2024-11-22 RX ADMIN — APIXABAN 2.5 MG: 2.5 TABLET, FILM COATED ORAL at 09:18

## 2024-11-22 ASSESSMENT — PAIN SCALES - GENERAL: PAINLEVEL_OUTOF10: 0

## 2024-11-22 NOTE — CARE COORDINATION
Notes reviewed, discussed with Ashli from [facility] and [facility]  Both will continue to follow and eval for [facility] transfer.  FiO2 has been weaned to 65%, liter flow remains at 60.  Last recorded BP 71/41.  Has received IV morphine x4 per order.  Patient is now DNR-CC with gmf transfer order.  Lavon Park, MSN RN  [facility] Case Management  [phone]

## 2024-11-22 NOTE — PLAN OF CARE
Problem: Discharge Planning  Goal: Discharge to home or other facility with appropriate resources  Outcome: Progressing     Problem: Pain  Goal: Verbalizes/displays adequate comfort level or baseline comfort level  11/22/2024 0043 by Darleen Wong RN  Outcome: Progressing  11/21/2024 1046 by Yojana Loco RN  Outcome: Progressing     Problem: Safety - Adult  Goal: Free from fall injury  11/22/2024 0043 by Darleen Wong RN  Outcome: Progressing  11/21/2024 1046 by Yojana Loco RN  Outcome: Progressing     Problem: ABCDS Injury Assessment  Goal: Absence of physical injury  Outcome: Progressing     Problem: Neurosensory - Adult  Goal: Achieves stable or improved neurological status  Outcome: Progressing  Goal: Absence of seizures  Outcome: Progressing     Problem: Respiratory - Adult  Goal: Achieves optimal ventilation and oxygenation  11/22/2024 0043 by Darleen Wong RN  Outcome: Progressing  11/21/2024 1046 by Yojana Loco RN  Outcome: Progressing     Problem: Cardiovascular - Adult  Goal: Maintains optimal cardiac output and hemodynamic stability  Outcome: Progressing  Goal: Absence of cardiac dysrhythmias or at baseline  Outcome: Progressing     Problem: Skin/Tissue Integrity - Adult  Goal: Incisions, wounds, or drain sites healing without S/S of infection  Outcome: Progressing  Goal: Skin integrity remains intact  Outcome: Progressing     Problem: Musculoskeletal - Adult  Goal: Return mobility to safest level of function  Outcome: Progressing  Goal: Maintain proper alignment of affected body part  Outcome: Progressing  Goal: Return ADL status to a safe level of function  Outcome: Progressing     Problem: Genitourinary - Adult  Goal: Absence of urinary retention  Outcome: Progressing     Problem: Gastrointestinal - Adult  Goal: Minimal or absence of nausea and vomiting  Outcome: Progressing     Problem: Nutrition Deficit:  Goal: Optimize nutritional status  Outcome: Progressing     Problem:

## 2024-11-23 ENCOUNTER — HOSPITAL ENCOUNTER (OUTPATIENT)
Age: 69
DRG: 640 | End: 2024-11-28
Attending: STUDENT IN AN ORGANIZED HEALTH CARE EDUCATION/TRAINING PROGRAM | Admitting: STUDENT IN AN ORGANIZED HEALTH CARE EDUCATION/TRAINING PROGRAM
Payer: MEDICARE

## 2024-11-23 VITALS
BODY MASS INDEX: 22.13 KG/M2 | HEART RATE: 76 BPM | WEIGHT: 158.07 LBS | OXYGEN SATURATION: 91 % | RESPIRATION RATE: 32 BRPM | SYSTOLIC BLOOD PRESSURE: 71 MMHG | DIASTOLIC BLOOD PRESSURE: 41 MMHG | TEMPERATURE: 97.1 F | HEIGHT: 71 IN

## 2024-11-23 PROCEDURE — 6360000002 HC RX W HCPCS: Performed by: NURSE PRACTITIONER

## 2024-11-23 PROCEDURE — 2580000003 HC RX 258: Performed by: INTERNAL MEDICINE

## 2024-11-23 PROCEDURE — 99239 HOSP IP/OBS DSCHRG MGMT >30: CPT | Performed by: STUDENT IN AN ORGANIZED HEALTH CARE EDUCATION/TRAINING PROGRAM

## 2024-11-23 PROCEDURE — 2700000000 HC OXYGEN THERAPY PER DAY

## 2024-11-23 RX ORDER — LORAZEPAM 2 MG/ML
2 INJECTION INTRAMUSCULAR
Status: DISCONTINUED | OUTPATIENT
Start: 2024-11-23 | End: 2024-11-25

## 2024-11-23 RX ORDER — ONDANSETRON 4 MG/1
4 TABLET, FILM COATED ORAL EVERY 6 HOURS PRN
Status: DISCONTINUED | OUTPATIENT
Start: 2024-11-23 | End: 2024-12-03 | Stop reason: HOSPADM

## 2024-11-23 RX ORDER — MORPHINE SULFATE 4 MG/ML
INJECTION, SOLUTION INTRAMUSCULAR; INTRAVENOUS
Status: COMPLETED
Start: 2024-11-23 | End: 2024-11-23

## 2024-11-23 RX ORDER — LORAZEPAM 2 MG/ML
1 INJECTION INTRAMUSCULAR EVERY 30 MIN PRN
Status: DISCONTINUED | OUTPATIENT
Start: 2024-11-23 | End: 2024-11-23 | Stop reason: HOSPADM

## 2024-11-23 RX ORDER — SODIUM CHLORIDE 0.9 % (FLUSH) 0.9 %
10 SYRINGE (ML) INJECTION PRN
Status: DISCONTINUED | OUTPATIENT
Start: 2024-11-23 | End: 2024-12-03 | Stop reason: HOSPADM

## 2024-11-23 RX ORDER — LOPERAMIDE HYDROCHLORIDE 2 MG/1
2 CAPSULE ORAL PRN
Status: DISCONTINUED | OUTPATIENT
Start: 2024-11-23 | End: 2024-12-03 | Stop reason: HOSPADM

## 2024-11-23 RX ORDER — POLYVINYL ALCOHOL 14 MG/ML
2 SOLUTION/ DROPS OPHTHALMIC
Status: DISCONTINUED | OUTPATIENT
Start: 2024-11-23 | End: 2024-12-03 | Stop reason: HOSPADM

## 2024-11-23 RX ORDER — LORAZEPAM 2 MG/ML
INJECTION INTRAMUSCULAR
Status: COMPLETED
Start: 2024-11-23 | End: 2024-11-23

## 2024-11-23 RX ORDER — MORPHINE SULFATE 4 MG/ML
4 INJECTION, SOLUTION INTRAMUSCULAR; INTRAVENOUS
Status: DISCONTINUED | OUTPATIENT
Start: 2024-11-23 | End: 2024-11-25

## 2024-11-23 RX ORDER — SENNOSIDES A AND B 8.6 MG/1
1 TABLET, FILM COATED ORAL NIGHTLY PRN
Status: DISCONTINUED | OUTPATIENT
Start: 2024-11-23 | End: 2024-12-03 | Stop reason: HOSPADM

## 2024-11-23 RX ORDER — MAGNESIUM HYDROXIDE/ALUMINUM HYDROXICE/SIMETHICONE 120; 1200; 1200 MG/30ML; MG/30ML; MG/30ML
30 SUSPENSION ORAL EVERY 4 HOURS PRN
Status: DISCONTINUED | OUTPATIENT
Start: 2024-11-23 | End: 2024-12-03 | Stop reason: HOSPADM

## 2024-11-23 RX ORDER — ALBUTEROL SULFATE 0.83 MG/ML
2.5 SOLUTION RESPIRATORY (INHALATION)
Status: DISCONTINUED | OUTPATIENT
Start: 2024-11-23 | End: 2024-12-03 | Stop reason: HOSPADM

## 2024-11-23 RX ORDER — ACETAMINOPHEN 650 MG/1
650 SUPPOSITORY RECTAL EVERY 4 HOURS PRN
Status: DISCONTINUED | OUTPATIENT
Start: 2024-11-23 | End: 2024-12-03 | Stop reason: HOSPADM

## 2024-11-23 RX ORDER — GUAIFENESIN/DEXTROMETHORPHAN 100-10MG/5
10 SYRUP ORAL EVERY 4 HOURS PRN
Status: DISCONTINUED | OUTPATIENT
Start: 2024-11-23 | End: 2024-12-03 | Stop reason: HOSPADM

## 2024-11-23 RX ORDER — BACLOFEN 10 MG/1
5 TABLET ORAL EVERY 12 HOURS PRN
Status: DISCONTINUED | OUTPATIENT
Start: 2024-11-23 | End: 2024-12-03 | Stop reason: HOSPADM

## 2024-11-23 RX ORDER — MORPHINE SULFATE 4 MG/ML
4 INJECTION, SOLUTION INTRAMUSCULAR; INTRAVENOUS EVERY 30 MIN PRN
Status: DISCONTINUED | OUTPATIENT
Start: 2024-11-23 | End: 2024-11-23 | Stop reason: HOSPADM

## 2024-11-23 RX ORDER — BISACODYL 10 MG
10 SUPPOSITORY, RECTAL RECTAL DAILY PRN
Status: DISCONTINUED | OUTPATIENT
Start: 2024-11-23 | End: 2024-12-03 | Stop reason: HOSPADM

## 2024-11-23 RX ORDER — LORAZEPAM 2 MG/ML
2 INJECTION INTRAMUSCULAR EVERY 30 MIN PRN
Status: DISCONTINUED | OUTPATIENT
Start: 2024-11-23 | End: 2024-11-23 | Stop reason: HOSPADM

## 2024-11-23 RX ADMIN — MORPHINE SULFATE 2 MG: 2 INJECTION, SOLUTION INTRAMUSCULAR; INTRAVENOUS at 01:42

## 2024-11-23 RX ADMIN — LORAZEPAM 2 MG: 2 INJECTION INTRAMUSCULAR at 20:15

## 2024-11-23 RX ADMIN — LORAZEPAM 2 MG: 2 INJECTION INTRAMUSCULAR; INTRAVENOUS at 17:11

## 2024-11-23 RX ADMIN — LORAZEPAM 1 MG: 2 INJECTION INTRAMUSCULAR; INTRAVENOUS at 04:48

## 2024-11-23 RX ADMIN — MORPHINE SULFATE 2 MG: 2 INJECTION, SOLUTION INTRAMUSCULAR; INTRAVENOUS at 02:20

## 2024-11-23 RX ADMIN — MORPHINE SULFATE 4 MG: 4 INJECTION, SOLUTION INTRAMUSCULAR; INTRAVENOUS at 06:49

## 2024-11-23 RX ADMIN — MORPHINE SULFATE 4 MG: 4 INJECTION, SOLUTION INTRAMUSCULAR; INTRAVENOUS at 11:23

## 2024-11-23 RX ADMIN — LORAZEPAM 1 MG: 2 INJECTION INTRAMUSCULAR; INTRAVENOUS at 01:42

## 2024-11-23 RX ADMIN — GLYCOPYRROLATE 0.2 MG: 0.2 INJECTION INTRAMUSCULAR; INTRAVENOUS at 03:21

## 2024-11-23 RX ADMIN — LORAZEPAM 1 MG: 2 INJECTION INTRAMUSCULAR; INTRAVENOUS at 03:09

## 2024-11-23 RX ADMIN — MORPHINE SULFATE 4 MG: 4 INJECTION, SOLUTION INTRAMUSCULAR; INTRAVENOUS at 03:44

## 2024-11-23 RX ADMIN — MORPHINE SULFATE 4 MG: 4 INJECTION, SOLUTION INTRAMUSCULAR; INTRAVENOUS at 04:49

## 2024-11-23 RX ADMIN — MORPHINE SULFATE 4 MG: 4 INJECTION, SOLUTION INTRAMUSCULAR; INTRAVENOUS at 21:42

## 2024-11-23 RX ADMIN — LORAZEPAM 2 MG: 2 INJECTION INTRAMUSCULAR; INTRAVENOUS at 11:35

## 2024-11-23 RX ADMIN — MORPHINE SULFATE 4 MG: 4 INJECTION, SOLUTION INTRAMUSCULAR; INTRAVENOUS at 17:10

## 2024-11-23 RX ADMIN — MORPHINE SULFATE 2 MG: 2 INJECTION, SOLUTION INTRAMUSCULAR; INTRAVENOUS at 03:08

## 2024-11-23 RX ADMIN — MORPHINE SULFATE 4 MG: 4 INJECTION, SOLUTION INTRAMUSCULAR; INTRAVENOUS at 20:15

## 2024-11-23 RX ADMIN — SODIUM CHLORIDE, PRESERVATIVE FREE 10 ML: 5 INJECTION INTRAVENOUS at 09:00

## 2024-11-23 RX ADMIN — LORAZEPAM 2 MG: 2 INJECTION INTRAMUSCULAR; INTRAVENOUS at 06:49

## 2024-11-23 RX ADMIN — MORPHINE SULFATE 4 MG: 4 INJECTION, SOLUTION INTRAMUSCULAR; INTRAVENOUS at 18:27

## 2024-11-23 RX ADMIN — LORAZEPAM 1 MG: 2 INJECTION INTRAMUSCULAR; INTRAVENOUS at 02:20

## 2024-11-23 RX ADMIN — GLYCOPYRROLATE 0.2 MG: 0.2 INJECTION INTRAMUSCULAR; INTRAVENOUS at 11:28

## 2024-11-23 NOTE — PROGRESS NOTES
Elder Gan M.D.,[facility]  Dean Manjarrez D.O., HUGO., [facility]  Akua Parkinson M.D.  Shanda Lowe M.D.   Varun Lindsey D.O.  Preston Morales M.D.         Daily Pulmonary Progress Note    Patient:  Alan Guajardo Jr. 69 y.o. male MRN: [id number]            Synopsis     We are following patient for resp fail    \"CC\" inability to void and fatigue     Code status: LIMITED  Intubation/Re-intubation at the time of arrest No   Defibrillation/Cardioversion No   Chest Compressions No   Resuscitative Medications No       Subjective      Patient was seen and examined resting in bed. Wife and son present.  He is currently on maxed out settings of heated high flow nasal cannula at 60 L, 100% FiO2 with SpO2 85%-91%.  Repeat respiratory culture now growing Enterobacter resistant to Cefepime.    Review of Systems:  Constitutional: Denies fever, weight loss, night sweats, and fatigue  Skin: Denies pigmentation, dark lesions, and rashes   HEENT: Denies hearing loss, tinnitus, ear drainage, epistaxis, sore throat, and hoarseness.  Cardiovascular: Denies palpitations, chest pain, and chest pressure.  Respiratory: increased mucous production  Gastrointestinal: emesis   Genitourinary:  Denies dysuria, frequency, urgency or hematuria   Musculoskeletal: Denies myalgias, muscle weakness, and bone pain  Neurological: Denies dizziness, vertigo, headache, and focal weakness  Psychological: Denies anxiety and depression  Endocrine: Denies heat intolerance and cold intolerance  Hematopoietic/Lymphatic: Denies bleeding problems and blood transfusions    24-hour events:  No new events    Objective   OBJECTIVE:   /66   Pulse 89   Temp 98.1 °F (36.7 °C) (Oral)   Resp 20   Ht 1.803 m (5' 11\")   Wt 71.7 kg (158 lb 1.1 oz)   SpO2 (!) 88%   BMI 22.05 kg/m²   SpO2 Readings from Last 1 Encounters:   11/21/24 (!) 88%        I/O:    Intake/Output Summary (Last 24 hours) at 11/21/2024 0950  Last data filed at 11/21/2024 
           Elder Gan M.D.,[facility]  Dean Manjarrez D.O., FSPARKLE., [facility]  Akua Parkinson M.D.  Shanda Lowe M.D.   Varun Lindsey D.O.  Preston Morales M.D.         Daily Pulmonary Progress Note    Patient:  Alan Guajardo Jr. 69 y.o. male MRN: [id number]            Synopsis     We are following patient for resp fail    \"CC\" inability to void and fatigue     Code status: FULL CODE      Subjective      Patient was seen and examined resting in bed. Wife present.  He is currently on heated high flow nasal cannula at 60 L, 95% FiO2 with SpO2 92%.  Patient shakes his head no when asked how he was doing.  His face seems quite swollen today.    Review of Systems:  Constitutional: Denies fever, weight loss, night sweats, and fatigue  Skin: Denies pigmentation, dark lesions, and rashes   HEENT: Denies hearing loss, tinnitus, ear drainage, epistaxis, sore throat, and hoarseness.  Cardiovascular: Denies palpitations, chest pain, and chest pressure.  Respiratory: increased mucous production  Gastrointestinal: emesis   Genitourinary:  Denies dysuria, frequency, urgency or hematuria   Musculoskeletal: Denies myalgias, muscle weakness, and bone pain  Neurological: Denies dizziness, vertigo, headache, and focal weakness  Psychological: Denies anxiety and depression  Endocrine: Denies heat intolerance and cold intolerance  Hematopoietic/Lymphatic: Denies bleeding problems and blood transfusions    24-hour events:  Increasing oxygen demand    Objective   OBJECTIVE:   BP (!) 90/50   Pulse 95   Temp 98.1 °F (36.7 °C) (Temporal)   Resp 22   Ht 1.803 m (5' 11\")   Wt 71.7 kg (158 lb 1.1 oz)   SpO2 90%   BMI 22.05 kg/m²   SpO2 Readings from Last 1 Encounters:   11/20/24 90%        I/O:    Intake/Output Summary (Last 24 hours) at 11/20/2024 1426  Last data filed at 11/20/2024 1323  Gross per 24 hour   Intake 767 ml   Output 2510 ml   Net -1743 ml     Vent Information  Equipment Changed: Humidification, Other (comment) (new 
           Elder Gan M.D.,[facility]  Dean Manjarrez D.O., HUGO., [facility]  Akua Parkinson M.D.  Shanda Lowe M.D.   Varun Lindsey D.O.  Preston Morales M.D.         Daily Pulmonary Progress Note    Patient:  Alan Guajardo Jr. 69 y.o. male MRN: [id number]            Synopsis     We are following patient for resp fail    \"CC\" inability to void and fatigue     Code status: FULL CODE      Subjective      Patient was seen and examined resting in bed, wife present at the bedside. Still on 35% CATINA. Not in any distress. Trach was changed yesterday by ENT without difficulty. Wife tells me wound are was just in and was going to recommend ID consult for cellulitis. Respiratory culture growing pseudomonas and serratia.      Review of Systems:  Constitutional: Denies fever, weight loss, night sweats, and fatigue  Skin: Denies pigmentation, dark lesions, and rashes   HEENT: Denies hearing loss, tinnitus, ear drainage, epistaxis, sore throat, and hoarseness.  Cardiovascular: Denies palpitations, chest pain, and chest pressure.  Respiratory: increased mucous production  Gastrointestinal: emesis  Genitourinary:  Denies dysuria, frequency, urgency or hematuria   Musculoskeletal: Denies myalgias, muscle weakness, and bone pain  Neurological: Denies dizziness, vertigo, headache, and focal weakness  Psychological: Denies anxiety and depression  Endocrine: Denies heat intolerance and cold intolerance  Hematopoietic/Lymphatic: Denies bleeding problems and blood transfusions    24-hour events:  Trach change by ENT    Objective   OBJECTIVE:   /75   Pulse 66   Temp 97.8 °F (36.6 °C) (Infrared)   Resp 18   Ht 1.803 m (5' 11\")   Wt 75 kg (165 lb 5.5 oz)   SpO2 94%   BMI 23.06 kg/m²   SpO2 Readings from Last 1 Encounters:   11/13/24 94%        I/O:    Intake/Output Summary (Last 24 hours) at 11/13/2024 1058  Last data filed at 11/13/2024 0701  Gross per 24 hour   Intake 1225 ml   Output 1150 ml   Net 75 ml     Vent 
           Elder Gan M.D.,[facility]  Dean Manjarrez D.O., F.VIOLETTA.TISH., [facility]  Akua Parkinson M.D.  Shanda Lowe M.D.   Varun Lindsey D.O.  Preston Morales M.D.         Daily Pulmonary Progress Note    Patient:  Alan Guajardo Jr. 69 y.o. male MRN: [id number]            Synopsis     We are following patient for resp fail    \"CC\" inability to void and fatigue     Code status: FULL CODE      Subjective      Patient was seen and examined resting in bed, wife present at the bedside. He has some tan emesis last night and lots of mucous. Tube feed is on hold and GI has been consulted for possible J-tube.      Review of Systems:  Constitutional: Denies fever, weight loss, night sweats, and fatigue  Skin: Denies pigmentation, dark lesions, and rashes   HEENT: Denies hearing loss, tinnitus, ear drainage, epistaxis, sore throat, and hoarseness.  Cardiovascular: Denies palpitations, chest pain, and chest pressure.  Respiratory: increased mucous production  Gastrointestinal: emesis  Genitourinary:  Denies dysuria, frequency, urgency or hematuria   Musculoskeletal: Denies myalgias, muscle weakness, and bone pain  Neurological: Denies dizziness, vertigo, headache, and focal weakness  Psychological: Denies anxiety and depression  Endocrine: Denies heat intolerance and cold intolerance  Hematopoietic/Lymphatic: Denies bleeding problems and blood transfusions    24-hour events:  emesis    Objective   OBJECTIVE:   /72   Pulse 75   Temp 97.9 °F (36.6 °C) (Oral)   Resp 20   Ht 1.803 m (5' 11\")   Wt 73.9 kg (162 lb 14.7 oz)   SpO2 95%   BMI 22.72 kg/m²   SpO2 Readings from Last 1 Encounters:   11/11/24 95%        I/O:    Intake/Output Summary (Last 24 hours) at 11/11/2024 1149  Last data filed at 11/11/2024 0934  Gross per 24 hour   Intake 930 ml   Output 725 ml   Net 205 ml                      CURRENT MEDS :  Scheduled Meds:   [START ON 11/12/2024] predniSONE  40 mg Oral Daily    bisacodyl  10 mg Rectal Once    
           Elder Gan M.D.,[facility]  Dean Manjarrez D.O., HUGO., [facility]  Akua Parkinson M.D.  Shanda Lowe M.D.   Varun Lindsey D.O.  Preston Morales M.D.         Daily Pulmonary Progress Note    Patient:  Alan Guajardo Jr. 69 y.o. male MRN: [id number]            Synopsis     We are following patient for resp fail    \"CC\" inability to void and fatigue     Code status: FULL CODE      Subjective      Patient was seen and examined resting in bed, resting in bed. Urology consult was placed yesterday for retention and urinary catheter was placed. Wife present. Remains on 35% CATINA      Review of Systems:  Constitutional: Denies fever, weight loss, night sweats, and fatigue  Skin: Denies pigmentation, dark lesions, and rashes   HEENT: Denies hearing loss, tinnitus, ear drainage, epistaxis, sore throat, and hoarseness.  Cardiovascular: Denies palpitations, chest pain, and chest pressure.  Respiratory: increased mucous production  Gastrointestinal: emesis  Genitourinary:  Denies dysuria, frequency, urgency or hematuria   Musculoskeletal: Denies myalgias, muscle weakness, and bone pain  Neurological: Denies dizziness, vertigo, headache, and focal weakness  Psychological: Denies anxiety and depression  Endocrine: Denies heat intolerance and cold intolerance  Hematopoietic/Lymphatic: Denies bleeding problems and blood transfusions    24-hour events:  Urology consult    Objective   OBJECTIVE:   BP 98/64   Pulse 85   Temp 98.4 °F (36.9 °C) (Temporal)   Resp 18   Ht 1.803 m (5' 11\")   Wt 71.7 kg (158 lb 1.1 oz)   SpO2 94%   BMI 22.05 kg/m²   SpO2 Readings from Last 1 Encounters:   11/15/24 94%        I/O:    Intake/Output Summary (Last 24 hours) at 11/15/2024 1039  Last data filed at 11/15/2024 0450  Gross per 24 hour   Intake 1517 ml   Output 1695 ml   Net -178 ml     Vent Information  Equipment Changed: (S) Humidification                CURRENT MEDS :  Scheduled Meds:   polyethylene glycol  17 g Oral Daily    
       [facility] Hospitalist Progress Note    Admitting Date and Time: 11/8/2024  2:07 PM  Admit Dx: Hyponatremia [E87.1]  Malignancy (HCC) [C80.1]  Pulmonary nodules [R91.8]  Loculated pleural effusion [J90]    Subjective:  Patient is being followed for Hyponatremia [E87.1]  Malignancy (HCC) [C80.1]  Pulmonary nodules [R91.8]  Loculated pleural effusion [J90]     Pt has no acute concerns. Wife si at bedside who complains of patient having jaw pain and wishes for his ears to be examined. Discussed that his jaw/auricular pain is likely d/t his cancer.     ROS: denies fever, chills, cp, n/v, HA unless stated above.      cefepime  2,000 mg IntraVENous Q8H    And    sterile water  10 mL Injection Q12H    fluconazole  200 mg Oral Daily    predniSONE  40 mg Oral Daily    pantoprazole (PROTONIX) 40 mg in sodium chloride (PF) 0.9 % 10 mL injection  40 mg IntraVENous Daily    methadone  10 mg Per G Tube BID    sennosides  10 mL Per NG tube BID    budesonide  500 mcg Nebulization BID RT    arformoterol tartrate  15 mcg Nebulization BID RT    sodium chloride flush  5-40 mL IntraVENous 2 times per day    levothyroxine  100 mcg Per NG tube QAM AC    apixaban  2.5 mg Per NG tube BID    atorvastatin  20 mg Per NG tube Daily    metoclopramide  10 mg Per NG tube 4x Daily AC & HS    midodrine  5 mg Per NG tube BID    pregabalin  150 mg Per NG tube TID    miconazole nitrate   Topical BID    silodosin  4 mg PEG Tube Daily    DULoxetine HCl  30 mg PEG Tube BID     lactulose, 20 g, BID PRN  albuterol, 1 ampule, Q6H PRN  sodium chloride flush, 5-40 mL, PRN  sodium chloride, , PRN  ondansetron, 4 mg, Q8H PRN   Or  ondansetron, 4 mg, Q6H PRN  polyethylene glycol, 17 g, Daily PRN  acetaminophen, 650 mg, Q6H PRN   Or  acetaminophen, 650 mg, Q6H PRN  oxyCODONE, 20 mg, Q3H PRN         Objective:    /66   Pulse 78   Temp 98 °F (36.7 °C) (Infrared)   Resp 18   Ht 1.803 m (5' 11\")   Wt 75 kg (165 lb 5.5 oz)   SpO2 97%   BMI 23.06 
       [facility] Hospitalist Progress Note    Admitting Date and Time: 11/8/2024  2:07 PM  Admit Dx: Hyponatremia [E87.1]  Malignancy (HCC) [C80.1]  Pulmonary nodules [R91.8]  Loculated pleural effusion [J90]    Subjective:  Patient is being followed for Hyponatremia [E87.1]  Malignancy (HCC) [C80.1]  Pulmonary nodules [R91.8]  Loculated pleural effusion [J90]   Pt was seen and examined today.     ROS: denies fever, chills, cp, sob, n/v, HA unless stated above.     clobetasol   Topical BID    levoFLOXacin  500 mg Oral Daily    furosemide  20 mg IntraVENous Once    albuterol  2.5 mg Nebulization Q4H WA RT    sodium chloride (Inhalant)  4 mL Nebulization TID RT    polyethylene glycol  17 g Oral Daily    sennosides  10 mL Per NG tube Nightly    midodrine  10 mg Per NG tube TID WC    miconazole   Topical BID    pantoprazole (PROTONIX) 40 mg in sodium chloride (PF) 0.9 % 10 mL injection  40 mg IntraVENous Daily    methadone  10 mg Per G Tube BID    budesonide  500 mcg Nebulization BID RT    arformoterol tartrate  15 mcg Nebulization BID RT    sodium chloride flush  5-40 mL IntraVENous 2 times per day    levothyroxine  100 mcg Per NG tube QAM AC    apixaban  2.5 mg Per NG tube BID    atorvastatin  20 mg Per NG tube Daily    metoclopramide  10 mg Per NG tube 4x Daily AC & HS    pregabalin  150 mg Per NG tube TID    silodosin  4 mg PEG Tube Daily    DULoxetine HCl  30 mg PEG Tube BID     melatonin, 6 mg, Nightly PRN  bisacodyl, 10 mg, Daily PRN  lactulose, 20 g, BID PRN  albuterol, 1 ampule, Q6H PRN  sodium chloride flush, 5-40 mL, PRN  sodium chloride, , PRN  ondansetron, 4 mg, Q8H PRN   Or  ondansetron, 4 mg, Q6H PRN  acetaminophen, 650 mg, Q6H PRN   Or  acetaminophen, 650 mg, Q6H PRN  oxyCODONE, 20 mg, Q3H PRN         Objective:    BP (!) 97/58   Pulse 95   Temp 99.4 °F (37.4 °C) (Oral)   Resp 18   Ht 1.803 m (5' 11\")   Wt 71.7 kg (158 lb 1.1 oz)   SpO2 91%   BMI 22.05 kg/m²   Deferred physical exam due to 
       [facility] Hospitalist Progress Note    Admitting Date and Time: 11/8/2024  2:07 PM  Admit Dx: Hyponatremia [E87.1]  Malignancy (HCC) [C80.1]  Pulmonary nodules [R91.8]  Loculated pleural effusion [J90]    Subjective:  Patient is being followed for Hyponatremia [E87.1]  Malignancy (HCC) [C80.1]  Pulmonary nodules [R91.8]  Loculated pleural effusion [J90]     Seen this am and doing well. His mentation is unchanged from baseline. He is requiring increased O2, shelley on HFNC @ 60L    Family present at bedside. All questions answered to their satisfaction.     ROS: denies fever, chills, cp, n/v, HA unless stated above.      augmented betamethasone dipropionate   Topical BID    lidocaine  1 patch TransDERmal Daily    hydrocortisone sodium succinate PF  50 mg IntraVENous Q6H    polyethylene glycol  17 g Oral Daily    sennosides  10 mL Per NG tube Nightly    ceFEPIme  2 g IntraVENous Q8H    And    sterile water  20 mL Injection Q8H    midodrine  10 mg Per NG tube TID WC    miconazole   Topical BID    fluconazole  200 mg Oral Daily    pantoprazole (PROTONIX) 40 mg in sodium chloride (PF) 0.9 % 10 mL injection  40 mg IntraVENous Daily    methadone  10 mg Per G Tube BID    budesonide  500 mcg Nebulization BID RT    arformoterol tartrate  15 mcg Nebulization BID RT    sodium chloride flush  5-40 mL IntraVENous 2 times per day    levothyroxine  100 mcg Per NG tube QAM AC    apixaban  2.5 mg Per NG tube BID    atorvastatin  20 mg Per NG tube Daily    metoclopramide  10 mg Per NG tube 4x Daily AC & HS    pregabalin  150 mg Per NG tube TID    miconazole nitrate   Topical BID    silodosin  4 mg PEG Tube Daily    DULoxetine HCl  30 mg PEG Tube BID     bisacodyl, 10 mg, Daily PRN  lactulose, 20 g, BID PRN  albuterol, 1 ampule, Q6H PRN  sodium chloride flush, 5-40 mL, PRN  sodium chloride, , PRN  ondansetron, 4 mg, Q8H PRN   Or  ondansetron, 4 mg, Q6H PRN  acetaminophen, 650 mg, Q6H PRN   Or  acetaminophen, 650 mg, Q6H 
       [facility] Hospitalist Progress Note    Admitting Date and Time: 11/8/2024  2:07 PM  Admit Dx: Hyponatremia [E87.1]  Malignancy (HCC) [C80.1]  Pulmonary nodules [R91.8]  Loculated pleural effusion [J90]    Subjective:  Patient is being followed for Hyponatremia [E87.1]  Malignancy (HCC) [C80.1]  Pulmonary nodules [R91.8]  Loculated pleural effusion [J90]     Pt has no acute concerns. Wife at bedside. Discussed clinical course.  All questions answered to her satisfaction.     Family present at bedside. All questions answered to their satisfaction.     ROS: denies fever, chills, cp, n/v, HA unless stated above.      polyethylene glycol  17 g Oral Daily    sennosides  10 mL Per NG tube Nightly    ceFEPIme  2 g IntraVENous Q8H    And    sterile water  20 mL Injection Q8H    midodrine  10 mg Per NG tube TID WC    miconazole   Topical BID    fluconazole  200 mg Oral Daily    pantoprazole (PROTONIX) 40 mg in sodium chloride (PF) 0.9 % 10 mL injection  40 mg IntraVENous Daily    methadone  10 mg Per G Tube BID    budesonide  500 mcg Nebulization BID RT    arformoterol tartrate  15 mcg Nebulization BID RT    sodium chloride flush  5-40 mL IntraVENous 2 times per day    levothyroxine  100 mcg Per NG tube QAM AC    apixaban  2.5 mg Per NG tube BID    atorvastatin  20 mg Per NG tube Daily    metoclopramide  10 mg Per NG tube 4x Daily AC & HS    pregabalin  150 mg Per NG tube TID    miconazole nitrate   Topical BID    silodosin  4 mg PEG Tube Daily    DULoxetine HCl  30 mg PEG Tube BID     bisacodyl, 10 mg, Daily PRN  lactulose, 20 g, BID PRN  albuterol, 1 ampule, Q6H PRN  sodium chloride flush, 5-40 mL, PRN  sodium chloride, , PRN  ondansetron, 4 mg, Q8H PRN   Or  ondansetron, 4 mg, Q6H PRN  acetaminophen, 650 mg, Q6H PRN   Or  acetaminophen, 650 mg, Q6H PRN  oxyCODONE, 20 mg, Q3H PRN         Objective:    /78   Pulse 87   Temp 98.3 °F (36.8 °C)   Resp 16   Ht 1.803 m (5' 11\")   Wt 71.7 kg (158 lb 1.1 oz)   
       [facility] Hospitalist Progress Note    Admitting Date and Time: 11/8/2024  2:07 PM  Admit Dx: Hyponatremia [E87.1]  Malignancy (HCC) [C80.1]  Pulmonary nodules [R91.8]  Loculated pleural effusion [J90]    Subjective:  Patient is being followed for Hyponatremia [E87.1]  Malignancy (HCC) [C80.1]  Pulmonary nodules [R91.8]  Loculated pleural effusion [J90]   Pt feels okay  Per RN: no additional concerns    ROS: denies fever, chills, cp, sob, n/v, HA unless otherwise noted above     budesonide  500 mcg Nebulization BID RT    fentaNYL  1 patch TransDERmal Q72H    arformoterol tartrate  15 mcg Nebulization BID RT    sodium chloride flush  5-40 mL IntraVENous 2 times per day    levothyroxine  100 mcg Per NG tube QAM AC    apixaban  2.5 mg Per NG tube BID    atorvastatin  20 mg Per NG tube Daily    metoclopramide  10 mg Per NG tube 4x Daily AC & HS    midodrine  5 mg Per NG tube BID    pregabalin  150 mg Per NG tube TID    sennosides  8.8 mg Per NG tube Nightly    miconazole nitrate   Topical BID    cefTRIAXone (ROCEPHIN) IV  1,000 mg IntraVENous Q24H    doxycycline (VIBRAMYCIN) IV  100 mg IntraVENous Q12H    methylPREDNISolone  40 mg IntraVENous Q12H    silodosin  4 mg PEG Tube Daily    DULoxetine HCl  30 mg PEG Tube BID     albuterol, 1 ampule, Q6H PRN  sodium chloride flush, 5-40 mL, PRN  sodium chloride, , PRN  ondansetron, 4 mg, Q8H PRN   Or  ondansetron, 4 mg, Q6H PRN  polyethylene glycol, 17 g, Daily PRN  acetaminophen, 650 mg, Q6H PRN   Or  acetaminophen, 650 mg, Q6H PRN  oxyCODONE, 20 mg, Q3H PRN         Objective:  /72   Pulse 79   Temp 97.6 °F (36.4 °C) (Temporal)   Resp 18   Ht 1.803 m (5' 11\")   Wt 73.9 kg (162 lb 14.7 oz)   SpO2 97%   BMI 22.72 kg/m²     General Appearance: alert and oriented to person, place and time and in NAD, sitting in bed  Skin: warm and dry  Head: normocephalic and atraumatic  Eyes: PERRL, EOMI, conjunctivae normal  Neck: mass on anterior cervical/posterior 
       [facility] Hospitalist Progress Note    Admitting Date and Time: 11/8/2024  2:07 PM  Admit Dx: Hyponatremia [E87.1]  Malignancy (HCC) [C80.1]  Pulmonary nodules [R91.8]  Loculated pleural effusion [J90]    Subjective:  Patient is being followed for Hyponatremia [E87.1]  Malignancy (HCC) [C80.1]  Pulmonary nodules [R91.8]  Loculated pleural effusion [J90]   Pt feels okay  Per RN: no additional concerns    ROS: denies fever, chills, cp, sob, n/v, HA unless otherwise noted above     budesonide  500 mcg Nebulization BID RT    DULoxetine  30 mg Per G Tube BID    fentaNYL  1 patch TransDERmal Q72H    arformoterol tartrate  15 mcg Nebulization BID RT    tamsulosin  0.4 mg Oral QPM    sodium chloride flush  5-40 mL IntraVENous 2 times per day    levothyroxine  100 mcg Per NG tube QAM AC    apixaban  2.5 mg Per NG tube BID    atorvastatin  20 mg Per NG tube Daily    metoclopramide  10 mg Per NG tube 4x Daily AC & HS    midodrine  5 mg Per NG tube BID    predniSONE  10 mg Per NG tube QPM    pregabalin  150 mg Per NG tube TID    sennosides  8.8 mg Per NG tube Nightly     albuterol, 1 ampule, Q6H PRN  sodium chloride flush, 5-40 mL, PRN  sodium chloride, , PRN  ondansetron, 4 mg, Q8H PRN   Or  ondansetron, 4 mg, Q6H PRN  polyethylene glycol, 17 g, Daily PRN  acetaminophen, 650 mg, Q6H PRN   Or  acetaminophen, 650 mg, Q6H PRN  oxyCODONE, 20 mg, Q3H PRN         Objective:  BP (!) 92/58   Pulse 89   Temp 97.6 °F (36.4 °C) (Temporal)   Resp 18   Ht 1.803 m (5' 11\")   Wt 73.9 kg (162 lb 14.7 oz)   SpO2 97%   BMI 22.72 kg/m²     General Appearance: alert and oriented to person, place and time and in NAD, laying in bed  Skin: warm and dry  Head: normocephalic and atraumatic  Eyes: PERRL, EOMI, conjunctivae normal  Neck: mass on anterior cervical/posterior mandibular region, trach tube in place  Pulmonary/Chest: slightly diminished on left, 10L trach mask  Cardiovascular: RRR, no murmurs  Abdomen: soft, non-tender, 
       [facility] Hospitalist Progress Note    Admitting Date and Time: 11/8/2024  2:07 PM  Admit Dx: Hyponatremia [E87.1]  Malignancy (HCC) [C80.1]  Pulmonary nodules [R91.8]  Loculated pleural effusion [J90]    Subjective:  Patient is being followed for Hyponatremia [E87.1]  Malignancy (HCC) [C80.1]  Pulmonary nodules [R91.8]  Loculated pleural effusion [J90]   Pt feels okay  Per RN: no additional concerns    ROS: denies fever, chills, cp, sob, n/v, HA unless otherwise noted above     ferric gluconate (FERRLECIT) 125 mg in sodium chloride 0.9 % 100 mL IVPB  125 mg IntraVENous Daily    methadone  10 mg Per G Tube BID    sennosides  10 mL Per NG tube BID    budesonide  500 mcg Nebulization BID RT    arformoterol tartrate  15 mcg Nebulization BID RT    sodium chloride flush  5-40 mL IntraVENous 2 times per day    levothyroxine  100 mcg Per NG tube QAM AC    apixaban  2.5 mg Per NG tube BID    atorvastatin  20 mg Per NG tube Daily    metoclopramide  10 mg Per NG tube 4x Daily AC & HS    midodrine  5 mg Per NG tube BID    pregabalin  150 mg Per NG tube TID    miconazole nitrate   Topical BID    cefTRIAXone (ROCEPHIN) IV  1,000 mg IntraVENous Q24H    doxycycline (VIBRAMYCIN) IV  100 mg IntraVENous Q12H    methylPREDNISolone  40 mg IntraVENous Q12H    silodosin  4 mg PEG Tube Daily    DULoxetine HCl  30 mg PEG Tube BID     lactulose, 20 g, BID PRN  albuterol, 1 ampule, Q6H PRN  sodium chloride flush, 5-40 mL, PRN  sodium chloride, , PRN  ondansetron, 4 mg, Q8H PRN   Or  ondansetron, 4 mg, Q6H PRN  polyethylene glycol, 17 g, Daily PRN  acetaminophen, 650 mg, Q6H PRN   Or  acetaminophen, 650 mg, Q6H PRN  oxyCODONE, 20 mg, Q3H PRN         Objective:  /65   Pulse 82   Temp 98.5 °F (36.9 °C) (Oral)   Resp 20   Ht 1.803 m (5' 11\")   Wt 73.9 kg (162 lb 14.7 oz)   SpO2 97%   BMI 22.72 kg/m²     General Appearance: alert and oriented to person, place and time and in NAD, laying in bed  Skin: warm and dry  Head: 
   11/23/24 0237   Oxygen Therapy/Pulse Ox   O2 Therapy Oxygen   O2 Device (S)  Trach mask  (on and full bottle placed with + mist at pt. at this time)   O2 Flow Rate (L/min) 5 L/min   FiO2  28 %       
  Palliative Care Department  Palliative Care Progress Note  Provider: Ashlijohnathan Willis, BG - CNP  [phone]    Hospital Day: 4  Date of Initial Consult: 11/8/24  Referring Provider: Dr. Martell  Palliative Medicine was consulted for assistance with: Goals of Care and Overwhelming Symptoms    Chief Complaint: Alan Guajardo Jr. is a 69 y.o. male with chief complaint of fatigue and weakness    HPI:   Alan Guajardo Jr. is a 69 y.o. male with significant medical history of stage IV non-small cell lung cancer, with a left upper lobe, as well as a history of subglottic cancer status post CRT in 2017, has had continued recurrent squamous cell carcinoma, known to Dr. Cummings, recently started on immunotherapy and chemotherapy, last dose received 10/31.  He additionally is very well-known to the palliative medicine service in the outpatient setting for symptomatic management.  He was admitted on 11/8/2024 due to worsening fatigue and weakness, having had a difficult time with nutritional intake, despite PEG tube, being seen in consultation by nephrology, oncology, and pulmonology.  Palliative is consulted to assist with symptomatic management, as well as to discuss goals of care.  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  At this time, Alan Guajardo Jr., Does have capacity for medical decision-making.  Capacity is time limited and situation/question specific.  Outcome of goals 
  Palliative Care Department  Palliative Care Progress Note  Provider: Ashlijohnathan Willis, BG - CNP  [phone]    Hospital Day: 5  Date of Initial Consult: 11/8/24  Referring Provider: Dr. Martell  Palliative Medicine was consulted for assistance with: Goals of Care and Overwhelming Symptoms    Chief Complaint: Alan Guajardo Jr. is a 69 y.o. male with chief complaint of fatigue and weakness    HPI:   Alan Guajardo Jr. is a 69 y.o. male with significant medical history of stage IV non-small cell lung cancer, with a left upper lobe, as well as a history of subglottic cancer status post CRT in 2017, has had continued recurrent squamous cell carcinoma, known to Dr. Cummings, recently started on immunotherapy and chemotherapy, last dose received 10/31.  He additionally is very well-known to the palliative medicine service in the outpatient setting for symptomatic management.  He was admitted on 11/8/2024 due to worsening fatigue and weakness, having had a difficult time with nutritional intake, despite PEG tube, being seen in consultation by nephrology, oncology, and pulmonology.  Palliative is consulted to assist with symptomatic management, as well as to discuss goals of care.  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  At this time, Alan Guajardo Jr., Does have capacity for medical decision-making.  Capacity is time limited and situation/question specific.  Outcome of goals 
  Palliative Care Department  Palliative Care Progress Note  Provider: BG Roldan - CNP  [phone]    Hospital Day: 15  Date of Initial Consult: 11/8/24  Referring Provider: Dr. Martell  Palliative Medicine was consulted for assistance with: Goals of Care and Overwhelming Symptoms    Chief Complaint: Alan Guajardo Jr. is a 69 y.o. male with chief complaint of fatigue and weakness    HPI:   Alan Guajardo Jr. is a 69 y.o. male with significant medical history of stage IV non-small cell lung cancer, with a left upper lobe, as well as a history of subglottic cancer status post CRT in 2017, has had continued recurrent squamous cell carcinoma, known to Dr. Cummings, recently started on immunotherapy and chemotherapy, last dose received 10/31.  He additionally is very well-known to the palliative medicine service in the outpatient setting for symptomatic management.  He was admitted on 11/8/2024 due to worsening fatigue and weakness, having had a difficult time with nutritional intake, despite PEG tube, being seen in consultation by nephrology, oncology, and pulmonology.  Palliative is consulted to assist with symptomatic management, as well as to discuss goals of care.  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  At this time, Alan Guajardo 
  Palliative Care Department  Palliative Care Progress Note  Provider: Cristy VIOLETTA Mirian, BG - [facility]  [phone]    Hospital Day: 13  Date of Initial Consult: 11/8/24  Referring Provider: Dr. Martell  Palliative Medicine was consulted for assistance with: Goals of Care and Overwhelming Symptoms    Chief Complaint: Alan Guajardo Jr. is a 69 y.o. male with chief complaint of fatigue and weakness    HPI:   Alan Guajardo Jr. is a 69 y.o. male with significant medical history of stage IV non-small cell lung cancer, with a left upper lobe, as well as a history of subglottic cancer status post CRT in 2017, has had continued recurrent squamous cell carcinoma, known to Dr. Cummings, recently started on immunotherapy and chemotherapy, last dose received 10/31.  He additionally is very well-known to the palliative medicine service in the outpatient setting for symptomatic management.  He was admitted on 11/8/2024 due to worsening fatigue and weakness, having had a difficult time with nutritional intake, despite PEG tube, being seen in consultation by nephrology, oncology, and pulmonology.  Palliative is consulted to assist with symptomatic management, as well as to discuss goals of care.  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  At this time, Alan 
  Palliative Care Department  Palliative Care Progress Note  Provider: Marian Jerrykosta, APRN - CNP  [phone]    Hospital Day: 8  Date of Initial Consult: 11/8/24  Referring Provider: Dr. Martell  Palliative Medicine was consulted for assistance with: Goals of Care and Overwhelming Symptoms    Chief Complaint: Alan Guajardo Jr. is a 69 y.o. male with chief complaint of fatigue and weakness    HPI:   Alan Guajardo Jr. is a 69 y.o. male with significant medical history of stage IV non-small cell lung cancer, with a left upper lobe, as well as a history of subglottic cancer status post CRT in 2017, has had continued recurrent squamous cell carcinoma, known to Dr. Cummings, recently started on immunotherapy and chemotherapy, last dose received 10/31.  He additionally is very well-known to the palliative medicine service in the outpatient setting for symptomatic management.  He was admitted on 11/8/2024 due to worsening fatigue and weakness, having had a difficult time with nutritional intake, despite PEG tube, being seen in consultation by nephrology, oncology, and pulmonology.  Palliative is consulted to assist with symptomatic management, as well as to discuss goals of care.  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  At this time, Alan Guajardo Jr., Does have capacity for medical decision-making.  Capacity is time limited and 
  Palliative Care Department  Palliative Care Progress Note  Provider: Marian Jerrykosta, APRN - CNP  [phone]    Hospital Day: 7  Date of Initial Consult: 11/8/24  Referring Provider: Dr. Martell  Palliative Medicine was consulted for assistance with: Goals of Care and Overwhelming Symptoms    Chief Complaint: Alan Guajardo Jr. is a 69 y.o. male with chief complaint of fatigue and weakness    HPI:   Alan Guajardo Jr. is a 69 y.o. male with significant medical history of stage IV non-small cell lung cancer, with a left upper lobe, as well as a history of subglottic cancer status post CRT in 2017, has had continued recurrent squamous cell carcinoma, known to Dr. Cummings, recently started on immunotherapy and chemotherapy, last dose received 10/31.  He additionally is very well-known to the palliative medicine service in the outpatient setting for symptomatic management.  He was admitted on 11/8/2024 due to worsening fatigue and weakness, having had a difficult time with nutritional intake, despite PEG tube, being seen in consultation by nephrology, oncology, and pulmonology.  Palliative is consulted to assist with symptomatic management, as well as to discuss goals of care.  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  At this time, Alan Guajardo Jr., Does have capacity for medical decision-making.  Capacity is time limited and 
  Palliative Care Department  Progress Note  Provider: BG Akers CNP  [phone]    Hospital Day: 13    Palliative Medicine was consulted for assistance with: Assist with goals of care and Symptom Management      I received a call from Casie and we had a long discussion regarding his goals of care.  She is very concerned regarding his condition and that she has been told that if his respiratory status continues to decline that he would need ventilated.  She acknowledges that he has expressed that he does not wish to be placed on a ventilator long term.  Unfortunately due to his underlying illness and the severity of his acute illness I recommend that they consider hospice care.  She understands this recommendation and will discuss it with the family this evening.    BG Akers CNP  Palliative Medicine    Thank you for allowing Palliative Medicine to participate in the care of Alan Guajardo Jr..    Note: This report was completed using [employer] voiced recognition software.  Every effort has been made to ensure accuracy; however, inadvertent computerized transcription errors may be present.    
 CI HR MAP TPRI SVI TFC   Baseline 2.5 81 64 2086 30 68.4   Test 2.8 83 70 1966 35 68.9   % Change 16% 1.7% 9.4% -5.7% 14.8% 0.8   noninvasive -  start    NICOM assessment 250cc bolus completed, patient found to be fluid responsive with SVI change of 14.8%    
4 Eyes Skin Assessment     NAME:  Alan Guajardo Jr.  YOB: 1955  MEDICAL RECORD NUMBER:  [id number]    The patient is being assessed for  Admission    I agree that at least one RN has performed a thorough Head to Toe Skin Assessment on the patient. ALL assessment sites listed below have been assessed.      Areas assessed by both nurses:    Head, Face, Ears, Shoulders, Back, Chest, Arms, Elbows, Hands, Sacrum. Buttock, Coccyx, Ischium, Legs. Feet and Heels, and Under Medical Devices         Does the Patient have a Wound? No noted wound(s)       Tye Prevention initiated by RN: No  Wound Care Orders initiated by RN: No    Pressure Injury (Stage 3,4, Unstageable, DTI, NWPT, and Complex wounds) if present, place Wound referral order by RN under : No    New Ostomies, if present place, Ostomy referral order under : No     Nurse 1 eSignature: Electronically signed by Aye Lubin RN on 11/9/24 at 3:12 AM EST    **SHARE this note so that the co-signing nurse can place an eSignature**    Nurse 2 eSignature: Electronically signed by Marilu Bliss RN on 11/9/24 at 3:53 AM EST   
[facility] notified of consult via answering service  Carmne reynoso  
Bp messaged to Dr. Torres  
Comprehensive Nutrition Assessment    Type and Reason for Visit:  Reassess    Nutrition Recommendations/Plan:   Recommend add order to Hold current TF for 30 min before and after Synthroid:    TF RECOMMENDATION: 2 Michele (Two Michele HN) to 50 ml/hr x 23 hr/d-Hold 30 min before and after Synthroid with current flushes 65 ml Q 4 hr  This will provide: 1150 ml/d, 2300 michele, 97 g pro, 1065 ml total free water  This regimen will meet 100% energy and protein needs      Continue inpt monitoring POC     Malnutrition Assessment:  Malnutrition Status:  Severe malnutrition (11/09/24 1024)    Context:  Chronic Illness     Findings of the 6 clinical characteristics of malnutrition:  Energy Intake:  75% or less estimated energy requirements for 1 month or longer  Weight Loss:  Unable to assess (wt fluctuations)     Body Fat Loss:  Mild body fat loss Buccal region   Muscle Mass Loss:  Severe muscle mass loss Scapula (trapezius), Clavicles (pectoralis & deltoids)  Fluid Accumulation:  No fluid accumulation     Strength:  Not Performed    Nutrition Assessment:    Pt now tolerating TF w/o N/V. PEG/J not planned per GI. Recommend order to Hold TF 30 min before and after Synthroid with current TF, as per MAR rec.    Nutrition Related Findings:    A&Ox 4, trach/trach mask, soft round abd +BS, PEG to TF, on Synthroid Wound Type: None       Current Nutrition Intake & Therapies:    Average Meal Intake: NPO  Average Supplements Intake: NPO  Current Tube Feeding (TF) Orders:  Feeding Route: PEG  Formula: 2.0 Calorie  Schedule: Continuous  Feeding Regimen: goal = 50 ml/hr = 1200 ml/d  Additives/Modulars: None  Water Flushes: 65 ml Q 4 hr = 390 ml/d  Current TF Provides: 2400 michele, 101 g pro, 1230 ml total free water    Anthropometric Measures:  Height: 180.3 cm (5' 11\")  Ideal Body Weight (IBW): 172 lbs (78 kg)    Admission Body Weight: 73.6 kg (162 lb 4 oz) (11/9 bed)  Current Body Weight: 75 kg (165 lb 5.5 oz), 94.7 % IBW. Weight Source: Bed 
Comprehensive Nutrition Assessment    Type and Reason for Visit:  Reassess    Nutrition Recommendations/Plan:   Recommend order to Hold TF 30  min before and after Synthroid, (run 23 hr/d) as per MAR to avoid DNI:    TF RECOMMENDATION: 2 Michele (Two Michele HN) to 50 ml/hr x 23 hr/d-Hold 30 min before and after Synthroid with current flushes 65 ml Q 4 hr  This will provide: 1150 ml/d, 2300 michele, 97 g pro, 1065 ml total free water  This regimen will meet 100% energy and protein needs    Continue inpatient monitoring     Malnutrition Assessment:  Malnutrition Status:  Severe malnutrition (11/09/24 1024)    Context:  Chronic Illness     Findings of the 6 clinical characteristics of malnutrition:  Energy Intake:  75% or less estimated energy requirements for 1 month or longer  Weight Loss:  Unable to assess (wt fluctuations)     Body Fat Loss:  Mild body fat loss Buccal region   Muscle Mass Loss:  Severe muscle mass loss Scapula (trapezius), Clavicles (pectoralis & deltoids)  Fluid Accumulation:  No fluid accumulation     Strength:  Not Performed    Nutrition Assessment:    Pt s/p RRT 11/17 d/t hypotension. Remains on O2 by trach and pt does not want intubated. Recommend continue TF for nutrition support. Recommend order to hold TF 30 min before and after Synthroid. Will continue to monitor pt tolerance and POC.    Nutrition Related Findings:    trach, A&O/nods, edentulous, trace edema, -I/O 4L, PEG to TF, round abd +BS, on steroid Wound Type: None (perineal candidiasis)       Current Nutrition Intake & Therapies:    Average Meal Intake: NPO  Average Supplements Intake: NPO  Current Tube Feeding (TF) Orders:  Feeding Route: PEG  Formula: 2.0 Calorie  Schedule: Continuous  Feeding Regimen: goal = 50 ml/hr = 1200 ml/d  Additives/Modulars: None  Water Flushes: 65 ml Q 4 hr = 390 ml/d  Current TF Provides: 2400 michele, 101 g pro, 1230 ml total free water    Anthropometric Measures:  Height: 180.3 cm (5' 11\")  Ideal Body Weight 
Consult called to ID office  
Department of Internal Medicine  Nephrology Progress Note    Events reviewed.     SUBJECTIVE:  We are following Mr Guajardo for hyponatremia. Reports no complaints.     PHYSICAL EXAM:      Vitals:    VITALS:  /65   Pulse 82   Temp 98.5 °F (36.9 °C) (Oral)   Resp 20   Ht 1.803 m (5' 11\")   Wt 73.9 kg (162 lb 14.7 oz)   SpO2 95%   BMI 22.72 kg/m²   24HR INTAKE/OUTPUT:    Intake/Output Summary (Last 24 hours) at 11/11/2024 1109  Last data filed at 11/11/2024 0934  Gross per 24 hour   Intake 930 ml   Output 725 ml   Net 205 ml       Constitutional:  awake, alert, side lying, does not speak   HEENT:  large mass to right larynx, trach to trach mask   Respiratory:  clear, no wheezing   Cardiovascular/Edema:  heart rate and rhythm regular, no edema   Gastrointestinal:  soft, nontender, PEG   Neurologic:  awake, alert, follows commands   Skin:  c/d/i    Scheduled Meds:   methadone  10 mg Per G Tube BID    sennosides  10 mL Per NG tube BID    budesonide  500 mcg Nebulization BID RT    arformoterol tartrate  15 mcg Nebulization BID RT    sodium chloride flush  5-40 mL IntraVENous 2 times per day    levothyroxine  100 mcg Per NG tube QAM AC    apixaban  2.5 mg Per NG tube BID    atorvastatin  20 mg Per NG tube Daily    metoclopramide  10 mg Per NG tube 4x Daily AC & HS    midodrine  5 mg Per NG tube BID    pregabalin  150 mg Per NG tube TID    miconazole nitrate   Topical BID    cefTRIAXone (ROCEPHIN) IV  1,000 mg IntraVENous Q24H    doxycycline (VIBRAMYCIN) IV  100 mg IntraVENous Q12H    methylPREDNISolone  40 mg IntraVENous Q12H    silodosin  4 mg PEG Tube Daily    DULoxetine HCl  30 mg PEG Tube BID     Continuous Infusions:   sodium chloride       PRN Meds:.[COMPLETED] lactulose **FOLLOWED BY** lactulose, albuterol, sodium chloride flush, sodium chloride, ondansetron **OR** ondansetron, polyethylene glycol, acetaminophen **OR** acetaminophen, oxyCODONE    DATA:    CBC with Differential:    Lab Results 
Department of Internal Medicine  Nephrology Progress Note    Events reviewed.     SUBJECTIVE:  We are following Mr Guajardo for hyponatremia. Reports no complaints.     PHYSICAL EXAM:      Vitals:    VITALS:  /72   Pulse 79   Temp 97.6 °F (36.4 °C) (Temporal)   Resp 18   Ht 1.803 m (5' 11\")   Wt 73.9 kg (162 lb 14.7 oz)   SpO2 97%   BMI 22.72 kg/m²   24HR INTAKE/OUTPUT:    Intake/Output Summary (Last 24 hours) at 11/10/2024 0933  Last data filed at 11/10/2024 0653  Gross per 24 hour   Intake 1116 ml   Output 1500 ml   Net -384 ml       Constitutional:  awake, alert, side lying, does not speak   HEENT:  large mass to right larynx, trach to trach mask   Respiratory:  clear, no wheezing   Cardiovascular/Edema:  heart rate and rhythm regular, no edema   Gastrointestinal:  soft, nontender, PEG   Neurologic:  awake, alert, follows commands   Skin:  c/d/i    Scheduled Meds:   budesonide  500 mcg Nebulization BID RT    fentaNYL  1 patch TransDERmal Q72H    arformoterol tartrate  15 mcg Nebulization BID RT    sodium chloride flush  5-40 mL IntraVENous 2 times per day    levothyroxine  100 mcg Per NG tube QAM AC    apixaban  2.5 mg Per NG tube BID    atorvastatin  20 mg Per NG tube Daily    metoclopramide  10 mg Per NG tube 4x Daily AC & HS    midodrine  5 mg Per NG tube BID    pregabalin  150 mg Per NG tube TID    sennosides  8.8 mg Per NG tube Nightly    miconazole nitrate   Topical BID    cefTRIAXone (ROCEPHIN) IV  1,000 mg IntraVENous Q24H    doxycycline (VIBRAMYCIN) IV  100 mg IntraVENous Q12H    methylPREDNISolone  40 mg IntraVENous Q12H    silodosin  4 mg PEG Tube Daily    DULoxetine HCl  30 mg PEG Tube BID     Continuous Infusions:   sodium chloride       PRN Meds:.albuterol, sodium chloride flush, sodium chloride, ondansetron **OR** ondansetron, polyethylene glycol, acetaminophen **OR** acetaminophen, oxyCODONE    DATA:    CBC with Differential:    Lab Results   Component Value Date/Time    WBC 7.3 
Did not get metaneb treatment. Patient SAT's 87-88%. Pt desats on treatment. Will reevaluate next round.   
Dr. Torres notified of patient's blood pressure. New orders received.   
Dr. Unger updated patients wife requesting ENT consult, states he is overdue to trach replacement  
Fentanyl patch removed from the patient's left arm per order. Patch folded up and placed in the sharps container. Two nurse verification between CHRISTINE Khan and MATA Griffiths. CHRISTINE Khan RN  
GI consult placed.  Vivian Lee RN    
In med room, with RN Tigist Jiang to pull patient dose of methadone. Bottle fell off of the counter and shattered on the floor. Medication spill cleaned up and glass disposed of in the sharps container. No medication was able to measured from the spill on the floor. No medication left in the bottle to measure.  Waste in medication log, pharmacy notified immediately.      Witnessed by this RN, Tigist Jiang   
Infectious Disease  Progress Note  [facility]    Chief Complaint: scrotal cellulitis.    Subjective: In bed no nausea vomiting or diarrhea.  Spoke to wife    Scheduled Meds:   polyethylene glycol  17 g Oral Daily    sennosides  10 mL Per NG tube Nightly    ceFEPIme  2 g IntraVENous Q8H    And    sterile water  20 mL Injection Q8H    midodrine  10 mg Per NG tube TID WC    miconazole   Topical BID    fluconazole  200 mg Oral Daily    pantoprazole (PROTONIX) 40 mg in sodium chloride (PF) 0.9 % 10 mL injection  40 mg IntraVENous Daily    methadone  10 mg Per G Tube BID    budesonide  500 mcg Nebulization BID RT    arformoterol tartrate  15 mcg Nebulization BID RT    sodium chloride flush  5-40 mL IntraVENous 2 times per day    levothyroxine  100 mcg Per NG tube QAM AC    apixaban  2.5 mg Per NG tube BID    atorvastatin  20 mg Per NG tube Daily    metoclopramide  10 mg Per NG tube 4x Daily AC & HS    pregabalin  150 mg Per NG tube TID    miconazole nitrate   Topical BID    silodosin  4 mg PEG Tube Daily    DULoxetine HCl  30 mg PEG Tube BID     Continuous Infusions:   sodium chloride       PRN Meds:bisacodyl, [COMPLETED] lactulose **FOLLOWED BY** lactulose, albuterol, sodium chloride flush, sodium chloride, ondansetron **OR** ondansetron, acetaminophen **OR** acetaminophen, oxyCODONE    Prior to Admission medications    Medication Sig Start Date End Date Taking? Authorizing Provider   fluconazole (DIFLUCAN) 200 MG tablet Take 1 tablet by mouth daily for 7 doses 11/16/24 11/23/24 Yes Papo Pompa MD   levoFLOXacin (LEVAQUIN) 750 MG tablet Take 1 tablet by mouth daily for 1 day 11/15/24 11/16/24 Yes Papo Pompa MD   silodosin (RAPAFLO) 4 MG CAPS capsule 1 capsule by PEG Tube route every evening   Yes Provider, MD Nataliia   fentaNYL (DURAGESIC) 75 MCG/HR Place 1 patch onto the skin every 72 hours for 30 days. Max Daily Amount: 1 patch 11/7/24 12/7/24 Yes Samira Willett, APRN - CNP   DULoxetine (CYMBALTA) 30 MG 
Infectious Disease  Progress Note  [facility]    Chief Complaint: scrotal cellulitis.    Subjective: In bed no nausea vomiting or diarrhea. He had hypotensive episode last night. Code status was changed to limited. No fever. BP improved.     Scheduled Meds:   augmented betamethasone dipropionate   Topical BID    lidocaine  1 patch TransDERmal Daily    hydrocortisone sodium succinate PF  50 mg IntraVENous Q6H    polyethylene glycol  17 g Oral Daily    sennosides  10 mL Per NG tube Nightly    ceFEPIme  2 g IntraVENous Q8H    And    sterile water  20 mL Injection Q8H    midodrine  10 mg Per NG tube TID WC    miconazole   Topical BID    fluconazole  200 mg Oral Daily    pantoprazole (PROTONIX) 40 mg in sodium chloride (PF) 0.9 % 10 mL injection  40 mg IntraVENous Daily    methadone  10 mg Per G Tube BID    budesonide  500 mcg Nebulization BID RT    arformoterol tartrate  15 mcg Nebulization BID RT    sodium chloride flush  5-40 mL IntraVENous 2 times per day    levothyroxine  100 mcg Per NG tube QAM AC    apixaban  2.5 mg Per NG tube BID    atorvastatin  20 mg Per NG tube Daily    metoclopramide  10 mg Per NG tube 4x Daily AC & HS    pregabalin  150 mg Per NG tube TID    miconazole nitrate   Topical BID    silodosin  4 mg PEG Tube Daily    DULoxetine HCl  30 mg PEG Tube BID     Continuous Infusions:   sodium chloride 100 mL/hr at 11/17/24 1704    sodium chloride       PRN Meds:bisacodyl, [COMPLETED] lactulose **FOLLOWED BY** lactulose, albuterol, sodium chloride flush, sodium chloride, ondansetron **OR** ondansetron, acetaminophen **OR** acetaminophen, oxyCODONE    Prior to Admission medications    Medication Sig Start Date End Date Taking? Authorizing Provider   fluconazole (DIFLUCAN) 200 MG tablet Take 1 tablet by mouth daily for 7 doses 11/16/24 11/23/24 Yes Papo Pompa MD   silodosin (RAPAFLO) 4 MG CAPS capsule 1 capsule by PEG Tube route every evening   Yes Provider, MD Nataliia   fentaNYL (DURAGESIC) 75 
Infectious Disease  Progress Note  [facility]    Chief Complaint: scrotal cellulitis.    Subjective: In bed no nausea vomiting or diarrhea. Son at bed side. Questioning about pain medication advised to speak with hospitalist.    Scheduled Meds:   albuterol  2.5 mg Nebulization Q4H WA RT    sodium chloride (Inhalant)  4 mL Nebulization TID RT    augmented betamethasone dipropionate   Topical BID    polyethylene glycol  17 g Oral Daily    sennosides  10 mL Per NG tube Nightly    ceFEPIme  2 g IntraVENous Q8H    And    sterile water  20 mL Injection Q8H    midodrine  10 mg Per NG tube TID WC    miconazole   Topical BID    pantoprazole (PROTONIX) 40 mg in sodium chloride (PF) 0.9 % 10 mL injection  40 mg IntraVENous Daily    methadone  10 mg Per G Tube BID    budesonide  500 mcg Nebulization BID RT    arformoterol tartrate  15 mcg Nebulization BID RT    sodium chloride flush  5-40 mL IntraVENous 2 times per day    levothyroxine  100 mcg Per NG tube QAM AC    apixaban  2.5 mg Per NG tube BID    atorvastatin  20 mg Per NG tube Daily    metoclopramide  10 mg Per NG tube 4x Daily AC & HS    pregabalin  150 mg Per NG tube TID    silodosin  4 mg PEG Tube Daily    DULoxetine HCl  30 mg PEG Tube BID     Continuous Infusions:   sodium chloride       PRN Meds:melatonin, bisacodyl, [COMPLETED] lactulose **FOLLOWED BY** lactulose, albuterol, sodium chloride flush, sodium chloride, ondansetron **OR** ondansetron, acetaminophen **OR** acetaminophen, oxyCODONE    Prior to Admission medications    Medication Sig Start Date End Date Taking? Authorizing Provider   fluconazole (DIFLUCAN) 200 MG tablet Take 1 tablet by mouth daily for 7 doses 11/16/24 11/23/24 Yes Papo Pompa MD   silodosin (RAPAFLO) 4 MG CAPS capsule 1 capsule by PEG Tube route every evening   Yes ProviderNataliia MD   fentaNYL (DURAGESIC) 75 MCG/HR Place 1 patch onto the skin every 72 hours for 30 days. Max Daily Amount: 1 patch 11/7/24 12/7/24 Yes Samira Willett 
Infectious Disease  Progress Note  [facility]    Chief Complaint: scrotal cellulitis.    Subjective: In bed no nausea vomiting or diarrhea. Wife at bed side. Has difficulty breathing on and off. Tolerating antibiotics well. Got lasix.    Scheduled Meds:   potassium bicarb-citric acid  50 mEq Oral Once    albuterol  2.5 mg Nebulization Q4H WA RT    sodium chloride (Inhalant)  4 mL Nebulization TID RT    augmented betamethasone dipropionate   Topical BID    polyethylene glycol  17 g Oral Daily    sennosides  10 mL Per NG tube Nightly    ceFEPIme  2 g IntraVENous Q8H    And    sterile water  20 mL Injection Q8H    midodrine  10 mg Per NG tube TID WC    miconazole   Topical BID    pantoprazole (PROTONIX) 40 mg in sodium chloride (PF) 0.9 % 10 mL injection  40 mg IntraVENous Daily    methadone  10 mg Per G Tube BID    budesonide  500 mcg Nebulization BID RT    arformoterol tartrate  15 mcg Nebulization BID RT    sodium chloride flush  5-40 mL IntraVENous 2 times per day    levothyroxine  100 mcg Per NG tube QAM AC    apixaban  2.5 mg Per NG tube BID    atorvastatin  20 mg Per NG tube Daily    metoclopramide  10 mg Per NG tube 4x Daily AC & HS    pregabalin  150 mg Per NG tube TID    silodosin  4 mg PEG Tube Daily    DULoxetine HCl  30 mg PEG Tube BID     Continuous Infusions:   sodium chloride       PRN Meds:melatonin, bisacodyl, [COMPLETED] lactulose **FOLLOWED BY** lactulose, albuterol, sodium chloride flush, sodium chloride, ondansetron **OR** ondansetron, acetaminophen **OR** acetaminophen, oxyCODONE    Prior to Admission medications    Medication Sig Start Date End Date Taking? Authorizing Provider   fluconazole (DIFLUCAN) 200 MG tablet Take 1 tablet by mouth daily for 7 doses 11/16/24 11/23/24 Yes Papo Pompa MD   silodosin (RAPAFLO) 4 MG CAPS capsule 1 capsule by PEG Tube route every evening   Yes Provider, MD Nataliia   fentaNYL (DURAGESIC) 75 MCG/HR Place 1 patch onto the skin every 72 hours for 30 days. 
Mechelle NP notified patient having large amount of tan emesis/secretions from his mouth, nose and trach. Patient states this has not happened before. Residual from peg tube 5-10 ml. Prior to emesis patient was resting comfortable.   Hold tube feed check chest xray and KUB.  Patients wife at bedside.   
Nephrology, palliative and Oncology consults placed.  Vivian Lee RN    
Notified Elizabeth Mariscal NP that patient's wife stated he is getting confused. She stated he had been awake for over 24 hours and she was worried the steroids were causing the confusion and insomnia.Order for melatonin received. Patient's wife stated that he has tried melatonin several times and it does not work for him.   
Notified by the patient's nurse that the patient is tachycardic, grimicing and increased respirations, grimicing improved with increased morphine from primary. Ativan also adjusted.   
Occupational Therapy  Date:11/15/2024  Patient Name: Alan Guajardo Jr.  Room: [address] [place] [address]A     Occupational Therapy (OT) order received, patient's medical record reviewed, and OT evaluation attempted this date; family member declined completion of OT evaluation this date, noting that patient was having a bad day. OT evaluation to be re-attempted at later date, as able/appropriate.    Ashli Jenkins, OTR/L  License Number: [id number]        
Occupational Therapy  Date:11/18/2024  Patient Name: Alan Guajardo Jr.  Room: [address] [place] [address]     Occupational Therapy (OT) order received, patient's medical record reviewed, and OT evaluation attempted this afternoon; patient declined to attempt EOB/OOB activities due to wanting to rest. OT evaluation to be re-attempted at later date, as able/appropriate.    Ashli Jenkins, OTR/L  License Number: [id number]        
Occupational Therapy  Date:11/20/2024  Patient Name: Alan Guajardo Jr.  Room: [address] [place] [address]A     Occupational Therapy (OT) order received, patient's medical record reviewed, and OT evaluation attempted this morning; OT evaluation held, per nursing, due to respiratory status. OT evaluation to be re-attempted at later date, as able/appropriate.    Ashli Jenkins, OTR/L  License Number: [id number]        
Otolaryngology consult placed via perfect serve   
PROGRESS NOTE      Patient Presents with/Seen in Consultation For      Reason for Consult: peg evaluation, family request   CHIEF COMPLAINT:  fatigue   Subjective:   Patient seen resting comfortably in bed. Wife at bedside. No further emesis. Had BM, no overt bleeding reported. No abdominal pain. Tolerating TF. POC d/w pt.     Review of Systems  Aside from what was mentioned in the PMH and HPI, essentially unremarkable, all others negative.    Objective:     /72   Pulse 78   Temp 97.8 °F (36.6 °C) (Temporal)   Resp 18   Ht 1.803 m (5' 11\")   Wt 73.9 kg (162 lb 14.7 oz)   SpO2 91%   BMI 22.72 kg/m²     General appearance: alert, awake, laying in bed, and cooperative  Eyes: conjunctiva pale, sclera anicteric. PERRL.  Lungs: clear to auscultation bilaterally, trach mask   Heart: regular rate and rhythm, no murmur, 2+ pulses; no edema  Abdomen: soft, non-tender; bowel sounds normal; no masses,  no organomegaly  Extremities: extremities without edema  Pulses: 2+ and symmetric  Skin: Skin color, texture, turgor normal.   Neurologic: Grossly normal    predniSONE (DELTASONE) tablet 40 mg, Daily  pantoprazole (PROTONIX) 40 mg in sodium chloride (PF) 0.9 % 10 mL injection, Daily  methadone (DOLOPHINE) 10 MG/ML solution 10 mg, BID  lactulose (CHRONULAC) 10 GM/15ML solution 20 g, BID PRN  sennosides (SENOKOT) 8.8 MG/5ML syrup 10 mL, BID  budesonide (PULMICORT) nebulizer suspension 500 mcg, BID RT  arformoterol tartrate (BROVANA) nebulizer solution 15 mcg, BID RT  albuterol (ACCUNEB) nebulizer solution 0.63 mg, Q6H PRN  sodium chloride flush 0.9 % injection 5-40 mL, 2 times per day  sodium chloride flush 0.9 % injection 5-40 mL, PRN  0.9 % sodium chloride infusion, PRN  ondansetron (ZOFRAN-ODT) disintegrating tablet 4 mg, Q8H PRN   Or  ondansetron (ZOFRAN) injection 4 mg, Q6H PRN  polyethylene glycol (GLYCOLAX) packet 17 g, Daily PRN  acetaminophen (TYLENOL) tablet 650 mg, Q6H PRN   Or  acetaminophen (TYLENOL) 
Patient lives at home with wife who is primary caretaker. Per wife, pt is dependent with ADL's and she has been bathing and dressing him recently due to decline in health. 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    Wife unsure of tube feed used at home- states she gives 8oz of feed diluted with 1-2 oz of water once a day and that patient is barely able to tolerate that. Patient complains of fullness and nausea. Wife states she does about 4oz free water before and after feedings.     Patient is on 5L trach mask at baseline    Patient is [facility] with [facility]- who supplies oxygen, tube feed, wheelchair and walker.   Patient oncologist is Dr. Cummings, radiologist Dr. Parra, and pt follows with Dr. Villagomez     Wife requesting podiatry consult for thick fungal toenails and not being able to cut them for patient anymore.   Also would like pulmonary consult for fluid on lungs and nodules that she was informed about in the ER.       
Patient reports feeling like he has to urinate. Patient stood at the side of the bed. Patient unable to urinate. Bladder scanned for 622 ml. Dr. Torres notified.  Vivian Lee RN    
Patient unable to void. Bladder scan showing 545ml. Sosa placed per urology order.   
Patient voided 650ml. PVR showing 0ml   
Patient was able to tolerate the metaneb for 4 minutes before falling from 88% to 84%. Finished the treatment with nebulization.   
Patients wife is requesting a urology consult due to intermittent retention. Dr. Torres notified. New orders given.  Vivian Lee RN    
Physical Therapy  Facility/Department: [address] INTERNAL MEDICINE 2      Name: Alan Guajardo Jr.  : 1955  MRN: [id number]    Chart reviewed and PT penelope attempted this date.  Per nursing, hold eval at this time.  Will check back at later time/date.     Aleisha Flowers, PT [id number]  
Physical Therapy  Facility/Department: [address] INTERNAL MEDICINE 2    NAME: Alan Guajardo Jr.  : 1955  MRN: [id number]    Chart reviewed and PT eval attempted twice this date.  First attempt, pt unavailable due to nursing care and second attempt pt needed suctioning from his trach.  Will check back at later time/date.     Aleisha Flowers, PT [id number]    
Podiatry consult placed via answering service. CHRISTINE Khan RN  
Repeat BP 80/48 after 500 cc NS bolus and 10 mg midodrine given. Primary physician notified  
Secure Perfect Serve communication sent to on call care provider regarding family requested changes to pain med dose/frequency. Orders received.   
Spiritual Health History and Assessment/Progress Note  [facility]     Encounter, Rituals, Rites and Sacraments,  ,  ,      Name: Alan Guajardo Jr. MRN: [id number]    Age: 69 y.o.     Sex: male   Language: English   Spiritism: Voodoo   Hyponatremia     Date: 11/11/2024                           Spiritual Assessment began in [address] INTERNAL MEDICINE 2        Referral/Consult From: Rounding   Encounter Overview/Reason:  Encounter, Rituals, Rites and Sacraments  Service Provided For: Patient and family together (One member of family also received Holy Communion.)    Stacy, Belief, Meaning:   Patient is connected with a stacy tradition or spiritual practice  Family/Friends are connected with a stacy tradition or spiritual practice      Importance and Influence:  Patient has no beliefs influential to healthcare decision-making identified during this visit  Family/Friends have no beliefs influential to healthcare decision-making identified during this visit    Community:  Patient feels well-supported. Support system includes: Spouse/Partner  Family/Friends feel well-supported. Support system includes: Children    Assessment and Plan of Care:     Patient Interventions include: Provided sacramental/Bahai ritual  Family/Friends Interventions include: Provided sacramental/Bahai ritual    Patient Plan of Care: Spiritual Care available upon further referral  Family/Friends Plan of Care: Spiritual Care available upon further referral    Electronically signed by Chaplain Lory on 11/11/2024 at 3:37 PM   
Spiritual Health History and Assessment/Progress Note  [facility]     Encounter, Rituals, Rites and Sacraments, Family Care,  ,  ,      Name: Alan Guajardo Jr. MRN: [id number]    Age: 69 y.o.     Sex: male   Language: English   Oriental orthodox: Christian   Hyponatremia     Date: 11/22/2024                           Spiritual Assessment began in [address] INTERNAL MEDICINE 2        Referral/Consult From: Family, Other (comment) ()   Encounter Overview/Reason:  Encounter, Rituals, Rites and Sacraments, Family Care  Service Provided For: Patient and family together (Provided end of life support to patient's family)    Stacy, Belief, Meaning:   Patient is connected with a stacy tradition or spiritual practice  Family/Friends are connected with a stacy tradition or spiritual practice      Importance and Influence:  Patient has no beliefs influential to healthcare decision-making identified during this visit  Family/Friends have no beliefs influential to healthcare decision-making identified during this visit    Community:  Patient feels well-supported. Support system includes: Spouse/Partner, Children, and Other: Sister  Family/Friends feel well-supported. Support system includes: Parent/s, Children, and Extended family    Assessment and Plan of Care:     Patient Interventions include: Provided sacramental/Hindu ritual  Family/Friends Interventions include: Facilitated expression of thoughts and feelings, Explored spiritual coping/struggle/distress, and Affirmed coping skills/support systems    Patient Plan of Care: Spiritual Care available upon further referral  Family/Friends Plan of Care: Spiritual Care available upon further referral    Electronically signed by Chaplain Lory on 11/22/2024 at 3:24 PM   
Spiritual Health History and Assessment/Progress Note  [facility]    Spiritual/Emotional Needs,  ,  ,      Name: Alan Guajardo Jr. MRN: [id number]    Age: 69 y.o.     Sex: male   Language: English   Sabianism: Caodaism   Hyponatremia     Date: 11/9/2024                           Spiritual Assessment began in [address] INTERNAL MEDICINE 2        Referral/Consult From: Rounding   Encounter Overview/Reason: Spiritual/Emotional Needs  Service Provided For: Patient and family together    Stacy, Belief, Meaning:   Patient is connected with a stacy tradition or spiritual practice  Family/Friends are connected with a stacy tradition or spiritual practice      Importance and Influence:  Patient has no beliefs influential to healthcare decision-making identified during this visit  Family/Friends have no beliefs influential to healthcare decision-making identified during this visit    Community:  Patient is connected with a spiritual community and feels well-supported. Support system includes: Spouse/Partner and Extended family  Family/Friends are connected with a spiritual community:    Assessment and Plan of Care:     Patient Interventions include: Facilitated expression of thoughts and feelings and Other: Prayer  Family/Friends Interventions include: Other: prayer    Patient Plan of Care: Spiritual Care available upon further referral  Family/Friends Plan of Care: Spiritual Care available upon further referral    Electronically signed by Chaplain Shu on 11/9/2024 at 2:13 PM   
Spiritual Health History and Assessment/Progress Note  [facility]    Attempted Encounter,  ,  ,      Name: Alan Guajardo Jr. MRN: [id number]    Age: 69 y.o.     Sex: male   Language: English   Anabaptism: Evangelical   Hyponatremia     Date: 11/9/2024                           Spiritual Assessment began in [address] INTERNAL MEDICINE 2        Referral/Consult From: Rounding   Encounter Overview/Reason: Attempted Encounter  Service Provided For: Patient    Stacy, Belief, Meaning:   Patient unable to assess at this time  Family/Friends No family/friends present      Importance and Influence:  Patient unable to assess at this time  Family/Friends No family/friends present    Community:  Patient Other: patient was sleeping  Family/Friends No family/friends present    Assessment and Plan of Care:     Patient Interventions include: Other:  prayed silent prayer for patient  Family/Friends Interventions include: No family/friends present    Patient Plan of Care: Spiritual Care available upon further referral  Family/Friends Plan of Care: No family/friends present    Electronically signed by Chaplain Shu on 11/9/2024 at 10:11 AM   
Spoke to patient's wife outside of the room. They have decided to go to hospice house pending when.   Can use levaquin via PEG for 5 days if hospice approves.  Papo Pompa MD  
Woke patient for 0330 am vitals. Patient refused.   
    General Appearance: Trach mask in place. no acute distress  Skin: warm and dry  Head: normocephalic and atraumatic  Eyes: pupils equal, round, and reactive to light, extraocular eye movements intact, conjunctivae normal  Neck: neck supple and non tender without mass   Pulmonary/Chest: trach mask in place, clear to auscultation bilaterally- no wheezes, rales or rhonchi, normal air movement, no respiratory distress on 8l NC trach mask.   Cardiovascular: normal rate, normal S1 and S2 and no carotid bruits  Abdomen: soft, non-tender, non-distended, normal bowel sounds, no masses or organomegaly  Extremities: no cyanosis, no clubbing and no edema  Neurologic: no cranial nerve deficit and speech normal        Recent Labs     11/14/24 0415 11/15/24  0344   * 132   K 3.9 4.4   CL 95* 94*   CO2 27 28   BUN 19 21   CREATININE 0.7 0.7   GLUCOSE 155* 128*   CALCIUM 9.4 9.8       Recent Labs     11/14/24 0415 11/15/24  0344 11/16/24  0714   WBC 7.6 6.0 3.2*   RBC 4.11 4.06 4.08   HGB 10.7* 10.8* 10.7*   HCT 34.6* 33.9* 34.2*   MCV 84.2 83.5 83.8   MCH 26.0 26.6 26.2   MCHC 30.9* 31.9* 31.3*   RDW 16.0* 16.2* 16.4*    213 190   MPV 9.7 9.4 9.6       Assessment:    Principal Problem:    Hyponatremia  Active Problems:    Chronic obstructive pulmonary disease (HCC)    Severe protein-calorie malnutrition (HCC)    Acquired hypothyroidism    Obstipation    Laryngeal cancer (HCC)    Loculated pleural effusion  Resolved Problems:    * No resolved hospital problems. *    11/15:  Midodrine dose increased today. Avoid giving midodrine <4 hours before bedtime to minimize risk of supine hypertension.   Will monitor vitals in anticipation for discharge tomorrow.    Can up titrate midodrine to 10 mg TID. Please check standing BP beffor up titration.     11/16; No change in BP in different positions. Continue midodrine 10 mg TID and monitor vital. Tumor may be causing vagal response from neck compression. This maybe his new 
22.05 kg/m²     General Appearance: Trach mask in place. no acute distress  Skin: warm and dry  Head: normocephalic and atraumatic  Eyes: pupils equal, round, and reactive to light, extraocular eye movements intact, conjunctivae normal  Neck: neck supple and non tender without mass   Pulmonary/Chest: trach mask in place, clear to auscultation bilaterally- no wheezes, rales or rhonchi, normal air movement, no respiratory distress on 8l NC trach mask.   Cardiovascular: normal rate, normal S1 and S2 and no carotid bruits  Abdomen: soft, non-tender, non-distended, normal bowel sounds, no masses or organomegaly  Extremities: no cyanosis, no clubbing and no edema  Neurologic: no cranial nerve deficit and speech normal        Recent Labs     11/13/24  0337 11/14/24  0415 11/15/24  0344    130* 132   K 3.9 3.9 4.4   CL 99 95* 94*   CO2 26 27 28   BUN 21 19 21   CREATININE 0.8 0.7 0.7   GLUCOSE 113* 155* 128*   CALCIUM 9.4 9.4 9.8       Recent Labs     11/13/24  0337 11/14/24  0415 11/15/24  0344   WBC 8.9 7.6 6.0   RBC 4.18 4.11 4.06   HGB 11.0* 10.7* 10.8*   HCT 35.1* 34.6* 33.9*   MCV 84.0 84.2 83.5   MCH 26.3 26.0 26.6   MCHC 31.3* 30.9* 31.9*   RDW 16.1* 16.0* 16.2*    219 213   MPV 9.8 9.7 9.4       Assessment:    Principal Problem:    Hyponatremia  Active Problems:    Chronic obstructive pulmonary disease (HCC)    Severe protein-calorie malnutrition (HCC)    Acquired hypothyroidism    Obstipation    Laryngeal cancer (HCC)    Loculated pleural effusion  Resolved Problems:    * No resolved hospital problems. *    7/15:  Midodrine dose increased today. Avoid giving midodrine <4 hours before bedtime to minimize risk of supine hypertension.   Will monitor vitals in anticipation for discharge tomorrow.    Can up titrate midodrine to 10 mg TID. Please check standing BP beffor up titration.     Plan:  Acute on chronic hypoxic respiratory failure: Baseline on 5 L NC now up to 8L via trach mask due to hypoxia: 
normocephalic and atraumatic  Eyes: PERRL, EOMI, conjunctivae normal  Neck: mass on anterior cervical/posterior mandibular region, trach tube in place  Pulmonary/Chest: slightly diminished on left, 8L trach mask  Cardiovascular: RRR, no murmurs  Abdomen: soft, non-tender, non-distended. PEG tube in place  Genitourinary: erythema of scrotum and penis is improving  Extremities: no cyanosis, no clubbing and no edema  Neurologic: no cranial nerve deficit and speech limited by trach    **chaperone present during sensitive exam**    Recent Labs     11/10/24  0300 11/11/24  1055 11/12/24  0631    136 134   K 4.7 4.7 3.9   CL 97* 97* 98   CO2 26 28 29   BUN 9 17 19   CREATININE 0.8 0.8 0.8   GLUCOSE 133* 136* 148*   CALCIUM 8.9 9.7 9.1       Recent Labs     11/10/24  0300 11/11/24  1055 11/12/24  0631   WBC 7.3 15.6* 7.7   RBC 3.97 4.39 4.05   HGB 10.1* 11.6* 10.5*   HCT 32.9* 36.3* 34.2*   MCV 82.9 82.7 84.4   MCH 25.4* 26.4 25.9*   MCHC 30.7* 32.0 30.7*   RDW 14.6 15.5* 15.7*    341 241   MPV 9.8 9.4 9.5       Radiology:  XR CHEST PORTABLE   Final Result   1. Unchanged small/moderate left pleural effusion and platelike atelectasis   at the right base.   2. No ileus or obstruction.         XR ABDOMEN (KUB) (SINGLE AP VIEW)   Final Result   1. Unchanged small/moderate left pleural effusion and platelike atelectasis   at the right base.   2. No ileus or obstruction.         XR ABDOMEN (KUB) (SINGLE AP VIEW)   Final Result   Nonobstructive bowel gas pattern.         CT Head W/O Contrast   Final Result   1. No acute intracranial abnormality.   2. Fluid present in the left mastoid air cells.         CT CHEST WO CONTRAST   Final Result   1.  Chronic findings with loss of volume in the left lung with signs of post   radiation fibrosis in the mediastinal margin of the left upper lobe.      2.  Presence of a at least partially loculated right-sided pleural effusion   in mild-to-moderate degree which is overall size has 
synthroid, steroid, bowel regimen Wound Type: None       Current Nutrition Intake & Therapies:    Average Meal Intake: NPO     ADULT DIET; Regular    Anthropometric Measures:  Height: 180.3 cm (5' 11\")  Ideal Body Weight (IBW): 172 lbs (78 kg)    Admission Body Weight: 73.6 kg (162 lb 4 oz) (11/9 bed)  Current Body Weight: 73.9 kg (162 lb 14.7 oz), 94.7 % IBW. Weight Source: Bed scale (11/9)  Current BMI (kg/m2): 22.7  Usual Body Weight: 69.9 kg (154 lb 3 oz) (10/22 actual wt; variable wts 150-170# in EHR-   156 lbs 8 oz 5/24 x 6 mos,)     % Weight Change (Calculated): 5.7  Weight Adjustment For: No Adjustment                 BMI Categories: Normal Weight (BMI 22.0 to 24.9) age over 65    Estimated Daily Nutrient Needs:  Energy Requirements Based On: Kcal/kg  Weight Used for Energy Requirements: Current  Energy (kcal/day): 9714-3538  Weight Used for Protein Requirements: Current  Protein (g/day):   Method Used for Fluid Requirements: 1 ml/kcal  Fluid (ml/day): 6052-4672    Nutrition Diagnosis:   Severe malnutrition, in context of chronic illness related to inadequate protein-energy intake as evidenced by criteria as identified in malnutrition assessment    Nutrition Interventions:   Food and/or Nutrient Delivery: Start Tube Feeding (Recommend NPO)  Nutrition Education/Counseling: Education/Counseling completed  Coordination of Nutrition Care: Continue to monitor while inpatient       Goals:  Goals: Meet at least 75% of estimated needs, Initiate nutrition support, Tolerate nutrition support at goal rate, by next RD assessment          Nutrition Monitoring and Evaluation:      Food/Nutrient Intake Outcomes: Enteral Nutrition Intake/Tolerance  Physical Signs/Symptoms Outcomes: Biochemical Data, Nutrition Focused Physical Findings, Skin, Weight, Fluid Status or Edema    Discharge Planning:    Enteral Nutrition     Monika Ryan RD  Contact: [phone]      
tablet Take 1 tablet by mouth 4 times daily   Yes Provider, Historical, MD   YUPELRI 175 MCG/3ML SOLN INHALE 3 MLS INTO THE LUNGS EVERY EVENING 9/23/24  Yes Young Parkinson MD   formoterol (PERFOROMIST) 20 MCG/2ML nebulizer solution TAKE 2 MLS BY NEBULIZATION IN THE MORNING AND 2 MLS IN THE EVENING. 8/19/24  Yes Young Parkinson MD   pregabalin (LYRICA) 150 MG capsule Take 1 capsule by mouth 3 times daily for 180 days. 8/15/24 2/11/25 Yes Elder Martínez APRN - CNP   oxyCODONE (OXYCONTIN) 10 MG extended release tablet Take 1 tablet by mouth in the morning and 1 tablet in the evening.   Yes Nataliia Gomez MD   chlorhexidine (PERIDEX) 0.12 % solution  4/28/24  Yes Nataliia Gomez MD   albuterol (ACCUNEB) 0.63 MG/3ML nebulizer solution Take 3 mLs by nebulization every 6 hours as needed for Shortness of Breath 2/27/24  Yes Cony Heard APRN - CNP   famotidine (PEPCID) 20 MG tablet  2/15/24  Yes ProviderNataliia MD   budesonide (PULMICORT) 0.5 MG/2ML nebulizer suspension Take 2 mLs by nebulization in the morning and 2 mLs in the evening.   Yes Nataliia Gomez MD   levothyroxine (SYNTHROID) 100 MCG tablet Take 1 tablet by mouth every morning (before breakfast)   Yes Nataliia Gomez MD   simvastatin (ZOCOR) 40 MG tablet Take 1 tablet by mouth every evening   Yes ProviderNataliia MD   apixaban (ELIQUIS) 2.5 MG TABS tablet Take 1 tablet by mouth 2 times daily   Yes Nataliia Gomez MD   midodrine (PROAMATINE) 5 MG tablet Take 1 tablet by mouth 2 times daily Taking three times a day   Yes Nataliia Gomez MD   pembrolizumab (KEYTRUDA) 100 MG/4ML SOLN Infuse 8 mLs intravenously every 21 days LAST DOSE: 12/21/2023  NEXT DOSE DUE: 01/11/2024   Yes Nataliia Gomez MD   predniSONE (DELTASONE) 10 MG tablet Take 1 tablet by mouth every evening   Yes Nataliia Gomez MD   Cholecalciferol (VITAMIN D3) 5000 units TABS Take 1 tablet by mouth every evening   Yes 
  2.  Presence of a at least partially loculated right-sided pleural effusion   in mild-to-moderate degree which is overall size has increased since the   study of May 19 causing additional mild compression of the left upper lobe   and left lower.      3.  Chronic multi segmental atelectasis of the left lower lobe as observed   previously which can represent chronic organized round atelectasis/chronic   organized indolent organized pneumonia.      4.  Areas of chronic peripheral areas of consolidation atelectasis in the   right lower lobe as observed previously.      5.  New 5 mm spiculated nodule in the right lobe not seen on the study May   19. Consider short-term follow-up study 3 months time interval monitoring   these finding.         XR CHEST PORTABLE   Final Result   1.  Further increase in left-sided pleural effusion causing additional   compression atelectasis in the left lung since the studies of May 2024.      2.  Increased density in the lower lung base is a chronic finding as observed   previous studies.      3.  No indication for aspiration in the right lung.      4.  Due further increase in left-sided pleural effusion can consider   re-evaluation with CT chest.             Assessment:    Principal Problem:    Hyponatremia  Active Problems:    Chronic obstructive pulmonary disease (HCC)    Severe protein-calorie malnutrition (HCC)    Hypotension due to hypovolemia    Acquired hypothyroidism    Obstipation    Laryngeal cancer (HCC)    Loculated pleural effusion    Hypoxia  Resolved Problems:    * No resolved hospital problems. *      Plan:  Family decision made to go to hospice, likely hospice house later today  Weaning O2 per hospice team  Appreciate palliative and hospice team support  Provided support for family     Time spent reviewing chart, clinical exam, discussing case and answering questions with staff/consultants/patient/family = 35 min    NOTE: This report was transcribed using voice 
12/21/2023  NEXT DOSE DUE: 01/11/2024   Yes Nataliia Gomez MD   predniSONE (DELTASONE) 10 MG tablet Take 1 tablet by mouth every evening   Yes Nataliia Gomez MD   Cholecalciferol (VITAMIN D3) 5000 units TABS Take 1 tablet by mouth every evening   Yes Nataliia Gomez MD   tamsulosin (FLOMAX) 0.4 MG capsule Take 1 capsule by mouth every evening  Patient not taking: Reported on 11/9/2024    ProviderNataliia MD        ROS:  As mentioned in subjective, all other systems negative      BP (!) 73/48   Pulse 73   Temp 97.8 °F (36.6 °C) (Temporal)   Resp 18   Ht 1.803 m (5' 11\")   Wt 71.7 kg (158 lb 1.1 oz)   SpO2 97%   BMI 22.05 kg/m²     Physical Exam  Const/Neuro- unchanged, no signs of acute distress, Alert  ENMT- Within Normal Limits, Normocephalic, mucous membranes pink/moist, No thrush  Neck: Neck supple trach on trach mask  Heart- Regular, Rate, Rhythm- no murmur appreciated.  Lungs- clear to ascultation. Respirations even and nonlabored.  Abdomen- Soft, bowel sounds positive, non tender  : redness all around penis and scrotum.- looks better.  Musculo/Extremities-  Equal and symmetrical, no edema. No tenderness.  Neurological- No focal motor or sensory loss.  No confusion  Skin:  Warm and dry, psoriatic lesions both elbows and knees.  Lines: PIV    Labs, Cultures reviewed  Radiology and other consultants notes reviewed    Microbiology:  RVP negative  Resp cx : Serratia marcescens, 2 species of Pseudomonas. One cefepime NAVDEEP was 0.5 but vitek read it as Resistant checked with lab. It was resistant to Ceftazidime so it reflected cefepime as resistant.     Assessment:  Hx of laryngeal cancer with tracheostomy in place:   Immunocompromised patient/On chemo:   Perineal candidiasis:   Tracheitis:      Plan:    Cont IV Cefepime 2gr q 8 in patient. and  PO fluconazole 200mg daily  Patients wife told me he is getting d/crista tomorrow, ordered meds- levaquin one more dose and fluconazole for 7 
more prominent since the study of May 19 particular towards the  left lower lung base.  There chronic area     There are multi segmental confluent atelectasis/consolidation in the left  lower lobe which was observed previously.  These appears to be form area of  large organized around atelectasis/organized chronic in the pneumonia.     There are areas of peripheral subpleural fibrosis along the lateral aspect of  the left lobe/lingula/anterior segment the left lobe these was also observed  previously the are more prominent on the present study as there is more left  side pleural effusion along the lateral chest.  These are chronic findings.     There are emphysematous changes lung parenchyma appreciated in the right  upper and in the right lung.     In the right lower lung base there segmental areas of consolidation with air  bronchogram which are more prominent since the previous study particular in  the posteromedial basal region of the right lower lobe although slightly  improved in the more posterior lateral aspect of the right costophrenic  sulcus.     In the anterior segment of the right upper lobe (axial image A1/52) there is  a 5 mm spiculated nodule that was not seen on the previous study these  represents a new development.  Consider short-term follow-up study in 3  months time interval.     There morphological changes with hypertrophy relative ballooning of the left  2nd rib but this is a stable finding observed previously.  Similar finding  with hyperexpansion is seen in the T2 vertebral body which is fused with T1  vertebral body.  Patient had previous anterior fusion of the cervical spine  at multiple levels including C7 C6, C5, C4, and CT 3 which is partially  visualized these are all stable findings.     Several lymph nodes are seen in the mediastinum they are borderline size and  number they appear to be overall stable since the study May 2024.     Patient has a tracheostomy tube, tip in good 
4/25/2022    RIGHT LUMBAR RADIOFREQUENCY ABLATION L5 AND S1 performed by Garfield Conner MD at [facility] OR    ROTATOR CUFF REPAIR Right     SINUS SURGERY      TRACHEOSTOMY  05/24/2017    # 6 Elsa (disposable)    TRACHEOSTOMY      UPPER GASTROINTESTINAL ENDOSCOPY N/A 9/13/2023    EGD ESOPHAGOGASTRODUODENOSCOPY/ POSSIBLE DILATATION performed by Kavon Villagomez DO at [facility] ENDOSCOPY    UPPER GASTROINTESTINAL ENDOSCOPY N/A 12/11/2023    EGD WITH DILATION performed by Kavon Villagomez DO at [facility] ENDOSCOPY    UPPER GASTROINTESTINAL ENDOSCOPY N/A 1/11/2024    EGD WITH DILATION AND PEG TUBE PLACEMENT performed by Kavon Villagomez DO at [facility] ENDOSCOPY       Family History   Problem Relation Age of Onset    Cancer Mother         breast cancer    Cancer Father         prostate cancer    Diabetes Father        Allergies   Allergen Reactions    Augmentin [Amoxicillin-Pot Clavulanate] Diarrhea       Physical Exam:  /66   Pulse 89   Temp 98.1 °F (36.7 °C) (Oral)   Resp 20   Ht 1.803 m (5' 11\")   Wt 71.7 kg (158 lb 1.1 oz)   SpO2 (!) 88%   BMI 22.05 kg/m²     Gen: Somnolent  Neck:  Supple, trach  Lungs: Unlabored, respirations coarse  Heart: RRR  Abd:  Soft, non tender, distended, PEG  M/S/Ext:  Moving all extremities, no edema, pulses present  Skin:  Warm and dry  Neuro:   Somnolent    Objective data reviewed: labs, images, records, medication use, vitals, and chart    MDM/Time:  The total encounter time on this service date was 50 minutes, which was spent performing a face-to-face encounter and personally completing the provider-level activities documented in the note.  This includes time spent prior to the visit and after the visit in direct care of the patient.  This time does not include time spent in any separately reportable services.    BG Roldan - CNP  Palliative Medicine    Patient and the plan of care discussed with the other IDT members of Palliative Care Team, and with consultants, 
Keytruda.  Loculated right pleural effusion: Pulm following.  Urinary retention: Continue Flomax, monitor you know  Hypertension: On midodrine, continue to monitor vitals  Hypothyroidism: Resume home meds  HLD: Stable, resume home meds    CODE STATUS: Full  DVT/GI prophylaxis: eliquis/ PPI    Dispo: SNF, pending acceptance given patient's high oxygen requirement.      NOTE: This report was transcribed using voice recognition software. Every effort was made to ensure accuracy; however, inadvertent computerized transcription errors may be present.  Electronically signed by Osmin Torres MD on 11/20/2024 at 1:28 PM      
Stable, resume home meds    CODE STATUS: Full  DVT/GI prophylaxis: eliquis/ PPI    Dispo: SNF, pending acceptance given patient's high oxygen requirement.      NOTE: This report was transcribed using voice recognition software. Every effort was made to ensure accuracy; however, inadvertent computerized transcription errors may be present.  Electronically signed by Osmin Torres MD on 11/19/2024 at 8:14 AM      
reg as needed  Urinary retention cont flomax  Hypotension monitor pt on midodrine  Hypothyroidism continue med  Hyperlipidemia continue med  Laryngeal cancer consult hem/onc, trach in place  Lung nodule 5mm noted on   Hx DVT cont eliquis    -remains on 8L trach mask, cont to wean O2 as able, Pulm following  -penile/scrotal erythema worsening, ID c/s, cont cefepime and antifungal cream for now  -GI c/s, no plans for PEGJ at this time  -trach exchange 11/12 per ENT  -follow Resp Cx, cont cefepime for now    Dispo: anticipate home    NOTE: Portions of this report may have been transcribed using voice recognition software. Every effort was made to ensure accuracy; however, inadvertent computerized transcription errors may be present.  Electronically signed by Luciana Unger MD on 11/13/2024 at 1:33 PM     
Supple, trach  Lungs: Unlabored, respirations coarse  Heart: RRR  Abd:  Soft, non tender, distended, PEG  M/S/Ext:  Moving all extremities, no edema, pulses present  Skin:  Warm and dry  Neuro:   Alert, oriented x 3; following commands    Objective data reviewed: labs, images, records, medication use, vitals, and chart    MDM/Time:  The total encounter time on this service date was 35 minutes, which was spent performing a face-to-face encounter and personally completing the provider-level activities documented in the note.  This includes time spent prior to the visit and after the visit in direct care of the patient.  This time does not include time spent in any separately reportable services.    BG Singh - CNP  Palliative Medicine    Patient and the plan of care discussed with the other IDT members of Palliative Care Team, and with consultants, Primary Attending, patient, family, and floor nurses, as appropriate and available.    Thank you for allowing Palliative Medicine to participate in the care of Alan Guajardo Jr..    Note: This report was completed using [employer] voiced recognition software.  Every effort has been made to ensure accuracy; however, inadvertent computerized transcription errors may be present.    
<=0.12 BACTERIAL SUSCEPTIBILITY PANEL NAVDEEP Final    cefotaxime Sensitive 0.5 BACTERIAL SUSCEPTIBILITY PANEL NAVDEEP Final    cefOXitin Resistant <=4 BACTERIAL SUSCEPTIBILITY PANEL NAVDEEP Final    cefTAZidime Sensitive <=1 BACTERIAL SUSCEPTIBILITY PANEL NAVDEEP Final    ciprofloxacin Sensitive <=0.06 BACTERIAL SUSCEPTIBILITY PANEL NAVDEEP Final    levofloxacin Sensitive <=0.12 BACTERIAL SUSCEPTIBILITY PANEL NAVDEEP Final    meropenem Sensitive <=0.25 BACTERIAL SUSCEPTIBILITY PANEL NAVDEEP Final    trimethoprim-sulfamethoxazole Sensitive <=20 BACTERIAL SUSCEPTIBILITY PANEL NAVDEEP Final    amoxicillin-clavulanate Resistant >=32 BACTERIAL SUSCEPTIBILITY PANEL NAVDEEP Final    Pseudomonas aeruginosa (1)    Antibiotic Interpretation Microscan Method Status    cefepime Resistant 0.5 BACTERIAL SUSCEPTIBILITY PANEL NAVDEEP Final    Ceftolozane/Tazobactam (Zerbaxa) Sensitive 0.5 BACTERIAL SUSCEPTIBILITY PANEL NAVDEEP Final    gentamicin Sensitive <=1 BACTERIAL SUSCEPTIBILITY PANEL NAVDEEP Final    levofloxacin Sensitive <=0.12 BACTERIAL SUSCEPTIBILITY PANEL NAVDEEP Final    meropenem Sensitive <=0.25 BACTERIAL SUSCEPTIBILITY PANEL NAVDEEP Final    piperacillin-tazobactam Sensitive <=4 BACTERIAL SUSCEPTIBILITY PANEL NAVDEEP Final    tobramycin Sensitive <=1 BACTERIAL SUSCEPTIBILITY PANEL NAVDEEP Final    Pseudomonas aeruginosa (3)    Antibiotic Interpretation Microscan Method Status    cefepime Sensitive 2 BACTERIAL SUSCEPTIBILITY PANEL NAVDEEP Final    Ceftolozane/Tazobactam (Zerbaxa) Sensitive 0.5 BACTERIAL SUSCEPTIBILITY PANEL NAVDEEP Final    gentamicin Sensitive <=1 BACTERIAL SUSCEPTIBILITY PANEL NAVDEEP Final    levofloxacin Sensitive 0.5 BACTERIAL SUSCEPTIBILITY PANEL NAVDEEP Final    meropenem Sensitive 0.5 BACTERIAL SUSCEPTIBILITY PANEL NAVDEEP Final    piperacillin-tazobactam Sensitive <=4 BACTERIAL SUSCEPTIBILITY PANEL NAVDEPE Final    tobramycin Sensitive <=1 BACTERIAL SUSCEPTIBILITY PANEL NAVDEEP Final     Condensed View         Specimen Collected: 11/09/24 17:29 EST Last Resulted: 11/14/24 07:50 
<=4 BACTERIAL SUSCEPTIBILITY PANEL NAVDEEP Final    cefTAZidime Sensitive <=1 BACTERIAL SUSCEPTIBILITY PANEL NAVDEEP Final    ciprofloxacin Sensitive <=0.06 BACTERIAL SUSCEPTIBILITY PANEL NAVDEEP Final    levofloxacin Sensitive <=0.12 BACTERIAL SUSCEPTIBILITY PANEL NAVDEEP Final    meropenem Sensitive <=0.25 BACTERIAL SUSCEPTIBILITY PANEL NAVDEEP Final    trimethoprim-sulfamethoxazole Sensitive <=20 BACTERIAL SUSCEPTIBILITY PANEL NAVDEEP Final    amoxicillin-clavulanate Resistant >=32 BACTERIAL SUSCEPTIBILITY PANEL NAVDEEP Final    Pseudomonas aeruginosa (1)    Antibiotic Interpretation Microscan Method Status    cefepime Resistant 0.5 BACTERIAL SUSCEPTIBILITY PANEL NAVDEEP Final    Ceftolozane/Tazobactam (Zerbaxa) Sensitive 0.5 BACTERIAL SUSCEPTIBILITY PANEL NAVDEEP Final    gentamicin Sensitive <=1 BACTERIAL SUSCEPTIBILITY PANEL NAVDEEP Final    levofloxacin Sensitive <=0.12 BACTERIAL SUSCEPTIBILITY PANEL NAVDEEP Final    meropenem Sensitive <=0.25 BACTERIAL SUSCEPTIBILITY PANEL NAVDEEP Final    piperacillin-tazobactam Sensitive <=4 BACTERIAL SUSCEPTIBILITY PANEL NAVDEEP Final    tobramycin Sensitive <=1 BACTERIAL SUSCEPTIBILITY PANEL NAVDEEP Final    Pseudomonas aeruginosa (3)    Antibiotic Interpretation Microscan Method Status    cefepime Sensitive 2 BACTERIAL SUSCEPTIBILITY PANEL NAVDEEP Final    Ceftolozane/Tazobactam (Zerbaxa) Sensitive 0.5 BACTERIAL SUSCEPTIBILITY PANEL NAVDEEP Final    gentamicin Sensitive <=1 BACTERIAL SUSCEPTIBILITY PANEL NAVDEEP Final    levofloxacin Sensitive 0.5 BACTERIAL SUSCEPTIBILITY PANEL NAVDEEP Final    meropenem Sensitive 0.5 BACTERIAL SUSCEPTIBILITY PANEL NAVDEEP Final    piperacillin-tazobactam Sensitive <=4 BACTERIAL SUSCEPTIBILITY PANEL NAVDEEP Final    tobramycin Sensitive <=1 BACTERIAL SUSCEPTIBILITY PANEL NAVDEEP Final     Condensed View         Specimen Collected: 11/09/24 17:29 EST Last Resulted: 11/14/24 07:50 EST                 Assessment:    Acute hypoxic respiratory failure   Trachitis   Abnormal CT, left loculated effusion with pneumonia 
SUSCEPTIBILITY PANEL NAVDEEP Final    cefotaxime Sensitive 0.5 BACTERIAL SUSCEPTIBILITY PANEL NAVDEEP Final    cefOXitin Resistant <=4 BACTERIAL SUSCEPTIBILITY PANEL NAVDEEP Final    cefTAZidime Sensitive <=1 BACTERIAL SUSCEPTIBILITY PANEL NAVDEEP Final    ciprofloxacin Sensitive <=0.06 BACTERIAL SUSCEPTIBILITY PANEL NAVDEEP Final    levofloxacin Sensitive <=0.12 BACTERIAL SUSCEPTIBILITY PANEL NAVDEEP Final    meropenem Sensitive <=0.25 BACTERIAL SUSCEPTIBILITY PANEL NAVDEEP Final    trimethoprim-sulfamethoxazole Sensitive <=20 BACTERIAL SUSCEPTIBILITY PANEL NAVDEEP Final    amoxicillin-clavulanate Resistant >=32 BACTERIAL SUSCEPTIBILITY PANEL NAVDEEP Final    Pseudomonas aeruginosa (1)    Antibiotic Interpretation Microscan Method Status    cefepime Resistant 0.5 BACTERIAL SUSCEPTIBILITY PANEL NAVDEEP Final    Ceftolozane/Tazobactam (Zerbaxa) Sensitive 0.5 BACTERIAL SUSCEPTIBILITY PANEL NAVDEEP Final    gentamicin Sensitive <=1 BACTERIAL SUSCEPTIBILITY PANEL NAVDEEP Final    levofloxacin Sensitive <=0.12 BACTERIAL SUSCEPTIBILITY PANEL NAVDEEP Final    meropenem Sensitive <=0.25 BACTERIAL SUSCEPTIBILITY PANEL NAVDEEP Final    piperacillin-tazobactam Sensitive <=4 BACTERIAL SUSCEPTIBILITY PANEL NAVDEEP Final    tobramycin Sensitive <=1 BACTERIAL SUSCEPTIBILITY PANEL NAVDEEP Final    Pseudomonas aeruginosa (3)    Antibiotic Interpretation Microscan Method Status    cefepime Sensitive 2 BACTERIAL SUSCEPTIBILITY PANEL NAVDEEP Final    Ceftolozane/Tazobactam (Zerbaxa) Sensitive 0.5 BACTERIAL SUSCEPTIBILITY PANEL NAVDEEP Final    gentamicin Sensitive <=1 BACTERIAL SUSCEPTIBILITY PANEL NAVDEEP Final    levofloxacin Sensitive 0.5 BACTERIAL SUSCEPTIBILITY PANEL NAVDEEP Final    meropenem Sensitive 0.5 BACTERIAL SUSCEPTIBILITY PANEL NAVDEEP Final    piperacillin-tazobactam Sensitive <=4 BACTERIAL SUSCEPTIBILITY PANEL NAVDEEP Final    tobramycin Sensitive <=1 BACTERIAL SUSCEPTIBILITY PANEL NAVDEEP Final     Condensed View         Specimen Collected: 11/09/24 17:29 EST Last Resulted: 11/14/24 07:50 EST                 
Sensitive <=0.06 BACTERIAL SUSCEPTIBILITY PANEL NAVDEEP Final    levofloxacin Sensitive <=0.12 BACTERIAL SUSCEPTIBILITY PANEL NAVDEEP Final    meropenem Sensitive <=0.25 BACTERIAL SUSCEPTIBILITY PANEL NAVDEEP Final    trimethoprim-sulfamethoxazole Sensitive <=20 BACTERIAL SUSCEPTIBILITY PANEL NAVDEEP Final    amoxicillin-clavulanate Resistant >=32 BACTERIAL SUSCEPTIBILITY PANEL NAVDEEP Final    Pseudomonas aeruginosa (1)    Antibiotic Interpretation Microscan Method Status    cefepime Resistant 0.5 BACTERIAL SUSCEPTIBILITY PANEL NAVDEEP Final    Ceftolozane/Tazobactam (Zerbaxa) Sensitive 0.5 BACTERIAL SUSCEPTIBILITY PANEL NAVDEEP Final    gentamicin Sensitive <=1 BACTERIAL SUSCEPTIBILITY PANEL NAVDEEP Final    levofloxacin Sensitive <=0.12 BACTERIAL SUSCEPTIBILITY PANEL NAVDEEP Final    meropenem Sensitive <=0.25 BACTERIAL SUSCEPTIBILITY PANEL NAVDEEP Final    piperacillin-tazobactam Sensitive <=4 BACTERIAL SUSCEPTIBILITY PANEL NAVDEEP Final    tobramycin Sensitive <=1 BACTERIAL SUSCEPTIBILITY PANEL NAVDEEP Final    Pseudomonas aeruginosa (3)    Antibiotic Interpretation Microscan Method Status    cefepime Sensitive 2 BACTERIAL SUSCEPTIBILITY PANEL NAVDEEP Final    Ceftolozane/Tazobactam (Zerbaxa) Sensitive 0.5 BACTERIAL SUSCEPTIBILITY PANEL NAVDEEP Final    gentamicin Sensitive <=1 BACTERIAL SUSCEPTIBILITY PANEL NAVDEEP Final    levofloxacin Sensitive 0.5 BACTERIAL SUSCEPTIBILITY PANEL NAVDEEP Final    meropenem Sensitive 0.5 BACTERIAL SUSCEPTIBILITY PANEL NAVDEEP Final    piperacillin-tazobactam Sensitive <=4 BACTERIAL SUSCEPTIBILITY PANEL NAVDEEP Final    tobramycin Sensitive <=1 BACTERIAL SUSCEPTIBILITY PANEL NAVDEEP Final     Condensed View         Specimen Collected: 11/09/24 17:29 EST Last Resulted: 11/14/24 07:50 EST            Specimen Description .[facility]   Special Requests Site[facility]   Direct Exam MANY Polymorphonuclear leukocytes [facility]    RARE EPITHELIAL CELLS [facility] 
cell carcinoma  S/P Tracheostomy 2017  Centrilobular emphysema   Tobacco abuse   Stable AAA    Plan:   Wean oxygen as tolerated to maintain SpO2 greater than 92%. On HHFNC, attempted CATINA but had significant desaturations. patient will need a concentrator that goes up to 10 liters for discharge  Routine trach care. Trach change 11/12/24 by ENT  Nebulized bronchodilators - Brovana and budesonide BID. Add albuterol q4h and 3% nebs TID with metanebs   Stop Solu Cortef  Antibiotic management per infectious disease-Cefepime  Procalcitoin 0.09, resp panel negative  Chest imaging reviewed - Radiation fibrosis noted, new 5mm nodule  Oncology following, recurrent SCC head and neck, currently on keytruda  GI following, no plans for PEG-J at this time    This plan of care was reviewed in collaboration with Dr. Morales    Electronically signed by BG Phillips - CNP on 11/19/2024 at 10:13 AM      This is confirmation that I have personally performed a substantial portion of medical decision making (>50%) related to this patient encounter.  The medications & laboratory data and imagery were discussed and adjusted where necessary. Key issues of the case were discussed among consultants.  Review of CNP documentation was conducted and revisions were made as appropriate. I agree with the above documented exam, problem list and plan of care.    Preston Morales MD          
candidate s/p 1 cycle of 5-FU carboplatin Keytruda.  Admitted with hypoxic respiratory failure tracheitis perineal candidiasis.    Continues to require 8 L of oxygen.  On antibiotics per ID.  Discharge to SNF pending oxygen requirement in process.  Resume treatment as outpatient pending improvement in performance status.    11/22/24  - Recurrent, Squamous cell carcinoma of head and neck not a surgical candidate s/p 1 cycle of 5-FU carboplatin Keytruda.  Admitted with hypoxic respiratory failure tracheitis perineal candidiasis.  Family has decided to move forward with comfort measures and patient with be discharged with hospice which is very reasonable given his overall prognosis. Heme onc will sign off. If there are any further needs please feel free to reach back out.     Electronically signed by BG Rodriguez CNP on 11/22/2024 at 10:51 AM  Agree with above  
candidiasis.    Continues to require 8 L of oxygen.  On antibiotics per ID.  Discharge to SNF pending oxygen requirement in process.  Resume treatment as outpatient pending improvement in performance status.    Electronically signed by Jesse Mendoza MD on 11/19/2024 at 4:56 PM

## 2024-11-23 NOTE — DISCHARGE SUMMARY
[facility] Hospitalist Physician Discharge Summary       [facility]  [address] [facility] [address] [id number]  [phone]    Follow up        Activity level: As tolerated     Dispo: Hospice house      Condition on discharge: Stable     Patient ID:  Alan Guajardo Jr.  [id number]  69 y.o.  1955    Admit date: 11/8/2024    Discharge date and time:  11/23/2024  2:02 PM    Admission Diagnoses: Principal Problem:    Hyponatremia  Active Problems:    Chronic obstructive pulmonary disease (HCC)    Severe protein-calorie malnutrition (HCC)    Hypotension due to hypovolemia    Acquired hypothyroidism    Obstipation    Laryngeal cancer (HCC)    Loculated pleural effusion    Hypoxia  Resolved Problems:    * No resolved hospital problems. *      Discharge Diagnoses: Principal Problem:    Hyponatremia  Active Problems:    Chronic obstructive pulmonary disease (HCC)    Severe protein-calorie malnutrition (HCC)    Hypotension due to hypovolemia    Acquired hypothyroidism    Obstipation    Laryngeal cancer (HCC)    Loculated pleural effusion    Hypoxia  Resolved Problems:    * No resolved hospital problems. *      Consults:  IP CONSULT TO HEM/ONC  IP CONSULT TO PALLIATIVE CARE  IP CONSULT TO SPIRITUAL SERVICES  IP CONSULT TO DIETITIAN  IP CONSULT TO NEPHROLOGY  IP CONSULT TO PODIATRY  IP CONSULT TO PULMONOLOGY  IP CONSULT TO GI  IP CONSULT TO OTOLARYNGOLOGY  IP CONSULT TO INFECTIOUS DISEASES  IP CONSULT TO UROLOGY  IP CONSULT TO HOME CARE NEEDS  IP CONSULT TO HOSPICE  IP CONSULT TO HOSPICE    Procedures:     Hospital Course:   Patient Alan Guajardo Jr. is a 69 y.o. with PMHx AAA without rupture, COPD, depression/anxiety, DVT on Eliquis, HLD, thyroid disease , stage IV non-small cell lung cancer, with a left upper lobe, as well as a history of subglottic cancer status post CRT in 2017, has had continued recurrent squamous cell carcinoma who presented with Hyponatremia [E87.1]  Malignancy

## 2024-11-24 RX ADMIN — LORAZEPAM 2 MG: 2 INJECTION INTRAMUSCULAR at 01:52

## 2024-11-24 RX ADMIN — MORPHINE SULFATE 4 MG: 4 INJECTION, SOLUTION INTRAMUSCULAR; INTRAVENOUS at 01:51

## 2024-11-24 RX ADMIN — MORPHINE SULFATE 4 MG: 4 INJECTION, SOLUTION INTRAMUSCULAR; INTRAVENOUS at 17:12

## 2024-11-24 RX ADMIN — LORAZEPAM 2 MG: 2 INJECTION INTRAMUSCULAR at 13:18

## 2024-11-24 RX ADMIN — MORPHINE SULFATE 4 MG: 4 INJECTION, SOLUTION INTRAMUSCULAR; INTRAVENOUS at 21:12

## 2024-11-24 RX ADMIN — Medication 10 ML: at 17:12

## 2024-11-24 RX ADMIN — LORAZEPAM 2 MG: 2 INJECTION INTRAMUSCULAR at 17:12

## 2024-11-24 RX ADMIN — LORAZEPAM 2 MG: 2 INJECTION INTRAMUSCULAR at 21:12

## 2024-11-24 RX ADMIN — MORPHINE SULFATE 4 MG: 4 INJECTION, SOLUTION INTRAMUSCULAR; INTRAVENOUS at 13:18

## 2024-11-24 RX ADMIN — LORAZEPAM 2 MG: 2 INJECTION INTRAMUSCULAR at 09:59

## 2024-11-24 RX ADMIN — MORPHINE SULFATE 4 MG: 4 INJECTION, SOLUTION INTRAMUSCULAR; INTRAVENOUS at 09:59

## 2024-11-24 NOTE — H&P
[facility]   [phone]  Hospice House History & Physical  Provider Kathia Ovalle DO    Alan Guajardo Jr.  [id number]  Hospital Day: 2    HPI:   Alan Guajardo Jr. is a 69 y.o. 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 Cummings, on chemotherapy and immunotherapy. He was following at [facility] for a possible total laryngectomy however it was not pursued due to concerns relating to his respiratory status. Patient initially presented to [facility] on 11/8/2024 with complaints of fatigue and weakness. He was found to have hyponatremia, urinary retention, constipation and acute on chronic hypoxic respiratory failure with tracheitis, cultures growing Serratia and Pseudomonas. Patient's oxygen requirements increased, was on Airvo and there was discussion about placing him on mechanical ventilation and patient and family decided to stop aggressive care and transition to comfort care with hospice services. He was compassionately weaned from Airvo to oxygen via trach mask. He was requiring several doses of IV Ativan and IV morphine for comfort. He was evaluated by the hospice liaison and it was determined he was symptomatic and was transferred to the Hospice House for the CHIEF COMPLAINT of uncontrolled pain, dyspnea and anxiety, related to a terminal diagnosis of acute on chronic respiratory failure related to recurrent squamous cell carcinoma of the larynx.     SUBJECTIVE:   Chart reviewed. MAR reviewed. Since arrival to the Hospice House has has required two as needed doses of Ativan 2mg IV and three as needed doses of morphine sulfate 4mg IV. Spoke with nursing.    The patient was seen and examined at bedside today. He is lying in bed and is minimally

## 2024-11-25 RX ORDER — MORPHINE SULFATE 4 MG/ML
4 INJECTION, SOLUTION INTRAMUSCULAR; INTRAVENOUS EVERY 4 HOURS
Status: DISCONTINUED | OUTPATIENT
Start: 2024-11-25 | End: 2024-11-26

## 2024-11-25 RX ORDER — MORPHINE SULFATE 8 MG/ML
6 INJECTION, SOLUTION INTRAMUSCULAR; INTRAVENOUS
Status: DISCONTINUED | OUTPATIENT
Start: 2024-11-25 | End: 2024-11-28

## 2024-11-25 RX ORDER — MORPHINE SULFATE 2 MG/ML
INJECTION, SOLUTION INTRAMUSCULAR; INTRAVENOUS
Status: COMPLETED
Start: 2024-11-25 | End: 2024-11-25

## 2024-11-25 RX ORDER — LORAZEPAM 2 MG/ML
2 INJECTION INTRAMUSCULAR
Status: DISCONTINUED | OUTPATIENT
Start: 2024-11-25 | End: 2024-11-28

## 2024-11-25 RX ORDER — LORAZEPAM 2 MG/ML
2 INJECTION INTRAMUSCULAR EVERY 4 HOURS
Status: DISCONTINUED | OUTPATIENT
Start: 2024-11-25 | End: 2024-11-28

## 2024-11-25 RX ADMIN — MORPHINE SULFATE 4 MG: 4 INJECTION, SOLUTION INTRAMUSCULAR; INTRAVENOUS at 02:01

## 2024-11-25 RX ADMIN — MORPHINE SULFATE 4 MG: 4 INJECTION, SOLUTION INTRAMUSCULAR; INTRAVENOUS at 07:59

## 2024-11-25 RX ADMIN — LORAZEPAM 2 MG: 2 INJECTION INTRAMUSCULAR at 13:23

## 2024-11-25 RX ADMIN — MORPHINE SULFATE 4 MG: 4 INJECTION, SOLUTION INTRAMUSCULAR; INTRAVENOUS at 10:23

## 2024-11-25 RX ADMIN — MORPHINE SULFATE 4 MG: 4 INJECTION, SOLUTION INTRAMUSCULAR; INTRAVENOUS at 17:34

## 2024-11-25 RX ADMIN — MORPHINE SULFATE 6 MG: 8 INJECTION, SOLUTION INTRAMUSCULAR; INTRAVENOUS at 13:24

## 2024-11-25 RX ADMIN — LORAZEPAM 2 MG: 2 INJECTION INTRAMUSCULAR at 17:34

## 2024-11-25 RX ADMIN — MORPHINE SULFATE 4 MG: 4 INJECTION, SOLUTION INTRAMUSCULAR; INTRAVENOUS at 21:29

## 2024-11-25 RX ADMIN — MORPHINE SULFATE 2 MG: 2 INJECTION, SOLUTION INTRAMUSCULAR; INTRAVENOUS at 13:25

## 2024-11-25 RX ADMIN — LORAZEPAM 2 MG: 2 INJECTION INTRAMUSCULAR at 02:01

## 2024-11-25 RX ADMIN — LORAZEPAM 2 MG: 2 INJECTION INTRAMUSCULAR at 07:59

## 2024-11-25 RX ADMIN — ACETAMINOPHEN 650 MG: 650 SUPPOSITORY RECTAL at 13:25

## 2024-11-25 RX ADMIN — LORAZEPAM 2 MG: 2 INJECTION INTRAMUSCULAR at 21:29

## 2024-11-25 RX ADMIN — LORAZEPAM 2 MG: 2 INJECTION INTRAMUSCULAR at 10:22

## 2024-11-25 RX ADMIN — MORPHINE SULFATE 4 MG: 4 INJECTION, SOLUTION INTRAMUSCULAR; INTRAVENOUS at 11:56

## 2024-11-26 RX ORDER — MORPHINE SULFATE 8 MG/ML
6 INJECTION, SOLUTION INTRAMUSCULAR; INTRAVENOUS EVERY 4 HOURS
Status: DISCONTINUED | OUTPATIENT
Start: 2024-11-26 | End: 2024-11-28

## 2024-11-26 RX ADMIN — MORPHINE SULFATE 4 MG: 4 INJECTION, SOLUTION INTRAMUSCULAR; INTRAVENOUS at 05:24

## 2024-11-26 RX ADMIN — Medication 10 ML: at 22:15

## 2024-11-26 RX ADMIN — LORAZEPAM 2 MG: 2 INJECTION INTRAMUSCULAR at 05:24

## 2024-11-26 RX ADMIN — LORAZEPAM 2 MG: 2 INJECTION INTRAMUSCULAR at 18:02

## 2024-11-26 RX ADMIN — MORPHINE SULFATE 6 MG: 8 INJECTION, SOLUTION INTRAMUSCULAR; INTRAVENOUS at 18:02

## 2024-11-26 RX ADMIN — LORAZEPAM 2 MG: 2 INJECTION INTRAMUSCULAR at 22:15

## 2024-11-26 RX ADMIN — LORAZEPAM 2 MG: 2 INJECTION INTRAMUSCULAR at 13:51

## 2024-11-26 RX ADMIN — LORAZEPAM 2 MG: 2 INJECTION INTRAMUSCULAR at 09:34

## 2024-11-26 RX ADMIN — MORPHINE SULFATE 6 MG: 8 INJECTION, SOLUTION INTRAMUSCULAR; INTRAVENOUS at 13:51

## 2024-11-26 RX ADMIN — LORAZEPAM 2 MG: 2 INJECTION INTRAMUSCULAR at 01:26

## 2024-11-26 RX ADMIN — Medication 10 ML: at 09:34

## 2024-11-26 RX ADMIN — MORPHINE SULFATE 4 MG: 4 INJECTION, SOLUTION INTRAMUSCULAR; INTRAVENOUS at 01:26

## 2024-11-26 RX ADMIN — MORPHINE SULFATE 6 MG: 8 INJECTION, SOLUTION INTRAMUSCULAR; INTRAVENOUS at 22:30

## 2024-11-26 RX ADMIN — MORPHINE SULFATE 4 MG: 4 INJECTION, SOLUTION INTRAMUSCULAR; INTRAVENOUS at 09:35

## 2024-11-27 RX ADMIN — MORPHINE SULFATE 6 MG: 8 INJECTION, SOLUTION INTRAMUSCULAR; INTRAVENOUS at 12:20

## 2024-11-27 RX ADMIN — MORPHINE SULFATE 6 MG: 8 INJECTION, SOLUTION INTRAMUSCULAR; INTRAVENOUS at 05:52

## 2024-11-27 RX ADMIN — LORAZEPAM 2 MG: 2 INJECTION INTRAMUSCULAR at 13:53

## 2024-11-27 RX ADMIN — LORAZEPAM 2 MG: 2 INJECTION INTRAMUSCULAR at 02:20

## 2024-11-27 RX ADMIN — MORPHINE SULFATE 6 MG: 8 INJECTION, SOLUTION INTRAMUSCULAR; INTRAVENOUS at 17:53

## 2024-11-27 RX ADMIN — LORAZEPAM 2 MG: 2 INJECTION INTRAMUSCULAR at 12:20

## 2024-11-27 RX ADMIN — MORPHINE SULFATE 6 MG: 8 INJECTION, SOLUTION INTRAMUSCULAR; INTRAVENOUS at 02:19

## 2024-11-27 RX ADMIN — Medication 10 ML: at 05:53

## 2024-11-27 RX ADMIN — LORAZEPAM 2 MG: 2 INJECTION INTRAMUSCULAR at 05:53

## 2024-11-27 RX ADMIN — LORAZEPAM 2 MG: 2 INJECTION INTRAMUSCULAR at 09:48

## 2024-11-27 RX ADMIN — MORPHINE SULFATE 6 MG: 8 INJECTION, SOLUTION INTRAMUSCULAR; INTRAVENOUS at 22:28

## 2024-11-27 RX ADMIN — MORPHINE SULFATE 6 MG: 8 INJECTION, SOLUTION INTRAMUSCULAR; INTRAVENOUS at 13:53

## 2024-11-27 RX ADMIN — LORAZEPAM 2 MG: 2 INJECTION INTRAMUSCULAR at 17:54

## 2024-11-27 RX ADMIN — MORPHINE SULFATE 6 MG: 8 INJECTION, SOLUTION INTRAMUSCULAR; INTRAVENOUS at 09:49

## 2024-11-27 RX ADMIN — LORAZEPAM 2 MG: 2 INJECTION INTRAMUSCULAR at 22:27

## 2024-11-28 RX ORDER — MIDAZOLAM HYDROCHLORIDE 2 MG/2ML
4 INJECTION, SOLUTION INTRAMUSCULAR; INTRAVENOUS EVERY 4 HOURS
Status: DISCONTINUED | OUTPATIENT
Start: 2024-11-28 | End: 2024-12-03 | Stop reason: HOSPADM

## 2024-11-28 RX ORDER — MIDAZOLAM HYDROCHLORIDE 2 MG/2ML
4 INJECTION, SOLUTION INTRAMUSCULAR; INTRAVENOUS
Status: DISCONTINUED | OUTPATIENT
Start: 2024-11-28 | End: 2024-12-03 | Stop reason: HOSPADM

## 2024-11-28 RX ADMIN — MORPHINE SULFATE 6 MG: 8 INJECTION, SOLUTION INTRAMUSCULAR; INTRAVENOUS at 08:41

## 2024-11-28 RX ADMIN — MIDAZOLAM HYDROCHLORIDE 4 MG: 2 INJECTION, SOLUTION INTRAMUSCULAR; INTRAVENOUS at 05:43

## 2024-11-28 RX ADMIN — Medication 1 MG: at 13:40

## 2024-11-28 RX ADMIN — MORPHINE SULFATE 6 MG: 8 INJECTION, SOLUTION INTRAMUSCULAR; INTRAVENOUS at 05:43

## 2024-11-28 RX ADMIN — Medication 1 MG: at 10:35

## 2024-11-28 RX ADMIN — MIDAZOLAM HYDROCHLORIDE 4 MG: 2 INJECTION, SOLUTION INTRAMUSCULAR; INTRAVENOUS at 13:40

## 2024-11-28 RX ADMIN — MIDAZOLAM HYDROCHLORIDE 4 MG: 2 INJECTION, SOLUTION INTRAMUSCULAR; INTRAVENOUS at 08:41

## 2024-11-28 RX ADMIN — MIDAZOLAM HYDROCHLORIDE 4 MG: 2 INJECTION, SOLUTION INTRAMUSCULAR; INTRAVENOUS at 10:35

## 2024-11-28 RX ADMIN — LORAZEPAM 2 MG: 2 INJECTION INTRAMUSCULAR at 01:33

## 2024-11-28 RX ADMIN — MORPHINE SULFATE 6 MG: 8 INJECTION, SOLUTION INTRAMUSCULAR; INTRAVENOUS at 01:33

## 2024-11-28 NOTE — PROGRESS NOTES
[facility]   [phone]  Hospice House Progress Note  Provider Kiley Jean Baptiste, APRN - CNP    Alan Guajardo Jr.  [id number]  Hospital Day: 4    HPI:   Alan Guajardo Jr. is a 69 y.o. 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 Cummings, on chemotherapy and immunotherapy. He was following at [facility] for a possible total laryngectomy however it was not pursued due to concerns relating to his respiratory status. Patient initially presented to [facility] on 11/8/2024 with complaints of fatigue and weakness. He was found to have hyponatremia, urinary retention, constipation and acute on chronic hypoxic respiratory failure with tracheitis, cultures growing Serratia and Pseudomonas. Patient's oxygen requirements increased, was on Airvo and there was discussion about placing him on mechanical ventilation and patient and family decided to stop aggressive care and transition to comfort care with hospice services. He was compassionately weaned from Airvo to oxygen via trach mask. He was requiring several doses of IV Ativan and IV morphine for comfort. He was evaluated by the hospice liaison and it was determined he was symptomatic and was transferred to the Hospice House for the CHIEF COMPLAINT of uncontrolled pain, dyspnea and anxiety, related to a terminal diagnosis of acute on chronic respiratory failure related to recurrent squamous cell carcinoma of the larynx.     SUBJECTIVE:   Chart reviewed and spoke with bedside RN. No acute events overnight. He is receiving comfort medications IV route. Yesterday afternoon he required dose increase for morphine sulfate due to persistent tachypnea and increased workload of breathing. He has received 2 as needed doses of 
  [facility]   [phone]  Hospice House Progress Note  Provider Boubacar Beltran MD    Alan Guajardo Jr.  [id number]  Hospital Day: 6    HPI:   Alan Guajardo Jr. is a 69 y.o. 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 Cummings, on chemotherapy and immunotherapy. He was following at [facility] for a possible total laryngectomy however it was not pursued due to concerns relating to his respiratory status. Patient initially presented to [facility] on 11/8/2024 with complaints of fatigue and weakness. He was found to have hyponatremia, urinary retention, constipation and acute on chronic hypoxic respiratory failure with tracheitis, cultures growing Serratia and Pseudomonas. Patient's oxygen requirements increased, was on Airvo and there was discussion about placing him on mechanical ventilation and patient and family decided to stop aggressive care and transition to comfort care with hospice services. He was compassionately weaned from Airvo to oxygen via trach mask. He was requiring several doses of IV Ativan and IV morphine for comfort. He was evaluated by the hospice liaison and it was determined he was symptomatic and was transferred to the Hospice House for the CHIEF COMPLAINT of uncontrolled pain, dyspnea and anxiety, related to a terminal diagnosis of acute on chronic respiratory failure related to recurrent squamous cell carcinoma of the larynx.     SUBJECTIVE:   The patient was seen and examined in his room.  On review of records, he used 1 breakthrough dose of morphine and lorazepam overnight.  He required transition to midazolam due to lorazepam availability this morning and used 1 dose of morphine and midazolam at this morning.  Nursing reports no 
  [facility]   [phone]  Hospice House Progress Note  Provider Kiley Jean Baptiste, APRN - CNP    Alan Guajardo Jr.  [id number]  Hospital Day: 3    HPI:   Alan Guajardo Jr. is a 69 y.o. 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 Cummings, on chemotherapy and immunotherapy. He was following at [facility] for a possible total laryngectomy however it was not pursued due to concerns relating to his respiratory status. Patient initially presented to [facility] on 11/8/2024 with complaints of fatigue and weakness. He was found to have hyponatremia, urinary retention, constipation and acute on chronic hypoxic respiratory failure with tracheitis, cultures growing Serratia and Pseudomonas. Patient's oxygen requirements increased, was on Airvo and there was discussion about placing him on mechanical ventilation and patient and family decided to stop aggressive care and transition to comfort care with hospice services. He was compassionately weaned from Airvo to oxygen via trach mask. He was requiring several doses of IV Ativan and IV morphine for comfort. He was evaluated by the hospice liaison and it was determined he was symptomatic and was transferred to the Hospice House for the CHIEF COMPLAINT of uncontrolled pain, dyspnea and anxiety, related to a terminal diagnosis of acute on chronic respiratory failure related to recurrent squamous cell carcinoma of the larynx.     SUBJECTIVE:   Chart reviewed and spoke with bedside RN. No acute events overnight. He is receiving comfort medications IV route. He has received 6 as needed doses of lorazepam and 6 as needed doses of morphine sulfate in the last 24 hours.     He was seen and examined at the bedside with multiple family 
pulses.      Heart sounds: No murmur heard.  Pulmonary:      Effort: No accessory muscle usage or respiratory distress.      Breath sounds: No transmitted upper airway sounds. Decreased breath sounds present. No wheezing, rhonchi or rales.      Comments: Trach to trach mask 4 L  Abdominal:      General: Bowel sounds are decreased. There is no distension.      Palpations: Abdomen is soft.      Tenderness: There is no abdominal tenderness. There is no guarding.      Comments: Peg tube present.    Musculoskeletal:      Right lower leg: No edema.      Left lower leg: No edema.   Skin:     General: Skin is warm and dry.      Coloration: Skin is pale.   Neurological:      Motor: No tremor or seizure activity.      Comments: Minimally responsive    Psychiatric:         Mood and Affect: Mood is not anxious.         Speech: He is noncommunicative.         Behavior: Behavior is not agitated.          Objective data reviewed: labs, images, records, medication use, vitals, and chart    ASSESSMENT/PLAN:   Terminal diagnosis: Acute on chronic respiratory failure related to recurrent squamous cell carcinoma of the larynx.     Acute on chronic respiratory failure related to recurrent squamous cell carcinoma of the larynx.   Patient with history of subglottic cancer s/p CRT in 2017, s/p trach and PEG, developed recurrent SCC (H+N primary), now with SCM muscle invasion, follows with Dr. Cummings, on chemotherapy and immunotherapy.   He was following at [facility] for a possible total laryngectomy however it was not pursued due to concerns relating to his respiratory status.   Patient initially presented to [facility] on 11/8/2024 with complaints of fatigue and weakness and was found to have hyponatremia, urinary retention, constipation and acute on chronic hypoxic respiratory failure with tracheitis, cultures growing Serratia and Pseudomonas.   Patient's oxygen requirements increased, was on Airvo and there was

## (undated) DEVICE — JELLY LUBRICATING 5G PETRO

## (undated) DEVICE — GLOVE ORANGE PI 7 1/2   MSG9075

## (undated) DEVICE — 6 ML SYRINGE LUER-LOCK TIP: Brand: MONOJECT

## (undated) DEVICE — 12 ML SYRINGE,LUER-LOCK TIP: Brand: MONOJECT

## (undated) DEVICE — BITEBLOCK 54FR W/ DENT RIM BLOX

## (undated) DEVICE — NON-DEHP CATHETER EXTENSION SET, MALE LUER LOCK ADAPTER

## (undated) DEVICE — ESOPHAGEAL/PYLORIC/COLONIC/BILIARY WIREGUIDED BALLOON DILATATION CATHETER: Brand: CRE™ PRO

## (undated) DEVICE — NEEDLE HYPO 18GA L1.5IN PNK POLYPR HUB S STL THN WALL FILL

## (undated) DEVICE — BANDAGE ADH W1XL3IN NAT FAB WVN FLX DURABLE N ADH PD SEAL

## (undated) DEVICE — Z DISCONTINUED APPLICATOR SURG PREP 0.35OZ 2% CHG 70% ISO ALC W/ HI LT

## (undated) DEVICE — 3M™ RED DOT™ MONITORING ELECTRODE WITH FOAM TAPE AND STICKY GEL 2560, 50/BAG, 20/CASE, 72/PLT: Brand: RED DOT™

## (undated) DEVICE — SYRINGE,EAR/ULCER, 2 OZ, STERILE: Brand: MEDLINE

## (undated) DEVICE — CATHETER ETER URETH 30FR BLLN 5CC LTX 2 W F BARDX LUB

## (undated) DEVICE — SYRINGE, LUER LOCK, 5ML: Brand: MEDLINE

## (undated) DEVICE — Device: Brand: PORTEX

## (undated) DEVICE — SPONGE GZ W4XL4IN RAYON POLY CVR W/NONWOVEN FAB STRL 2/PK

## (undated) DEVICE — GRADUATE TRIANG MEASURE 1000ML BLK PRNT

## (undated) DEVICE — GAUZE,SPONGE,4"X4",12PLY,STERILE,LF,2'S: Brand: MEDLINE

## (undated) DEVICE — DEFENDO AIR WATER SUCTION AND BIOPSY VALVE KIT FOR  OLYMPUS: Brand: DEFENDO AIR/WATER/SUCTION AND BIOPSY VALVE

## (undated) DEVICE — Device

## (undated) DEVICE — NEEDLE HYPO 25GA L1.5IN BLU POLYPR HUB S STL REG BVL STR

## (undated) DEVICE — TUBING SUCT 12FR MAL ALUM SHFT FN CAP VENT UNIV CONN W/ OBT

## (undated) DEVICE — BASIC SINGLE BASIN 1-LF: Brand: MEDLINE INDUSTRIES, INC.

## (undated) DEVICE — SPONGE,DRAIN,NONWVN,4"X4",6PLY,STRL,LF: Brand: MEDLINE

## (undated) DEVICE — GUIDEWIRE ENDOSCP ANGLED 0.035 INX260 CM SS JAGWIRE DISP

## (undated) DEVICE — 3M™ MICROPORE™ TAPE, 1530-2: Brand: 3M™ MICROPORE™

## (undated) DEVICE — AIRWAY PHARYNGEAL AD 10 CM INTUB

## (undated) DEVICE — BLADE,STAINLESS-STEEL,15,STRL,DISPOSABLE: Brand: MEDLINE

## (undated) DEVICE — BLOCK BITE 60FR RUBBER ADLT DENTAL

## (undated) DEVICE — NEEDLE HYPO 18GA L1.5IN PNK POLYPR HUB S STL REG BVL STR

## (undated) DEVICE — GUIDEWIRE ENDOSCP ANGLED 0.025 INX260 CM RND STIFF JAGWIRE

## (undated) DEVICE — TOWEL,OR,DSP,ST,BLUE,STD,6/PK,12PK/CS: Brand: MEDLINE

## (undated) DEVICE — SOLUTION IRRIG 1000ML STRL H2O USP PLAS POUR BTL

## (undated) DEVICE — GAUZE,SPONGE,4"X4",8PLY,STRL,LF,10/TRAY: Brand: MEDLINE

## (undated) DEVICE — SYRINGE,CONTROL,LL,FINGER,GRIP: Brand: MEDLINE INDUSTRIES, INC.

## (undated) DEVICE — GLOVE SURG SZ 8 CRM LTX FREE POLYISOPRENE POLYMER BEAD ANTI

## (undated) DEVICE — SURGICAL PROCEDURE PACK EENT CUST

## (undated) DEVICE — MARKER,SKIN,WI/RULER AND LABELS: Brand: MEDLINE

## (undated) DEVICE — COVER HNDL LT DISP

## (undated) DEVICE — SURGICAL PROCEDURE PACK BRONCH

## (undated) DEVICE — 4-PORT MANIFOLD: Brand: NEPTUNE 2

## (undated) DEVICE — BANDAGE,GAUZE,BULKEE II,4.5"X4.1YD,STRL: Brand: MEDLINE

## (undated) DEVICE — YANKAUER,OPEN TIP,W/O VENT,STERILE: Brand: MEDLINE INDUSTRIES, INC.

## (undated) DEVICE — INSTRUMENT SET DENTAL HOUSE

## (undated) DEVICE — GOWN,SIRUS,FABRNF,2XL,18/CS: Brand: MEDLINE

## (undated) DEVICE — SOLUTION IV IRRIG 500ML 0.9% SODIUM CHL 2F7123

## (undated) DEVICE — COVER,LIGHT HANDLE,FLX,2/PK: Brand: MEDLINE INDUSTRIES, INC.

## (undated) DEVICE — MASK RESP UNIV N95 4 PANEL HD STRP INDIVIDUALLY WRP LF